# Patient Record
Sex: FEMALE | Race: ASIAN | NOT HISPANIC OR LATINO | Employment: FULL TIME | URBAN - METROPOLITAN AREA
[De-identification: names, ages, dates, MRNs, and addresses within clinical notes are randomized per-mention and may not be internally consistent; named-entity substitution may affect disease eponyms.]

---

## 2017-02-01 ENCOUNTER — HOSPITAL ENCOUNTER (OUTPATIENT)
Dept: MAMMOGRAPHY | Facility: HOSPITAL | Age: 62
Discharge: HOME | End: 2017-02-01
Admitting: FAMILY MEDICINE

## 2017-02-01 DIAGNOSIS — Z12.31 VISIT FOR SCREENING MAMMOGRAM: ICD-10-CM

## 2017-02-01 PROCEDURE — G0202 SCR MAMMO BI INCL CAD: HCPCS

## 2017-02-01 PROCEDURE — 77063 BREAST TOMOSYNTHESIS BI: CPT

## 2017-02-21 ENCOUNTER — OFFICE (OUTPATIENT)
Dept: URBAN - METROPOLITAN AREA OTHER 6 | Facility: OTHER | Age: 62
End: 2017-02-21

## 2017-02-21 VITALS
DIASTOLIC BLOOD PRESSURE: 59 MMHG | HEIGHT: 59 IN | WEIGHT: 117 LBS | HEART RATE: 88 BPM | SYSTOLIC BLOOD PRESSURE: 107 MMHG

## 2017-02-21 DIAGNOSIS — R13.10 DYSPHAGIA, UNSPECIFIED: ICD-10-CM

## 2017-02-21 DIAGNOSIS — K57.90 DIVERTICULOSIS OF INTESTINE, PART UNSPECIFIED, WITHOUT PERFO: ICD-10-CM

## 2017-02-21 PROCEDURE — 99201 OFFICE OUTPATIENT NEW 10 MINUTES: CPT | Performed by: INTERNAL MEDICINE

## 2017-03-10 ENCOUNTER — ANESTHESIA (OUTPATIENT)
Dept: GASTROENTEROLOGY | Facility: HOSPITAL | Age: 62
End: 2017-03-10

## 2017-03-10 ENCOUNTER — HOSPITAL ENCOUNTER (OUTPATIENT)
Facility: HOSPITAL | Age: 62
Setting detail: HOSPITAL OUTPATIENT SURGERY
Discharge: HOME | End: 2017-03-10
Attending: INTERNAL MEDICINE | Admitting: INTERNAL MEDICINE

## 2017-03-10 ENCOUNTER — ON CAMPUS - OUTPATIENT (OUTPATIENT)
Dept: URBAN - METROPOLITAN AREA HOSPITAL 114 | Facility: HOSPITAL | Age: 62
End: 2017-03-10

## 2017-03-10 ENCOUNTER — ANESTHESIA EVENT (OUTPATIENT)
Dept: GASTROENTEROLOGY | Facility: HOSPITAL | Age: 62
End: 2017-03-10

## 2017-03-10 VITALS
WEIGHT: 117 LBS | SYSTOLIC BLOOD PRESSURE: 121 MMHG | HEART RATE: 64 BPM | BODY MASS INDEX: 23.59 KG/M2 | RESPIRATION RATE: 18 BRPM | OXYGEN SATURATION: 97 % | DIASTOLIC BLOOD PRESSURE: 74 MMHG | HEIGHT: 59 IN

## 2017-03-10 DIAGNOSIS — R13.14 DYSPHAGIA, PHARYNGOESOPHAGEAL PHASE: ICD-10-CM

## 2017-03-10 DIAGNOSIS — K22.2 ESOPHAGEAL OBSTRUCTION: ICD-10-CM

## 2017-03-10 DIAGNOSIS — R10.13 DYSPEPSIA: ICD-10-CM

## 2017-03-10 DIAGNOSIS — K29.50 UNSPECIFIED CHRONIC GASTRITIS WITHOUT BLEEDING: ICD-10-CM

## 2017-03-10 DIAGNOSIS — R13.10 DYSPHAGIA: ICD-10-CM

## 2017-03-10 LAB — GLUCOSE BLDC GLUCOMTR-MCNC: 99 MG/DL (ref 70–130)

## 2017-03-10 PROCEDURE — 25010000002 PROPOFOL 10 MG/ML EMULSION: Performed by: ANESTHESIOLOGY

## 2017-03-10 PROCEDURE — 43239 EGD BIOPSY SINGLE/MULTIPLE: CPT | Performed by: INTERNAL MEDICINE

## 2017-03-10 PROCEDURE — 88305 TISSUE EXAM BY PATHOLOGIST: CPT | Performed by: INTERNAL MEDICINE

## 2017-03-10 PROCEDURE — 88312 SPECIAL STAINS GROUP 1: CPT | Performed by: INTERNAL MEDICINE

## 2017-03-10 PROCEDURE — 43450 DILATE ESOPHAGUS 1/MULT PASS: CPT | Performed by: INTERNAL MEDICINE

## 2017-03-10 PROCEDURE — 82962 GLUCOSE BLOOD TEST: CPT

## 2017-03-10 RX ORDER — SODIUM CHLORIDE, SODIUM LACTATE, POTASSIUM CHLORIDE, CALCIUM CHLORIDE 600; 310; 30; 20 MG/100ML; MG/100ML; MG/100ML; MG/100ML
30 INJECTION, SOLUTION INTRAVENOUS CONTINUOUS PRN
Status: DISCONTINUED | OUTPATIENT
Start: 2017-03-10 | End: 2017-03-10 | Stop reason: HOSPADM

## 2017-03-10 RX ORDER — PROPOFOL 10 MG/ML
VIAL (ML) INTRAVENOUS AS NEEDED
Status: DISCONTINUED | OUTPATIENT
Start: 2017-03-10 | End: 2017-03-10 | Stop reason: SURG

## 2017-03-10 RX ORDER — PROPOFOL 10 MG/ML
VIAL (ML) INTRAVENOUS CONTINUOUS PRN
Status: DISCONTINUED | OUTPATIENT
Start: 2017-03-10 | End: 2017-03-10 | Stop reason: SURG

## 2017-03-10 RX ORDER — LIDOCAINE HYDROCHLORIDE 20 MG/ML
INJECTION, SOLUTION INFILTRATION; PERINEURAL AS NEEDED
Status: DISCONTINUED | OUTPATIENT
Start: 2017-03-10 | End: 2017-03-10 | Stop reason: SURG

## 2017-03-10 RX ORDER — SODIUM CHLORIDE 0.9 % (FLUSH) 0.9 %
1-10 SYRINGE (ML) INJECTION AS NEEDED
Status: DISCONTINUED | OUTPATIENT
Start: 2017-03-10 | End: 2017-03-10 | Stop reason: HOSPADM

## 2017-03-10 RX ADMIN — PROPOFOL 140 MCG/KG/MIN: 10 INJECTION, EMULSION INTRAVENOUS at 10:13

## 2017-03-10 RX ADMIN — LIDOCAINE HYDROCHLORIDE 50 MG: 20 INJECTION, SOLUTION INFILTRATION; PERINEURAL at 10:13

## 2017-03-10 RX ADMIN — SODIUM CHLORIDE, POTASSIUM CHLORIDE, SODIUM LACTATE AND CALCIUM CHLORIDE 30 ML/HR: 600; 310; 30; 20 INJECTION, SOLUTION INTRAVENOUS at 10:03

## 2017-03-10 RX ADMIN — PROPOFOL 150 MG: 10 INJECTION, EMULSION INTRAVENOUS at 10:13

## 2017-03-10 NOTE — ANESTHESIA POSTPROCEDURE EVALUATION
"Patient: Felicia Boyle    Procedure Summary     Date Anesthesia Start Anesthesia Stop Room / Location    03/10/17 1008 1024  [facility] /  [facility]       Procedure Diagnosis Surgeon Provider    ESOPHAGOGASTRODUODENOSCOPY WITH BIOPSY AND PETER DILATATION 54\" (N/A Esophagus) No diagnosis on file. MD Hal Burgos MD          Anesthesia Type: MAC  Last vitals  BP (!) 72/35 (03/10/17 1028)    Temp      Pulse 66 (03/10/17 1028)   Resp 16 (03/10/17 1028)    SpO2 98 % (03/10/17 1028)      Post Anesthesia Care and Evaluation    Patient location during evaluation: PACU  Patient participation: complete - patient participated  Level of consciousness: awake and alert  Pain score: 0  Pain management: adequate  Airway patency: patent  Anesthetic complications: No anesthetic complications    Cardiovascular status: acceptable  Respiratory status: acceptable  Hydration status: acceptable      "

## 2017-03-10 NOTE — DISCHARGE INSTRUCTIONS
For the next 24 hours patient needs to be with a responsible adult.    For 24 hours DO NOT drive, operate machinery, appliances, drink alcohol, make important decisions or sign legal documents.    Start with a soft or bland diet and advance to regular diet as tolerated.    Follow recommendations on procedure report provided by your doctor.    Call Dr Gonsalez for problems [phone]. Call for pathology results in one week.    Problems may include but not limited to: large amounts of bleeding, trouble breathing, repeated vomiting, severe unrelieved pain, fever or chills.

## 2017-03-10 NOTE — ANESTHESIA PREPROCEDURE EVALUATION
Anesthesia Evaluation     Patient summary reviewed      Airway   Mallampati: II  TM distance: >3 FB  Neck ROM: full  no difficulty expected  Dental    (+) upper dentures and lower dentures    Pulmonary     breath sounds clear to auscultation  Cardiovascular   Exercise tolerance: good (4-7 METS)    Rhythm: regular  Rate: normal        Neuro/Psych  GI/Hepatic/Renal/Endo      Musculoskeletal     Abdominal    Substance History      OB/GYN          Other                                    Anesthesia Plan    ASA 2     MAC     intravenous induction   Anesthetic plan and risks discussed with patient.

## 2017-03-10 NOTE — H&P
Patient Care Team:  Tristan Malone DO as PCP - General    Chief complaint  dysphagia    Subjective     History of Present Illness  Has some issues with swallowing solids uses liquids to push solids down   Review of Systems   All other systems reviewed and are negative.       Past Medical History   Diagnosis Date   • Diabetes mellitus      borderline   • Diverticulitis    • Osteoporosis 3/3/2016     Past Surgical History   Procedure Laterality Date   • Cholecystectomy  2012   • Endoscopy       2013   • Colon surgery  2002     Family History   Problem Relation Age of Onset   • Hypertension Father    • Hypertension Mother    • Diabetes Mother    • Breast cancer Sister 48   • Breast cancer Sister 38     Social History   Substance Use Topics   • Smoking status: Current Some Day Smoker   • Smokeless tobacco: Never Used      Comment: socially   • Alcohol use No     No prescriptions prior to admission.     Allergies:  Metronidazole    Objective      Vital Signs       Physical Exam   Constitutional: She is oriented to person, place, and time. She appears well-developed and well-nourished.   HENT:   Mouth/Throat: Oropharynx is clear and moist.   Eyes: Conjunctivae are normal.   Neck: Neck supple.   Cardiovascular: Normal rate and regular rhythm.    Pulmonary/Chest: Effort normal and breath sounds normal.   Abdominal: Soft. Bowel sounds are normal.   Neurological: She is alert and oriented to person, place, and time.   Skin: Skin is warm and dry.   Psychiatric: She has a normal mood and affect.       Results Review:   I reviewed the patient's new clinical results.      Assessment/Plan     Active Problems:    * No active hospital problems. *      Assessment:  (Dysphagia  History of h pylori).     Plan:   (Upper tract endoscopy with dilation Upper tract endoscopy, risks, alternatives and benefits discussed with patient and patient is agreeable to proceed, risks, alternatives and benefits discussed with patient and patient  is agreeable to proceed).       I discussed the patients findings and my recommendations with patient and nursing staff    Angelo Gonsalez MD  03/10/17  10:13 AM

## 2017-03-10 NOTE — PLAN OF CARE
Problem: Patient Care Overview (Adult)  Goal: Plan of Care Review  Outcome: Ongoing (interventions implemented as appropriate)    03/10/17 0936   Coping/Psychosocial Response Interventions   Plan Of Care Reviewed With patient   Patient Care Overview   Progress no change       Goal: Adult Individualization and Mutuality  Outcome: Ongoing (interventions implemented as appropriate)  Goal: Discharge Needs Assessment  Outcome: Ongoing (interventions implemented as appropriate)    03/10/17 0936   Discharge Needs Assessment   Concerns To Be Addressed no discharge needs identified   Discharge Disposition home or self-care   Living Environment   Transportation Available car;family or friend will provide         Problem: GI Endoscopy (Adult)  Intervention: Monitor/Manage Procedure Recovery    03/10/17 0936   Respiratory Interventions   Airway/Ventilation Management airway patency maintained         Goal: Signs and Symptoms of Listed Potential Problems Will be Absent or Manageable (GI Endoscopy)  Outcome: Ongoing (interventions implemented as appropriate)    03/10/17 0936   GI Endoscopy   Problems Assessed (GI Endoscopy) pain   Problems Present (GI Endoscopy) none

## 2017-03-13 LAB
CYTO UR: NORMAL
LAB AP CASE REPORT: NORMAL
Lab: NORMAL
PATH REPORT.FINAL DX SPEC: NORMAL
PATH REPORT.GROSS SPEC: NORMAL

## 2017-09-01 ENCOUNTER — OFFICE VISIT (OUTPATIENT)
Dept: OBSTETRICS AND GYNECOLOGY | Facility: CLINIC | Age: 62
End: 2017-09-01

## 2017-09-01 VITALS
WEIGHT: 119 LBS | HEIGHT: 59 IN | DIASTOLIC BLOOD PRESSURE: 78 MMHG | SYSTOLIC BLOOD PRESSURE: 118 MMHG | BODY MASS INDEX: 23.99 KG/M2

## 2017-09-01 DIAGNOSIS — Z12.4 SCREENING FOR MALIGNANT NEOPLASM OF CERVIX: ICD-10-CM

## 2017-09-01 DIAGNOSIS — Z80.3 FH: BREAST CANCER IN FIRST DEGREE RELATIVE: ICD-10-CM

## 2017-09-01 DIAGNOSIS — Z13.9 SCREENING: Primary | ICD-10-CM

## 2017-09-01 DIAGNOSIS — M81.0 OSTEOPOROSIS: ICD-10-CM

## 2017-09-01 PROCEDURE — 99396 PREV VISIT EST AGE 40-64: CPT | Performed by: OBSTETRICS & GYNECOLOGY

## 2017-09-01 RX ORDER — ALENDRONATE SODIUM 70 MG/1
70 TABLET ORAL
Qty: 4 TABLET | Refills: 12 | Status: SHIPPED | OUTPATIENT
Start: 2017-09-01 | End: 2018-01-29 | Stop reason: ALTCHOICE

## 2017-09-01 RX ORDER — AZITHROMYCIN 250 MG/1
TABLET, FILM COATED ORAL
Refills: 0 | COMMUNITY
Start: 2017-08-30 | End: 2017-11-02

## 2017-09-01 NOTE — PROGRESS NOTES
"Subjective   Felicia Boyle is a 61 y.o. female Annual exam, last pap smear 1/15/16 negative, last mammo 2/1/17 negative patient states she has not started fosamax because she forgot about it - will start now- no other complaints -    History of Present Illness    The following portions of the patient's history were reviewed and updated as appropriate: allergies, current medications, past family history, past medical history, past social history, past surgical history and problem list.    Review of Systems   Constitutional: Negative for chills and fever.   Gastrointestinal: Negative for abdominal distention and abdominal pain.   Genitourinary: Negative for dyspareunia, dysuria, pelvic pain, vaginal bleeding, vaginal discharge and vaginal pain.   All other systems reviewed and are negative.  /78  Ht 59\" (149.9 cm)  Wt 119 lb (54 kg)  Breastfeeding? No  BMI 24.04 kg/m2      Objective   Physical Exam   Constitutional: She is oriented to person, place, and time. She appears well-developed and well-nourished.   Neck: Normal range of motion. Neck supple. No thyromegaly present.   Cardiovascular: Normal rate and regular rhythm.    Pulmonary/Chest: Effort normal and breath sounds normal. Right breast exhibits no mass, no nipple discharge, no skin change and no tenderness. Left breast exhibits no mass, no nipple discharge, no skin change and no tenderness.   Abdominal: Soft. Bowel sounds are normal. She exhibits no distension. There is no tenderness.   Genitourinary: Vagina normal and uterus normal. Pelvic exam was performed with patient supine. Uterus is not tender. Cervix exhibits no friability. Right adnexum displays no mass and no tenderness. Left adnexum displays no mass and no tenderness. No vaginal discharge found.   Musculoskeletal: Normal range of motion. She exhibits no edema.   Neurological: She is alert and oriented to person, place, and time.   Skin: Skin is warm and dry. No rash noted.   Psychiatric: " She has a normal mood and affect. Her behavior is normal.   Nursing note and vitals reviewed.        Assessment/Plan   Felicia was seen today for gynecologic exam.    Diagnoses and all orders for this visit:    Screening  -     Cancel: POC Urinalysis Dipstick    Screening for malignant neoplasm of cervix  -     IgP, Aptima HPV    FH: breast cancer in first degree relative    Osteoporosis  -     alendronate (FOSAMAX) 70 MG tablet; Take 1 tablet by mouth Every 7 (Seven) Days. Starting on Sunday      [facility] today   Return 1 year

## 2017-09-06 ENCOUNTER — TELEPHONE (OUTPATIENT)
Dept: OBSTETRICS AND GYNECOLOGY | Facility: CLINIC | Age: 62
End: 2017-09-06

## 2017-09-06 LAB
CYTOLOGIST CVX/VAG CYTO: NORMAL
CYTOLOGY CVX/VAG DOC THIN PREP: NORMAL
DX ICD CODE: NORMAL
HIV 1 & 2 AB SER-IMP: NORMAL
HPV I/H RISK 4 DNA CVX QL PROBE+SIG AMP: NEGATIVE
OTHER STN SPEC: NORMAL
PATH REPORT.FINAL DX SPEC: NORMAL
STAT OF ADQ CVX/VAG CYTO-IMP: NORMAL

## 2017-09-06 NOTE — TELEPHONE ENCOUNTER
----- Message from Cristin Perry MD sent at 9/6/2017  1:22 PM EDT -----  Please call patient and notify of normal results of pap

## 2017-10-04 ENCOUNTER — TELEPHONE (OUTPATIENT)
Dept: OBSTETRICS AND GYNECOLOGY | Facility: CLINIC | Age: 62
End: 2017-10-04

## 2017-10-11 RX ORDER — BLOOD-GLUCOSE METER
KIT MISCELLANEOUS
Qty: 1 EACH | Refills: 0 | Status: SHIPPED | OUTPATIENT
Start: 2017-10-11 | End: 2019-10-09

## 2017-10-11 NOTE — TELEPHONE ENCOUNTER
Patient has made an appointment on 11/2/2017 @ 3:00.    She would like a refill on     Metformin 500mg    #60  1 bid    Please send to [facility] on [address]

## 2017-11-02 ENCOUNTER — OFFICE VISIT (OUTPATIENT)
Dept: FAMILY MEDICINE CLINIC | Facility: CLINIC | Age: 62
End: 2017-11-02

## 2017-11-02 VITALS
OXYGEN SATURATION: 95 % | HEIGHT: 59 IN | HEART RATE: 95 BPM | WEIGHT: 119 LBS | BODY MASS INDEX: 23.99 KG/M2 | SYSTOLIC BLOOD PRESSURE: 120 MMHG | TEMPERATURE: 98.9 F | DIASTOLIC BLOOD PRESSURE: 70 MMHG

## 2017-11-02 DIAGNOSIS — E11.9 CONTROLLED TYPE 2 DIABETES MELLITUS WITHOUT COMPLICATION, WITHOUT LONG-TERM CURRENT USE OF INSULIN (HCC): Primary | ICD-10-CM

## 2017-11-02 DIAGNOSIS — Z00.00 ANNUAL PHYSICAL EXAM: ICD-10-CM

## 2017-11-02 DIAGNOSIS — M81.0 AGE-RELATED OSTEOPOROSIS WITHOUT CURRENT PATHOLOGICAL FRACTURE: ICD-10-CM

## 2017-11-02 DIAGNOSIS — Z78.0 POST-MENOPAUSAL: ICD-10-CM

## 2017-11-02 DIAGNOSIS — Z00.00 HEALTH CARE MAINTENANCE: ICD-10-CM

## 2017-11-02 PROCEDURE — 99213 OFFICE O/P EST LOW 20 MIN: CPT | Performed by: FAMILY MEDICINE

## 2017-11-02 NOTE — PROGRESS NOTES
Subjective   Felicia Boyle is a 62 y.o. female with   Chief Complaint   Patient presents with   • Diabetes     History of Present Illness     Patient presents today for stable Type II Diabetes.  She reports that she has recently checked her A1C and it was 6.13. 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  She continues to tolerate Metformin twice a day. She checks her sugar daily.    Patient states that she was prescribed Alendronate.  There is a known history of osteoporosis.    The following portions of the patient's history were reviewed and updated as appropriate: allergies, current medications, past family history, past medical history, past social history, past surgical history and problem list.    Review of Systems   Endocrine: Negative for cold intolerance and heat intolerance.        Type II Diabetes   Musculoskeletal: Negative for arthralgias, back pain, gait problem and joint swelling.        Osteoporosis       Objective     Vitals:    11/02/17 1504   BP: 120/70   Pulse: 95   Temp: 98.9 °F (37.2 °C)   SpO2: 95%     BP Readings from Last 3 Encounters:   11/02/17 120/70   09/01/17 118/78   03/10/17 121/74      Wt Readings from Last 3 Encounters:   11/02/17 119 lb (54 kg)   09/01/17 119 lb (54 kg)   03/09/17 117 lb (53.1 kg)        Physical Exam   Constitutional: She is oriented to person, place, and time. She appears well-developed and well-nourished.   HENT:   Head: Normocephalic and atraumatic.   Neck: Trachea normal and phonation normal. Neck supple. Normal carotid pulses present. Carotid bruit is not present. No thyroid mass and no thyromegaly present.   Cardiovascular: Normal rate, regular rhythm and normal heart sounds.  Exam reveals no gallop and no friction rub.    No murmur heard.  Pulmonary/Chest: Effort normal and breath sounds normal. No respiratory distress. She has no decreased breath sounds. She has no wheezes. She has no  rhonchi. She has no rales.   Lymphadenopathy:     She has no cervical adenopathy.   Neurological: She is alert and oriented to person, place, and time.   Skin: Skin is warm and dry. No rash noted.   Psychiatric: She has a normal mood and affect. Her speech is normal and behavior is normal. Judgment and thought content normal. Cognition and memory are normal.   Nursing note and vitals reviewed.      Assessment/Plan   Felicia was seen today for diabetes.    Diagnoses and all orders for this visit:    Controlled type 2 diabetes mellitus without complication, without long-term current use of insulin    Post-menopausal  -     DEXA Bone Density Axial; Future    Health care maintenance  -     CBC & Differential  -     Comprehensive Metabolic Panel  -     Hemoglobin A1c  -     Lipid Panel  -     TSH  -     Vitamin D 25 Hydroxy    Annual physical exam  -     CBC & Differential  -     Comprehensive Metabolic Panel  -     Hemoglobin A1c  -     Lipid Panel  -     TSH  -     Vitamin D 25 Hydroxy    Age-related osteoporosis without current pathological fracture    Other orders  -     metFORMIN (GLUCOPHAGE) 500 MG tablet; Take 1 tablet by mouth 2 (Two) Times a Day With Meals.  -     HYDROcodone-homatropine (HYCODAN) 5-1.5 MG/5ML syrup; Take 5 mL by mouth Every 6 (Six) Hours As Needed for Cough.        Return in about 6 months (around 5/2/2018).        Scribed for Tristan Malone MD by Erick Cordova. 11/02/2017    ITristan MD personally performed the services described in this documentation, as scribed by Erick Cordova in my presence, and it is both accurate and complete

## 2017-11-04 PROBLEM — E11.9 CONTROLLED TYPE 2 DIABETES MELLITUS WITHOUT COMPLICATION, WITHOUT LONG-TERM CURRENT USE OF INSULIN: Status: ACTIVE | Noted: 2017-11-04

## 2017-11-04 PROBLEM — Z78.0 POST-MENOPAUSAL: Status: ACTIVE | Noted: 2017-11-04

## 2017-11-08 ENCOUNTER — APPOINTMENT (OUTPATIENT)
Dept: LAB | Facility: HOSPITAL | Age: 62
End: 2017-11-08

## 2017-11-08 LAB
25(OH)D3 SERPL-MCNC: 17.5 NG/ML (ref 30–100)
ALBUMIN SERPL-MCNC: 4.7 G/DL (ref 3.5–5.2)
ALBUMIN/GLOB SERPL: 1.6 G/DL
ALP SERPL-CCNC: 75 U/L (ref 39–117)
ALT SERPL W P-5'-P-CCNC: 23 U/L (ref 1–33)
ANION GAP SERPL CALCULATED.3IONS-SCNC: 10.3 MMOL/L
AST SERPL-CCNC: 16 U/L (ref 1–32)
BASOPHILS # BLD AUTO: 0.03 10*3/MM3 (ref 0–0.2)
BASOPHILS NFR BLD AUTO: 0.4 % (ref 0–1.5)
BILIRUB SERPL-MCNC: 0.3 MG/DL (ref 0.1–1.2)
BUN BLD-MCNC: 12 MG/DL (ref 8–23)
BUN/CREAT SERPL: 16.9 (ref 7–25)
CALCIUM SPEC-SCNC: 9.4 MG/DL (ref 8.6–10.5)
CHLORIDE SERPL-SCNC: 104 MMOL/L (ref 98–107)
CHOLEST SERPL-MCNC: 151 MG/DL (ref 0–200)
CO2 SERPL-SCNC: 27.7 MMOL/L (ref 22–29)
CREAT BLD-MCNC: 0.71 MG/DL (ref 0.57–1)
DEPRECATED RDW RBC AUTO: 45.1 FL (ref 37–54)
EOSINOPHIL # BLD AUTO: 0.08 10*3/MM3 (ref 0–0.7)
EOSINOPHIL NFR BLD AUTO: 1 % (ref 0.3–6.2)
ERYTHROCYTE [DISTWIDTH] IN BLOOD BY AUTOMATED COUNT: 13.1 % (ref 11.7–13)
GFR SERPL CREATININE-BSD FRML MDRD: 101 ML/MIN/1.73
GFR SERPL CREATININE-BSD FRML MDRD: 83 ML/MIN/1.73
GLOBULIN UR ELPH-MCNC: 2.9 GM/DL
GLUCOSE BLD-MCNC: 116 MG/DL (ref 65–99)
HBA1C MFR BLD: 6.23 % (ref 4.8–5.6)
HCT VFR BLD AUTO: 42.5 % (ref 35.6–45.5)
HDLC SERPL-MCNC: 67 MG/DL (ref 40–60)
HGB BLD-MCNC: 13.5 G/DL (ref 11.9–15.5)
IMM GRANULOCYTES # BLD: 0 10*3/MM3 (ref 0–0.03)
IMM GRANULOCYTES NFR BLD: 0 % (ref 0–0.5)
LDLC SERPL CALC-MCNC: 53 MG/DL (ref 0–100)
LDLC/HDLC SERPL: 0.8 {RATIO}
LYMPHOCYTES # BLD AUTO: 2.95 10*3/MM3 (ref 0.9–4.8)
LYMPHOCYTES NFR BLD AUTO: 35.1 % (ref 19.6–45.3)
MCH RBC QN AUTO: 30.1 PG (ref 26.9–32)
MCHC RBC AUTO-ENTMCNC: 31.8 G/DL (ref 32.4–36.3)
MCV RBC AUTO: 94.7 FL (ref 80.5–98.2)
MONOCYTES # BLD AUTO: 0.91 10*3/MM3 (ref 0.2–1.2)
MONOCYTES NFR BLD AUTO: 10.8 % (ref 5–12)
NEUTROPHILS # BLD AUTO: 4.43 10*3/MM3 (ref 1.9–8.1)
NEUTROPHILS NFR BLD AUTO: 52.7 % (ref 42.7–76)
PLATELET # BLD AUTO: 286 10*3/MM3 (ref 140–500)
PMV BLD AUTO: 10.5 FL (ref 6–12)
POTASSIUM BLD-SCNC: 4.1 MMOL/L (ref 3.5–5.2)
PROT SERPL-MCNC: 7.6 G/DL (ref 6–8.5)
RBC # BLD AUTO: 4.49 10*6/MM3 (ref 3.9–5.2)
SODIUM BLD-SCNC: 142 MMOL/L (ref 136–145)
TRIGL SERPL-MCNC: 153 MG/DL (ref 0–150)
TSH SERPL DL<=0.05 MIU/L-ACNC: 2.7 MIU/ML (ref 0.27–4.2)
VLDLC SERPL-MCNC: 30.6 MG/DL (ref 5–40)
WBC NRBC COR # BLD: 8.4 10*3/MM3 (ref 4.5–10.7)

## 2017-11-08 PROCEDURE — 82306 VITAMIN D 25 HYDROXY: CPT | Performed by: FAMILY MEDICINE

## 2017-11-08 PROCEDURE — 85025 COMPLETE CBC W/AUTO DIFF WBC: CPT | Performed by: FAMILY MEDICINE

## 2017-11-08 PROCEDURE — 83036 HEMOGLOBIN GLYCOSYLATED A1C: CPT | Performed by: FAMILY MEDICINE

## 2017-11-08 PROCEDURE — 84443 ASSAY THYROID STIM HORMONE: CPT | Performed by: FAMILY MEDICINE

## 2017-11-08 PROCEDURE — 80061 LIPID PANEL: CPT | Performed by: FAMILY MEDICINE

## 2017-11-08 PROCEDURE — 80053 COMPREHEN METABOLIC PANEL: CPT | Performed by: FAMILY MEDICINE

## 2018-01-03 ENCOUNTER — TRANSCRIBE ORDERS (OUTPATIENT)
Dept: ADMINISTRATIVE | Facility: HOSPITAL | Age: 63
End: 2018-01-03

## 2018-01-03 DIAGNOSIS — Z12.31 VISIT FOR SCREENING MAMMOGRAM: Primary | ICD-10-CM

## 2018-01-22 ENCOUNTER — HOSPITAL ENCOUNTER (OUTPATIENT)
Dept: BONE DENSITY | Facility: HOSPITAL | Age: 63
Discharge: HOME | End: 2018-01-22
Admitting: FAMILY MEDICINE

## 2018-01-22 DIAGNOSIS — Z78.0 POST-MENOPAUSAL: ICD-10-CM

## 2018-01-22 PROCEDURE — 77080 DXA BONE DENSITY AXIAL: CPT

## 2018-01-29 ENCOUNTER — OFFICE VISIT (OUTPATIENT)
Dept: FAMILY MEDICINE CLINIC | Facility: CLINIC | Age: 63
End: 2018-01-29

## 2018-01-29 VITALS
DIASTOLIC BLOOD PRESSURE: 68 MMHG | SYSTOLIC BLOOD PRESSURE: 120 MMHG | TEMPERATURE: 98.5 F | WEIGHT: 115 LBS | HEIGHT: 59 IN | OXYGEN SATURATION: 97 % | HEART RATE: 97 BPM | BODY MASS INDEX: 23.18 KG/M2

## 2018-01-29 DIAGNOSIS — M81.0 AGE-RELATED OSTEOPOROSIS WITHOUT CURRENT PATHOLOGICAL FRACTURE: Primary | ICD-10-CM

## 2018-01-29 DIAGNOSIS — E11.9 CONTROLLED TYPE 2 DIABETES MELLITUS WITHOUT COMPLICATION, WITHOUT LONG-TERM CURRENT USE OF INSULIN (HCC): ICD-10-CM

## 2018-01-29 DIAGNOSIS — E55.9 VITAMIN D DEFICIENCY: ICD-10-CM

## 2018-01-29 PROCEDURE — 99213 OFFICE O/P EST LOW 20 MIN: CPT | Performed by: FAMILY MEDICINE

## 2018-01-29 RX ORDER — ERGOCALCIFEROL 1.25 MG/1
50000 CAPSULE ORAL
Qty: 12 CAPSULE | Refills: 2 | Status: SHIPPED | OUTPATIENT
Start: 2018-01-29 | End: 2018-11-15 | Stop reason: SDUPTHER

## 2018-01-29 NOTE — PATIENT INSTRUCTIONS
Start 50,000 IU per day of Vitamin D3  Repeat Dexa in November of 2018  Discontinue Fosamax due to Reflux and start Prolia Injections.

## 2018-02-02 ENCOUNTER — HOSPITAL ENCOUNTER (OUTPATIENT)
Dept: MAMMOGRAPHY | Facility: HOSPITAL | Age: 63
Discharge: HOME | End: 2018-02-02
Admitting: FAMILY MEDICINE

## 2018-02-02 DIAGNOSIS — Z12.31 VISIT FOR SCREENING MAMMOGRAM: ICD-10-CM

## 2018-02-02 PROCEDURE — 77063 BREAST TOMOSYNTHESIS BI: CPT

## 2018-02-02 PROCEDURE — 77067 SCR MAMMO BI INCL CAD: CPT

## 2018-02-13 ENCOUNTER — TELEPHONE (OUTPATIENT)
Dept: FAMILY MEDICINE CLINIC | Facility: CLINIC | Age: 63
End: 2018-02-13

## 2018-02-13 NOTE — TELEPHONE ENCOUNTER
Called pt to schedule her for Prolia shot, it is here.  She is now afraid to take the injection due to side effects.  She isnt sure now what to do.  Would you like her to schedule an appt to see you to discuss this?  Or can you offer advice over the phone?

## 2018-02-14 ENCOUNTER — OFFICE VISIT (OUTPATIENT)
Dept: FAMILY MEDICINE CLINIC | Facility: CLINIC | Age: 63
End: 2018-02-14

## 2018-02-14 VITALS
TEMPERATURE: 98.3 F | DIASTOLIC BLOOD PRESSURE: 78 MMHG | BODY MASS INDEX: 23.18 KG/M2 | SYSTOLIC BLOOD PRESSURE: 122 MMHG | HEART RATE: 87 BPM | HEIGHT: 59 IN | OXYGEN SATURATION: 97 % | WEIGHT: 115 LBS

## 2018-02-14 DIAGNOSIS — M81.0 AGE-RELATED OSTEOPOROSIS WITHOUT CURRENT PATHOLOGICAL FRACTURE: Primary | ICD-10-CM

## 2018-02-14 PROCEDURE — 99213 OFFICE O/P EST LOW 20 MIN: CPT | Performed by: FAMILY MEDICINE

## 2018-02-14 RX ORDER — ALENDRONATE SODIUM 70 MG/1
70 TABLET ORAL
Qty: 4 TABLET | Refills: 11 | Status: SHIPPED | OUTPATIENT
Start: 2018-02-14 | End: 2018-11-15 | Stop reason: SDUPTHER

## 2018-02-14 NOTE — PROGRESS NOTES
"Subjective   Felicia Boyle is a 62 y.o. female.   Chief Complaint   Patient presents with   • Osteoporosis     Here to discuss prolia, she is concerned about side effects     Vitals:    02/14/18 0732   BP: 122/78   Pulse: 87   Temp: 98.3 °F (36.8 °C)   SpO2: 97%   Weight: 52.2 kg (115 lb)   Height: 149.9 cm (59.02\")     Allergies   Allergen Reactions   • Metronidazole Nausea Only     History of Present Illness   62-year-old [place] female with significant osteoporosis here in follow-up.  Patient initially agreed to the use of Prolia and actually had one injection approved and paid for and has been delivered.  She is now refusing to use this product secondary to cost and would rather try Fosamax first.  Full discussion in regards to benefits, potential side effects and alternatives took place with patient choosing to use this method currently.  The following portions of the patient's history were reviewed and updated as appropriate: allergies, current medications, past family history, past medical history, past social history, past surgical history and problem list.    Review of Systems   Musculoskeletal:        Osteoporosis       Objective   Physical Exam   Constitutional: She is oriented to person, place, and time. She appears well-developed and well-nourished.   HENT:   Head: Normocephalic and atraumatic.   Neck: Trachea normal and phonation normal. Neck supple. Normal carotid pulses present. Carotid bruit is not present. No thyroid mass and no thyromegaly present.   Cardiovascular: Normal rate, regular rhythm and normal heart sounds.  Exam reveals no gallop and no friction rub.    No murmur heard.  Pulmonary/Chest: Effort normal and breath sounds normal. No respiratory distress. She has no decreased breath sounds. She has no wheezes. She has no rhonchi. She has no rales.   Lymphadenopathy:     She has no cervical adenopathy.   Neurological: She is alert and oriented to person, place, and time.   Skin: Skin is " warm and dry. No rash noted.   Psychiatric: She has a normal mood and affect. Her speech is normal and behavior is normal. Judgment and thought content normal. Cognition and memory are normal.   Nursing note and vitals reviewed.      Assessment/Plan   Felicia was seen today for osteoporosis.    Diagnoses and all orders for this visit:    Age-related osteoporosis without current pathological fracture  -     alendronate (FOSAMAX) 70 MG tablet; Take 1 tablet by mouth Every 7 (Seven) Days.      Repeat DEXA scan in 1 year.

## 2018-03-16 ENCOUNTER — LAB (OUTPATIENT)
Dept: LAB | Facility: HOSPITAL | Age: 63
End: 2018-03-16

## 2018-03-16 DIAGNOSIS — E11.9 CONTROLLED TYPE 2 DIABETES MELLITUS WITHOUT COMPLICATION, WITHOUT LONG-TERM CURRENT USE OF INSULIN (HCC): ICD-10-CM

## 2018-03-16 DIAGNOSIS — E55.9 VITAMIN D DEFICIENCY: ICD-10-CM

## 2018-03-16 LAB
25(OH)D3 SERPL-MCNC: 54.1 NG/ML (ref 30–100)
ALBUMIN SERPL-MCNC: 5.1 G/DL (ref 3.5–5.2)
ALBUMIN/GLOB SERPL: 1.8 G/DL
ALP SERPL-CCNC: 62 U/L (ref 39–117)
ALT SERPL W P-5'-P-CCNC: 21 U/L (ref 1–33)
ANION GAP SERPL CALCULATED.3IONS-SCNC: 15.5 MMOL/L
AST SERPL-CCNC: 18 U/L (ref 1–32)
BASOPHILS # BLD AUTO: 0.03 10*3/MM3 (ref 0–0.2)
BASOPHILS NFR BLD AUTO: 0.3 % (ref 0–1.5)
BILIRUB SERPL-MCNC: 0.3 MG/DL (ref 0.1–1.2)
BUN BLD-MCNC: 15 MG/DL (ref 8–23)
BUN/CREAT SERPL: 26.8 (ref 7–25)
CALCIUM SPEC-SCNC: 9.6 MG/DL (ref 8.6–10.5)
CHLORIDE SERPL-SCNC: 99 MMOL/L (ref 98–107)
CHOLEST SERPL-MCNC: 152 MG/DL (ref 0–200)
CO2 SERPL-SCNC: 25.5 MMOL/L (ref 22–29)
CREAT BLD-MCNC: 0.56 MG/DL (ref 0.57–1)
DEPRECATED RDW RBC AUTO: 45.4 FL (ref 37–54)
EOSINOPHIL # BLD AUTO: 0.03 10*3/MM3 (ref 0–0.7)
EOSINOPHIL NFR BLD AUTO: 0.3 % (ref 0.3–6.2)
ERYTHROCYTE [DISTWIDTH] IN BLOOD BY AUTOMATED COUNT: 13.3 % (ref 11.7–13)
GFR SERPL CREATININE-BSD FRML MDRD: 109 ML/MIN/1.73
GFR SERPL CREATININE-BSD FRML MDRD: 133 ML/MIN/1.73
GLOBULIN UR ELPH-MCNC: 2.9 GM/DL
GLUCOSE BLD-MCNC: 123 MG/DL (ref 65–99)
HBA1C MFR BLD: 5.8 % (ref 4.8–5.6)
HCT VFR BLD AUTO: 43 % (ref 35.6–45.5)
HDLC SERPL-MCNC: 68 MG/DL (ref 40–60)
HGB BLD-MCNC: 14.2 G/DL (ref 11.9–15.5)
IMM GRANULOCYTES # BLD: 0.02 10*3/MM3 (ref 0–0.03)
IMM GRANULOCYTES NFR BLD: 0.2 % (ref 0–0.5)
LDLC SERPL CALC-MCNC: 57 MG/DL (ref 0–100)
LDLC/HDLC SERPL: 0.84 {RATIO}
LYMPHOCYTES # BLD AUTO: 3.08 10*3/MM3 (ref 0.9–4.8)
LYMPHOCYTES NFR BLD AUTO: 34.9 % (ref 19.6–45.3)
MCH RBC QN AUTO: 30.8 PG (ref 26.9–32)
MCHC RBC AUTO-ENTMCNC: 33 G/DL (ref 32.4–36.3)
MCV RBC AUTO: 93.3 FL (ref 80.5–98.2)
MONOCYTES # BLD AUTO: 0.49 10*3/MM3 (ref 0.2–1.2)
MONOCYTES NFR BLD AUTO: 5.5 % (ref 5–12)
NEUTROPHILS # BLD AUTO: 5.18 10*3/MM3 (ref 1.9–8.1)
NEUTROPHILS NFR BLD AUTO: 58.8 % (ref 42.7–76)
PLATELET # BLD AUTO: 316 10*3/MM3 (ref 140–500)
PMV BLD AUTO: 10.7 FL (ref 6–12)
POTASSIUM BLD-SCNC: 4.2 MMOL/L (ref 3.5–5.2)
PROT SERPL-MCNC: 8 G/DL (ref 6–8.5)
RBC # BLD AUTO: 4.61 10*6/MM3 (ref 3.9–5.2)
SODIUM BLD-SCNC: 140 MMOL/L (ref 136–145)
TRIGL SERPL-MCNC: 135 MG/DL (ref 0–150)
TSH SERPL DL<=0.05 MIU/L-ACNC: 1.72 MIU/ML (ref 0.27–4.2)
VLDLC SERPL-MCNC: 27 MG/DL (ref 5–40)
WBC NRBC COR # BLD: 8.83 10*3/MM3 (ref 4.5–10.7)

## 2018-03-16 PROCEDURE — 84443 ASSAY THYROID STIM HORMONE: CPT

## 2018-03-16 PROCEDURE — 80053 COMPREHEN METABOLIC PANEL: CPT

## 2018-03-16 PROCEDURE — 85025 COMPLETE CBC W/AUTO DIFF WBC: CPT

## 2018-03-16 PROCEDURE — 80061 LIPID PANEL: CPT

## 2018-03-16 PROCEDURE — 83036 HEMOGLOBIN GLYCOSYLATED A1C: CPT

## 2018-03-16 PROCEDURE — 82306 VITAMIN D 25 HYDROXY: CPT

## 2018-03-16 PROCEDURE — 36415 COLL VENOUS BLD VENIPUNCTURE: CPT

## 2018-05-16 ENCOUNTER — OFFICE VISIT (OUTPATIENT)
Dept: FAMILY MEDICINE CLINIC | Facility: CLINIC | Age: 63
End: 2018-05-16

## 2018-05-16 VITALS
DIASTOLIC BLOOD PRESSURE: 64 MMHG | BODY MASS INDEX: 23.69 KG/M2 | HEIGHT: 59 IN | HEART RATE: 86 BPM | OXYGEN SATURATION: 98 % | RESPIRATION RATE: 16 BRPM | SYSTOLIC BLOOD PRESSURE: 98 MMHG | WEIGHT: 117.5 LBS | TEMPERATURE: 98 F

## 2018-05-16 DIAGNOSIS — R73.02 GLUCOSE INTOLERANCE (IMPAIRED GLUCOSE TOLERANCE): Primary | ICD-10-CM

## 2018-05-16 DIAGNOSIS — Z00.00 HEALTHCARE MAINTENANCE: ICD-10-CM

## 2018-05-16 DIAGNOSIS — E11.9 CONTROLLED TYPE 2 DIABETES MELLITUS WITHOUT COMPLICATION, WITHOUT LONG-TERM CURRENT USE OF INSULIN (HCC): ICD-10-CM

## 2018-05-16 PROCEDURE — 99213 OFFICE O/P EST LOW 20 MIN: CPT | Performed by: FAMILY MEDICINE

## 2018-05-16 NOTE — PATIENT INSTRUCTIONS
Lab on 03/16/2018   Component Date Value Ref Range Status   • 25 Hydroxy, Vitamin D 03/16/2018 54.1  30.0 - 100.0 ng/ml Final   Lab on 03/16/2018   Component Date Value Ref Range Status   • TSH 03/16/2018 1.720  0.270 - 4.200 mIU/mL Final   Lab on 03/16/2018   Component Date Value Ref Range Status   • Total Cholesterol 03/16/2018 152  0 - 200 mg/dL Final   • Triglycerides 03/16/2018 135  0 - 150 mg/dL Final   • HDL Cholesterol 03/16/2018 68* 40 - 60 mg/dL Final   • LDL Cholesterol  03/16/2018 57  0 - 100 mg/dL Final   • VLDL Cholesterol 03/16/2018 27  5 - 40 mg/dL Final   • LDL/HDL Ratio 03/16/2018 0.84   Final   Lab on 03/16/2018   Component Date Value Ref Range Status   • Hemoglobin A1C 03/16/2018 5.80* 4.80 - 5.60 % Final   Lab on 03/16/2018   Component Date Value Ref Range Status   • Glucose 03/16/2018 123* 65 - 99 mg/dL Final   • BUN 03/16/2018 15  8 - 23 mg/dL Final   • Creatinine 03/16/2018 0.56* 0.57 - 1.00 mg/dL Final   • Sodium 03/16/2018 140  136 - 145 mmol/L Final   • Potassium 03/16/2018 4.2  3.5 - 5.2 mmol/L Final   • Chloride 03/16/2018 99  98 - 107 mmol/L Final   • CO2 03/16/2018 25.5  22.0 - 29.0 mmol/L Final   • Calcium 03/16/2018 9.6  8.6 - 10.5 mg/dL Final   • Total Protein 03/16/2018 8.0  6.0 - 8.5 g/dL Final   • Albumin 03/16/2018 5.10  3.50 - 5.20 g/dL Final   • ALT (SGPT) 03/16/2018 21  1 - 33 U/L Final   • AST (SGOT) 03/16/2018 18  1 - 32 U/L Final   • Alkaline Phosphatase 03/16/2018 62  39 - 117 U/L Final   • Total Bilirubin 03/16/2018 0.3  0.1 - 1.2 mg/dL Final   • eGFR Non African Amer 03/16/2018 109  >60 mL/min/1.73 Final   • eGFR   Amer 03/16/2018 133  >60 mL/min/1.73 Final   • Globulin 03/16/2018 2.9  gm/dL Final   • A/G Ratio 03/16/2018 1.8  g/dL Final   • BUN/Creatinine Ratio 03/16/2018 26.8* 7.0 - 25.0 Final   • Anion Gap 03/16/2018 15.5  mmol/L Final   Lab on 03/16/2018   Component Date Value Ref Range Status   • WBC 03/16/2018 8.83  4.50 - 10.70 10*3/mm3 Final   • RBC  03/16/2018 4.61  3.90 - 5.20 10*6/mm3 Final   • Hemoglobin 03/16/2018 14.2  11.9 - 15.5 g/dL Final   • Hematocrit 03/16/2018 43.0  35.6 - 45.5 % Final   • MCV 03/16/2018 93.3  80.5 - 98.2 fL Final   • MCH 03/16/2018 30.8  26.9 - 32.0 pg Final   • MCHC 03/16/2018 33.0  32.4 - 36.3 g/dL Final   • RDW 03/16/2018 13.3* 11.7 - 13.0 % Final   • RDW-SD 03/16/2018 45.4  37.0 - 54.0 fl Final   • MPV 03/16/2018 10.7  6.0 - 12.0 fL Final   • Platelets 03/16/2018 316  140 - 500 10*3/mm3 Final   • Neutrophil % 03/16/2018 58.8  42.7 - 76.0 % Final   • Lymphocyte % 03/16/2018 34.9  19.6 - 45.3 % Final   • Monocyte % 03/16/2018 5.5  5.0 - 12.0 % Final   • Eosinophil % 03/16/2018 0.3  0.3 - 6.2 % Final   • Basophil % 03/16/2018 0.3  0.0 - 1.5 % Final   • Immature Grans % 03/16/2018 0.2  0.0 - 0.5 % Final   • Neutrophils, Absolute 03/16/2018 5.18  1.90 - 8.10 10*3/mm3 Final   • Lymphocytes, Absolute 03/16/2018 3.08  0.90 - 4.80 10*3/mm3 Final   • Monocytes, Absolute 03/16/2018 0.49  0.20 - 1.20 10*3/mm3 Final   • Eosinophils, Absolute 03/16/2018 0.03  0.00 - 0.70 10*3/mm3 Final   • Basophils, Absolute 03/16/2018 0.03  0.00 - 0.20 10*3/mm3 Final   • Immature Grans, Absolute 03/16/2018 0.02  0.00 - 0.03 10*3/mm3 Final     You may stop the Metformin at this time.

## 2018-05-16 NOTE — PROGRESS NOTES
Subjective   Felicia Boyle is a 62 y.o. female with   Chief Complaint   Patient presents with   • Follow-up     6 month   • Diabetes   .    History of Present Illness     Pt presents today for follow up on unstable glucose intolerance, HLD and Vit D.  Pt had labs prior to this appointment which will be discussed today.  Pt is currently taking Metformin 500mg for her diabetes and she does take Vit D 17471PK for her Vit D Def.  Pt also takes Alendronate 70mg for known history of  osteoporosis.  She is currently tolerating those medications and has no other problems a this time.      The following portions of the patient's history were reviewed and updated as appropriate: allergies, current medications, past family history, past medical history, past social history, past surgical history and problem list.    Review of Systems   Endocrine: Negative for cold intolerance and heat intolerance.        Type 2 diabetes     Musculoskeletal: Negative for arthralgias and back pain.        Osteoporosis   All other systems reviewed and are negative.      Objective     Vitals:    05/16/18 0845   BP: 98/64   Pulse: 86   Resp: 16   Temp: 98 °F (36.7 °C)   SpO2: 98%     BP Readings from Last 3 Encounters:   05/16/18 98/64   02/14/18 122/78   01/29/18 120/68      Wt Readings from Last 3 Encounters:   05/16/18 53.3 kg (117 lb 8 oz)   02/14/18 52.2 kg (115 lb)   01/29/18 52.2 kg (115 lb)      Lab on 03/16/2018   Component Date Value Ref Range Status   • 25 Hydroxy, Vitamin D 03/16/2018 54.1  30.0 - 100.0 ng/ml Final   Lab on 03/16/2018   Component Date Value Ref Range Status   • TSH 03/16/2018 1.720  0.270 - 4.200 mIU/mL Final   Lab on 03/16/2018   Component Date Value Ref Range Status   • Total Cholesterol 03/16/2018 152  0 - 200 mg/dL Final   • Triglycerides 03/16/2018 135  0 - 150 mg/dL Final   • HDL Cholesterol 03/16/2018 68* 40 - 60 mg/dL Final   • LDL Cholesterol  03/16/2018 57  0 - 100 mg/dL Final   • VLDL Cholesterol 03/16/2018  27  5 - 40 mg/dL Final   • LDL/HDL Ratio 03/16/2018 0.84   Final   Lab on 03/16/2018   Component Date Value Ref Range Status   • Hemoglobin A1C 03/16/2018 5.80* 4.80 - 5.60 % Final   Lab on 03/16/2018   Component Date Value Ref Range Status   • Glucose 03/16/2018 123* 65 - 99 mg/dL Final   • BUN 03/16/2018 15  8 - 23 mg/dL Final   • Creatinine 03/16/2018 0.56* 0.57 - 1.00 mg/dL Final   • Sodium 03/16/2018 140  136 - 145 mmol/L Final   • Potassium 03/16/2018 4.2  3.5 - 5.2 mmol/L Final   • Chloride 03/16/2018 99  98 - 107 mmol/L Final   • CO2 03/16/2018 25.5  22.0 - 29.0 mmol/L Final   • Calcium 03/16/2018 9.6  8.6 - 10.5 mg/dL Final   • Total Protein 03/16/2018 8.0  6.0 - 8.5 g/dL Final   • Albumin 03/16/2018 5.10  3.50 - 5.20 g/dL Final   • ALT (SGPT) 03/16/2018 21  1 - 33 U/L Final   • AST (SGOT) 03/16/2018 18  1 - 32 U/L Final   • Alkaline Phosphatase 03/16/2018 62  39 - 117 U/L Final   • Total Bilirubin 03/16/2018 0.3  0.1 - 1.2 mg/dL Final   • eGFR Non African Amer 03/16/2018 109  >60 mL/min/1.73 Final   • eGFR   Amer 03/16/2018 133  >60 mL/min/1.73 Final   • Globulin 03/16/2018 2.9  gm/dL Final   • A/G Ratio 03/16/2018 1.8  g/dL Final   • BUN/Creatinine Ratio 03/16/2018 26.8* 7.0 - 25.0 Final   • Anion Gap 03/16/2018 15.5  mmol/L Final   Lab on 03/16/2018   Component Date Value Ref Range Status   • WBC 03/16/2018 8.83  4.50 - 10.70 10*3/mm3 Final   • RBC 03/16/2018 4.61  3.90 - 5.20 10*6/mm3 Final   • Hemoglobin 03/16/2018 14.2  11.9 - 15.5 g/dL Final   • Hematocrit 03/16/2018 43.0  35.6 - 45.5 % Final   • MCV 03/16/2018 93.3  80.5 - 98.2 fL Final   • MCH 03/16/2018 30.8  26.9 - 32.0 pg Final   • MCHC 03/16/2018 33.0  32.4 - 36.3 g/dL Final   • RDW 03/16/2018 13.3* 11.7 - 13.0 % Final   • RDW-SD 03/16/2018 45.4  37.0 - 54.0 fl Final   • MPV 03/16/2018 10.7  6.0 - 12.0 fL Final   • Platelets 03/16/2018 316  140 - 500 10*3/mm3 Final   • Neutrophil % 03/16/2018 58.8  42.7 - 76.0 % Final   • Lymphocyte %  03/16/2018 34.9  19.6 - 45.3 % Final   • Monocyte % 03/16/2018 5.5  5.0 - 12.0 % Final   • Eosinophil % 03/16/2018 0.3  0.3 - 6.2 % Final   • Basophil % 03/16/2018 0.3  0.0 - 1.5 % Final   • Immature Grans % 03/16/2018 0.2  0.0 - 0.5 % Final   • Neutrophils, Absolute 03/16/2018 5.18  1.90 - 8.10 10*3/mm3 Final   • Lymphocytes, Absolute 03/16/2018 3.08  0.90 - 4.80 10*3/mm3 Final   • Monocytes, Absolute 03/16/2018 0.49  0.20 - 1.20 10*3/mm3 Final   • Eosinophils, Absolute 03/16/2018 0.03  0.00 - 0.70 10*3/mm3 Final   • Basophils, Absolute 03/16/2018 0.03  0.00 - 0.20 10*3/mm3 Final   • Immature Grans, Absolute 03/16/2018 0.02  0.00 - 0.03 10*3/mm3 Final         Physical Exam   Constitutional: She is oriented to person, place, and time. She appears well-developed and well-nourished.   HENT:   Head: Normocephalic and atraumatic.   Neck: Trachea normal and phonation normal. Neck supple. Normal carotid pulses present. Carotid bruit is not present. No thyroid mass and no thyromegaly present.   Cardiovascular: Normal rate, regular rhythm and normal heart sounds.  Exam reveals no gallop and no friction rub.    No murmur heard.  Pulmonary/Chest: Effort normal and breath sounds normal. No respiratory distress. She has no decreased breath sounds. She has no wheezes. She has no rhonchi. She has no rales.   Lymphadenopathy:     She has no cervical adenopathy.   Neurological: She is alert and oriented to person, place, and time.   Skin: Skin is warm and dry. No rash noted.   Psychiatric: She has a normal mood and affect. Her speech is normal and behavior is normal. Judgment and thought content normal. Cognition and memory are normal.   Nursing note and vitals reviewed.      Assessment/Plan   Felicia was seen today for follow-up and diabetes.    Diagnoses and all orders for this visit:    Glucose intolerance (impaired glucose tolerance)  -     Comprehensive Metabolic Panel; Future  -     Hemoglobin A1c; Future    Healthcare  maintenance  -     CBC & Differential; Future  -     TSH; Future  -     Vitamin D 25 Hydroxy; Future  -     Lipid Panel; Future    Controlled type 2 diabetes mellitus without complication, without long-term current use of insulin        Patient Instructions     Lab on 03/16/2018   Component Date Value Ref Range Status   • 25 Hydroxy, Vitamin D 03/16/2018 54.1  30.0 - 100.0 ng/ml Final   Lab on 03/16/2018   Component Date Value Ref Range Status   • TSH 03/16/2018 1.720  0.270 - 4.200 mIU/mL Final   Lab on 03/16/2018   Component Date Value Ref Range Status   • Total Cholesterol 03/16/2018 152  0 - 200 mg/dL Final   • Triglycerides 03/16/2018 135  0 - 150 mg/dL Final   • HDL Cholesterol 03/16/2018 68* 40 - 60 mg/dL Final   • LDL Cholesterol  03/16/2018 57  0 - 100 mg/dL Final   • VLDL Cholesterol 03/16/2018 27  5 - 40 mg/dL Final   • LDL/HDL Ratio 03/16/2018 0.84   Final   Lab on 03/16/2018   Component Date Value Ref Range Status   • Hemoglobin A1C 03/16/2018 5.80* 4.80 - 5.60 % Final   Lab on 03/16/2018   Component Date Value Ref Range Status   • Glucose 03/16/2018 123* 65 - 99 mg/dL Final   • BUN 03/16/2018 15  8 - 23 mg/dL Final   • Creatinine 03/16/2018 0.56* 0.57 - 1.00 mg/dL Final   • Sodium 03/16/2018 140  136 - 145 mmol/L Final   • Potassium 03/16/2018 4.2  3.5 - 5.2 mmol/L Final   • Chloride 03/16/2018 99  98 - 107 mmol/L Final   • CO2 03/16/2018 25.5  22.0 - 29.0 mmol/L Final   • Calcium 03/16/2018 9.6  8.6 - 10.5 mg/dL Final   • Total Protein 03/16/2018 8.0  6.0 - 8.5 g/dL Final   • Albumin 03/16/2018 5.10  3.50 - 5.20 g/dL Final   • ALT (SGPT) 03/16/2018 21  1 - 33 U/L Final   • AST (SGOT) 03/16/2018 18  1 - 32 U/L Final   • Alkaline Phosphatase 03/16/2018 62  39 - 117 U/L Final   • Total Bilirubin 03/16/2018 0.3  0.1 - 1.2 mg/dL Final   • eGFR Non African Amer 03/16/2018 109  >60 mL/min/1.73 Final   • eGFR   Amer 03/16/2018 133  >60 mL/min/1.73 Final   • Globulin 03/16/2018 2.9  gm/dL Final   • A/G  Ratio 03/16/2018 1.8  g/dL Final   • BUN/Creatinine Ratio 03/16/2018 26.8* 7.0 - 25.0 Final   • Anion Gap 03/16/2018 15.5  mmol/L Final   Lab on 03/16/2018   Component Date Value Ref Range Status   • WBC 03/16/2018 8.83  4.50 - 10.70 10*3/mm3 Final   • RBC 03/16/2018 4.61  3.90 - 5.20 10*6/mm3 Final   • Hemoglobin 03/16/2018 14.2  11.9 - 15.5 g/dL Final   • Hematocrit 03/16/2018 43.0  35.6 - 45.5 % Final   • MCV 03/16/2018 93.3  80.5 - 98.2 fL Final   • MCH 03/16/2018 30.8  26.9 - 32.0 pg Final   • MCHC 03/16/2018 33.0  32.4 - 36.3 g/dL Final   • RDW 03/16/2018 13.3* 11.7 - 13.0 % Final   • RDW-SD 03/16/2018 45.4  37.0 - 54.0 fl Final   • MPV 03/16/2018 10.7  6.0 - 12.0 fL Final   • Platelets 03/16/2018 316  140 - 500 10*3/mm3 Final   • Neutrophil % 03/16/2018 58.8  42.7 - 76.0 % Final   • Lymphocyte % 03/16/2018 34.9  19.6 - 45.3 % Final   • Monocyte % 03/16/2018 5.5  5.0 - 12.0 % Final   • Eosinophil % 03/16/2018 0.3  0.3 - 6.2 % Final   • Basophil % 03/16/2018 0.3  0.0 - 1.5 % Final   • Immature Grans % 03/16/2018 0.2  0.0 - 0.5 % Final   • Neutrophils, Absolute 03/16/2018 5.18  1.90 - 8.10 10*3/mm3 Final   • Lymphocytes, Absolute 03/16/2018 3.08  0.90 - 4.80 10*3/mm3 Final   • Monocytes, Absolute 03/16/2018 0.49  0.20 - 1.20 10*3/mm3 Final   • Eosinophils, Absolute 03/16/2018 0.03  0.00 - 0.70 10*3/mm3 Final   • Basophils, Absolute 03/16/2018 0.03  0.00 - 0.20 10*3/mm3 Final   • Immature Grans, Absolute 03/16/2018 0.02  0.00 - 0.03 10*3/mm3 Final     You may stop the Metformin at this time.      Return in about 3 months (around 8/16/2018).    Scribed for Tristan Malone MD by Diane Rojas CMA. 05/16/2018    I, Tristan Malone MD personally performed the services described in this documentation, as scribed by Diane Rojas CMA in my presence, and it is both accurate and complete

## 2018-11-06 ENCOUNTER — LAB (OUTPATIENT)
Dept: LAB | Facility: HOSPITAL | Age: 63
End: 2018-11-06

## 2018-11-06 DIAGNOSIS — R73.02 GLUCOSE INTOLERANCE (IMPAIRED GLUCOSE TOLERANCE): ICD-10-CM

## 2018-11-06 DIAGNOSIS — Z00.00 HEALTHCARE MAINTENANCE: ICD-10-CM

## 2018-11-06 LAB
25(OH)D3 SERPL-MCNC: 24 NG/ML (ref 30–100)
ALBUMIN SERPL-MCNC: 4.6 G/DL (ref 3.5–5.2)
ALBUMIN/GLOB SERPL: 1.4 G/DL
ALP SERPL-CCNC: 68 U/L (ref 39–117)
ALT SERPL W P-5'-P-CCNC: 18 U/L (ref 1–33)
ANION GAP SERPL CALCULATED.3IONS-SCNC: 13.3 MMOL/L
AST SERPL-CCNC: 14 U/L (ref 1–32)
BASOPHILS # BLD AUTO: 0.04 10*3/MM3 (ref 0–0.2)
BASOPHILS NFR BLD AUTO: 0.4 % (ref 0–1.5)
BILIRUB SERPL-MCNC: 0.3 MG/DL (ref 0.1–1.2)
BUN BLD-MCNC: 14 MG/DL (ref 8–23)
BUN/CREAT SERPL: 21.2 (ref 7–25)
CALCIUM SPEC-SCNC: 9.7 MG/DL (ref 8.6–10.5)
CHLORIDE SERPL-SCNC: 102 MMOL/L (ref 98–107)
CHOLEST SERPL-MCNC: 176 MG/DL (ref 0–200)
CO2 SERPL-SCNC: 25.7 MMOL/L (ref 22–29)
CREAT BLD-MCNC: 0.66 MG/DL (ref 0.57–1)
DEPRECATED RDW RBC AUTO: 45.8 FL (ref 37–54)
EOSINOPHIL # BLD AUTO: 0.03 10*3/MM3 (ref 0–0.7)
EOSINOPHIL NFR BLD AUTO: 0.3 % (ref 0.3–6.2)
ERYTHROCYTE [DISTWIDTH] IN BLOOD BY AUTOMATED COUNT: 13.1 % (ref 11.7–13)
GFR SERPL CREATININE-BSD FRML MDRD: 109 ML/MIN/1.73
GFR SERPL CREATININE-BSD FRML MDRD: 90 ML/MIN/1.73
GLOBULIN UR ELPH-MCNC: 3.2 GM/DL
GLUCOSE BLD-MCNC: 111 MG/DL (ref 65–99)
HBA1C MFR BLD: 6.1 % (ref 4.8–5.6)
HCT VFR BLD AUTO: 43.4 % (ref 35.6–45.5)
HDLC SERPL-MCNC: 65 MG/DL (ref 40–60)
HGB BLD-MCNC: 13.8 G/DL (ref 11.9–15.5)
IMM GRANULOCYTES # BLD: 0.02 10*3/MM3 (ref 0–0.03)
IMM GRANULOCYTES NFR BLD: 0.2 % (ref 0–0.5)
LDLC SERPL CALC-MCNC: 68 MG/DL (ref 0–100)
LDLC/HDLC SERPL: 1.04 {RATIO}
LYMPHOCYTES # BLD AUTO: 3.26 10*3/MM3 (ref 0.9–4.8)
LYMPHOCYTES NFR BLD AUTO: 36.6 % (ref 19.6–45.3)
MCH RBC QN AUTO: 30.5 PG (ref 26.9–32)
MCHC RBC AUTO-ENTMCNC: 31.8 G/DL (ref 32.4–36.3)
MCV RBC AUTO: 96 FL (ref 80.5–98.2)
MONOCYTES # BLD AUTO: 0.6 10*3/MM3 (ref 0.2–1.2)
MONOCYTES NFR BLD AUTO: 6.7 % (ref 5–12)
NEUTROPHILS # BLD AUTO: 4.96 10*3/MM3 (ref 1.9–8.1)
NEUTROPHILS NFR BLD AUTO: 55.8 % (ref 42.7–76)
PLATELET # BLD AUTO: 319 10*3/MM3 (ref 140–500)
PMV BLD AUTO: 9.9 FL (ref 6–12)
POTASSIUM BLD-SCNC: 4.5 MMOL/L (ref 3.5–5.2)
PROT SERPL-MCNC: 7.8 G/DL (ref 6–8.5)
RBC # BLD AUTO: 4.52 10*6/MM3 (ref 3.9–5.2)
SODIUM BLD-SCNC: 141 MMOL/L (ref 136–145)
TRIGL SERPL-MCNC: 217 MG/DL (ref 0–150)
TSH SERPL DL<=0.05 MIU/L-ACNC: 1.26 MIU/ML (ref 0.27–4.2)
VLDLC SERPL-MCNC: 43.4 MG/DL (ref 5–40)
WBC NRBC COR # BLD: 8.91 10*3/MM3 (ref 4.5–10.7)

## 2018-11-06 PROCEDURE — 80061 LIPID PANEL: CPT

## 2018-11-06 PROCEDURE — 84443 ASSAY THYROID STIM HORMONE: CPT

## 2018-11-06 PROCEDURE — 82306 VITAMIN D 25 HYDROXY: CPT

## 2018-11-06 PROCEDURE — 85025 COMPLETE CBC W/AUTO DIFF WBC: CPT

## 2018-11-06 PROCEDURE — 36415 COLL VENOUS BLD VENIPUNCTURE: CPT

## 2018-11-06 PROCEDURE — 80053 COMPREHEN METABOLIC PANEL: CPT

## 2018-11-06 PROCEDURE — 83036 HEMOGLOBIN GLYCOSYLATED A1C: CPT

## 2018-11-15 ENCOUNTER — OFFICE VISIT (OUTPATIENT)
Dept: FAMILY MEDICINE CLINIC | Facility: CLINIC | Age: 63
End: 2018-11-15

## 2018-11-15 VITALS
SYSTOLIC BLOOD PRESSURE: 120 MMHG | WEIGHT: 118 LBS | HEART RATE: 78 BPM | RESPIRATION RATE: 16 BRPM | DIASTOLIC BLOOD PRESSURE: 76 MMHG | OXYGEN SATURATION: 95 % | HEIGHT: 59 IN | BODY MASS INDEX: 23.79 KG/M2 | TEMPERATURE: 98 F

## 2018-11-15 DIAGNOSIS — E11.9 CONTROLLED TYPE 2 DIABETES MELLITUS WITHOUT COMPLICATION, WITHOUT LONG-TERM CURRENT USE OF INSULIN (HCC): Primary | ICD-10-CM

## 2018-11-15 DIAGNOSIS — Z00.00 ROUTINE HEALTH MAINTENANCE: ICD-10-CM

## 2018-11-15 DIAGNOSIS — M81.0 AGE-RELATED OSTEOPOROSIS WITHOUT CURRENT PATHOLOGICAL FRACTURE: ICD-10-CM

## 2018-11-15 DIAGNOSIS — E55.9 VITAMIN D DEFICIENCY: ICD-10-CM

## 2018-11-15 PROCEDURE — 99213 OFFICE O/P EST LOW 20 MIN: CPT | Performed by: FAMILY MEDICINE

## 2018-11-15 RX ORDER — ERGOCALCIFEROL 1.25 MG/1
50000 CAPSULE ORAL
Qty: 12 CAPSULE | Refills: 3 | Status: SHIPPED | OUTPATIENT
Start: 2018-11-15 | End: 2019-10-31

## 2018-11-15 RX ORDER — ALENDRONATE SODIUM 70 MG/1
70 TABLET ORAL
Qty: 4 TABLET | Refills: 11 | Status: SHIPPED | OUTPATIENT
Start: 2018-11-15 | End: 2020-06-22 | Stop reason: SDUPTHER

## 2018-11-15 NOTE — PROGRESS NOTES
Subjective   Felicia Boyle is a 63 y.o. female with   Chief Complaint   Patient presents with   • Follow-up     on labs   .    History of Present Illness     64 yo white female who presents for follow up on recent fasting labs done for Controlled Type 2 Diabetes Mellitus and unstable Vitamin D Def.  Felicia is presently only prescribed Fosamax 70 mg once weekly for a history of Osteoporosis and Vitamin D 50,000 IU daily for a history of Vitamin D Deficiency.  Pt had her last Dexa Scan was January 2018.    The following portions of the patient's history were reviewed and updated as appropriate: allergies, current medications, past family history, past medical history, past social history, past surgical history and problem list.    Review of Systems   Endocrine: Negative for cold intolerance and heat intolerance.        Controlled Type 2 Diabetes Mellitus  Vitamin D Def   All other systems reviewed and are negative.      Objective     Vitals:    11/15/18 0742   BP: 120/76   Pulse: 78   Resp: 16   Temp: 98 °F (36.7 °C)   SpO2: 95%     BP Readings from Last 3 Encounters:   11/15/18 120/76   05/16/18 98/64   02/14/18 122/78      Wt Readings from Last 3 Encounters:   11/15/18 53.5 kg (118 lb)   05/16/18 53.3 kg (117 lb 8 oz)   02/14/18 52.2 kg (115 lb)      Lab on 11/06/2018   Component Date Value Ref Range Status   • Glucose 11/06/2018 111* 65 - 99 mg/dL Final   • BUN 11/06/2018 14  8 - 23 mg/dL Final   • Creatinine 11/06/2018 0.66  0.57 - 1.00 mg/dL Final   • Sodium 11/06/2018 141  136 - 145 mmol/L Final   • Potassium 11/06/2018 4.5  3.5 - 5.2 mmol/L Final   • Chloride 11/06/2018 102  98 - 107 mmol/L Final   • CO2 11/06/2018 25.7  22.0 - 29.0 mmol/L Final   • Calcium 11/06/2018 9.7  8.6 - 10.5 mg/dL Final   • Total Protein 11/06/2018 7.8  6.0 - 8.5 g/dL Final   • Albumin 11/06/2018 4.60  3.50 - 5.20 g/dL Final   • ALT (SGPT) 11/06/2018 18  1 - 33 U/L Final   • AST (SGOT) 11/06/2018 14  1 - 32 U/L Final   • Alkaline  Phosphatase 11/06/2018 68  39 - 117 U/L Final   • Total Bilirubin 11/06/2018 0.3  0.1 - 1.2 mg/dL Final   • eGFR Non African Amer 11/06/2018 90  >60 mL/min/1.73 Final   • eGFR  African Amer 11/06/2018 109  >60 mL/min/1.73 Final   • Globulin 11/06/2018 3.2  gm/dL Final   • A/G Ratio 11/06/2018 1.4  g/dL Final   • BUN/Creatinine Ratio 11/06/2018 21.2  7.0 - 25.0 Final   • Anion Gap 11/06/2018 13.3  mmol/L Final   • Hemoglobin A1C 11/06/2018 6.10* 4.80 - 5.60 % Final   • TSH 11/06/2018 1.260  0.270 - 4.200 mIU/mL Final   • 25 Hydroxy, Vitamin D 11/06/2018 24.0* 30.0 - 100.0 ng/ml Final   • Total Cholesterol 11/06/2018 176  0 - 200 mg/dL Final   • Triglycerides 11/06/2018 217* 0 - 150 mg/dL Final   • HDL Cholesterol 11/06/2018 65* 40 - 60 mg/dL Final   • LDL Cholesterol  11/06/2018 68  0 - 100 mg/dL Final   • VLDL Cholesterol 11/06/2018 43.4* 5 - 40 mg/dL Final   • LDL/HDL Ratio 11/06/2018 1.04   Final   • WBC 11/06/2018 8.91  4.50 - 10.70 10*3/mm3 Final   • RBC 11/06/2018 4.52  3.90 - 5.20 10*6/mm3 Final   • Hemoglobin 11/06/2018 13.8  11.9 - 15.5 g/dL Final   • Hematocrit 11/06/2018 43.4  35.6 - 45.5 % Final   • MCV 11/06/2018 96.0  80.5 - 98.2 fL Final   • MCH 11/06/2018 30.5  26.9 - 32.0 pg Final   • MCHC 11/06/2018 31.8* 32.4 - 36.3 g/dL Final   • RDW 11/06/2018 13.1* 11.7 - 13.0 % Final   • RDW-SD 11/06/2018 45.8  37.0 - 54.0 fl Final   • MPV 11/06/2018 9.9  6.0 - 12.0 fL Final   • Platelets 11/06/2018 319  140 - 500 10*3/mm3 Final   • Neutrophil % 11/06/2018 55.8  42.7 - 76.0 % Final   • Lymphocyte % 11/06/2018 36.6  19.6 - 45.3 % Final   • Monocyte % 11/06/2018 6.7  5.0 - 12.0 % Final   • Eosinophil % 11/06/2018 0.3  0.3 - 6.2 % Final   • Basophil % 11/06/2018 0.4  0.0 - 1.5 % Final   • Immature Grans % 11/06/2018 0.2  0.0 - 0.5 % Final   • Neutrophils, Absolute 11/06/2018 4.96  1.90 - 8.10 10*3/mm3 Final   • Lymphocytes, Absolute 11/06/2018 3.26  0.90 - 4.80 10*3/mm3 Final   • Monocytes, Absolute 11/06/2018  0.60  0.20 - 1.20 10*3/mm3 Final   • Eosinophils, Absolute 11/06/2018 0.03  0.00 - 0.70 10*3/mm3 Final   • Basophils, Absolute 11/06/2018 0.04  0.00 - 0.20 10*3/mm3 Final   • Immature Grans, Absolute 11/06/2018 0.02  0.00 - 0.03 10*3/mm3 Final       Physical Exam   Constitutional: She is oriented to person, place, and time. She appears well-developed and well-nourished.   HENT:   Head: Normocephalic and atraumatic.   Neck: Trachea normal and phonation normal. Neck supple. Normal carotid pulses present. Carotid bruit is not present. No thyroid mass and no thyromegaly present.   Cardiovascular: Normal rate, regular rhythm and normal heart sounds. Exam reveals no gallop and no friction rub.   No murmur heard.  Pulmonary/Chest: Effort normal and breath sounds normal. No respiratory distress. She has no decreased breath sounds. She has no wheezes. She has no rhonchi. She has no rales.   Lymphadenopathy:     She has no cervical adenopathy.   Neurological: She is alert and oriented to person, place, and time.   Skin: Skin is warm and dry. No rash noted.   Psychiatric: She has a normal mood and affect. Her speech is normal and behavior is normal. Judgment and thought content normal. Cognition and memory are normal.   Nursing note and vitals reviewed.      Assessment/Plan   Felicia was seen today for follow-up.    Diagnoses and all orders for this visit:    Controlled type 2 diabetes mellitus without complication, without long-term current use of insulin (CMS/Former[facility])  -     CBC & Differential; Future  -     Comprehensive Metabolic Panel; Future  -     Hemoglobin A1c; Future  -     Lipid Panel; Future    Vitamin D deficiency  -     Vitamin D 25 Hydroxy; Future    Age-related osteoporosis without current pathological fracture  -     alendronate (FOSAMAX) 70 MG tablet; Take 1 tablet by mouth Every 7 (Seven) Days.  -     DEXA Bone Density Axial; Future    Routine health maintenance  -     Lipid Panel; Future  -     TSH;  Future    Other orders  -     vitamin D (ERGOCALCIFEROL) 28497 units capsule capsule; Take 1 capsule by mouth Every 7 (Seven) Days.      Patient Instructions     Lab on 11/06/2018   Component Date Value Ref Range Status   • Glucose 11/06/2018 111* 65 - 99 mg/dL Final   • BUN 11/06/2018 14  8 - 23 mg/dL Final   • Creatinine 11/06/2018 0.66  0.57 - 1.00 mg/dL Final   • Sodium 11/06/2018 141  136 - 145 mmol/L Final   • Potassium 11/06/2018 4.5  3.5 - 5.2 mmol/L Final   • Chloride 11/06/2018 102  98 - 107 mmol/L Final   • CO2 11/06/2018 25.7  22.0 - 29.0 mmol/L Final   • Calcium 11/06/2018 9.7  8.6 - 10.5 mg/dL Final   • Total Protein 11/06/2018 7.8  6.0 - 8.5 g/dL Final   • Albumin 11/06/2018 4.60  3.50 - 5.20 g/dL Final   • ALT (SGPT) 11/06/2018 18  1 - 33 U/L Final   • AST (SGOT) 11/06/2018 14  1 - 32 U/L Final   • Alkaline Phosphatase 11/06/2018 68  39 - 117 U/L Final   • Total Bilirubin 11/06/2018 0.3  0.1 - 1.2 mg/dL Final   • eGFR Non African Amer 11/06/2018 90  >60 mL/min/1.73 Final   • eGFR  African Amer 11/06/2018 109  >60 mL/min/1.73 Final   • Globulin 11/06/2018 3.2  gm/dL Final   • A/G Ratio 11/06/2018 1.4  g/dL Final   • BUN/Creatinine Ratio 11/06/2018 21.2  7.0 - 25.0 Final   • Anion Gap 11/06/2018 13.3  mmol/L Final   • Hemoglobin A1C 11/06/2018 6.10* 4.80 - 5.60 % Final   • TSH 11/06/2018 1.260  0.270 - 4.200 mIU/mL Final   • 25 Hydroxy, Vitamin D 11/06/2018 24.0* 30.0 - 100.0 ng/ml Final   • Total Cholesterol 11/06/2018 176  0 - 200 mg/dL Final   • Triglycerides 11/06/2018 217* 0 - 150 mg/dL Final   • HDL Cholesterol 11/06/2018 65* 40 - 60 mg/dL Final   • LDL Cholesterol  11/06/2018 68  0 - 100 mg/dL Final   • VLDL Cholesterol 11/06/2018 43.4* 5 - 40 mg/dL Final   • LDL/HDL Ratio 11/06/2018 1.04   Final   • WBC 11/06/2018 8.91  4.50 - 10.70 10*3/mm3 Final   • RBC 11/06/2018 4.52  3.90 - 5.20 10*6/mm3 Final   • Hemoglobin 11/06/2018 13.8  11.9 - 15.5 g/dL Final   • Hematocrit 11/06/2018 43.4  35.6 -  45.5 % Final   • MCV 11/06/2018 96.0  80.5 - 98.2 fL Final   • MCH 11/06/2018 30.5  26.9 - 32.0 pg Final   • MCHC 11/06/2018 31.8* 32.4 - 36.3 g/dL Final   • RDW 11/06/2018 13.1* 11.7 - 13.0 % Final   • RDW-SD 11/06/2018 45.8  37.0 - 54.0 fl Final   • MPV 11/06/2018 9.9  6.0 - 12.0 fL Final   • Platelets 11/06/2018 319  140 - 500 10*3/mm3 Final   • Neutrophil % 11/06/2018 55.8  42.7 - 76.0 % Final   • Lymphocyte % 11/06/2018 36.6  19.6 - 45.3 % Final   • Monocyte % 11/06/2018 6.7  5.0 - 12.0 % Final   • Eosinophil % 11/06/2018 0.3  0.3 - 6.2 % Final   • Basophil % 11/06/2018 0.4  0.0 - 1.5 % Final   • Immature Grans % 11/06/2018 0.2  0.0 - 0.5 % Final   • Neutrophils, Absolute 11/06/2018 4.96  1.90 - 8.10 10*3/mm3 Final   • Lymphocytes, Absolute 11/06/2018 3.26  0.90 - 4.80 10*3/mm3 Final   • Monocytes, Absolute 11/06/2018 0.60  0.20 - 1.20 10*3/mm3 Final   • Eosinophils, Absolute 11/06/2018 0.03  0.00 - 0.70 10*3/mm3 Final   • Basophils, Absolute 11/06/2018 0.04  0.00 - 0.20 10*3/mm3 Final   • Immature Grans, Absolute 11/06/2018 0.02  0.00 - 0.03 10*3/mm3 Final     Please continue your medications as prescribed.  If you run out of refills don't hesitate to call the office for another refill.        Return in about 6 months (around 5/15/2019).    Scribed for Tristan Malone MD by Diane Rojas CMA. 11/15/2018    Tristan CROSS MD personally performed the services described in this documentation, as scribed by Diane Rojas CMA in my presence, and it is both accurate and complete

## 2018-11-15 NOTE — PATIENT INSTRUCTIONS
Lab on 11/06/2018   Component Date Value Ref Range Status   • Glucose 11/06/2018 111* 65 - 99 mg/dL Final   • BUN 11/06/2018 14  8 - 23 mg/dL Final   • Creatinine 11/06/2018 0.66  0.57 - 1.00 mg/dL Final   • Sodium 11/06/2018 141  136 - 145 mmol/L Final   • Potassium 11/06/2018 4.5  3.5 - 5.2 mmol/L Final   • Chloride 11/06/2018 102  98 - 107 mmol/L Final   • CO2 11/06/2018 25.7  22.0 - 29.0 mmol/L Final   • Calcium 11/06/2018 9.7  8.6 - 10.5 mg/dL Final   • Total Protein 11/06/2018 7.8  6.0 - 8.5 g/dL Final   • Albumin 11/06/2018 4.60  3.50 - 5.20 g/dL Final   • ALT (SGPT) 11/06/2018 18  1 - 33 U/L Final   • AST (SGOT) 11/06/2018 14  1 - 32 U/L Final   • Alkaline Phosphatase 11/06/2018 68  39 - 117 U/L Final   • Total Bilirubin 11/06/2018 0.3  0.1 - 1.2 mg/dL Final   • eGFR Non African Amer 11/06/2018 90  >60 mL/min/1.73 Final   • eGFR  African Amer 11/06/2018 109  >60 mL/min/1.73 Final   • Globulin 11/06/2018 3.2  gm/dL Final   • A/G Ratio 11/06/2018 1.4  g/dL Final   • BUN/Creatinine Ratio 11/06/2018 21.2  7.0 - 25.0 Final   • Anion Gap 11/06/2018 13.3  mmol/L Final   • Hemoglobin A1C 11/06/2018 6.10* 4.80 - 5.60 % Final   • TSH 11/06/2018 1.260  0.270 - 4.200 mIU/mL Final   • 25 Hydroxy, Vitamin D 11/06/2018 24.0* 30.0 - 100.0 ng/ml Final   • Total Cholesterol 11/06/2018 176  0 - 200 mg/dL Final   • Triglycerides 11/06/2018 217* 0 - 150 mg/dL Final   • HDL Cholesterol 11/06/2018 65* 40 - 60 mg/dL Final   • LDL Cholesterol  11/06/2018 68  0 - 100 mg/dL Final   • VLDL Cholesterol 11/06/2018 43.4* 5 - 40 mg/dL Final   • LDL/HDL Ratio 11/06/2018 1.04   Final   • WBC 11/06/2018 8.91  4.50 - 10.70 10*3/mm3 Final   • RBC 11/06/2018 4.52  3.90 - 5.20 10*6/mm3 Final   • Hemoglobin 11/06/2018 13.8  11.9 - 15.5 g/dL Final   • Hematocrit 11/06/2018 43.4  35.6 - 45.5 % Final   • MCV 11/06/2018 96.0  80.5 - 98.2 fL Final   • MCH 11/06/2018 30.5  26.9 - 32.0 pg Final   • MCHC 11/06/2018 31.8* 32.4 - 36.3 g/dL Final   • RDW  11/06/2018 13.1* 11.7 - 13.0 % Final   • RDW-SD 11/06/2018 45.8  37.0 - 54.0 fl Final   • MPV 11/06/2018 9.9  6.0 - 12.0 fL Final   • Platelets 11/06/2018 319  140 - 500 10*3/mm3 Final   • Neutrophil % 11/06/2018 55.8  42.7 - 76.0 % Final   • Lymphocyte % 11/06/2018 36.6  19.6 - 45.3 % Final   • Monocyte % 11/06/2018 6.7  5.0 - 12.0 % Final   • Eosinophil % 11/06/2018 0.3  0.3 - 6.2 % Final   • Basophil % 11/06/2018 0.4  0.0 - 1.5 % Final   • Immature Grans % 11/06/2018 0.2  0.0 - 0.5 % Final   • Neutrophils, Absolute 11/06/2018 4.96  1.90 - 8.10 10*3/mm3 Final   • Lymphocytes, Absolute 11/06/2018 3.26  0.90 - 4.80 10*3/mm3 Final   • Monocytes, Absolute 11/06/2018 0.60  0.20 - 1.20 10*3/mm3 Final   • Eosinophils, Absolute 11/06/2018 0.03  0.00 - 0.70 10*3/mm3 Final   • Basophils, Absolute 11/06/2018 0.04  0.00 - 0.20 10*3/mm3 Final   • Immature Grans, Absolute 11/06/2018 0.02  0.00 - 0.03 10*3/mm3 Final     Please continue your medications as prescribed.  If you run out of refills don't hesitate to call the office for another refill.

## 2019-01-07 ENCOUNTER — TRANSCRIBE ORDERS (OUTPATIENT)
Dept: ADMINISTRATIVE | Facility: HOSPITAL | Age: 64
End: 2019-01-07

## 2019-01-07 DIAGNOSIS — Z12.39 SCREENING BREAST EXAMINATION: Primary | ICD-10-CM

## 2019-02-07 ENCOUNTER — HOSPITAL ENCOUNTER (OUTPATIENT)
Dept: MAMMOGRAPHY | Facility: HOSPITAL | Age: 64
Discharge: HOME | End: 2019-02-07
Admitting: FAMILY MEDICINE

## 2019-02-07 DIAGNOSIS — Z12.39 SCREENING BREAST EXAMINATION: ICD-10-CM

## 2019-02-07 PROCEDURE — 77063 BREAST TOMOSYNTHESIS BI: CPT

## 2019-02-07 PROCEDURE — 77067 SCR MAMMO BI INCL CAD: CPT

## 2019-02-08 DIAGNOSIS — N63.10 BREAST MASS, RIGHT: Primary | ICD-10-CM

## 2019-02-08 DIAGNOSIS — R92.8 ABNORMAL MAMMOGRAM: ICD-10-CM

## 2019-02-21 ENCOUNTER — APPOINTMENT (OUTPATIENT)
Dept: ULTRASOUND IMAGING | Facility: HOSPITAL | Age: 64
End: 2019-02-21

## 2019-02-21 ENCOUNTER — APPOINTMENT (OUTPATIENT)
Dept: MAMMOGRAPHY | Facility: HOSPITAL | Age: 64
End: 2019-02-21

## 2019-03-11 ENCOUNTER — TRANSCRIBE ORDERS (OUTPATIENT)
Dept: ADMINISTRATIVE | Facility: HOSPITAL | Age: 64
End: 2019-03-11

## 2019-03-11 DIAGNOSIS — Z12.39 SCREENING BREAST EXAMINATION: Primary | ICD-10-CM

## 2019-03-15 ENCOUNTER — HOSPITAL ENCOUNTER (OUTPATIENT)
Dept: MAMMOGRAPHY | Facility: HOSPITAL | Age: 64
Discharge: HOME | End: 2019-03-15
Admitting: FAMILY MEDICINE

## 2019-03-15 ENCOUNTER — APPOINTMENT (OUTPATIENT)
Dept: ULTRASOUND IMAGING | Facility: HOSPITAL | Age: 64
End: 2019-03-15

## 2019-03-15 DIAGNOSIS — N63.10 BREAST MASS, RIGHT: ICD-10-CM

## 2019-03-15 DIAGNOSIS — R92.8 ABNORMAL MAMMOGRAM: ICD-10-CM

## 2019-03-15 PROCEDURE — 77065 DX MAMMO INCL CAD UNI: CPT

## 2019-05-08 ENCOUNTER — LAB (OUTPATIENT)
Dept: LAB | Facility: HOSPITAL | Age: 64
End: 2019-05-08

## 2019-05-08 DIAGNOSIS — Z00.00 ROUTINE HEALTH MAINTENANCE: ICD-10-CM

## 2019-05-08 DIAGNOSIS — E11.9 CONTROLLED TYPE 2 DIABETES MELLITUS WITHOUT COMPLICATION, WITHOUT LONG-TERM CURRENT USE OF INSULIN (HCC): ICD-10-CM

## 2019-05-08 DIAGNOSIS — E55.9 VITAMIN D DEFICIENCY: ICD-10-CM

## 2019-05-08 LAB
25(OH)D3 SERPL-MCNC: 19.1 NG/ML (ref 30–100)
ALBUMIN SERPL-MCNC: 4.5 G/DL (ref 3.5–5.2)
ALBUMIN/GLOB SERPL: 1.5 G/DL
ALP SERPL-CCNC: 59 U/L (ref 39–117)
ALT SERPL W P-5'-P-CCNC: 20 U/L (ref 1–33)
ANION GAP SERPL CALCULATED.3IONS-SCNC: 11.2 MMOL/L
AST SERPL-CCNC: 17 U/L (ref 1–32)
BASOPHILS # BLD AUTO: 0.06 10*3/MM3 (ref 0–0.2)
BASOPHILS NFR BLD AUTO: 0.7 % (ref 0–1.5)
BILIRUB SERPL-MCNC: 0.2 MG/DL (ref 0.2–1.2)
BUN BLD-MCNC: 20 MG/DL (ref 8–23)
BUN/CREAT SERPL: 27.8 (ref 7–25)
CALCIUM SPEC-SCNC: 9.3 MG/DL (ref 8.6–10.5)
CHLORIDE SERPL-SCNC: 103 MMOL/L (ref 98–107)
CHOLEST SERPL-MCNC: 179 MG/DL (ref 0–200)
CO2 SERPL-SCNC: 24.8 MMOL/L (ref 22–29)
CREAT BLD-MCNC: 0.72 MG/DL (ref 0.57–1)
DEPRECATED RDW RBC AUTO: 43.6 FL (ref 37–54)
EOSINOPHIL # BLD AUTO: 0.07 10*3/MM3 (ref 0–0.4)
EOSINOPHIL NFR BLD AUTO: 0.9 % (ref 0.3–6.2)
ERYTHROCYTE [DISTWIDTH] IN BLOOD BY AUTOMATED COUNT: 12.7 % (ref 12.3–15.4)
GFR SERPL CREATININE-BSD FRML MDRD: 82 ML/MIN/1.73
GFR SERPL CREATININE-BSD FRML MDRD: 99 ML/MIN/1.73
GLOBULIN UR ELPH-MCNC: 3 GM/DL
GLUCOSE BLD-MCNC: 130 MG/DL (ref 65–99)
HBA1C MFR BLD: 6.3 % (ref 4.8–5.6)
HCT VFR BLD AUTO: 43.3 % (ref 34–46.6)
HDLC SERPL-MCNC: 68 MG/DL (ref 40–60)
HGB BLD-MCNC: 13.6 G/DL (ref 12–15.9)
IMM GRANULOCYTES # BLD AUTO: 0.05 10*3/MM3 (ref 0–0.05)
IMM GRANULOCYTES NFR BLD AUTO: 0.6 % (ref 0–0.5)
LDLC SERPL CALC-MCNC: 85 MG/DL (ref 0–100)
LDLC/HDLC SERPL: 1.24 {RATIO}
LYMPHOCYTES # BLD AUTO: 3.32 10*3/MM3 (ref 0.7–3.1)
LYMPHOCYTES NFR BLD AUTO: 41.1 % (ref 19.6–45.3)
MCH RBC QN AUTO: 29.5 PG (ref 26.6–33)
MCHC RBC AUTO-ENTMCNC: 31.4 G/DL (ref 31.5–35.7)
MCV RBC AUTO: 93.9 FL (ref 79–97)
MONOCYTES # BLD AUTO: 0.55 10*3/MM3 (ref 0.1–0.9)
MONOCYTES NFR BLD AUTO: 6.8 % (ref 5–12)
NEUTROPHILS # BLD AUTO: 4.02 10*3/MM3 (ref 1.7–7)
NEUTROPHILS NFR BLD AUTO: 49.9 % (ref 42.7–76)
NRBC BLD AUTO-RTO: 0 /100 WBC (ref 0–0.2)
PLATELET # BLD AUTO: 312 10*3/MM3 (ref 140–450)
PMV BLD AUTO: 10.6 FL (ref 6–12)
POTASSIUM BLD-SCNC: 4.3 MMOL/L (ref 3.5–5.2)
PROT SERPL-MCNC: 7.5 G/DL (ref 6–8.5)
RBC # BLD AUTO: 4.61 10*6/MM3 (ref 3.77–5.28)
SODIUM BLD-SCNC: 139 MMOL/L (ref 136–145)
TRIGL SERPL-MCNC: 132 MG/DL (ref 0–150)
TSH SERPL DL<=0.05 MIU/L-ACNC: 2.07 MIU/ML (ref 0.27–4.2)
VLDLC SERPL-MCNC: 26.4 MG/DL (ref 5–40)
WBC NRBC COR # BLD: 8.07 10*3/MM3 (ref 3.4–10.8)

## 2019-05-08 PROCEDURE — 80053 COMPREHEN METABOLIC PANEL: CPT

## 2019-05-08 PROCEDURE — 82306 VITAMIN D 25 HYDROXY: CPT

## 2019-05-08 PROCEDURE — 83036 HEMOGLOBIN GLYCOSYLATED A1C: CPT

## 2019-05-08 PROCEDURE — 85025 COMPLETE CBC W/AUTO DIFF WBC: CPT

## 2019-05-08 PROCEDURE — 80061 LIPID PANEL: CPT

## 2019-05-08 PROCEDURE — 84443 ASSAY THYROID STIM HORMONE: CPT

## 2019-05-08 PROCEDURE — 36415 COLL VENOUS BLD VENIPUNCTURE: CPT

## 2019-05-16 ENCOUNTER — OFFICE VISIT (OUTPATIENT)
Dept: FAMILY MEDICINE CLINIC | Facility: CLINIC | Age: 64
End: 2019-05-16

## 2019-05-16 VITALS
BODY MASS INDEX: 23.59 KG/M2 | DIASTOLIC BLOOD PRESSURE: 76 MMHG | HEIGHT: 59 IN | OXYGEN SATURATION: 96 % | SYSTOLIC BLOOD PRESSURE: 136 MMHG | HEART RATE: 87 BPM | WEIGHT: 117 LBS

## 2019-05-16 DIAGNOSIS — E11.9 CONTROLLED TYPE 2 DIABETES MELLITUS WITHOUT COMPLICATION, WITHOUT LONG-TERM CURRENT USE OF INSULIN (HCC): Primary | ICD-10-CM

## 2019-05-16 DIAGNOSIS — E78.2 MIXED HYPERLIPIDEMIA: ICD-10-CM

## 2019-05-16 DIAGNOSIS — E55.9 VITAMIN D DEFICIENCY: ICD-10-CM

## 2019-05-16 PROCEDURE — 99213 OFFICE O/P EST LOW 20 MIN: CPT | Performed by: FAMILY MEDICINE

## 2019-05-19 PROBLEM — E78.2 MIXED HYPERLIPIDEMIA: Status: ACTIVE | Noted: 2019-05-19

## 2019-05-19 NOTE — PROGRESS NOTES
Subjective   Felicia Boyle is a 63 y.o. female with   Chief Complaint   Patient presents with   • Diabetes     lab follow up   .    History of Present Illness   63-year-old female with type II non-insulin-dependent diabetes mellitus.  Patient has been controlling her sugar status by diet alone.  She has not been exercising but trying to watch what she eats.  In keeping fasting glucometer numbers and is here with a list of same.  Patient with fasting labs acquired prior to this appointment-here for review.  The following portions of the patient's history were reviewed and updated as appropriate: allergies, current medications, past family history, past medical history, past social history, past surgical history and problem list.    Review of Systems   Endocrine:        Type 2 diabetes mellitus       Objective     Vitals:    05/16/19 0839   BP: 136/76   Pulse: 87   SpO2: 96%       No results found for this or any previous visit (from the past 168 hour(s)).    Physical Exam   Constitutional: She is oriented to person, place, and time. She appears well-developed and well-nourished.   HENT:   Head: Normocephalic and atraumatic.   Neck: Trachea normal and phonation normal. Neck supple. Normal carotid pulses present. Carotid bruit is not present. No thyroid mass and no thyromegaly present.   Cardiovascular: Normal rate, regular rhythm and normal heart sounds. Exam reveals no gallop and no friction rub.   No murmur heard.  Pulmonary/Chest: Effort normal and breath sounds normal. No respiratory distress. She has no decreased breath sounds. She has no wheezes. She has no rhonchi. She has no rales.   Lymphadenopathy:     She has no cervical adenopathy.   Neurological: She is alert and oriented to person, place, and time.   Skin: Skin is warm and dry. No rash noted.   Psychiatric: She has a normal mood and affect. Her speech is normal and behavior is normal. Judgment and thought content normal. Cognition and memory are normal.    Nursing note and vitals reviewed.    Lab on 05/08/2019   Component Date Value Ref Range Status   • Glucose 05/08/2019 130* 65 - 99 mg/dL Final   • BUN 05/08/2019 20  8 - 23 mg/dL Final   • Creatinine 05/08/2019 0.72  0.57 - 1.00 mg/dL Final   • Sodium 05/08/2019 139  136 - 145 mmol/L Final   • Potassium 05/08/2019 4.3  3.5 - 5.2 mmol/L Final   • Chloride 05/08/2019 103  98 - 107 mmol/L Final   • CO2 05/08/2019 24.8  22.0 - 29.0 mmol/L Final   • Calcium 05/08/2019 9.3  8.6 - 10.5 mg/dL Final   • Total Protein 05/08/2019 7.5  6.0 - 8.5 g/dL Final   • Albumin 05/08/2019 4.50  3.50 - 5.20 g/dL Final   • ALT (SGPT) 05/08/2019 20  1 - 33 U/L Final   • AST (SGOT) 05/08/2019 17  1 - 32 U/L Final   • Alkaline Phosphatase 05/08/2019 59  39 - 117 U/L Final   • Total Bilirubin 05/08/2019 0.2  0.2 - 1.2 mg/dL Final   • eGFR Non  Amer 05/08/2019 82  >60 mL/min/1.73 Final   • eGFR   Amer 05/08/2019 99  >60 mL/min/1.73 Final   • Globulin 05/08/2019 3.0  gm/dL Final   • A/G Ratio 05/08/2019 1.5  g/dL Final   • BUN/Creatinine Ratio 05/08/2019 27.8* 7.0 - 25.0 Final   • Anion Gap 05/08/2019 11.2  mmol/L Final   • Hemoglobin A1C 05/08/2019 6.30* 4.80 - 5.60 % Final   • Total Cholesterol 05/08/2019 179  0 - 200 mg/dL Final   • Triglycerides 05/08/2019 132  0 - 150 mg/dL Final   • HDL Cholesterol 05/08/2019 68* 40 - 60 mg/dL Final   • LDL Cholesterol  05/08/2019 85  0 - 100 mg/dL Final   • VLDL Cholesterol 05/08/2019 26.4  5 - 40 mg/dL Final   • LDL/HDL Ratio 05/08/2019 1.24   Final   • TSH 05/08/2019 2.070  0.270 - 4.200 mIU/mL Final   • 25 Hydroxy, Vitamin D 05/08/2019 19.1* 30.0 - 100.0 ng/ml Final   • WBC 05/08/2019 8.07  3.40 - 10.80 10*3/mm3 Final   • RBC 05/08/2019 4.61  3.77 - 5.28 10*6/mm3 Final   • Hemoglobin 05/08/2019 13.6  12.0 - 15.9 g/dL Final   • Hematocrit 05/08/2019 43.3  34.0 - 46.6 % Final   • MCV 05/08/2019 93.9  79.0 - 97.0 fL Final   • MCH 05/08/2019 29.5  26.6 - 33.0 pg Final   • MCHC 05/08/2019  31.4* 31.5 - 35.7 g/dL Final   • RDW 05/08/2019 12.7  12.3 - 15.4 % Final   • RDW-SD 05/08/2019 43.6  37.0 - 54.0 fl Final   • MPV 05/08/2019 10.6  6.0 - 12.0 fL Final   • Platelets 05/08/2019 312  140 - 450 10*3/mm3 Final   • Neutrophil % 05/08/2019 49.9  42.7 - 76.0 % Final   • Lymphocyte % 05/08/2019 41.1  19.6 - 45.3 % Final   • Monocyte % 05/08/2019 6.8  5.0 - 12.0 % Final   • Eosinophil % 05/08/2019 0.9  0.3 - 6.2 % Final   • Basophil % 05/08/2019 0.7  0.0 - 1.5 % Final   • Immature Grans % 05/08/2019 0.6* 0.0 - 0.5 % Final   • Neutrophils, Absolute 05/08/2019 4.02  1.70 - 7.00 10*3/mm3 Final   • Lymphocytes, Absolute 05/08/2019 3.32* 0.70 - 3.10 10*3/mm3 Final   • Monocytes, Absolute 05/08/2019 0.55  0.10 - 0.90 10*3/mm3 Final   • Eosinophils, Absolute 05/08/2019 0.07  0.00 - 0.40 10*3/mm3 Final   • Basophils, Absolute 05/08/2019 0.06  0.00 - 0.20 10*3/mm3 Final   • Immature Grans, Absolute 05/08/2019 0.05  0.00 - 0.05 10*3/mm3 Final   • nRBC 05/08/2019 0.0  0.0 - 0.2 /100 WBC Final         Assessment/Plan   Felicia was seen today for diabetes.    Diagnoses and all orders for this visit:    Controlled type 2 diabetes mellitus without complication, without long-term current use of insulin (CMS/[facility])    Vitamin D deficiency    Mixed hyperlipidemia        Return in about 6 months (around 11/16/2019) for Recheck.

## 2019-05-19 NOTE — PATIENT INSTRUCTIONS
Sugar status currently in a glucose intolerant fashion.  Her good control has been achieved by her adequate diet.  Have asked patient to decrease cholesterol in her diet and attempt to get LDL closer to 70.  Have also asked patient to begin over-the-counter vitamin D3 at 5000 IU/day.  Next fasting labs in 6 months with follow-up with me thereafter.

## 2019-09-05 DIAGNOSIS — E55.9 VITAMIN D DEFICIENCY: ICD-10-CM

## 2019-09-05 DIAGNOSIS — E78.2 MIXED HYPERLIPIDEMIA: ICD-10-CM

## 2019-09-05 DIAGNOSIS — E11.9 CONTROLLED TYPE 2 DIABETES MELLITUS WITHOUT COMPLICATION, WITHOUT LONG-TERM CURRENT USE OF INSULIN (HCC): Primary | ICD-10-CM

## 2019-09-23 NOTE — TELEPHONE ENCOUNTER
Plan:   1. Continue Keppra (100 mg/ml) at 700 mg (7 ml) twice daily. 2. Continue Vimpat (10mg/ml) at 60 mg (6ml) twice a day. 3. Continue Zanaflex at 4 mg at night. 4.  I would again recommend blood work including CBC, CMP, Vitamin D levels. 5. I would recommend Diastat at 5 mg PRN rectally for seizures lasting greater than 3 minutes. 6. Continue involvement in Physical and Occupational therapies. 7. Continue to follow with Hematology.   8. I would like to see him back in 4 months of earlier if needed Pt notified of results. Pt voiced understanding.

## 2019-10-08 ENCOUNTER — TELEPHONE (OUTPATIENT)
Dept: OBSTETRICS AND GYNECOLOGY | Age: 64
End: 2019-10-08

## 2019-10-08 NOTE — TELEPHONE ENCOUNTER
Dr Perry pt has been called into work Oct 15th the day of her sched annual. Pt asked if she can come in tomorrow for her annual instead?     You currently have six annuals scheduled tomorrow. Please Advise

## 2019-10-09 ENCOUNTER — OFFICE VISIT (OUTPATIENT)
Dept: OBSTETRICS AND GYNECOLOGY | Age: 64
End: 2019-10-09

## 2019-10-09 VITALS
SYSTOLIC BLOOD PRESSURE: 126 MMHG | BODY MASS INDEX: 23.59 KG/M2 | WEIGHT: 117 LBS | DIASTOLIC BLOOD PRESSURE: 72 MMHG | HEIGHT: 59 IN

## 2019-10-09 DIAGNOSIS — M81.0 AGE-RELATED OSTEOPOROSIS WITHOUT CURRENT PATHOLOGICAL FRACTURE: ICD-10-CM

## 2019-10-09 DIAGNOSIS — Z80.3 FH: BREAST CANCER IN FIRST DEGREE RELATIVE: ICD-10-CM

## 2019-10-09 DIAGNOSIS — Z01.419 ENCOUNTER FOR GYNECOLOGICAL EXAMINATION WITHOUT ABNORMAL FINDING: Primary | ICD-10-CM

## 2019-10-09 DIAGNOSIS — Z78.0 POST-MENOPAUSAL: ICD-10-CM

## 2019-10-09 DIAGNOSIS — Z12.4 SCREENING FOR MALIGNANT NEOPLASM OF THE CERVIX: ICD-10-CM

## 2019-10-09 PROCEDURE — 99397 PER PM REEVAL EST PAT 65+ YR: CPT | Performed by: OBSTETRICS & GYNECOLOGY

## 2019-10-09 NOTE — PROGRESS NOTES
"Subjective   Felicia Boyle is a 64 y.o. female annual , last pap 09/01/2017 neg , dexa 01/22/2018 , mmg 03/15/19 at [facility], endoscopy 03/2017 , patient doesnt take fosamax daily doesnt like to take pills.  Plans to retire next year.  Has 6 grandchildren that keep her busy. Daughter has opened up Japanese restaurant, [employer], on [address].  Denies complaints.          History of Present Illness    The following portions of the patient's history were reviewed and updated as appropriate: allergies, current medications, past family history, past medical history, past social history, past surgical history and problem list.    Review of Systems   Constitutional: Negative for chills, fatigue and fever.   Gastrointestinal: Negative for abdominal distention and abdominal pain.   Genitourinary: Negative for dyspareunia, dysuria, menstrual problem, pelvic pain, vaginal bleeding, vaginal discharge and vaginal pain.   /72   Ht 149.9 cm (59\")   Wt 53.1 kg (117 lb)   LMP  (LMP Unknown)   BMI 23.63 kg/m²       Objective   Physical Exam   Constitutional: She is oriented to person, place, and time. She appears well-developed and well-nourished.   Neck: Normal range of motion. Neck supple. No thyromegaly present.   Cardiovascular: Normal rate and regular rhythm.   Pulmonary/Chest: Effort normal and breath sounds normal. Right breast exhibits no mass, no nipple discharge, no skin change and no tenderness. Left breast exhibits no mass, no nipple discharge, no skin change and no tenderness.   Abdominal: Soft. Bowel sounds are normal. She exhibits no distension. There is no tenderness.   Genitourinary: Vagina normal and uterus normal. Pelvic exam was performed with patient supine. Uterus is not tender. Cervix exhibits no friability. Right adnexum displays no mass and no tenderness. Left adnexum displays no mass and no tenderness. No vaginal discharge found.   Musculoskeletal: Normal range of motion. She exhibits no edema. "   Neurological: She is alert and oriented to person, place, and time.   Skin: Skin is warm and dry. No rash noted.   Psychiatric: She has a normal mood and affect. Her behavior is normal.   Nursing note and vitals reviewed.        Assessment/Plan   Felicia was seen today for gynecologic exam.    Diagnoses and all orders for this visit:    Encounter for gynecological examination without abnormal finding  -     IgP, Aptima HPV    FH: breast cancer in first degree relative    Post-menopausal  -     IgP, Aptima HPV    Age-related osteoporosis without current pathological fracture    Screening for malignant neoplasm of the cervix  -     IgP, Aptima HPV      Counseling was given to patient for the following topics: instructions for management, risks and benefits of treatment options, importance of treatment compliance and self-breast exams .

## 2019-10-14 LAB
CYTOLOGIST CVX/VAG CYTO: NORMAL
CYTOLOGY CVX/VAG DOC CYTO: NORMAL
CYTOLOGY CVX/VAG DOC THIN PREP: NORMAL
DX ICD CODE: NORMAL
HIV 1 & 2 AB SER-IMP: NORMAL
HPV I/H RISK 4 DNA CVX QL PROBE+SIG AMP: NEGATIVE
OTHER STN SPEC: NORMAL
STAT OF ADQ CVX/VAG CYTO-IMP: NORMAL

## 2019-10-16 ENCOUNTER — TELEPHONE (OUTPATIENT)
Dept: OBSTETRICS AND GYNECOLOGY | Age: 64
End: 2019-10-16

## 2019-10-16 NOTE — TELEPHONE ENCOUNTER
----- Message from Cristin Perry MD sent at 10/15/2019  8:33 AM EDT -----  Please call patient and notify of normal results of pap smear

## 2019-10-29 ENCOUNTER — LAB (OUTPATIENT)
Dept: LAB | Facility: HOSPITAL | Age: 64
End: 2019-10-29

## 2019-10-29 DIAGNOSIS — E78.2 MIXED HYPERLIPIDEMIA: ICD-10-CM

## 2019-10-29 DIAGNOSIS — E11.9 CONTROLLED TYPE 2 DIABETES MELLITUS WITHOUT COMPLICATION, WITHOUT LONG-TERM CURRENT USE OF INSULIN (HCC): ICD-10-CM

## 2019-10-29 DIAGNOSIS — E55.9 VITAMIN D DEFICIENCY: ICD-10-CM

## 2019-10-29 LAB
25(OH)D3 SERPL-MCNC: 22.3 NG/ML (ref 30–100)
ALBUMIN SERPL-MCNC: 4.7 G/DL (ref 3.5–5.2)
ALBUMIN UR-MCNC: 8.1 MG/DL
ALBUMIN/GLOB SERPL: 1.3 G/DL
ALP SERPL-CCNC: 78 U/L (ref 39–117)
ALT SERPL W P-5'-P-CCNC: 22 U/L (ref 1–33)
ANION GAP SERPL CALCULATED.3IONS-SCNC: 13.3 MMOL/L (ref 5–15)
AST SERPL-CCNC: 18 U/L (ref 1–32)
BASOPHILS # BLD AUTO: 0.04 10*3/MM3 (ref 0–0.2)
BASOPHILS NFR BLD AUTO: 0.4 % (ref 0–1.5)
BILIRUB SERPL-MCNC: 0.4 MG/DL (ref 0.2–1.2)
BUN BLD-MCNC: 11 MG/DL (ref 8–23)
BUN/CREAT SERPL: 16.7 (ref 7–25)
CALCIUM SPEC-SCNC: 9.5 MG/DL (ref 8.6–10.5)
CHLORIDE SERPL-SCNC: 99 MMOL/L (ref 98–107)
CHOLEST SERPL-MCNC: 173 MG/DL (ref 0–200)
CO2 SERPL-SCNC: 25.7 MMOL/L (ref 22–29)
CREAT BLD-MCNC: 0.66 MG/DL (ref 0.57–1)
CREAT UR-MCNC: 174.2 MG/DL
DEPRECATED RDW RBC AUTO: 43.9 FL (ref 37–54)
EOSINOPHIL # BLD AUTO: 0.06 10*3/MM3 (ref 0–0.4)
EOSINOPHIL NFR BLD AUTO: 0.7 % (ref 0.3–6.2)
ERYTHROCYTE [DISTWIDTH] IN BLOOD BY AUTOMATED COUNT: 13.1 % (ref 12.3–15.4)
GFR SERPL CREATININE-BSD FRML MDRD: 109 ML/MIN/1.73
GFR SERPL CREATININE-BSD FRML MDRD: 90 ML/MIN/1.73
GLOBULIN UR ELPH-MCNC: 3.6 GM/DL
GLUCOSE BLD-MCNC: 134 MG/DL (ref 65–99)
HBA1C MFR BLD: 6.8 % (ref 4.8–5.6)
HCT VFR BLD AUTO: 44.4 % (ref 34–46.6)
HDLC SERPL-MCNC: 62 MG/DL (ref 40–60)
HGB BLD-MCNC: 14.4 G/DL (ref 12–15.9)
IMM GRANULOCYTES # BLD AUTO: 0.05 10*3/MM3 (ref 0–0.05)
IMM GRANULOCYTES NFR BLD AUTO: 0.6 % (ref 0–0.5)
LDLC SERPL CALC-MCNC: 59 MG/DL (ref 0–100)
LDLC/HDLC SERPL: 0.95 {RATIO}
LYMPHOCYTES # BLD AUTO: 3.8 10*3/MM3 (ref 0.7–3.1)
LYMPHOCYTES NFR BLD AUTO: 42.3 % (ref 19.6–45.3)
MCH RBC QN AUTO: 29.6 PG (ref 26.6–33)
MCHC RBC AUTO-ENTMCNC: 32.4 G/DL (ref 31.5–35.7)
MCV RBC AUTO: 91.2 FL (ref 79–97)
MICROALBUMIN/CREAT UR: 46.5 MG/G
MONOCYTES # BLD AUTO: 0.56 10*3/MM3 (ref 0.1–0.9)
MONOCYTES NFR BLD AUTO: 6.2 % (ref 5–12)
NEUTROPHILS # BLD AUTO: 4.47 10*3/MM3 (ref 1.7–7)
NEUTROPHILS NFR BLD AUTO: 49.8 % (ref 42.7–76)
NRBC BLD AUTO-RTO: 0 /100 WBC (ref 0–0.2)
PLATELET # BLD AUTO: 329 10*3/MM3 (ref 140–450)
PMV BLD AUTO: 10 FL (ref 6–12)
POTASSIUM BLD-SCNC: 4.3 MMOL/L (ref 3.5–5.2)
PROT SERPL-MCNC: 8.3 G/DL (ref 6–8.5)
RBC # BLD AUTO: 4.87 10*6/MM3 (ref 3.77–5.28)
SODIUM BLD-SCNC: 138 MMOL/L (ref 136–145)
TRIGL SERPL-MCNC: 260 MG/DL (ref 0–150)
TSH SERPL DL<=0.05 MIU/L-ACNC: 2.3 UIU/ML (ref 0.27–4.2)
VLDLC SERPL-MCNC: 52 MG/DL (ref 5–40)
WBC NRBC COR # BLD: 8.98 10*3/MM3 (ref 3.4–10.8)

## 2019-10-29 PROCEDURE — 85025 COMPLETE CBC W/AUTO DIFF WBC: CPT

## 2019-10-29 PROCEDURE — 84443 ASSAY THYROID STIM HORMONE: CPT

## 2019-10-29 PROCEDURE — 83036 HEMOGLOBIN GLYCOSYLATED A1C: CPT

## 2019-10-29 PROCEDURE — 36415 COLL VENOUS BLD VENIPUNCTURE: CPT

## 2019-10-29 PROCEDURE — 82306 VITAMIN D 25 HYDROXY: CPT

## 2019-10-29 PROCEDURE — 82570 ASSAY OF URINE CREATININE: CPT

## 2019-10-29 PROCEDURE — 82043 UR ALBUMIN QUANTITATIVE: CPT

## 2019-10-29 PROCEDURE — 80061 LIPID PANEL: CPT

## 2019-10-29 PROCEDURE — 80053 COMPREHEN METABOLIC PANEL: CPT

## 2019-10-31 ENCOUNTER — OFFICE VISIT (OUTPATIENT)
Dept: FAMILY MEDICINE CLINIC | Facility: CLINIC | Age: 64
End: 2019-10-31

## 2019-10-31 VITALS
OXYGEN SATURATION: 98 % | HEIGHT: 59 IN | HEART RATE: 97 BPM | DIASTOLIC BLOOD PRESSURE: 80 MMHG | BODY MASS INDEX: 23.79 KG/M2 | WEIGHT: 118 LBS | SYSTOLIC BLOOD PRESSURE: 134 MMHG

## 2019-10-31 DIAGNOSIS — E55.9 VITAMIN D DEFICIENCY: ICD-10-CM

## 2019-10-31 DIAGNOSIS — J01.00 ACUTE NON-RECURRENT MAXILLARY SINUSITIS: ICD-10-CM

## 2019-10-31 DIAGNOSIS — M81.0 AGE-RELATED OSTEOPOROSIS WITHOUT CURRENT PATHOLOGICAL FRACTURE: ICD-10-CM

## 2019-10-31 DIAGNOSIS — E78.2 MIXED HYPERLIPIDEMIA: ICD-10-CM

## 2019-10-31 DIAGNOSIS — E11.9 CONTROLLED TYPE 2 DIABETES MELLITUS WITHOUT COMPLICATION, WITHOUT LONG-TERM CURRENT USE OF INSULIN (HCC): Primary | ICD-10-CM

## 2019-10-31 PROCEDURE — 99214 OFFICE O/P EST MOD 30 MIN: CPT | Performed by: FAMILY MEDICINE

## 2019-10-31 RX ORDER — METFORMIN HYDROCHLORIDE 750 MG/1
750 TABLET, EXTENDED RELEASE ORAL
Qty: 30 TABLET | Refills: 3 | Status: SHIPPED | OUTPATIENT
Start: 2019-10-31 | End: 2020-02-18

## 2019-10-31 RX ORDER — ERGOCALCIFEROL 1.25 MG/1
CAPSULE ORAL
Qty: 24 CAPSULE | Refills: 3 | Status: SHIPPED | OUTPATIENT
Start: 2019-10-31 | End: 2020-06-22

## 2019-10-31 RX ORDER — AZITHROMYCIN 250 MG/1
TABLET, FILM COATED ORAL
Qty: 6 TABLET | Refills: 0 | Status: SHIPPED | OUTPATIENT
Start: 2019-10-31 | End: 2020-06-22

## 2019-11-03 NOTE — PROGRESS NOTES
Subjective   Felicia Boyle is a 64 y.o. female with   Chief Complaint   Patient presents with   • Vitamin D Deficiency     go over labs    • Cough     discuss xray needed    .    History of Present Illness   64-year-old [place] female here for review of acquired prior to this appointment.  Patient with known history of osteoporosis and vitamin D deficiency here for further medical management.  She also complains of increased nasal congestion with postnasal drip and increased cough.  Onset of symptoms occurred within the last 7 to 10 days and were preceded by her  becoming ill.  He has improved using a azithromycin patient is requesting same.  She denies chest pain, shortness of air and there has been no audible wheezing.  Patient is also a type II non-insulin-dependent diabetic using metformin ER at 750 mg daily.  She is on Fosamax at 70 mg daily as well as 50,000 units of vitamin D2 weekly.  Overall she is tolerating the above medications well and has no further acute complaints.  Patient states that she has been using her Metformin ER only 3-4 times a week.  There has been no particular reason other than she does not like to take pills.  She denies side effects with the above.  The following portions of the patient's history were reviewed and updated as appropriate: allergies, current medications, past family history, past medical history, past social history, past surgical history and problem list.    Review of Systems   Constitutional:        Vitamin D deficiency   HENT: Positive for congestion, postnasal drip and sinus pressure.    Respiratory: Positive for cough. Negative for shortness of breath and wheezing.    Cardiovascular: Negative for chest pain.   Endocrine:        Type II non-insulin-dependent diabetes mellitus   Musculoskeletal:        Osteoporosis       Objective     Vitals:    10/31/19 0809   BP: 134/80   Pulse: 97   SpO2: 98%       Recent Results (from the past 672 hour(s))   IgP, Aptima  HPV    Collection Time: 10/10/19  9:30 AM   Result Value Ref Range    Diagnosis Comment     Specimen adequacy: Comment     Clinician Provided ICD-10: Comment     Performed by: Comment     . .     Note: Comment     Method: Comment     HPV Aptima Negative Negative   Vitamin D 25 Hydroxy    Collection Time: 10/29/19  7:19 AM   Result Value Ref Range    25 Hydroxy, Vitamin D 22.3 (L) 30.0 - 100.0 ng/ml   TSH    Collection Time: 10/29/19  7:19 AM   Result Value Ref Range    TSH 2.300 0.270 - 4.200 uIU/mL   Lipid Panel    Collection Time: 10/29/19  7:19 AM   Result Value Ref Range    Total Cholesterol 173 0 - 200 mg/dL    Triglycerides 260 (H) 0 - 150 mg/dL    HDL Cholesterol 62 (H) 40 - 60 mg/dL    LDL Cholesterol  59 0 - 100 mg/dL    VLDL Cholesterol 52 (H) 5 - 40 mg/dL    LDL/HDL Ratio 0.95    Comprehensive Metabolic Panel    Collection Time: 10/29/19  7:19 AM   Result Value Ref Range    Glucose 134 (H) 65 - 99 mg/dL    BUN 11 8 - 23 mg/dL    Creatinine 0.66 0.57 - 1.00 mg/dL    Sodium 138 136 - 145 mmol/L    Potassium 4.3 3.5 - 5.2 mmol/L    Chloride 99 98 - 107 mmol/L    CO2 25.7 22.0 - 29.0 mmol/L    Calcium 9.5 8.6 - 10.5 mg/dL    Total Protein 8.3 6.0 - 8.5 g/dL    Albumin 4.70 3.50 - 5.20 g/dL    ALT (SGPT) 22 1 - 33 U/L    AST (SGOT) 18 1 - 32 U/L    Alkaline Phosphatase 78 39 - 117 U/L    Total Bilirubin 0.4 0.2 - 1.2 mg/dL    eGFR Non African Amer 90 >60 mL/min/1.73    eGFR  African Amer 109 >60 mL/min/1.73    Globulin 3.6 gm/dL    A/G Ratio 1.3 g/dL    BUN/Creatinine Ratio 16.7 7.0 - 25.0    Anion Gap 13.3 5.0 - 15.0 mmol/L   Microalbumin / Creatinine Urine Ratio - Urine, Clean Catch    Collection Time: 10/29/19  7:20 AM   Result Value Ref Range    Microalbumin/Creatinine Ratio 46.5 mg/g    Creatinine, Urine 174.2 mg/dL    Microalbumin, Urine 8.1 mg/dL   Hemoglobin A1c    Collection Time: 10/29/19  7:20 AM   Result Value Ref Range    Hemoglobin A1C 6.80 (H) 4.80 - 5.60 %   CBC Auto Differential    Collection  Time: 10/29/19  7:20 AM   Result Value Ref Range    WBC 8.98 3.40 - 10.80 10*3/mm3    RBC 4.87 3.77 - 5.28 10*6/mm3    Hemoglobin 14.4 12.0 - 15.9 g/dL    Hematocrit 44.4 34.0 - 46.6 %    MCV 91.2 79.0 - 97.0 fL    MCH 29.6 26.6 - 33.0 pg    MCHC 32.4 31.5 - 35.7 g/dL    RDW 13.1 12.3 - 15.4 %    RDW-SD 43.9 37.0 - 54.0 fl    MPV 10.0 6.0 - 12.0 fL    Platelets 329 140 - 450 10*3/mm3    Neutrophil % 49.8 42.7 - 76.0 %    Lymphocyte % 42.3 19.6 - 45.3 %    Monocyte % 6.2 5.0 - 12.0 %    Eosinophil % 0.7 0.3 - 6.2 %    Basophil % 0.4 0.0 - 1.5 %    Immature Grans % 0.6 (H) 0.0 - 0.5 %    Neutrophils, Absolute 4.47 1.70 - 7.00 10*3/mm3    Lymphocytes, Absolute 3.80 (H) 0.70 - 3.10 10*3/mm3    Monocytes, Absolute 0.56 0.10 - 0.90 10*3/mm3    Eosinophils, Absolute 0.06 0.00 - 0.40 10*3/mm3    Basophils, Absolute 0.04 0.00 - 0.20 10*3/mm3    Immature Grans, Absolute 0.05 0.00 - 0.05 10*3/mm3    nRBC 0.0 0.0 - 0.2 /100 WBC       Physical Exam   Constitutional: She is oriented to person, place, and time. She appears well-developed and well-nourished.   HENT:   Head: Normocephalic and atraumatic.   Right Ear: Hearing, tympanic membrane, external ear and ear canal normal.   Left Ear: Hearing, tympanic membrane, external ear and ear canal normal.   Nose: Mucosal edema present.   Mouth/Throat: Uvula is midline, oropharynx is clear and moist and mucous membranes are normal.   Neck: Trachea normal and phonation normal. Neck supple. Normal carotid pulses present. Carotid bruit is not present. No thyroid mass and no thyromegaly present.   Cardiovascular: Normal rate, regular rhythm and normal heart sounds. Exam reveals no gallop and no friction rub.   No murmur heard.  Pulmonary/Chest: Effort normal and breath sounds normal. No respiratory distress. She has no decreased breath sounds. She has no wheezes. She has no rhonchi. She has no rales.   Lymphadenopathy:     She has no cervical adenopathy.   Neurological: She is alert and  oriented to person, place, and time.   Skin: Skin is warm and dry. No rash noted.   Psychiatric: She has a normal mood and affect. Her speech is normal and behavior is normal. Judgment and thought content normal. Cognition and memory are normal.   Nursing note and vitals reviewed.      Assessment/Plan   Felicia was seen today for vitamin d deficiency and cough.    Diagnoses and all orders for this visit:    Controlled type 2 diabetes mellitus without complication, without long-term current use of insulin (CMS/[facility])  -     metFORMIN ER (GLUCOPHAGE-XR) 750 MG 24 hr tablet; Take 1 tablet by mouth Daily With Breakfast.  -     Lipid panel; Future  -     Hemoglobin A1c; Future  -     Comprehensive metabolic panel; Future  -     Vitamin D 25 hydroxy; Future  -     TSH; Future  -     CBC w AUTO Differential; Future    Mixed hyperlipidemia  -     Lipid panel; Future  -     Hemoglobin A1c; Future  -     Comprehensive metabolic panel; Future  -     Vitamin D 25 hydroxy; Future  -     TSH; Future  -     CBC w AUTO Differential; Future    Vitamin D deficiency  -     vitamin D (ERGOCALCIFEROL) 1.25 MG (94606 UT) capsule capsule; 1 po 2xs per week  -     Lipid panel; Future  -     Hemoglobin A1c; Future  -     Comprehensive metabolic panel; Future  -     Vitamin D 25 hydroxy; Future  -     TSH; Future  -     CBC w AUTO Differential; Future    Age-related osteoporosis without current pathological fracture  -     DEXA Bone Density Axial  -     Lipid panel; Future  -     Hemoglobin A1c; Future  -     Comprehensive metabolic panel; Future  -     Vitamin D 25 hydroxy; Future  -     TSH; Future  -     CBC w AUTO Differential; Future    Acute non-recurrent maxillary sinusitis  -     azithromycin (ZITHROMAX Z-WILDER) 250 MG tablet; Take 2 tablets the first day, then 1 tablet daily for 4 days.  -     Lipid panel; Future  -     Hemoglobin A1c; Future  -     Comprehensive metabolic panel; Future  -     Vitamin D 25 hydroxy; Future  -     TSH;  Future  -     CBC w AUTO Differential; Future        Return in about 4 months (around 2/29/2020).

## 2019-11-21 ENCOUNTER — TELEPHONE (OUTPATIENT)
Dept: FAMILY MEDICINE CLINIC | Facility: CLINIC | Age: 64
End: 2019-11-21

## 2019-11-21 NOTE — TELEPHONE ENCOUNTER
PT CALLED to request a refill on Hycodan cough syrup.  Originally given to her  but you had told them to share it.  He no longer needs it, only she does.

## 2019-12-30 ENCOUNTER — HOSPITAL ENCOUNTER (OUTPATIENT)
Dept: BONE DENSITY | Facility: HOSPITAL | Age: 64
Discharge: HOME | End: 2019-12-30
Admitting: FAMILY MEDICINE

## 2019-12-30 PROCEDURE — 77080 DXA BONE DENSITY AXIAL: CPT

## 2020-02-18 DIAGNOSIS — E11.9 CONTROLLED TYPE 2 DIABETES MELLITUS WITHOUT COMPLICATION, WITHOUT LONG-TERM CURRENT USE OF INSULIN (HCC): ICD-10-CM

## 2020-02-18 RX ORDER — METFORMIN HYDROCHLORIDE 750 MG/1
750 TABLET, EXTENDED RELEASE ORAL
Qty: 30 TABLET | Refills: 3 | Status: SHIPPED | OUTPATIENT
Start: 2020-02-18 | End: 2020-06-22 | Stop reason: SDUPTHER

## 2020-02-24 DIAGNOSIS — E78.2 MIXED HYPERLIPIDEMIA: Primary | ICD-10-CM

## 2020-02-24 DIAGNOSIS — E11.9 CONTROLLED TYPE 2 DIABETES MELLITUS WITHOUT COMPLICATION, WITHOUT LONG-TERM CURRENT USE OF INSULIN (HCC): ICD-10-CM

## 2020-02-24 DIAGNOSIS — E55.9 VITAMIN D DEFICIENCY: ICD-10-CM

## 2020-03-18 ENCOUNTER — OFFICE VISIT (OUTPATIENT)
Dept: OBSTETRICS AND GYNECOLOGY | Age: 65
End: 2020-03-18

## 2020-03-18 VITALS
SYSTOLIC BLOOD PRESSURE: 130 MMHG | DIASTOLIC BLOOD PRESSURE: 80 MMHG | BODY MASS INDEX: 23.39 KG/M2 | HEIGHT: 59 IN | WEIGHT: 116 LBS

## 2020-03-18 DIAGNOSIS — A59.01 TRICHOMONAL VAGINITIS: ICD-10-CM

## 2020-03-18 DIAGNOSIS — N89.8 VAGINAL DISCHARGE: Primary | ICD-10-CM

## 2020-03-18 PROCEDURE — 99213 OFFICE O/P EST LOW 20 MIN: CPT | Performed by: OBSTETRICS & GYNECOLOGY

## 2020-03-18 NOTE — PROGRESS NOTES
"Subjective   Felicia Boyle is a 64 y.o. female . cc:vaginal yellow discharge past 2 weeks denies odor or abdominal pain , patient tried monistat OTC did not work , last pap 10/10/19  neg , mmg 03/15/19 at [facility], [place] , dexa 10/30/19.      History of Present Illness    The following portions of the patient's history were reviewed and updated as appropriate: allergies, current medications, past family history, past medical history, past social history, past surgical history and problem list.    Review of Systems   Constitutional: Negative for chills and fever.   Gastrointestinal: Negative for abdominal distention and abdominal pain.   Genitourinary: Positive for vaginal discharge. Negative for dysuria, pelvic pain, vaginal bleeding and vaginal pain.   All other systems reviewed and are negative.    /80   Ht 149.9 cm (59\")   Wt 52.6 kg (116 lb)   LMP  (LMP Unknown)   BMI 23.43 kg/m²     Objective   Physical Exam   Constitutional: She appears well-developed and well-nourished.   Pulmonary/Chest: Effort normal.   Genitourinary: Pelvic exam was performed with patient supine. There is no rash or tenderness on the right labia. There is no rash or tenderness on the left labia. Cervix exhibits discharge. Cervix exhibits no friability. Vaginal discharge found.   Genitourinary Comments: + thin white d/c noted    Skin: Skin is warm and dry.   Psychiatric: She has a normal mood and affect. Her behavior is normal.   Nursing note and vitals reviewed.      Assessment/Plan   Felicia was seen today for gynecologic exam.    Diagnoses and all orders for this visit:    Vaginal discharge  -     NuSwab VG+ - Swab, Vagina       Counseling was given to patient for the following topics: instructions for management, importance of treatment compliance and call me if swab neg and discharge persists  . Total time of the encounter was 20 minutes and 15 minutes was spend counseling.           "

## 2020-03-21 LAB
A VAGINAE DNA VAG QL NAA+PROBE: ABNORMAL SCORE
BVAB2 DNA VAG QL NAA+PROBE: ABNORMAL SCORE
C ALBICANS DNA VAG QL NAA+PROBE: NEGATIVE
C GLABRATA DNA VAG QL NAA+PROBE: NEGATIVE
C TRACH DNA VAG QL NAA+PROBE: NEGATIVE
MEGA1 DNA VAG QL NAA+PROBE: ABNORMAL SCORE
N GONORRHOEA DNA VAG QL NAA+PROBE: NEGATIVE
T VAGINALIS DNA VAG QL NAA+PROBE: POSITIVE

## 2020-03-23 ENCOUNTER — TELEPHONE (OUTPATIENT)
Dept: OBSTETRICS AND GYNECOLOGY | Age: 65
End: 2020-03-23

## 2020-03-23 DIAGNOSIS — A59.01 TRICHOMONAL VAGINITIS: Primary | ICD-10-CM

## 2020-03-23 RX ORDER — TINIDAZOLE 500 MG/1
2 TABLET ORAL ONCE
Qty: 4 TABLET | Refills: 0 | Status: SHIPPED | OUTPATIENT
Start: 2020-03-23 | End: 2020-03-23

## 2020-03-23 RX ORDER — METRONIDAZOLE 500 MG/1
2000 TABLET ORAL ONCE
Qty: 4 TABLET | Refills: 0 | Status: SHIPPED | OUTPATIENT
Start: 2020-03-23 | End: 2020-03-23

## 2020-03-23 NOTE — TELEPHONE ENCOUNTER
----- Message from Cristin Perry MD sent at 3/23/2020 10:32 AM EDT -----  Please call the patient regarding her abnormal vaginal swab result of trichomonas - this is a sexually transmitted disease and partner must be treated before resuming intercourse - Rx sent to pharmacy - happy to talk with her if she has questions

## 2020-03-23 NOTE — PROGRESS NOTES
Please call the patient regarding her abnormal vaginal swab result of trichomonas - this is a sexually transmitted disease and partner must be treated before resuming intercourse - Rx sent to pharmacy - happy to talk with her if she has questions

## 2020-03-23 NOTE — TELEPHONE ENCOUNTER
Patient notified and explained the results of trich , will  flagyl today . Patient wants to know if we can call the rx for her  to be treated Ryan Boyle dod: 09/26/1961 NOAH

## 2020-03-23 NOTE — TELEPHONE ENCOUNTER
Patient dwaine rx for tindamax and  jamila gutierrezyl is ready at the pharmacy , patient notified and instructed how to take the medicine. Any questions call the office back./

## 2020-04-07 ENCOUNTER — LAB (OUTPATIENT)
Dept: LAB | Facility: HOSPITAL | Age: 65
End: 2020-04-07

## 2020-04-07 DIAGNOSIS — E78.2 MIXED HYPERLIPIDEMIA: ICD-10-CM

## 2020-04-07 DIAGNOSIS — E11.9 CONTROLLED TYPE 2 DIABETES MELLITUS WITHOUT COMPLICATION, WITHOUT LONG-TERM CURRENT USE OF INSULIN (HCC): ICD-10-CM

## 2020-04-07 DIAGNOSIS — E55.9 VITAMIN D DEFICIENCY: ICD-10-CM

## 2020-04-07 LAB
25(OH)D3 SERPL-MCNC: 28 NG/ML (ref 30–100)
ALBUMIN SERPL-MCNC: 4.9 G/DL (ref 3.5–5.2)
ALBUMIN/GLOB SERPL: 1.8 G/DL
ALP SERPL-CCNC: 69 U/L (ref 39–117)
ALT SERPL W P-5'-P-CCNC: 26 U/L (ref 1–33)
ANION GAP SERPL CALCULATED.3IONS-SCNC: 15.4 MMOL/L (ref 5–15)
AST SERPL-CCNC: 19 U/L (ref 1–32)
BASOPHILS # BLD AUTO: 0.05 10*3/MM3 (ref 0–0.2)
BASOPHILS NFR BLD AUTO: 0.6 % (ref 0–1.5)
BILIRUB SERPL-MCNC: 0.3 MG/DL (ref 0.2–1.2)
BUN BLD-MCNC: 16 MG/DL (ref 8–23)
BUN/CREAT SERPL: 22.5 (ref 7–25)
CALCIUM SPEC-SCNC: 9.7 MG/DL (ref 8.6–10.5)
CHLORIDE SERPL-SCNC: 98 MMOL/L (ref 98–107)
CHOLEST SERPL-MCNC: 183 MG/DL (ref 0–200)
CO2 SERPL-SCNC: 23.6 MMOL/L (ref 22–29)
CREAT BLD-MCNC: 0.71 MG/DL (ref 0.57–1)
DEPRECATED RDW RBC AUTO: 42 FL (ref 37–54)
EOSINOPHIL # BLD AUTO: 0.05 10*3/MM3 (ref 0–0.4)
EOSINOPHIL NFR BLD AUTO: 0.6 % (ref 0.3–6.2)
ERYTHROCYTE [DISTWIDTH] IN BLOOD BY AUTOMATED COUNT: 12.7 % (ref 12.3–15.4)
GFR SERPL CREATININE-BSD FRML MDRD: 100 ML/MIN/1.73
GFR SERPL CREATININE-BSD FRML MDRD: 83 ML/MIN/1.73
GLOBULIN UR ELPH-MCNC: 2.8 GM/DL
GLUCOSE BLD-MCNC: 127 MG/DL (ref 65–99)
HBA1C MFR BLD: 6.77 % (ref 4.8–5.6)
HCT VFR BLD AUTO: 41.7 % (ref 34–46.6)
HDLC SERPL-MCNC: 78 MG/DL (ref 40–60)
HGB BLD-MCNC: 13.9 G/DL (ref 12–15.9)
IMM GRANULOCYTES # BLD AUTO: 0.03 10*3/MM3 (ref 0–0.05)
IMM GRANULOCYTES NFR BLD AUTO: 0.3 % (ref 0–0.5)
LDLC SERPL CALC-MCNC: 79 MG/DL (ref 0–100)
LDLC/HDLC SERPL: 1.01 {RATIO}
LYMPHOCYTES # BLD AUTO: 3.53 10*3/MM3 (ref 0.7–3.1)
LYMPHOCYTES NFR BLD AUTO: 40.8 % (ref 19.6–45.3)
MCH RBC QN AUTO: 30.3 PG (ref 26.6–33)
MCHC RBC AUTO-ENTMCNC: 33.3 G/DL (ref 31.5–35.7)
MCV RBC AUTO: 90.8 FL (ref 79–97)
MONOCYTES # BLD AUTO: 0.6 10*3/MM3 (ref 0.1–0.9)
MONOCYTES NFR BLD AUTO: 6.9 % (ref 5–12)
NEUTROPHILS # BLD AUTO: 4.4 10*3/MM3 (ref 1.7–7)
NEUTROPHILS NFR BLD AUTO: 50.8 % (ref 42.7–76)
NRBC BLD AUTO-RTO: 0 /100 WBC (ref 0–0.2)
PLATELET # BLD AUTO: 299 10*3/MM3 (ref 140–450)
PMV BLD AUTO: 10.6 FL (ref 6–12)
POTASSIUM BLD-SCNC: 4.6 MMOL/L (ref 3.5–5.2)
PROT SERPL-MCNC: 7.7 G/DL (ref 6–8.5)
RBC # BLD AUTO: 4.59 10*6/MM3 (ref 3.77–5.28)
SODIUM BLD-SCNC: 137 MMOL/L (ref 136–145)
TRIGL SERPL-MCNC: 132 MG/DL (ref 0–150)
TSH SERPL DL<=0.05 MIU/L-ACNC: 2.33 UIU/ML (ref 0.27–4.2)
VLDLC SERPL-MCNC: 26.4 MG/DL (ref 5–40)
WBC NRBC COR # BLD: 8.66 10*3/MM3 (ref 3.4–10.8)

## 2020-04-07 PROCEDURE — 83036 HEMOGLOBIN GLYCOSYLATED A1C: CPT

## 2020-04-07 PROCEDURE — 85025 COMPLETE CBC W/AUTO DIFF WBC: CPT

## 2020-04-07 PROCEDURE — 84443 ASSAY THYROID STIM HORMONE: CPT

## 2020-04-07 PROCEDURE — 80053 COMPREHEN METABOLIC PANEL: CPT

## 2020-04-07 PROCEDURE — 36415 COLL VENOUS BLD VENIPUNCTURE: CPT

## 2020-04-07 PROCEDURE — 80061 LIPID PANEL: CPT

## 2020-04-07 PROCEDURE — 82306 VITAMIN D 25 HYDROXY: CPT

## 2020-06-22 ENCOUNTER — TELEPHONE (OUTPATIENT)
Dept: FAMILY MEDICINE CLINIC | Facility: CLINIC | Age: 65
End: 2020-06-22

## 2020-06-22 ENCOUNTER — OFFICE VISIT (OUTPATIENT)
Dept: FAMILY MEDICINE CLINIC | Facility: CLINIC | Age: 65
End: 2020-06-22

## 2020-06-22 VITALS
HEART RATE: 94 BPM | TEMPERATURE: 97.6 F | DIASTOLIC BLOOD PRESSURE: 74 MMHG | SYSTOLIC BLOOD PRESSURE: 130 MMHG | HEIGHT: 59 IN | OXYGEN SATURATION: 95 % | WEIGHT: 118 LBS | BODY MASS INDEX: 23.79 KG/M2

## 2020-06-22 DIAGNOSIS — E11.9 CONTROLLED TYPE 2 DIABETES MELLITUS WITHOUT COMPLICATION, WITHOUT LONG-TERM CURRENT USE OF INSULIN (HCC): Primary | ICD-10-CM

## 2020-06-22 DIAGNOSIS — M81.0 AGE-RELATED OSTEOPOROSIS WITHOUT CURRENT PATHOLOGICAL FRACTURE: ICD-10-CM

## 2020-06-22 DIAGNOSIS — E55.9 VITAMIN D DEFICIENCY: ICD-10-CM

## 2020-06-22 PROBLEM — A59.01 TRICHOMONAL VAGINITIS: Status: RESOLVED | Noted: 2020-03-23 | Resolved: 2020-06-22

## 2020-06-22 PROCEDURE — 99213 OFFICE O/P EST LOW 20 MIN: CPT | Performed by: FAMILY MEDICINE

## 2020-06-22 RX ORDER — ERGOCALCIFEROL 1.25 MG/1
CAPSULE ORAL
Qty: 24 CAPSULE | Refills: 3 | Status: SHIPPED | OUTPATIENT
Start: 2020-06-22 | End: 2020-11-25 | Stop reason: SDUPTHER

## 2020-06-22 RX ORDER — METFORMIN HYDROCHLORIDE 750 MG/1
750 TABLET, EXTENDED RELEASE ORAL
Qty: 90 TABLET | Refills: 1 | Status: SHIPPED | OUTPATIENT
Start: 2020-06-22 | End: 2020-11-25 | Stop reason: SDUPTHER

## 2020-06-22 RX ORDER — ALENDRONATE SODIUM 70 MG/1
70 TABLET ORAL
Qty: 4 TABLET | Refills: 11 | Status: SHIPPED | OUTPATIENT
Start: 2020-06-22 | End: 2020-11-25 | Stop reason: SDDI

## 2020-06-22 NOTE — TELEPHONE ENCOUNTER
Pt would like labs done at [facility] before her appointment here on 11/19/2020.

## 2020-06-22 NOTE — PROGRESS NOTES
Subjective   Felicia Boyle is a 64 y.o. female with   Chief Complaint   Patient presents with   • Diabetes   .    History of Present Illness   64-year-old [place] female with type II non-insulin-dependent diabetes mellitus, vitamin D deficiency, osteoporosis here for further medical management.  Patient has been using metformin ER at 750 mg daily.  She has also been using Fosamax on a weekly basis and states that she has been using vitamin D at 50,000 units once a week.  Fasting labs have been acquired prior to this visit.  She is using the above medication regimen and has had no side effects that she has complained of.  Last fasting labs were required in April of this year.  She was prevented from following up as usual secondary to coronavirus.  The following portions of the patient's history were reviewed and updated as appropriate: allergies, current medications, past family history, past medical history, past social history, past surgical history and problem list.    Review of Systems   Constitutional:        Vitamin D deficiency   Endocrine:        Type II non-insulin-dependent diabetes mellitus   Musculoskeletal:        Osteoporosis       Objective     Vitals:    06/22/20 1016   BP: 130/74   Pulse: 94   Temp: 97.6 °F (36.4 °C)   SpO2: 95%       No results found for this or any previous visit (from the past 672 hour(s)).    Physical Exam   Constitutional: She is oriented to person, place, and time. She appears well-developed and well-nourished.   HENT:   Head: Normocephalic and atraumatic.   Neck: Trachea normal and phonation normal. Neck supple. Normal carotid pulses present. Carotid bruit is not present. No thyroid mass and no thyromegaly present.   Cardiovascular: Normal rate, regular rhythm and normal heart sounds. Exam reveals no gallop and no friction rub.   No murmur heard.  Pulmonary/Chest: Effort normal and breath sounds normal. No respiratory distress. She has no decreased breath sounds. She has no  wheezes. She has no rhonchi. She has no rales.   Lymphadenopathy:     She has no cervical adenopathy.   Neurological: She is alert and oriented to person, place, and time.   Skin: Skin is warm and dry. No rash noted.   Psychiatric: She has a normal mood and affect. Her speech is normal and behavior is normal. Judgment and thought content normal. Cognition and memory are normal.   Nursing note and vitals reviewed.    No visits with results within 8 Week(s) from this visit.   Latest known visit with results is:   Lab on 04/07/2020   Component Date Value Ref Range Status   • Glucose 04/07/2020 127* 65 - 99 mg/dL Final   • BUN 04/07/2020 16  8 - 23 mg/dL Final   • Creatinine 04/07/2020 0.71  0.57 - 1.00 mg/dL Final   • Sodium 04/07/2020 137  136 - 145 mmol/L Final   • Potassium 04/07/2020 4.6  3.5 - 5.2 mmol/L Final   • Chloride 04/07/2020 98  98 - 107 mmol/L Final   • CO2 04/07/2020 23.6  22.0 - 29.0 mmol/L Final   • Calcium 04/07/2020 9.7  8.6 - 10.5 mg/dL Final   • Total Protein 04/07/2020 7.7  6.0 - 8.5 g/dL Final   • Albumin 04/07/2020 4.90  3.50 - 5.20 g/dL Final   • ALT (SGPT) 04/07/2020 26  1 - 33 U/L Final   • AST (SGOT) 04/07/2020 19  1 - 32 U/L Final   • Alkaline Phosphatase 04/07/2020 69  39 - 117 U/L Final   • Total Bilirubin 04/07/2020 0.3  0.2 - 1.2 mg/dL Final   • eGFR Non African Amer 04/07/2020 83  >60 mL/min/1.73 Final   • eGFR  African Amer 04/07/2020 100  >60 mL/min/1.73 Final   • Globulin 04/07/2020 2.8  gm/dL Final   • A/G Ratio 04/07/2020 1.8  g/dL Final   • BUN/Creatinine Ratio 04/07/2020 22.5  7.0 - 25.0 Final   • Anion Gap 04/07/2020 15.4* 5.0 - 15.0 mmol/L Final   • Hemoglobin A1C 04/07/2020 6.77* 4.80 - 5.60 % Final   • Total Cholesterol 04/07/2020 183  0 - 200 mg/dL Final   • Triglycerides 04/07/2020 132  0 - 150 mg/dL Final   • HDL Cholesterol 04/07/2020 78* 40 - 60 mg/dL Final   • LDL Cholesterol  04/07/2020 79  0 - 100 mg/dL Final   • VLDL Cholesterol 04/07/2020 26.4  5 - 40 mg/dL Final    • LDL/HDL Ratio 04/07/2020 1.01   Final   • TSH 04/07/2020 2.330  0.270 - 4.200 uIU/mL Final   • WBC 04/07/2020 8.66  3.40 - 10.80 10*3/mm3 Final   • RBC 04/07/2020 4.59  3.77 - 5.28 10*6/mm3 Final   • Hemoglobin 04/07/2020 13.9  12.0 - 15.9 g/dL Final   • Hematocrit 04/07/2020 41.7  34.0 - 46.6 % Final   • MCV 04/07/2020 90.8  79.0 - 97.0 fL Final   • MCH 04/07/2020 30.3  26.6 - 33.0 pg Final   • MCHC 04/07/2020 33.3  31.5 - 35.7 g/dL Final   • RDW 04/07/2020 12.7  12.3 - 15.4 % Final   • RDW-SD 04/07/2020 42.0  37.0 - 54.0 fl Final   • MPV 04/07/2020 10.6  6.0 - 12.0 fL Final   • Platelets 04/07/2020 299  140 - 450 10*3/mm3 Final   • Neutrophil % 04/07/2020 50.8  42.7 - 76.0 % Final   • Lymphocyte % 04/07/2020 40.8  19.6 - 45.3 % Final   • Monocyte % 04/07/2020 6.9  5.0 - 12.0 % Final   • Eosinophil % 04/07/2020 0.6  0.3 - 6.2 % Final   • Basophil % 04/07/2020 0.6  0.0 - 1.5 % Final   • Immature Grans % 04/07/2020 0.3  0.0 - 0.5 % Final   • Neutrophils, Absolute 04/07/2020 4.40  1.70 - 7.00 10*3/mm3 Final   • Lymphocytes, Absolute 04/07/2020 3.53* 0.70 - 3.10 10*3/mm3 Final   • Monocytes, Absolute 04/07/2020 0.60  0.10 - 0.90 10*3/mm3 Final   • Eosinophils, Absolute 04/07/2020 0.05  0.00 - 0.40 10*3/mm3 Final   • Basophils, Absolute 04/07/2020 0.05  0.00 - 0.20 10*3/mm3 Final   • Immature Grans, Absolute 04/07/2020 0.03  0.00 - 0.05 10*3/mm3 Final   • nRBC 04/07/2020 0.0  0.0 - 0.2 /100 WBC Final   • 25 Hydroxy, Vitamin D 04/07/2020 28.0* 30.0 - 100.0 ng/ml Final         Assessment/Plan   Felicia was seen today for diabetes.    Diagnoses and all orders for this visit:    Controlled type 2 diabetes mellitus without complication, without long-term current use of insulin (CMS/[facility])  -     metFORMIN ER (GLUCOPHAGE-XR) 750 MG 24 hr tablet; Take 1 tablet by mouth Daily With Breakfast.  -     Lipid panel; Future  -     Hemoglobin A1c; Future  -     Comprehensive metabolic panel; Future  -     Vitamin D 25 hydroxy;  Future  -     TSH; Future  -     CBC w AUTO Differential; Future    Vitamin D deficiency  -     vitamin D (ERGOCALCIFEROL) 1.25 MG (18645 UT) capsule capsule; 1 po 2xs per week  -     Lipid panel; Future  -     Hemoglobin A1c; Future  -     Comprehensive metabolic panel; Future  -     Vitamin D 25 hydroxy; Future  -     TSH; Future  -     CBC w AUTO Differential; Future    Age-related osteoporosis without current pathological fracture  -     alendronate (Fosamax) 70 MG tablet; Take 1 tablet by mouth Every 7 (Seven) Days.  -     Lipid panel; Future  -     Hemoglobin A1c; Future  -     Comprehensive metabolic panel; Future  -     Vitamin D 25 hydroxy; Future  -     TSH; Future  -     CBC w AUTO Differential; Future        Return in about 6 months (around 12/22/2020) for Recheck.

## 2020-08-25 ENCOUNTER — OFFICE VISIT (OUTPATIENT)
Dept: FAMILY MEDICINE CLINIC | Facility: CLINIC | Age: 65
End: 2020-08-25

## 2020-08-25 VITALS
WEIGHT: 118 LBS | SYSTOLIC BLOOD PRESSURE: 120 MMHG | BODY MASS INDEX: 23.79 KG/M2 | HEART RATE: 75 BPM | TEMPERATURE: 98 F | OXYGEN SATURATION: 98 % | HEIGHT: 59 IN | DIASTOLIC BLOOD PRESSURE: 80 MMHG

## 2020-08-25 DIAGNOSIS — H81.11 BENIGN PAROXYSMAL POSITIONAL VERTIGO OF RIGHT EAR: Primary | ICD-10-CM

## 2020-08-25 PROCEDURE — 99213 OFFICE O/P EST LOW 20 MIN: CPT | Performed by: FAMILY MEDICINE

## 2020-08-25 RX ORDER — MECLIZINE HCL 12.5 MG/1
TABLET ORAL
Qty: 20 TABLET | Refills: 0 | Status: SHIPPED | OUTPATIENT
Start: 2020-08-25 | End: 2022-05-16

## 2020-08-25 NOTE — PROGRESS NOTES
Subjective   Felicia Boyle is a 64 y.o. female with   Chief Complaint   Patient presents with   • Headache   • Dizziness     since Saturday   .    History of Present Illness   64-year-old Filipina female with complaint of true vertigo that is been present for several days.  This is much more prominent when lying on her right side or leaning towards her right.  Patient denies head trauma and there has been no visual changes or focal neurologic changes.  Onset of symptoms seem to spontaneously start and each episode last several minutes.  The vertigo is significant enough to cause nausea and near emesis.  The following portions of the patient's history were reviewed and updated as appropriate: allergies, current medications, past family history, past medical history, past social history, past surgical history and problem list.    Review of Systems   Neurological: Positive for dizziness.       Objective     Vitals:    08/25/20 0853   BP: 120/80   Pulse: 75   Temp: 98 °F (36.7 °C)   SpO2: 98%       No results found for this or any previous visit (from the past 672 hour(s)).    Physical Exam   Constitutional: She is oriented to person, place, and time. She appears well-developed and well-nourished.   HENT:   Head: Normocephalic and atraumatic.   Right Ear: Hearing, tympanic membrane, external ear and ear canal normal.   Left Ear: Hearing, tympanic membrane, external ear and ear canal normal.   Nose: Nose normal.   Mouth/Throat: Uvula is midline, oropharynx is clear and moist and mucous membranes are normal.   Eyes: Pupils are equal, round, and reactive to light. Conjunctivae, EOM and lids are normal.   Fundoscopic exam:       The right eye shows no AV nicking, no exudate, no hemorrhage and no papilledema.        The left eye shows no AV nicking, no exudate, no hemorrhage and no papilledema.   Neck: Trachea normal and phonation normal. Neck supple. Normal carotid pulses present. Carotid bruit is not present. No thyroid  mass and no thyromegaly present.   Cardiovascular: Normal rate, regular rhythm and normal heart sounds. Exam reveals no gallop and no friction rub.   No murmur heard.  Pulmonary/Chest: Effort normal and breath sounds normal. No respiratory distress. She has no decreased breath sounds. She has no wheezes. She has no rhonchi. She has no rales.   Lymphadenopathy:     She has no cervical adenopathy.   Neurological: She is alert and oriented to person, place, and time. She has normal strength and normal reflexes. No cranial nerve deficit or sensory deficit.   [place]-Hallpike maneuver is positive to the right.   Skin: Skin is warm and dry. No rash noted.   Psychiatric: She has a normal mood and affect. Her speech is normal and behavior is normal. Judgment and thought content normal. Cognition and memory are normal.   Nursing note and vitals reviewed.      Assessment/Plan   Felicia was seen today for headache and dizziness.    Diagnoses and all orders for this visit:    Benign paroxysmal positional vertigo of right ear  -     Ambulatory Referral to Physical Therapy Vestibular  -     meclizine (ANTIVERT) 12.5 MG tablet; 1-2 po q 6 hours prn dizzy        Return if symptoms worsen or fail to improve.

## 2020-09-16 ENCOUNTER — APPOINTMENT (OUTPATIENT)
Dept: PHYSICAL THERAPY | Facility: HOSPITAL | Age: 65
End: 2020-09-16

## 2020-09-25 ENCOUNTER — APPOINTMENT (OUTPATIENT)
Dept: PHYSICAL THERAPY | Facility: HOSPITAL | Age: 65
End: 2020-09-25

## 2020-10-12 ENCOUNTER — HOSPITAL ENCOUNTER (OUTPATIENT)
Dept: PHYSICAL THERAPY | Facility: HOSPITAL | Age: 65
Setting detail: THERAPIES SERIES
Discharge: HOME | End: 2020-10-12

## 2020-10-12 DIAGNOSIS — R42 DIZZINESS: ICD-10-CM

## 2020-10-12 DIAGNOSIS — H81.11 BPPV (BENIGN PAROXYSMAL POSITIONAL VERTIGO), RIGHT: Primary | ICD-10-CM

## 2020-10-12 PROCEDURE — 97161 PT EVAL LOW COMPLEX 20 MIN: CPT | Performed by: PHYSICAL THERAPIST

## 2020-10-12 PROCEDURE — 95992 CANALITH REPOSITIONING PROC: CPT | Performed by: PHYSICAL THERAPIST

## 2020-10-12 NOTE — THERAPY EVALUATION
"    Outpatient Physical Therapy Vestibular Initial Evaluation  [facility]     Patient Name: Felicia Boyle  : 1955  MRN: [id number]  Today's Date: 10/12/2020      Visit Date: 10/12/2020    Patient Active Problem List   Diagnosis   • Age-related osteoporosis without current pathological fracture   • FH: breast cancer in first degree relative   • Post-menopausal   • Controlled type 2 diabetes mellitus without complication, without long-term current use of insulin (CMS/[facility])   • Vitamin D deficiency   • Mixed hyperlipidemia        Past Medical History:   Diagnosis Date   • Diabetes mellitus (CMS/HCC)     borderline   • Diverticulitis    • Mixed hyperlipidemia 2019   • Osteoporosis 3/3/2016   • Trichomonal vaginitis 3/23/2020        Past Surgical History:   Procedure Laterality Date   • BREAST BIOPSY     • CHOLECYSTECTOMY     • COLON SURGERY     • ENDOSCOPY         • ENDOSCOPY N/A 3/10/2017    Procedure: ESOPHAGOGASTRODUODENOSCOPY WITH BIOPSY AND PETER DILATATION 54\";  Surgeon: Angelo Gonsalez MD;  Location: [facility] ENDOSCOPY;  Service:    • LAPAROSCOPIC CHOLECYSTECTOMY           Visit Dx:     ICD-10-CM ICD-9-CM   1. BPPV (benign paroxysmal positional vertigo), right  H81.11 386.11   2. Dizziness  R42 780.4       Patient History     Row Name 10/12/20 1000             History    Chief Complaint  Dizziness  -GR      Date Current Problem(s) Began  20  -GR      Brief Description of Current Complaint  6 week history of vertigo started while watching TV. She c/o spinning with laying on her R side.  Her PCP diagnosed her with BPPV and issued her meclizine. She now takes this every 6 hours.  She has had episodes of emesis at least 3 times.  She denies light sensitivity/sound, tinnitus, head trauma or falls.  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  She has been trying to do the home epley every day on both sides- her sister told her to try this.  She is avoiding R sidelying. Worried about working " because she has to call in regularly now.    -GR      Patient/Caregiver Goals  Relief from dizziness;Know what to do to help the symptoms  -GR      Are you or can you be pregnant  No  -GR         Pain     Pain Location  -- no associated pain  -GR         Fall Risk Assessment    Any falls in the past year:  No  -GR         Services    Do you plan to receive Home Health services in the near future  No  -GR         Daily Activities    Primary Language  Other (comment) [place] Irish/Tagalog  -GR      Action taken if English not primary language  speaks English  -GR      Are you able to read  Yes  -GR      Are you able to write  Yes  -GR      How does patient learn best?  Listening;Reading;Demonstration  -GR      Pt Participated in POC and Goals  Yes  -GR         Safety    Are you being hurt, hit, or frightened by anyone at home or in your life?  No  -GR      Are you being neglected by a caregiver  No  -GR        User Key  (r) = Recorded By, (t) = Taken By, (c) = Cosigned By    Initials Name Provider Type    GR Quintin Chung, PT Physical Therapist          Vestibular Eval     Row Name 10/12/20 1000             Occulomotor Exam Fixation Present    Occular ROM  Normal  -GR      Spontaneous Nystagmus  Absent  -GR      Gaze-induced Nystagmus  Absent symptomatic to the R  -GR      Smooth Pursuit  Right:;Symptom Provoking  -GR      Saccades  Intact  -GR      Convergence  Normal  -GR         Positional Testing    Positional Testing  Without infrared goggles  -GR      Vertebrobasilar Artery Screen - Right  Negative  -GR      Vertebrobasilar Artery Screen - Left  Negative  -GR      [place]-Hallpike Right  Upbeat, right rotatory nystagmus  -GR      [place]-Hallpike Right Onset Time   4  -GR      [place]-Hallpike Right Duration Time   20  -GR      Horizontal Roll Test Right  No nystagmus  -GR      Horizontal Roll Test Left  No nystagmus  -GR        User Key  (r) = Recorded By, (t) = Taken By, (c) = Cosigned By    Initials Name Provider Type     Quintin Dyson, PT Physical Therapist                      Therapy Education  Education Details: Role of outpatient PT, vestibular anatomy, differential diagnosis, tri-part balance system, expectations, post-maneuver recommendations, follow up plans.  Given: Symptoms/condition management  Program: New  How Provided: Verbal  Provided to: Patient  Level of Understanding: Teach back education performed, Verbalized      OP Exercises     Row Name 10/12/20 1000             Exercise 1    Exercise Name 1  CRM_ EPLEY R  -GR      Cueing 1  Demo  -GR      Sets 1  1  -GR      Reps 1  1  -GR      Additional Comments  retest negative  -GR        User Key  (r) = Recorded By, (t) = Taken By, (c) = Cosigned By    Initials Name Provider Type    Quintin Dyson, PT Physical Therapist                      PT OP Goals     Row Name 10/12/20 1200          PT Short Term Goals    STG Date to Achieve  10/27/20  -GR     STG 1  Patient will present negative for right BPPV of the posterior canal.  -GR     STG 1 Progress  New  -GR     STG 2  Patient will be independent with static habituation PRN for in home safety.  -GR     STG 2 Progress  New  -GR        Long Term Goals    LTG Date to Achieve  11/26/20  -GR     LTG 1  Patient will present negative for BPPV of all canals.  -GR     LTG 1 Progress  New  -GR     LTG 2  Patient will be independent with dynamic adaptation techniques for community reintegration.  -GR     LTG 2 Progress  New  -GR     LTG 3  Patient will score </= 20/100 on the dizziness handicap inventory to indicate improved perceived positional tolerance.  -GR     LTG 3 Progress  New  -GR        Time Calculation    PT Goal Re-Cert Due Date  01/10/21  -GR       User Key  (r) = Recorded By, (t) = Taken By, (c) = Cosigned By    Initials Name Provider Type    Quintin Dyson, PT Physical Therapist          PT Assessment/Plan     Row Name 10/12/20 1000          PT Assessment    Functional Limitations  Impaired  gait;Limitation in home management;Limitations in community activities;Limitations in functional capacity and performance;Performance in leisure activities;Performance in self-care ADL  -GR     Impairments  Balance  -GR     Assessment Comments  65 y.o. female referred to outpatient physical therapy for vestibular evaluation.  Signs and symptoms are consistent with R BPPV posterior canalithiasis.  Oculomotor exam reveals symptomatic smooth pursuit R. Pertinent comorbidities and personal factors that may affect progress include, but are not limited to, DM, osteoporosis.  This condition is evolving. Canalith repositioning maneuver performed successfully with a negative retest; advised to hold on home treatments until next visit. Recommend skilled PT to address functional deficits. Thank you for this referral.  -GR     Please refer to paper survey for additional self-reported information  Yes  -GR     Rehab Potential  Excellent  -GR     Patient/caregiver participated in establishment of treatment plan and goals  Yes  -GR     Patient would benefit from skilled therapy intervention  Yes  -GR        PT Plan    PT Frequency  1x/week  -GR     Predicted Duration of Therapy Intervention (OT)  6 visits  -GR     Planned CPT's?  PT EVAL LOW COMPLEXITY: 06353;PT RE-EVAL: 24319;PT THER PROC EA 15 MIN: 98004;PT THER ACT EA 15 MIN: 14557;PT MANUAL THERAPY EA 15 MIN: 02076;PT NEUROMUSC RE-EDUCATION EA 15 MIN: 91278;PT GAIT TRAINING EA 15 MIN: 14016;PT TRACTION CERVICAL: 35057;PT CANALITH REPOSITIONIN  -GR     Physical Therapy Interventions (Optional Details)  balance training;home exercise program;manual therapy techniques;modalities;neuromuscular re-education;patient/family education;postural re-education;vestibular training  -GR     PT Plan Comments  Recheck BPPV and tx as indicated.  -GR       User Key  (r) = Recorded By, (t) = Taken By, (c) = Cosigned By    Initials Name Provider Type    GR Quintin Chung, PT Physical  Therapist           Outcome Measure Options: Dizziness Handicap Inventory(40/100)         Time Calculation:   Start Time: 1000  Stop Time: 1045  Time Calculation (min): 45 min  Total Timed Code Minutes- PT: 0 minute(s)   Therapy Charges for Today     Code Description Service Date Service Provider Modifiers Qty    86714527390 HC PT CANALITH REPOSITIONING PER DAY 10/12/2020 Quintin Chung, PT GP 1    16260938221 HC PT EVAL LOW COMPLEXITY 2 10/12/2020 Quintin Chung, PT GP 1          PT G-Codes  Outcome Measure Options: Dizziness Handicap Inventory(40/100)         Quintin Chung, PT  10/12/2020

## 2020-10-15 ENCOUNTER — APPOINTMENT (OUTPATIENT)
Dept: PHYSICAL THERAPY | Facility: HOSPITAL | Age: 65
End: 2020-10-15

## 2020-11-13 ENCOUNTER — LAB (OUTPATIENT)
Dept: LAB | Facility: HOSPITAL | Age: 65
End: 2020-11-13

## 2020-11-13 DIAGNOSIS — E55.9 VITAMIN D DEFICIENCY: ICD-10-CM

## 2020-11-13 DIAGNOSIS — E11.9 CONTROLLED TYPE 2 DIABETES MELLITUS WITHOUT COMPLICATION, WITHOUT LONG-TERM CURRENT USE OF INSULIN (HCC): ICD-10-CM

## 2020-11-13 DIAGNOSIS — M81.0 AGE-RELATED OSTEOPOROSIS WITHOUT CURRENT PATHOLOGICAL FRACTURE: ICD-10-CM

## 2020-11-13 LAB
25(OH)D3 SERPL-MCNC: 26.8 NG/ML (ref 30–100)
ALBUMIN SERPL-MCNC: 4.8 G/DL (ref 3.5–5.2)
ALBUMIN/GLOB SERPL: 1.8 G/DL
ALP SERPL-CCNC: 73 U/L (ref 39–117)
ALT SERPL W P-5'-P-CCNC: 28 U/L (ref 1–33)
ANION GAP SERPL CALCULATED.3IONS-SCNC: 8.2 MMOL/L (ref 5–15)
AST SERPL-CCNC: 22 U/L (ref 1–32)
BASOPHILS # BLD AUTO: 0.04 10*3/MM3 (ref 0–0.2)
BASOPHILS NFR BLD AUTO: 0.5 % (ref 0–1.5)
BILIRUB SERPL-MCNC: 0.3 MG/DL (ref 0–1.2)
BUN SERPL-MCNC: 11 MG/DL (ref 8–23)
BUN/CREAT SERPL: 20.4 (ref 7–25)
CALCIUM SPEC-SCNC: 8.9 MG/DL (ref 8.6–10.5)
CHLORIDE SERPL-SCNC: 104 MMOL/L (ref 98–107)
CHOLEST SERPL-MCNC: 173 MG/DL (ref 0–200)
CO2 SERPL-SCNC: 25.8 MMOL/L (ref 22–29)
CREAT SERPL-MCNC: 0.54 MG/DL (ref 0.57–1)
DEPRECATED RDW RBC AUTO: 42.5 FL (ref 37–54)
EOSINOPHIL # BLD AUTO: 0.04 10*3/MM3 (ref 0–0.4)
EOSINOPHIL NFR BLD AUTO: 0.5 % (ref 0.3–6.2)
ERYTHROCYTE [DISTWIDTH] IN BLOOD BY AUTOMATED COUNT: 12.8 % (ref 12.3–15.4)
GFR SERPL CREATININE-BSD FRML MDRD: 113 ML/MIN/1.73
GFR SERPL CREATININE-BSD FRML MDRD: 137 ML/MIN/1.73
GLOBULIN UR ELPH-MCNC: 2.6 GM/DL
GLUCOSE SERPL-MCNC: 117 MG/DL (ref 65–99)
HBA1C MFR BLD: 6.7 % (ref 4.8–5.6)
HCT VFR BLD AUTO: 41.9 % (ref 34–46.6)
HDLC SERPL-MCNC: 70 MG/DL (ref 40–60)
HGB BLD-MCNC: 13.8 G/DL (ref 12–15.9)
IMM GRANULOCYTES # BLD AUTO: 0.02 10*3/MM3 (ref 0–0.05)
IMM GRANULOCYTES NFR BLD AUTO: 0.3 % (ref 0–0.5)
LDLC SERPL CALC-MCNC: 76 MG/DL (ref 0–100)
LDLC/HDLC SERPL: 1.02 {RATIO}
LYMPHOCYTES # BLD AUTO: 2.75 10*3/MM3 (ref 0.7–3.1)
LYMPHOCYTES NFR BLD AUTO: 36.9 % (ref 19.6–45.3)
MCH RBC QN AUTO: 29.9 PG (ref 26.6–33)
MCHC RBC AUTO-ENTMCNC: 32.9 G/DL (ref 31.5–35.7)
MCV RBC AUTO: 90.9 FL (ref 79–97)
MONOCYTES # BLD AUTO: 0.41 10*3/MM3 (ref 0.1–0.9)
MONOCYTES NFR BLD AUTO: 5.5 % (ref 5–12)
NEUTROPHILS NFR BLD AUTO: 4.19 10*3/MM3 (ref 1.7–7)
NEUTROPHILS NFR BLD AUTO: 56.3 % (ref 42.7–76)
NRBC BLD AUTO-RTO: 0 /100 WBC (ref 0–0.2)
PLATELET # BLD AUTO: 303 10*3/MM3 (ref 140–450)
PMV BLD AUTO: 10.5 FL (ref 6–12)
POTASSIUM SERPL-SCNC: 3.7 MMOL/L (ref 3.5–5.2)
PROT SERPL-MCNC: 7.4 G/DL (ref 6–8.5)
RBC # BLD AUTO: 4.61 10*6/MM3 (ref 3.77–5.28)
SODIUM SERPL-SCNC: 138 MMOL/L (ref 136–145)
TRIGL SERPL-MCNC: 159 MG/DL (ref 0–150)
TSH SERPL DL<=0.05 MIU/L-ACNC: 1.54 UIU/ML (ref 0.27–4.2)
VLDLC SERPL-MCNC: 27 MG/DL (ref 5–40)
WBC # BLD AUTO: 7.45 10*3/MM3 (ref 3.4–10.8)

## 2020-11-13 PROCEDURE — 82306 VITAMIN D 25 HYDROXY: CPT

## 2020-11-13 PROCEDURE — 80061 LIPID PANEL: CPT

## 2020-11-13 PROCEDURE — 80053 COMPREHEN METABOLIC PANEL: CPT

## 2020-11-13 PROCEDURE — 84443 ASSAY THYROID STIM HORMONE: CPT

## 2020-11-13 PROCEDURE — 85025 COMPLETE CBC W/AUTO DIFF WBC: CPT

## 2020-11-13 PROCEDURE — 83036 HEMOGLOBIN GLYCOSYLATED A1C: CPT

## 2020-11-13 PROCEDURE — 36415 COLL VENOUS BLD VENIPUNCTURE: CPT

## 2020-11-25 ENCOUNTER — OFFICE VISIT (OUTPATIENT)
Dept: FAMILY MEDICINE CLINIC | Facility: CLINIC | Age: 65
End: 2020-11-25

## 2020-11-25 VITALS
SYSTOLIC BLOOD PRESSURE: 132 MMHG | WEIGHT: 120 LBS | OXYGEN SATURATION: 98 % | DIASTOLIC BLOOD PRESSURE: 82 MMHG | HEART RATE: 79 BPM | TEMPERATURE: 97.3 F | BODY MASS INDEX: 24.19 KG/M2 | HEIGHT: 59 IN

## 2020-11-25 DIAGNOSIS — E55.9 VITAMIN D DEFICIENCY: ICD-10-CM

## 2020-11-25 DIAGNOSIS — M81.0 AGE-RELATED OSTEOPOROSIS WITHOUT CURRENT PATHOLOGICAL FRACTURE: ICD-10-CM

## 2020-11-25 DIAGNOSIS — E11.9 CONTROLLED TYPE 2 DIABETES MELLITUS WITHOUT COMPLICATION, WITHOUT LONG-TERM CURRENT USE OF INSULIN (HCC): Primary | ICD-10-CM

## 2020-11-25 PROCEDURE — 99213 OFFICE O/P EST LOW 20 MIN: CPT | Performed by: FAMILY MEDICINE

## 2020-11-25 RX ORDER — METFORMIN HYDROCHLORIDE 750 MG/1
750 TABLET, EXTENDED RELEASE ORAL
Qty: 90 TABLET | Refills: 1 | Status: SHIPPED | OUTPATIENT
Start: 2020-11-25 | End: 2021-12-06

## 2020-11-25 RX ORDER — ALENDRONATE SODIUM 70 MG/1
70 TABLET ORAL
Qty: 12 TABLET | Refills: 3 | Status: SHIPPED | OUTPATIENT
Start: 2020-11-25 | End: 2022-05-16

## 2020-11-25 RX ORDER — ERGOCALCIFEROL 1.25 MG/1
CAPSULE ORAL
Qty: 24 CAPSULE | Refills: 3 | Status: SHIPPED | OUTPATIENT
Start: 2020-11-25 | End: 2021-09-07

## 2020-11-25 NOTE — PROGRESS NOTES
Subjective   Felicia Boyle is a 65 y.o. female with   Chief Complaint   Patient presents with   • Diabetes   .    History of Present Illness   65-year-old Malaysian female with type II non-insulin-dependent diabetes mellitus here for further medical management.  Fasting labs have been acquired prior to this visit.  Patient is currently using Metformin ER at 750 mg using 1 tablet daily.  She is also vitamin D deficient and uses vitamin D at 50,000 units twice per week.  She states that she is faithful using the above medication and that they are well-tolerated without side effects.  She does have history of osteoporosis and was initiated with Fosamax.  She self discontinued Fosamax due to the fact that she does not like to take medicines.  Her last DEXA scan was 1 year ago with lumbar spine T score of -3.2.  Her left femoral neck T score was -2.8.  She had also been offered Prolia and balked at this secondary to reading side effects.  She understands the importance of treating this and not allowing her to have a pathologic fracture.  The following portions of the patient's history were reviewed and updated as appropriate: allergies, current medications, past family history, past medical history, past social history, past surgical history and problem list.    Review of Systems   Endocrine:        Vitamin D deficiency, type II non-insulin-dependent diabetes mellitus   Musculoskeletal:        Osteoporosis       Objective     Vitals:    11/25/20 1614   BP: 132/82   Pulse: 79   Temp: 97.3 °F (36.3 °C)   SpO2: 98%       Recent Results (from the past 672 hour(s))   Comprehensive Metabolic Panel    Collection Time: 11/13/20  8:09 AM    Specimen: Blood   Result Value Ref Range    Glucose 117 (H) 65 - 99 mg/dL    BUN 11 8 - 23 mg/dL    Creatinine 0.54 (L) 0.57 - 1.00 mg/dL    Sodium 138 136 - 145 mmol/L    Potassium 3.7 3.5 - 5.2 mmol/L    Chloride 104 98 - 107 mmol/L    CO2 25.8 22.0 - 29.0 mmol/L    Calcium 8.9 8.6 - 10.5  mg/dL    Total Protein 7.4 6.0 - 8.5 g/dL    Albumin 4.80 3.50 - 5.20 g/dL    ALT (SGPT) 28 1 - 33 U/L    AST (SGOT) 22 1 - 32 U/L    Alkaline Phosphatase 73 39 - 117 U/L    Total Bilirubin 0.3 0.0 - 1.2 mg/dL    eGFR Non African Amer 113 >60 mL/min/1.73    eGFR  African Amer 137 >60 mL/min/1.73    Globulin 2.6 gm/dL    A/G Ratio 1.8 g/dL    BUN/Creatinine Ratio 20.4 7.0 - 25.0    Anion Gap 8.2 5.0 - 15.0 mmol/L   Hemoglobin A1c    Collection Time: 11/13/20  8:09 AM    Specimen: Blood   Result Value Ref Range    Hemoglobin A1C 6.70 (H) 4.80 - 5.60 %   Lipid Panel    Collection Time: 11/13/20  8:09 AM    Specimen: Blood   Result Value Ref Range    Total Cholesterol 173 0 - 200 mg/dL    Triglycerides 159 (H) 0 - 150 mg/dL    HDL Cholesterol 70 (H) 40 - 60 mg/dL    LDL Cholesterol  76 0 - 100 mg/dL    VLDL Cholesterol 27 5 - 40 mg/dL    LDL/HDL Ratio 1.02    TSH    Collection Time: 11/13/20  8:09 AM    Specimen: Blood   Result Value Ref Range    TSH 1.540 0.270 - 4.200 uIU/mL   Vitamin D 25 Hydroxy    Collection Time: 11/13/20  8:09 AM    Specimen: Blood   Result Value Ref Range    25 Hydroxy, Vitamin D 26.8 (L) 30.0 - 100.0 ng/ml   CBC Auto Differential    Collection Time: 11/13/20  8:09 AM    Specimen: Blood   Result Value Ref Range    WBC 7.45 3.40 - 10.80 10*3/mm3    RBC 4.61 3.77 - 5.28 10*6/mm3    Hemoglobin 13.8 12.0 - 15.9 g/dL    Hematocrit 41.9 34.0 - 46.6 %    MCV 90.9 79.0 - 97.0 fL    MCH 29.9 26.6 - 33.0 pg    MCHC 32.9 31.5 - 35.7 g/dL    RDW 12.8 12.3 - 15.4 %    RDW-SD 42.5 37.0 - 54.0 fl    MPV 10.5 6.0 - 12.0 fL    Platelets 303 140 - 450 10*3/mm3    Neutrophil % 56.3 42.7 - 76.0 %    Lymphocyte % 36.9 19.6 - 45.3 %    Monocyte % 5.5 5.0 - 12.0 %    Eosinophil % 0.5 0.3 - 6.2 %    Basophil % 0.5 0.0 - 1.5 %    Immature Grans % 0.3 0.0 - 0.5 %    Neutrophils, Absolute 4.19 1.70 - 7.00 10*3/mm3    Lymphocytes, Absolute 2.75 0.70 - 3.10 10*3/mm3    Monocytes, Absolute 0.41 0.10 - 0.90 10*3/mm3     Eosinophils, Absolute 0.04 0.00 - 0.40 10*3/mm3    Basophils, Absolute 0.04 0.00 - 0.20 10*3/mm3    Immature Grans, Absolute 0.02 0.00 - 0.05 10*3/mm3    nRBC 0.0 0.0 - 0.2 /100 WBC       Physical Exam  Vitals signs and nursing note reviewed.   Constitutional:       Appearance: Normal appearance. She is well-developed, well-groomed and normal weight.   HENT:      Head: Normocephalic and atraumatic.   Neck:      Musculoskeletal: Neck supple.      Thyroid: No thyroid mass or thyromegaly.      Vascular: Normal carotid pulses. No carotid bruit.      Trachea: Trachea and phonation normal.   Cardiovascular:      Rate and Rhythm: Normal rate and regular rhythm.      Heart sounds: Normal heart sounds. No murmur. No friction rub. No gallop.    Pulmonary:      Effort: Pulmonary effort is normal. No respiratory distress.      Breath sounds: Normal breath sounds. No decreased breath sounds, wheezing, rhonchi or rales.   Lymphadenopathy:      Cervical: No cervical adenopathy.   Skin:     General: Skin is warm and dry.      Findings: No rash.   Neurological:      Mental Status: She is alert and oriented to person, place, and time.   Psychiatric:         Attention and Perception: Attention and perception normal.         Mood and Affect: Mood and affect normal.         Speech: Speech normal.         Behavior: Behavior normal. Behavior is cooperative.         Thought Content: Thought content normal.         Cognition and Memory: Cognition and memory normal.         Judgment: Judgment normal.         Assessment/Plan   Diagnoses and all orders for this visit:    1. Controlled type 2 diabetes mellitus without complication, without long-term current use of insulin (CMS/[facility]) (Primary)  -     CBC w AUTO Differential; Future  -     Lipid panel; Future  -     Hemoglobin A1c; Future  -     Comprehensive metabolic panel; Future  -     Vitamin D 25 hydroxy; Future  -     TSH; Future  -     metFORMIN ER (GLUCOPHAGE-XR) 750 MG 24 hr tablet; Take  1 tablet by mouth Daily With Breakfast.  Dispense: 90 tablet; Refill: 1    2. Age-related osteoporosis without current pathological fracture  -     alendronate (Fosamax) 70 MG tablet; Take 1 tablet by mouth Every 7 (Seven) Days.  Dispense: 12 tablet; Refill: 3  -     CBC w AUTO Differential; Future  -     Lipid panel; Future  -     Hemoglobin A1c; Future  -     Comprehensive metabolic panel; Future  -     Vitamin D 25 hydroxy; Future  -     TSH; Future    3. Vitamin D deficiency  -     CBC w AUTO Differential; Future  -     Lipid panel; Future  -     Hemoglobin A1c; Future  -     Comprehensive metabolic panel; Future  -     Vitamin D 25 hydroxy; Future  -     TSH; Future  -     vitamin D (ERGOCALCIFEROL) 1.25 MG (87860 UT) capsule capsule; 1 po 2xs per week  Dispense: 24 capsule; Refill: 3        Return in about 6 months (around 5/25/2021) for Recheck.

## 2021-01-07 ENCOUNTER — APPOINTMENT (OUTPATIENT)
Dept: GENERAL RADIOLOGY | Facility: HOSPITAL | Age: 66
End: 2021-01-07

## 2021-01-07 PROCEDURE — 71046 X-RAY EXAM CHEST 2 VIEWS: CPT | Performed by: GENERAL PRACTICE

## 2021-01-13 ENCOUNTER — TELEPHONE (OUTPATIENT)
Dept: GASTROENTEROLOGY | Facility: CLINIC | Age: 66
End: 2021-01-13

## 2021-01-13 NOTE — TELEPHONE ENCOUNTER
RETURNED PT CALL REGARDING APPT QUESTION FOR 1/25 AND COVID QUESTION.... NO APPOINTMENT FOUND ON PT APPT DESK PT MAY HAVE CALLED THE WRONG OFFICE

## 2021-01-17 ENCOUNTER — IMMUNIZATION (OUTPATIENT)
Dept: VACCINE CLINIC | Facility: HOSPITAL | Age: 66
End: 2021-01-17

## 2021-01-17 PROCEDURE — 91300 HC SARSCOV02 VAC 30MCG/0.3ML IM: CPT | Performed by: INTERNAL MEDICINE

## 2021-01-17 PROCEDURE — 0001A: CPT | Performed by: INTERNAL MEDICINE

## 2021-02-07 ENCOUNTER — IMMUNIZATION (OUTPATIENT)
Dept: VACCINE CLINIC | Facility: HOSPITAL | Age: 66
End: 2021-02-07

## 2021-02-07 PROCEDURE — 0002A: CPT | Performed by: INTERNAL MEDICINE

## 2021-02-07 PROCEDURE — 91300 HC SARSCOV02 VAC 30MCG/0.3ML IM: CPT | Performed by: INTERNAL MEDICINE

## 2021-04-20 ENCOUNTER — TELEPHONE (OUTPATIENT)
Dept: FAMILY MEDICINE CLINIC | Facility: CLINIC | Age: 66
End: 2021-04-20

## 2021-04-20 NOTE — TELEPHONE ENCOUNTER
SW patient and told her to call her gastro doctor due to  her symptoms. Pt verbalized understanding.

## 2021-04-20 NOTE — TELEPHONE ENCOUNTER
Caller: Felicia Boyle    Relationship: Self    Best call back number: [phone]    What is the best time to reach you: ANY    Who are you requesting to speak with (clinical staff, provider,  specific staff member): DR LOPEZ OR STAFF    Do you require a callback: PATIENT CALLED STATING SHE FEELS LIKE HER FOOD IS NOT COMING DOWN ALL THE WAY WHEN SHE EATS. SHE GETS PAIN IN HER BACK AFTER SHE EATS. PATIENT WASN'T SURE IF SHE SHOULD SEE DR OLPEZ FIRST OR GO STRAIGHT TO GASTRO . PLEASE CALL TO ADVISE.          Referring Physician:  Sera Navarrete CNM    Reason for Referral:  1. Umbilical vein abnormality affecting pregnancy, single or unspecified fetus        HISTORY OF PRESENT ILLNESS:   Johny Barreto is a 30 year old female seen at 22w4d gestation for the issues noted above.    CURRENT MEDICATIONS:  Current Outpatient Medications   Medication Sig Dispense Refill   • Prenatal Vit-Fe Fumarate-FA (MULTIVITAMIN & MINERAL W/FOLIC ACID- PRENATAL) 27-1 MG Tab Take 1 tablet by mouth daily.     • acetaminophen (TYLENOL) 325 MG tablet Take 2 tablets by mouth every 4 hours as needed for Pain. 30 tablet 0   • ibuprofen (MOTRIN) 200 MG tablet Take 3 tablets by mouth every 6 hours as needed for Pain.       No current facility-administered medications for this visit.        PAST OB HISTORY:   OB History    Para Term  AB Living   4 3 3 0 0 3   SAB TAB Ectopic Molar Multiple Live Births   0 0 0 0 0 3       Allergies as of 2020   • (No Known Allergies)       PAST MEDICAL HISTORY:     Pre-eclampsia                                             PAST SURGICAL HISTORY:   There is no previous surgical history on file.    FAMILY HISTORY:  Family History   Problem Relation Age of Onset   • Thyroid Sister    • Diabetes Maternal Grandmother    • Stroke Maternal Grandfather    • Hyperlipidemia Father        SOCIAL HISTORY:   Social History     Tobacco Use   • Smoking status: Former Smoker     Years: 13.00     Types: Cigarettes     Start date: 2005     Last attempt to quit: 2019     Years since quittin.1   • Smokeless tobacco: Never Used   Substance Use Topics   • Alcohol use: No     Frequency: Never     Drinks per session: 1 or 2     Binge frequency: Never   • Drug use: No       PHYSICAL EXAMINATION:  VITAL SIGNS:    Visit Vitals  /66   Ht 5' 1\" (1.549 m)   Wt 78.7 kg   LMP 10/14/2019 (Exact Date)   BMI 32.78 kg/m²     HEAD:  No gross obvious deformity.    EYES:  Sclerae clear and white.  :  Uterine:   Size/fundal height:  It is umbilicus.    MUSCULOSKELETAL:  Gait - ambulates without difficulty.    SKIN:  No rashes or lesions visible on extremities or facial region.    PSYCHIATRIC:  Judgment and mood appear appropriate.      LABS:        ULTRASOUND:  Cervical length 3.3 cm   persistent right umbilical vein    ASSESSMENT AND PLAN:   The patient was seen in referral today at 22w4d for;    PROBLEM 1.    1. Umbilical vein abnormality affecting pregnancy, single or unspecified fetus    Persistent right umbilical vein protocol *  PLAN:  1. Fetal echo in 3-4 weeks  2. Serial growth scans  3. Fetal testing starting at 32 weeks gestational unless clinically indicated prior  4. Genetics    Total time of today's office visit was 30 greater than 50% was spent patient counseling and care planning.  Which consisted of   A. Reviewing the patient's chart  B. Interpreting any of the following performed prior which have been highlighted      Laboratory results      Ultrasound  C. A physician patient conversation in the counseling room discussing  Persistent right umbilical  vein  Formulating with the patient's agreement the care plan which is documented above

## 2021-04-26 ENCOUNTER — LAB (OUTPATIENT)
Dept: LAB | Facility: HOSPITAL | Age: 66
End: 2021-04-26

## 2021-04-26 ENCOUNTER — TRANSCRIBE ORDERS (OUTPATIENT)
Dept: ADMINISTRATIVE | Facility: HOSPITAL | Age: 66
End: 2021-04-26

## 2021-04-26 DIAGNOSIS — Z01.818 OTHER SPECIFIED PRE-OPERATIVE EXAMINATION: ICD-10-CM

## 2021-04-26 DIAGNOSIS — Z01.818 OTHER SPECIFIED PRE-OPERATIVE EXAMINATION: Primary | ICD-10-CM

## 2021-04-26 LAB — SARS-COV-2 ORF1AB RESP QL NAA+PROBE: NOT DETECTED

## 2021-04-26 PROCEDURE — U0004 COV-19 TEST NON-CDC HGH THRU: HCPCS

## 2021-04-26 PROCEDURE — C9803 HOPD COVID-19 SPEC COLLECT: HCPCS

## 2021-04-28 ENCOUNTER — HOSPITAL ENCOUNTER (OUTPATIENT)
Facility: HOSPITAL | Age: 66
Setting detail: HOSPITAL OUTPATIENT SURGERY
Discharge: HOME | End: 2021-04-28
Attending: INTERNAL MEDICINE | Admitting: INTERNAL MEDICINE

## 2021-04-28 ENCOUNTER — ON CAMPUS - OUTPATIENT (OUTPATIENT)
Dept: URBAN - METROPOLITAN AREA HOSPITAL 114 | Facility: HOSPITAL | Age: 66
End: 2021-04-28
Payer: COMMERCIAL

## 2021-04-28 ENCOUNTER — ANESTHESIA (OUTPATIENT)
Dept: GASTROENTEROLOGY | Facility: HOSPITAL | Age: 66
End: 2021-04-28

## 2021-04-28 ENCOUNTER — ANESTHESIA EVENT (OUTPATIENT)
Dept: GASTROENTEROLOGY | Facility: HOSPITAL | Age: 66
End: 2021-04-28

## 2021-04-28 VITALS
SYSTOLIC BLOOD PRESSURE: 118 MMHG | RESPIRATION RATE: 16 BRPM | BODY MASS INDEX: 24.16 KG/M2 | HEART RATE: 82 BPM | OXYGEN SATURATION: 99 % | HEIGHT: 58 IN | DIASTOLIC BLOOD PRESSURE: 71 MMHG | WEIGHT: 115.1 LBS

## 2021-04-28 DIAGNOSIS — K22.2 ESOPHAGEAL OBSTRUCTION: ICD-10-CM

## 2021-04-28 DIAGNOSIS — R13.14 DYSPHAGIA, PHARYNGOESOPHAGEAL PHASE: ICD-10-CM

## 2021-04-28 DIAGNOSIS — Z12.11 ENCOUNTER FOR SCREENING FOR MALIGNANT NEOPLASM OF COLON: ICD-10-CM

## 2021-04-28 DIAGNOSIS — R13.10 DYSPHAGIA: ICD-10-CM

## 2021-04-28 DIAGNOSIS — K29.70 GASTRITIS, UNSPECIFIED, WITHOUT BLEEDING: ICD-10-CM

## 2021-04-28 LAB — GLUCOSE BLDC GLUCOMTR-MCNC: 147 MG/DL (ref 70–130)

## 2021-04-28 PROCEDURE — 43239 EGD BIOPSY SINGLE/MULTIPLE: CPT | Performed by: INTERNAL MEDICINE

## 2021-04-28 PROCEDURE — 25010000002 PROPOFOL 10 MG/ML EMULSION: Performed by: ANESTHESIOLOGY

## 2021-04-28 PROCEDURE — 43450 DILATE ESOPHAGUS 1/MULT PASS: CPT | Performed by: INTERNAL MEDICINE

## 2021-04-28 PROCEDURE — 88305 TISSUE EXAM BY PATHOLOGIST: CPT | Performed by: INTERNAL MEDICINE

## 2021-04-28 PROCEDURE — 45378 DIAGNOSTIC COLONOSCOPY: CPT | Mod: 33 | Performed by: INTERNAL MEDICINE

## 2021-04-28 PROCEDURE — 82962 GLUCOSE BLOOD TEST: CPT

## 2021-04-28 RX ORDER — PROPOFOL 10 MG/ML
VIAL (ML) INTRAVENOUS AS NEEDED
Status: DISCONTINUED | OUTPATIENT
Start: 2021-04-28 | End: 2021-04-28 | Stop reason: SURG

## 2021-04-28 RX ORDER — EPHEDRINE SULFATE 50 MG/ML
INJECTION, SOLUTION INTRAVENOUS AS NEEDED
Status: DISCONTINUED | OUTPATIENT
Start: 2021-04-28 | End: 2021-04-28 | Stop reason: SURG

## 2021-04-28 RX ORDER — SODIUM CHLORIDE, SODIUM LACTATE, POTASSIUM CHLORIDE, CALCIUM CHLORIDE 600; 310; 30; 20 MG/100ML; MG/100ML; MG/100ML; MG/100ML
30 INJECTION, SOLUTION INTRAVENOUS CONTINUOUS PRN
Status: DISCONTINUED | OUTPATIENT
Start: 2021-04-28 | End: 2021-04-28 | Stop reason: HOSPADM

## 2021-04-28 RX ORDER — LIDOCAINE HYDROCHLORIDE 20 MG/ML
INJECTION, SOLUTION INFILTRATION; PERINEURAL AS NEEDED
Status: DISCONTINUED | OUTPATIENT
Start: 2021-04-28 | End: 2021-04-28 | Stop reason: SURG

## 2021-04-28 RX ORDER — PROPOFOL 10 MG/ML
VIAL (ML) INTRAVENOUS CONTINUOUS PRN
Status: DISCONTINUED | OUTPATIENT
Start: 2021-04-28 | End: 2021-04-28 | Stop reason: SURG

## 2021-04-28 RX ADMIN — PROPOFOL 100 MCG/KG/MIN: 10 INJECTION, EMULSION INTRAVENOUS at 11:09

## 2021-04-28 RX ADMIN — SODIUM CHLORIDE, POTASSIUM CHLORIDE, SODIUM LACTATE AND CALCIUM CHLORIDE 30 ML/HR: 600; 310; 30; 20 INJECTION, SOLUTION INTRAVENOUS at 10:58

## 2021-04-28 RX ADMIN — EPHEDRINE SULFATE 15 MG: 50 INJECTION INTRAVENOUS at 11:23

## 2021-04-28 RX ADMIN — PROPOFOL 100 MG: 10 INJECTION, EMULSION INTRAVENOUS at 11:15

## 2021-04-28 RX ADMIN — PROPOFOL 100 MG: 10 INJECTION, EMULSION INTRAVENOUS at 11:08

## 2021-04-28 RX ADMIN — LIDOCAINE HYDROCHLORIDE 100 MG: 20 INJECTION, SOLUTION INFILTRATION; PERINEURAL at 11:07

## 2021-04-28 NOTE — ANESTHESIA POSTPROCEDURE EVALUATION
"Patient: Felicia Boyle    Procedure Summary     Date: 04/28/21 Room / Location:  [facility] /  [facility]    Anesthesia Start: 1101 Anesthesia Stop: 1142    Procedures:       COLONOSCOPY TO ANASTAMOSIS/TI (N/A )      ESOPHAGOGASTRODUODENOSCOPY WITH BIOPSIES, 54FR PETER DILATATION (N/A Esophagus) Diagnosis:     Surgeons: Angelo Gonsalez MD Provider: Rashel Rocha MD    Anesthesia Type: MAC ASA Status: 2          Anesthesia Type: MAC    Vitals  Vitals Value Taken Time   /71 04/28/21 1201   Temp     Pulse 82 04/28/21 1201   Resp 16 04/28/21 1201   SpO2 100 % 04/28/21 1204   Vitals shown include unvalidated device data.        Post Anesthesia Care and Evaluation    Patient location during evaluation: bedside  Patient participation: complete - patient participated  Level of consciousness: sleepy but conscious  Pain score: 0  Pain management: adequate  Airway patency: patent  Anesthetic complications: No anesthetic complications    Cardiovascular status: acceptable  Respiratory status: acceptable  Hydration status: acceptable    Comments: /71   Pulse 82   Resp 16   Ht 147.3 cm (58\")   Wt 52.2 kg (115 lb 1.6 oz)   LMP  (LMP Unknown)   SpO2 99%   BMI 24.06 kg/m²         "

## 2021-04-28 NOTE — ANESTHESIA PREPROCEDURE EVALUATION
Anesthesia Evaluation     Patient summary reviewed and Nursing notes reviewed   NPO Solid Status: > 8 hours  NPO Liquid Status: > 4 hours           Airway   Mallampati: II  Neck ROM: full  No difficulty expected  Dental - normal exam     Pulmonary     breath sounds clear to auscultation  (+) a smoker Current,   Cardiovascular     Rhythm: regular    (+) hyperlipidemia,       Neuro/Psych  GI/Hepatic/Renal/Endo    (+)   diabetes mellitus,     Musculoskeletal     Abdominal   (+) obese,    Substance History      OB/GYN          Other                        Anesthesia Plan    ASA 2     MAC     intravenous induction     Anesthetic plan, all risks, benefits, and alternatives have been provided, discussed and informed consent has been obtained with: patient.

## 2021-04-29 LAB
LAB AP CASE REPORT: NORMAL
PATH REPORT.FINAL DX SPEC: NORMAL
PATH REPORT.GROSS SPEC: NORMAL

## 2021-05-18 ENCOUNTER — OFFICE VISIT (OUTPATIENT)
Dept: FAMILY MEDICINE CLINIC | Facility: CLINIC | Age: 66
End: 2021-05-18

## 2021-05-18 VITALS
HEART RATE: 72 BPM | TEMPERATURE: 97.1 F | WEIGHT: 119 LBS | HEIGHT: 58 IN | DIASTOLIC BLOOD PRESSURE: 86 MMHG | OXYGEN SATURATION: 97 % | BODY MASS INDEX: 24.98 KG/M2 | SYSTOLIC BLOOD PRESSURE: 134 MMHG

## 2021-05-18 DIAGNOSIS — L50.9 URTICARIA: Primary | ICD-10-CM

## 2021-05-18 DIAGNOSIS — R13.19 ESOPHAGEAL DYSPHAGIA: ICD-10-CM

## 2021-05-18 PROCEDURE — 99213 OFFICE O/P EST LOW 20 MIN: CPT | Performed by: FAMILY MEDICINE

## 2021-05-18 RX ORDER — OMEPRAZOLE 20 MG/1
20 CAPSULE, DELAYED RELEASE ORAL EVERY MORNING
COMMUNITY
Start: 2021-05-07 | End: 2022-05-16

## 2021-05-19 ENCOUNTER — TELEPHONE (OUTPATIENT)
Dept: FAMILY MEDICINE CLINIC | Facility: CLINIC | Age: 66
End: 2021-05-19

## 2021-05-19 NOTE — PROGRESS NOTES
Subjective   Felicia Boyle is a 65 y.o. female with   Chief Complaint   Patient presents with   • swallowing issues     Everytime she eats it feels as though food is stuck in lower part of esophagus   • Urticaria     in afternoons, taking benadryl   .    History of Present Illness   65-year-old female with episodic complaint of dysphagia.  Patient has been experiencing random episodes of urticaria with pictures of same brought to the office visit on her phone.  She has also had difficulty swallowing with episodes of urticaria and has actually been evaluated by GI.  GI has documented normal EGD as well as colonoscopy.  Patient is unaware of any underlying etiology to either her swallowing difficulties or onset of urticaria.  She is  requesting referral to allergy.  The following portions of the patient's history were reviewed and updated as appropriate: allergies, current medications, past family history, past medical history, past social history, past surgical history and problem list.    Review of Systems   Gastrointestinal:        Dysphagia   Skin: Positive for rash.       Objective     Vitals:    05/18/21 1021   BP: 134/86   Pulse: 72   Temp: 97.1 °F (36.2 °C)   SpO2: 97%       Recent Results (from the past 672 hour(s))   COVID-19,APTIMA PANTHER,[facility], NP/OP SWAB IN UTM/VTM/[facility] MEDIA,24 HR TAT - Swab, Nasopharynx    Collection Time: 04/26/21  2:47 PM    Specimen: Nasopharynx; Swab   Result Value Ref Range    COVID19 Not Detected Not Detected - Ref. Range   POC Glucose Once    Collection Time: 04/28/21 10:54 AM    Specimen: Blood   Result Value Ref Range    Glucose 147 (H) 70 - 130 mg/dL   Tissue Pathology Exam    Collection Time: 04/28/21 11:12 AM    Specimen: Stomach; Tissue   Result Value Ref Range    Case Report       Surgical Pathology Report                         Case: [id number]                                  Authorizing Provider:  Angelo Gonsalez MD        Collected:            04/28/2021 11:12 AM          Ordering Location:     [facility]  Received:            04/28/2021 12:35 PM                                 ENDO SUITES                                                                  Pathologist:           Liliana Avery MD                                                          Specimen:    Stomach, GASTRIC                                                                           Final Diagnosis       1. Stomach, Biopsy:     A. Gastric antral and oxyntic mucosa with chronic inactive gastritis.   B. Negative for intestinal metaplasia.    C. Negative for H. pylori-type organisms on routine stain.     jab/brb        Gross Description       1. The specimen is received in formalin labeled with the patient's name and further designated 'stomach biopsy' are multiple small fragments of gray-tan tissue. The specimen is submitted for embedding as received.           Physical Exam  Vitals and nursing note reviewed.   Constitutional:       Appearance: Normal appearance. She is well-developed and well-groomed.   HENT:      Head: Normocephalic and atraumatic.   Neck:      Thyroid: No thyroid mass or thyromegaly.      Vascular: Normal carotid pulses. No carotid bruit.      Trachea: Trachea and phonation normal.   Cardiovascular:      Rate and Rhythm: Normal rate and regular rhythm.      Heart sounds: Normal heart sounds. No murmur heard.   No friction rub. No gallop.    Pulmonary:      Effort: Pulmonary effort is normal. No respiratory distress.      Breath sounds: Normal breath sounds. No decreased breath sounds, wheezing, rhonchi or rales.   Musculoskeletal:      Cervical back: Neck supple.   Lymphadenopathy:      Cervical: No cervical adenopathy.   Skin:     General: Skin is warm and dry.      Findings: No rash.   Neurological:      Mental Status: She is alert and oriented to person, place, and time.   Psychiatric:         Attention and Perception: Attention and perception  normal.         Mood and Affect: Mood and affect normal.         Speech: Speech normal.         Behavior: Behavior normal. Behavior is cooperative.         Thought Content: Thought content normal.         Cognition and Memory: Cognition and memory normal.         Judgment: Judgment normal.         Assessment/Plan   Diagnoses and all orders for this visit:    1. Urticaria (Primary)  -     Ambulatory Referral to Allergy    2. Esophageal dysphagia  -     Ambulatory Referral to Allergy        Return if symptoms worsen or fail to improve, for Next scheduled follow up.

## 2021-06-17 NOTE — TELEPHONE ENCOUNTER
Caller: Felicia Boyle    Relationship: Self    Best call back number: [phone] (H)    What is the medical concern/diagnosis: UNKNOWN    What specialty or service is being requested: ALLERGY SPECIALIST    What is the provider, practice or medical service name: DR DELEON    What is the office location: [facility]    What is the office phone number: UNKNOWN    Any additional details: PATIENT WOULD LIKE AN UPDATED STATUS ON REFERRAL BECAUSE STATED NEEDS TO SEE DR PANCHO [place]  
He states this referral could be changed to Urgent.  Does a new one need to be put in?  
I think I put it in as routine but can be increased to an urgent  
Quality 130: Documentation Of Current Medications In The Medical Record: Current Medications Documented
Was her allergist referral supposed to be a stat or urgent referral?  
Detail Level: Detailed
Quality 110: Preventive Care And Screening: Influenza Immunization: Influenza Immunization not Administered because Patient Refused.

## 2021-09-05 DIAGNOSIS — E55.9 VITAMIN D DEFICIENCY: ICD-10-CM

## 2021-09-05 RX ORDER — ERGOCALCIFEROL 1.25 MG/1
CAPSULE ORAL
Qty: 24 CAPSULE | Refills: 3 | Status: CANCELLED | OUTPATIENT
Start: 2021-09-05

## 2021-09-07 RX ORDER — ERGOCALCIFEROL 1.25 MG/1
CAPSULE ORAL
Qty: 24 CAPSULE | Refills: 0 | Status: SHIPPED | OUTPATIENT
Start: 2021-09-07 | End: 2022-05-16

## 2021-09-10 ENCOUNTER — TELEPHONE (OUTPATIENT)
Dept: FAMILY MEDICINE CLINIC | Facility: CLINIC | Age: 66
End: 2021-09-10

## 2021-09-10 NOTE — TELEPHONE ENCOUNTER
PT IS REQUESTING FOR A REFERRAL TO DERMATOLOGY [facility]. SHE IS HAVING WHITE DOTS ON FACE NEAR HER EYE.

## 2021-12-04 DIAGNOSIS — E11.9 CONTROLLED TYPE 2 DIABETES MELLITUS WITHOUT COMPLICATION, WITHOUT LONG-TERM CURRENT USE OF INSULIN (HCC): ICD-10-CM

## 2021-12-06 DIAGNOSIS — E11.9 CONTROLLED TYPE 2 DIABETES MELLITUS WITHOUT COMPLICATION, WITHOUT LONG-TERM CURRENT USE OF INSULIN (HCC): ICD-10-CM

## 2021-12-06 RX ORDER — METFORMIN HYDROCHLORIDE 750 MG/1
TABLET, EXTENDED RELEASE ORAL
Qty: 30 TABLET | Refills: 0 | Status: SHIPPED | OUTPATIENT
Start: 2021-12-06 | End: 2021-12-06

## 2021-12-06 RX ORDER — METFORMIN HYDROCHLORIDE 750 MG/1
TABLET, EXTENDED RELEASE ORAL
Qty: 90 TABLET | Refills: 0 | Status: SHIPPED | OUTPATIENT
Start: 2021-12-06 | End: 2022-05-16

## 2022-05-03 ENCOUNTER — TELEPHONE (OUTPATIENT)
Dept: FAMILY MEDICINE CLINIC | Facility: CLINIC | Age: 67
End: 2022-05-03

## 2022-05-03 NOTE — TELEPHONE ENCOUNTER
Caller: Felicia Boyle    Relationship: Self    Best call back number: [phone]    What is the medical concern/diagnosis: REDNESS IN EYES    What specialty or service is being requested: Ophthalmology

## 2022-05-16 ENCOUNTER — OFFICE VISIT (OUTPATIENT)
Dept: FAMILY MEDICINE CLINIC | Facility: CLINIC | Age: 67
End: 2022-05-16

## 2022-05-16 VITALS
BODY MASS INDEX: 24.56 KG/M2 | HEART RATE: 80 BPM | OXYGEN SATURATION: 97 % | TEMPERATURE: 97.7 F | WEIGHT: 117 LBS | DIASTOLIC BLOOD PRESSURE: 70 MMHG | HEIGHT: 58 IN | SYSTOLIC BLOOD PRESSURE: 110 MMHG

## 2022-05-16 DIAGNOSIS — E11.9 CONTROLLED TYPE 2 DIABETES MELLITUS WITHOUT COMPLICATION, WITHOUT LONG-TERM CURRENT USE OF INSULIN: ICD-10-CM

## 2022-05-16 DIAGNOSIS — H10.44 VERNAL CONJUNCTIVITIS OF BOTH EYES: Primary | ICD-10-CM

## 2022-05-16 PROCEDURE — 99213 OFFICE O/P EST LOW 20 MIN: CPT | Performed by: FAMILY MEDICINE

## 2022-05-16 RX ORDER — OLOPATADINE HYDROCHLORIDE 1 MG/ML
1 SOLUTION/ DROPS OPHTHALMIC 2 TIMES DAILY
Qty: 5 ML | Refills: 5 | Status: SHIPPED | OUTPATIENT
Start: 2022-05-16 | End: 2022-10-18

## 2022-05-16 RX ORDER — BLOOD-GLUCOSE METER
KIT MISCELLANEOUS
Qty: 100 EACH | Refills: 2 | Status: SHIPPED | OUTPATIENT
Start: 2022-05-16

## 2022-05-16 RX ORDER — LANCETS 28 GAUGE
EACH MISCELLANEOUS
Qty: 100 EACH | Refills: 2 | Status: SHIPPED | OUTPATIENT
Start: 2022-05-16

## 2022-05-16 RX ORDER — BLOOD-GLUCOSE METER
1 KIT MISCELLANEOUS DAILY
Qty: 1 KIT | Refills: 0 | Status: SHIPPED | OUTPATIENT
Start: 2022-05-16

## 2022-05-16 NOTE — PROGRESS NOTES
Subjective   Felicia Boyle is a 66 y.o. female with   Chief Complaint   Patient presents with   • Eye Problem     Unsure if should have come here first, last week her eyes were blood shot but it was 1/2 of each eye   .    History of Present Illness   66-year-old Turkish female with complaint that over the last 2 weeks her eyes have been somewhat bloodshot and pruritic.  There has been no visual acuity changes and there has been no ocular drainage.  Symptoms seem to wax and wane based on her exposure to the outdoors.  She does have history of allergies.  Patient is also a diet-controlled diabetic and will need a new glucometer as hers has come to its and-no longer works.  The following portions of the patient's history were reviewed and updated as appropriate: allergies, current medications, past family history, past medical history, past social history, past surgical history and problem list.    Review of Systems   Eyes: Positive for redness and itching.   Endocrine:        Type 2 diabetes mellitus       Objective     Vitals:    05/16/22 0910   BP: 110/70   Pulse: 80   Temp: 97.7 °F (36.5 °C)   SpO2: 97%       No results found for this or any previous visit (from the past 672 hour(s)).    Physical Exam  Vitals and nursing note reviewed.   Constitutional:       Appearance: Normal appearance. She is well-developed and well-groomed.   HENT:      Head: Normocephalic and atraumatic.   Eyes:      General: Lids are normal.      Extraocular Movements: Extraocular movements intact.      Conjunctiva/sclera:      Right eye: Right conjunctiva is injected.      Left eye: Left conjunctiva is injected.      Pupils: Pupils are equal, round, and reactive to light.      Comments: Bilateral conjunctiva with slight injection bilaterally with cobblestoning   Musculoskeletal:      Cervical back: Neck supple.   Skin:     General: Skin is warm and dry.      Findings: No rash.   Neurological:      Mental Status: She is alert and oriented  to person, place, and time.   Psychiatric:         Attention and Perception: Attention and perception normal.         Mood and Affect: Mood and affect normal.         Speech: Speech normal.         Behavior: Behavior normal. Behavior is cooperative.         Thought Content: Thought content normal.         Cognition and Memory: Cognition and memory normal.         Judgment: Judgment normal.         Assessment & Plan   Diagnoses and all orders for this visit:    1. Vernal conjunctivitis of both eyes (Primary)  -     olopatadine (Patanol) 0.1 % ophthalmic solution; Administer 1 drop to both eyes 2 (Two) Times a Day.  Dispense: 5 mL; Refill: 5    2. Controlled type 2 diabetes mellitus without complication, without long-term current use of insulin ([facility])  -     Blood Glucose Monitoring Suppl (FreeStyle Lite) w/Device kit; 1 Units Daily.  Dispense: 1 kit; Refill: 0  -     Lancets (freestyle) lancets; Use TO CHECK BLOOD SUGAR ONCE DAILY  Dispense: 100 each; Refill: 2  -     glucose blood (FREESTYLE LITE) test strip; TEST ONCE DAILY  Dispense: 100 each; Refill: 2        Return if symptoms worsen or fail to improve, for Next scheduled follow up.

## 2022-09-13 ENCOUNTER — OFFICE VISIT (OUTPATIENT)
Dept: FAMILY MEDICINE CLINIC | Facility: CLINIC | Age: 67
End: 2022-09-13

## 2022-09-13 VITALS
WEIGHT: 120 LBS | TEMPERATURE: 97.8 F | HEART RATE: 72 BPM | HEIGHT: 58 IN | SYSTOLIC BLOOD PRESSURE: 138 MMHG | BODY MASS INDEX: 25.19 KG/M2 | OXYGEN SATURATION: 98 % | DIASTOLIC BLOOD PRESSURE: 68 MMHG

## 2022-09-13 DIAGNOSIS — H81.393 PERIPHERAL POSITIONAL VERTIGO OF BOTH EARS: Primary | ICD-10-CM

## 2022-09-13 DIAGNOSIS — M81.0 AGE-RELATED OSTEOPOROSIS WITHOUT CURRENT PATHOLOGICAL FRACTURE: ICD-10-CM

## 2022-09-13 DIAGNOSIS — E55.9 VITAMIN D DEFICIENCY: ICD-10-CM

## 2022-09-13 DIAGNOSIS — E11.9 CONTROLLED TYPE 2 DIABETES MELLITUS WITHOUT COMPLICATION, WITHOUT LONG-TERM CURRENT USE OF INSULIN: ICD-10-CM

## 2022-09-13 DIAGNOSIS — E78.2 MIXED HYPERLIPIDEMIA: ICD-10-CM

## 2022-09-13 PROCEDURE — 99213 OFFICE O/P EST LOW 20 MIN: CPT | Performed by: FAMILY MEDICINE

## 2022-09-14 DIAGNOSIS — E78.2 MIXED HYPERLIPIDEMIA: ICD-10-CM

## 2022-09-14 DIAGNOSIS — E11.9 CONTROLLED TYPE 2 DIABETES MELLITUS WITHOUT COMPLICATION, WITHOUT LONG-TERM CURRENT USE OF INSULIN: ICD-10-CM

## 2022-09-14 DIAGNOSIS — M81.0 AGE-RELATED OSTEOPOROSIS WITHOUT CURRENT PATHOLOGICAL FRACTURE: ICD-10-CM

## 2022-09-14 DIAGNOSIS — H81.393 PERIPHERAL POSITIONAL VERTIGO OF BOTH EARS: ICD-10-CM

## 2022-09-14 DIAGNOSIS — E55.9 VITAMIN D DEFICIENCY: ICD-10-CM

## 2022-09-14 NOTE — PROGRESS NOTES
Subjective   Felicia Boyle is a 66 y.o. female with   Chief Complaint   Patient presents with   • Headache   .    History of Present Illness   66-year-old [place] female with episodes of lightheadedness with head position changes.  This can occur somewhat randomly but also occurs when she goes from lying down to sitting up or with other head changes.  Episode only lasts seconds and then resolves.  Patient really not having headaches but discomfort in regards to the above symptoms.  She has had true vertigo in the past with eventual resolution.  Patient denies any visual changes, slurred speech and there have been no focal neurologic changes.  She is a type II non-insulin-dependent diabetic-diet controlled.  She does check fasting glucometer readings on occasion and is stating that her fasting blood sugar is running in the 160s.  She has not had lab work in quite some time.  The following portions of the patient's history were reviewed and updated as appropriate: allergies, current medications, past family history, past medical history, past social history, past surgical history and problem list.    Review of Systems   Endocrine:        Type II non-insulin-dependent diabetes mellitus, vitamin D deficiency   Allergic/Immunologic: Positive for environmental allergies.   Neurological: Positive for dizziness and headaches.       Objective     Vitals:    09/13/22 1408   BP: 138/68   Pulse: 72   Temp: 97.8 °F (36.6 °C)   SpO2: 98%       No results found for this or any previous visit (from the past 672 hour(s)).    Physical Exam  Vitals and nursing note reviewed.   Constitutional:       Appearance: Normal appearance. She is well-developed and well-groomed.   HENT:      Head: Normocephalic and atraumatic.      Right Ear: Hearing, tympanic membrane, ear canal and external ear normal.      Left Ear: Hearing, tympanic membrane, ear canal and external ear normal.      Nose: Nose normal.      Mouth/Throat:      Lips: Pink.       Mouth: Mucous membranes are moist.      Pharynx: Oropharynx is clear. Uvula midline.   Neck:      Thyroid: No thyroid mass or thyromegaly.      Vascular: Normal carotid pulses. No carotid bruit.      Trachea: Trachea and phonation normal.   Cardiovascular:      Rate and Rhythm: Normal rate and regular rhythm.      Heart sounds: Normal heart sounds. No murmur heard.    No friction rub. No gallop.   Pulmonary:      Effort: Pulmonary effort is normal. No respiratory distress.      Breath sounds: Normal breath sounds. No decreased breath sounds, wheezing, rhonchi or rales.   Musculoskeletal:      Cervical back: Neck supple.   Lymphadenopathy:      Cervical: No cervical adenopathy.   Skin:     General: Skin is warm and dry.      Findings: No rash.   Neurological:      Mental Status: She is alert and oriented to person, place, and time.      Cranial Nerves: Cranial nerves are intact.      Sensory: Sensation is intact.      Motor: Motor function is intact.      Gait: Gait is intact.      Deep Tendon Reflexes: Reflexes are normal and symmetric.      Comments:  [place] Galloway's [place] maneuver is negative bilaterally although does get very lightheaded when sitting her up from this maneuver.   Psychiatric:         Attention and Perception: Attention and perception normal.         Mood and Affect: Mood and affect normal.         Speech: Speech normal.         Behavior: Behavior normal. Behavior is cooperative.         Thought Content: Thought content normal.         Cognition and Memory: Cognition and memory normal.         Judgment: Judgment normal.         Assessment & Plan   Diagnoses and all orders for this visit:    1. Peripheral positional vertigo of both ears (Primary)  -     Lipid panel; Future  -     Hemoglobin A1c; Future  -     Comprehensive metabolic panel; Future  -     Vitamin D 25 hydroxy; Future  -     TSH; Future  -     Magnesium; Future  -     Phosphorus; Future  -     CBC w AUTO Differential; Future    2.  Controlled type 2 diabetes mellitus without complication, without long-term current use of insulin (HCC)  -     Lipid panel; Future  -     Hemoglobin A1c; Future  -     Comprehensive metabolic panel; Future  -     Vitamin D 25 hydroxy; Future  -     TSH; Future  -     Magnesium; Future  -     Phosphorus; Future  -     CBC w AUTO Differential; Future    3. Mixed hyperlipidemia  -     Lipid panel; Future  -     Hemoglobin A1c; Future  -     Comprehensive metabolic panel; Future  -     Vitamin D 25 hydroxy; Future  -     TSH; Future  -     Magnesium; Future  -     Phosphorus; Future  -     CBC w AUTO Differential; Future    4. Vitamin D deficiency  -     Lipid panel; Future  -     Hemoglobin A1c; Future  -     Comprehensive metabolic panel; Future  -     Vitamin D 25 hydroxy; Future  -     TSH; Future  -     Magnesium; Future  -     Phosphorus; Future  -     CBC w AUTO Differential; Future    5. Age-related osteoporosis without current pathological fracture  -     Lipid panel; Future  -     Hemoglobin A1c; Future  -     Comprehensive metabolic panel; Future  -     Vitamin D 25 hydroxy; Future  -     TSH; Future  -     Magnesium; Future  -     Phosphorus; Future  -     CBC w AUTO Differential; Future        Return if symptoms worsen or fail to improve.

## 2022-09-15 LAB
25(OH)D3+25(OH)D2 SERPL-MCNC: 31.4 NG/ML (ref 30–100)
ALBUMIN SERPL-MCNC: 4.8 G/DL (ref 3.5–5.2)
ALBUMIN/GLOB SERPL: 2.3 G/DL
ALP SERPL-CCNC: 90 U/L (ref 39–117)
ALT SERPL-CCNC: 25 U/L (ref 1–33)
AST SERPL-CCNC: 15 U/L (ref 1–32)
BASOPHILS # BLD AUTO: 0.05 10*3/MM3 (ref 0–0.2)
BASOPHILS NFR BLD AUTO: 0.7 % (ref 0–1.5)
BILIRUB SERPL-MCNC: 0.3 MG/DL (ref 0–1.2)
BUN SERPL-MCNC: 13 MG/DL (ref 8–23)
BUN/CREAT SERPL: 21.3 (ref 7–25)
CALCIUM SERPL-MCNC: 10 MG/DL (ref 8.6–10.5)
CHLORIDE SERPL-SCNC: 101 MMOL/L (ref 98–107)
CHOLEST SERPL-MCNC: 193 MG/DL (ref 0–200)
CO2 SERPL-SCNC: 26.4 MMOL/L (ref 22–29)
CREAT SERPL-MCNC: 0.61 MG/DL (ref 0.57–1)
EGFRCR-CYS SERPLBLD CKD-EPI 2021: 98.1 ML/MIN/1.73
EOSINOPHIL # BLD AUTO: 0.04 10*3/MM3 (ref 0–0.4)
EOSINOPHIL NFR BLD AUTO: 0.5 % (ref 0.3–6.2)
ERYTHROCYTE [DISTWIDTH] IN BLOOD BY AUTOMATED COUNT: 12.5 % (ref 12.3–15.4)
GLOBULIN SER CALC-MCNC: 2.1 GM/DL
GLUCOSE SERPL-MCNC: 104 MG/DL (ref 65–99)
HBA1C MFR BLD: 7.5 % (ref 4.8–5.6)
HCT VFR BLD AUTO: 41.4 % (ref 34–46.6)
HDLC SERPL-MCNC: 72 MG/DL (ref 40–60)
HGB BLD-MCNC: 14 G/DL (ref 12–15.9)
IMM GRANULOCYTES # BLD AUTO: 0.02 10*3/MM3 (ref 0–0.05)
IMM GRANULOCYTES NFR BLD AUTO: 0.3 % (ref 0–0.5)
LDLC SERPL CALC-MCNC: 90 MG/DL (ref 0–100)
LYMPHOCYTES # BLD AUTO: 3.42 10*3/MM3 (ref 0.7–3.1)
LYMPHOCYTES NFR BLD AUTO: 45.4 % (ref 19.6–45.3)
MAGNESIUM SERPL-MCNC: 1.7 MG/DL (ref 1.6–2.4)
MCH RBC QN AUTO: 30.8 PG (ref 26.6–33)
MCHC RBC AUTO-ENTMCNC: 33.8 G/DL (ref 31.5–35.7)
MCV RBC AUTO: 91.2 FL (ref 79–97)
MONOCYTES # BLD AUTO: 0.5 10*3/MM3 (ref 0.1–0.9)
MONOCYTES NFR BLD AUTO: 6.6 % (ref 5–12)
NEUTROPHILS # BLD AUTO: 3.5 10*3/MM3 (ref 1.7–7)
NEUTROPHILS NFR BLD AUTO: 46.5 % (ref 42.7–76)
NRBC BLD AUTO-RTO: 0 /100 WBC (ref 0–0.2)
PHOSPHATE SERPL-MCNC: 3.6 MG/DL (ref 2.5–4.5)
PLATELET # BLD AUTO: 281 10*3/MM3 (ref 140–450)
POTASSIUM SERPL-SCNC: 4.3 MMOL/L (ref 3.5–5.2)
PROT SERPL-MCNC: 6.9 G/DL (ref 6–8.5)
RBC # BLD AUTO: 4.54 10*6/MM3 (ref 3.77–5.28)
SODIUM SERPL-SCNC: 140 MMOL/L (ref 136–145)
TRIGL SERPL-MCNC: 184 MG/DL (ref 0–150)
TSH SERPL DL<=0.005 MIU/L-ACNC: 1.26 UIU/ML (ref 0.27–4.2)
VLDLC SERPL CALC-MCNC: 31 MG/DL (ref 5–40)
WBC # BLD AUTO: 7.53 10*3/MM3 (ref 3.4–10.8)

## 2022-09-19 ENCOUNTER — OFFICE VISIT (OUTPATIENT)
Dept: FAMILY MEDICINE CLINIC | Facility: CLINIC | Age: 67
End: 2022-09-19

## 2022-09-19 VITALS
DIASTOLIC BLOOD PRESSURE: 82 MMHG | BODY MASS INDEX: 25.19 KG/M2 | SYSTOLIC BLOOD PRESSURE: 142 MMHG | OXYGEN SATURATION: 98 % | WEIGHT: 120 LBS | HEIGHT: 58 IN | HEART RATE: 78 BPM

## 2022-09-19 DIAGNOSIS — E11.9 CONTROLLED TYPE 2 DIABETES MELLITUS WITHOUT COMPLICATION, WITHOUT LONG-TERM CURRENT USE OF INSULIN: Primary | ICD-10-CM

## 2022-09-19 DIAGNOSIS — E55.9 VITAMIN D DEFICIENCY: ICD-10-CM

## 2022-09-19 DIAGNOSIS — E78.2 MIXED HYPERLIPIDEMIA: ICD-10-CM

## 2022-09-19 PROCEDURE — 99213 OFFICE O/P EST LOW 20 MIN: CPT | Performed by: FAMILY MEDICINE

## 2022-09-19 NOTE — PROGRESS NOTES
Subjective   Felicia Boyle is a 66 y.o. female with   Chief Complaint   Patient presents with   • Hyperlipidemia   • Diabetes     Follow up labs   .    History of Present Illness   66-year-old Filipina female with type II non-insulin-dependent diabetes mellitus here in follow-up from recent laboratory studies.  Patient is currently using no diabetic medication able to wean from this in the not-too-distant past.  She has been trying to control her status with dietary measures alone.  She does note that her weight has not changed although her fasting blood sugars have changed.  By glucometer her fasting blood sugars have been running in the 140-160 range.  She does state that she has used metformin in the past but had very difficult time with swallowing this large pill.  She is wondering if another medication can be used and lieu of this product if indicated.  The following portions of the patient's history were reviewed and updated as appropriate: allergies, current medications, past family history, past medical history, past social history, past surgical history and problem list.    Review of Systems   Endocrine:        Type II non-insulin-dependent diabetes mellitus       Objective     Vitals:    09/19/22 1345   BP: 142/82   Pulse: 78   SpO2: 98%       Recent Results (from the past 672 hour(s))   CBC w AUTO Differential    Collection Time: 09/14/22  1:36 PM    Specimen: Blood   Result Value Ref Range    WBC 7.53 3.40 - 10.80 10*3/mm3    RBC 4.54 3.77 - 5.28 10*6/mm3    Hemoglobin 14.0 12.0 - 15.9 g/dL    Hematocrit 41.4 34.0 - 46.6 %    MCV 91.2 79.0 - 97.0 fL    MCH 30.8 26.6 - 33.0 pg    MCHC 33.8 31.5 - 35.7 g/dL    RDW 12.5 12.3 - 15.4 %    Platelets 281 140 - 450 10*3/mm3    Neutrophil Rel % 46.5 42.7 - 76.0 %    Lymphocyte Rel % 45.4 (H) 19.6 - 45.3 %    Monocyte Rel % 6.6 5.0 - 12.0 %    Eosinophil Rel % 0.5 0.3 - 6.2 %    Basophil Rel % 0.7 0.0 - 1.5 %    Neutrophils Absolute 3.50 1.70 - 7.00 10*3/mm3     Lymphocytes Absolute 3.42 (H) 0.70 - 3.10 10*3/mm3    Monocytes Absolute 0.50 0.10 - 0.90 10*3/mm3    Eosinophils Absolute 0.04 0.00 - 0.40 10*3/mm3    Basophils Absolute 0.05 0.00 - 0.20 10*3/mm3    Immature Granulocyte Rel % 0.3 0.0 - 0.5 %    Immature Grans Absolute 0.02 0.00 - 0.05 10*3/mm3    nRBC 0.0 0.0 - 0.2 /100 WBC   Phosphorus    Collection Time: 09/14/22  1:36 PM    Specimen: Blood   Result Value Ref Range    Phosphorus 3.6 2.5 - 4.5 mg/dL   Magnesium    Collection Time: 09/14/22  1:36 PM    Specimen: Blood   Result Value Ref Range    Magnesium 1.7 1.6 - 2.4 mg/dL   TSH    Collection Time: 09/14/22  1:36 PM    Specimen: Blood   Result Value Ref Range    TSH 1.260 0.270 - 4.200 uIU/mL   Vitamin D 25 hydroxy    Collection Time: 09/14/22  1:36 PM    Specimen: Blood   Result Value Ref Range    25 Hydroxy, Vitamin D 31.4 30.0 - 100.0 ng/ml   Comprehensive metabolic panel    Collection Time: 09/14/22  1:36 PM    Specimen: Blood   Result Value Ref Range    Glucose 104 (H) 65 - 99 mg/dL    BUN 13 8 - 23 mg/dL    Creatinine 0.61 0.57 - 1.00 mg/dL    EGFR Result 98.1 >60.0 mL/min/1.73    BUN/Creatinine Ratio 21.3 7.0 - 25.0    Sodium 140 136 - 145 mmol/L    Potassium 4.3 3.5 - 5.2 mmol/L    Chloride 101 98 - 107 mmol/L    Total CO2 26.4 22.0 - 29.0 mmol/L    Calcium 10.0 8.6 - 10.5 mg/dL    Total Protein 6.9 6.0 - 8.5 g/dL    Albumin 4.80 3.50 - 5.20 g/dL    Globulin 2.1 gm/dL    A/G Ratio 2.3 g/dL    Total Bilirubin 0.3 0.0 - 1.2 mg/dL    Alkaline Phosphatase 90 39 - 117 U/L    AST (SGOT) 15 1 - 32 U/L    ALT (SGPT) 25 1 - 33 U/L   Hemoglobin A1c    Collection Time: 09/14/22  1:36 PM    Specimen: Blood   Result Value Ref Range    Hemoglobin A1C 7.50 (H) 4.80 - 5.60 %   Lipid panel    Collection Time: 09/14/22  1:36 PM    Specimen: Blood   Result Value Ref Range    Total Cholesterol 193 0 - 200 mg/dL    Triglycerides 184 (H) 0 - 150 mg/dL    HDL Cholesterol 72 (H) 40 - 60 mg/dL    VLDL Cholesterol Rashi 31 5 - 40  mg/dL    LDL Chol Calc (NIH) 90 0 - 100 mg/dL       Physical Exam  Vitals and nursing note reviewed.   Constitutional:       Appearance: Normal appearance. She is well-developed and well-groomed.   HENT:      Head: Normocephalic and atraumatic.   Neck:      Thyroid: No thyroid mass or thyromegaly.      Vascular: Normal carotid pulses. No carotid bruit.      Trachea: Trachea and phonation normal.   Cardiovascular:      Rate and Rhythm: Normal rate and regular rhythm.      Heart sounds: Normal heart sounds. No murmur heard.    No friction rub. No gallop.   Pulmonary:      Effort: Pulmonary effort is normal. No respiratory distress.      Breath sounds: Normal breath sounds. No decreased breath sounds, wheezing, rhonchi or rales.   Musculoskeletal:      Cervical back: Neck supple.   Lymphadenopathy:      Cervical: No cervical adenopathy.   Skin:     General: Skin is warm and dry.      Findings: No rash.   Neurological:      Mental Status: She is alert and oriented to person, place, and time.   Psychiatric:         Attention and Perception: Attention and perception normal.         Mood and Affect: Mood and affect normal.         Speech: Speech normal.         Behavior: Behavior normal. Behavior is cooperative.         Thought Content: Thought content normal.         Cognition and Memory: Cognition and memory normal.         Judgment: Judgment normal.         Assessment & Plan   Diagnoses and all orders for this visit:    1. Controlled type 2 diabetes mellitus without complication, without long-term current use of insulin (HCC) (Primary)  -     SITagliptin (Januvia) 100 MG tablet; Take 1 tablet by mouth Daily.  Dispense: 30 tablet; Refill: 1    2. Mixed hyperlipidemia    3. Vitamin D deficiency    Januvia will be initiated at 100 mg daily.  She will continue to follow fasting blood sugars by glucometer with anticipated follow-up with me in 4 weeks.  A low-cholesterol diet has been strongly recommended with an LDL goal of  70 or less.  Repeat fasting labs will not be obtained until sugars have been shown to be in control.    Return in about 4 weeks (around 10/17/2022) for Recheck.

## 2022-10-18 ENCOUNTER — OFFICE VISIT (OUTPATIENT)
Dept: FAMILY MEDICINE CLINIC | Facility: CLINIC | Age: 67
End: 2022-10-18

## 2022-10-18 VITALS
BODY MASS INDEX: 25.19 KG/M2 | OXYGEN SATURATION: 97 % | SYSTOLIC BLOOD PRESSURE: 144 MMHG | WEIGHT: 120 LBS | HEART RATE: 84 BPM | TEMPERATURE: 97.5 F | HEIGHT: 58 IN | DIASTOLIC BLOOD PRESSURE: 82 MMHG

## 2022-10-18 DIAGNOSIS — E11.9 CONTROLLED TYPE 2 DIABETES MELLITUS WITHOUT COMPLICATION, WITHOUT LONG-TERM CURRENT USE OF INSULIN: Primary | ICD-10-CM

## 2022-10-18 DIAGNOSIS — M81.0 AGE-RELATED OSTEOPOROSIS WITHOUT CURRENT PATHOLOGICAL FRACTURE: ICD-10-CM

## 2022-10-18 DIAGNOSIS — E78.2 MIXED HYPERLIPIDEMIA: ICD-10-CM

## 2022-10-18 DIAGNOSIS — E55.9 VITAMIN D DEFICIENCY: ICD-10-CM

## 2022-10-18 PROCEDURE — 99213 OFFICE O/P EST LOW 20 MIN: CPT | Performed by: FAMILY MEDICINE

## 2022-10-18 NOTE — PROGRESS NOTES
Subjective   Felicia Boyle is a 67 y.o. female with   Chief Complaint   Patient presents with   • Diabetes   .    History of Present Illness   67-year-old female with new onset type II non-insulin-dependent diabetes mellitus here for further medical management.  Patient has been monitoring glucometer numbers on a fasting basis.  She initially was using no medication with last hemoglobin A1c on 9/14/2022 at 7.5.  She is now on Januvia 100 mg daily and tolerating this product without side effects.  Her fasting blood sugars that have been running in the 150-160 range has now dropped into the 120-125 range.  There have been no lows and patient has been using this product on a regular basis.  The following portions of the patient's history were reviewed and updated as appropriate: allergies, current medications, past family history, past medical history, past social history, past surgical history and problem list.    Review of Systems   Endocrine:        Type II non-insulin-dependent diabetes mellitus       Objective     Vitals:    10/18/22 0919   BP: 144/82   Pulse: 84   Temp: 97.5 °F (36.4 °C)   SpO2: 97%       No results found for this or any previous visit (from the past 672 hour(s)).    Physical Exam  Vitals and nursing note reviewed.   Constitutional:       Appearance: Normal appearance. She is well-developed and well-groomed.   HENT:      Head: Normocephalic and atraumatic.   Neck:      Thyroid: No thyroid mass or thyromegaly.      Vascular: Normal carotid pulses. No carotid bruit.      Trachea: Trachea and phonation normal.   Cardiovascular:      Rate and Rhythm: Normal rate and regular rhythm.      Heart sounds: Normal heart sounds. No murmur heard.    No friction rub. No gallop.   Pulmonary:      Effort: Pulmonary effort is normal. No respiratory distress.      Breath sounds: Normal breath sounds. No decreased breath sounds, wheezing, rhonchi or rales.   Musculoskeletal:      Cervical back: Neck supple.    Lymphadenopathy:      Cervical: No cervical adenopathy.   Skin:     General: Skin is warm and dry.      Findings: No rash.   Neurological:      Mental Status: She is alert and oriented to person, place, and time.   Psychiatric:         Attention and Perception: Attention and perception normal.         Mood and Affect: Mood and affect normal.         Speech: Speech normal.         Behavior: Behavior normal. Behavior is cooperative.         Thought Content: Thought content normal.         Cognition and Memory: Cognition and memory normal.         Judgment: Judgment normal.         Assessment & Plan   Diagnoses and all orders for this visit:    1. Controlled type 2 diabetes mellitus without complication, without long-term current use of insulin (HCC) (Primary)  -     SITagliptin (Januvia) 100 MG tablet; Take 1 tablet by mouth Daily.  Dispense: 90 tablet; Refill: 1  -     Comprehensive metabolic panel; Future  -     TSH; Future  -     Hemoglobin A1c; Future  -     Vitamin D 25 hydroxy; Future  -     Lipid panel; Future  -     CBC w AUTO Differential; Future    2. Mixed hyperlipidemia  -     Comprehensive metabolic panel; Future  -     TSH; Future  -     Hemoglobin A1c; Future  -     Vitamin D 25 hydroxy; Future  -     Lipid panel; Future  -     CBC w AUTO Differential; Future    3. Vitamin D deficiency  -     Comprehensive metabolic panel; Future  -     TSH; Future  -     Hemoglobin A1c; Future  -     Vitamin D 25 hydroxy; Future  -     Lipid panel; Future  -     CBC w AUTO Differential; Future    4. Age-related osteoporosis without current pathological fracture  -     Comprehensive metabolic panel; Future  -     TSH; Future  -     Hemoglobin A1c; Future  -     Vitamin D 25 hydroxy; Future  -     Lipid panel; Future  -     CBC w AUTO Differential; Future        Return in about 5 months (around 3/18/2023) for Recheck.

## 2023-08-01 ENCOUNTER — OFFICE VISIT (OUTPATIENT)
Dept: FAMILY MEDICINE CLINIC | Facility: CLINIC | Age: 68
End: 2023-08-01
Payer: COMMERCIAL

## 2023-08-01 ENCOUNTER — HOSPITAL ENCOUNTER (OUTPATIENT)
Dept: GENERAL RADIOLOGY | Facility: HOSPITAL | Age: 68
Discharge: HOME | End: 2023-08-01
Admitting: FAMILY MEDICINE
Payer: COMMERCIAL

## 2023-08-01 VITALS
WEIGHT: 122 LBS | DIASTOLIC BLOOD PRESSURE: 72 MMHG | SYSTOLIC BLOOD PRESSURE: 124 MMHG | OXYGEN SATURATION: 95 % | BODY MASS INDEX: 25.61 KG/M2 | HEIGHT: 58 IN | HEART RATE: 85 BPM | TEMPERATURE: 97.3 F

## 2023-08-01 DIAGNOSIS — E55.9 VITAMIN D DEFICIENCY: ICD-10-CM

## 2023-08-01 DIAGNOSIS — Z00.01 ENCOUNTER FOR WELL ADULT EXAM WITH ABNORMAL FINDINGS: Primary | ICD-10-CM

## 2023-08-01 DIAGNOSIS — R06.09 DOE (DYSPNEA ON EXERTION): ICD-10-CM

## 2023-08-01 DIAGNOSIS — E78.2 MIXED HYPERLIPIDEMIA: ICD-10-CM

## 2023-08-01 DIAGNOSIS — R07.9 CHEST PAIN, UNSPECIFIED TYPE: ICD-10-CM

## 2023-08-01 DIAGNOSIS — E11.9 CONTROLLED TYPE 2 DIABETES MELLITUS WITHOUT COMPLICATION, WITHOUT LONG-TERM CURRENT USE OF INSULIN: ICD-10-CM

## 2023-08-01 PROCEDURE — 99397 PER PM REEVAL EST PAT 65+ YR: CPT | Performed by: FAMILY MEDICINE

## 2023-08-01 PROCEDURE — 71046 X-RAY EXAM CHEST 2 VIEWS: CPT

## 2023-08-01 PROCEDURE — 93000 ELECTROCARDIOGRAM COMPLETE: CPT | Performed by: FAMILY MEDICINE

## 2023-08-02 ENCOUNTER — LAB (OUTPATIENT)
Dept: LAB | Facility: HOSPITAL | Age: 68
End: 2023-08-02
Payer: COMMERCIAL

## 2023-08-02 DIAGNOSIS — E78.2 MIXED HYPERLIPIDEMIA: ICD-10-CM

## 2023-08-02 DIAGNOSIS — Z00.01 ENCOUNTER FOR WELL ADULT EXAM WITH ABNORMAL FINDINGS: ICD-10-CM

## 2023-08-02 DIAGNOSIS — R06.09 DOE (DYSPNEA ON EXERTION): ICD-10-CM

## 2023-08-02 DIAGNOSIS — R07.9 CHEST PAIN, UNSPECIFIED TYPE: ICD-10-CM

## 2023-08-02 DIAGNOSIS — E11.9 CONTROLLED TYPE 2 DIABETES MELLITUS WITHOUT COMPLICATION, WITHOUT LONG-TERM CURRENT USE OF INSULIN: ICD-10-CM

## 2023-08-02 DIAGNOSIS — E55.9 VITAMIN D DEFICIENCY: ICD-10-CM

## 2023-08-02 LAB
25(OH)D3 SERPL-MCNC: 34.5 NG/ML (ref 30–100)
ALBUMIN SERPL-MCNC: 4.7 G/DL (ref 3.5–5.2)
ALBUMIN/GLOB SERPL: 1.7 G/DL
ALP SERPL-CCNC: 94 U/L (ref 39–117)
ALT SERPL W P-5'-P-CCNC: 38 U/L (ref 1–33)
ANION GAP SERPL CALCULATED.3IONS-SCNC: 13.2 MMOL/L (ref 5–15)
AST SERPL-CCNC: 26 U/L (ref 1–32)
BASOPHILS # BLD AUTO: 0.04 10*3/MM3 (ref 0–0.2)
BASOPHILS NFR BLD AUTO: 0.5 % (ref 0–1.5)
BILIRUB SERPL-MCNC: 0.4 MG/DL (ref 0–1.2)
BUN SERPL-MCNC: 15 MG/DL (ref 8–23)
BUN/CREAT SERPL: 21.4 (ref 7–25)
CALCIUM SPEC-SCNC: 9.7 MG/DL (ref 8.6–10.5)
CHLORIDE SERPL-SCNC: 102 MMOL/L (ref 98–107)
CHOLEST SERPL-MCNC: 203 MG/DL (ref 0–200)
CO2 SERPL-SCNC: 23.8 MMOL/L (ref 22–29)
CREAT SERPL-MCNC: 0.7 MG/DL (ref 0.57–1)
DEPRECATED RDW RBC AUTO: 40.1 FL (ref 37–54)
EGFRCR SERPLBLD CKD-EPI 2021: 94.3 ML/MIN/1.73
EOSINOPHIL # BLD AUTO: 0.06 10*3/MM3 (ref 0–0.4)
EOSINOPHIL NFR BLD AUTO: 0.7 % (ref 0.3–6.2)
ERYTHROCYTE [DISTWIDTH] IN BLOOD BY AUTOMATED COUNT: 12.4 % (ref 12.3–15.4)
GLOBULIN UR ELPH-MCNC: 2.8 GM/DL
GLUCOSE SERPL-MCNC: 180 MG/DL (ref 65–99)
HBA1C MFR BLD: 7.3 % (ref 4.8–5.6)
HCT VFR BLD AUTO: 42.2 % (ref 34–46.6)
HDLC SERPL-MCNC: 69 MG/DL (ref 40–60)
HGB BLD-MCNC: 14.3 G/DL (ref 12–15.9)
IMM GRANULOCYTES # BLD AUTO: 0.03 10*3/MM3 (ref 0–0.05)
IMM GRANULOCYTES NFR BLD AUTO: 0.4 % (ref 0–0.5)
LDLC SERPL CALC-MCNC: 97 MG/DL (ref 0–100)
LDLC/HDLC SERPL: 1.3 {RATIO}
LYMPHOCYTES # BLD AUTO: 2.62 10*3/MM3 (ref 0.7–3.1)
LYMPHOCYTES NFR BLD AUTO: 32 % (ref 19.6–45.3)
MCH RBC QN AUTO: 30.5 PG (ref 26.6–33)
MCHC RBC AUTO-ENTMCNC: 33.9 G/DL (ref 31.5–35.7)
MCV RBC AUTO: 90 FL (ref 79–97)
MONOCYTES # BLD AUTO: 0.56 10*3/MM3 (ref 0.1–0.9)
MONOCYTES NFR BLD AUTO: 6.8 % (ref 5–12)
NEUTROPHILS NFR BLD AUTO: 4.89 10*3/MM3 (ref 1.7–7)
NEUTROPHILS NFR BLD AUTO: 59.6 % (ref 42.7–76)
NRBC BLD AUTO-RTO: 0 /100 WBC (ref 0–0.2)
PLATELET # BLD AUTO: 314 10*3/MM3 (ref 140–450)
PMV BLD AUTO: 10.6 FL (ref 6–12)
POTASSIUM SERPL-SCNC: 4.5 MMOL/L (ref 3.5–5.2)
PROT SERPL-MCNC: 7.5 G/DL (ref 6–8.5)
RBC # BLD AUTO: 4.69 10*6/MM3 (ref 3.77–5.28)
SODIUM SERPL-SCNC: 139 MMOL/L (ref 136–145)
TRIGL SERPL-MCNC: 222 MG/DL (ref 0–150)
TSH SERPL DL<=0.05 MIU/L-ACNC: 1.76 UIU/ML (ref 0.27–4.2)
VLDLC SERPL-MCNC: 37 MG/DL (ref 5–40)
WBC NRBC COR # BLD: 8.2 10*3/MM3 (ref 3.4–10.8)

## 2023-08-02 PROCEDURE — 36415 COLL VENOUS BLD VENIPUNCTURE: CPT

## 2023-08-02 PROCEDURE — 80050 GENERAL HEALTH PANEL: CPT

## 2023-08-02 PROCEDURE — 83036 HEMOGLOBIN GLYCOSYLATED A1C: CPT

## 2023-08-02 PROCEDURE — 80061 LIPID PANEL: CPT

## 2023-08-02 PROCEDURE — 82306 VITAMIN D 25 HYDROXY: CPT

## 2023-08-17 ENCOUNTER — HOSPITAL ENCOUNTER (OUTPATIENT)
Dept: CARDIOLOGY | Facility: HOSPITAL | Age: 68
Discharge: HOME | End: 2023-08-17
Payer: COMMERCIAL

## 2023-08-17 ENCOUNTER — HOSPITAL ENCOUNTER (OUTPATIENT)
Dept: RESPIRATORY THERAPY | Facility: HOSPITAL | Age: 68
Discharge: HOME | End: 2023-08-17
Payer: COMMERCIAL

## 2023-08-17 VITALS
DIASTOLIC BLOOD PRESSURE: 74 MMHG | HEIGHT: 57 IN | BODY MASS INDEX: 26.32 KG/M2 | SYSTOLIC BLOOD PRESSURE: 124 MMHG | WEIGHT: 122 LBS

## 2023-08-17 DIAGNOSIS — R07.9 CHEST PAIN, UNSPECIFIED TYPE: ICD-10-CM

## 2023-08-17 DIAGNOSIS — R06.09 DOE (DYSPNEA ON EXERTION): ICD-10-CM

## 2023-08-17 LAB
ASCENDING AORTA: 3.1 CM
BH CV ECHO MEAS - ACS: 1.61 CM
BH CV ECHO MEAS - AO MAX PG: 6.2 MMHG
BH CV ECHO MEAS - AO MEAN PG: 3.2 MMHG
BH CV ECHO MEAS - AO ROOT DIAM: 2.9 CM
BH CV ECHO MEAS - AO V2 MAX: 124.9 CM/SEC
BH CV ECHO MEAS - AO V2 VTI: 29.1 CM
BH CV ECHO MEAS - AVA(I,D): 2.22 CM2
BH CV ECHO MEAS - EDV(CUBED): 41.1 ML
BH CV ECHO MEAS - EDV(MOD-SP2): 47 ML
BH CV ECHO MEAS - EDV(MOD-SP4): 43 ML
BH CV ECHO MEAS - EF(MOD-BP): 62 %
BH CV ECHO MEAS - EF(MOD-SP2): 85.1 %
BH CV ECHO MEAS - EF(MOD-SP4): 81.4 %
BH CV ECHO MEAS - ESV(CUBED): 14.4 ML
BH CV ECHO MEAS - ESV(MOD-SP2): 7 ML
BH CV ECHO MEAS - ESV(MOD-SP4): 8 ML
BH CV ECHO MEAS - FS: 29.5 %
BH CV ECHO MEAS - IVS/LVPW: 0.98 CM
BH CV ECHO MEAS - IVSD: 0.81 CM
BH CV ECHO MEAS - LAT PEAK E' VEL: 5.7 CM/SEC
BH CV ECHO MEAS - LV DIASTOLIC VOL/BSA (35-75): 29.5 CM2
BH CV ECHO MEAS - LV MASS(C)D: 76.1 GRAMS
BH CV ECHO MEAS - LV MAX PG: 5.4 MMHG
BH CV ECHO MEAS - LV MEAN PG: 2.5 MMHG
BH CV ECHO MEAS - LV SYSTOLIC VOL/BSA (12-30): 5.5 CM2
BH CV ECHO MEAS - LV V1 MAX: 115.7 CM/SEC
BH CV ECHO MEAS - LV V1 VTI: 27.9 CM
BH CV ECHO MEAS - LVIDD: 3.5 CM
BH CV ECHO MEAS - LVIDS: 2.44 CM
BH CV ECHO MEAS - LVOT AREA: 2.31 CM2
BH CV ECHO MEAS - LVOT DIAM: 1.71 CM
BH CV ECHO MEAS - LVPWD: 0.83 CM
BH CV ECHO MEAS - MED PEAK E' VEL: 6 CM/SEC
BH CV ECHO MEAS - MV A DUR: 0.15 SEC
BH CV ECHO MEAS - MV A MAX VEL: 104.7 CM/SEC
BH CV ECHO MEAS - MV DEC SLOPE: 290.7 CM/SEC2
BH CV ECHO MEAS - MV DEC TIME: 0.31 MSEC
BH CV ECHO MEAS - MV E MAX VEL: 68.6 CM/SEC
BH CV ECHO MEAS - MV E/A: 0.66
BH CV ECHO MEAS - MV MAX PG: 5.3 MMHG
BH CV ECHO MEAS - MV MEAN PG: 1.95 MMHG
BH CV ECHO MEAS - MV P1/2T: 86.4 MSEC
BH CV ECHO MEAS - MV V2 VTI: 28.1 CM
BH CV ECHO MEAS - MVA(P1/2T): 2.5 CM2
BH CV ECHO MEAS - MVA(VTI): 2.29 CM2
BH CV ECHO MEAS - PA ACC TIME: 0.13 SEC
BH CV ECHO MEAS - PA V2 MAX: 89.6 CM/SEC
BH CV ECHO MEAS - PULM A REVS DUR: 0.14 SEC
BH CV ECHO MEAS - PULM A REVS VEL: 33.2 CM/SEC
BH CV ECHO MEAS - PULM DIAS VEL: 30.5 CM/SEC
BH CV ECHO MEAS - PULM S/D: 1.93
BH CV ECHO MEAS - PULM SYS VEL: 58.9 CM/SEC
BH CV ECHO MEAS - RAP SYSTOLE: 3 MMHG
BH CV ECHO MEAS - RV MAX PG: 2.6 MMHG
BH CV ECHO MEAS - RV V1 MAX: 81 CM/SEC
BH CV ECHO MEAS - RV V1 VTI: 18.3 CM
BH CV ECHO MEAS - RVSP: 19 MMHG
BH CV ECHO MEAS - SI(MOD-SP2): 27.4 ML/M2
BH CV ECHO MEAS - SI(MOD-SP4): 24 ML/M2
BH CV ECHO MEAS - SV(LVOT): 64.4 ML
BH CV ECHO MEAS - SV(MOD-SP2): 40 ML
BH CV ECHO MEAS - SV(MOD-SP4): 35 ML
BH CV ECHO MEAS - TAPSE (>1.6): 1.91 CM
BH CV ECHO MEAS - TR MAX PG: 16.8 MMHG
BH CV ECHO MEAS - TR MAX VEL: 205 CM/SEC
BH CV ECHO MEASUREMENTS AVERAGE E/E' RATIO: 11.73
BH CV STRESS BP STAGE 1: NORMAL
BH CV STRESS BP STAGE 2: NORMAL
BH CV STRESS BP STAGE 3: NORMAL
BH CV STRESS DURATION MIN STAGE 1: 3
BH CV STRESS DURATION MIN STAGE 2: 3
BH CV STRESS DURATION MIN STAGE 3: 1
BH CV STRESS DURATION SEC STAGE 1: 0
BH CV STRESS DURATION SEC STAGE 2: 0
BH CV STRESS DURATION SEC STAGE 3: 0
BH CV STRESS ECHO POST STRESS EJECTION FRACTION EF: 84 %
BH CV STRESS GRADE STAGE 1: 10
BH CV STRESS GRADE STAGE 2: 12
BH CV STRESS GRADE STAGE 3: 14
BH CV STRESS HR STAGE 1: 113
BH CV STRESS HR STAGE 2: 136
BH CV STRESS HR STAGE 3: 145
BH CV STRESS METS STAGE 1: 5
BH CV STRESS METS STAGE 2: 7.5
BH CV STRESS METS STAGE 3: 10
BH CV STRESS PROTOCOL 1: NORMAL
BH CV STRESS RECOVERY BP: NORMAL MMHG
BH CV STRESS RECOVERY HR: 103 BPM
BH CV STRESS SPEED STAGE 1: 1.7
BH CV STRESS SPEED STAGE 2: 2.5
BH CV STRESS SPEED STAGE 3: 3.4
BH CV STRESS STAGE 1: 1
BH CV STRESS STAGE 2: 2
BH CV STRESS STAGE 3: 3
BH CV XLRA - TDI S': 9.9 CM/SEC
LEFT ATRIUM VOLUME INDEX: 16.6 ML/M2
MAXIMAL PREDICTED HEART RATE: 152 BPM
PERCENT MAX PREDICTED HR: 95.39 %
SINUS: 2.42 CM
STJ: 2.2 CM
STRESS BASELINE BP: NORMAL MMHG
STRESS BASELINE HR: 69 BPM
STRESS PERCENT HR: 112 %
STRESS POST ESTIMATED WORKLOAD: 8.2 METS
STRESS POST EXERCISE DUR MIN: 7 MIN
STRESS POST EXERCISE DUR SEC: 0 SEC
STRESS POST PEAK BP: NORMAL MMHG
STRESS POST PEAK HR: 145 BPM
STRESS TARGET HR: 129 BPM

## 2023-08-17 PROCEDURE — 93325 DOPPLER ECHO COLOR FLOW MAPG: CPT

## 2023-08-17 PROCEDURE — 93350 STRESS TTE ONLY: CPT

## 2023-08-17 PROCEDURE — 94010 BREATHING CAPACITY TEST: CPT

## 2023-08-17 PROCEDURE — 94729 DIFFUSING CAPACITY: CPT

## 2023-08-17 PROCEDURE — 94726 PLETHYSMOGRAPHY LUNG VOLUMES: CPT

## 2023-08-17 PROCEDURE — 93320 DOPPLER ECHO COMPLETE: CPT

## 2023-08-17 PROCEDURE — 93017 CV STRESS TEST TRACING ONLY: CPT

## 2023-08-17 PROCEDURE — 25510000001 PERFLUTREN (DEFINITY) 8.476 MG IN SODIUM CHLORIDE (PF) 0.9 % 10 ML INJECTION: Performed by: FAMILY MEDICINE

## 2023-08-17 RX ADMIN — SODIUM CHLORIDE 2 ML: 9 INJECTION INTRAMUSCULAR; INTRAVENOUS; SUBCUTANEOUS at 13:52

## 2023-08-29 ENCOUNTER — OFFICE VISIT (OUTPATIENT)
Dept: FAMILY MEDICINE CLINIC | Facility: CLINIC | Age: 68
End: 2023-08-29
Payer: COMMERCIAL

## 2023-08-29 VITALS
BODY MASS INDEX: 25.67 KG/M2 | OXYGEN SATURATION: 95 % | TEMPERATURE: 97.3 F | HEIGHT: 57 IN | HEART RATE: 56 BPM | SYSTOLIC BLOOD PRESSURE: 136 MMHG | WEIGHT: 119 LBS | DIASTOLIC BLOOD PRESSURE: 82 MMHG

## 2023-08-29 DIAGNOSIS — J45.20 MILD INTERMITTENT ASTHMA WITHOUT COMPLICATION: Primary | ICD-10-CM

## 2023-08-29 PROCEDURE — 99213 OFFICE O/P EST LOW 20 MIN: CPT | Performed by: FAMILY MEDICINE

## 2023-08-29 RX ORDER — DEXTROMETHORPHAN HYDROBROMIDE AND PROMETHAZINE HYDROCHLORIDE 15; 6.25 MG/5ML; MG/5ML
5 SYRUP ORAL 4 TIMES DAILY PRN
Qty: 120 ML | Refills: 0 | Status: SHIPPED | OUTPATIENT
Start: 2023-08-29

## 2023-08-29 RX ORDER — ALBUTEROL SULFATE 90 UG/1
2 AEROSOL, METERED RESPIRATORY (INHALATION) EVERY 4 HOURS PRN
Qty: 18 G | Refills: 0 | Status: SHIPPED | OUTPATIENT
Start: 2023-08-29

## 2023-08-30 NOTE — PROGRESS NOTES
Subjective   Felicia Boyle is a 67 y.o. female with   Chief Complaint   Patient presents with    Discuss PFT   .    History of Present Illness   67-year-old female who has been complaining of shortness of air when she exerts herself chasing her grandson.  There is been some atypical chest pain and initially patient was evaluated with stress echo.  She did very well with no signs of ischemia on this testing.  Chest x-ray was also unremarkable.  She has recently had pulmonary function test and is here for review.  She admits that she is not very well conditioned.  She is now retired from the medical industry.  Patient has smoked since she was a teenager but states that this is never more than 1 or 2 cigarettes/day.  The following portions of the patient's history were reviewed and updated as appropriate: allergies, current medications, past family history, past medical history, past social history, past surgical history and problem list.    Review of Systems   Respiratory:  Positive for shortness of breath.      Objective     Vitals:    08/29/23 0936   BP: 136/82   Pulse: 56   Temp: 97.3 øF (36.3 øC)   SpO2: 95%       Recent Results (from the past 672 hour(s))   Comprehensive metabolic panel    Collection Time: 08/02/23  9:28 AM    Specimen: Blood   Result Value Ref Range    Glucose 180 (H) 65 - 99 mg/dL    BUN 15 8 - 23 mg/dL    Creatinine 0.70 0.57 - 1.00 mg/dL    Sodium 139 136 - 145 mmol/L    Potassium 4.5 3.5 - 5.2 mmol/L    Chloride 102 98 - 107 mmol/L    CO2 23.8 22.0 - 29.0 mmol/L    Calcium 9.7 8.6 - 10.5 mg/dL    Total Protein 7.5 6.0 - 8.5 g/dL    Albumin 4.7 3.5 - 5.2 g/dL    ALT (SGPT) 38 (H) 1 - 33 U/L    AST (SGOT) 26 1 - 32 U/L    Alkaline Phosphatase 94 39 - 117 U/L    Total Bilirubin 0.4 0.0 - 1.2 mg/dL    Globulin 2.8 gm/dL    A/G Ratio 1.7 g/dL    BUN/Creatinine Ratio 21.4 7.0 - 25.0    Anion Gap 13.2 5.0 - 15.0 mmol/L    eGFR 94.3 >60.0 mL/min/1.73   Hemoglobin A1c    Collection Time: 08/02/23   9:28 AM    Specimen: Blood   Result Value Ref Range    Hemoglobin A1C 7.30 (H) 4.80 - 5.60 %   TSH    Collection Time: 08/02/23  9:28 AM    Specimen: Blood   Result Value Ref Range    TSH 1.760 0.270 - 4.200 uIU/mL   Vitamin D 25 hydroxy    Collection Time: 08/02/23  9:28 AM    Specimen: Blood   Result Value Ref Range    25 Hydroxy, Vitamin D 34.5 30.0 - 100.0 ng/ml   Lipid panel    Collection Time: 08/02/23  9:28 AM    Specimen: Blood   Result Value Ref Range    Total Cholesterol 203 (H) 0 - 200 mg/dL    Triglycerides 222 (H) 0 - 150 mg/dL    HDL Cholesterol 69 (H) 40 - 60 mg/dL    LDL Cholesterol  97 0 - 100 mg/dL    VLDL Cholesterol 37 5 - 40 mg/dL    LDL/HDL Ratio 1.30    CBC Auto Differential    Collection Time: 08/02/23  9:28 AM    Specimen: Blood   Result Value Ref Range    WBC 8.20 3.40 - 10.80 10*3/mm3    RBC 4.69 3.77 - 5.28 10*6/mm3    Hemoglobin 14.3 12.0 - 15.9 g/dL    Hematocrit 42.2 34.0 - 46.6 %    MCV 90.0 79.0 - 97.0 fL    MCH 30.5 26.6 - 33.0 pg    MCHC 33.9 31.5 - 35.7 g/dL    RDW 12.4 12.3 - 15.4 %    RDW-SD 40.1 37.0 - 54.0 fl    MPV 10.6 6.0 - 12.0 fL    Platelets 314 140 - 450 10*3/mm3    Neutrophil % 59.6 42.7 - 76.0 %    Lymphocyte % 32.0 19.6 - 45.3 %    Monocyte % 6.8 5.0 - 12.0 %    Eosinophil % 0.7 0.3 - 6.2 %    Basophil % 0.5 0.0 - 1.5 %    Immature Grans % 0.4 0.0 - 0.5 %    Neutrophils, Absolute 4.89 1.70 - 7.00 10*3/mm3    Lymphocytes, Absolute 2.62 0.70 - 3.10 10*3/mm3    Monocytes, Absolute 0.56 0.10 - 0.90 10*3/mm3    Eosinophils, Absolute 0.06 0.00 - 0.40 10*3/mm3    Basophils, Absolute 0.04 0.00 - 0.20 10*3/mm3    Immature Grans, Absolute 0.03 0.00 - 0.05 10*3/mm3    nRBC 0.0 0.0 - 0.2 /100 WBC   Adult Stress Echo W/ Cont or Stress Agent if Necessary Per Protocol    Collection Time: 08/17/23  1:51 PM   Result Value Ref Range    EF(MOD-bp) 62.0 %    LVIDd 3.5 cm    LVIDs 2.44 cm    IVSd 0.81 cm    LVPWd 0.83 cm    FS 29.5 %    IVS/LVPW 0.98 cm    ESV(cubed) 14.4 ml    LV Sys  Vol (BSA corrected) 5.5 cm2    EDV(cubed) 41.1 ml    LV Batista Vol (BSA corrected) 29.5 cm2    LVOT area 2.31 cm2    LV mass(C)d 76.1 grams    LVOT diam 1.71 cm    EDV(MOD-sp2) 47.0 ml    EDV(MOD-sp4) 43.0 ml    ESV(MOD-sp2) 7.0 ml    ESV(MOD-sp4) 8.0 ml    SV(MOD-sp2) 40.0 ml    SV(MOD-sp4) 35.0 ml    SI(MOD-sp2) 27.4 ml/m2    SI(MOD-sp4) 24.0 ml/m2    EF(MOD-sp2) 85.1 %    EF(MOD-sp4) 81.4 %    MV E max tahir 68.6 cm/sec    MV A max tahir 104.7 cm/sec    MV dec time 0.31 msec    MV E/A 0.66     Pulm A Revs Dur 0.14 sec    MV A dur 0.15 sec    LA ESV Index (BP) 16.6 ml/m2    Med Peak E' Tahir 6.0 cm/sec    Lat Peak E' Tahir 5.7 cm/sec    Avg E/e' ratio 11.73     SV(LVOT) 64.4 ml    TAPSE (>1.6) 1.91 cm    RV S' 9.9 cm/sec    Pulm Sys Tahir 58.9 cm/sec    Pulm Batista Tahir 30.5 cm/sec    Pulm S/D 1.93     Pulm A Revs Tahir 33.2 cm/sec    LV V1 max 115.7 cm/sec    LV V1 max PG 5.4 mmHg    LV V1 mean PG 2.5 mmHg    LV V1 VTI 27.9 cm    Ao pk tahir 124.9 cm/sec    Ao max PG 6.2 mmHg    Ao mean PG 3.2 mmHg    Ao V2 VTI 29.1 cm    BENJAMIN(I,D) 2.22 cm2    MV max PG 5.3 mmHg    MV mean PG 1.95 mmHg    MV V2 VTI 28.1 cm    MV P1/2t 86.4 msec    MVA(P1/2t) 2.5 cm2    MVA(VTI) 2.29 cm2    MV dec slope 290.7 cm/sec2    TR max tahir 205.0 cm/sec    TR max PG 16.8 mmHg    RV V1 max PG 2.6 mmHg    RV V1 max 81.0 cm/sec    RV V1 VTI 18.3 cm    PA V2 max 89.6 cm/sec    PA acc time 0.13 sec    Ao root diam 2.9 cm    ACS 1.61 cm    Sinus 2.42 cm    STJ 2.20 cm    RVSP(TR) 19 mmHg    RAP systole 3 mmHg    Ascending aorta 3.1 cm    PostStressEF 84 %    BH CV STRESS PROTOCOL 1 Yovani     Stage 1 1.0     HR Stage 1 113     BP Stage 1 158/89     Duration Min Stage 1 3     Duration Sec Stage 1 0     Grade Stage 1 10     Speed Stage 1 1.7     BH CV STRESS METS STAGE 1 5.0     Stage 2 2.0     HR Stage 2 136     BP Stage 2 188/86     Duration Min Stage 2 3     Duration Sec Stage 2 0     Grade Stage 2 12     Speed Stage 2 2.5     BH CV STRESS METS STAGE 2 7.5     Stage  3 3.0     HR Stage 3 145     BP Stage 3 188/86     Duration Min Stage 3 1     Duration Sec Stage 3 0     Grade Stage 3 14     Speed Stage 3 3.4     BH CV STRESS METS STAGE 3 10.0     Baseline HR 69 bpm    Baseline /78 mmHg    Peak  bpm    Peak /89 mmHg    Recovery  bpm    Recovery /70 mmHg    Target HR (85%) 129 bpm    Max. Pred. HR (100%) 152 bpm    Percent Max Pred HR 95.39 %    Exercise duration (min) 7 min    Exercise duration (sec) 0 sec    Estimated workload 8.2 METS    Percent Target  %       Physical Exam  Vitals and nursing note reviewed.   Constitutional:       Appearance: Normal appearance. She is well-developed and well-groomed.   HENT:      Head: Normocephalic and atraumatic.   Neck:      Thyroid: No thyroid mass or thyromegaly.      Vascular: Normal carotid pulses. No carotid bruit.      Trachea: Trachea and phonation normal.   Cardiovascular:      Rate and Rhythm: Normal rate and regular rhythm.      Heart sounds: Normal heart sounds. No murmur heard.    No friction rub. No gallop.   Pulmonary:      Effort: Pulmonary effort is normal. No respiratory distress.      Breath sounds: Normal breath sounds. No decreased breath sounds, wheezing, rhonchi or rales.   Musculoskeletal:      Cervical back: Neck supple.   Lymphadenopathy:      Cervical: No cervical adenopathy.   Skin:     General: Skin is warm and dry.      Findings: No rash.   Neurological:      Mental Status: She is alert and oriented to person, place, and time.   Psychiatric:         Attention and Perception: Attention and perception normal.         Mood and Affect: Mood and affect normal.         Speech: Speech normal.         Behavior: Behavior normal. Behavior is cooperative.         Thought Content: Thought content normal.         Cognition and Memory: Cognition and memory normal.         Judgment: Judgment normal.   Formal PFTs with only 80% of that predicted for FEV1.    Assessment & Plan   Diagnoses and  all orders for this visit:    1. Mild intermittent asthma without complication (Primary)  -     albuterol sulfate  (90 Base) MCG/ACT inhaler; Inhale 2 puffs Every 4 (Four) Hours As Needed for Wheezing.  Dispense: 18 g; Refill: 0    Other orders  -     promethazine-dextromethorphan (PROMETHAZINE-DM) 6.25-15 MG/5ML syrup; Take 5 mL by mouth 4 (Four) Times a Day As Needed for Cough.  Dispense: 120 mL; Refill: 0        Return if symptoms worsen or fail to improve, for Next scheduled follow up.

## 2023-12-21 ENCOUNTER — TELEPHONE (OUTPATIENT)
Dept: FAMILY MEDICINE CLINIC | Facility: CLINIC | Age: 68
End: 2023-12-21
Payer: COMMERCIAL

## 2023-12-21 NOTE — TELEPHONE ENCOUNTER
Pt called and stated that she found a lump in her right breast yesterday and is asking for a referral for a mammogram. Will the patient need to be seen in office before a referral can be sent? Please advise, thank you.           Note

## 2023-12-21 NOTE — TELEPHONE ENCOUNTER
Pt called and stated that she found a lump in her right breast yesterday and is asking for a referral for a mammogram. Will the patient need to be seen in office before a referral can be sent? Please advise, thank you.

## 2023-12-22 ENCOUNTER — TELEPHONE (OUTPATIENT)
Dept: FAMILY MEDICINE CLINIC | Facility: CLINIC | Age: 68
End: 2023-12-22
Payer: COMMERCIAL

## 2023-12-22 NOTE — TELEPHONE ENCOUNTER
Pt called the office again this morning regarding the lump that she found in her right breast.She is very concerned because of her family history of breast cancer and is asking for you to please place a referral for imaging.

## 2023-12-23 DIAGNOSIS — N64.9 DISORDER OF BREAST: ICD-10-CM

## 2023-12-23 DIAGNOSIS — N63.10 MASS OF RIGHT BREAST, UNSPECIFIED QUADRANT: Primary | ICD-10-CM

## 2023-12-23 NOTE — TELEPHONE ENCOUNTER
Let patient know that I have ordered a diagnostic mammogram with right breast ultrasound.  I would like to see her after I return for a breast exam.

## 2024-01-22 ENCOUNTER — HOSPITAL ENCOUNTER (OUTPATIENT)
Dept: ULTRASOUND IMAGING | Facility: HOSPITAL | Age: 69
Discharge: HOME | End: 2024-01-22
Payer: COMMERCIAL

## 2024-01-22 ENCOUNTER — HOSPITAL ENCOUNTER (OUTPATIENT)
Dept: MAMMOGRAPHY | Facility: HOSPITAL | Age: 69
Discharge: HOME | End: 2024-01-22
Payer: COMMERCIAL

## 2024-01-22 DIAGNOSIS — N64.9 DISORDER OF BREAST: ICD-10-CM

## 2024-01-22 DIAGNOSIS — N63.10 MASS OF RIGHT BREAST, UNSPECIFIED QUADRANT: ICD-10-CM

## 2024-01-22 PROCEDURE — G0279 TOMOSYNTHESIS, MAMMO: HCPCS

## 2024-01-22 PROCEDURE — 77066 DX MAMMO INCL CAD BI: CPT

## 2024-01-22 PROCEDURE — 76642 ULTRASOUND BREAST LIMITED: CPT

## 2024-01-23 DIAGNOSIS — N63.10 MASS OF RIGHT BREAST, UNSPECIFIED QUADRANT: Primary | ICD-10-CM

## 2024-01-29 ENCOUNTER — OFFICE VISIT (OUTPATIENT)
Dept: FAMILY MEDICINE CLINIC | Facility: CLINIC | Age: 69
End: 2024-01-29
Payer: COMMERCIAL

## 2024-01-29 VITALS
TEMPERATURE: 97.5 F | HEIGHT: 57 IN | SYSTOLIC BLOOD PRESSURE: 130 MMHG | HEART RATE: 80 BPM | WEIGHT: 119 LBS | BODY MASS INDEX: 25.67 KG/M2 | DIASTOLIC BLOOD PRESSURE: 80 MMHG | OXYGEN SATURATION: 97 %

## 2024-01-29 DIAGNOSIS — N63.13 MASS OF LOWER OUTER QUADRANT OF RIGHT BREAST: Primary | ICD-10-CM

## 2024-01-29 PROCEDURE — 99213 OFFICE O/P EST LOW 20 MIN: CPT | Performed by: FAMILY MEDICINE

## 2024-01-29 NOTE — PROGRESS NOTES
Subjective   Felicia Boyle is a 68 y.o. female with   Chief Complaint   Patient presents with    Abnormal Mammogram   .    History of Present Illness   68-year-old [place] female with palpable right breast lump discovered during the holidays of 2023.  She states that she was just scratching and felt something in the lower lateral quadrant of the right breast.  There has been no pain and lesion remains freely movable.  Patient denies galactorrhea.  Previous mammogram was in February 2019 and that at that time completely normal.  She has since had diagnostic mammogram with ultrasound showing suspicious lesion in the right breast-lateral lower quadrant approximately 8 cm from her nipple.  She is already scheduled to have an ultrasound guided needle biopsy of this lesion.  The following portions of the patient's history were reviewed and updated as appropriate: allergies, current medications, past family history, past medical history, past social history, past surgical history and problem list.    Review of Systems   Respiratory:          Right breast mass       Objective     Vitals:    01/29/24 0949   BP: 130/80   Pulse: 80   Temp: 97.5 °F (36.4 °C)   SpO2: 97%       No results found for this or any previous visit (from the past 672 hour(s)).    Physical Exam  Vitals and nursing note reviewed.   Constitutional:       Appearance: Normal appearance. She is well-developed and well-groomed.   HENT:      Head: Normocephalic and atraumatic.   Chest:   Breasts:     Right: Mass present. No swelling, bleeding, inverted nipple, nipple discharge, skin change or tenderness.      Left: No swelling, bleeding, inverted nipple, mass, nipple discharge, skin change or tenderness.          Comments: Right breast with a somewhat oval approximately 2 cm well-circumscribed freely movable solid lesion in the lower quadrant approximately 6 cm from her nipple.  Lesion is nontender and easily palpable.  Musculoskeletal:      Cervical back:  Neck supple.   Skin:     General: Skin is warm and dry.      Findings: No rash.   Neurological:      Mental Status: She is alert and oriented to person, place, and time.   Psychiatric:         Attention and Perception: Attention and perception normal.         Mood and Affect: Mood and affect normal.         Speech: Speech normal.         Behavior: Behavior normal. Behavior is cooperative.         Thought Content: Thought content normal.         Cognition and Memory: Cognition and memory normal.         Judgment: Judgment normal.         Assessment & Plan   Diagnoses and all orders for this visit:    1. Mass of lower outer quadrant of right breast (Primary)    Patient has already been scheduled for an ultrasound-guided needle biopsy of this lesion.  Await results.    Return if symptoms worsen or fail to improve.

## 2024-01-31 ENCOUNTER — HOSPITAL ENCOUNTER (OUTPATIENT)
Dept: ULTRASOUND IMAGING | Facility: HOSPITAL | Age: 69
Discharge: HOME | End: 2024-01-31
Payer: COMMERCIAL

## 2024-01-31 ENCOUNTER — HOSPITAL ENCOUNTER (OUTPATIENT)
Dept: MAMMOGRAPHY | Facility: HOSPITAL | Age: 69
Discharge: HOME | End: 2024-01-31
Payer: COMMERCIAL

## 2024-01-31 VITALS
DIASTOLIC BLOOD PRESSURE: 90 MMHG | BODY MASS INDEX: 25.24 KG/M2 | HEART RATE: 80 BPM | WEIGHT: 117 LBS | OXYGEN SATURATION: 97 % | RESPIRATION RATE: 16 BRPM | TEMPERATURE: 97.7 F | SYSTOLIC BLOOD PRESSURE: 159 MMHG | HEIGHT: 57 IN

## 2024-01-31 DIAGNOSIS — N63.10 MASS OF RIGHT BREAST, UNSPECIFIED QUADRANT: ICD-10-CM

## 2024-01-31 PROCEDURE — 88360 TUMOR IMMUNOHISTOCHEM/MANUAL: CPT | Performed by: FAMILY MEDICINE

## 2024-01-31 PROCEDURE — 25010000002 LIDOCAINE 1 % SOLUTION: Performed by: RADIOLOGY

## 2024-01-31 PROCEDURE — 88342 IMHCHEM/IMCYTCHM 1ST ANTB: CPT | Performed by: FAMILY MEDICINE

## 2024-01-31 PROCEDURE — 88305 TISSUE EXAM BY PATHOLOGIST: CPT | Performed by: FAMILY MEDICINE

## 2024-01-31 PROCEDURE — 77065 DX MAMMO INCL CAD UNI: CPT

## 2024-01-31 PROCEDURE — 88341 IMHCHEM/IMCYTCHM EA ADD ANTB: CPT | Performed by: FAMILY MEDICINE

## 2024-01-31 PROCEDURE — A4648 IMPLANTABLE TISSUE MARKER: HCPCS

## 2024-01-31 RX ORDER — DIAZEPAM 5 MG/1
5 TABLET ORAL ONCE AS NEEDED
Status: DISCONTINUED | OUTPATIENT
Start: 2024-01-31 | End: 2024-02-01 | Stop reason: HOSPADM

## 2024-01-31 RX ORDER — LIDOCAINE HYDROCHLORIDE 10 MG/ML
10 INJECTION, SOLUTION INFILTRATION; PERINEURAL ONCE
Status: COMPLETED | OUTPATIENT
Start: 2024-01-31 | End: 2024-01-31

## 2024-01-31 RX ADMIN — LIDOCAINE HYDROCHLORIDE 7 ML: 10; .005 INJECTION, SOLUTION EPIDURAL; INFILTRATION; INTRACAUDAL; PERINEURAL at 10:37

## 2024-01-31 RX ADMIN — LIDOCAINE HYDROCHLORIDE 7 ML: 10 INJECTION, SOLUTION INFILTRATION; PERINEURAL at 10:36

## 2024-01-31 NOTE — H&P
"        Name: Felicia Boyle ADMIT: 2024   : 1955  PCP: Tristan Malone DO    MRN: [id number] LOS: 0 days   AGE/SEX: 68 y.o. female  ROOM: Room/bed info not found       Chief complaint right breast mass    Present Illness or Internal History:  Patient is a 68 y.o. female presents with a right breast mass.     Past Surgical History:  Past Surgical History:   Procedure Laterality Date    BREAST BIOPSY Left     Excisional biopsy in the [facility]    CHOLECYSTECTOMY  2012    COLON SURGERY      COLONOSCOPY N/A 2021    Procedure: COLONOSCOPY TO ANASTAMOSIS/TI;  Surgeon: Angelo Gonsalez MD;  Location: [facility] ENDOSCOPY;  Service: Gastroenterology;  Laterality: N/A;  PRE: SCREENING  POST: NORMAL POST-OP ANATOMY    ENDOSCOPY          ENDOSCOPY N/A 03/10/2017    Procedure: ESOPHAGOGASTRODUODENOSCOPY WITH BIOPSY AND PETER DILATATION 54\";  Surgeon: Angelo Gonsalez MD;  Location: [facility] ENDOSCOPY;  Service:     ENDOSCOPY N/A 2021    Procedure: ESOPHAGOGASTRODUODENOSCOPY WITH BIOPSIES, 54FR PETER DILATATION;  Surgeon: Angelo Gonsalez MD;  Location: [facility] ENDOSCOPY;  Service: Gastroenterology;  Laterality: N/A;  PRE: DYSPHAGIA  POST: GASTRITIS, ESOPHAGEAL RING    LAPAROSCOPIC CHOLECYSTECTOMY         Past Medical History:  Past Medical History:   Diagnosis Date    Diabetes mellitus     borderline    Diverticulitis     Mixed hyperlipidemia 2019    Osteoporosis 2016    Trichomonal vaginitis 2020       Home Medications:  (Not in a hospital admission)      Allergies:  Metronidazole    Family History:  Family History   Problem Relation Age of Onset    Hypertension Mother     Diabetes Mother     Hypertension Father     Breast cancer Sister 48    Breast cancer Sister 38    No Known Problems Brother     No Known Problems Daughter     No Known Problems Son     No Known Problems Maternal Grandmother     No Known Problems Paternal Grandmother     No Known Problems Maternal Grandfather "     No Known Problems Paternal Grandfather     Autism Grandson     Colon cancer Neg Hx     Rectal cancer Neg Hx     Endometrial cancer Neg Hx     Vaginal cancer Neg Hx     Cervical cancer Neg Hx     Ovarian cancer Neg Hx     Prostate cancer Neg Hx     Uterine cancer Neg Hx     Malig Hyperthermia Neg Hx        Social History:  Social History     Tobacco Use    Smoking status: Some Days     Packs/day: 0.25     Years: 15.00     Additional pack years: 0.00     Total pack years: 3.75     Types: Cigarettes    Smokeless tobacco: Never    Tobacco comments:     socially   Substance Use Topics    Alcohol use: No    Drug use: No        Objective     Physical Exam:    No exam performed today,    Vital Signs  Temp:  [97.7 °F (36.5 °C)] 97.7 °F (36.5 °C)  Heart Rate:  [85] 85  Resp:  [16] 16  BP: (151)/(81) 151/81    Anticipated Surgical Procedure:  Ultrasound guided right breast biopsy with clip placement    The risks, benefits and alternatives of this procedure have been discussed with the patient or responsible party: Yes        Luis Patel Jr., MD  01/31/24  10:15 EST

## 2024-01-31 NOTE — NURSING NOTE
Biopsy site to right outer slightly lower breast just above the intramammary fold clear with Exofin dry and intact. No firmness or swelling noted at or around biopsy site. Denies pain. Ice pack with protective covering applied to biopsy site. Discharge instructions discussed with understanding voiced by patient. Copies provided to patient. No distress noted. To home via private vehicle.

## 2024-02-01 ENCOUNTER — TELEPHONE (OUTPATIENT)
Dept: MAMMOGRAPHY | Facility: HOSPITAL | Age: 69
End: 2024-02-01
Payer: COMMERCIAL

## 2024-02-01 LAB
LAB AP CASE REPORT: NORMAL
LAB AP CLINICAL INFORMATION: NORMAL
LAB AP SPECIAL STAINS: NORMAL
LAB AP SYNOPTIC CHECKLIST: NORMAL
PATH REPORT.FINAL DX SPEC: NORMAL
PATH REPORT.GROSS SPEC: NORMAL

## 2024-02-01 NOTE — TELEPHONE ENCOUNTER
Left message checking on patient after biopsy yesterday. Requested call back with any questions or concerns.

## 2024-02-05 ENCOUNTER — TELEPHONE (OUTPATIENT)
Dept: SURGERY | Facility: CLINIC | Age: 69
End: 2024-02-05
Payer: COMMERCIAL

## 2024-02-05 ENCOUNTER — TELEPHONE (OUTPATIENT)
Dept: FAMILY MEDICINE CLINIC | Facility: CLINIC | Age: 69
End: 2024-02-05

## 2024-02-05 DIAGNOSIS — C50.911 INFILTRATING DUCTAL CARCINOMA OF RIGHT BREAST: Primary | ICD-10-CM

## 2024-02-05 NOTE — TELEPHONE ENCOUNTER
Caller: Felicia Boyle    Relationship: Self    Best call back number: [phone]    Caller requesting test results: SELF    What test was performed: BREAST BIOPSY    When was the test performed: 1/31/24    Where was the test performed: [facility]     Additional notes: PLEASE CALL WITH RESULTS

## 2024-02-05 NOTE — TELEPHONE ENCOUNTER
I left patient a detailed message to call me back.   Received referral to see patient. Chart prepared for Dr Abeba Manrique. Message to scheduling for apt.

## 2024-02-07 ENCOUNTER — TELEPHONE (OUTPATIENT)
Dept: SURGERY | Facility: CLINIC | Age: 69
End: 2024-02-07
Payer: COMMERCIAL

## 2024-02-07 NOTE — TELEPHONE ENCOUNTER
Returned patient phone in regards to MRI that is scheduled for 02/09.  Patient verbalized an understanding on the location of the appt and time.        [facility]

## 2024-02-07 NOTE — TELEPHONE ENCOUNTER
Message for patient to call me back, would like to know if patient could come into office for genetic testing. She is scheduled her breast MRI here at [facility] 2/9/24.

## 2024-02-09 ENCOUNTER — HOSPITAL ENCOUNTER (OUTPATIENT)
Dept: MRI IMAGING | Facility: HOSPITAL | Age: 69
Discharge: HOME | End: 2024-02-09
Admitting: SURGERY
Payer: COMMERCIAL

## 2024-02-09 LAB — CREAT BLDA-MCNC: 0.7 MG/DL (ref 0.6–1.3)

## 2024-02-09 PROCEDURE — A9577 INJ MULTIHANCE: HCPCS | Performed by: SURGERY

## 2024-02-09 PROCEDURE — 0 GADOBENATE DIMEGLUMINE 529 MG/ML SOLUTION: Performed by: SURGERY

## 2024-02-09 PROCEDURE — 82565 ASSAY OF CREATININE: CPT

## 2024-02-09 PROCEDURE — 77049 MRI BREAST C-+ W/CAD BI: CPT

## 2024-02-09 RX ADMIN — GADOBENATE DIMEGLUMINE 10 ML: 529 INJECTION, SOLUTION INTRAVENOUS at 16:46

## 2024-02-14 ENCOUNTER — OFFICE VISIT (OUTPATIENT)
Dept: SURGERY | Facility: CLINIC | Age: 69
End: 2024-02-14
Payer: COMMERCIAL

## 2024-02-14 ENCOUNTER — PREP FOR SURGERY (OUTPATIENT)
Dept: OTHER | Facility: HOSPITAL | Age: 69
End: 2024-02-14
Payer: COMMERCIAL

## 2024-02-14 ENCOUNTER — HOSPITAL ENCOUNTER (OUTPATIENT)
Dept: SURGERY | Facility: HOSPITAL | Age: 69
Discharge: HOME | End: 2024-02-14
Payer: COMMERCIAL

## 2024-02-14 VITALS
HEIGHT: 57 IN | BODY MASS INDEX: 25.24 KG/M2 | SYSTOLIC BLOOD PRESSURE: 136 MMHG | OXYGEN SATURATION: 95 % | HEART RATE: 82 BPM | DIASTOLIC BLOOD PRESSURE: 78 MMHG | WEIGHT: 117 LBS

## 2024-02-14 DIAGNOSIS — Z17.1 MALIGNANT NEOPLASM OF LOWER-OUTER QUADRANT OF RIGHT BREAST OF FEMALE, ESTROGEN RECEPTOR NEGATIVE: Primary | ICD-10-CM

## 2024-02-14 DIAGNOSIS — C50.411 MALIGNANT NEOPLASM OF UPPER-OUTER QUADRANT OF RIGHT BREAST IN FEMALE, ESTROGEN RECEPTOR NEGATIVE: Primary | ICD-10-CM

## 2024-02-14 DIAGNOSIS — Z17.1 MALIGNANT NEOPLASM OF UPPER-OUTER QUADRANT OF RIGHT BREAST IN FEMALE, ESTROGEN RECEPTOR NEGATIVE: Primary | ICD-10-CM

## 2024-02-14 DIAGNOSIS — Z87.891 HISTORY OF PRIOR CIGARETTE SMOKING: ICD-10-CM

## 2024-02-14 DIAGNOSIS — C50.511 MALIGNANT NEOPLASM OF LOWER-OUTER QUADRANT OF RIGHT BREAST OF FEMALE, ESTROGEN RECEPTOR NEGATIVE: Primary | ICD-10-CM

## 2024-02-14 DIAGNOSIS — C50.511 BREAST CANCER OF LOWER-OUTER QUADRANT OF RIGHT FEMALE BREAST: Primary | ICD-10-CM

## 2024-02-14 DIAGNOSIS — R92.8 ABNORMAL MRI, BREAST: ICD-10-CM

## 2024-02-14 DIAGNOSIS — Z80.3 FH: BREAST CANCER: ICD-10-CM

## 2024-02-14 RX ORDER — ONDANSETRON 4 MG/1
4 TABLET, FILM COATED ORAL EVERY 8 HOURS PRN
Qty: 10 TABLET | Refills: 1 | Status: SHIPPED | OUTPATIENT
Start: 2024-02-14

## 2024-02-14 RX ORDER — SODIUM CHLORIDE, SODIUM LACTATE, POTASSIUM CHLORIDE, CALCIUM CHLORIDE 600; 310; 30; 20 MG/100ML; MG/100ML; MG/100ML; MG/100ML
100 INJECTION, SOLUTION INTRAVENOUS CONTINUOUS
Status: CANCELLED | OUTPATIENT
Start: 2024-03-14

## 2024-02-14 RX ORDER — HYDROCODONE BITARTRATE AND ACETAMINOPHEN 5; 325 MG/1; MG/1
TABLET ORAL
Qty: 15 TABLET | Refills: 0 | Status: SHIPPED | OUTPATIENT
Start: 2024-02-14

## 2024-02-14 RX ORDER — CEFAZOLIN SODIUM 2 G/100ML
2000 INJECTION, SOLUTION INTRAVENOUS ONCE
Status: CANCELLED | OUTPATIENT
Start: 2024-03-14 | End: 2024-02-14

## 2024-02-14 NOTE — H&P
There are no changes in the history or physical exam, aside from any that are listed as an addendum at the bottom of this document.   Today, patient will go for the surgery listed in the plan below.   Chief Complaint: Felicia Boyle is a 68 y.o. female who was seen in consultation at the request of Tristan Malone DO  for newly diagnosed breast cancer and a followup visit    History of Present Illness:  Patient presents with newly diagnosed breast cancer.  She noted a new RIGHT breast mass lower outer in 2023. She noted no other new masses, skin changes, nipple discharge, nipple changes prior to her most recent imaging.    Her most recent imaging includes the followin2019, MMG Screening Santo Bilateral ([facility])  Scattered fibroglandular density.  BI-RADS 0    2019, MMG Diagnostic Right ([facility])  Resolution of the area of focal asymmetry.  BI-RADS 1: Negative    2024, MMG Diagnostic Digital Santo Bilateral, US Breast Right Limited ([facility])  INDICATION: Right breast palpable lump the patient first identified at  the end of December 3 to 4 weeks ago. The patient reports 2 sisters  with a history of breast cancer, diagnosed age 38 and 48.     The breasts are heterogeneously dense   Right breast: There is a triangular skin marker area of palpable concern, 8 cm from the nipple. The marker overlies an oval high density mass margins measuring 1.7  x 1.4 x 1.6 cm. Lymph nodes included in the right axilla have a normal mammographic appearance.    Ultrasound  8 o'clock 8 cm from the nipple solid mass 1.7 x 1.0 x 1.3 cm.   Ultrasound of the right axilla was performed with no abnormally enlarged lymph nodes.    BI-RADS 4c: Suspicious        She had a biopsy on the following day that showed:   2024, US Guided Breast Biopsy ([facility])  Right breast 8 o'clock 8 cm from the nipple. 10-gauge mammotome multiple tissue specimens bowtie shaped metallic clip was placed.    Post-biopsy mammography demonstrates bowtie  shaped metallic clip centrally within a mass in the posterior one third lower outer quadrant of the right breast. No evidence for clip migration.    The pathology is concordant.      01/31/2024, Surgical pathology report ([facility])    1.  Breast, right, 8:00, 8 cm from nipple, biopsy: Invasive ductal adenocarcinoma                -A.  The tumor is Lesage grade III (tubular grade 3, nuclear grade 3, mitotic grade 3).               -B.  The tumor measures up to 9.3 mm on the slide               -C.  No definitive lymphovascular invasion or perineural invasion is identified               -D.  Ductal carcinoma in situ is not identified    ER Negative (less than 1%)  AZ Negative (less than 1%)  HER2 positive (Score 3+) 95%  KI-67 70%     I then arranged for a MRi:  2/9/24  [facility] BREAST MRI  SHERRILL BUTLER   Posterior one third lower outer quadrant of the right  breast 8 o'clock, 8 cm from the nipple there is an irregular enhancing mass with an internal focal signal void. The mass measures 2.0 cm in anterior to posterior dimension, 1.8 cm in the superior-inferior dimension and 1.7 cm in the mediolateral dimension. This corresponds to the biopsy-proven malignancy with the centrally located bowtie shaped metallic clip. Extending anteriorly away from the anterior margin of the mass there is irregular linear  enhancement that measures on the order of 3.5 cm in anterior posterior.    There are linear areas of increased density seen mammographically in this region. This is suspicious for additional DCIS. The biopsy-proven malignancy and contiguous anterior linear enhancement  measure on the order of 4.7 cm in anterior to posterior dimension, 1.7 cm in the mediolateral dimension and 2.4 cm in the superior to inferior dimension. anterior one third of the right breast at the 12 o'clock position centered on the order of 3 cm from the nipple there is an irregular area  of non-mass enhancement that measures 1.0 cm in superior-inferior  dimension, 1.1 cm in anterior to posterior dimension and 1.2 cm in the medial to lateral dimension. No mammographic correlate is appreciated.  No other areas of suspicious enhancement or morphology are seen in the  right breast. I see no evidence for abnormal skin, nipple or chest wall  enhancement of the right breast and there is no evidence for right  axillary or internal mammary chain adenopathy.  In the middle third of the left breast centered at the 11:30 position on the order of 3.7 cm from the nipple there is an irregular area of non-mass enhancement that measures on the order of 1.3 cm in anterior posterior dimension, 1.1 cm in the superior to inferior dimension and 1.0 cm in the mediolateral dimension. There is suspected architectural distortion. No mammographic correlate is appreciated.  No other areas of abnormal enhancement or morphology are seen within the left breast. No evidence for left axillary or internal mammary chain adenopathy.   IMPRESSION: Biopsy proven malignancy right. The entire mass and linear enhancement measure on the order of 4.7 cm. If breast conservation therapy is desired and biopsy of this region is desired, it is my opinion that this would be best performed under MRI guidance. 2. 1.2 cm irregular area o non mass enhancement in the right breast 12 o'clock position. No mammographic correlate is appreciated. Further evaluation with an MRI guided right breast biopsy should be considered. 3. 1.1 cm irregular area of non mass enhancement seen n middle third of the left breast at th 11:30 position. MRI guided left breast biopsy is recommended. BI-RADS 4 Suspicious abnormality. Biopsy should be considered.       She has not had a breast biopsy in the past.  She has her uterus and ovaries, is postmenopausal, and takes no hormones.    Her family history includes the following:   She has 2 of 7 sisters with breast cancer in their 30s-40s.  Dr Perry sent testing years ago, as  follows    09/01/2017, Genetics ([facility])  Genetic result: Negative, no clinically significant mutation identified.    Based on her imaging, we arranged for a bilateral (2X RIGHT and 1 X LEFT) MRI guided biopsies.      MRI guided biopsy x 3 with 2 being on the right and 1 being on the left dated February 26 2024 [facility]  Right breast 12:00, buckle shaped metallic clip placed.  Sclerosing adenosis, calcifications.  Right breast 8:00, infinity shaped clip placed.  High-grade duct carcinoma in situ, solid, cribriform, clinging, lobular extension, central comedonecrosis.  No invasive carcinoma.       Left breast biopsy was also done:   Left breast MRI guided biopsy 12:00, stoplight shaped metallic clip. 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  No clip migration.  Pathology is all concordant    She reports significant bruising LEFT breast.        Review of Systems:  Review of Systems   Eyes:  Eye problems: cataract surgery 3 weeks ago..   All other systems reviewed and are negative.       Past Medical and Surgical History:  Breast Biopsy History:  Patient had not had a breast biopsy prior to her cancer diagnosis.  Breast Cancer HIstory:  Patient does not have a past medical history of breast cancer.  Breast Operations, and year:  None   Obstetric/Gynecologic History:  Age menstrual periods began: 16  Patient is postmenopausal, entered menopause naturally at age: 49   Number of pregnancies:4  Number of live births: 3  Number of abortions or miscarriages: 1  Age of delivery of first child: 18  Patient breast fed, for the following lenth of time: 3 mons total  Length of time taking birth control pills: none   Patient has never taken hormone replacement    Patient has both ovaries and uterus.   Past Surgical History:   Procedure Laterality Date    BREAST BIOPSY Left     Excisional biopsy in the [facility]    CHOLECYSTECTOMY  2012     "COLON SURGERY  2002    COLONOSCOPY N/A 04/28/2021    Procedure: COLONOSCOPY TO ANASTAMOSIS/TI;  Surgeon: Angelo Gonsalez MD;  Location:  [facility];  Service: Gastroenterology;  Laterality: N/A;  PRE: SCREENING  POST: NORMAL POST-OP ANATOMY    ENDOSCOPY      2013    ENDOSCOPY N/A 03/10/2017    Procedure: ESOPHAGOGASTRODUODENOSCOPY WITH BIOPSY AND PETER DILATATION 54\";  Surgeon: Angelo Gonsalez MD;  Location:  [facility];  Service:     ENDOSCOPY N/A 04/28/2021    Procedure: ESOPHAGOGASTRODUODENOSCOPY WITH BIOPSIES, 54FR PETER DILATATION;  Surgeon: Angelo Gonsalez MD;  Location:  [facility];  Service: Gastroenterology;  Laterality: N/A;  PRE: DYSPHAGIA  POST: GASTRITIS, ESOPHAGEAL RING    LAPAROSCOPIC CHOLECYSTECTOMY       Past Medical History:   Diagnosis Date    Diabetes mellitus     borderline    Diverticulitis     Mixed hyperlipidemia 05/19/2019    Osteoporosis 03/03/2016    Trichomonal vaginitis 03/23/2020       Prior Hospitalizations, other than for surgery or childbirth, and year:  None     Social History     Socioeconomic History    Marital status:    Tobacco Use    Smoking status: Former     Current packs/day: 0.25     Average packs/day: 0.3 packs/day for 15.0 years (3.8 ttl pk-yrs)     Types: Cigarettes     Passive exposure: Never    Smokeless tobacco: Never    Tobacco comments:     socially   Vaping Use    Vaping status: Never Used   Substance and Sexual Activity    Alcohol use: No    Drug use: No    Sexual activity: Defer     Birth control/protection: None     Patient is .  Patient is retired.  Patient drinks 0-1 servings of caffeine per day.    Family History:  Family History   Problem Relation Age of Onset    Hypertension Mother     Diabetes Mother     Hypertension Father     Breast cancer Sister 48    Breast cancer Sister 38    No Known Problems Brother     No Known Problems Daughter     No Known Problems Son     No Known Problems Maternal Grandmother     No Known " "Problems Paternal Grandmother     No Known Problems Maternal Grandfather     No Known Problems Paternal Grandfather     Autism Grandson     Colon cancer Neg Hx     Rectal cancer Neg Hx     Endometrial cancer Neg Hx     Vaginal cancer Neg Hx     Cervical cancer Neg Hx     Ovarian cancer Neg Hx     Prostate cancer Neg Hx     Uterine cancer Neg Hx     Malig Hyperthermia Neg Hx        Vital Signs:  /78 (BP Location: Left arm, Patient Position: Sitting, Cuff Size: Adult)   Pulse 81   Ht 149.9 cm (59.02\")   Wt 53.5 kg (118 lb)   LMP  (LMP Unknown)   SpO2 96%   BMI 23.82 kg/m²      Medications:    Current Outpatient Medications:     Blood Glucose Monitoring Suppl (FreeStyle Lite) w/Device kit, 1 Units Daily., Disp: 1 kit, Rfl: 0    glucose blood (FREESTYLE LITE) test strip, TEST ONCE DAILY, Disp: 100 each, Rfl: 2    Lancets (freestyle) lancets, Use TO CHECK BLOOD SUGAR ONCE DAILY, Disp: 100 each, Rfl: 2    albuterol sulfate  (90 Base) MCG/ACT inhaler, Inhale 2 puffs Every 4 (Four) Hours As Needed for Wheezing. (Patient not taking: Reported on 2/14/2024), Disp: 18 g, Rfl: 0    HYDROcodone-acetaminophen (NORCO) 5-325 MG per tablet, 1-2 tabs po q 4-6hr prn pain, wean to tylenol (Patient not taking: Reported on 3/4/2024), Disp: 15 tablet, Rfl: 0    ondansetron (ZOFRAN) 4 MG tablet, Take 1 tablet by mouth Every 8 (Eight) Hours As Needed for Nausea or Vomiting. May take 8 mg po q8hr prn is 4mg is not effective (Patient not taking: Reported on 3/4/2024), Disp: 10 tablet, Rfl: 1     Allergies:  Allergies   Allergen Reactions    Metronidazole Nausea And Vomiting       Physical Examination:  /78 (BP Location: Left arm, Patient Position: Sitting, Cuff Size: Adult)   Pulse 81   Ht 149.9 cm (59.02\")   Wt 53.5 kg (118 lb)   LMP  (LMP Unknown)   SpO2 96%   BMI 23.82 kg/m²   General Appearance:  Patient is in no distress.  She is well kept and has an average build.   Psychiatric:  Patient with appropriate " mood and affect. Alert and oriented to self, time, and place.    Breast, RIGHT:  medium sized, symmetric with the contralateral side.  Breast skin is without erythema, edema, rashes.  There are no visible abnormalities upon inspection during the arm-raising maneuver or with hands on hips in the sitting position. There is no nipple retraction, discharge or nipple/areolar skin changes. In the RIGHT breast LOQ there is a 2.5x1.75 cm palpable mass at the area of known malignancy. Fairly superficial, fairly close to the IMF, freely mobile, and no overlying skin changes.There are no  other masses palpable in the sitting or supine positions.    Breast, LEFT:  medium sized, symmetric with the contralateral side.  Breast skin is without erythema, edema, rashes.  There are significant ecchymoses LEFT  central breast from  her MRI guided biopsy that showed DCIS. There are no visible abnormalities upon inspection during the arm-raising maneuver or with hands on hips in the sitting position. There is no nipple retraction, discharge or nipple/areolar skin changes.There are no masses palpable in the sitting or supine positions.    Lymphatic:  There is no axillary, cervical, infraclavicular, or supraclavicular adenopathy bilaterally.  Eyes:  Pupils are round and reactive to light.  Cardiovascular:  Heart rate and rhythm are regular.  Respiratory:  Lungs are clear bilaterally with no crackles or wheezes in any lung field.  Gastrointestinal:  Abdomen is soft, nondistended, and nontender.     Musculoskeletal:  Good strength in all 4 extremities.   There is good range of motion in both shoulders.    Skin:  No new skin lesions or rashes on the skin excluding the breast (see breast exam above).        Imagin2019, MMG Screening Santo Bilateral ([facility])  Scattered fibroglandular density.  BI-RADS 0    2019, MMG Diagnostic Right ([facility])  Resolution of the area of focal asymmetry.  BI-RADS 1: Negative    2024, MMG Diagnostic  Digital Santo Bilateral, US Breast Right Limited ([facility])  INDICATION: Right breast palpable lump the patient first identified at  the end of December 3 to 4 weeks ago. The patient reports 2 sisters  with a history of breast cancer, diagnosed age 38 and 48.     The breasts are heterogeneously dense   Right breast: There is a triangular skin marker area of palpable concern, 8 cm from the nipple. The marker overlies an oval high density mass margins measuring 1.7  x 1.4 x 1.6 cm. Lymph nodes included in the right axilla have a normal mammographic appearance.    Ultrasound  8 o'clock 8 cm from the nipple solid mass 1.7 x 1.0 x 1.3 cm.   Ultrasound of the right axilla was performed with no abnormally enlarged lymph nodes.    BI-RADS 4c: Suspicious    2/9/24  [facility] BREAST MRI  SHERRILL BUTLER   Posterior one third lower outer quadrant of the right  breast 8 o'clock, 8 cm from the nipple there is an irregular enhancing mass with an internal focal signal void. The mass measures 2.0 cm in anterior to posterior dimension, 1.8 cm in the superior-inferior dimension and 1.7 cm in the mediolateral dimension. This corresponds to the biopsy-proven malignancy with the centrally located bowtie shaped metallic clip. Extending anteriorly away from the anterior margin of the mass there is irregular linear  enhancement that measures on the order of 3.5 cm in anterior posterior.    There are linear areas of increased density seen mammographically in this region. This is suspicious for additional DCIS. The biopsy-proven malignancy and contiguous anterior linear enhancement  measure on the order of 4.7 cm in anterior to posterior dimension, 1.7 cm in the mediolateral dimension and 2.4 cm in the superior to inferior dimension. anterior one third of the right breast at the 12 o'clock position centered on the order of 3 cm from the nipple there is an irregular area  of non-mass enhancement that measures 1.0 cm in superior-inferior dimension, 1.1  cm in anterior to posterior dimension and 1.2 cm in the medial to lateral dimension. No mammographic correlate is appreciated.  No other areas of suspicious enhancement or morphology are seen in the  right breast. I see no evidence for abnormal skin, nipple or chest wall  enhancement of the right breast and there is no evidence for right  axillary or internal mammary chain adenopathy.  In the middle third of the left breast centered at the 11:30 position on the order of 3.7 cm from the nipple there is an irregular area of non-mass enhancement that measures on the order of 1.3 cm in anterior posterior dimension, 1.1 cm in the superior to inferior dimension and 1.0 cm in the mediolateral dimension. There is suspected architectural distortion. No mammographic correlate is appreciated.  No other areas of abnormal enhancement or morphology are seen within the left breast. No evidence for left axillary or internal mammary chain adenopathy.   IMPRESSION: Biopsy proven malignancy right. The entire mass and linear enhancement measure on the order of 4.7 cm. If breast conservation therapy is desired and biopsy of this region is desired, it is my opinion that this would be best performed under MRI guidance. 2. 1.2 cm irregular area o non mass enhancement in the right breast 12 o'clock position. No mammographic correlate is appreciated. Further evaluation with an MRI guided right breast biopsy should be considered. 3. 1.1 cm irregular area of non mass enhancement seen n middle third of the left breast at th 11:30 position. MRI guided left breast biopsy is recommended. BI-RADS 4 Suspicious abnormality. Biopsy should be considered.     Pathology:  01/31/2024, US Guided Breast Biopsy ([facility])  Right breast 8 o'clock 8 cm from the nipple. 10-gauge mammotome multiple tissue specimens bowtie shaped metallic clip was placed.    Post-biopsy mammography demonstrates bowtie shaped metallic clip centrally within a mass in the posterior one  third lower outer quadrant of the right breast. No evidence for clip migration.    The pathology is concordant.      01/31/2024, Surgical pathology report ([facility])    1.  Breast, right, 8:00, 8 cm from nipple, biopsy: Invasive ductal adenocarcinoma                -A.  The tumor is [place] grade III (tubular grade 3, nuclear grade 3, mitotic grade 3).               -B.  The tumor measures up to 9.3 mm on the slide               -C.  No definitive lymphovascular invasion or perineural invasion is identified               -D.  Ductal carcinoma in situ is not identified    ER Negative (less than 1%)  DC Negative (less than 1%)  HER2 positive (Score 3+) 95%  KI-67 70%      MRI guided biopsy x 3 with 2 being on the right and 1 being on the left dated February 26 2024 [facility]  Right breast 12:00, buckle shaped metallic clip placed.  Sclerosing adenosis, calcifications.  Right breast 8:00, infinity shaped clip placed.  High-grade duct carcinoma in situ, solid, cribriform, clinging, lobular extension, central comedonecrosis.  No invasive carcinoma.       Left breast biopsy was also done:   Left breast MRI guided biopsy 12:00, stoplight shaped metallic clip. 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  No clip migration.  Pathology is all concordant    Final Diagnosis   1.  Left breast, 12:00, MRI-guided biopsies for non-mass enhancement: INTERMEDIATE GRADE DUCTAL CARCINOMA IN SITU (DCIS) WITH APOCRINE FEATURES.               -Architectural patterns: Solid and cribriform with central necrosis and associated microcalcifications.               -DCIS involves sclerosing adenosis in multiple cores and measures up to 7 mm maximally.               -No evidence of invasion identified.     2.  Right breast, 12:00, MRI-guided biopsies for non-mass enhancement:               -Sclerosing adenosis with associated microcalcifications.               -No  atypical hyperplasia, in situ nor invasive carcinoma identified.     3.  Right breast, 8:00, MRI-guided biopsies for linear enhancement: HIGH GRADE DUCTAL CARCINOMA IN SITU (DCIS).  -Architectural patterns: Solid, comedo and clinging with lobular extension, central comedonecrosis and associated microcalcifications.               -DCIS involves multiple tissue cores measuring up to 3 mm.               -Sclerosing adenosis and usual ductal hyperplasia with associated microcalcifications.               -No evidence of invasive carcinoma identified.     SWM   Electronically signed by Shyann Hilario MD on 2/29/2024 at 0940   Synoptic Checklist   Breast Biomarker Reporting Template   Protocol posted: 12/13/2023BREAST BIOMARKER REPORTING TEMPLATE - 1  Test(s) Performed     Estrogen Receptor (ER) Status  Positive (greater than 10% of cells demonstrate nuclear positivity)   Percentage of Cells with Nuclear Positivity  %   Average Intensity of Staining  Strong   Test Type  Food and Drug Administration (FDA) cleared (test / vendor): ventana   Primary Antibody  SP1   Scoring System  Domi   Proportion Score  5   Intensity Score  3   Total Domi Score  8   Test(s) Performed     Progesterone Receptor (PgR) Status  Positive   Percentage of Cells with Nuclear Positivity  81-90%   Average Intensity of Staining  Strong   Test Type  Food and Drug Administration (FDA) cleared (test / vendor): ventana   Primary Antibody  1E2   Scoring System  Domi   Proportion Score  5   Intensity Score  3   Total Domi Score  8   Test(s) Performed  Ki-67   Ki-67 Percentage of Positive Nuclei  15 %   Primary Antibody  30-9   Cold Ischemia and Fixation Times  Meet requirements specified in latest version of the ASCO / CAP Guidelines   Cold Ischemia Time (minutes)  41 min   Fixation Time (hours)  9 hours   Testing Performed on Block Number(s)  1A   METHODS   Fixative  Formalin   Image Analysis  Not performed   .      Comment    Multiple  representative slides from parts 1-3 of this case are reviewed internally with Dr. Avery, who concurs.  The patient has a previously diagnosed invasive ductal carcinoma of the right breast ([id number]).  Please refer to that biopsy for receptor studies in the right breast.       Labs:  09/01/2017, Genetics ([facility])  Genetic result: Negative, no clinically significant mutation identified.    Procedures:    Assessment:   Diagnosis Plan   1. Malignant neoplasm of lower-outer quadrant of right breast of female, estrogen receptor negative        2. Ductal carcinoma in situ (DCIS) of right breast        3. Abnormal MRI, breast        4. Ductal carcinoma in situ (DCIS) of left breast        5. FH: breast cancer        6. History of prior cigarette smoking          1-4  RIGHT LOQ 8:00, 7 CFN- 2.5 cm on exam, 2 cm on MRI index lesion (see also below re: enhancement anteriorly over 3.5 cm concerning for DCIS, plus second lesion in the RIGHT breast and lesion seen in the LEFT breast). MRI total enhancement at known biopsy site 4.7cm. Dr Patel cannot rule out additional invasive disease (tumor board)., Bowtie marker  IMC, NST, high grade, 3,3,3, 9.3mm in core,   ER neg IL neg her 2 hemalatha 3+    Clinical stage possibly mT2N0- IIA      MRI abnormalities at time of cancer diagnosis:  1- RIGHT  8:00- anterior to index lesion 3.5cm enhancement- high grade DCIS, solid, comedo, clinging with lobular extension, central comedonecrosis, no invasive- infinity marker good position anterior to the bowtie invasive cancer  2- RIGHT 12:00 3 CFN- 1.2 cm enhancement- sclerosing adenosis with calcifications- buckle marker (the most anterior marker)  3- LEFT middle third, 11:30 4 CFN- 1.3 cm enhancement- intermediate grade DCIS, solid and cribiform, central necrosis, 7mm- stoplight marker, only marker in the breast    3-4  LEFT breast DCIS, 11:30, 4 CFN, see above    5-  2 of 7 sisters with breast cancer, ages 35,45 approximate  My Risk genetic  testing 9-1-17 negative for mutation    6-  Smoked 1/3 ppd or less from age teens until cancer diagnosis      Plan:  The patient goes by Felicia  She was here today with her  Ryan. I met her Daughter in Law Jocelyn at her initial consultation.      We reviewed her interval history, exam, imaging, pathology, and treatment options together today.       We discussed that with bilateral cancers that hse is considering bilateral mastectomy.    In that light, we will arrange for her to talk with plastic surgery.    I showed her pictures of no reconstruction versus reconstruction.   We discussed that we are hopeful not to need radiation.    We reviewed that we will proceed with LEFT port placement, previously discussed risks of bleeding, infection, clot, pneumothorax, malfunction.    She will see Dr Renteria on the 6th March.    I have arranged for her FU ultrasound in 10 weeks.    We will repeat her MRI at the conclusion of her treatment.  She will see plastics in the interim.      We previously reviewed the differences in systemic therapy versus locoregional therapy. We discussed the options of neoadjuvant versus adjuvant therapy. We discussed that for larger tumors, node positive tumors, or tumors that are felt to communly be chemosensitive, that neoadjuvant therapy can offer the following benefits: potential tumor reduction that may allow for breast conservation, ability to see in vivo response to chemotherapy, potential in 30% of women to neutralize small volume anibal disease that may allow avoidance of complete axillary dissection.      She is interested in neoadjuvant therapy.     Presented her case at tumor board.    Asked Mayito to schedule with plastics.    Abeba Manrique MD      Today I spent 40 minutes doing the following: Reviewing records, labs, outside imaging and reports in preparation for the patient visit; obtaining medical history; performing the physical exam; counseling and educating the  patient and any available family or caregivers; ordering medications, tests or procedures; coordinating care with any other physicians on her care team as needed, and documenting all of the above in the medical record as well as sending communications with her other healthcare professionals.      Next Appointment:  Return for surgery for port placement.      EMR Dragon/transcription disclaimer:    Please note that portions of this note were completed with a voice recognition program.

## 2024-02-16 ENCOUNTER — TELEPHONE (OUTPATIENT)
Dept: SURGERY | Facility: CLINIC | Age: 69
End: 2024-02-16
Payer: COMMERCIAL

## 2024-02-16 ENCOUNTER — PATIENT OUTREACH (OUTPATIENT)
Dept: OTHER | Facility: HOSPITAL | Age: 69
End: 2024-02-16
Payer: COMMERCIAL

## 2024-02-16 DIAGNOSIS — C50.411 MALIGNANT NEOPLASM OF UPPER-OUTER QUADRANT OF RIGHT BREAST IN FEMALE, ESTROGEN RECEPTOR NEGATIVE: Primary | ICD-10-CM

## 2024-02-16 DIAGNOSIS — Z17.1 MALIGNANT NEOPLASM OF UPPER-OUTER QUADRANT OF RIGHT BREAST IN FEMALE, ESTROGEN RECEPTOR NEGATIVE: Primary | ICD-10-CM

## 2024-02-16 PROBLEM — C50.511 BREAST CANCER OF LOWER-OUTER QUADRANT OF RIGHT FEMALE BREAST: Status: ACTIVE | Noted: 2024-02-14

## 2024-02-26 ENCOUNTER — HOSPITAL ENCOUNTER (OUTPATIENT)
Dept: MRI IMAGING | Facility: HOSPITAL | Age: 69
Discharge: HOME | End: 2024-02-26
Payer: COMMERCIAL

## 2024-02-26 ENCOUNTER — HOSPITAL ENCOUNTER (OUTPATIENT)
Dept: MAMMOGRAPHY | Facility: HOSPITAL | Age: 69
Discharge: HOME | End: 2024-02-26
Payer: COMMERCIAL

## 2024-02-26 ENCOUNTER — PATIENT OUTREACH (OUTPATIENT)
Dept: OTHER | Facility: HOSPITAL | Age: 69
End: 2024-02-26
Payer: COMMERCIAL

## 2024-02-26 VITALS
HEIGHT: 59 IN | TEMPERATURE: 97.7 F | HEART RATE: 80 BPM | BODY MASS INDEX: 23.59 KG/M2 | OXYGEN SATURATION: 98 % | RESPIRATION RATE: 16 BRPM | DIASTOLIC BLOOD PRESSURE: 88 MMHG | WEIGHT: 117 LBS | SYSTOLIC BLOOD PRESSURE: 155 MMHG

## 2024-02-26 DIAGNOSIS — Z17.1 MALIGNANT NEOPLASM OF UPPER-OUTER QUADRANT OF RIGHT BREAST IN FEMALE, ESTROGEN RECEPTOR NEGATIVE: ICD-10-CM

## 2024-02-26 DIAGNOSIS — C50.411 MALIGNANT NEOPLASM OF UPPER-OUTER QUADRANT OF RIGHT BREAST IN FEMALE, ESTROGEN RECEPTOR NEGATIVE: ICD-10-CM

## 2024-02-26 LAB — CREAT BLDA-MCNC: 0.8 MG/DL (ref 0.6–1.3)

## 2024-02-26 PROCEDURE — 88305 TISSUE EXAM BY PATHOLOGIST: CPT | Performed by: SURGERY

## 2024-02-26 PROCEDURE — 88360 TUMOR IMMUNOHISTOCHEM/MANUAL: CPT | Performed by: SURGERY

## 2024-02-26 PROCEDURE — 0 GADOBENATE DIMEGLUMINE 529 MG/ML SOLUTION: Performed by: SURGERY

## 2024-02-26 PROCEDURE — 25010000002 LIDOCAINE 1 % SOLUTION: Performed by: SURGERY

## 2024-02-26 PROCEDURE — A4648 IMPLANTABLE TISSUE MARKER: HCPCS

## 2024-02-26 PROCEDURE — 88341 IMHCHEM/IMCYTCHM EA ADD ANTB: CPT | Performed by: SURGERY

## 2024-02-26 PROCEDURE — 77066 DX MAMMO INCL CAD BI: CPT

## 2024-02-26 PROCEDURE — 82565 ASSAY OF CREATININE: CPT

## 2024-02-26 PROCEDURE — C1894 INTRO/SHEATH, NON-LASER: HCPCS

## 2024-02-26 PROCEDURE — A9577 INJ MULTIHANCE: HCPCS | Performed by: SURGERY

## 2024-02-26 PROCEDURE — 88342 IMHCHEM/IMCYTCHM 1ST ANTB: CPT | Performed by: SURGERY

## 2024-02-26 RX ORDER — LIDOCAINE HYDROCHLORIDE 10 MG/ML
1 INJECTION, SOLUTION INFILTRATION; PERINEURAL ONCE
Status: COMPLETED | OUTPATIENT
Start: 2024-02-26 | End: 2024-02-26

## 2024-02-26 RX ORDER — DIAZEPAM 5 MG/1
5 TABLET ORAL ONCE AS NEEDED
Status: COMPLETED | OUTPATIENT
Start: 2024-02-26 | End: 2024-02-26

## 2024-02-26 RX ADMIN — LIDOCAINE HYDROCHLORIDE 15 ML: 10; .005 INJECTION, SOLUTION EPIDURAL; INFILTRATION; INTRACAUDAL; PERINEURAL at 08:51

## 2024-02-26 RX ADMIN — GADOBENATE DIMEGLUMINE 10 ML: 529 INJECTION, SOLUTION INTRAVENOUS at 08:32

## 2024-02-26 RX ADMIN — LIDOCAINE HYDROCHLORIDE 1 ML: 10 INJECTION, SOLUTION INFILTRATION; PERINEURAL at 08:54

## 2024-02-26 RX ADMIN — LIDOCAINE HYDROCHLORIDE 1 ML: 10 INJECTION, SOLUTION INFILTRATION; PERINEURAL at 08:53

## 2024-02-26 RX ADMIN — LIDOCAINE HYDROCHLORIDE 1 ML: 10 INJECTION, SOLUTION INFILTRATION; PERINEURAL at 08:50

## 2024-02-26 RX ADMIN — DIAZEPAM 5 MG: 5 TABLET ORAL at 08:00

## 2024-02-26 RX ADMIN — LIDOCAINE HYDROCHLORIDE 15 ML: 10; .005 INJECTION, SOLUTION EPIDURAL; INFILTRATION; INTRACAUDAL; PERINEURAL at 08:55

## 2024-02-26 RX ADMIN — LIDOCAINE HYDROCHLORIDE 17 ML: 10; .005 INJECTION, SOLUTION EPIDURAL; INFILTRATION; INTRACAUDAL; PERINEURAL at 08:55

## 2024-02-26 NOTE — PROGRESS NOTES
Ms. Boyle called and LVM. Returned called and LVM          Ms. Boyle called back and voiced concern with finances and made referral to Michael. Also gave patient Michael's phone number to contact. She was thankful for the help.

## 2024-02-26 NOTE — NURSING NOTE
Biopsy site to left outer breast and two sites to right outer breast all three clear with Dermabond dry and intact. No firmness or swelling noted at or around right breast biopsy sites. 5 x 5 cm hematoma formed to left breast after mammogram images taken. Ace bandage wrapped around patient. Instructions provided. Sports bra placed over top of ace bandage for further support. 10/10 pain right breast (this breast had previously been biopsied 1/31/24 and was still sore from this). 7/10 pain left breast. Ice pack with protective covering applied to biopsy site. Relief noted with ice applied directly to sites. Discharge instructions discussed with understanding voiced by patient. Copies provided to patient. No distress noted. This RN walked with patient to Patient Discharge to meet  with car.

## 2024-02-29 LAB
LAB AP CASE REPORT: NORMAL
LAB AP DIAGNOSIS COMMENT: NORMAL
LAB AP SPECIAL STAINS: NORMAL
LAB AP SYNOPTIC CHECKLIST: NORMAL
PATH REPORT.FINAL DX SPEC: NORMAL
PATH REPORT.GROSS SPEC: NORMAL

## 2024-03-04 ENCOUNTER — TELEPHONE (OUTPATIENT)
Dept: SURGERY | Facility: CLINIC | Age: 69
End: 2024-03-04

## 2024-03-04 ENCOUNTER — PATIENT OUTREACH (OUTPATIENT)
Dept: OTHER | Facility: HOSPITAL | Age: 69
End: 2024-03-04
Payer: COMMERCIAL

## 2024-03-04 ENCOUNTER — OFFICE VISIT (OUTPATIENT)
Dept: SURGERY | Facility: CLINIC | Age: 69
End: 2024-03-04
Payer: COMMERCIAL

## 2024-03-04 VITALS
WEIGHT: 118 LBS | HEART RATE: 81 BPM | HEIGHT: 59 IN | DIASTOLIC BLOOD PRESSURE: 78 MMHG | BODY MASS INDEX: 23.79 KG/M2 | SYSTOLIC BLOOD PRESSURE: 138 MMHG | OXYGEN SATURATION: 96 %

## 2024-03-04 DIAGNOSIS — D05.12 DUCTAL CARCINOMA IN SITU (DCIS) OF LEFT BREAST: ICD-10-CM

## 2024-03-04 DIAGNOSIS — R92.8 ABNORMAL MRI, BREAST: ICD-10-CM

## 2024-03-04 DIAGNOSIS — D05.11 DUCTAL CARCINOMA IN SITU (DCIS) OF RIGHT BREAST: ICD-10-CM

## 2024-03-04 DIAGNOSIS — C50.511 MALIGNANT NEOPLASM OF LOWER-OUTER QUADRANT OF RIGHT BREAST OF FEMALE, ESTROGEN RECEPTOR NEGATIVE: Primary | ICD-10-CM

## 2024-03-04 DIAGNOSIS — Z80.3 FH: BREAST CANCER: ICD-10-CM

## 2024-03-04 DIAGNOSIS — Z17.1 MALIGNANT NEOPLASM OF LOWER-OUTER QUADRANT OF RIGHT BREAST OF FEMALE, ESTROGEN RECEPTOR NEGATIVE: Primary | ICD-10-CM

## 2024-03-04 DIAGNOSIS — Z87.891 HISTORY OF PRIOR CIGARETTE SMOKING: ICD-10-CM

## 2024-03-04 PROCEDURE — 99215 OFFICE O/P EST HI 40 MIN: CPT | Performed by: SURGERY

## 2024-03-04 NOTE — PROGRESS NOTES
Chief Complaint: Felicia Boyle is a 68 y.o. female who was seen in consultation at the request of Tristan Maolne DO  for newly diagnosed breast cancer and a followup visit    History of Present Illness:  Patient presents with newly diagnosed breast cancer.  She noted a new RIGHT breast mass lower outer in 2023. She noted no other new masses, skin changes, nipple discharge, nipple changes prior to her most recent imaging.    Her most recent imaging includes the followin2019, MMG Screening [place] Bilateral ([facility])  Scattered fibroglandular density.  BI-RADS 0    2019, [facility] Diagnostic Right ([facility])  Resolution of the area of focal asymmetry.  BI-RADS 1: Negative    2024, MM[facility] Diagnostic Digital [place] Bilateral, US Breast Right Limited ([facility])  INDICATION: Right breast palpable lump the patient first identified at  the end of December 3 to 4 weeks ago. The patient reports 2 sisters  with a history of breast cancer, diagnosed age 38 and 48.     The breasts are heterogeneously dense   Right breast: There is a triangular skin marker area of palpable concern, 8 cm from the nipple. The marker overlies an oval high density mass margins measuring 1.7  x 1.4 x 1.6 cm. Lymph nodes included in the right axilla have a normal mammographic appearance.    Ultrasound  8 o'clock 8 cm from the nipple solid mass 1.7 x 1.0 x 1.3 cm.   Ultrasound of the right axilla was performed with no abnormally enlarged lymph nodes.    BI-RADS 4c: Suspicious        She had a biopsy on the following day that showed:   2024, US Guided Breast Biopsy ([facility])  Right breast 8 o'clock 8 cm from the nipple. 10-gauge mammotome multiple tissue specimens bowtie shaped metallic clip was placed.    Post-biopsy mammography demonstrates bowtie shaped metallic clip centrally within a mass in the posterior one third lower outer quadrant of the right breast. No evidence for clip migration.    The pathology is concordant.      2024,  Surgical pathology report ([facility])    1.  Breast, right, 8:00, 8 cm from nipple, biopsy: Invasive ductal adenocarcinoma                -A.  The tumor is [place] grade III (tubular grade 3, nuclear grade 3, mitotic grade 3).               -B.  The tumor measures up to 9.3 mm on the slide               -C.  No definitive lymphovascular invasion or perineural invasion is identified               -D.  Ductal carcinoma in situ is not identified    ER Negative (less than 1%)  [place] Negative (less than 1%)  HER2 positive (Score 3+) 95%  KI-67 70%     I then arranged for a MRi:  2/9/24  [facility] BREAST MRI  SHERRILL BUTLER   Posterior one third lower outer quadrant of the right  breast 8 o'clock, 8 cm from the nipple there is an irregular enhancing mass with an internal focal signal void. The mass measures 2.0 cm in anterior to posterior dimension, 1.8 cm in the superior-inferior dimension and 1.7 cm in the mediolateral dimension. This corresponds to the biopsy-proven malignancy with the centrally located bowtie shaped metallic clip. Extending anteriorly away from the anterior margin of the mass there is irregular linear  enhancement that measures on the order of 3.5 cm in anterior posterior.    There are linear areas of increased density seen mammographically in this region. This is suspicious for additional DCIS. The biopsy-proven malignancy and contiguous anterior linear enhancement  measure on the order of 4.7 cm in anterior to posterior dimension, 1.7 cm in the mediolateral dimension and 2.4 cm in the superior to inferior dimension. anterior one third of the right breast at the 12 o'clock position centered on the order of 3 cm from the nipple there is an irregular area  of non-mass enhancement that measures 1.0 cm in superior-inferior dimension, 1.1 cm in anterior to posterior dimension and 1.2 cm in the medial to lateral dimension. No mammographic correlate is appreciated.  No other areas of suspicious enhancement or  morphology are seen in the  right breast. I see no evidence for abnormal skin, nipple or chest wall  enhancement of the right breast and there is no evidence for right  axillary or internal mammary chain adenopathy.  In the middle third of the left breast centered at the 11:30 position on the order of 3.7 cm from the nipple there is an irregular area of non-mass enhancement that measures on the order of 1.3 cm in anterior posterior dimension, 1.1 cm in the superior to inferior dimension and 1.0 cm in the mediolateral dimension. There is suspected architectural distortion. No mammographic correlate is appreciated.  No other areas of abnormal enhancement or morphology are seen within the left breast. No evidence for left axillary or internal mammary chain adenopathy.   IMPRESSION: Biopsy proven malignancy right. The entire mass and linear enhancement measure on the order of 4.7 cm. If breast conservation therapy is desired and biopsy of this region is desired, it is my opinion that this would be best performed under MRI guidance. 2. 1.2 cm irregular area o non mass enhancement in the right breast 12 o'clock position. No mammographic correlate is appreciated. Further evaluation with an MRI guided right breast biopsy should be considered. 3. 1.1 cm irregular area of non mass enhancement seen n middle third of the left breast at th 11:30 position. MRI guided left breast biopsy is recommended. BI-RADS 4 Suspicious abnormality. Biopsy should be considered.       She has not had a breast biopsy in the past.  She has her uterus and ovaries, is postmenopausal, and takes no hormones.    Her family history includes the following:   She has 2 of 7 sisters with breast cancer in their 30s-40s.  Dr Perry sent testing years ago, as follows    09/01/2017, Genetics ([facility])  Genetic result: Negative, no clinically significant mutation identified.    Based on her imaging, we arranged for a bilateral (2X RIGHT and 1 X LEFT) MRI guided  biopsies.      MRI guided biopsy x 3 with 2 being on the right and 1 being on the left dated February 26 2024 [facility]  Right breast 12:00, buckle shaped metallic clip placed.  Sclerosing adenosis, calcifications.  Right breast 8:00, infinity shaped clip placed.  High-grade duct carcinoma in situ, solid, cribriform, clinging, lobular extension, central comedonecrosis.  No invasive carcinoma.       Left breast biopsy was also done:   Left breast MRI guided biopsy 12:00, stoplight shaped metallic clip. 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  No clip migration.  Pathology is all concordant    She reports significant bruising LEFT breast.        Review of Systems:  Review of Systems   Eyes:  Eye problems: cataract surgery 3 weeks ago..   All other systems reviewed and are negative.       Past Medical and Surgical History:  Breast Biopsy History:  Patient had not had a breast biopsy prior to her cancer diagnosis.  Breast Cancer HIstory:  Patient does not have a past medical history of breast cancer.  Breast Operations, and year:  None   Obstetric/Gynecologic History:  Age menstrual periods began: 16  Patient is postmenopausal, entered menopause naturally at age: 49   Number of pregnancies:4  Number of live births: 3  Number of abortions or miscarriages: 1  Age of delivery of first child: 18  Patient breast fed, for the following lenth of time: 3 mons total  Length of time taking birth control pills: none   Patient has never taken hormone replacement    Patient has both ovaries and uterus.   Past Surgical History:   Procedure Laterality Date    BREAST BIOPSY Left     Excisional biopsy in the [facility]    CHOLECYSTECTOMY  2012    COLON SURGERY  2002    COLONOSCOPY N/A 04/28/2021    Procedure: COLONOSCOPY TO ANASTAMOSIS/TI;  Surgeon: Angelo Gonsalez MD;  Location: [facility] ENDOSCOPY;  Service: Gastroenterology;  Laterality: N/A;   "PRE: SCREENING  POST: NORMAL POST-OP ANATOMY    ENDOSCOPY      2013    ENDOSCOPY N/A 03/10/2017    Procedure: ESOPHAGOGASTRODUODENOSCOPY WITH BIOPSY AND PETER DILATATION 54\";  Surgeon: Angelo Gonsalez MD;  Location: [facility] ENDOSCOPY;  Service:     ENDOSCOPY N/A 04/28/2021    Procedure: ESOPHAGOGASTRODUODENOSCOPY WITH BIOPSIES, 54FR PETER DILATATION;  Surgeon: Angelo Gonsalez MD;  Location:  [facility] ENDOSCOPY;  Service: Gastroenterology;  Laterality: N/A;  PRE: DYSPHAGIA  POST: GASTRITIS, ESOPHAGEAL RING    LAPAROSCOPIC CHOLECYSTECTOMY       Past Medical History:   Diagnosis Date    Diabetes mellitus     borderline    Diverticulitis     Mixed hyperlipidemia 05/19/2019    Osteoporosis 03/03/2016    Trichomonal vaginitis 03/23/2020       Prior Hospitalizations, other than for surgery or childbirth, and year:  None     Social History     Socioeconomic History    Marital status:    Tobacco Use    Smoking status: Former     Current packs/day: 0.25     Average packs/day: 0.3 packs/day for 15.0 years (3.8 ttl pk-yrs)     Types: Cigarettes     Passive exposure: Never    Smokeless tobacco: Never    Tobacco comments:     socially   Vaping Use    Vaping status: Never Used   Substance and Sexual Activity    Alcohol use: No    Drug use: No    Sexual activity: Defer     Birth control/protection: None     Patient is .  Patient is retired.  Patient drinks 0-1 servings of caffeine per day.    Family History:  Family History   Problem Relation Age of Onset    Hypertension Mother     Diabetes Mother     Hypertension Father     Breast cancer Sister 48    Breast cancer Sister 38    No Known Problems Brother     No Known Problems Daughter     No Known Problems Son     No Known Problems Maternal Grandmother     No Known Problems Paternal Grandmother     No Known Problems Maternal Grandfather     No Known Problems Paternal Grandfather     Autism Grandson     Colon cancer Neg Hx     Rectal cancer Neg Hx     Endometrial " "cancer Neg Hx     Vaginal cancer Neg Hx     Cervical cancer Neg Hx     Ovarian cancer Neg Hx     Prostate cancer Neg Hx     Uterine cancer Neg Hx     Malig Hyperthermia Neg Hx        Vital Signs:  /78 (BP Location: Left arm, Patient Position: Sitting, Cuff Size: Adult)   Pulse 81   Ht 149.9 cm (59.02\")   Wt 53.5 kg (118 lb)   LMP  (LMP Unknown)   SpO2 96%   BMI 23.82 kg/m²      Medications:    Current Outpatient Medications:     Blood Glucose Monitoring Suppl (FreeStyle Lite) w/Device kit, 1 Units Daily., Disp: 1 kit, Rfl: 0    glucose blood (FREESTYLE LITE) test strip, TEST ONCE DAILY, Disp: 100 each, Rfl: 2    Lancets (freestyle) lancets, Use TO CHECK BLOOD SUGAR ONCE DAILY, Disp: 100 each, Rfl: 2    albuterol sulfate  (90 Base) MCG/ACT inhaler, Inhale 2 puffs Every 4 (Four) Hours As Needed for Wheezing. (Patient not taking: Reported on 2/14/2024), Disp: 18 g, Rfl: 0    HYDROcodone-acetaminophen (NORCO) 5-325 MG per tablet, 1-2 tabs po q 4-6hr prn pain, wean to tylenol (Patient not taking: Reported on 3/4/2024), Disp: 15 tablet, Rfl: 0    ondansetron (ZOFRAN) 4 MG tablet, Take 1 tablet by mouth Every 8 (Eight) Hours As Needed for Nausea or Vomiting. May take 8 mg po q8hr prn is 4mg is not effective (Patient not taking: Reported on 3/4/2024), Disp: 10 tablet, Rfl: 1     Allergies:  Allergies   Allergen Reactions    Metronidazole Nausea And Vomiting       Physical Examination:  /78 (BP Location: Left arm, Patient Position: Sitting, Cuff Size: Adult)   Pulse 81   Ht 149.9 cm (59.02\")   Wt 53.5 kg (118 lb)   LMP  (LMP Unknown)   SpO2 96%   BMI 23.82 kg/m²   General Appearance:  Patient is in no distress.  She is well kept and has an average build.   Psychiatric:  Patient with appropriate mood and affect. Alert and oriented to self, time, and place.    Breast, RIGHT:  medium sized, symmetric with the contralateral side.  Breast skin is without erythema, edema, rashes.  There are no " visible abnormalities upon inspection during the arm-raising maneuver or with hands on hips in the sitting position. There is no nipple retraction, discharge or nipple/areolar skin changes. In the RIGHT breast LOQ there is a 2.5x1.75 cm palpable mass at the area of known malignancy. Fairly superficial, fairly close to the IMF, freely mobile, and no overlying skin changes.There are no  other masses palpable in the sitting or supine positions.    Breast, LEFT:  medium sized, symmetric with the contralateral side.  Breast skin is without erythema, edema, rashes.  There are significant ecchymoses LEFT  central breast from  her MRI guided biopsy that showed DCIS. There are no visible abnormalities upon inspection during the arm-raising maneuver or with hands on hips in the sitting position. There is no nipple retraction, discharge or nipple/areolar skin changes.There are no masses palpable in the sitting or supine positions.    Lymphatic:  There is no axillary, cervical, infraclavicular, or supraclavicular adenopathy bilaterally.  Eyes:  Pupils are round and reactive to light.  Cardiovascular:  Heart rate and rhythm are regular.  Respiratory:  Lungs are clear bilaterally with no crackles or wheezes in any lung field.  Gastrointestinal:  Abdomen is soft, nondistended, and nontender.     Musculoskeletal:  Good strength in all 4 extremities.   There is good range of motion in both shoulders.    Skin:  No new skin lesions or rashes on the skin excluding the breast (see breast exam above).        Imagin2019, MMG Screening [place] Bilateral ([facility])  Scattered fibroglandular density.  BI-RADS 0    2019, MMG Diagnostic Right ([facility])  Resolution of the area of focal asymmetry.  BI-RADS 1: Negative    2024, MMG Diagnostic Digital [place] Bilateral, US Breast Right Limited ([facility])  INDICATION: Right breast palpable lump the patient first identified at  the end of December 3 to 4 weeks ago. The patient reports 2  sisters  with a history of breast cancer, diagnosed age 38 and 48.     The breasts are heterogeneously dense   Right breast: There is a triangular skin marker area of palpable concern, 8 cm from the nipple. The marker overlies an oval high density mass margins measuring 1.7  x 1.4 x 1.6 cm. Lymph nodes included in the right axilla have a normal mammographic appearance.    Ultrasound  8 o'clock 8 cm from the nipple solid mass 1.7 x 1.0 x 1.3 cm.   Ultrasound of the right axilla was performed with no abnormally enlarged lymph nodes.    BI-RADS 4c: Suspicious    2/9/24  [facility] BREAST MRI  SHERRILL BUTLER   Posterior one third lower outer quadrant of the right  breast 8 o'clock, 8 cm from the nipple there is an irregular enhancing mass with an internal focal signal void. The mass measures 2.0 cm in anterior to posterior dimension, 1.8 cm in the superior-inferior dimension and 1.7 cm in the mediolateral dimension. This corresponds to the biopsy-proven malignancy with the centrally located bowtie shaped metallic clip. Extending anteriorly away from the anterior margin of the mass there is irregular linear  enhancement that measures on the order of 3.5 cm in anterior posterior.    There are linear areas of increased density seen mammographically in this region. This is suspicious for additional DCIS. The biopsy-proven malignancy and contiguous anterior linear enhancement  measure on the order of 4.7 cm in anterior to posterior dimension, 1.7 cm in the mediolateral dimension and 2.4 cm in the superior to inferior dimension. anterior one third of the right breast at the 12 o'clock position centered on the order of 3 cm from the nipple there is an irregular area  of non-mass enhancement that measures 1.0 cm in superior-inferior dimension, 1.1 cm in anterior to posterior dimension and 1.2 cm in the medial to lateral dimension. No mammographic correlate is appreciated.  No other areas of suspicious enhancement or morphology are  seen in the  right breast. I see no evidence for abnormal skin, nipple or chest wall  enhancement of the right breast and there is no evidence for right  axillary or internal mammary chain adenopathy.  In the middle third of the left breast centered at the 11:30 position on the order of 3.7 cm from the nipple there is an irregular area of non-mass enhancement that measures on the order of 1.3 cm in anterior posterior dimension, 1.1 cm in the superior to inferior dimension and 1.0 cm in the mediolateral dimension. There is suspected architectural distortion. No mammographic correlate is appreciated.  No other areas of abnormal enhancement or morphology are seen within the left breast. No evidence for left axillary or internal mammary chain adenopathy.   IMPRESSION: Biopsy proven malignancy right. The entire mass and linear enhancement measure on the order of 4.7 cm. If breast conservation therapy is desired and biopsy of this region is desired, it is my opinion that this would be best performed under MRI guidance. 2. 1.2 cm irregular area o non mass enhancement in the right breast 12 o'clock position. No mammographic correlate is appreciated. Further evaluation with an MRI guided right breast biopsy should be considered. 3. 1.1 cm irregular area of non mass enhancement seen n middle third of the left breast at th 11:30 position. MRI guided left breast biopsy is recommended. BI-RADS 4 Suspicious abnormality. Biopsy should be considered.     Pathology:  01/31/2024, US Guided Breast Biopsy ([facility])  Right breast 8 o'clock 8 cm from the nipple. 10-gauge mammotome multiple tissue specimens bowtie shaped metallic clip was placed.    Post-biopsy mammography demonstrates bowtie shaped metallic clip centrally within a mass in the posterior one third lower outer quadrant of the right breast. No evidence for clip migration.    The pathology is concordant.      01/31/2024, Surgical pathology report ([facility])    1.  Breast, right, 8:00,  8 cm from nipple, biopsy: Invasive ductal adenocarcinoma                -A.  The tumor is [place] grade III (tubular grade 3, nuclear grade 3, mitotic grade 3).               -B.  The tumor measures up to 9.3 mm on the slide               -C.  No definitive lymphovascular invasion or perineural invasion is identified               -D.  Ductal carcinoma in situ is not identified    ER Negative (less than 1%)  [place] Negative (less than 1%)  HER2 positive (Score 3+) 95%  KI-67 70%      MRI guided biopsy x 3 with 2 being on the right and 1 being on the left dated February 26 2024 [facility]  Right breast 12:00, buckle shaped metallic clip placed.  Sclerosing adenosis, calcifications.  Right breast 8:00, infinity shaped clip placed.  High-grade duct carcinoma in situ, solid, cribriform, clinging, lobular extension, central comedonecrosis.  No invasive carcinoma.       Left breast biopsy was also done:   Left breast MRI guided biopsy 12:00, stoplight shaped metallic clip. 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  No clip migration.  Pathology is all concordant    Final Diagnosis   1.  Left breast, 12:00, MRI-guided biopsies for non-mass enhancement: INTERMEDIATE GRADE DUCTAL CARCINOMA IN SITU (DCIS) WITH APOCRINE FEATURES.               -Architectural patterns: Solid and cribriform with central necrosis and associated microcalcifications.               -DCIS involves sclerosing adenosis in multiple cores and measures up to 7 mm maximally.               -No evidence of invasion identified.     2.  Right breast, 12:00, MRI-guided biopsies for non-mass enhancement:               -Sclerosing adenosis with associated microcalcifications.               -No atypical hyperplasia, in situ nor invasive carcinoma identified.     3.  Right breast, 8:00, MRI-guided biopsies for linear enhancement: HIGH GRADE DUCTAL CARCINOMA IN SITU  (DCIS).  -Architectural patterns: Solid, comedo and clinging with lobular extension, central comedonecrosis and associated microcalcifications.               -DCIS involves multiple tissue cores measuring up to 3 mm.               -Sclerosing adenosis and usual ductal hyperplasia with associated microcalcifications.               -No evidence of invasive carcinoma identified.     SWM   Electronically signed by Shyann Hilario MD on 2/29/2024 at 0940   Synoptic Checklist   Breast Biomarker Reporting Template   Protocol posted: 12/13/2023BREAST BIOMARKER REPORTING TEMPLATE - 1  Test(s) Performed     Estrogen Receptor (ER) Status  Positive (greater than 10% of cells demonstrate nuclear positivity)   Percentage of Cells with Nuclear Positivity  %   Average Intensity of Staining  Strong   Test Type  Food and Drug Administration (FDA) cleared (test / vendor): ventana   Primary Antibody  SP1   Scoring System  Domi   Proportion Score  5   Intensity Score  3   Total Domi Score  8   Test(s) Performed     Progesterone Receptor (PgR) Status  Positive   Percentage of Cells with Nuclear Positivity  81-90%   Average Intensity of Staining  Strong   Test Type  Food and Drug Administration (FDA) cleared (test / vendor): ventana   Primary Antibody  1E2   Scoring System  Domi   Proportion Score  5   Intensity Score  3   Total Domi Score  8   Test(s) Performed  Ki-67   Ki-67 Percentage of Positive Nuclei  15 %   Primary Antibody  30-9   Cold Ischemia and Fixation Times  Meet requirements specified in latest version of the ASCO / CAP Guidelines   Cold Ischemia Time (minutes)  41 min   Fixation Time (hours)  9 hours   Testing Performed on Block Number(s)  1A   METHODS   Fixative  Formalin   Image Analysis  Not performed   .      Comment    Multiple representative slides from parts 1-3 of this case are reviewed internally with Dr. Avery, who concurs.  The patient has a previously diagnosed invasive ductal carcinoma of  the right breast ([id number]).  Please refer to that biopsy for receptor studies in the right breast.       Labs:  09/01/2017, Genetics ([facility])  Genetic result: Negative, no clinically significant mutation identified.    Procedures:    Assessment:   Diagnosis Plan   1. Malignant neoplasm of lower-outer quadrant of right breast of female, estrogen receptor negative        2. Ductal carcinoma in situ (DCIS) of right breast        3. Abnormal MRI, breast        4. Ductal carcinoma in situ (DCIS) of left breast        5. FH: breast cancer        6. History of prior cigarette smoking          1-4  RIGHT LOQ 8:00, 7 CFN- 2.5 cm on exam, 2 cm on MRI index lesion (see also below re: enhancement anteriorly over 3.5 cm concerning for DCIS, plus second lesion in the RIGHT breast and lesion seen in the LEFT breast). MRI total enhancement at known biopsy site 4.7cm. Dr Patel cannot rule out additional invasive disease (tumor board)., Bowtie marker  IMC, NST, high grade, 3,3,3, 9.3mm in core,   ER neg [place] neg her 2 hemalatha 3+    Clinical stage possibly mT2N0- IIA      MRI abnormalities at time of cancer diagnosis:  1- RIGHT  8:00- anterior to index lesion 3.5cm enhancement- high grade DCIS, solid, comedo, clinging with lobular extension, central comedonecrosis, no invasive- infinity marker good position anterior to the bowtie invasive cancer  2- RIGHT 12:00 3 CFN- 1.2 cm enhancement- sclerosing adenosis with calcifications- buckle marker (the most anterior marker)  3- LEFT middle third, 11:30 4 CFN- 1.3 cm enhancement- intermediate grade DCIS, solid and cribiform, central necrosis, 7mm- stoplight marker, only marker in the breast    3-4  LEFT breast DCIS, 11:30, 4 CFN, see above    5-  2 of 7 sisters with breast cancer, ages 35,45 approximate  My Risk genetic testing 9-1-17 negative for mutation    6-  Smoked 1/3 ppd or less from age teens until cancer diagnosis      Plan:  The patient goes by Felicia  She was here today with her   Ryan. I met her Daughter in Law Jocelyn at her initial consultation.      We reviewed her interval history, exam, imaging, pathology, and treatment options together today.       We discussed that with bilateral cancers that neva is considering bilateral mastectomy.    In that light, we will arrange for her to talk with plastic surgery.    I showed her pictures of no reconstruction versus reconstruction.   We discussed that we are hopeful not to need radiation.    We reviewed that we will proceed with LEFT port placement, previously discussed risks of bleeding, infection, clot, pneumothorax, malfunction.    She will see Dr Renteria on the 6th March.    I have arranged for her FU ultrasound in 10 weeks.    We will repeat her MRI at the conclusion of her treatment.  She will see plastics in the interim.      We previously reviewed the differences in systemic therapy versus locoregional therapy. We discussed the options of neoadjuvant versus adjuvant therapy. We discussed that for larger tumors, node positive tumors, or tumors that are felt to communly be chemosensitive, that neoadjuvant therapy can offer the following benefits: potential tumor reduction that may allow for breast conservation, ability to see in vivo response to chemotherapy, potential in 30% of women to neutralize small volume anibal disease that may allow avoidance of complete axillary dissection.      She is interested in neoadjuvant therapy.     Presented her case at tumor board.    Asked Mayito to schedule with plastics.    Abeba Manrique MD      Today I spent 40 minutes doing the following: Reviewing records, labs, outside imaging and reports in preparation for the patient visit; obtaining medical history; performing the physical exam; counseling and educating the patient and any available family or caregivers; ordering medications, tests or procedures; coordinating care with any other physicians on her care team as needed, and documenting  all of the above in the medical record as well as sending communications with her other healthcare professionals.      Next Appointment:  Return for surgery for port placement.      EMR Dragon/transcription disclaimer:    Please note that portions of this note were completed with a voice recognition program.

## 2024-03-04 NOTE — TELEPHONE ENCOUNTER
MRI guided biopsy x 3 with 2 being on the right and 1 being on the left dated fibber 26 2024 [facility]  Right breast 12:00, buckle shaped metallic clip placed.  Sclerosing adenosis, calcifications.  Right breast 8:00, infinity shaped clip placed.  High-grade duct carcinoma in situ, solid, cribriform, clinging, lobular extension, central comedonecrosis.  No invasive carcinoma.      Left breast biopsy was also done:   Left breast MRI guided biopsy 12:00, stoplight shaped metallic clip. 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      No clip migration.  Pathology is all concordant

## 2024-03-04 NOTE — PROGRESS NOTES
Ms. Boyle called with questions about directions to cancer center. Discussed this and she was thankful for the help.

## 2024-03-06 ENCOUNTER — HOSPITAL ENCOUNTER (OUTPATIENT)
Dept: GENERAL RADIOLOGY | Facility: HOSPITAL | Age: 69
Discharge: HOME | End: 2024-03-06
Payer: COMMERCIAL

## 2024-03-06 ENCOUNTER — CONSULT (OUTPATIENT)
Dept: ONCOLOGY | Facility: CLINIC | Age: 69
End: 2024-03-06
Payer: COMMERCIAL

## 2024-03-06 ENCOUNTER — PRE-ADMISSION TESTING (OUTPATIENT)
Dept: PREADMISSION TESTING | Facility: HOSPITAL | Age: 69
End: 2024-03-06
Payer: COMMERCIAL

## 2024-03-06 ENCOUNTER — LAB (OUTPATIENT)
Dept: LAB | Facility: HOSPITAL | Age: 69
End: 2024-03-06
Payer: COMMERCIAL

## 2024-03-06 VITALS
HEART RATE: 86 BPM | BODY MASS INDEX: 25.48 KG/M2 | HEIGHT: 58 IN | RESPIRATION RATE: 16 BRPM | SYSTOLIC BLOOD PRESSURE: 131 MMHG | DIASTOLIC BLOOD PRESSURE: 77 MMHG | OXYGEN SATURATION: 96 % | TEMPERATURE: 98.4 F | WEIGHT: 121.4 LBS

## 2024-03-06 VITALS
HEART RATE: 74 BPM | TEMPERATURE: 97.5 F | SYSTOLIC BLOOD PRESSURE: 143 MMHG | BODY MASS INDEX: 24.17 KG/M2 | RESPIRATION RATE: 16 BRPM | OXYGEN SATURATION: 95 % | WEIGHT: 119.9 LBS | HEIGHT: 59 IN | DIASTOLIC BLOOD PRESSURE: 88 MMHG

## 2024-03-06 DIAGNOSIS — C50.511 BREAST CANCER OF LOWER-OUTER QUADRANT OF RIGHT FEMALE BREAST: ICD-10-CM

## 2024-03-06 DIAGNOSIS — C50.511 MALIGNANT NEOPLASM OF LOWER-OUTER QUADRANT OF RIGHT BREAST OF FEMALE, ESTROGEN RECEPTOR POSITIVE: Primary | ICD-10-CM

## 2024-03-06 DIAGNOSIS — C50.919 MALIGNANT NEOPLASM OF FEMALE BREAST, UNSPECIFIED ESTROGEN RECEPTOR STATUS, UNSPECIFIED LATERALITY, UNSPECIFIED SITE OF BREAST: Primary | ICD-10-CM

## 2024-03-06 DIAGNOSIS — Z17.0 MALIGNANT NEOPLASM OF LOWER-OUTER QUADRANT OF RIGHT BREAST OF FEMALE, ESTROGEN RECEPTOR POSITIVE: Primary | ICD-10-CM

## 2024-03-06 PROBLEM — Z45.2 ENCOUNTER FOR FITTING AND ADJUSTMENT OF VASCULAR CATHETER: Status: ACTIVE | Noted: 2024-03-06

## 2024-03-06 LAB
ANION GAP SERPL CALCULATED.3IONS-SCNC: 10.3 MMOL/L (ref 5–15)
BASOPHILS # BLD AUTO: 0.05 10*3/MM3 (ref 0–0.2)
BASOPHILS NFR BLD AUTO: 0.8 % (ref 0–1.5)
BUN SERPL-MCNC: 17 MG/DL (ref 8–23)
BUN/CREAT SERPL: 22.4 (ref 7–25)
CALCIUM SPEC-SCNC: 9.7 MG/DL (ref 8.6–10.5)
CHLORIDE SERPL-SCNC: 102 MMOL/L (ref 98–107)
CO2 SERPL-SCNC: 23.7 MMOL/L (ref 22–29)
CREAT SERPL-MCNC: 0.76 MG/DL (ref 0.57–1)
DEPRECATED RDW RBC AUTO: 41.6 FL (ref 37–54)
EGFRCR SERPLBLD CKD-EPI 2021: 84.9 ML/MIN/1.73
EOSINOPHIL # BLD AUTO: 0.07 10*3/MM3 (ref 0–0.4)
EOSINOPHIL NFR BLD AUTO: 1.1 % (ref 0.3–6.2)
ERYTHROCYTE [DISTWIDTH] IN BLOOD BY AUTOMATED COUNT: 12.6 % (ref 12.3–15.4)
GLUCOSE SERPL-MCNC: 183 MG/DL (ref 65–99)
HCT VFR BLD AUTO: 41.7 % (ref 34–46.6)
HGB BLD-MCNC: 13.7 G/DL (ref 12–15.9)
IMM GRANULOCYTES # BLD AUTO: 0.03 10*3/MM3 (ref 0–0.05)
IMM GRANULOCYTES NFR BLD AUTO: 0.5 % (ref 0–0.5)
LYMPHOCYTES # BLD AUTO: 2.41 10*3/MM3 (ref 0.7–3.1)
LYMPHOCYTES NFR BLD AUTO: 36.3 % (ref 19.6–45.3)
MCH RBC QN AUTO: 30 PG (ref 26.6–33)
MCHC RBC AUTO-ENTMCNC: 32.9 G/DL (ref 31.5–35.7)
MCV RBC AUTO: 91.2 FL (ref 79–97)
MONOCYTES # BLD AUTO: 0.57 10*3/MM3 (ref 0.1–0.9)
MONOCYTES NFR BLD AUTO: 8.6 % (ref 5–12)
NEUTROPHILS NFR BLD AUTO: 3.51 10*3/MM3 (ref 1.7–7)
NEUTROPHILS NFR BLD AUTO: 52.7 % (ref 42.7–76)
NRBC BLD AUTO-RTO: 0 /100 WBC (ref 0–0.2)
PLATELET # BLD AUTO: 309 10*3/MM3 (ref 140–450)
PMV BLD AUTO: 10.3 FL (ref 6–12)
POTASSIUM SERPL-SCNC: 4 MMOL/L (ref 3.5–5.2)
QT INTERVAL: 382 MS
QTC INTERVAL: 449 MS
RBC # BLD AUTO: 4.57 10*6/MM3 (ref 3.77–5.28)
SODIUM SERPL-SCNC: 136 MMOL/L (ref 136–145)
WBC NRBC COR # BLD AUTO: 6.64 10*3/MM3 (ref 3.4–10.8)

## 2024-03-06 PROCEDURE — 71046 X-RAY EXAM CHEST 2 VIEWS: CPT

## 2024-03-06 PROCEDURE — 36415 COLL VENOUS BLD VENIPUNCTURE: CPT

## 2024-03-06 PROCEDURE — 80048 BASIC METABOLIC PNL TOTAL CA: CPT

## 2024-03-06 PROCEDURE — 85025 COMPLETE CBC W/AUTO DIFF WBC: CPT

## 2024-03-06 PROCEDURE — 93005 ELECTROCARDIOGRAM TRACING: CPT

## 2024-03-06 RX ORDER — HEPARIN SODIUM (PORCINE) LOCK FLUSH IV SOLN 100 UNIT/ML 100 UNIT/ML
500 SOLUTION INTRAVENOUS AS NEEDED
OUTPATIENT
Start: 2024-03-06

## 2024-03-06 RX ORDER — SODIUM CHLORIDE 0.9 % (FLUSH) 0.9 %
10 SYRINGE (ML) INJECTION AS NEEDED
OUTPATIENT
Start: 2024-03-06

## 2024-03-06 NOTE — DISCHARGE INSTRUCTIONS
Take the following medications the morning of surgery:  NONE    Arrive to hospital on your day of surgery at 9:00 AM.      If you are on prescription narcotic pain medication to control your pain you may also take that medication the morning of surgery.    General Instructions:  Do not eat solid food after midnight the night before surgery.  You may drink clear liquids day of surgery but must stop at least one hour before your hospital arrival time.  It is beneficial for you to have a clear drink that contains carbohydrates the day of surgery.  We suggest a 12 to 20 ounce bottle of Gatorade or Powerade for non-diabetic patients or a 12 to 20 ounce bottle of G2 or Powerade Zero for diabetic patients. (Pediatric patients, are not advised to drink a 12 to 20 ounce carbohydrate drink)    Clear liquids are liquids you can see through.  Nothing red in color.     Plain water                               Sports drinks  Sodas                                   Gelatin (Jell-O)  Fruit juices without pulp such as white grape juice and apple juice  Popsicles that contain no fruit or yogurt  Tea or coffee (no cream or milk added)  Gatorade / Powerade  G2 / Powerade Zero    Infants may have breast milk up to four hours before surgery.  Infants drinking formula may drink formula up to six hours before surgery.   Patients who avoid smoking, chewing tobacco and alcohol for 4 weeks prior to surgery have a reduced risk of post-operative complications.  Quit smoking as many days before surgery as you can.  Do not smoke, use chewing tobacco or drink alcohol the day of surgery.   If applicable bring your C-PAP/ BI-PAP machine in with you to preop day of surgery.  Bring any papers given to you in the doctor’s office.  Wear clean comfortable clothes.  Do not wear contact lenses, false eyelashes or make-up.  Bring a case for your glasses.   Bring crutches or walker if applicable.  Remove all piercings.  Leave jewelry and any other valuables  at home.  Hair extensions with metal clips must be removed prior to surgery.  The Pre-Admission Testing nurse will instruct you to bring medications if unable to obtain an accurate list in Pre-Admission Testing.        If you were given a blood bank ID arm band remember to bring it with you the day of surgery.    Preventing a Surgical Site Infection:  For 2 to 3 days before surgery, avoid shaving with a razor because the razor can irritate skin and make it easier to develop an infection.    Any areas of open skin can increase the risk of a post-operative wound infection by allowing bacteria to enter and travel throughout the body.  Notify your surgeon if you have any skin wounds / rashes even if it is not near the expected surgical site.  The area will need assessed to determine if surgery should be delayed until it is healed.  The night prior to surgery shower using a fresh bar of anti-bacterial soap (such as Dial) and clean washcloth.  Sleep in a clean bed with clean clothing.  Do not allow pets to sleep with you.  Shower on the morning of surgery using a fresh bar of anti-bacterial soap (such as Dial) and clean washcloth.  Dry with a clean towel and dress in clean clothing.  Ask your surgeon if you will be receiving antibiotics prior to surgery.  Make sure you, your family, and all healthcare providers clean their hands with soap and water or an alcohol based hand  before caring for you or your wound.    Day of surgery:  Your arrival time is approximately two hours before your scheduled surgery time.  Upon arrival, a Pre-op nurse and Anesthesiologist will review your health history, obtain vital signs, and answer questions you may have.  The only belongings needed at this time will be a list of your home medications and if applicable your C-PAP/BI-PAP machine.  A Pre-op nurse will start an IV and you may receive medication in preparation for surgery, including something to help you relax.     Please be  aware that surgery does come with discomfort.  We want to make every effort to control your discomfort so please discuss any uncontrolled symptoms with your nurse.   Your doctor will most likely have prescribed pain medications.      If you are going home after surgery you will receive individualized written care instructions before being discharged.  A responsible adult must drive you to and from the hospital on the day of your surgery and ideally stay with you through the night.   .  Discharge prescriptions can be filled by the hospital pharmacy during regular pharmacy hours.  If you are having surgery late in the day/evening your prescription may be e-prescribed to your pharmacy.  Please verify your pharmacy hours or chose a 24 hour pharmacy to avoid not having access to your prescription because your pharmacy has closed for the day.    If you are staying overnight following surgery, you will be transported to your hospital room following the recovery period.  [facility] has all private rooms.    If you have any questions please call Pre-Admission Testing at [phone].  Deductibles and co-payments are collected on the day of service. Please be prepared to pay the required co-pay, deductible or deposit on the day of service as defined by your plan.    Call your surgeon immediately if you experience any of the following symptoms:  Sore Throat  Shortness of Breath or difficulty breathing  Cough  Chills  Body soreness or muscle pain  Headache  Fever  New loss of taste or smell  Do not arrive for your surgery ill.  Your procedure will need to be rescheduled to another time.  You will need to call your physician before the day of surgery to avoid any unnecessary exposure to hospital staff as well as other patients.

## 2024-03-06 NOTE — PROGRESS NOTES
Subjective   Felicia Boyle is a 68 y.o. female.  Referred by Dr. Manrique for right breast invasive ductal carcinoma, HER2 positive, left breast ductal carcinoma in situ    History of Present Illness     Ms. Boyle is a 68-year-old postmenopausal [place] lady who is fairly healthy without any significant comorbidities palpated of right breast mass in the latter part of  which was further worked up with diagnostic mammogram and ultrasound and subsequently a biopsy which confirmed invasive ductal carcinoma which was ER/LA negative and HER2 positive.    Family history significant for her 2 sisters with breast cancer at the age of 38 and 48 respectively.  Her older sister  of metastatic breast cancer but her younger sister is doing well.  Patient underwent genetic testing in 2017 which was negative due to her family history.    She had regular screening mammograms up until 2019 but subsequently stopped having annual gynecological visits and therefore did not have any further screening mammograms up until the most recent diagnostic mammogram for the new palpable abnormality.    2024-bilateral diagnostic mammogram and right breast ultrasound  The breast heterogeneously dense.  Left breast with a scar marker in the lower left breast dating back to .  No new findings in the left breast.    Right breast at 8:00, 8 cm from the nipple there is a 1.7 x 1.4 x 1.6 cm oval high density mass with partially circumscribed and partially indistinct margins.    On the ultrasound the mass measures 1.7 x 1 x 1.3 cm.    No abnormal right axilla lymphadenopathy.    2024-right breast ultrasound-guided biopsy  Pathology consistent with invasive ductal carcinoma  Grade 3  9.3 mm of invasive disease on the core biopsy  ER negative  LA negative  HER2 3+ on immunohistochemistry, 95%  Ki-67 70%    2024-bilateral breast MRI  In the right breast at 8:00, 8 cm from the nipple there is a irregular enhancing mass which  measures 1.8 x 2 x 1.7 cm.  This corresponds to the biopsy-proven malignancy.  Extending anteriorly away from the MT margin of the mass there is an irregular linear enhancement which measures 3.5 x 1 x 0.7 cm.  The whole area including the mass measures 4.7 cm in AP dimension, 1.7 in mediolateral and 2.4 cm in the superior-inferior dimension.    At 12:00, 3 cm from the nipple there is an irregular non-mass enhancement which measures 1 x 1.1 x 1.2 cm.  No mammographic correlate is appreciated.    No other suspicious areas of enhancement in the right breast or right axilla or internal mammary chain.    In the left breast, middle third at 11:30 position, 3.7 cm from the nipple there is an irregular area of non-mass enhancement which measures 1.3 x 1.1 x 1 cm.  There is suspected architectural distortion.  No other areas of abnormal enhancement seen in the left breast of the left axilla or the internal mammary chain.    2/26/2024-additional MRI guided biopsies    1.left breast 12:00-intermediate grade ductal carcinoma in situ with apocrine features  ER +91 to 100% strong  ND +81 to 90% strong  Ki-67 15%    2. Right breast 12 o'clock position MRI guided biopsy of the non-mass enhancement-sclerosing adenosis with associated calcifications  No atypical hyperplasia in situ or invasive carcinoma    3.right breast 8:00 MRI guided biopsies of the linear enhancement is consistent with high-grade ductal carcinoma in situ  DCIS involves multiple tissue cores measuring up to 3 mm.  Sclerosing adenosis and usual ductal hyperplasia with associated microcalcifications.    The case was discussed in tumor board and Dr. Patel thought that they could be more invasive disease in the 4.7 cm of linear enhancement noted in the right breast at the site of the mass.  Therefore it was decided to proceed with neoadjuvant chemotherapy.    Patient was a former smoker and smoked for about 40 years, half a pack per week, quit about 1 month ago.   "Denies any alcohol use.    She denies any new bone pains, cough, abdominal pain nausea vomiting.  She has extensive bruising from the additional MRI guided breast biopsies which she underwent in February 2024.  She is complaining of some breast discomfort from this.      The following portions of the patient's history were reviewed and updated as appropriate: allergies, current medications, past family history, past medical history, past social history, past surgical history, and problem list.    Past Medical History:   Diagnosis Date    Diabetes mellitus     borderline    Diverticulitis     Encounter for fitting and adjustment of vascular catheter 3/6/2024    Mixed hyperlipidemia 05/19/2019    Osteoporosis 03/03/2016    Trichomonal vaginitis 03/23/2020        Past Surgical History:   Procedure Laterality Date    BREAST BIOPSY Left     Excisional biopsy in the [facility]    CHOLECYSTECTOMY  2012    COLON SURGERY  2002    COLONOSCOPY N/A 04/28/2021    Procedure: COLONOSCOPY TO ANASTAMOSIS/TI;  Surgeon: Angelo Gonsalez MD;  Location: [facility] ENDOSCOPY;  Service: Gastroenterology;  Laterality: N/A;  PRE: SCREENING  POST: NORMAL POST-OP ANATOMY    ENDOSCOPY      2013    ENDOSCOPY N/A 03/10/2017    Procedure: ESOPHAGOGASTRODUODENOSCOPY WITH BIOPSY AND PETER DILATATION 54\";  Surgeon: Angelo Gonsalez MD;  Location: [facility] ENDOSCOPY;  Service:     ENDOSCOPY N/A 04/28/2021    Procedure: ESOPHAGOGASTRODUODENOSCOPY WITH BIOPSIES, 54FR PETER DILATATION;  Surgeon: Angelo Gonsalez MD;  Location: [facility] ENDOSCOPY;  Service: Gastroenterology;  Laterality: N/A;  PRE: DYSPHAGIA  POST: GASTRITIS, ESOPHAGEAL RING    LAPAROSCOPIC CHOLECYSTECTOMY          Family History   Problem Relation Age of Onset    Hypertension Mother     Diabetes Mother     Hypertension Father     Breast cancer Sister 48    Breast cancer Sister 38    No Known Problems Brother     No Known Problems Daughter     No Known Problems Son     No Known Problems " Maternal Grandmother     No Known Problems Paternal Grandmother     No Known Problems Maternal Grandfather     No Known Problems Paternal Grandfather     Autism Grandson     Colon cancer Neg Hx     Rectal cancer Neg Hx     Endometrial cancer Neg Hx     Vaginal cancer Neg Hx     Cervical cancer Neg Hx     Ovarian cancer Neg Hx     Prostate cancer Neg Hx     Uterine cancer Neg Hx     Malig Hyperthermia Neg Hx         Social History     Socioeconomic History    Marital status:    Tobacco Use    Smoking status: Former     Current packs/day: 0.25     Average packs/day: 0.3 packs/day for 15.0 years (3.8 ttl pk-yrs)     Types: Cigarettes     Passive exposure: Never    Smokeless tobacco: Never    Tobacco comments:     socially   Vaping Use    Vaping status: Never Used   Substance and Sexual Activity    Alcohol use: No    Drug use: No    Sexual activity: Defer     Birth control/protection: None        OB History          4    Para   3    Term   3       0    AB   1    Living   3         SAB   1    IAB   0    Ectopic   0    Molar   0    Multiple   0    Live Births   3               Age at menarche-16  Age at first live childbirth-18   4 para 3  1  Age at menopause-49  No use of hormone replacement therapy  No use of oral conceptive pills  Breast-feeding for 3 months    Allergies   Allergen Reactions    Metronidazole Nausea And Vomiting            Review of Systems   Constitutional: Negative.    HENT: Negative.     Eyes: Negative.    Respiratory: Negative.     Cardiovascular: Negative.    Gastrointestinal: Negative.    Endocrine: Negative.    Genitourinary: Negative.  Positive for breast pain.   Musculoskeletal: Negative.    Skin: Negative.    Allergic/Immunologic: Negative.    Neurological: Negative.    Hematological: Negative.    Psychiatric/Behavioral: Negative.           Objective   Blood pressure 143/88, pulse 74, temperature 97.5 °F (36.4 °C), temperature source Temporal, resp. rate 16,  "height 149.9 cm (59.02\"), weight 54.4 kg (119 lb 14.4 oz), SpO2 95%, not currently breastfeeding.   Physical Exam  Vitals reviewed.   Constitutional:       Appearance: Normal appearance. She is normal weight.   HENT:      Nose: Nose normal.      Mouth/Throat:      Pharynx: Oropharynx is clear.   Eyes:      Conjunctiva/sclera: Conjunctivae normal.   Cardiovascular:      Rate and Rhythm: Normal rate.   Pulmonary:      Effort: Pulmonary effort is normal.   Abdominal:      General: Abdomen is flat.   Musculoskeletal:         General: Normal range of motion.      Cervical back: Normal range of motion.   Skin:     General: Skin is warm.   Neurological:      General: No focal deficit present.      Mental Status: She is alert and oriented to person, place, and time.   Psychiatric:         Mood and Affect: Mood normal.         Behavior: Behavior normal.         Thought Content: Thought content normal.         Judgment: Judgment normal.       Breast Exam: Right breast on inspection there is bruising at around 9 o'clock position with biopsy site changes.  On palpation there is a palpable mass which measures 2.5 x 2 cm which is fairly superficial at 8:00.   No palpable right axillary lymphadenopathy.    Left breast-extensive bruising from recent MRI guided biopsies.  There is a palpable underlying hematoma which is tender to palpation.  No left axillary lymphadenopathy.    Lab on 03/06/2024   Component Date Value Ref Range Status    WBC 03/06/2024 6.64  3.40 - 10.80 10*3/mm3 Final    RBC 03/06/2024 4.57  3.77 - 5.28 10*6/mm3 Final    Hemoglobin 03/06/2024 13.7  12.0 - 15.9 g/dL Final    Hematocrit 03/06/2024 41.7  34.0 - 46.6 % Final    MCV 03/06/2024 91.2  79.0 - 97.0 fL Final    MCH 03/06/2024 30.0  26.6 - 33.0 pg Final    MCHC 03/06/2024 32.9  31.5 - 35.7 g/dL Final    RDW 03/06/2024 12.6  12.3 - 15.4 % Final    RDW-SD 03/06/2024 41.6  37.0 - 54.0 fl Final    MPV 03/06/2024 10.3  6.0 - 12.0 fL Final    Platelets 03/06/2024 " 309  140 - 450 10*3/mm3 Final    Neutrophil % 03/06/2024 52.7  42.7 - 76.0 % Final    Lymphocyte % 03/06/2024 36.3  19.6 - 45.3 % Final    Monocyte % 03/06/2024 8.6  5.0 - 12.0 % Final    Eosinophil % 03/06/2024 1.1  0.3 - 6.2 % Final    Basophil % 03/06/2024 0.8  0.0 - 1.5 % Final    Immature Grans % 03/06/2024 0.5  0.0 - 0.5 % Final    Neutrophils, Absolute 03/06/2024 3.51  1.70 - 7.00 10*3/mm3 Final    Lymphocytes, Absolute 03/06/2024 2.41  0.70 - 3.10 10*3/mm3 Final    Monocytes, Absolute 03/06/2024 0.57  0.10 - 0.90 10*3/mm3 Final    Eosinophils, Absolute 03/06/2024 0.07  0.00 - 0.40 10*3/mm3 Final    Basophils, Absolute 03/06/2024 0.05  0.00 - 0.20 10*3/mm3 Final    Immature Grans, Absolute 03/06/2024 0.03  0.00 - 0.05 10*3/mm3 Final    nRBC 03/06/2024 0.0  0.0 - 0.2 /100 WBC Final   Hospital Outpatient Visit on 02/26/2024   Component Date Value Ref Range Status    Case Report 02/26/2024    Final                    Value:Surgical Pathology Report                         Case: [id number]                                  Authorizing Provider:  Abeba Manrique MD    Collected:           02/26/2024 09:06 AM          Ordering Location:     [facility]  Received:            02/26/2024 10:55 AM                                 MRI                                                                          Pathologist:           Shyann Hilario MD                                                    Specimens:   1) - Breast, Left, Left breast MRI Bx @ 12:00 by Dr Patel, 10 cores, formalin 09:47,                clockface 12:00                                                                                     2) - Breast, Right, Site B, Rt breast BX MRI by Dr Patel @ 0914, 7 cores, formalin                  09:42, clockface 12:00                                                                              3) - Breast, Right, Site A Right Breast Bx MRI by Dr Patel @ 0910, 11 cores, [facility]                                          09:45, clockface 08:00                                                                     Final Diagnosis 02/26/2024    Final                    Value:This result contains rich text formatting which cannot be displayed here.    Synoptic Checklist 02/26/2024    Final                    Value:Breast Biomarker Reporting Template                            BREAST BIOMARKER REPORTING TEMPLATE - 1                            Protocol posted: 12/13/2023                                                           Test(s) Performed:                                     Estrogen Receptor (ER) Status:    Positive (greater than 10% of cells demonstrate nuclear positivity)                                    Percentage of Cells with Nuclear Positivity:    %                                    Average Intensity of Staining:    Strong                                  Test Type:    Food and Drug Administration (FDA) cleared (test / vendor): [employer]ana                                  Primary Antibody:    SP1                                  Scoring System:    Domi                                    Proportion Score:    5                                    Intensity Score:    3                                    Total Domi Score:    8                                Test(s) Performed:                                     Progesterone Receptor (PgR) Status:    Positive                                    Percentage of Cells with Nuclear Positivity:    81-90%                                    Average Intensity of Staining:    Strong                                  Test Type:    Food and Drug Administration (FDA) cleared (test / vendor): [employer]                                  Primary Antibody:    1E2                                  Scoring System:    Domi                                    Proportion Score:    5                                    Intensity Score:    3                                     Total Domi Score:    8                                Test(s) Performed:    Ki-67                                  Ki-67 Percentage of Positive Nuclei:    15 %                                 Primary Antibody:    30-9                                Cold Ischemia and Fixation Times:    Meet requirements specified in latest version of the ASCO / CAP Guidelines                                Cold Ischemia Time (minutes):    41 min                               Fixation Time (hours):    9 hours                               Testing Performed on Block Number(s):    1A                                                         METHODS                               Fixative:    Formalin                                Image Analysis:    Not performed       Comment 02/26/2024    Final                    Value:This result contains rich text formatting which cannot be displayed here.    Gross Description 02/26/2024    Final                    Value:This result contains rich text formatting which cannot be displayed here.    Special Stains 02/26/2024    Final                    Value:This result contains rich text formatting which cannot be displayed here.   Hospital Outpatient Visit on 02/26/2024   Component Date Value Ref Range Status    Case Report 02/26/2024    Final                    Value:Surgical Pathology Report                         Case: [id number]                                  Authorizing Provider:  Abeba Manrique MD    Collected:           02/26/2024 09:06 AM          Ordering Location:     [facility]  Received:            02/26/2024 10:55 AM                                 MRI                                                                          Pathologist:           Shyann Hilario MD                                                    Specimens:   1) - Breast, Left, Left breast MRI Bx @ 12:00 by Dr Patel, 10 cores, formalin 09:47,                clockface 12:00                                                                                      2) - Breast, Right, Site B, Rt breast BX MRI by Dr Patel @ 0914, 7 cores, formalin                  09:42, clockface 12:00                                                                              3) - Breast, Right, Site A Right Breast Bx MRI by Dr Patel @ 0910, 11 cores, foramlin                                         09:45, clockface 08:00                                                                     Final Diagnosis 02/26/2024    Final                    Value:This result contains rich text formatting which cannot be displayed here.    Synoptic Checklist 02/26/2024    Final                    Value:Breast Biomarker Reporting Template                            BREAST BIOMARKER REPORTING TEMPLATE - 1                            Protocol posted: 12/13/2023                                                           Test(s) Performed:                                     Estrogen Receptor (ER) Status:    Positive (greater than 10% of cells demonstrate nuclear positivity)                                    Percentage of Cells with Nuclear Positivity:    %                                    Average Intensity of Staining:    Strong                                  Test Type:    Food and Drug Administration (FDA) cleared (test / vendor): [employer]ana                                  Primary Antibody:    SP1                                  Scoring System:    Domi                                    Proportion Score:    5                                    Intensity Score:    3                                    Total Domi Score:    8                                Test(s) Performed:                                     Progesterone Receptor (PgR) Status:    Positive                                    Percentage of Cells with Nuclear Positivity:    81-90%                                    Average Intensity of Staining:    Strong                                   Test Type:    Food and Drug Administration (FDA) cleared (test / vendor): [employer]ana                                  Primary Antibody:    1E2                                  Scoring System:    Domi                                    Proportion Score:    5                                    Intensity Score:    3                                    Total Domi Score:    8                                Test(s) Performed:    Ki-67                                  Ki-67 Percentage of Positive Nuclei:    15 %                                 Primary Antibody:    30-9                                Cold Ischemia and Fixation Times:    Meet requirements specified in latest version of the ASCO / CAP Guidelines                                Cold Ischemia Time (minutes):    41 min                               Fixation Time (hours):    9 hours                               Testing Performed on Block Number(s):    1A                                                         METHODS                               Fixative:    Formalin                                Image Analysis:    Not performed       Comment 02/26/2024    Final                    Value:This result contains rich text formatting which cannot be displayed here.    Gross Description 02/26/2024    Final                    Value:This result contains rich text formatting which cannot be displayed here.    Special Stains 02/26/2024    Final                    Value:This result contains rich text formatting which cannot be displayed here.   Hospital Outpatient Visit on 02/26/2024   Component Date Value Ref Range Status    Creatinine 02/26/2024 0.80  0.60 - 1.30 mg/dL Final    Serial Number: [id number]:  [id number]    Case Report 02/26/2024    Final                    Value:Surgical Pathology Report                         Case: [id number]                                  Authorizing Provider:  Abeba Manrique MD    Collected:           02/26/2024  09:06 AM          Ordering Location:     [facility]  Received:            02/26/2024 10:55 AM                                 MRI                                                                          Pathologist:           Shyann Hilario MD                                                    Specimens:   1) - Breast, Left, Left breast MRI Bx @ 12:00 by Dr Patel, 10 cores, formalin 09:47,                clockface 12:00                                                                                     2) - Breast, Right, Site B, Rt breast BX MRI by Dr Patel @ 0914, 7 cores, formalin                  09:42, clockface 12:00                                                                              3) - Breast, Right, Site A Right Breast Bx MRI by Dr Patel @ 0910, 11 cores, foramlin                                         09:45, clockface 08:00                                                                     Final Diagnosis 02/26/2024    Final                    Value:This result contains rich text formatting which cannot be displayed here.    Synoptic Checklist 02/26/2024    Final                    Value:Breast Biomarker Reporting Template                            BREAST BIOMARKER REPORTING TEMPLATE - 1                            Protocol posted: 12/13/2023                                                           Test(s) Performed:                                     Estrogen Receptor (ER) Status:    Positive (greater than 10% of cells demonstrate nuclear positivity)                                    Percentage of Cells with Nuclear Positivity:    %                                    Average Intensity of Staining:    Strong                                  Test Type:    Food and Drug Administration (FDA) cleared (test / vendor): [employer]                                  Primary Antibody:    SP1                                  Scoring System:    Domi                                     Proportion Score:    5                                    Intensity Score:    3                                    Total Domi Score:    8                                Test(s) Performed:                                     Progesterone Receptor (PgR) Status:    Positive                                    Percentage of Cells with Nuclear Positivity:    81-90%                                    Average Intensity of Staining:    Strong                                  Test Type:    Food and Drug Administration (FDA) cleared (test / vendor): [employer]                                  Primary Antibody:    1E2                                  Scoring System:    Domi                                    Proportion Score:    5                                    Intensity Score:    3                                    Total Domi Score:    8                                Test(s) Performed:    Ki-67                                  Ki-67 Percentage of Positive Nuclei:    15 %                                 Primary Antibody:    30-9                                Cold Ischemia and Fixation Times:    Meet requirements specified in latest version of the ASCO / CAP Guidelines                                Cold Ischemia Time (minutes):    41 min                               Fixation Time (hours):    9 hours                               Testing Performed on Block Number(s):    1A                                                         METHODS                               Fixative:    Formalin                                Image Analysis:    Not performed       Comment 02/26/2024    Final                    Value:This result contains rich text formatting which cannot be displayed here.    Gross Description 02/26/2024    Final                    Value:This result contains rich text formatting which cannot be displayed here.    Special Stains 02/26/2024    Final                    Value:This result contains rich text  formatting which cannot be displayed here.   Hospital Outpatient Visit on 02/09/2024   Component Date Value Ref Range Status    Creatinine 02/09/2024 0.70  0.60 - 1.30 mg/dL Final    Serial Number: [id number]:  [id number]        MRI Breast Biopsy Initial With & Without Device    Result Date: 3/4/2024  Technically successful MRI guided vacuum assisted bilateral breast biopsy at 3 sites with placement of the metallic clips. The pathology result in the right breast at the 8 o'clock position marked by the Infinity shaped metallic clip has returned as high-grade DCIS and the pathology result in the left breast marked by the stoplight shaped metallic clip at the 12-lead position has returned as intermediate grade DCIS. This is in conjunction with the known malignancy in the posterior one third lower outer quadrant of the right breast marked by the bowtie shaped metallic clip. Surgical follow-up for the bilateral breast malignancies is recommended.  BI-RADS Category 6: Known malignancy.  This report was finalized on 3/4/2024 7:04 AM by Dr. Luis Patel M.D on Workstation: [id number]      MRI Breast Biopsy Each Add With & Without Device    Result Date: 3/4/2024  Technically successful MRI guided vacuum assisted bilateral breast biopsy at 3 sites with placement of the metallic clips. The pathology result in the right breast at the 8 o'clock position marked by the Infinity shaped metallic clip has returned as high-grade DCIS and the pathology result in the left breast marked by the stoplight shaped metallic clip at the 12-lead position has returned as intermediate grade DCIS. This is in conjunction with the known malignancy in the posterior one third lower outer quadrant of the right breast marked by the bowtie shaped metallic clip. Surgical follow-up for the bilateral breast malignancies is recommended.  BI-RADS Category 6: Known malignancy.  This report was finalized on 3/4/2024 7:04 AM by Dr. Luis Patel M.D on  Workstation: [id number]      MRI Breast Biopsy Initial With & Without Device    Result Date: 3/4/2024  Technically successful MRI guided vacuum assisted bilateral breast biopsy at 3 sites with placement of the metallic clips. The pathology result in the right breast at the 8 o'clock position marked by the Infinity shaped metallic clip has returned as high-grade DCIS and the pathology result in the left breast marked by the stoplight shaped metallic clip at the 12-lead position has returned as intermediate grade DCIS. This is in conjunction with the known malignancy in the posterior one third lower outer quadrant of the right breast marked by the bowtie shaped metallic clip. Surgical follow-up for the bilateral breast malignancies is recommended.  BI-RADS Category 6: Known malignancy.  This report was finalized on 3/4/2024 7:04 AM by Dr. Luis Patel M.D on Workstation: [id number]      Mammo Diagnostic Bilateral With CAD    Result Date: 3/4/2024  Technically successful MRI guided vacuum assisted bilateral breast biopsy at 3 sites with placement of the metallic clips. The pathology result in the right breast at the 8 o'clock position marked by the Infinity shaped metallic clip has returned as high-grade DCIS and the pathology result in the left breast marked by the stoplight shaped metallic clip at the 12-lead position has returned as intermediate grade DCIS. This is in conjunction with the known malignancy in the posterior one third lower outer quadrant of the right breast marked by the bowtie shaped metallic clip. Surgical follow-up for the bilateral breast malignancies is recommended.  BI-RADS Category 6: Known malignancy.  This report was finalized on 3/4/2024 7:04 AM by Dr. Luis Patel M.D on Workstation: [id number]      MRI Breast Bilateral With & Without Contrast    Result Date: 2/12/2024  1. Biopsy-proven malignancy in the posterior one third of the right breast that measures on the order of 2 cm in  greatest dimension with the centrally located bowtie shaped metallic clip. There is linear enhancement extends anteriorly away from the mass that is 3.5 cm in length. The entire mass and linear enhancement measure on the order of 4.7 cm in greatest dimension. There are linear areas of increased density seen in the region of the linear enhancement anterior to the biopsy-proven malignancy mammographically on the mammograms dated 01/31/2024 and 01/22/2024. However, if breast conservation therapy is desired and biopsy of this region is desired, it is my opinion that this would be best performed under MRI guidance. No evidence for right axillary adenopathy is appreciated. 2. There is a 1.2 cm irregular area of non-mass enhancement in the right breast at the 12 o'clock position. No mammographic correlate is appreciated. Further evaluation with an MRI guided right breast biopsy should be considered. 3. There is a 1.1 cm irregular area of non-mass enhancement seen in the middle third of the left breast at the 11:30 position. Further evaluation with MRI guided left breast biopsy is recommended.  BI-RADS category 4: Suspicious abnormality. Biopsy should be considered.  This report was finalized on 2/12/2024 4:34 PM by Dr. Luis Patel M.D on Workstation: [id number]          Assessment & Plan       *Right breast invasive ductal carcinoma  Clinical T2 N0 M0, stage IIa, clinical and prognostic ER/KY negative, HER2 3+ immunohistochemistry, Ki-67 70%  Discussed at length the details of imaging and pathology report.Discussed the origin of breast cancer from the ducts and the lobules and the histological type of breast cancer based on site of origin. Discussed the tumor size, lymph node status and stage of the cancer. Explained the presence of DCIS. Discussed the receptor status including ER, KY and her-2 hemalatha and their significance in determining the biology and treatment. Also discussed the importance of grade and ki-67.   On the  MRI the mass measures 2 cm however there is linear enhancement which in the AP dimension measures 4.7 cm.  Additional biopsies of this linear enhancement was performed and consistent with DCIS  It is hard to delineate the amount of invasive disease as opposed to DCIS.  More likely than not invasive component is likely limited to the mass which is about 2-1/2 cm on exam.  Since the tumor is greater than 2 cm I think it is reasonable to proceed with neoadjuvant chemotherapy.  We discussed proceeding with weekly Taxol along with Herceptin and Perjeta every 3 weeks.  After 12 weeks she will require an MRI to assess the response.  If there is inadequate response then we may have to proceed with Adriamycin and Cytoxan for 4 cycles however if there is a good response then she can proceed with surgery with no additional chemotherapy.  Adverse effects of chemotherapy including but not limited to myelosuppression, increased risk of infections, nausea, vomiting, arthralgias, risk of hypersensitivity reactions, neurotoxicity, diarrhea, cardiotoxicity discussed with the patient and her .  They are willing to proceed with chemotherapy.  The likely plan is that she will undergo bilateral mastectomy.  If that is the case as long as she does not have any positive margins or lymph node positive disease she may not need radiation.  We discussed continuing adjuvant HER2 directed therapy for whole year    *Left breast ductal carcinoma in situ  Intermediate grade with apocrine features  ER/OH strongly positive  Plan is for her to undergo bilateral mastectomy.  Patient had questions regarding reconstruction  If she does undergo bilateral mastectomy then that would be curative and she would not require any endocrine therapy subsequently unless there is any upstaging of the malignancy    *Cardiac health  Referral to cardio oncology placed  2D echocardiogram will be obtained    *Follow-up-start first dose of Taxol Herceptin Perjeta on  3/18/2024  MD follow-up on 3/25/2024  Schedule chemotherapy education    A total of 95 minutes spent on the encounter including reviewing the medical records, reviewing the MRI images independently and interpreting them independently, face-to-face time, documentation on the same day.

## 2024-03-12 ENCOUNTER — OFFICE VISIT (OUTPATIENT)
Dept: ONCOLOGY | Facility: CLINIC | Age: 69
End: 2024-03-12
Payer: COMMERCIAL

## 2024-03-12 VITALS
WEIGHT: 121.4 LBS | OXYGEN SATURATION: 95 % | HEIGHT: 58 IN | BODY MASS INDEX: 25.48 KG/M2 | RESPIRATION RATE: 18 BRPM | DIASTOLIC BLOOD PRESSURE: 84 MMHG | HEART RATE: 83 BPM | SYSTOLIC BLOOD PRESSURE: 159 MMHG | TEMPERATURE: 98.2 F

## 2024-03-12 DIAGNOSIS — Z17.0 MALIGNANT NEOPLASM OF LOWER-OUTER QUADRANT OF RIGHT BREAST OF FEMALE, ESTROGEN RECEPTOR POSITIVE: Primary | ICD-10-CM

## 2024-03-12 DIAGNOSIS — C50.511 MALIGNANT NEOPLASM OF LOWER-OUTER QUADRANT OF RIGHT BREAST OF FEMALE, ESTROGEN RECEPTOR POSITIVE: Primary | ICD-10-CM

## 2024-03-12 RX ORDER — DEXAMETHASONE 4 MG/1
TABLET ORAL
Qty: 4 TABLET | Refills: 0 | Status: SHIPPED | OUTPATIENT
Start: 2024-03-12

## 2024-03-12 RX ORDER — ONDANSETRON HYDROCHLORIDE 8 MG/1
8 TABLET, FILM COATED ORAL 3 TIMES DAILY PRN
Qty: 30 TABLET | Refills: 5 | Status: SHIPPED | OUTPATIENT
Start: 2024-03-12

## 2024-03-12 NOTE — PROGRESS NOTES
TREATMENT  PREPARATION    Felicia Boyle  [id number]  1955    Chief Complaint: Treatment preparation and needs assessment    History of present illness:  Felicia Boyle is a 68 y.o. year old female who is here today for treatment preparation and needs assessment.  The patient has been diagnosed with   Encounter Diagnosis   Name Primary?    Malignant neoplasm of lower-outer quadrant of right breast of female, estrogen receptor positive Yes    and is scheduled to begin treatment with:     Oncology History:    Oncology/Hematology History   Breast cancer of lower-outer quadrant of right female breast   2/14/2024 Initial Diagnosis    Breast cancer of lower-outer quadrant of right female breast     3/18/2024 -  Chemotherapy    OP BREAST Pertuzumab / Trastuzumab + (Weekly PACLitaxel x 4 Cycles) Q21D         The current medication list and allergy list were reviewed and reconciled.     Past Medical History, Past Surgical History, Social History, Family History have been reviewed and are without significant changes except as mentioned.    Physical Exam:    Vitals:    03/12/24 1402   BP: 159/84   Pulse: 83   Resp: 18   Temp: 98.2 °F (36.8 °C)   SpO2: 95%     Vitals:    03/12/24 1402   PainSc: 0-No pain        ECOG score: 0             Physical Exam  HENT:      Head: Normocephalic and atraumatic.   Eyes:      Extraocular Movements: Extraocular movements intact.      Conjunctiva/sclera: Conjunctivae normal.   Pulmonary:      Effort: Pulmonary effort is normal. No respiratory distress.   Neurological:      General: No focal deficit present.      Mental Status: She is alert and oriented to person, place, and time.   Psychiatric:         Mood and Affect: Mood normal.         Behavior: Behavior normal.           NEEDS ASSESSMENTS    Genetics  The patient's new diagnosis and family history have been reviewed for genetic counseling needs. The patient will not be referred..     Psychosocial and Barriers to care  The patient has  completed a PHQ-9 Depression Screening and the Distress Thermometer (DT) today.  PHQ-9 results show PHQ-2 Total Score: 0 PHQ-9 Total Score: PHQ-9 Total Score: 0     The patient scored their distress today as Distress Level: 0-No distress on a scale of 0-10 with 0 being no distress and 10 being extreme distress. Problems marked by the patient as being an issue for them within the last week include   .      Results were reviewed along with psychosocial resources offered by our cancer center.  Our Supportive Oncology team will be flagged for a score of 4 or above, and a same day call will be made for a score of 9 or 10.  A mental health referral is offered at that time. Patients who score less than 4 have been educated on our support services and can be referred to our  upon request.  The patient will not be referred to our .       Nutrition  The patient has completed the malnutrition screening today. They scored Malnutrition Screening Tool  Have you recently lost weight without trying?  If yes, how much weight have you lost?: 0--> No  Have you been eating poorly because of a decreased appetite?: 0--> No  MST score: 0   with a score of 0-1 meaning not at risk in a score of 2 or greater meaning at risk.  Patients with a score of 3 or higher will be referred to our oncology dietitian for support. Patients beginning at risk treatment regimens or who have dietary concerns will also be referred to our oncology dietitian. The patient will not be referred.    Functional Assessment  Persons who are age 70 or greater will be screened for qualification of a comprehensive geriatric assessment by our survivorship nurse practitioner.  Older adults with cancer face unique challenges. These may include an increased risk of drug reactions, financial burdens, and caregiver stress. The patient scored   . Patients scoring 14 or lower will referred for an older adult functional assessment with the survivorship  "advanced practice registered nurse to ensure all needed support is provided as patients plan for their treatments. NOT APPLICABLE    Intravenous Access Assessment  The patient and I discussed planned intravenous chemo/biotherapy as well as other IV treatments that are often needed throughout the course of treatment. These may include, but are not limited to blood transfusions, antibiotics, and IV hydration. Discussed that depending on selected treatment and vein assessment, patient may require venous access device (VAD) which could include but not limited to a Mediport or PICC line. Risks and benefits of VADs reviewed. The patient will be treated via Port.    Reproductive/Sexual Activity   People should avoid becoming pregnant and should not get a partner pregnant while undergoing chemo/biotherapy.  People of childbearing age should use effective contraception during active therapy. The best recommendation for all people is to use a barrier method for a minimum of 1 week after the last infusion of chemo/biotherapy to prevent your partner being exposed to byproducts from treatment medications in bodily fluids. Effective contraception should be discussed with your oncology team to make sure it is safe to take based on your diagnosis. Possible options include oral contraceptives, barrier methods. Chemo/biotherapy can change your ability to reproduce children in the future.  There are options for fertility preservation. NOT APPLICABLE    Advanced Care Planning  Advance Care Planning   The patient and I discussed advanced care planning, \"Conversations that Matter\".   This service is offered for development of advance directives with a certified ACP facilitator.  The patient does not have an up-to-date advanced directive. This document is not on file with our office. The patient is not interested in an appointment with one of our facilitators to create or update their advanced directives.    Have you reviewed your Advance " Directive and is it valid for this stay?: Not applicable  Patient Requests Assistance on Advance Directives: Patient Declined          Smoking cessation  Tobacco Use: Medium Risk (3/12/2024)    Patient History     Smoking Tobacco Use: Former     Smokeless Tobacco Use: Never     Passive Exposure: Never       Patient and I discussed their tobacco use history. Referral will not be made for smoking cessation.      Palliative Care  When appropriate, the patient and I discussed the availability palliative care services and when appropriate Hospice care. Palliative care is not the same as Hospice care which was explained to the patient.NOT APPLICABLE.    Survivorship   When appropriate, we discussed that we will refer the patient to survivorship clinic to discuss next steps following completion of planned treatment.  Reviewed this visit will include assessment of your physical, psychological, functional, and spiritual needs as a survivor and the need at attend this visit when scheduled.    TREATMENT EDUCATION    Today I met with the patient to discuss the chemo/biotherapy regimen recommended for treatment of Malignant neoplasm of lower-outer quadrant of right breast of female, estrogen receptor positive  - Provider communication  - ondansetron (ZOFRAN) 8 MG tablet  - dexAMETHasone (DECADRON) 4 MG tablet  .  The patient was given explanation of treatment premed side effects including office policy that prohibits patients to drive if sedating medications are administered, MD explanation given regarding benefits, side effects, toxicities and goals of treatment.  The patient received a Chemotherapy/Biotherapy Plan Summary including diagnosis and explanation of specific treatment plan.    SIDE EFFECTS:  Common side effects were discussed with the patient and/or significant other.  Discussion included where applicable hair loss/discoloration, anemia/fatigue, infection/chills/fever, appetite, bleeding risk/precautions,  constipation, diarrhea, mouth sores, taste alteration, loss of appetite, nausea/vomiting, peripheral neuropathy, skin/nail changes, rash, muscle aches/weakness, photosensitivity, weight gain/loss, hearing loss, dizziness, menopausal symptoms, menstrual irregularity, sterility, high blood pressure, heart damage, liver damage, lung damage, kidney damage, DVT/PE risk, fluid retention, pleural/pericardial effusion, somnolence, electrolyte/LFT imbalance, vein exercises and/or the possible need for vascular access/port placement.  The patient was advised that although uncommon, leakage of an infused medication from the vein or venous access device may lead to skin breakdown and/or other tissue damage.  The patient was advised that he/she may have pain, bleeding, and/or bruising from the insertion of a needle in their vein or venous access device (port).  The patient was further advised that, in spite of proper technique, infection with redness and irritation may rarely occur at the site where the needle was inserted.  The patient was advised that if complications occur, additional medical treatment is available.  Finally, where applicable we have reviewed rare but potential immune mediated side effects including shortness of breath, cough, chest pain (pneumonitis), abdominal pain, diarrhea (colitis), thyroiditis (hypothyroid or hyperthyroid), hepatitis and liver dysfunction, nephritis and renal dysfunction.    Discussion also included side effects specific to drugs in the treatment plan, specifically:    Treatment Plans       Name Type Plan Dates Plan Provider         Active    OP BREAST Pertuzumab / Trastuzumab + (Weekly PACLitaxel x 4 Cycles) Q21D ONCOLOGY TREATMENT  3/17/2024 - Present Agata Renteria MD                      Questions answered and additional information discussed on topics including:  Anemia, Thrombocytopenia, Neutropenia, Nutrition and appetite changes, Constipation, Diarrhea, Nausea & vomiting,  "Mouth sores, Alopecia, Nervous system changes, Pain, Skin & nail changes, [place], and Hand/Foot Cooling       Assessment and Plan:    Diagnoses and all orders for this visit:    1. Malignant neoplasm of lower-outer quadrant of right breast of female, estrogen receptor positive (Primary)  -     Provider communication  -     ondansetron (ZOFRAN) 8 MG tablet; Take 1 tablet by mouth 3 (Three) Times a Day As Needed for Nausea or Vomiting.  Dispense: 30 tablet; Refill: 5  -     dexAMETHasone (DECADRON) 4 MG tablet; Take 2 tablets in the morning and the afternoon the day before first treatment  Dispense: 4 tablet; Refill: 0      Orders Placed This Encounter   Procedures    Provider communication     Go to \"Add Orders\" to place order for test to assess Left Ventricular Ejection Fraction every 3 months while on trastuzumab or pertuzumab.    Associate one of the following ICD-10 codes to LVEF study:  Z01.818 - Examination prior to chemotherapy  or  Z51.11 - Patient on antineoplastic chemotherapy regimen / encounter for chemotherapy management         The patient and I have reviewed their diagnosis and scheduled treatment plan. Needs assessment was completed where applicable including genetics, psychosocial needs, barriers to care, VAD evaluation, advanced care planning, survivorship, and palliative care services where indicated. Referrals have been ordered as appropriate based upon evaluation today and patient desires.   Chemo/biotherapy teaching was completed today and consent obtained. See separate documentation for further details.  Adequate time was given to answer questions.  Patient made aware of their care team members and contact information if they have questions or problems throughout the treatment course.  Discussion held and written information provided describing frequency of office visits and ongoing monitoring throughout the treatment plan.     Reviewed with patient any prescribed medication sent to pharmacy.  " Education provided regarding proper storage, safe handling, and proper disposal of unused medication.  Proper handling of body fluids and waste discussed and written information provided.  If appropriate, patient had pretreatment labs drawn today.    Learning assessment completed at initial patient encounter. See separate flowsheet. Chemo/biotherapy education comprehension assessed at today's visit.    I spent 70 minutes caring for Felicia on this date of service. This time includes time spent by me in the following activities: preparing for the visit, reviewing tests, obtaining and/or reviewing a separately obtained history, performing a medically appropriate examination and/or evaluation, counseling and educating the patient/family/caregiver, ordering medications, tests, or procedures, documenting information in the medical record, independently interpreting results and communicating that information with the patient/family/caregiver, and care coordination.     Erin Mir, APRN   03/12/24

## 2024-03-12 NOTE — PROGRESS NOTES
[facility] Hematology/Oncology Treatment Plan Summary    Name: Felicia Boyle  [facility]# [id number]  MD: Dr. Renteria    Diagnosis: No diagnosis found.  Stage: IIa      Goal of treatment: neoadjuvant    Treatment Medication(s):   Taxol weekly  Herceptin every 3 weeks  Perjeta every 3 weeks    Frequency: as above    Number of cycles: 12 weeks then surgery vs additional chemotherapy; Herceptin and Perjeta continue for 1 year    Starting on: 3/18/24    Repeat after 12 weeks:  MRI breast    Items for home use: Thermometer    Rx written for: [x] Nausea    [] Pre-Treatment   dexamethasone 4mg tabs. 2 tabs twice daily on the day prior to starting paclitaxel  and ondansetron 8 mg by mouth every 8 hours as needed for nausea    Notes:         Completing Provider: DAY Sarmiento           Date/time: 03/12/2024      Please note: You will be seen by a provider frequently with your treatment plan. This plan may change depending on many factors, if so, this will be discussed with you by your physician.  Last update 03/2022.

## 2024-03-13 ENCOUNTER — TELEPHONE (OUTPATIENT)
Dept: SURGERY | Facility: CLINIC | Age: 69
End: 2024-03-13
Payer: COMMERCIAL

## 2024-03-13 NOTE — TELEPHONE ENCOUNTER
Called patient and informed them that per pre op testing they would like the patient to arrive at 8:30 instead of 9 am.   Patient verbalized an understanding of the new arrival time.      [facility]

## 2024-03-14 ENCOUNTER — APPOINTMENT (OUTPATIENT)
Dept: GENERAL RADIOLOGY | Facility: HOSPITAL | Age: 69
End: 2024-03-14
Payer: COMMERCIAL

## 2024-03-14 ENCOUNTER — ANESTHESIA EVENT (OUTPATIENT)
Dept: PERIOP | Facility: HOSPITAL | Age: 69
End: 2024-03-14
Payer: COMMERCIAL

## 2024-03-14 ENCOUNTER — ANESTHESIA (OUTPATIENT)
Dept: PERIOP | Facility: HOSPITAL | Age: 69
End: 2024-03-14
Payer: COMMERCIAL

## 2024-03-14 ENCOUNTER — TELEPHONE (OUTPATIENT)
Dept: ONCOLOGY | Facility: CLINIC | Age: 69
End: 2024-03-14
Payer: COMMERCIAL

## 2024-03-14 ENCOUNTER — HOSPITAL ENCOUNTER (OUTPATIENT)
Facility: HOSPITAL | Age: 69
Setting detail: HOSPITAL OUTPATIENT SURGERY
Discharge: HOME | End: 2024-03-14
Attending: SURGERY | Admitting: SURGERY
Payer: COMMERCIAL

## 2024-03-14 VITALS
HEART RATE: 77 BPM | SYSTOLIC BLOOD PRESSURE: 127 MMHG | OXYGEN SATURATION: 94 % | TEMPERATURE: 97.7 F | RESPIRATION RATE: 16 BRPM | DIASTOLIC BLOOD PRESSURE: 79 MMHG

## 2024-03-14 DIAGNOSIS — C50.511 BREAST CANCER OF LOWER-OUTER QUADRANT OF RIGHT FEMALE BREAST: ICD-10-CM

## 2024-03-14 LAB
GLUCOSE BLDC GLUCOMTR-MCNC: 102 MG/DL (ref 70–130)
GLUCOSE BLDC GLUCOMTR-MCNC: 124 MG/DL (ref 70–130)

## 2024-03-14 PROCEDURE — 25010000002 BUPIVACAINE (PF) 0.25 % SOLUTION 10 ML VIAL: Performed by: SURGERY

## 2024-03-14 PROCEDURE — 25810000003 LACTATED RINGERS PER 1000 ML: Performed by: SURGERY

## 2024-03-14 PROCEDURE — 25010000002 ONDANSETRON PER 1 MG: Performed by: NURSE ANESTHETIST, CERTIFIED REGISTERED

## 2024-03-14 PROCEDURE — 36561 INSERT TUNNELED CV CATH: CPT | Performed by: SURGERY

## 2024-03-14 PROCEDURE — C1788 PORT, INDWELLING, IMP: HCPCS | Performed by: SURGERY

## 2024-03-14 PROCEDURE — 25010000002 GENTAMICIN PER 80 MG: Performed by: SURGERY

## 2024-03-14 PROCEDURE — 25010000002 LIDOCAINE 1 % SOLUTION 20 ML VIAL: Performed by: SURGERY

## 2024-03-14 PROCEDURE — 76000 FLUOROSCOPY <1 HR PHYS/QHP: CPT

## 2024-03-14 PROCEDURE — 25010000002 HYDROMORPHONE PER 4 MG: Performed by: NURSE ANESTHETIST, CERTIFIED REGISTERED

## 2024-03-14 PROCEDURE — 77001 FLUOROGUIDE FOR VEIN DEVICE: CPT | Performed by: SURGERY

## 2024-03-14 PROCEDURE — 25010000002 MIDAZOLAM PER 1 MG: Performed by: STUDENT IN AN ORGANIZED HEALTH CARE EDUCATION/TRAINING PROGRAM

## 2024-03-14 PROCEDURE — 25010000002 DEXAMETHASONE SODIUM PHOSPHATE 20 MG/5ML SOLUTION: Performed by: NURSE ANESTHETIST, CERTIFIED REGISTERED

## 2024-03-14 PROCEDURE — 25010000002 PROPOFOL 200 MG/20ML EMULSION: Performed by: NURSE ANESTHETIST, CERTIFIED REGISTERED

## 2024-03-14 PROCEDURE — 82948 REAGENT STRIP/BLOOD GLUCOSE: CPT

## 2024-03-14 PROCEDURE — 25010000002 FENTANYL CITRATE (PF) 50 MCG/ML SOLUTION: Performed by: NURSE ANESTHETIST, CERTIFIED REGISTERED

## 2024-03-14 PROCEDURE — 25010000002 CEFAZOLIN PER 500 MG: Performed by: SURGERY

## 2024-03-14 PROCEDURE — S0260 H&P FOR SURGERY: HCPCS | Performed by: SURGERY

## 2024-03-14 PROCEDURE — 25010000002 HEPARIN (PORCINE) PER 1000 UNITS: Performed by: SURGERY

## 2024-03-14 DEVICE — POWERPORT CLEARVUE IMPLANTABLE PORT WITH ATTACHABLE 8F POLYURETHANE OPEN-ENDED SINGLE-LUMEN VENOUS CATHETER INTERMEDIATE KIT
Type: IMPLANTABLE DEVICE | Site: CHEST | Status: FUNCTIONAL
Brand: POWERPORT CLEARVUE

## 2024-03-14 RX ORDER — SODIUM CHLORIDE, SODIUM LACTATE, POTASSIUM CHLORIDE, CALCIUM CHLORIDE 600; 310; 30; 20 MG/100ML; MG/100ML; MG/100ML; MG/100ML
9 INJECTION, SOLUTION INTRAVENOUS CONTINUOUS
Status: DISCONTINUED | OUTPATIENT
Start: 2024-03-14 | End: 2024-03-14 | Stop reason: HOSPADM

## 2024-03-14 RX ORDER — FAMOTIDINE 10 MG/ML
20 INJECTION, SOLUTION INTRAVENOUS ONCE
Status: COMPLETED | OUTPATIENT
Start: 2024-03-14 | End: 2024-03-14

## 2024-03-14 RX ORDER — FLUMAZENIL 0.1 MG/ML
0.2 INJECTION INTRAVENOUS AS NEEDED
Status: DISCONTINUED | OUTPATIENT
Start: 2024-03-14 | End: 2024-03-14 | Stop reason: HOSPADM

## 2024-03-14 RX ORDER — DROPERIDOL 2.5 MG/ML
0.62 INJECTION, SOLUTION INTRAMUSCULAR; INTRAVENOUS
Status: DISCONTINUED | OUTPATIENT
Start: 2024-03-14 | End: 2024-03-14 | Stop reason: HOSPADM

## 2024-03-14 RX ORDER — EPHEDRINE SULFATE 50 MG/ML
5 INJECTION, SOLUTION INTRAVENOUS ONCE AS NEEDED
Status: DISCONTINUED | OUTPATIENT
Start: 2024-03-14 | End: 2024-03-14 | Stop reason: HOSPADM

## 2024-03-14 RX ORDER — PROPOFOL 10 MG/ML
INJECTION, EMULSION INTRAVENOUS AS NEEDED
Status: DISCONTINUED | OUTPATIENT
Start: 2024-03-14 | End: 2024-03-14 | Stop reason: SURG

## 2024-03-14 RX ORDER — DEXAMETHASONE SODIUM PHOSPHATE 4 MG/ML
INJECTION, SOLUTION INTRA-ARTICULAR; INTRALESIONAL; INTRAMUSCULAR; INTRAVENOUS; SOFT TISSUE AS NEEDED
Status: DISCONTINUED | OUTPATIENT
Start: 2024-03-14 | End: 2024-03-14 | Stop reason: SURG

## 2024-03-14 RX ORDER — MIDAZOLAM HYDROCHLORIDE 1 MG/ML
0.5 INJECTION INTRAMUSCULAR; INTRAVENOUS
Status: DISCONTINUED | OUTPATIENT
Start: 2024-03-14 | End: 2024-03-14 | Stop reason: HOSPADM

## 2024-03-14 RX ORDER — SODIUM CHLORIDE 0.9 % (FLUSH) 0.9 %
3 SYRINGE (ML) INJECTION EVERY 12 HOURS SCHEDULED
Status: DISCONTINUED | OUTPATIENT
Start: 2024-03-14 | End: 2024-03-14 | Stop reason: HOSPADM

## 2024-03-14 RX ORDER — LIDOCAINE HYDROCHLORIDE 10 MG/ML
0.5 INJECTION, SOLUTION INFILTRATION; PERINEURAL ONCE AS NEEDED
Status: DISCONTINUED | OUTPATIENT
Start: 2024-03-14 | End: 2024-03-14 | Stop reason: HOSPADM

## 2024-03-14 RX ORDER — OXYCODONE AND ACETAMINOPHEN 7.5; 325 MG/1; MG/1
1 TABLET ORAL EVERY 4 HOURS PRN
Status: DISCONTINUED | OUTPATIENT
Start: 2024-03-14 | End: 2024-03-14 | Stop reason: HOSPADM

## 2024-03-14 RX ORDER — HYDROCODONE BITARTRATE AND ACETAMINOPHEN 5; 325 MG/1; MG/1
1 TABLET ORAL ONCE AS NEEDED
Status: DISCONTINUED | OUTPATIENT
Start: 2024-03-14 | End: 2024-03-14 | Stop reason: HOSPADM

## 2024-03-14 RX ORDER — NALOXONE HCL 0.4 MG/ML
0.2 VIAL (ML) INJECTION AS NEEDED
Status: DISCONTINUED | OUTPATIENT
Start: 2024-03-14 | End: 2024-03-14 | Stop reason: HOSPADM

## 2024-03-14 RX ORDER — FENTANYL CITRATE 50 UG/ML
INJECTION, SOLUTION INTRAMUSCULAR; INTRAVENOUS AS NEEDED
Status: DISCONTINUED | OUTPATIENT
Start: 2024-03-14 | End: 2024-03-14 | Stop reason: SURG

## 2024-03-14 RX ORDER — HYDRALAZINE HYDROCHLORIDE 20 MG/ML
5 INJECTION INTRAMUSCULAR; INTRAVENOUS
Status: DISCONTINUED | OUTPATIENT
Start: 2024-03-14 | End: 2024-03-14 | Stop reason: HOSPADM

## 2024-03-14 RX ORDER — PROMETHAZINE HYDROCHLORIDE 25 MG/1
25 TABLET ORAL ONCE AS NEEDED
Status: DISCONTINUED | OUTPATIENT
Start: 2024-03-14 | End: 2024-03-14 | Stop reason: HOSPADM

## 2024-03-14 RX ORDER — PREDNISOLONE ACETATE 10 MG/ML
1 SUSPENSION/ DROPS OPHTHALMIC 4 TIMES DAILY
COMMUNITY
Start: 2024-03-12

## 2024-03-14 RX ORDER — FENTANYL CITRATE 50 UG/ML
50 INJECTION, SOLUTION INTRAMUSCULAR; INTRAVENOUS ONCE AS NEEDED
Status: DISCONTINUED | OUTPATIENT
Start: 2024-03-14 | End: 2024-03-14 | Stop reason: HOSPADM

## 2024-03-14 RX ORDER — IPRATROPIUM BROMIDE AND ALBUTEROL SULFATE 2.5; .5 MG/3ML; MG/3ML
3 SOLUTION RESPIRATORY (INHALATION) ONCE AS NEEDED
Status: DISCONTINUED | OUTPATIENT
Start: 2024-03-14 | End: 2024-03-14 | Stop reason: HOSPADM

## 2024-03-14 RX ORDER — FENTANYL CITRATE 50 UG/ML
50 INJECTION, SOLUTION INTRAMUSCULAR; INTRAVENOUS
Status: DISCONTINUED | OUTPATIENT
Start: 2024-03-14 | End: 2024-03-14 | Stop reason: HOSPADM

## 2024-03-14 RX ORDER — LIDOCAINE HYDROCHLORIDE 20 MG/ML
INJECTION, SOLUTION INFILTRATION; PERINEURAL AS NEEDED
Status: DISCONTINUED | OUTPATIENT
Start: 2024-03-14 | End: 2024-03-14 | Stop reason: SURG

## 2024-03-14 RX ORDER — ONDANSETRON 2 MG/ML
4 INJECTION INTRAMUSCULAR; INTRAVENOUS ONCE AS NEEDED
Status: COMPLETED | OUTPATIENT
Start: 2024-03-14 | End: 2024-03-14

## 2024-03-14 RX ORDER — EPHEDRINE SULFATE 50 MG/ML
INJECTION INTRAVENOUS AS NEEDED
Status: DISCONTINUED | OUTPATIENT
Start: 2024-03-14 | End: 2024-03-14 | Stop reason: SURG

## 2024-03-14 RX ORDER — PROMETHAZINE HYDROCHLORIDE 25 MG/1
25 SUPPOSITORY RECTAL ONCE AS NEEDED
Status: DISCONTINUED | OUTPATIENT
Start: 2024-03-14 | End: 2024-03-14 | Stop reason: HOSPADM

## 2024-03-14 RX ORDER — SODIUM CHLORIDE 0.9 % (FLUSH) 0.9 %
3-10 SYRINGE (ML) INJECTION AS NEEDED
Status: DISCONTINUED | OUTPATIENT
Start: 2024-03-14 | End: 2024-03-14 | Stop reason: HOSPADM

## 2024-03-14 RX ORDER — DIPHENHYDRAMINE HYDROCHLORIDE 50 MG/ML
12.5 INJECTION INTRAMUSCULAR; INTRAVENOUS
Status: DISCONTINUED | OUTPATIENT
Start: 2024-03-14 | End: 2024-03-14 | Stop reason: HOSPADM

## 2024-03-14 RX ORDER — HYDROMORPHONE HYDROCHLORIDE 1 MG/ML
0.5 INJECTION, SOLUTION INTRAMUSCULAR; INTRAVENOUS; SUBCUTANEOUS
Status: DISCONTINUED | OUTPATIENT
Start: 2024-03-14 | End: 2024-03-14 | Stop reason: HOSPADM

## 2024-03-14 RX ORDER — ONDANSETRON 2 MG/ML
INJECTION INTRAMUSCULAR; INTRAVENOUS AS NEEDED
Status: DISCONTINUED | OUTPATIENT
Start: 2024-03-14 | End: 2024-03-14 | Stop reason: SURG

## 2024-03-14 RX ORDER — SODIUM CHLORIDE, SODIUM LACTATE, POTASSIUM CHLORIDE, CALCIUM CHLORIDE 600; 310; 30; 20 MG/100ML; MG/100ML; MG/100ML; MG/100ML
100 INJECTION, SOLUTION INTRAVENOUS CONTINUOUS
Status: DISCONTINUED | OUTPATIENT
Start: 2024-03-14 | End: 2024-03-14 | Stop reason: HOSPADM

## 2024-03-14 RX ORDER — LABETALOL HYDROCHLORIDE 5 MG/ML
5 INJECTION, SOLUTION INTRAVENOUS
Status: DISCONTINUED | OUTPATIENT
Start: 2024-03-14 | End: 2024-03-14 | Stop reason: HOSPADM

## 2024-03-14 RX ADMIN — ONDANSETRON 4 MG: 2 INJECTION INTRAMUSCULAR; INTRAVENOUS at 11:28

## 2024-03-14 RX ADMIN — FENTANYL CITRATE 50 MCG: 50 INJECTION, SOLUTION INTRAMUSCULAR; INTRAVENOUS at 11:33

## 2024-03-14 RX ADMIN — ONDANSETRON 4 MG: 2 INJECTION INTRAMUSCULAR; INTRAVENOUS at 13:25

## 2024-03-14 RX ADMIN — MIDAZOLAM 0.5 MG: 1 INJECTION INTRAMUSCULAR; INTRAVENOUS at 10:42

## 2024-03-14 RX ADMIN — LIDOCAINE HYDROCHLORIDE 60 MG: 20 INJECTION, SOLUTION INFILTRATION; PERINEURAL at 11:15

## 2024-03-14 RX ADMIN — PROPOFOL 170 MG: 10 INJECTION, EMULSION INTRAVENOUS at 11:15

## 2024-03-14 RX ADMIN — SODIUM CHLORIDE 2000 MG: 9 INJECTION, SOLUTION INTRAVENOUS at 10:59

## 2024-03-14 RX ADMIN — FAMOTIDINE 20 MG: 10 INJECTION INTRAVENOUS at 09:46

## 2024-03-14 RX ADMIN — SODIUM CHLORIDE, POTASSIUM CHLORIDE, SODIUM LACTATE AND CALCIUM CHLORIDE 100 ML/HR: 600; 310; 30; 20 INJECTION, SOLUTION INTRAVENOUS at 09:46

## 2024-03-14 RX ADMIN — EPHEDRINE SULFATE 10 MG: 50 INJECTION INTRAVENOUS at 11:43

## 2024-03-14 RX ADMIN — DEXAMETHASONE SODIUM PHOSPHATE 8 MG: 4 INJECTION, SOLUTION INTRAMUSCULAR; INTRAVENOUS at 11:28

## 2024-03-14 RX ADMIN — HYDROMORPHONE HYDROCHLORIDE 0.5 MG: 1 INJECTION, SOLUTION INTRAMUSCULAR; INTRAVENOUS; SUBCUTANEOUS at 12:37

## 2024-03-14 RX ADMIN — OXYCODONE AND ACETAMINOPHEN 1 TABLET: 7.5; 325 TABLET ORAL at 14:00

## 2024-03-14 NOTE — TELEPHONE ENCOUNTER
Caller: Felicia Boyle    Relationship: Self    Best call back number: [phone]    What was the call regarding: PT CALLED TO CHECK STATUS OF BEING SCHEDULED FOR CARDIOLOGIST, HER TREATMENT IS THIS MONDAY AND PER PATIENT DR FLEMING WANTED HER TO SEE CARDIOLOGY BEFORE STARTING TREATMENT       PLEASE CALL PATIENT TO ADVISE    Principal Discharge DX:	Bacteremia   1

## 2024-03-14 NOTE — TELEPHONE ENCOUNTER
Called the patients  to let him know that per Cardiology office they are going to try to get her in for a baseline echo. He did not answer but a v/m was left explaining this. I then tried to call the patient and she did not answer either. She had a port placed today. V/m was left on her number as well explaining this with the office number.

## 2024-03-14 NOTE — TELEPHONE ENCOUNTER
PT CALLED BACK TO VERIFY IF SHE IS TO COME TO HER INFUSION ON MONDAY & IS NERVOUS SINCE SHE HAS NOT RECEIVED A CALL BACK, ASSURED HER SHE WOULD HEAR BACK FROM OFFICE.

## 2024-03-14 NOTE — ANESTHESIA PROCEDURE NOTES
Airway  Urgency: elective    Date/Time: 3/14/2024 11:17 AM  Airway not difficult    General Information and Staff    Patient location during procedure: OR  CRNA/CAA: Tiffanie Rodriguez CRNA    Indications and Patient Condition  Indications for airway management: airway protection    Preoxygenated: yes  Mask difficulty assessment: 1 - vent by mask    Final Airway Details  Final airway type: supraglottic airway      Successful airway: classic  Size 4     Number of attempts at approach: 1  Assessment: lips, teeth, and gum same as pre-op    Additional Comments  Smooth IV induction. LMA inserted with ease. Cuff up. LMA secured BEBS

## 2024-03-14 NOTE — ANESTHESIA POSTPROCEDURE EVALUATION
Patient: Felicia Boyle    Procedure Summary       Date: 03/14/24 Room / Location: [facility] OR [address] [place] [address] MAIN OR    Anesthesia Start: 1107 Anesthesia Stop: 1226    Procedure: INSERTION OF PORTACATH (Left) Diagnosis:       Breast cancer of lower-outer quadrant of right female breast      (Breast cancer of lower-outer quadrant of right female breast [C50.511])    Surgeons: Abeba Manrique MD Provider: Han García MD    Anesthesia Type: general ASA Status: 3            Anesthesia Type: general    Vitals  Vitals Value Taken Time   /86 03/14/24 1410   Temp     Pulse 76 03/14/24 1411   Resp 15 03/14/24 1245   SpO2 96 % 03/14/24 1411   Vitals shown include unfiled device data.        Post Anesthesia Care and Evaluation    Patient location during evaluation: bedside  Patient participation: complete - patient participated  Level of consciousness: awake  Pain management: adequate    Airway patency: patent  Anesthetic complications: No anesthetic complications    Cardiovascular status: acceptable  Respiratory status: acceptable  Hydration status: acceptable    Comments: /79   Pulse 77   Temp 36.5 °C (97.7 °F) (Oral)   Resp 16   LMP  (LMP Unknown)   SpO2 94%

## 2024-03-14 NOTE — TELEPHONE ENCOUNTER
Caller: Ryan Boyle    Relationship: Emergency Contact    Best call back number: ***/***/****    What is the best time to reach you: ***    Who are you requesting to speak with (clinical staff, provider,  specific staff member): ***    Do you know the name of the person who called: ***    What was the call regarding: ***    Is it okay if the provider responds through MyChart: ***

## 2024-03-14 NOTE — OP NOTE
Operative note    Preoperative Diagnosis:  Breast cancer    Postoperative Diagnosis: Breast cancer    Procedure Performed: left cephalic vein cutdown port placement    Dictating physician and surgeon: Abeba Manrique MD    EBL:<10cc    FINDINGS AND DESCRIPTION OF PROCEDURE:       The patient was brought to the operating room and placed on the table in the supine position. After adequate general anesthesia was obtained, we sterilely prepped and draped bilateral anterior chest wall.   We did a time out to identify correct patient and correct operative site.  She did receive IV antibiotic within an hour of and prior to incision.     I made an oblique incision in the deltopectoral groove using a 15 blade and dissected down using bovie electrocautery to open the clavipectoral fascia.   I identified the cephalic vein and dissected this out circumferentially and ligated the proximal portion closest to the arm using a 3-0 silk suture.  I left the distal end patient. I then flushed the port after assembling it, using 10 unit per cc heparainized saline.    I used the following port: 8Fr clear  power port   I made a pocket on the pectoral fascia using bovie electrocautery I then made a venotomy with the 11 blade, and threaded the catheter proximally. This threaded with ease. I then used intraoperative fluoroscopy to ensure that the catheter tip was in good position near the atriocaval junction.    I then secured the catheter in the proximal cephalic vein using the 3-0 silk.    I  I then sewed the port down to the pectoral fascia using 3 x 2-0 prolene sutures.    I then flushed and kumar from the port using heparinized saline 10 unit per cc to ensure that the port worked properly, which it did.    I then irrigated, assured hemostasis and closed the skin in 2 layers. the first being an interrupted 3-0 vicryl layer, followed by a running 4-0 monocryl.    I then applied lengthwise ½-inch steri-strips, 2x2s and a Tegaderm.    She  will have a CXR in the recovery room.    She tolerated the procedure well, there were no immediate complications, and all counts were correct at the end of the case.

## 2024-03-14 NOTE — TELEPHONE ENCOUNTER
Caller: [place] W    Relationship: Emergency Contact    Best call back number: [phone]    What is the best time to reach you: SCHEDULING     Who are you requesting to speak with (clinical staff, provider,  specific staff member): SCHEDULING     What was the call regarding: PT IS SCHEDULED FOR HER CARDIOLOGY APPT ON 3-29 AND [facility] IS STATING DR FLEMING WANTED HER TO HAVE HER HEART CHECKED PRIOR TO STARTING HER CHEMO TREATMENTS    REQUESTING A CALL BACK TO VERIFY THIS CHANGE, WILL THEY NEED TO CONTACT THE OFFICE FOR CARDIOLOGY

## 2024-03-14 NOTE — ANESTHESIA PREPROCEDURE EVALUATION
Anesthesia Evaluation     Patient summary reviewed and Nursing notes reviewed                Airway   Mallampati: II  TM distance: >3 FB  Neck ROM: full  Dental    (+) edentulous    Pulmonary    (+) a smoker (quit 1/2024) Former,  Cardiovascular     ECG reviewed    (+) hyperlipidemia    ROS comment: ·  Left ventricular systolic function is normal. Calculated left ventricular EF = 62%  ·  Left ventricular diastolic function was normal.  ·  Normal stress echo with no significant echocardiographic evidence for myocardial ischemia.      Neuro/Psych  (+) psychiatric history Anxiety and Depression  GI/Hepatic/Renal/Endo    (+) diabetes mellitus    Musculoskeletal     Abdominal    Substance History      OB/GYN          Other      history of cancer active                      Anesthesia Plan    ASA 3     general     (I have reviewed the patient's history with the patient and the chart, including all pertinent laboratory results and imaging. I have explained the risks of anesthesia including but not limited to dental damage, corneal abrasion, nerve injury, MI, stroke, and death. Questions asked and answered. Anesthetic plan discussed with patient and team as indicated. Patient expressed understanding of the above.  )  intravenous induction     Anesthetic plan, risks, benefits, and alternatives have been provided, discussed and informed consent has been obtained with: patient.        CODE STATUS:

## 2024-03-14 NOTE — ADDENDUM NOTE
Addendum  created 03/14/24 1539 by Han García MD    Attestation recorded in Intraprocedure, Intraprocedure Attestations filed

## 2024-03-14 NOTE — H&P
There are no changes in the history or physical exam, aside from any that are listed as an addendum at the bottom of this document.   Today, patient will go for the surgery listed in the plan below.     No changes    For LEFT port for neoadjuvant therapy for RIGHT invasive cancer      Chief Complaint: Felicia Boyle is a 68 y.o. female who was seen in consultation at the request of Tristan Malone DO  for newly diagnosed breast cancer and a followup visit    History of Present Illness:  Patient presents with newly diagnosed breast cancer.  She noted a new RIGHT breast mass lower outer in 2023. She noted no other new masses, skin changes, nipple discharge, nipple changes prior to her most recent imaging.    Her most recent imaging includes the followin2019, MMG Screening [place] Bilateral ([facility])  Scattered fibroglandular density.  BI-RADS 0    2019, MMG Diagnostic Right ([facility])  Resolution of the area of focal asymmetry.  BI-RADS 1: Negative    2024, MMG Diagnostic Digital [place] Bilateral, US Breast Right Limited ([facility])  INDICATION: Right breast palpable lump the patient first identified at  the end of December 3 to 4 weeks ago. The patient reports 2 sisters  with a history of breast cancer, diagnosed age 38 and 48.     The breasts are heterogeneously dense   Right breast: There is a triangular skin marker area of palpable concern, 8 cm from the nipple. The marker overlies an oval high density mass margins measuring 1.7  x 1.4 x 1.6 cm. Lymph nodes included in the right axilla have a normal mammographic appearance.    Ultrasound  8 o'clock 8 cm from the nipple solid mass 1.7 x 1.0 x 1.3 cm.   Ultrasound of the right axilla was performed with no abnormally enlarged lymph nodes.    BI-RADS 4c: Suspicious        She had a biopsy on the following day that showed:   2024, US Guided Breast Biopsy ([facility])  Right breast 8 o'clock 8 cm from the nipple. 10-gauge mammotome multiple tissue specimens  bowtie shaped metallic clip was placed.    Post-biopsy mammography demonstrates bowtie shaped metallic clip centrally within a mass in the posterior one third lower outer quadrant of the right breast. No evidence for clip migration.    The pathology is concordant.      01/31/2024, Surgical pathology report ([facility])    1.  Breast, right, 8:00, 8 cm from nipple, biopsy: Invasive ductal adenocarcinoma                -A.  The tumor is [place] grade III (tubular grade 3, nuclear grade 3, mitotic grade 3).               -B.  The tumor measures up to 9.3 mm on the slide               -C.  No definitive lymphovascular invasion or perineural invasion is identified               -D.  Ductal carcinoma in situ is not identified    ER Negative (less than 1%)  ID Negative (less than 1%)  HER2 positive (Score 3+) 95%  KI-67 70%     I then arranged for a MRi:  2/9/24  [facility] BEATRIZ BREAST MRI  SHERRILL BUTLER   Posterior one third lower outer quadrant of the right  breast 8 o'clock, 8 cm from the nipple there is an irregular enhancing mass with an internal focal signal void. The mass measures 2.0 cm in anterior to posterior dimension, 1.8 cm in the superior-inferior dimension and 1.7 cm in the mediolateral dimension. This corresponds to the biopsy-proven malignancy with the centrally located bowtie shaped metallic clip. Extending anteriorly away from the anterior margin of the mass there is irregular linear  enhancement that measures on the order of 3.5 cm in anterior posterior.    There are linear areas of increased density seen mammographically in this region. This is suspicious for additional DCIS. The biopsy-proven malignancy and contiguous anterior linear enhancement  measure on the order of 4.7 cm in anterior to posterior dimension, 1.7 cm in the mediolateral dimension and 2.4 cm in the superior to inferior dimension. anterior one third of the right breast at the 12 o'clock position centered on the order of 3 cm from the nipple there  is an irregular area  of non-mass enhancement that measures 1.0 cm in superior-inferior dimension, 1.1 cm in anterior to posterior dimension and 1.2 cm in the medial to lateral dimension. No mammographic correlate is appreciated.  No other areas of suspicious enhancement or morphology are seen in the  right breast. I see no evidence for abnormal skin, nipple or chest wall  enhancement of the right breast and there is no evidence for right  axillary or internal mammary chain adenopathy.  In the middle third of the left breast centered at the 11:30 position on the order of 3.7 cm from the nipple there is an irregular area of non-mass enhancement that measures on the order of 1.3 cm in anterior posterior dimension, 1.1 cm in the superior to inferior dimension and 1.0 cm in the mediolateral dimension. There is suspected architectural distortion. No mammographic correlate is appreciated.  No other areas of abnormal enhancement or morphology are seen within the left breast. No evidence for left axillary or internal mammary chain adenopathy.   IMPRESSION: Biopsy proven malignancy right. The entire mass and linear enhancement measure on the order of 4.7 cm. If breast conservation therapy is desired and biopsy of this region is desired, it is my opinion that this would be best performed under MRI guidance. 2. 1.2 cm irregular area o non mass enhancement in the right breast 12 o'clock position. No mammographic correlate is appreciated. Further evaluation with an MRI guided right breast biopsy should be considered. 3. 1.1 cm irregular area of non mass enhancement seen n middle third of the left breast at th 11:30 position. MRI guided left breast biopsy is recommended. BI-RADS 4 Suspicious abnormality. Biopsy should be considered.       She has not had a breast biopsy in the past.  She has her uterus and ovaries, is postmenopausal, and takes no hormones.    Her family history includes the following:   She has 2 of 7 sisters  with breast cancer in their 30s-40s.  Dr Perry sent testing years ago, as follows    09/01/2017, Genetics ([facility])  Genetic result: Negative, no clinically significant mutation identified.    Based on her imaging, we arranged for a bilateral (2X RIGHT and 1 X LEFT) MRI guided biopsies.      MRI guided biopsy x 3 with 2 being on the right and 1 being on the left dated February 26 2024 [facility]  Right breast 12:00, buckle shaped metallic clip placed.  Sclerosing adenosis, calcifications.  Right breast 8:00, infinity shaped clip placed.  High-grade duct carcinoma in situ, solid, cribriform, clinging, lobular extension, central comedonecrosis.  No invasive carcinoma.       Left breast biopsy was also done:   Left breast MRI guided biopsy 12:00, stoplight shaped metallic clip. 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  No clip migration.  Pathology is all concordant    She reports significant bruising LEFT breast.        Review of Systems:  Review of Systems   Eyes:  Eye problems: cataract surgery 3 weeks ago..   All other systems reviewed and are negative.       Past Medical and Surgical History:  Breast Biopsy History:  Patient had not had a breast biopsy prior to her cancer diagnosis.  Breast Cancer HIstory:  Patient does not have a past medical history of breast cancer.  Breast Operations, and year:  None   Obstetric/Gynecologic History:  Age menstrual periods began: 16  Patient is postmenopausal, entered menopause naturally at age: 49   Number of pregnancies:4  Number of live births: 3  Number of abortions or miscarriages: 1  Age of delivery of first child: 18  Patient breast fed, for the following lenth of time: 3 mons total  Length of time taking birth control pills: none   Patient has never taken hormone replacement    Patient has both ovaries and uterus.   Past Surgical History:   Procedure Laterality Date    BREAST BIOPSY  "Left     Excisional biopsy in the [facility]    CHOLECYSTECTOMY  2012    COLON SURGERY  2002    COLONOSCOPY N/A 04/28/2021    Procedure: COLONOSCOPY TO ANASTAMOSIS/TI;  Surgeon: Angelo Gonsalez MD;  Location: [facility] ENDOSCOPY;  Service: Gastroenterology;  Laterality: N/A;  PRE: SCREENING  POST: NORMAL POST-OP ANATOMY    ENDOSCOPY      2013    ENDOSCOPY N/A 03/10/2017    Procedure: ESOPHAGOGASTRODUODENOSCOPY WITH BIOPSY AND PETER DILATATION 54\";  Surgeon: Angelo Gonsalez MD;  Location: [facility] ENDOSCOPY;  Service:     ENDOSCOPY N/A 04/28/2021    Procedure: ESOPHAGOGASTRODUODENOSCOPY WITH BIOPSIES, 54FR PETER DILATATION;  Surgeon: Angelo Gonsalez MD;  Location: [facility] ENDOSCOPY;  Service: Gastroenterology;  Laterality: N/A;  PRE: DYSPHAGIA  POST: GASTRITIS, ESOPHAGEAL RING    LAPAROSCOPIC CHOLECYSTECTOMY       Past Medical History:   Diagnosis Date    Diabetes mellitus     borderline    Diverticulitis     Mixed hyperlipidemia 05/19/2019    Osteoporosis 03/03/2016    Trichomonal vaginitis 03/23/2020       Prior Hospitalizations, other than for surgery or childbirth, and year:  None     Social History     Socioeconomic History    Marital status:    Tobacco Use    Smoking status: Former     Current packs/day: 0.25     Average packs/day: 0.3 packs/day for 15.0 years (3.8 ttl pk-yrs)     Types: Cigarettes     Passive exposure: Never    Smokeless tobacco: Never    Tobacco comments:     socially   Vaping Use    Vaping status: Never Used   Substance and Sexual Activity    Alcohol use: No    Drug use: No    Sexual activity: Defer     Birth control/protection: None     Patient is .  Patient is retired.  Patient drinks 0-1 servings of caffeine per day.    Family History:  Family History   Problem Relation Age of Onset    Hypertension Mother     Diabetes Mother     Hypertension Father     Breast cancer Sister 48    Breast cancer Sister 38    No Known Problems Brother     No Known Problems Daughter     No Known " "Problems Son     No Known Problems Maternal Grandmother     No Known Problems Paternal Grandmother     No Known Problems Maternal Grandfather     No Known Problems Paternal Grandfather     Autism Grandson     Colon cancer Neg Hx     Rectal cancer Neg Hx     Endometrial cancer Neg Hx     Vaginal cancer Neg Hx     Cervical cancer Neg Hx     Ovarian cancer Neg Hx     Prostate cancer Neg Hx     Uterine cancer Neg Hx     Malig Hyperthermia Neg Hx        Vital Signs:  /78 (BP Location: Left arm, Patient Position: Sitting, Cuff Size: Adult)   Pulse 81   Ht 149.9 cm (59.02\")   Wt 53.5 kg (118 lb)   LMP  (LMP Unknown)   SpO2 96%   BMI 23.82 kg/m²      Medications:    Current Outpatient Medications:     Blood Glucose Monitoring Suppl (FreeStyle Lite) w/Device kit, 1 Units Daily., Disp: 1 kit, Rfl: 0    glucose blood (FREESTYLE LITE) test strip, TEST ONCE DAILY, Disp: 100 each, Rfl: 2    Lancets (freestyle) lancets, Use TO CHECK BLOOD SUGAR ONCE DAILY, Disp: 100 each, Rfl: 2    albuterol sulfate  (90 Base) MCG/ACT inhaler, Inhale 2 puffs Every 4 (Four) Hours As Needed for Wheezing. (Patient not taking: Reported on 2/14/2024), Disp: 18 g, Rfl: 0    HYDROcodone-acetaminophen (NORCO) 5-325 MG per tablet, 1-2 tabs po q 4-6hr prn pain, wean to tylenol (Patient not taking: Reported on 3/4/2024), Disp: 15 tablet, Rfl: 0    ondansetron (ZOFRAN) 4 MG tablet, Take 1 tablet by mouth Every 8 (Eight) Hours As Needed for Nausea or Vomiting. May take 8 mg po q8hr prn is 4mg is not effective (Patient not taking: Reported on 3/4/2024), Disp: 10 tablet, Rfl: 1     Allergies:  Allergies   Allergen Reactions    Metronidazole Nausea And Vomiting       Physical Examination:  /78 (BP Location: Left arm, Patient Position: Sitting, Cuff Size: Adult)   Pulse 81   Ht 149.9 cm (59.02\")   Wt 53.5 kg (118 lb)   LMP  (LMP Unknown)   SpO2 96%   BMI 23.82 kg/m²   General Appearance:  Patient is in no distress.  She is well kept " and has an average build.   Psychiatric:  Patient with appropriate mood and affect. Alert and oriented to self, time, and place.    Breast, RIGHT:  medium sized, symmetric with the contralateral side.  Breast skin is without erythema, edema, rashes.  There are no visible abnormalities upon inspection during the arm-raising maneuver or with hands on hips in the sitting position. There is no nipple retraction, discharge or nipple/areolar skin changes. In the RIGHT breast LOQ there is a 2.5x1.75 cm palpable mass at the area of known malignancy. Fairly superficial, fairly close to the IMF, freely mobile, and no overlying skin changes.There are no  other masses palpable in the sitting or supine positions.    Breast, LEFT:  medium sized, symmetric with the contralateral side.  Breast skin is without erythema, edema, rashes.  There are significant ecchymoses LEFT  central breast from  her MRI guided biopsy that showed DCIS. There are no visible abnormalities upon inspection during the arm-raising maneuver or with hands on hips in the sitting position. There is no nipple retraction, discharge or nipple/areolar skin changes.There are no masses palpable in the sitting or supine positions.    Lymphatic:  There is no axillary, cervical, infraclavicular, or supraclavicular adenopathy bilaterally.  Eyes:  Pupils are round and reactive to light.  Cardiovascular:  Heart rate and rhythm are regular.  Respiratory:  Lungs are clear bilaterally with no crackles or wheezes in any lung field.  Gastrointestinal:  Abdomen is soft, nondistended, and nontender.     Musculoskeletal:  Good strength in all 4 extremities.   There is good range of motion in both shoulders.    Skin:  No new skin lesions or rashes on the skin excluding the breast (see breast exam above).        Imagin2019, MMG Screening [place] Bilateral ([facility])  Scattered fibroglandular density.  BI-RADS 0    2019, MMG Diagnostic Right ([facility])  Resolution of the area of  focal asymmetry.  BI-RADS 1: Negative    01/22/2024, MMG Diagnostic Digital [place] Bilateral, US Breast Right Limited ([facility])  INDICATION: Right breast palpable lump the patient first identified at  the end of December 3 to 4 weeks ago. The patient reports 2 sisters  with a history of breast cancer, diagnosed age 38 and 48.     The breasts are heterogeneously dense   Right breast: There is a triangular skin marker area of palpable concern, 8 cm from the nipple. The marker overlies an oval high density mass margins measuring 1.7  x 1.4 x 1.6 cm. Lymph nodes included in the right axilla have a normal mammographic appearance.    Ultrasound  8 o'clock 8 cm from the nipple solid mass 1.7 x 1.0 x 1.3 cm.   Ultrasound of the right axilla was performed with no abnormally enlarged lymph nodes.    BI-RADS 4c: Suspicious    2/9/24  [facility] BEATRIZ BREAST MRI  SHERRILL BUTLER   Posterior one third lower outer quadrant of the right  breast 8 o'clock, 8 cm from the nipple there is an irregular enhancing mass with an internal focal signal void. The mass measures 2.0 cm in anterior to posterior dimension, 1.8 cm in the superior-inferior dimension and 1.7 cm in the mediolateral dimension. This corresponds to the biopsy-proven malignancy with the centrally located bowtie shaped metallic clip. Extending anteriorly away from the anterior margin of the mass there is irregular linear  enhancement that measures on the order of 3.5 cm in anterior posterior.    There are linear areas of increased density seen mammographically in this region. This is suspicious for additional DCIS. The biopsy-proven malignancy and contiguous anterior linear enhancement  measure on the order of 4.7 cm in anterior to posterior dimension, 1.7 cm in the mediolateral dimension and 2.4 cm in the superior to inferior dimension. anterior one third of the right breast at the 12 o'clock position centered on the order of 3 cm from the nipple there is an irregular area  of non-mass  enhancement that measures 1.0 cm in superior-inferior dimension, 1.1 cm in anterior to posterior dimension and 1.2 cm in the medial to lateral dimension. No mammographic correlate is appreciated.  No other areas of suspicious enhancement or morphology are seen in the  right breast. I see no evidence for abnormal skin, nipple or chest wall  enhancement of the right breast and there is no evidence for right  axillary or internal mammary chain adenopathy.  In the middle third of the left breast centered at the 11:30 position on the order of 3.7 cm from the nipple there is an irregular area of non-mass enhancement that measures on the order of 1.3 cm in anterior posterior dimension, 1.1 cm in the superior to inferior dimension and 1.0 cm in the mediolateral dimension. There is suspected architectural distortion. No mammographic correlate is appreciated.  No other areas of abnormal enhancement or morphology are seen within the left breast. No evidence for left axillary or internal mammary chain adenopathy.   IMPRESSION: Biopsy proven malignancy right. The entire mass and linear enhancement measure on the order of 4.7 cm. If breast conservation therapy is desired and biopsy of this region is desired, it is my opinion that this would be best performed under MRI guidance. 2. 1.2 cm irregular area o non mass enhancement in the right breast 12 o'clock position. No mammographic correlate is appreciated. Further evaluation with an MRI guided right breast biopsy should be considered. 3. 1.1 cm irregular area of non mass enhancement seen n middle third of the left breast at th 11:30 position. MRI guided left breast biopsy is recommended. BI-RADS 4 Suspicious abnormality. Biopsy should be considered.     Pathology:  01/31/2024, US Guided Breast Biopsy ([facility])  Right breast 8 o'clock 8 cm from the nipple. 10-gauge mammotome multiple tissue specimens bowtie shaped metallic clip was placed.    Post-biopsy mammography demonstrates  bowtie shaped metallic clip centrally within a mass in the posterior one third lower outer quadrant of the right breast. No evidence for clip migration.    The pathology is concordant.      01/31/2024, Surgical pathology report ([facility])    1.  Breast, right, 8:00, 8 cm from nipple, biopsy: Invasive ductal adenocarcinoma                -A.  The tumor is [place] grade III (tubular grade 3, nuclear grade 3, mitotic grade 3).               -B.  The tumor measures up to 9.3 mm on the slide               -C.  No definitive lymphovascular invasion or perineural invasion is identified               -D.  Ductal carcinoma in situ is not identified    ER Negative (less than 1%)  MN Negative (less than 1%)  HER2 positive (Score 3+) 95%  KI-67 70%      MRI guided biopsy x 3 with 2 being on the right and 1 being on the left dated February 26 2024 [facility]  Right breast 12:00, buckle shaped metallic clip placed.  Sclerosing adenosis, calcifications.  Right breast 8:00, infinity shaped clip placed.  High-grade duct carcinoma in situ, solid, cribriform, clinging, lobular extension, central comedonecrosis.  No invasive carcinoma.       Left breast biopsy was also done:   Left breast MRI guided biopsy 12:00, stoplight shaped metallic clip. 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  No clip migration.  Pathology is all concordant    Final Diagnosis   1.  Left breast, 12:00, MRI-guided biopsies for non-mass enhancement: INTERMEDIATE GRADE DUCTAL CARCINOMA IN SITU (DCIS) WITH APOCRINE FEATURES.               -Architectural patterns: Solid and cribriform with central necrosis and associated microcalcifications.               -DCIS involves sclerosing adenosis in multiple cores and measures up to 7 mm maximally.               -No evidence of invasion identified.     2.  Right breast, 12:00, MRI-guided biopsies for non-mass enhancement:                -Sclerosing adenosis with associated microcalcifications.               -No atypical hyperplasia, in situ nor invasive carcinoma identified.     3.  Right breast, 8:00, MRI-guided biopsies for linear enhancement: HIGH GRADE DUCTAL CARCINOMA IN SITU (DCIS).  -Architectural patterns: Solid, comedo and clinging with lobular extension, central comedonecrosis and associated microcalcifications.               -DCIS involves multiple tissue cores measuring up to 3 mm.               -Sclerosing adenosis and usual ductal hyperplasia with associated microcalcifications.               -No evidence of invasive carcinoma identified.     SWM   Electronically signed by Shyann Hilario MD on 2/29/2024 at 0940   Synoptic Checklist   Breast Biomarker Reporting Template   Protocol posted: 12/13/2023BREAST BIOMARKER REPORTING TEMPLATE - 1  Test(s) Performed     Estrogen Receptor (ER) Status  Positive (greater than 10% of cells demonstrate nuclear positivity)   Percentage of Cells with Nuclear Positivity  %   Average Intensity of Staining  Strong   Test Type  Food and Drug Administration (FDA) cleared (test / vendor): ventana   Primary Antibody  SP1   Scoring System  Domi   Proportion Score  5   Intensity Score  3   Total Domi Score  8   Test(s) Performed     Progesterone Receptor (PgR) Status  Positive   Percentage of Cells with Nuclear Positivity  81-90%   Average Intensity of Staining  Strong   Test Type  Food and Drug Administration (FDA) cleared (test / vendor): ventana   Primary Antibody  1E2   Scoring System  Domi   Proportion Score  5   Intensity Score  3   Total Domi Score  8   Test(s) Performed  Ki-67   Ki-67 Percentage of Positive Nuclei  15 %   Primary Antibody  30-9   Cold Ischemia and Fixation Times  Meet requirements specified in latest version of the ASCO / CAP Guidelines   Cold Ischemia Time (minutes)  41 min   Fixation Time (hours)  9 hours   Testing Performed on Block Number(s)  1A   METHODS    Fixative  Formalin   Image Analysis  Not performed   .      Comment    Multiple representative slides from parts 1-3 of this case are reviewed internally with Dr. Avery, who concurs.  The patient has a previously diagnosed invasive ductal carcinoma of the right breast ([id number]).  Please refer to that biopsy for receptor studies in the right breast.       Labs:  09/01/2017, Genetics ([facility])  Genetic result: Negative, no clinically significant mutation identified.    Procedures:    Assessment:   Diagnosis Plan   1. Malignant neoplasm of lower-outer quadrant of right breast of female, estrogen receptor negative        2. Ductal carcinoma in situ (DCIS) of right breast        3. Abnormal MRI, breast        4. Ductal carcinoma in situ (DCIS) of left breast        5. FH: breast cancer        6. History of prior cigarette smoking          1-4  RIGHT LOQ 8:00, 7 CFN- 2.5 cm on exam, 2 cm on MRI index lesion (see also below re: enhancement anteriorly over 3.5 cm concerning for DCIS, plus second lesion in the RIGHT breast and lesion seen in the LEFT breast). MRI total enhancement at known biopsy site 4.7cm. Dr Patel cannot rule out additional invasive disease (tumor board)., Bowtie marker  IMC, NST, high grade, 3,3,3, 9.3mm in core,   ER neg NM neg her 2 hemalatha 3+    Clinical stage possibly mT2N0- IIA      MRI abnormalities at time of cancer diagnosis:  1- RIGHT  8:00- anterior to index lesion 3.5cm enhancement- high grade DCIS, solid, comedo, clinging with lobular extension, central comedonecrosis, no invasive- infinity marker good position anterior to the bowtie invasive cancer  2- RIGHT 12:00 3 CFN- 1.2 cm enhancement- sclerosing adenosis with calcifications- buckle marker (the most anterior marker)  3- LEFT middle third, 11:30 4 CFN- 1.3 cm enhancement- intermediate grade DCIS, solid and cribiform, central necrosis, 7mm- stoplight marker, only marker in the breast    3-4  LEFT breast DCIS, 11:30, 4 CFN, see  above    5-  2 of 7 sisters with breast cancer, ages 35,45 approximate  My Risk genetic testing 9-1-17 negative for mutation    6-  Smoked 1/3 ppd or less from age teens until cancer diagnosis      Plan:  The patient goes by Felicia  She was here today with her  Ryan. I met her Daughter in Law Jocelyn at her initial consultation.      We reviewed her interval history, exam, imaging, pathology, and treatment options together today.       We discussed that with bilateral cancers that hse is considering bilateral mastectomy.    In that light, we will arrange for her to talk with plastic surgery.    I showed her pictures of no reconstruction versus reconstruction.   We discussed that we are hopeful not to need radiation.    We reviewed that we will proceed with LEFT port placement, previously discussed risks of bleeding, infection, clot, pneumothorax, malfunction.    She will see Dr Renteria on the 6th March.    I have arranged for her FU ultrasound in 10 weeks.    We will repeat her MRI at the conclusion of her treatment.  She will see plastics in the interim.      We previously reviewed the differences in systemic therapy versus locoregional therapy. We discussed the options of neoadjuvant versus adjuvant therapy. We discussed that for larger tumors, node positive tumors, or tumors that are felt to communly be chemosensitive, that neoadjuvant therapy can offer the following benefits: potential tumor reduction that may allow for breast conservation, ability to see in vivo response to chemotherapy, potential in 30% of women to neutralize small volume anibal disease that may allow avoidance of complete axillary dissection.      She is interested in neoadjuvant therapy.     Presented her case at tumor board.    Asked Mayito to schedule with plastics.    Abeba Manrique MD      Today I spent 40 minutes doing the following: Reviewing records, labs, outside imaging and reports in preparation for the patient visit;  obtaining medical history; performing the physical exam; counseling and educating the patient and any available family or caregivers; ordering medications, tests or procedures; coordinating care with any other physicians on her care team as needed, and documenting all of the above in the medical record as well as sending communications with her other healthcare professionals.      Next Appointment:  Return for surgery for port placement.      EMR Dragon/transcription disclaimer:    Please note that portions of this note were completed with a voice recognition program.

## 2024-03-15 ENCOUNTER — TELEPHONE (OUTPATIENT)
Dept: FAMILY MEDICINE CLINIC | Facility: CLINIC | Age: 69
End: 2024-03-15
Payer: COMMERCIAL

## 2024-03-15 ENCOUNTER — OFFICE VISIT (OUTPATIENT)
Dept: CARDIOLOGY | Facility: CLINIC | Age: 69
End: 2024-03-15
Payer: COMMERCIAL

## 2024-03-15 ENCOUNTER — HOSPITAL ENCOUNTER (OUTPATIENT)
Dept: CARDIOLOGY | Facility: HOSPITAL | Age: 69
Discharge: HOME | End: 2024-03-15
Payer: COMMERCIAL

## 2024-03-15 VITALS
HEIGHT: 58 IN | BODY MASS INDEX: 25.4 KG/M2 | SYSTOLIC BLOOD PRESSURE: 138 MMHG | WEIGHT: 121 LBS | HEART RATE: 88 BPM | DIASTOLIC BLOOD PRESSURE: 76 MMHG

## 2024-03-15 VITALS
BODY MASS INDEX: 25.4 KG/M2 | HEIGHT: 58 IN | HEART RATE: 72 BPM | SYSTOLIC BLOOD PRESSURE: 120 MMHG | WEIGHT: 121 LBS | DIASTOLIC BLOOD PRESSURE: 80 MMHG

## 2024-03-15 DIAGNOSIS — Z79.899 LONG-TERM USE OF HIGH-RISK MEDICATION: ICD-10-CM

## 2024-03-15 DIAGNOSIS — E11.9 TYPE 2 DIABETES MELLITUS WITHOUT COMPLICATION, WITHOUT LONG-TERM CURRENT USE OF INSULIN: ICD-10-CM

## 2024-03-15 DIAGNOSIS — Z79.899 LONG-TERM USE OF HIGH-RISK MEDICATION: Primary | ICD-10-CM

## 2024-03-15 DIAGNOSIS — C50.511 MALIGNANT NEOPLASM OF LOWER-OUTER QUADRANT OF RIGHT BREAST OF FEMALE, ESTROGEN RECEPTOR NEGATIVE: Primary | ICD-10-CM

## 2024-03-15 DIAGNOSIS — Z17.1 MALIGNANT NEOPLASM OF LOWER-OUTER QUADRANT OF RIGHT BREAST OF FEMALE, ESTROGEN RECEPTOR NEGATIVE: Primary | ICD-10-CM

## 2024-03-15 DIAGNOSIS — D05.10 DUCTAL CARCINOMA IN SITU (DCIS) OF BREAST, UNSPECIFIED LATERALITY: ICD-10-CM

## 2024-03-15 DIAGNOSIS — E78.2 MIXED HYPERLIPIDEMIA: ICD-10-CM

## 2024-03-15 PROBLEM — N63.13 MASS OF LOWER OUTER QUADRANT OF RIGHT BREAST: Status: RESOLVED | Noted: 2024-01-29 | Resolved: 2024-03-15

## 2024-03-15 LAB
AORTIC ARCH: 2.2 CM
AORTIC DIMENSIONLESS INDEX: 0.9 (DI)
ASCENDING AORTA: 2.8 CM
BH CV ECHO LEFT VENTRICLE GLOBAL LONGITUDINAL STRAIN: -24.2 %
BH CV ECHO MEAS - ACS: 1.4 CM
BH CV ECHO MEAS - AO MAX PG: 4.8 MMHG
BH CV ECHO MEAS - AO MEAN PG: 3 MMHG
BH CV ECHO MEAS - AO ROOT DIAM: 2.6 CM
BH CV ECHO MEAS - AO V2 MAX: 110 CM/SEC
BH CV ECHO MEAS - AO V2 VTI: 23.6 CM
BH CV ECHO MEAS - AVA(I,D): 2.9 CM2
BH CV ECHO MEAS - EDV(CUBED): 64 ML
BH CV ECHO MEAS - EDV(MOD-SP2): 78 ML
BH CV ECHO MEAS - EDV(MOD-SP4): 76 ML
BH CV ECHO MEAS - EF(MOD-BP): 75.5 %
BH CV ECHO MEAS - EF(MOD-SP2): 76.9 %
BH CV ECHO MEAS - EF(MOD-SP4): 73.7 %
BH CV ECHO MEAS - EF_3D-VOL: 72 %
BH CV ECHO MEAS - ESV(CUBED): 15.6 ML
BH CV ECHO MEAS - ESV(MOD-SP2): 18 ML
BH CV ECHO MEAS - ESV(MOD-SP4): 20 ML
BH CV ECHO MEAS - FS: 37.5 %
BH CV ECHO MEAS - IVS/LVPW: 0.78 CM
BH CV ECHO MEAS - IVSD: 0.7 CM
BH CV ECHO MEAS - LA 3D VOL INDEX: 33
BH CV ECHO MEAS - LAT PEAK E' VEL: 8.9 CM/SEC
BH CV ECHO MEAS - LV DIASTOLIC VOL/BSA (35-75): 51.5 CM2
BH CV ECHO MEAS - LV MASS(C)D: 93.5 GRAMS
BH CV ECHO MEAS - LV MAX PG: 4.6 MMHG
BH CV ECHO MEAS - LV MEAN PG: 2 MMHG
BH CV ECHO MEAS - LV SYSTOLIC VOL/BSA (12-30): 13.5 CM2
BH CV ECHO MEAS - LV V1 MAX: 107 CM/SEC
BH CV ECHO MEAS - LV V1 VTI: 21.8 CM
BH CV ECHO MEAS - LVIDD: 4 CM
BH CV ECHO MEAS - LVIDS: 2.5 CM
BH CV ECHO MEAS - LVOT AREA: 3.1 CM2
BH CV ECHO MEAS - LVOT DIAM: 2 CM
BH CV ECHO MEAS - LVPWD: 0.9 CM
BH CV ECHO MEAS - MED PEAK E' VEL: 9.1 CM/SEC
BH CV ECHO MEAS - MV A DUR: 0.16 SEC
BH CV ECHO MEAS - MV A MAX VEL: 111 CM/SEC
BH CV ECHO MEAS - MV DEC SLOPE: 507 CM/SEC2
BH CV ECHO MEAS - MV DEC TIME: 0.21 SEC
BH CV ECHO MEAS - MV E MAX VEL: 90.3 CM/SEC
BH CV ECHO MEAS - MV E/A: 0.81
BH CV ECHO MEAS - MV MAX PG: 6.3 MMHG
BH CV ECHO MEAS - MV MEAN PG: 3 MMHG
BH CV ECHO MEAS - MV P1/2T: 69.9 MSEC
BH CV ECHO MEAS - MV V2 VTI: 31.8 CM
BH CV ECHO MEAS - MVA(P1/2T): 3.1 CM2
BH CV ECHO MEAS - MVA(VTI): 2.15 CM2
BH CV ECHO MEAS - PA ACC TIME: 0.12 SEC
BH CV ECHO MEAS - PA V2 MAX: 103 CM/SEC
BH CV ECHO MEAS - PULM A REVS DUR: 0.14 SEC
BH CV ECHO MEAS - PULM A REVS VEL: 36.7 CM/SEC
BH CV ECHO MEAS - PULM DIAS VEL: 38.8 CM/SEC
BH CV ECHO MEAS - PULM S/D: 0.87
BH CV ECHO MEAS - PULM SYS VEL: 33.6 CM/SEC
BH CV ECHO MEAS - QP/QS: 0.52
BH CV ECHO MEAS - RAP SYSTOLE: 3 MMHG
BH CV ECHO MEAS - RV MAX PG: 1.6 MMHG
BH CV ECHO MEAS - RV V1 MAX: 63.3 CM/SEC
BH CV ECHO MEAS - RV V1 VTI: 13.9 CM
BH CV ECHO MEAS - RVOT DIAM: 1.8 CM
BH CV ECHO MEAS - RVSP: 19.6 MMHG
BH CV ECHO MEAS - SI(MOD-SP2): 40.6 ML/M2
BH CV ECHO MEAS - SI(MOD-SP4): 37.9 ML/M2
BH CV ECHO MEAS - SUP REN AO DIAM: 1.7 CM
BH CV ECHO MEAS - SV(LVOT): 68.5 ML
BH CV ECHO MEAS - SV(MOD-SP2): 60 ML
BH CV ECHO MEAS - SV(MOD-SP4): 56 ML
BH CV ECHO MEAS - SV(RVOT): 35.4 ML
BH CV ECHO MEAS - TAPSE (>1.6): 2.6 CM
BH CV ECHO MEAS - TR MAX PG: 16.6 MMHG
BH CV ECHO MEAS - TR MAX VEL: 204 CM/SEC
BH CV ECHO MEASUREMENTS AVERAGE E/E' RATIO: 10.03
BH CV XLRA - RV BASE: 3.2 CM
BH CV XLRA - RV LENGTH: 6.2 CM
BH CV XLRA - RV MID: 2.6 CM
BH CV XLRA - TDI S': 11.2 CM/SEC
LEFT ATRIUM VOLUME INDEX: 23.2 ML/M2
SINUS: 2.8 CM
STJ: 2.34 CM

## 2024-03-15 PROCEDURE — 93306 TTE W/DOPPLER COMPLETE: CPT

## 2024-03-15 PROCEDURE — 93356 MYOCRD STRAIN IMG SPCKL TRCK: CPT

## 2024-03-15 PROCEDURE — 25510000001 PERFLUTREN (DEFINITY) 8.476 MG IN SODIUM CHLORIDE (PF) 0.9 % 10 ML INJECTION: Performed by: INTERNAL MEDICINE

## 2024-03-15 RX ORDER — BISOPROLOL FUMARATE 5 MG/1
5 TABLET, FILM COATED ORAL DAILY
Qty: 90 TABLET | Refills: 1 | Status: SHIPPED | OUTPATIENT
Start: 2024-03-15

## 2024-03-15 RX ADMIN — PERFLUTREN 2 ML: 6.52 INJECTION, SUSPENSION INTRAVENOUS at 10:04

## 2024-03-15 NOTE — TELEPHONE ENCOUNTER
Caller: Felicia Boyle    Relationship: Self    Best call back number: [phone]     What was the call regarding: PATIENT STATED THAT WHERE SHE LIVES, PARKING IS FAR. SHE IS TIRED, WEAK, AND HAS TROUBLE WALKING AROUND SOMETIMES AND IS REQUESTING TO KNOW HOW SHE CAN GO ABOUT GETTING A HANDICAP STICKER FOR HER CAR    PLEASE ADVISE

## 2024-03-15 NOTE — PROGRESS NOTES
Date of Office Visit: 03/15/24  Encounter Provider: Hieu Peguero MD  Place of Service: [facility] CARDIOLOGY  Patient Name: Felicia Boyle  :1955    Chief Complaint   Patient presents with    CARDIO ONCOLOGY VISIT    :     HPI:     Ms. Boyle is 68 y.o. and presents today in cardio-oncology consultation at the request of Dr Renteria.     She was diagnosed with right sided breast cancer (ER/AK-, Her2+) and bilateral DCIS (ER/AK+). She had a port placed yesterday and will start treatment with paclitaxel/trastuzumab/pertuzumab in 3 days.     She has a history of type 2 diabetes but does not take insulin or oral medications.  Her A1c is consistently 7 to 7.5%.     She has a reported history of hyperlipidemia but I reviewed her last lipid panel and it is normal.  She does not take lipid-lowering medications.  She does not have a history of hypertension.  Her father had nonpremature heart disease.  She is very active and denies chest pain, shortness of breath, palpitations, lightheadedness, syncope, leg swelling, orthopnea, or PND.      Past Medical History:   Diagnosis Date    Anxiety about health     Breast cancer of lower-outer quadrant of right female breast 2024    DCIS (ductal carcinoma in situ) of breast 03/15/2024    Diverticulosis     Mixed hyperlipidemia 2019    DIET CONTROLLED    Osteoporosis     Type 2 diabetes mellitus        Past Surgical History:   Procedure Laterality Date    BREAST BIOPSY Left     Excisional biopsy in the [facility]    BREAST BIOPSY  2024    CATARACT EXTRACTION W/ INTRAOCULAR LENS IMPLANT Bilateral     CHOLECYSTECTOMY  2012    COLON RESECTION Right     COLONOSCOPY N/A 2021    Procedure: COLONOSCOPY TO ANASTAMOSIS/TI;  Surgeon: Angelo Gonsalez MD;  Location: [facility] ENDOSCOPY;  Service: Gastroenterology;  Laterality: N/A;  PRE: SCREENING  POST: NORMAL POST-OP ANATOMY    ENDOSCOPY      2013    ENDOSCOPY N/A 03/10/2017     "Procedure: ESOPHAGOGASTRODUODENOSCOPY WITH BIOPSY AND PETER DILATATION 54\";  Surgeon: Angelo Gonsalez MD;  Location: [facility] ENDOSCOPY;  Service:     ENDOSCOPY N/A 2021    Procedure: ESOPHAGOGASTRODUODENOSCOPY WITH BIOPSIES, 54FR PETER DILATATION;  Surgeon: Angelo Gonsalez MD;  Location: [facility] ENDOSCOPY;  Service: Gastroenterology;  Laterality: N/A;  PRE: DYSPHAGIA  POST: GASTRITIS, ESOPHAGEAL RING    VENOUS ACCESS DEVICE (PORT) INSERTION Left 3/14/2024    Procedure: INSERTION OF PORTACATH;  Surgeon: Abeba Manrique MD;  Location: [facility] MAIN OR;  Service: General;  Laterality: Left;       Social History     Socioeconomic History    Marital status:      Spouse name: Ryan   Tobacco Use    Smoking status: Former     Current packs/day: 0.00     Average packs/day: 0.3 packs/day for 15.0 years (3.8 ttl pk-yrs)     Types: Cigarettes     Quit date: 1/15/2024     Years since quittin.1     Passive exposure: Never    Smokeless tobacco: Never    Tobacco comments:     N/A   Vaping Use    Vaping status: Never Used   Substance and Sexual Activity    Alcohol use: Yes     Comment: RARE    Drug use: No    Sexual activity: Not Currently     Partners: Male     Birth control/protection: Tubal ligation       Family History   Problem Relation Age of Onset    Hypertension Mother     Diabetes Mother     Hypertension Father     Breast cancer Sister 48    Breast cancer Sister 38    No Known Problems Brother     No Known Problems Daughter     No Known Problems Son     No Known Problems Maternal Grandmother     No Known Problems Paternal Grandmother     No Known Problems Maternal Grandfather     No Known Problems Paternal Grandfather     Autism Grandson     Colon cancer Neg Hx     Rectal cancer Neg Hx     Endometrial cancer Neg Hx     Vaginal cancer Neg Hx     Cervical cancer Neg Hx     Ovarian cancer Neg Hx     Prostate cancer Neg Hx     Uterine cancer Neg Hx     Malig Hyperthermia Neg Hx        ROS    Allergies " "  Allergen Reactions    Metronidazole Nausea And Vomiting         Current Outpatient Medications:     bisoprolol (ZEBeta) 5 MG tablet, Take 1 tablet by mouth Daily., Disp: 90 tablet, Rfl: 1    Blood Glucose Monitoring Suppl (FreeStyle Lite) w/Device kit, 1 Units Daily., Disp: 1 kit, Rfl: 0    dexAMETHasone (DECADRON) 4 MG tablet, Take 2 tablets in the morning and the afternoon the day before first treatment, Disp: 4 tablet, Rfl: 0    glucose blood (FREESTYLE LITE) test strip, TEST ONCE DAILY, Disp: 100 each, Rfl: 2    HYDROcodone-acetaminophen (NORCO) 5-325 MG per tablet, 1-2 tabs po q 4-6hr prn pain, wean to tylenol (Patient taking differently: Take 1 tablet by mouth Every 4 (Four) Hours As Needed. 1-2 tabs po q 4-6hr prn pain, wean to tylenol), Disp: 15 tablet, Rfl: 0    Lancets (freestyle) lancets, Use TO CHECK BLOOD SUGAR ONCE DAILY, Disp: 100 each, Rfl: 2    ondansetron (ZOFRAN) 8 MG tablet, Take 1 tablet by mouth 3 (Three) Times a Day As Needed for Nausea or Vomiting., Disp: 30 tablet, Rfl: 5    prednisoLONE acetate (PRED FORTE) 1 % ophthalmic suspension, Administer 1 drop to the right eye 4 (Four) Times a Day., Disp: , Rfl:   No current facility-administered medications for this visit.      Objective:     Vitals:    03/15/24 1341   BP: 138/76   BP Location: Left arm   Pulse: 88   Weight: 54.9 kg (121 lb)   Height: 147.3 cm (58\")   STOP-Bang Score  Have you been diagnosed with Sleep Apnea?: no  Snoring?: no  Tired?: no  Observed?: no  Pressure?: no  Stop Score: 0  Body Mass Index more than 35 kg/m2?: no  Age older than 50 year old?: yes  Neck large? \">17\"/43cm-M, >16\"/41cm-F: no  Gender=Male?: no  Total Stop-Bang Score: 1    Body mass index is 25.29 kg/m².    Vitals reviewed.   Constitutional:       Appearance: Well-developed and not in distress.   Eyes:      Conjunctiva/sclera: Conjunctivae normal.   HENT:      Head: Normocephalic.      Nose: Nose normal.    Mouth/Throat:      Pharynx: Oropharynx is clear. "   Neck:      Thyroid: Thyroid normal.      Vascular: No JVD. JVD normal.      Lymphadenopathy: No cervical adenopathy.   Pulmonary:      Effort: Pulmonary effort is normal.      Breath sounds: Normal breath sounds.   Chest:      Comments: Port is bandaged, mild edema but no redness or warmth  Cardiovascular:      Normal rate. Regular rhythm.      Murmurs: There is no murmur.   Pulses:     Intact distal pulses.   Edema:     Peripheral edema absent.   Abdominal:      Palpations: Abdomen is soft.      Tenderness: There is no abdominal tenderness.   Musculoskeletal: Normal range of motion.      Cervical back: Normal range of motion. Skin:     General: Skin is warm and dry.   Neurological:      General: No focal deficit present.      Mental Status: Alert and oriented to person, place, and time.      Cranial Nerves: No cranial nerve deficit.   Psychiatric:         Behavior: Behavior normal.         Thought Content: Thought content normal.         Judgment: Judgment normal.         Procedures EKG --   I have personally reviewed EKG on 03/06/2024 and my interpretation of the tracing is as follows: NSR, normal EKG        Assessment:       Diagnosis Plan   1. Malignant neoplasm of lower-outer quadrant of right breast of female, estrogen receptor negative  Adult Transthoracic Echo Limited W/ Cont if Necessary Per Protocol    Adult Transthoracic Echo Limited W/ Cont if Necessary Per Protocol      2. Ductal carcinoma in situ (DCIS) of breast, unspecified laterality  Adult Transthoracic Echo Limited W/ Cont if Necessary Per Protocol    Adult Transthoracic Echo Limited W/ Cont if Necessary Per Protocol      3. Type 2 diabetes mellitus without complication, without long-term current use of insulin        4. Mixed hyperlipidemia               Plan:       Mrs Boyle has right sided breast cancer (ER/WV-, Her2+) and bilateral DCIS (ER/WV+).  She will start treatment with potentially cardiotoxic immunotherapy in a few days  (trastuzumab/pertuzumab).  An echocardiogram was performed today.  It reveals normal left ventricular systolic function, ejection fraction 70%, normal global longitudinal strain, -24.2%, and normal diastolic function.  She has excellent functional status and denies any worrisome cardiac symptoms.  Unfortunately, she does have untreated diabetes and what I suspect is prehypertension.  I have recommended that she start a low-dose of bisoprolol for cardioprotection.  I will repeat limited echocardiograms every 3 months during treatment and 3 months after completion of treatment.  At this point, the plan is to complete 12 weeks of paclitaxel/trastuzumab/pertuzumab followed by ongoing trastuzumab for a year.  She may eventually require anthracycline therapy but that has not yet been decided.  She will undergo bilateral mastectomy and it is unknown if she will require radiation.  Further decisions will be made based on her evolving treatment plan.    Sincerely,       Hieu Peguero MD

## 2024-03-15 NOTE — PROGRESS NOTES
RM:________    Referral Provider: Agata Renteria MD Hefner, Richard, DO    NEW PATIENT/ CONSULT  PREVIOUS CARDIOLOGIST: ______________________________    CARDIAC TESTING: __________________________________________________    : 1955   AGE: 68 y.o.    03/15/2024  REASON FOR VISIT/  CC:      WT: ____________ BP: __________L __________R/ HR_______________    ALLERGIES:  Metronidazole  SMOKING HISTORY  Social History     Tobacco Use    Smoking status: Former     Current packs/day: 0.25     Average packs/day: 0.3 packs/day for 15.0 years (3.8 ttl pk-yrs)     Types: Cigarettes     Passive exposure: Never    Smokeless tobacco: Never    Tobacco comments:     QUIT 2024  - 1 PACK/ WEEK X 30 YEARS (SOCIAL)   Vaping Use    Vaping status: Never Used   Substance Use Topics    Alcohol use: Yes     Comment: RARE    Drug use: No          H/O: MI_____   STROKE________   GOUT_____   ANEMIA______     CAROTID________ HIV____ CAD_______ HYPERCHOL _____    H/O: CHF _____   RF____ DM___ HTN_______PVD____THYROID DISEASE_______    PMH: GI ____   HEPATITIS ___ KIDNEY DISEASE ___ LUNG DISEASE _______     SLEEP APNEA ____ BLOOD CLOTS ____ DVT ____ VEIN STRIPPING ___________      STOP BANG _________ (CARDIO ONCOLOGY ONLY)    CANCER _________________________________ CHEMO/ RADIATION__________

## 2024-03-15 NOTE — TELEPHONE ENCOUNTER
Caller: Elzbieta Boyle    Relationship: Emergency Contact    Best call back number: [phone]    What is the best time to reach you: ANYTIME    Who are you requesting to speak with (clinical staff, provider,  specific staff member): CLINICAL     Do you know the name of the person who called: ELZBIETA     What was the call regarding:PATIENT IS VERY UPSET SEE WENT TO SEE DR. CHOWDARY TODAY BUT HER APPT IS NOT UNTIL 3/19/2024. WAS TOLD BY DR. FLEMING SHE CANNOT HAVE HER TREATMENT UNTIL SHE SEE'S CARDIOLOGY.  ALSO SHE IS SUPPOSE TO TAKE MEDICATION SUNDAY BEFORE HER TREATMENT.    CALL TO ADVISE, TRIED TO W/T TOLD TO SEND MESSAGE.        Is it okay if the provider responds through MyChart:

## 2024-03-15 NOTE — TELEPHONE ENCOUNTER
Called the patient back to make sure she was aware of the 9:15am echo scheduled for her. She verified she did and v/u.

## 2024-03-18 ENCOUNTER — NUTRITION (OUTPATIENT)
Dept: OTHER | Facility: HOSPITAL | Age: 69
End: 2024-03-18
Payer: COMMERCIAL

## 2024-03-18 ENCOUNTER — INFUSION (OUTPATIENT)
Dept: ONCOLOGY | Facility: HOSPITAL | Age: 69
End: 2024-03-18
Payer: COMMERCIAL

## 2024-03-18 VITALS
OXYGEN SATURATION: 95 % | RESPIRATION RATE: 16 BRPM | HEART RATE: 78 BPM | DIASTOLIC BLOOD PRESSURE: 67 MMHG | SYSTOLIC BLOOD PRESSURE: 117 MMHG | WEIGHT: 119.8 LBS | BODY MASS INDEX: 25.04 KG/M2 | TEMPERATURE: 97.7 F

## 2024-03-18 DIAGNOSIS — Z17.0 MALIGNANT NEOPLASM OF LOWER-OUTER QUADRANT OF RIGHT BREAST OF FEMALE, ESTROGEN RECEPTOR POSITIVE: Primary | ICD-10-CM

## 2024-03-18 DIAGNOSIS — C50.511 MALIGNANT NEOPLASM OF LOWER-OUTER QUADRANT OF RIGHT BREAST OF FEMALE, ESTROGEN RECEPTOR POSITIVE: Primary | ICD-10-CM

## 2024-03-18 DIAGNOSIS — Z45.2 ENCOUNTER FOR FITTING AND ADJUSTMENT OF VASCULAR CATHETER: ICD-10-CM

## 2024-03-18 LAB
ALBUMIN SERPL-MCNC: 4.3 G/DL (ref 3.5–5.2)
ALBUMIN/GLOB SERPL: 1.7 G/DL
ALP SERPL-CCNC: 72 U/L (ref 39–117)
ALT SERPL W P-5'-P-CCNC: 19 U/L (ref 1–33)
ANION GAP SERPL CALCULATED.3IONS-SCNC: 15.1 MMOL/L (ref 5–15)
AST SERPL-CCNC: 18 U/L (ref 1–32)
BASOPHILS # BLD AUTO: 0.02 10*3/MM3 (ref 0–0.2)
BASOPHILS NFR BLD AUTO: 0.2 % (ref 0–1.5)
BILIRUB SERPL-MCNC: 0.3 MG/DL (ref 0–1.2)
BUN SERPL-MCNC: 21 MG/DL (ref 8–23)
BUN/CREAT SERPL: 30.4 (ref 7–25)
CALCIUM SPEC-SCNC: 9.5 MG/DL (ref 8.6–10.5)
CHLORIDE SERPL-SCNC: 106 MMOL/L (ref 98–107)
CO2 SERPL-SCNC: 20.9 MMOL/L (ref 22–29)
CREAT SERPL-MCNC: 0.69 MG/DL (ref 0.57–1)
DEPRECATED RDW RBC AUTO: 42.4 FL (ref 37–54)
EGFRCR SERPLBLD CKD-EPI 2021: 94.1 ML/MIN/1.73
EOSINOPHIL # BLD AUTO: 0 10*3/MM3 (ref 0–0.4)
EOSINOPHIL NFR BLD AUTO: 0 % (ref 0.3–6.2)
ERYTHROCYTE [DISTWIDTH] IN BLOOD BY AUTOMATED COUNT: 12.7 % (ref 12.3–15.4)
GLOBULIN UR ELPH-MCNC: 2.6 GM/DL
GLUCOSE SERPL-MCNC: 150 MG/DL (ref 65–99)
HCT VFR BLD AUTO: 38.6 % (ref 34–46.6)
HGB BLD-MCNC: 13.1 G/DL (ref 12–15.9)
IMM GRANULOCYTES # BLD AUTO: 0.06 10*3/MM3 (ref 0–0.05)
IMM GRANULOCYTES NFR BLD AUTO: 0.5 % (ref 0–0.5)
LYMPHOCYTES # BLD AUTO: 2.43 10*3/MM3 (ref 0.7–3.1)
LYMPHOCYTES NFR BLD AUTO: 19.7 % (ref 19.6–45.3)
MCH RBC QN AUTO: 31.2 PG (ref 26.6–33)
MCHC RBC AUTO-ENTMCNC: 33.9 G/DL (ref 31.5–35.7)
MCV RBC AUTO: 91.9 FL (ref 79–97)
MONOCYTES # BLD AUTO: 1.21 10*3/MM3 (ref 0.1–0.9)
MONOCYTES NFR BLD AUTO: 9.8 % (ref 5–12)
NEUTROPHILS NFR BLD AUTO: 69.8 % (ref 42.7–76)
NEUTROPHILS NFR BLD AUTO: 8.61 10*3/MM3 (ref 1.7–7)
NRBC BLD AUTO-RTO: 0 /100 WBC (ref 0–0.2)
PLATELET # BLD AUTO: 271 10*3/MM3 (ref 140–450)
PMV BLD AUTO: 10.6 FL (ref 6–12)
POTASSIUM SERPL-SCNC: 4 MMOL/L (ref 3.5–5.2)
PROT SERPL-MCNC: 6.9 G/DL (ref 6–8.5)
RBC # BLD AUTO: 4.2 10*6/MM3 (ref 3.77–5.28)
SODIUM SERPL-SCNC: 142 MMOL/L (ref 136–145)
WBC NRBC COR # BLD AUTO: 12.33 10*3/MM3 (ref 3.4–10.8)

## 2024-03-18 PROCEDURE — 25010000002 TRASTUZUMAB-DTTB 420 MG RECONSTITUTED SOLUTION 1 EACH VIAL: Performed by: INTERNAL MEDICINE

## 2024-03-18 PROCEDURE — 25010000002 PERTUZUMAB 420 MG/14ML SOLUTION 420 MG VIAL: Performed by: INTERNAL MEDICINE

## 2024-03-18 PROCEDURE — 25010000002 DEXAMETHASONE SODIUM PHOSPHATE 100 MG/10ML SOLUTION: Performed by: INTERNAL MEDICINE

## 2024-03-18 PROCEDURE — 96411 CHEMO IV PUSH ADDL DRUG: CPT

## 2024-03-18 PROCEDURE — 96375 TX/PRO/DX INJ NEW DRUG ADDON: CPT

## 2024-03-18 PROCEDURE — 80053 COMPREHEN METABOLIC PANEL: CPT

## 2024-03-18 PROCEDURE — 85025 COMPLETE CBC W/AUTO DIFF WBC: CPT

## 2024-03-18 PROCEDURE — 25010000002 HEPARIN LOCK FLUSH PER 10 UNITS: Performed by: INTERNAL MEDICINE

## 2024-03-18 PROCEDURE — 96413 CHEMO IV INFUSION 1 HR: CPT

## 2024-03-18 PROCEDURE — 25810000003 SODIUM CHLORIDE 0.9 % SOLUTION 250 ML FLEX CONT: Performed by: INTERNAL MEDICINE

## 2024-03-18 PROCEDURE — 25810000003 SODIUM CHLORIDE 0.9 % SOLUTION: Performed by: INTERNAL MEDICINE

## 2024-03-18 PROCEDURE — 25010000002 PACLITAXEL PER 1 MG: Performed by: INTERNAL MEDICINE

## 2024-03-18 PROCEDURE — 96417 CHEMO IV INFUS EACH ADDL SEQ: CPT

## 2024-03-18 PROCEDURE — 96376 TX/PRO/DX INJ SAME DRUG ADON: CPT

## 2024-03-18 PROCEDURE — 25010000002 DIPHENHYDRAMINE PER 50 MG: Performed by: INTERNAL MEDICINE

## 2024-03-18 PROCEDURE — 96415 CHEMO IV INFUSION ADDL HR: CPT

## 2024-03-18 PROCEDURE — 25010000002 HYDROCORTISONE SOD SUC (PF) 100 MG RECONSTITUTED SOLUTION: Performed by: INTERNAL MEDICINE

## 2024-03-18 RX ORDER — FAMOTIDINE 10 MG/ML
20 INJECTION, SOLUTION INTRAVENOUS AS NEEDED
Status: CANCELLED | OUTPATIENT
Start: 2024-03-25

## 2024-03-18 RX ORDER — HEPARIN SODIUM (PORCINE) LOCK FLUSH IV SOLN 100 UNIT/ML 100 UNIT/ML
500 SOLUTION INTRAVENOUS AS NEEDED
Status: DISCONTINUED | OUTPATIENT
Start: 2024-03-18 | End: 2024-03-18 | Stop reason: HOSPADM

## 2024-03-18 RX ORDER — FAMOTIDINE 10 MG/ML
20 INJECTION, SOLUTION INTRAVENOUS ONCE
Status: COMPLETED | OUTPATIENT
Start: 2024-03-18 | End: 2024-03-18

## 2024-03-18 RX ORDER — SODIUM CHLORIDE 9 MG/ML
20 INJECTION, SOLUTION INTRAVENOUS ONCE
Status: CANCELLED | OUTPATIENT
Start: 2024-03-25

## 2024-03-18 RX ORDER — SODIUM CHLORIDE 9 MG/ML
20 INJECTION, SOLUTION INTRAVENOUS ONCE
Status: COMPLETED | OUTPATIENT
Start: 2024-03-18 | End: 2024-03-18

## 2024-03-18 RX ORDER — FAMOTIDINE 10 MG/ML
20 INJECTION, SOLUTION INTRAVENOUS ONCE
Status: CANCELLED | OUTPATIENT
Start: 2024-03-18 | End: 2024-03-18

## 2024-03-18 RX ORDER — FAMOTIDINE 10 MG/ML
20 INJECTION, SOLUTION INTRAVENOUS AS NEEDED
OUTPATIENT
Start: 2024-04-01

## 2024-03-18 RX ORDER — DIPHENHYDRAMINE HYDROCHLORIDE 50 MG/ML
50 INJECTION INTRAMUSCULAR; INTRAVENOUS AS NEEDED
OUTPATIENT
Start: 2024-04-01

## 2024-03-18 RX ORDER — DIPHENHYDRAMINE HYDROCHLORIDE 50 MG/ML
50 INJECTION INTRAMUSCULAR; INTRAVENOUS AS NEEDED
Status: CANCELLED | OUTPATIENT
Start: 2024-03-25

## 2024-03-18 RX ORDER — FAMOTIDINE 10 MG/ML
20 INJECTION, SOLUTION INTRAVENOUS ONCE
OUTPATIENT
Start: 2024-04-01

## 2024-03-18 RX ORDER — HEPARIN SODIUM (PORCINE) LOCK FLUSH IV SOLN 100 UNIT/ML 100 UNIT/ML
500 SOLUTION INTRAVENOUS AS NEEDED
OUTPATIENT
Start: 2024-03-18

## 2024-03-18 RX ORDER — FAMOTIDINE 10 MG/ML
20 INJECTION, SOLUTION INTRAVENOUS ONCE
Status: CANCELLED | OUTPATIENT
Start: 2024-03-18

## 2024-03-18 RX ORDER — FAMOTIDINE 10 MG/ML
20 INJECTION, SOLUTION INTRAVENOUS ONCE
Status: CANCELLED | OUTPATIENT
Start: 2024-03-25

## 2024-03-18 RX ORDER — FAMOTIDINE 10 MG/ML
20 INJECTION, SOLUTION INTRAVENOUS AS NEEDED
Status: CANCELLED | OUTPATIENT
Start: 2024-03-18

## 2024-03-18 RX ORDER — DIPHENHYDRAMINE HYDROCHLORIDE 50 MG/ML
50 INJECTION INTRAMUSCULAR; INTRAVENOUS AS NEEDED
Status: CANCELLED | OUTPATIENT
Start: 2024-03-18

## 2024-03-18 RX ORDER — SODIUM CHLORIDE 0.9 % (FLUSH) 0.9 %
10 SYRINGE (ML) INJECTION AS NEEDED
OUTPATIENT
Start: 2024-03-18

## 2024-03-18 RX ORDER — SODIUM CHLORIDE 9 MG/ML
20 INJECTION, SOLUTION INTRAVENOUS ONCE
Status: CANCELLED | OUTPATIENT
Start: 2024-03-18

## 2024-03-18 RX ORDER — SODIUM CHLORIDE 0.9 % (FLUSH) 0.9 %
10 SYRINGE (ML) INJECTION AS NEEDED
Status: DISCONTINUED | OUTPATIENT
Start: 2024-03-18 | End: 2024-03-18 | Stop reason: HOSPADM

## 2024-03-18 RX ORDER — SODIUM CHLORIDE 9 MG/ML
20 INJECTION, SOLUTION INTRAVENOUS ONCE
OUTPATIENT
Start: 2024-04-01

## 2024-03-18 RX ADMIN — FAMOTIDINE 20 MG: 10 INJECTION INTRAVENOUS at 08:47

## 2024-03-18 RX ADMIN — Medication 10 ML: at 15:49

## 2024-03-18 RX ADMIN — ONTRUZANT 440 MG: KIT INTRAVENOUS at 11:41

## 2024-03-18 RX ADMIN — HYDROCORTISONE SODIUM SUCCINATE 100 MG: 100 INJECTION, POWDER, FOR SOLUTION INTRAMUSCULAR; INTRAVENOUS at 12:50

## 2024-03-18 RX ADMIN — PACLITAXEL 120 MG: 6 INJECTION, SOLUTION INTRAVENOUS at 14:53

## 2024-03-18 RX ADMIN — Medication 500 UNITS: at 15:49

## 2024-03-18 RX ADMIN — SODIUM CHLORIDE 20 ML/HR: 9 INJECTION, SOLUTION INTRAVENOUS at 08:47

## 2024-03-18 RX ADMIN — PERTUZUMAB 840 MG: 30 INJECTION, SOLUTION, CONCENTRATE INTRAVENOUS at 09:40

## 2024-03-18 RX ADMIN — DEXAMETHASONE SODIUM PHOSPHATE 12 MG: 10 INJECTION, SOLUTION INTRAMUSCULAR; INTRAVENOUS at 14:05

## 2024-03-18 RX ADMIN — DIPHENHYDRAMINE HYDROCHLORIDE 25 MG: 50 INJECTION, SOLUTION INTRAMUSCULAR; INTRAVENOUS at 13:47

## 2024-03-18 RX ADMIN — DIPHENHYDRAMINE HYDROCHLORIDE 25 MG: 50 INJECTION, SOLUTION INTRAMUSCULAR; INTRAVENOUS at 08:50

## 2024-03-18 RX ADMIN — FAMOTIDINE 20 MG: 10 INJECTION INTRAVENOUS at 13:46

## 2024-03-18 NOTE — NURSING NOTE
"1246 pt reports chills 60 min into 90 min herceptin infusion.  Herceptin stopped and normal saline flush started.  Erin NP at bedside immediately.  /79  HR 97  1250 100mg solucortef administered per Erin NP.  1250 pt reports head feels \"stiff\".    1251 Reports chills are subsiding.But still feel \"stiff\" like still having tense muscles.  1255 Chills have resolved.  Pt will continue on NS flush for approx 15 more minutes then will try to re-start herceptin.  "

## 2024-03-18 NOTE — NURSING NOTE
1312: Pt states she feels back to baseline. /77; HR 90. No more chills. Ontruzant restarted.   Pt able to complete rest of Ontruzant infusion without complications/complaints.     **See second RN Note for Taxol reaction documentation.**    Pt feels back to baseline 20 minutes after having stopped Taxol. No chest tightness, or SOA. Pt's /76 HR 80. O2 99% on room air.   Pt deaccessed and discharged home with daughter and .

## 2024-03-18 NOTE — NURSING NOTE
"1459 taxol stopped after 1mL of Taxol given. Pt c/o chest tightness, soa, pain in neck. Normal saline flush started.  JOHN Joshi at bedside immediately and assessing the pt.  1502 solucortef 100mg given per JOHN Joshi.      1502 O2 sat 99%  /65   Pt stating she \"can't breathe\" and c/o neck pain and tightening.  1505 pt reports soa and pain in neck is improving    1506  Pt c/o chest tightness, soa and pain is completely resolved. Dr. Renteria to bedside and explaining to pt that Taxol will not be restarted.    /73      1512   /76 HR 93 and pt continues to report that all complaints are resolved.  NS flush continued.  "

## 2024-03-18 NOTE — PROGRESS NOTES
"OUTPATIENT ONCOLOGY NUTRITION ASSESSMENT    Patient Name: Felicia Boyle  YOB: 1955  MRN: [id number]  Assessment Date: 3/18/2024    COMMENTS: Met with pt and her daughter in infusion area, pt receiving C1 THP today. Pt reports a good appetite, no issues with eating or drinking. Pt denies recent weight loss, says her UBW is ~117#. Reviewed the recommendation for weight maintenance throughout treatment, encouraged smaller, more frequent meals. Discussed the possibility of diarrhea, provided her with some enterade to have in case diarrhea occurs. Pt and daughter had numerous questions about what foods to eat, discussed all foods are appropriate, but may depend on any side effects that occur.     Explained RD role and the importance of adequate nutrition and hydration during treatment.  Encouraged patient to focus on getting adequate calories, protein and nutrients in order to support immune function and nutrition needs. Reviewed examples of calorically dense high protein foods to include at meals and snacks. Discussed use of oral nutrition supplements if needed to supplement intake. Suggested small more frequent meals.     Reviewed nutrition management of possible side effects during treatment including nausea/vomiting, diarrhea, constipation, fatigue, poor appetite and taste changes. Stressed importance of good oral hygiene and provided recipe for baking soda and salt water mouth rinse to use several times a day. Provided the [employer] booklet \"Nutrition during Cancer Treatment\" as a resource as well as RD contact info for any questions. Will follow throughout treatment course and be available as needed.         Reason for Assessment New assessment, Education      Diagnosis/Problem   R breast ca   Treatment Plan Chemotherapy; THP   Frequency Taxol weekly, herceptin and perjeta every 3 weeks    Goal of cancer treatment Neoadjuvant     Encounter Information        Nutrition/Diet History:  Pt " "has a good appetite    Oral Nutrition Supplements:    Factors/Symptoms Affecting Intake: No factors at this time   Comments:      Medical/Surgical History Past Medical History:   Diagnosis Date    Anxiety about health     Breast cancer of lower-outer quadrant of right female breast 02/14/2024    DCIS (ductal carcinoma in situ) of breast 03/15/2024    Diverticulosis     Mixed hyperlipidemia 05/19/2019    DIET CONTROLLED    Osteoporosis     Type 2 diabetes mellitus      Past Surgical History:   Procedure Laterality Date    BREAST BIOPSY Left     Excisional biopsy in the [facility]    BREAST BIOPSY  03/2024    CATARACT EXTRACTION W/ INTRAOCULAR LENS IMPLANT Bilateral     CHOLECYSTECTOMY  2012    COLON RESECTION Right 2002    COLONOSCOPY N/A 04/28/2021    Procedure: COLONOSCOPY TO ANASTAMOSIS/TI;  Surgeon: Angelo Gonsalez MD;  Location: [facility] ENDOSCOPY;  Service: Gastroenterology;  Laterality: N/A;  PRE: SCREENING  POST: NORMAL POST-OP ANATOMY    ENDOSCOPY      2013    ENDOSCOPY N/A 03/10/2017    Procedure: ESOPHAGOGASTRODUODENOSCOPY WITH BIOPSY AND PETER DILATATION 54\";  Surgeon: Angelo Gonsalez MD;  Location: [facility] ENDOSCOPY;  Service:     ENDOSCOPY N/A 04/28/2021    Procedure: ESOPHAGOGASTRODUODENOSCOPY WITH BIOPSIES, 54FR PETER DILATATION;  Surgeon: Angelo Gonsalez MD;  Location: [facility] ENDOSCOPY;  Service: Gastroenterology;  Laterality: N/A;  PRE: DYSPHAGIA  POST: GASTRITIS, ESOPHAGEAL RING    VENOUS ACCESS DEVICE (PORT) INSERTION Left 3/14/2024    Procedure: INSERTION OF PORTACATH;  Surgeon: Abeba Manrique MD;  Location: [facility] OR;  Service: General;  Laterality: Left;        Anthropometrics        Current Height Ht Readings from Last 1 Encounters:   03/15/24 147.3 cm (58\")      Current Weight Wt Readings from Last 1 Encounters:   03/18/24 54.3 kg (119 lb 12.8 oz)      BMI  24.8   Ideal Body Weight (IBW) 100#   Usual Body Weight (UBW) ~117# per pt   Weight Change/Trend Stable   Weight History " Wt Readings from Last 30 Encounters:   03/18/24 0757 54.3 kg (119 lb 12.8 oz)   03/15/24 0918 54.9 kg (121 lb)   03/15/24 1341 54.9 kg (121 lb)   03/12/24 1402 55.1 kg (121 lb 6.4 oz)   03/06/24 1214 55.1 kg (121 lb 6.4 oz)   03/06/24 0932 54.4 kg (119 lb 14.4 oz)   03/04/24 0918 53.5 kg (118 lb)   02/26/24 0728 53.1 kg (117 lb)   02/14/24 1042 53.1 kg (117 lb)   02/09/24 1613 53.1 kg (117 lb)   01/31/24 0940 53.1 kg (117 lb)   01/29/24 0949 54 kg (119 lb)   08/29/23 0936 54 kg (119 lb)   08/17/23 1352 55.3 kg (122 lb)   08/01/23 1435 55.3 kg (122 lb)   10/18/22 0919 54.4 kg (120 lb)   09/19/22 1345 54.4 kg (120 lb)   09/13/22 1408 54.4 kg (120 lb)   05/16/22 0910 53.1 kg (117 lb)   05/18/21 1021 54 kg (119 lb)   04/28/21 1038 52.2 kg (115 lb 1.6 oz)   01/07/21 0926 53.5 kg (118 lb)   11/25/20 1614 54.4 kg (120 lb)   08/25/20 0853 53.5 kg (118 lb)   06/22/20 1016 53.5 kg (118 lb)   03/18/20 0941 52.6 kg (116 lb)   10/31/19 0809 53.5 kg (118 lb)   10/09/19 1522 53.1 kg (117 lb)   05/16/19 0839 53.1 kg (117 lb)   11/15/18 0742 53.5 kg (118 lb)          Medications           Current medications: FreeStyle Lite, HYDROcodone-acetaminophen, bisoprolol, dexAMETHasone, freestyle, glucose blood, ondansetron, and prednisoLONE acetate     Tests/Procedures        Tests/Procedures Chemotherapy     Labs       Pertinent Labs    Results from last 7 days   Lab Units 03/18/24  0757   SODIUM mmol/L 142   POTASSIUM mmol/L 4.0   CHLORIDE mmol/L 106   CO2 mmol/L 20.9*   BUN mg/dL 21   CREATININE mg/dL 0.69   CALCIUM mg/dL 9.5   BILIRUBIN mg/dL 0.3   ALK PHOS U/L 72   ALT (SGPT) U/L 19   AST (SGOT) U/L 18   GLUCOSE mg/dL 150*     Results from last 7 days   Lab Units 03/18/24  0824 03/18/24  0757   HEMOGLOBIN g/dL 13.1  --    HEMATOCRIT % 38.6  --    WBC 10*3/mm3 12.33*  --    ALBUMIN g/dL  --  4.3     Results from last 7 days   Lab Units 03/18/24  0824   PLATELETS 10*3/mm3 271     COVID19   Date Value Ref Range Status   04/26/2021 Not  "Detected Not Detected - Ref. Range Final     Lab Results   Component Value Date    HGBA1C 7.30 (H) 08/02/2023          Physical Findings        Physical Appearance alert, oriented     Edema  no edema   Gastrointestinal None   Tubes/Drains implantable port   Oral/Mouth Cavity WNL     Estimated/Assessed Needs        Energy Requirements    Height for Calculation   58\"    Weight for Calculation 54.3 kg    Method for Estimation  30 kcal/kg   EST Needs (kcal/day) 1600 kcal/day       Protein Requirements    Weight for Calculation 54.3 kg    EST Protein Needs (g/kg) 1.0 - 1.2 gm/kg   EST Daily Needs (g/day) 55-65 g/day       Fluid Requirements     Method for Estimation 1 mL/kcal    Estimated Needs (mL/day) 1600 mL/day           PES STATEMENT / NUTRITION DIAGNOSIS      Nutrition Dx Problem Problem:    NutritionDiagnosisProblem: Nutrition Appropriate for Condition at this Time and Knowledge Deficit    Etiology:  Medical diagnosis: Breast cancer    Signs/Symptoms:  Information Deficit    Comment:      NUTRITION INTERVENTION / PLAN OF CARE      Intervention Goal(s) Maintain nutrition status, Reduce/improve symptoms, Provide information, Meet estimated needs, Tolerate PO , Maintain intake, Maintain weight, and No significant weight loss         RD Intervention/Action Encouraged intake, Follow Tx progress         Recommendations:       PO Diet Continue current, encouraged small, frequent meals       Supplements Enterade for possible diarrhea       Snacks       Other          Monitor/Evaluation PO intake, Supplement intake, Weight, Symptoms   Education Education provided   --    RD to follow     Electronically signed by:  Yesi Henson RD  03/18/24 13:24 EDT    "

## 2024-03-19 ENCOUNTER — TELEPHONE (OUTPATIENT)
Dept: ONCOLOGY | Facility: CLINIC | Age: 69
End: 2024-03-19
Payer: COMMERCIAL

## 2024-03-19 NOTE — TELEPHONE ENCOUNTER
Provider: Myra  Caller: patient  Relationship to Patient: self  Call Back Phone Number: [phone]  Reason for Call: patient has pain to her neck

## 2024-03-19 NOTE — TELEPHONE ENCOUNTER
Called the patient back. She states she is having neck and back pain after her taxol yesterday. Patient stated that she took a hydrocodone and she states that her pain was 10/10 before medication and then she is now at a 6/10. She denies sleeping different or anything that would have caused this. I spoke with Dr. Renteria and she wanted her to continue taking the norco q 6 hours prn and she stated that she had #14 left from Dr. Verdugo February rx. I let her know to take these as needed and stop after the pain subsides and call us back if she needed anything. Patient v/u.

## 2024-03-25 ENCOUNTER — NUTRITION (OUTPATIENT)
Dept: OTHER | Facility: HOSPITAL | Age: 69
End: 2024-03-25
Payer: COMMERCIAL

## 2024-03-25 ENCOUNTER — OFFICE VISIT (OUTPATIENT)
Dept: ONCOLOGY | Facility: CLINIC | Age: 69
End: 2024-03-25
Payer: COMMERCIAL

## 2024-03-25 ENCOUNTER — INFUSION (OUTPATIENT)
Dept: ONCOLOGY | Facility: HOSPITAL | Age: 69
End: 2024-03-25
Payer: COMMERCIAL

## 2024-03-25 ENCOUNTER — CLINICAL SUPPORT (OUTPATIENT)
Dept: SURGERY | Facility: CLINIC | Age: 69
End: 2024-03-25
Payer: COMMERCIAL

## 2024-03-25 ENCOUNTER — TELEPHONE (OUTPATIENT)
Dept: SURGERY | Facility: CLINIC | Age: 69
End: 2024-03-25
Payer: COMMERCIAL

## 2024-03-25 VITALS
BODY MASS INDEX: 24.16 KG/M2 | OXYGEN SATURATION: 98 % | DIASTOLIC BLOOD PRESSURE: 80 MMHG | TEMPERATURE: 97.8 F | HEIGHT: 58 IN | RESPIRATION RATE: 16 BRPM | HEART RATE: 87 BPM | SYSTOLIC BLOOD PRESSURE: 119 MMHG | WEIGHT: 115.1 LBS

## 2024-03-25 DIAGNOSIS — Z17.0 MALIGNANT NEOPLASM OF LOWER-OUTER QUADRANT OF RIGHT BREAST OF FEMALE, ESTROGEN RECEPTOR POSITIVE: Primary | ICD-10-CM

## 2024-03-25 DIAGNOSIS — C50.511 MALIGNANT NEOPLASM OF LOWER-OUTER QUADRANT OF RIGHT BREAST OF FEMALE, ESTROGEN RECEPTOR POSITIVE: Primary | ICD-10-CM

## 2024-03-25 DIAGNOSIS — C50.511 MALIGNANT NEOPLASM OF LOWER-OUTER QUADRANT OF RIGHT BREAST OF FEMALE, ESTROGEN RECEPTOR POSITIVE: ICD-10-CM

## 2024-03-25 DIAGNOSIS — Z17.0 MALIGNANT NEOPLASM OF LOWER-OUTER QUADRANT OF RIGHT BREAST OF FEMALE, ESTROGEN RECEPTOR POSITIVE: ICD-10-CM

## 2024-03-25 LAB
ALBUMIN SERPL-MCNC: 4.5 G/DL (ref 3.5–5.2)
ALBUMIN/GLOB SERPL: 1.3 G/DL
ALP SERPL-CCNC: 100 U/L (ref 39–117)
ALT SERPL W P-5'-P-CCNC: 22 U/L (ref 1–33)
ANION GAP SERPL CALCULATED.3IONS-SCNC: 15.9 MMOL/L (ref 5–15)
AST SERPL-CCNC: 20 U/L (ref 1–32)
BASOPHILS # BLD AUTO: 0.03 10*3/MM3 (ref 0–0.2)
BASOPHILS NFR BLD AUTO: 0.2 % (ref 0–1.5)
BILIRUB SERPL-MCNC: 0.3 MG/DL (ref 0–1.2)
BUN SERPL-MCNC: 17 MG/DL (ref 8–23)
BUN/CREAT SERPL: 17.3 (ref 7–25)
CALCIUM SPEC-SCNC: 9.4 MG/DL (ref 8.6–10.5)
CHLORIDE SERPL-SCNC: 106 MMOL/L (ref 98–107)
CO2 SERPL-SCNC: 15.1 MMOL/L (ref 22–29)
CREAT SERPL-MCNC: 0.98 MG/DL (ref 0.57–1)
DEPRECATED RDW RBC AUTO: 43.8 FL (ref 37–54)
EGFRCR SERPLBLD CKD-EPI 2021: 62.6 ML/MIN/1.73
EOSINOPHIL # BLD AUTO: 0.02 10*3/MM3 (ref 0–0.4)
EOSINOPHIL NFR BLD AUTO: 0.2 % (ref 0.3–6.2)
ERYTHROCYTE [DISTWIDTH] IN BLOOD BY AUTOMATED COUNT: 12.8 % (ref 12.3–15.4)
GLOBULIN UR ELPH-MCNC: 3.6 GM/DL
GLUCOSE SERPL-MCNC: 200 MG/DL (ref 65–99)
HCT VFR BLD AUTO: 47 % (ref 34–46.6)
HGB BLD-MCNC: 15.5 G/DL (ref 12–15.9)
IMM GRANULOCYTES # BLD AUTO: 0.16 10*3/MM3 (ref 0–0.05)
IMM GRANULOCYTES NFR BLD AUTO: 1.3 % (ref 0–0.5)
LYMPHOCYTES # BLD AUTO: 1 10*3/MM3 (ref 0.7–3.1)
LYMPHOCYTES NFR BLD AUTO: 8.3 % (ref 19.6–45.3)
MCH RBC QN AUTO: 30.6 PG (ref 26.6–33)
MCHC RBC AUTO-ENTMCNC: 33 G/DL (ref 31.5–35.7)
MCV RBC AUTO: 92.9 FL (ref 79–97)
MONOCYTES # BLD AUTO: 0.91 10*3/MM3 (ref 0.1–0.9)
MONOCYTES NFR BLD AUTO: 7.6 % (ref 5–12)
NEUTROPHILS NFR BLD AUTO: 82.4 % (ref 42.7–76)
NEUTROPHILS NFR BLD AUTO: 9.92 10*3/MM3 (ref 1.7–7)
NRBC BLD AUTO-RTO: 0 /100 WBC (ref 0–0.2)
PLATELET # BLD AUTO: 269 10*3/MM3 (ref 140–450)
PMV BLD AUTO: 10.2 FL (ref 6–12)
POTASSIUM SERPL-SCNC: 3.6 MMOL/L (ref 3.5–5.2)
PROT SERPL-MCNC: 8.1 G/DL (ref 6–8.5)
RBC # BLD AUTO: 5.06 10*6/MM3 (ref 3.77–5.28)
SODIUM SERPL-SCNC: 137 MMOL/L (ref 136–145)
WBC NRBC COR # BLD AUTO: 12.04 10*3/MM3 (ref 3.4–10.8)

## 2024-03-25 PROCEDURE — 25810000003 SODIUM CHLORIDE 0.9 % SOLUTION: Performed by: INTERNAL MEDICINE

## 2024-03-25 PROCEDURE — 25010000002 DEXAMETHASONE SODIUM PHOSPHATE 100 MG/10ML SOLUTION: Performed by: INTERNAL MEDICINE

## 2024-03-25 PROCEDURE — 25010000002 DIPHENHYDRAMINE PER 50 MG: Performed by: INTERNAL MEDICINE

## 2024-03-25 PROCEDURE — 25010000002 PACLITAXEL PROTEIN-BOUND PART PER 1 MG: Performed by: INTERNAL MEDICINE

## 2024-03-25 PROCEDURE — 85025 COMPLETE CBC W/AUTO DIFF WBC: CPT

## 2024-03-25 PROCEDURE — 96413 CHEMO IV INFUSION 1 HR: CPT

## 2024-03-25 PROCEDURE — 80053 COMPREHEN METABOLIC PANEL: CPT

## 2024-03-25 PROCEDURE — 96375 TX/PRO/DX INJ NEW DRUG ADDON: CPT

## 2024-03-25 PROCEDURE — 96361 HYDRATE IV INFUSION ADD-ON: CPT

## 2024-03-25 PROCEDURE — G2211 COMPLEX E/M VISIT ADD ON: HCPCS | Performed by: INTERNAL MEDICINE

## 2024-03-25 PROCEDURE — 99215 OFFICE O/P EST HI 40 MIN: CPT | Performed by: INTERNAL MEDICINE

## 2024-03-25 RX ORDER — FAMOTIDINE 10 MG/ML
20 INJECTION, SOLUTION INTRAVENOUS ONCE
Status: COMPLETED | OUTPATIENT
Start: 2024-03-25 | End: 2024-03-25

## 2024-03-25 RX ORDER — PANTOPRAZOLE SODIUM 40 MG/1
40 TABLET, DELAYED RELEASE ORAL DAILY
Qty: 30 TABLET | Refills: 5 | Status: SHIPPED | OUTPATIENT
Start: 2024-03-25

## 2024-03-25 RX ORDER — MONTELUKAST SODIUM 10 MG/1
10 TABLET ORAL ONCE
Status: COMPLETED | OUTPATIENT
Start: 2024-03-25 | End: 2024-03-25

## 2024-03-25 RX ORDER — SODIUM CHLORIDE 9 MG/ML
1000 INJECTION, SOLUTION INTRAVENOUS ONCE
Status: COMPLETED | OUTPATIENT
Start: 2024-03-25 | End: 2024-03-25

## 2024-03-25 RX ORDER — MONTELUKAST SODIUM 10 MG/1
10 TABLET ORAL ONCE
Status: CANCELLED
Start: 2024-04-01 | End: 2024-04-01

## 2024-03-25 RX ORDER — LIDOCAINE AND PRILOCAINE 25; 25 MG/G; MG/G
1 CREAM TOPICAL TAKE AS DIRECTED
Qty: 30 G | Refills: 5 | Status: SHIPPED | OUTPATIENT
Start: 2024-03-25

## 2024-03-25 RX ORDER — SODIUM CHLORIDE 9 MG/ML
20 INJECTION, SOLUTION INTRAVENOUS ONCE
Status: COMPLETED | OUTPATIENT
Start: 2024-03-25 | End: 2024-03-25

## 2024-03-25 RX ORDER — PACLITAXEL 100 MG/20ML
100 INJECTION, POWDER, LYOPHILIZED, FOR SUSPENSION INTRAVENOUS ONCE
Status: CANCELLED | OUTPATIENT
Start: 2024-03-25

## 2024-03-25 RX ORDER — PACLITAXEL 100 MG/20ML
100 INJECTION, POWDER, LYOPHILIZED, FOR SUSPENSION INTRAVENOUS ONCE
Status: CANCELLED | OUTPATIENT
Start: 2024-04-01

## 2024-03-25 RX ORDER — LOPERAMIDE HYDROCHLORIDE 2 MG/1
4 CAPSULE ORAL ONCE
Status: COMPLETED | OUTPATIENT
Start: 2024-03-25 | End: 2024-03-25

## 2024-03-25 RX ORDER — PACLITAXEL 100 MG/20ML
100 INJECTION, POWDER, LYOPHILIZED, FOR SUSPENSION INTRAVENOUS ONCE
Status: COMPLETED | OUTPATIENT
Start: 2024-03-25 | End: 2024-03-25

## 2024-03-25 RX ORDER — MONTELUKAST SODIUM 10 MG/1
10 TABLET ORAL ONCE
Status: CANCELLED
Start: 2024-03-25 | End: 2024-03-25

## 2024-03-25 RX ADMIN — DIPHENHYDRAMINE HYDROCHLORIDE 25 MG: 50 INJECTION, SOLUTION INTRAMUSCULAR; INTRAVENOUS at 09:31

## 2024-03-25 RX ADMIN — FAMOTIDINE 20 MG: 10 INJECTION INTRAVENOUS at 09:29

## 2024-03-25 RX ADMIN — SODIUM CHLORIDE 20 ML/HR: 9 INJECTION, SOLUTION INTRAVENOUS at 09:28

## 2024-03-25 RX ADMIN — LOPERAMIDE HYDROCHLORIDE 4 MG: 2 CAPSULE ORAL at 09:28

## 2024-03-25 RX ADMIN — SODIUM CHLORIDE 1000 ML: 9 INJECTION, SOLUTION INTRAVENOUS at 09:31

## 2024-03-25 RX ADMIN — DEXAMETHASONE SODIUM PHOSPHATE 12 MG: 10 INJECTION, SOLUTION INTRAMUSCULAR; INTRAVENOUS at 09:50

## 2024-03-25 RX ADMIN — PACLITAXEL 150 MG: 100 INJECTION, POWDER, LYOPHILIZED, FOR SUSPENSION INTRAVENOUS at 10:40

## 2024-03-25 RX ADMIN — MONTELUKAST 10 MG: 10 TABLET, FILM COATED ORAL at 09:28

## 2024-03-25 NOTE — TELEPHONE ENCOUNTER
Patient has seen Dr Renteria and plan is for paclitaxel, herceptin, pertuzumab, weekly for 12 weeks, then complete herceptin for a year     Has seen Cardiooncology Dr Peguero

## 2024-03-25 NOTE — PROGRESS NOTES
OUTPATIENT ONCOLOGY NUTRITION ASSESSMENT    Patient Name: Felicia Boyle  YOB: 1955  MRN: [id number]  Assessment Date: 3/25/2024    COMMENTS: F/U with pt and spouse in infusion area, pt receiving abraxane today d/t significant reaction to taxol last week. Pt reports her appetite/intake have been fairly good, but did have some significant diarrhea that started last night. She reports it is watery/yellow in color. Pt spouse did report that he was sick with a significant stomach bug the past few weeks, is still recovering, wondering if he gave it to her or delayed side effect from perjeta. Pt did drink and enterade this AM, encouraged her to drink another one this afternoon and use alongside imodium. Encouraged her to continue with good PO intake to help maintain strength.         Reason for Assessment Follow up     Diagnosis/Problem   R breast ca   Treatment Plan Chemotherapy; abraxane, herceptin, perjeta    Frequency Abraxane weekly, herceptin and perjeta every 3 weeks    Goal of cancer treatment Neoadjuvant     Encounter Information        Nutrition/Diet History:  Pt appetite is fair    Oral Nutrition Supplements: Started enterade today d/t diarrhea    Factors/Symptoms Affecting Intake: Diarrhea   Comments:      Medical/Surgical History Past Medical History:   Diagnosis Date    Anxiety about health     Breast cancer of lower-outer quadrant of right female breast 02/14/2024    DCIS (ductal carcinoma in situ) of breast 03/15/2024    Diverticulosis     Mixed hyperlipidemia 05/19/2019    DIET CONTROLLED    Osteoporosis     Type 2 diabetes mellitus      Past Surgical History:   Procedure Laterality Date    BREAST BIOPSY Left     Excisional biopsy in the [facility]    BREAST BIOPSY  03/2024    CATARACT EXTRACTION W/ INTRAOCULAR LENS IMPLANT Bilateral     CHOLECYSTECTOMY  2012    COLON RESECTION Right 2002    COLONOSCOPY N/A 04/28/2021    Procedure: COLONOSCOPY TO ANASTAMOSIS/TI;  Surgeon: Angelo Gonsalez,  "MD;  Location: [facility] ENDOSCOPY;  Service: Gastroenterology;  Laterality: N/A;  PRE: SCREENING  POST: NORMAL POST-OP ANATOMY    ENDOSCOPY      2013    ENDOSCOPY N/A 03/10/2017    Procedure: ESOPHAGOGASTRODUODENOSCOPY WITH BIOPSY AND PETER DILATATION 54\";  Surgeon: Angelo Gonsalez MD;  Location: [facility] ENDOSCOPY;  Service:     ENDOSCOPY N/A 04/28/2021    Procedure: ESOPHAGOGASTRODUODENOSCOPY WITH BIOPSIES, 54FR PETER DILATATION;  Surgeon: Angelo Gonsalez MD;  Location: [facility] ENDOSCOPY;  Service: Gastroenterology;  Laterality: N/A;  PRE: DYSPHAGIA  POST: GASTRITIS, ESOPHAGEAL RING    VENOUS ACCESS DEVICE (PORT) INSERTION Left 3/14/2024    Procedure: INSERTION OF PORTACATH;  Surgeon: Abeba Manrique MD;  Location: [facility];  Service: General;  Laterality: Left;        Anthropometrics        Current Height Ht Readings from Last 1 Encounters:   03/25/24 147.3 cm (57.99\")      Current Weight Wt Readings from Last 1 Encounters:   03/25/24 52.2 kg (115 lb 1.6 oz)      BMI  24   Ideal Body Weight (IBW) 100#   Usual Body Weight (UBW) ~117#   Weight Change/Trend Stable, Loss   Weight History Wt Readings from Last 30 Encounters:   03/25/24 0816 52.2 kg (115 lb 1.6 oz)   03/18/24 0757 54.3 kg (119 lb 12.8 oz)   03/15/24 0918 54.9 kg (121 lb)   03/15/24 1341 54.9 kg (121 lb)   03/12/24 1402 55.1 kg (121 lb 6.4 oz)   03/06/24 1214 55.1 kg (121 lb 6.4 oz)   03/06/24 0932 54.4 kg (119 lb 14.4 oz)   03/04/24 0918 53.5 kg (118 lb)   02/26/24 0728 53.1 kg (117 lb)   02/14/24 1042 53.1 kg (117 lb)   02/09/24 1613 53.1 kg (117 lb)   01/31/24 0940 53.1 kg (117 lb)   01/29/24 0949 54 kg (119 lb)   08/29/23 0936 54 kg (119 lb)   08/17/23 1352 55.3 kg (122 lb)   08/01/23 1435 55.3 kg (122 lb)   10/18/22 0919 54.4 kg (120 lb)   09/19/22 1345 54.4 kg (120 lb)   09/13/22 1408 54.4 kg (120 lb)   05/16/22 0910 53.1 kg (117 lb)   05/18/21 1021 54 kg (119 lb)   04/28/21 1038 52.2 kg (115 lb 1.6 oz)   01/07/21 0926 53.5 kg (118 lb) " "  11/25/20 1614 54.4 kg (120 lb)   08/25/20 0853 53.5 kg (118 lb)   06/22/20 1016 53.5 kg (118 lb)   03/18/20 0941 52.6 kg (116 lb)   10/31/19 0809 53.5 kg (118 lb)   10/09/19 1522 53.1 kg (117 lb)   05/16/19 0839 53.1 kg (117 lb)          Medications           Current medications: FreeStyle Lite, HYDROcodone-acetaminophen, bisoprolol, dexAMETHasone, freestyle, glucose blood, lidocaine-prilocaine, ondansetron, pantoprazole, and prednisoLONE acetate     Tests/Procedures        Tests/Procedures Chemotherapy     Labs       Pertinent Labs    Results from last 7 days   Lab Units 03/25/24  0756   SODIUM mmol/L 137   POTASSIUM mmol/L 3.6   CHLORIDE mmol/L 106   CO2 mmol/L 15.1*   BUN mg/dL 17   CREATININE mg/dL 0.98   CALCIUM mg/dL 9.4   BILIRUBIN mg/dL 0.3   ALK PHOS U/L 100   ALT (SGPT) U/L 22   AST (SGOT) U/L 20   GLUCOSE mg/dL 200*     Results from last 7 days   Lab Units 03/25/24  0756   HEMOGLOBIN g/dL 15.5   HEMATOCRIT % 47.0*   WBC 10*3/mm3 12.04*   ALBUMIN g/dL 4.5     Results from last 7 days   Lab Units 03/25/24  0756   PLATELETS 10*3/mm3 269     COVID19   Date Value Ref Range Status   04/26/2021 Not Detected Not Detected - Ref. Range Final     Lab Results   Component Value Date    HGBA1C 7.30 (H) 08/02/2023          Physical Findings        Physical Appearance alert, oriented     Edema  no edema   Gastrointestinal diarrhea   Tubes/Drains implantable port   Oral/Mouth Cavity WNL     Estimated/Assessed Needs        Energy Requirements    Height for Calculation   58\"   Weight for Calculation 54.3 kg   Method for Estimation  30 kcal/kg   EST Needs (kcal/day) 1600 kcal/day       Protein Requirements    Weight for Calculation 54.3 kg   EST Protein Needs (g/kg) 1.0 - 1.2 gm/kg   EST Daily Needs (g/day) 55-65 g/day       Fluid Requirements     Method for Estimation 1 mL/kcal    Estimated Needs (mL/day) 1600 mL/day           PES STATEMENT / NUTRITION DIAGNOSIS      Nutrition Dx Problem Problem:    " NutritionDiagnosisProblem: Altered GI Function    Etiology:  Medical diagnosis: Breast cancer  Factors affecting nutrition: Reported GI Symptoms    Signs/Symptoms:  Report/Observation    Comment:      NUTRITION INTERVENTION / PLAN OF CARE      Intervention Goal(s) Maintain nutrition status, Reduce/improve symptoms, Provide information, Meet estimated needs, Tolerate PO , Maintain intake, Maintain weight, and No significant weight loss         RD Intervention/Action Encouraged intake, Follow Tx progress         Recommendations:       PO Diet Continue current       Supplements Enterade 1-2 daily, depending on severity of diarrhea       Snacks       Other          Monitor/Evaluation PO intake, Supplement intake, Weight, Symptoms   Education Will provide eduction as needed   --    RD to follow     Electronically signed by:  Yesi Henson RD  03/25/24 13:20 EDT

## 2024-03-25 NOTE — PROGRESS NOTES
Subjective   Felicia Boyle is a 68 y.o. female.  Referred by Dr. Manrique for right breast invasive ductal carcinoma, HER2 positive, left breast ductal carcinoma in situ    History of Present Illness     Ms. Boyle is a 68-year-old postmenopausal [place]ian lady who is fairly healthy without any significant comorbidities palpated of right breast mass in the latter part of  which was further worked up with diagnostic mammogram and ultrasound and subsequently a biopsy which confirmed invasive ductal carcinoma which was ER/HI negative and HER2 positive.    Family history significant for her 2 sisters with breast cancer at the age of 38 and 48 respectively.  Her older sister  of metastatic breast cancer but her younger sister is doing well.  Patient underwent genetic testing in 2017 which was negative due to her family history.    She had regular screening mammograms up until 2019 but subsequently stopped having annual gynecological visits and therefore did not have any further screening mammograms up until the most recent diagnostic mammogram for the new palpable abnormality.    2024-bilateral diagnostic mammogram and right breast ultrasound  The breast heterogeneously dense.  Left breast with a scar marker in the lower left breast dating back to .  No new findings in the left breast.    Right breast at 8:00, 8 cm from the nipple there is a 1.7 x 1.4 x 1.6 cm oval high density mass with partially circumscribed and partially indistinct margins.    On the ultrasound the mass measures 1.7 x 1 x 1.3 cm.    No abnormal right axilla lymphadenopathy.    2024-right breast ultrasound-guided biopsy  Pathology consistent with invasive ductal carcinoma  Grade 3  9.3 mm of invasive disease on the core biopsy  ER negative  HI negative  HER2 3+ on immunohistochemistry, 95%  Ki-67 70%    2024-bilateral breast MRI  In the right breast at 8:00, 8 cm from the nipple there is a irregular enhancing mass which  measures 1.8 x 2 x 1.7 cm.  This corresponds to the biopsy-proven malignancy.  Extending anteriorly away from the MT margin of the mass there is an irregular linear enhancement which measures 3.5 x 1 x 0.7 cm.  The whole area including the mass measures 4.7 cm in AP dimension, 1.7 in mediolateral and 2.4 cm in the superior-inferior dimension.    At 12:00, 3 cm from the nipple there is an irregular non-mass enhancement which measures 1 x 1.1 x 1.2 cm.  No mammographic correlate is appreciated.    No other suspicious areas of enhancement in the right breast or right axilla or internal mammary chain.    In the left breast, middle third at 11:30 position, 3.7 cm from the nipple there is an irregular area of non-mass enhancement which measures 1.3 x 1.1 x 1 cm.  There is suspected architectural distortion.  No other areas of abnormal enhancement seen in the left breast of the left axilla or the internal mammary chain.    2/26/2024-additional MRI guided biopsies    1.left breast 12:00-intermediate grade ductal carcinoma in situ with apocrine features  ER +91 to 100% strong  WA +81 to 90% strong  Ki-67 15%    2. Right breast 12 o'clock position MRI guided biopsy of the non-mass enhancement-sclerosing adenosis with associated calcifications  No atypical hyperplasia in situ or invasive carcinoma    3.right breast 8:00 MRI guided biopsies of the linear enhancement is consistent with high-grade ductal carcinoma in situ  DCIS involves multiple tissue cores measuring up to 3 mm.  Sclerosing adenosis and usual ductal hyperplasia with associated microcalcifications.    The case was discussed in tumor board and Dr. Patel thought that they could be more invasive disease in the 4.7 cm of linear enhancement noted in the right breast at the site of the mass.  Therefore it was decided to proceed with neoadjuvant chemotherapy.    Patient was a former smoker and smoked for about 40 years, half a pack per week, quit about 1 month ago.   Denies any alcohol use.    Week 1 Taxol Herceptin and Perjeta was planned for 3/18/2024.  Patient received Herceptin and Perjeta and had some chills for which she received Solu-Medrol and responded well to that.  However after infusing only 1 cc of Taxol patient experienced a severe anaphylactic reaction due to which the infusion was stopped permanently and she received an additional dose of Solu-Medrol and Benadryl.  Symptoms resolved subsequently and patient was discharged home safely.    Due to this recent treatment has been changed to Abraxane along with Herceptin and Perjeta.  3/25/2024-patient is due for her first treatment with Abraxane.    She is extremely anxious given the severe hypersensitivity reaction that she experienced with Taxol.  She is also complaining of increased belching and heartburn since her last chemotherapy.  She is complaining of severe diarrhea with started last night.  Had multiple loose stools.        The following portions of the patient's history were reviewed and updated as appropriate: allergies, current medications, past family history, past medical history, past social history, past surgical history, and problem list.    Past Medical History:   Diagnosis Date    Anxiety about health     Breast cancer of lower-outer quadrant of right female breast 02/14/2024    DCIS (ductal carcinoma in situ) of breast 03/15/2024    Diverticulosis     Mixed hyperlipidemia 05/19/2019    DIET CONTROLLED    Osteoporosis     Type 2 diabetes mellitus         Past Surgical History:   Procedure Laterality Date    BREAST BIOPSY Left     Excisional biopsy in the [facility]    BREAST BIOPSY  03/2024    CATARACT EXTRACTION W/ INTRAOCULAR LENS IMPLANT Bilateral     CHOLECYSTECTOMY  2012    COLON RESECTION Right 2002    COLONOSCOPY N/A 04/28/2021    Procedure: COLONOSCOPY TO ANASTAMOSIS/TI;  Surgeon: Angelo Gonsalez MD;  Location: [facility] ENDOSCOPY;  Service: Gastroenterology;  Laterality: N/A;  PRE:  "SCREENING  POST: NORMAL POST-OP ANATOMY    ENDOSCOPY          ENDOSCOPY N/A 03/10/2017    Procedure: ESOPHAGOGASTRODUODENOSCOPY WITH BIOPSY AND PETER DILATATION 54\";  Surgeon: Angelo Gonsalez MD;  Location: [facility] ENDOSCOPY;  Service:     ENDOSCOPY N/A 2021    Procedure: ESOPHAGOGASTRODUODENOSCOPY WITH BIOPSIES, 54FR PETER DILATATION;  Surgeon: Angelo Gonsalez MD;  Location: [facility] ENDOSCOPY;  Service: Gastroenterology;  Laterality: N/A;  PRE: DYSPHAGIA  POST: GASTRITIS, ESOPHAGEAL RING    VENOUS ACCESS DEVICE (PORT) INSERTION Left 3/14/2024    Procedure: INSERTION OF PORTACATH;  Surgeon: Abeba Manrique MD;  Location: [facility];  Service: General;  Laterality: Left;        Family History   Problem Relation Age of Onset    Hypertension Mother     Diabetes Mother     Hypertension Father     Breast cancer Sister 48    Breast cancer Sister 38    No Known Problems Brother     No Known Problems Daughter     No Known Problems Son     No Known Problems Maternal Grandmother     No Known Problems Paternal Grandmother     No Known Problems Maternal Grandfather     No Known Problems Paternal Grandfather     Autism Grandson     Colon cancer Neg Hx     Rectal cancer Neg Hx     Endometrial cancer Neg Hx     Vaginal cancer Neg Hx     Cervical cancer Neg Hx     Ovarian cancer Neg Hx     Prostate cancer Neg Hx     Uterine cancer Neg Hx     Malig Hyperthermia Neg Hx         Social History     Socioeconomic History    Marital status:      Spouse name: Ryan   Tobacco Use    Smoking status: Former     Current packs/day: 0.00     Average packs/day: 0.3 packs/day for 15.0 years (3.8 ttl pk-yrs)     Types: Cigarettes     Quit date: 1/15/2024     Years since quittin.1     Passive exposure: Never    Smokeless tobacco: Never    Tobacco comments:     N/A   Vaping Use    Vaping status: Never Used   Substance and Sexual Activity    Alcohol use: Yes     Comment: RARE    Drug use: No    Sexual activity: Not " "Currently     Partners: Male     Birth control/protection: Tubal ligation        OB History          4    Para   3    Term   3       0    AB   1    Living   3         SAB   1    IAB   0    Ectopic   0    Molar   0    Multiple   0    Live Births   3               Age at menarche-16  Age at first live childbirth-18   4 para 3  1  Age at menopause-49  No use of hormone replacement therapy  No use of oral conceptive pills  Breast-feeding for 3 months    Allergies   Allergen Reactions    Metronidazole Nausea And Vomiting          Review of systems mentioned HPI otherwise negative    Objective   Blood pressure 119/80, pulse 87, temperature 97.8 °F (36.6 °C), temperature source Temporal, resp. rate 16, height 147.3 cm (57.99\"), weight 52.2 kg (115 lb 1.6 oz), SpO2 98%, not currently breastfeeding.   Physical Exam  Vitals reviewed.   Constitutional:       Appearance: Normal appearance. She is normal weight.   HENT:      Nose: Nose normal.      Mouth/Throat:      Pharynx: Oropharynx is clear.   Eyes:      Conjunctiva/sclera: Conjunctivae normal.   Cardiovascular:      Rate and Rhythm: Normal rate.   Pulmonary:      Effort: Pulmonary effort is normal.   Abdominal:      General: Abdomen is flat.   Musculoskeletal:         General: Normal range of motion.      Cervical back: Normal range of motion.   Skin:     General: Skin is warm.   Neurological:      General: No focal deficit present.      Mental Status: She is alert and oriented to person, place, and time.   Psychiatric:         Mood and Affect: Mood normal.         Behavior: Behavior normal.         Thought Content: Thought content normal.         Judgment: Judgment normal.     Breast Exam: Right breast on inspection there is bruising at around 9 o'clock position with biopsy site changes.  On palpation there is a palpable mass which measures 2.5 x 2 cm which is fairly superficial at 8:00.   No palpable right axillary lymphadenopathy.    Left " breast-extensive bruising from recent MRI guided biopsies.  There is a palpable underlying hematoma which is tender to palpation.  No left axillary lymphadenopathy.      I have reexamined the patient and the results are consistent with the previously documented exam. Agata Renteria MD      Infusion on 03/25/2024   Component Date Value Ref Range Status    Glucose 03/25/2024 200 (H)  65 - 99 mg/dL Final    BUN 03/25/2024 17  8 - 23 mg/dL Final    Creatinine 03/25/2024 0.98  0.57 - 1.00 mg/dL Final    Sodium 03/25/2024 137  136 - 145 mmol/L Final    Potassium 03/25/2024 3.6  3.5 - 5.2 mmol/L Final    Chloride 03/25/2024 106  98 - 107 mmol/L Final    CO2 03/25/2024 15.1 (L)  22.0 - 29.0 mmol/L Final    Calcium 03/25/2024 9.4  8.6 - 10.5 mg/dL Final    Total Protein 03/25/2024 8.1  6.0 - 8.5 g/dL Final    Albumin 03/25/2024 4.5  3.5 - 5.2 g/dL Final    ALT (SGPT) 03/25/2024 22  1 - 33 U/L Final    AST (SGOT) 03/25/2024 20  1 - 32 U/L Final    Alkaline Phosphatase 03/25/2024 100  39 - 117 U/L Final    Total Bilirubin 03/25/2024 0.3  0.0 - 1.2 mg/dL Final    Globulin 03/25/2024 3.6  gm/dL Final    A/G Ratio 03/25/2024 1.3  g/dL Final    BUN/Creatinine Ratio 03/25/2024 17.3  7.0 - 25.0 Final    Anion Gap 03/25/2024 15.9 (H)  5.0 - 15.0 mmol/L Final    eGFR 03/25/2024 62.6  >60.0 mL/min/1.73 Final    WBC 03/25/2024 12.04 (H)  3.40 - 10.80 10*3/mm3 Final    RBC 03/25/2024 5.06  3.77 - 5.28 10*6/mm3 Final    Hemoglobin 03/25/2024 15.5  12.0 - 15.9 g/dL Final    Hematocrit 03/25/2024 47.0 (H)  34.0 - 46.6 % Final    MCV 03/25/2024 92.9  79.0 - 97.0 fL Final    MCH 03/25/2024 30.6  26.6 - 33.0 pg Final    MCHC 03/25/2024 33.0  31.5 - 35.7 g/dL Final    RDW 03/25/2024 12.8  12.3 - 15.4 % Final    RDW-SD 03/25/2024 43.8  37.0 - 54.0 fl Final    MPV 03/25/2024 10.2  6.0 - 12.0 fL Final    Platelets 03/25/2024 269  140 - 450 10*3/mm3 Final    Neutrophil % 03/25/2024 82.4 (H)  42.7 - 76.0 % Final    Lymphocyte % 03/25/2024 8.3 (L)   19.6 - 45.3 % Final    Monocyte % 03/25/2024 7.6  5.0 - 12.0 % Final    Eosinophil % 03/25/2024 0.2 (L)  0.3 - 6.2 % Final    Basophil % 03/25/2024 0.2  0.0 - 1.5 % Final    Immature Grans % 03/25/2024 1.3 (H)  0.0 - 0.5 % Final    Neutrophils, Absolute 03/25/2024 9.92 (H)  1.70 - 7.00 10*3/mm3 Final    Lymphocytes, Absolute 03/25/2024 1.00  0.70 - 3.10 10*3/mm3 Final    Monocytes, Absolute 03/25/2024 0.91 (H)  0.10 - 0.90 10*3/mm3 Final    Eosinophils, Absolute 03/25/2024 0.02  0.00 - 0.40 10*3/mm3 Final    Basophils, Absolute 03/25/2024 0.03  0.00 - 0.20 10*3/mm3 Final    Immature Grans, Absolute 03/25/2024 0.16 (H)  0.00 - 0.05 10*3/mm3 Final    nRBC 03/25/2024 0.0  0.0 - 0.2 /100 WBC Final   Infusion on 03/18/2024   Component Date Value Ref Range Status    Glucose 03/18/2024 150 (H)  65 - 99 mg/dL Final    BUN 03/18/2024 21  8 - 23 mg/dL Final    Creatinine 03/18/2024 0.69  0.57 - 1.00 mg/dL Final    Sodium 03/18/2024 142  136 - 145 mmol/L Final    Potassium 03/18/2024 4.0  3.5 - 5.2 mmol/L Final    Chloride 03/18/2024 106  98 - 107 mmol/L Final    CO2 03/18/2024 20.9 (L)  22.0 - 29.0 mmol/L Final    Calcium 03/18/2024 9.5  8.6 - 10.5 mg/dL Final    Total Protein 03/18/2024 6.9  6.0 - 8.5 g/dL Final    Albumin 03/18/2024 4.3  3.5 - 5.2 g/dL Final    ALT (SGPT) 03/18/2024 19  1 - 33 U/L Final    AST (SGOT) 03/18/2024 18  1 - 32 U/L Final    Alkaline Phosphatase 03/18/2024 72  39 - 117 U/L Final    Total Bilirubin 03/18/2024 0.3  0.0 - 1.2 mg/dL Final    Globulin 03/18/2024 2.6  gm/dL Final    A/G Ratio 03/18/2024 1.7  g/dL Final    BUN/Creatinine Ratio 03/18/2024 30.4 (H)  7.0 - 25.0 Final    Anion Gap 03/18/2024 15.1 (H)  5.0 - 15.0 mmol/L Final    eGFR 03/18/2024 94.1  >60.0 mL/min/1.73 Final    WBC 03/18/2024 12.33 (H)  3.40 - 10.80 10*3/mm3 Final    RBC 03/18/2024 4.20  3.77 - 5.28 10*6/mm3 Final    Hemoglobin 03/18/2024 13.1  12.0 - 15.9 g/dL Final    Hematocrit 03/18/2024 38.6  34.0 - 46.6 % Final    MCV  03/18/2024 91.9  79.0 - 97.0 fL Final    MCH 03/18/2024 31.2  26.6 - 33.0 pg Final    MCHC 03/18/2024 33.9  31.5 - 35.7 g/dL Final    RDW 03/18/2024 12.7  12.3 - 15.4 % Final    RDW-SD 03/18/2024 42.4  37.0 - 54.0 fl Final    MPV 03/18/2024 10.6  6.0 - 12.0 fL Final    Platelets 03/18/2024 271  140 - 450 10*3/mm3 Final    Neutrophil % 03/18/2024 69.8  42.7 - 76.0 % Final    Lymphocyte % 03/18/2024 19.7  19.6 - 45.3 % Final    Monocyte % 03/18/2024 9.8  5.0 - 12.0 % Final    Eosinophil % 03/18/2024 0.0 (L)  0.3 - 6.2 % Final    Basophil % 03/18/2024 0.2  0.0 - 1.5 % Final    Immature Grans % 03/18/2024 0.5  0.0 - 0.5 % Final    Neutrophils, Absolute 03/18/2024 8.61 (H)  1.70 - 7.00 10*3/mm3 Final    Lymphocytes, Absolute 03/18/2024 2.43  0.70 - 3.10 10*3/mm3 Final    Monocytes, Absolute 03/18/2024 1.21 (H)  0.10 - 0.90 10*3/mm3 Final    Eosinophils, Absolute 03/18/2024 0.00  0.00 - 0.40 10*3/mm3 Final    Basophils, Absolute 03/18/2024 0.02  0.00 - 0.20 10*3/mm3 Final    Immature Grans, Absolute 03/18/2024 0.06 (H)  0.00 - 0.05 10*3/mm3 Final    nRBC 03/18/2024 0.0  0.0 - 0.2 /100 WBC Final   Hospital Outpatient Visit on 03/15/2024   Component Date Value Ref Range Status    EF(MOD-bp) 03/15/2024 75.5  % Final    EF_3D-VOL 03/15/2024 72.0  % Final    LV GLOBAL STRAIN  03/15/2024 -24.2  % Final    LVIDd 03/15/2024 4.0  cm Final    LVIDs 03/15/2024 2.5  cm Final    IVSd 03/15/2024 0.70  cm Final    LVPWd 03/15/2024 0.90  cm Final    FS 03/15/2024 37.5  % Final    IVS/LVPW 03/15/2024 0.78  cm Final    ESV(cubed) 03/15/2024 15.6  ml Final    LV Sys Vol (BSA corrected) 03/15/2024 13.5  cm2 Final    EDV(cubed) 03/15/2024 64.0  ml Final    LV Batista Vol (BSA corrected) 03/15/2024 51.5  cm2 Final    LV mass(C)d 03/15/2024 93.5  grams Final    LVOT area 03/15/2024 3.1  cm2 Final    LVOT diam 03/15/2024 2.00  cm Final    EDV(MOD-sp2) 03/15/2024 78.0  ml Final    EDV(MOD-sp4) 03/15/2024 76.0  ml Final    ESV(MOD-sp2) 03/15/2024  18.0  ml Final    ESV(MOD-sp4) 03/15/2024 20.0  ml Final    SV(MOD-sp2) 03/15/2024 60.0  ml Final    SV(MOD-sp4) 03/15/2024 56.0  ml Final    SI(MOD-sp2) 03/15/2024 40.6  ml/m2 Final    SI(MOD-sp4) 03/15/2024 37.9  ml/m2 Final    EF(MOD-sp2) 03/15/2024 76.9  % Final    EF(MOD-sp4) 03/15/2024 73.7  % Final    MV E max tahir 03/15/2024 90.3  cm/sec Final    MV A max tahir 03/15/2024 111.0  cm/sec Final    MV dec time 03/15/2024 0.21  sec Final    MV E/A 03/15/2024 0.81   Final    Pulm A Revs Dur 03/15/2024 0.14  sec Final    MV A dur 03/15/2024 0.16  sec Final    LA ESV Index (BP) 03/15/2024 23.2  ml/m2 Final    Med Peak E' Tahir 03/15/2024 9.1  cm/sec Final    Lat Peak E' Tahir 03/15/2024 8.9  cm/sec Final    TR max tahir 03/15/2024 204.0  cm/sec Final    Avg E/e' ratio 03/15/2024 10.03   Final    SV(LVOT) 03/15/2024 68.5  ml Final    SV(RVOT) 03/15/2024 35.4  ml Final    Qp/Qs 03/15/2024 0.52   Final    RV Base 03/15/2024 3.2  cm Final    RV Mid 03/15/2024 2.6  cm Final    RV Length 03/15/2024 6.2  cm Final    TAPSE (>1.6) 03/15/2024 2.6  cm Final    RV S' 03/15/2024 11.2  cm/sec Final    Pulm Sys Tahir 03/15/2024 33.6  cm/sec Final    Pulm Batista Tahir 03/15/2024 38.8  cm/sec Final    Pulm S/D 03/15/2024 0.87   Final    Pulm A Revs Tahir 03/15/2024 36.7  cm/sec Final    LV V1 max 03/15/2024 107.0  cm/sec Final    LV V1 max PG 03/15/2024 4.6  mmHg Final    LV V1 mean PG 03/15/2024 2.00  mmHg Final    LV V1 VTI 03/15/2024 21.8  cm Final    Ao pk tahir 03/15/2024 110.0  cm/sec Final    Ao max PG 03/15/2024 4.8  mmHg Final    Ao mean PG 03/15/2024 3.0  mmHg Final    Ao V2 VTI 03/15/2024 23.6  cm Final    BENJAMIN(I,D) 03/15/2024 2.9  cm2 Final    MV max PG 03/15/2024 6.3  mmHg Final    MV mean PG 03/15/2024 3.0  mmHg Final    MV V2 VTI 03/15/2024 31.8  cm Final    MV P1/2t 03/15/2024 69.9  msec Final    MVA(P1/2t) 03/15/2024 3.1  cm2 Final    MVA(VTI) 03/15/2024 2.15  cm2 Final    MV dec slope 03/15/2024 507.0  cm/sec2 Final    TR max PG  03/15/2024 16.6  mmHg Final    RVSP(TR) 03/15/2024 19.6  mmHg Final    RAP systole 03/15/2024 3.0  mmHg Final    RVOT diam 03/15/2024 1.80  cm Final    RV V1 max PG 03/15/2024 1.60  mmHg Final    RV V1 max 03/15/2024 63.3  cm/sec Final    RV V1 VTI 03/15/2024 13.9  cm Final    PA V2 max 03/15/2024 103.0  cm/sec Final    PA acc time 03/15/2024 0.12  sec Final    Ao root diam 03/15/2024 2.6  cm Final    ACS 03/15/2024 1.40  cm Final    Sinus 03/15/2024 2.8  cm Final    STJ 03/15/2024 2.34  cm Final    3D vol index 03/15/2024 33.0   Final    Dimensionless Index 03/15/2024 0.90  (DI) Final    Ascending aorta 03/15/2024 2.8  cm Final    Aortic arch 03/15/2024 2.2  cm Final    Abdo Ao Diam 03/15/2024 1.7  cm Final   Admission on 03/14/2024, Discharged on 03/14/2024   Component Date Value Ref Range Status    Glucose 03/14/2024 124  70 - 130 mg/dL Final    Glucose 03/14/2024 102  70 - 130 mg/dL Final   Pre-Admission Testing on 03/06/2024   Component Date Value Ref Range Status    Glucose 03/06/2024 183 (H)  65 - 99 mg/dL Final    BUN 03/06/2024 17  8 - 23 mg/dL Final    Creatinine 03/06/2024 0.76  0.57 - 1.00 mg/dL Final    Sodium 03/06/2024 136  136 - 145 mmol/L Final    Potassium 03/06/2024 4.0  3.5 - 5.2 mmol/L Final    Chloride 03/06/2024 102  98 - 107 mmol/L Final    CO2 03/06/2024 23.7  22.0 - 29.0 mmol/L Final    Calcium 03/06/2024 9.7  8.6 - 10.5 mg/dL Final    BUN/Creatinine Ratio 03/06/2024 22.4  7.0 - 25.0 Final    Anion Gap 03/06/2024 10.3  5.0 - 15.0 mmol/L Final    eGFR 03/06/2024 84.9  >60.0 mL/min/1.73 Final    QT Interval 03/06/2024 382  ms Final    QTC Interval 03/06/2024 449  ms Final   Lab on 03/06/2024   Component Date Value Ref Range Status    WBC 03/06/2024 6.64  3.40 - 10.80 10*3/mm3 Final    RBC 03/06/2024 4.57  3.77 - 5.28 10*6/mm3 Final    Hemoglobin 03/06/2024 13.7  12.0 - 15.9 g/dL Final    Hematocrit 03/06/2024 41.7  34.0 - 46.6 % Final    MCV 03/06/2024 91.2  79.0 - 97.0 fL Final    MCH  03/06/2024 30.0  26.6 - 33.0 pg Final    MCHC 03/06/2024 32.9  31.5 - 35.7 g/dL Final    RDW 03/06/2024 12.6  12.3 - 15.4 % Final    RDW-SD 03/06/2024 41.6  37.0 - 54.0 fl Final    MPV 03/06/2024 10.3  6.0 - 12.0 fL Final    Platelets 03/06/2024 309  140 - 450 10*3/mm3 Final    Neutrophil % 03/06/2024 52.7  42.7 - 76.0 % Final    Lymphocyte % 03/06/2024 36.3  19.6 - 45.3 % Final    Monocyte % 03/06/2024 8.6  5.0 - 12.0 % Final    Eosinophil % 03/06/2024 1.1  0.3 - 6.2 % Final    Basophil % 03/06/2024 0.8  0.0 - 1.5 % Final    Immature Grans % 03/06/2024 0.5  0.0 - 0.5 % Final    Neutrophils, Absolute 03/06/2024 3.51  1.70 - 7.00 10*3/mm3 Final    Lymphocytes, Absolute 03/06/2024 2.41  0.70 - 3.10 10*3/mm3 Final    Monocytes, Absolute 03/06/2024 0.57  0.10 - 0.90 10*3/mm3 Final    Eosinophils, Absolute 03/06/2024 0.07  0.00 - 0.40 10*3/mm3 Final    Basophils, Absolute 03/06/2024 0.05  0.00 - 0.20 10*3/mm3 Final    Immature Grans, Absolute 03/06/2024 0.03  0.00 - 0.05 10*3/mm3 Final    nRBC 03/06/2024 0.0  0.0 - 0.2 /100 WBC Final   Hospital Outpatient Visit on 02/26/2024   Component Date Value Ref Range Status    Case Report 02/26/2024    Final                    Value:Surgical Pathology Report                         Case: [id number]                                  Authorizing Provider:  Abeba Manrique MD    Collected:           02/26/2024 09:06 AM          Ordering Location:     [facility]  Received:            02/26/2024 10:55 AM                                 MRI                                                                          Pathologist:           Shyann Hilario MD                                                    Specimens:   1) - Breast, Left, Left breast MRI Bx @ 12:00 by Dr Patel, 10 cores, formalin 09:47,                clockface 12:00                                                                                     2) - Breast, Right, Site B, Rt breast BX MRI by  Dr Patel @ 0914, 7 cores, formalin                  09:42, clockface 12:00                                                                              3) - Breast, Right, Site A Right Breast Bx MRI by Dr Patel @ 0910, 11 cores, foramlin                                         09:45, clockface 08:00                                                                     Final Diagnosis 02/26/2024    Final                    Value:This result contains rich text formatting which cannot be displayed here.    Synoptic Checklist 02/26/2024    Final                    Value:Breast Biomarker Reporting Template                            BREAST BIOMARKER REPORTING TEMPLATE - 1                            Protocol posted: 12/13/2023                                                           Test(s) Performed:                                     Estrogen Receptor (ER) Status:    Positive (greater than 10% of cells demonstrate nuclear positivity)                                    Percentage of Cells with Nuclear Positivity:    %                                    Average Intensity of Staining:    Strong                                  Test Type:    Food and Drug Administration (FDA) cleared (test / vendor): [employer]                                  Primary Antibody:    SP1                                  Scoring System:    Domi                                    Proportion Score:    5                                    Intensity Score:    3                                    Total Domi Score:    8                                Test(s) Performed:                                     Progesterone Receptor (PgR) Status:    Positive                                    Percentage of Cells with Nuclear Positivity:    81-90%                                    Average Intensity of Staining:    Strong                                  Test Type:    Food and Drug Administration (FDA) cleared (test / vendor): [employer]                                   Primary Antibody:    1E2                                  Scoring System:    Domi                                    Proportion Score:    5                                    Intensity Score:    3                                    Total Domi Score:    8                                Test(s) Performed:    Ki-67                                  Ki-67 Percentage of Positive Nuclei:    15 %                                 Primary Antibody:    30-9                                Cold Ischemia and Fixation Times:    Meet requirements specified in latest version of the ASCO / [employer] Guidelines                                Cold Ischemia Time (minutes):    41 min                               Fixation Time (hours):    9 hours                               Testing Performed on Block Number(s):    1A                                                         METHODS                               Fixative:    Formalin                                Image Analysis:    Not performed       Comment 02/26/2024    Final                    Value:This result contains rich text formatting which cannot be displayed here.    Gross Description 02/26/2024    Final                    Value:This result contains rich text formatting which cannot be displayed here.    Special Stains 02/26/2024    Final                    Value:This result contains rich text formatting which cannot be displayed here.   Hospital Outpatient Visit on 02/26/2024   Component Date Value Ref Range Status    Case Report 02/26/2024    Final                    Value:Surgical Pathology Report                         Case: [id number]                                  Authorizing Provider:  Abeba Manrique MD    Collected:           02/26/2024 09:06 AM          Ordering Location:     [facility]  Received:            02/26/2024 10:55 AM                                 MRI                                                                           Pathologist:           Shyann Hilario MD                                                    Specimens:   1) - Breast, Left, Left breast MRI Bx @ 12:00 by Dr Patel, 10 cores, formalin 09:47,                clockface 12:00                                                                                     2) - Breast, Right, Site B, Rt breast BX MRI by Dr Patel @ 0914, 7 cores, formalin                  09:42, clockface 12:00                                                                              3) - Breast, Right, Site A Right Breast Bx MRI by Dr Patel @ 0910, 11 cores, foramlin                                         09:45, clockface 08:00                                                                     Final Diagnosis 02/26/2024    Final                    Value:This result contains rich text formatting which cannot be displayed here.    Synoptic Checklist 02/26/2024    Final                    Value:Breast Biomarker Reporting Template                            BREAST BIOMARKER REPORTING TEMPLATE - 1                            Protocol posted: 12/13/2023                                                           Test(s) Performed:                                     Estrogen Receptor (ER) Status:    Positive (greater than 10% of cells demonstrate nuclear positivity)                                    Percentage of Cells with Nuclear Positivity:    %                                    Average Intensity of Staining:    Strong                                  Test Type:    Food and Drug Administration (FDA) cleared (test / vendor): vent[employer]ul[employer]ribe                                  Primary Antibody:    SP1                                  Scoring System:    Domi                                    Proportion Score:    5                                    Intensity Score:    3                                    Total Domi Score:    8                                Test(s) Performed:                                      Progesterone Receptor (PgR) Status:    Positive                                    Percentage of Cells with Nuclear Positivity:    81-90%                                    Average Intensity of Staining:    Strong                                  Test Type:    Food and Drug Administration (FDA) cleared (test / vendor): ventana                                  Primary Antibody:    1E2                                  Scoring System:    Domi                                    Proportion Score:    5                                    Intensity Score:    3                                    Total Domi Score:    8                                Test(s) Performed:    Ki-67                                  Ki-67 Percentage of Positive Nuclei:    15 %                                 Primary Antibody:    30-9                                Cold Ischemia and Fixation Times:    Meet requirements specified in latest version of the ASCO / CAP Guidelines                                Cold Ischemia Time (minutes):    41 min                               Fixation Time (hours):    9 hours                               Testing Performed on Block Number(s):    1A                                                         METHODS                               Fixative:    Formalin                                Image Analysis:    Not performed       Comment 02/26/2024    Final                    Value:This result contains rich text formatting which cannot be displayed here.    Gross Description 02/26/2024    Final                    Value:This result contains rich text formatting which cannot be displayed here.    Special Stains 02/26/2024    Final                    Value:This result contains rich text formatting which cannot be displayed here.   Hospital Outpatient Visit on 02/26/2024   Component Date Value Ref Range Status    Creatinine 02/26/2024 0.80  0.60 - 1.30 mg/dL Final    Serial Number: [id number]:   [id number]    Case Report 02/26/2024    Final                    Value:Surgical Pathology Report                         Case: [id number]                                  Authorizing Provider:  Abeba Manrique MD    Collected:           02/26/2024 09:06 AM          Ordering Location:     [facility]  Received:            02/26/2024 10:55 AM                                 MRI                                                                          Pathologist:           Shyann Hilario MD                                                    Specimens:   1) - Breast, Left, Left breast MRI Bx @ 12:00 by Dr Patel, 10 cores, formalin 09:47,                clockface 12:00                                                                                     2) - Breast, Right, Site B, Rt breast BX MRI by Dr Patel @ 0914, 7 cores, formalin                  09:42, clockface 12:00                                                                              3) - Breast, Right, Site A Right Breast Bx MRI by Dr Patel @ 0910, 11 cores, foramlin                                         09:45, clockface 08:00                                                                     Final Diagnosis 02/26/2024    Final                    Value:This result contains rich text formatting which cannot be displayed here.    Synoptic Checklist 02/26/2024    Final                    Value:Breast Biomarker Reporting Template                            BREAST BIOMARKER REPORTING TEMPLATE - 1                            Protocol posted: 12/13/2023                                                           Test(s) Performed:                                     Estrogen Receptor (ER) Status:    Positive (greater than 10% of cells demonstrate nuclear positivity)                                    Percentage of Cells with Nuclear Positivity:    %                                    Average Intensity of Staining:    Strong                                   Test Type:    Food and Drug Administration (FDA) cleared (test / vendor): ventana                                  Primary Antibody:    SP1                                  Scoring System:    Domi                                    Proportion Score:    5                                    Intensity Score:    3                                    Total Domi Score:    8                                Test(s) Performed:                                     Progesterone Receptor (PgR) Status:    Positive                                    Percentage of Cells with Nuclear Positivity:    81-90%                                    Average Intensity of Staining:    Strong                                  Test Type:    Food and Drug Administration (FDA) cleared (test / vendor): ventana                                  Primary Antibody:    1E2                                  Scoring System:    Domi                                    Proportion Score:    5                                    Intensity Score:    3                                    Total Domi Score:    8                                Test(s) Performed:    Ki-67                                  Ki-67 Percentage of Positive Nuclei:    15 %                                 Primary Antibody:    30-9                                Cold Ischemia and Fixation Times:    Meet requirements specified in latest version of the ASCO / CAP Guidelines                                Cold Ischemia Time (minutes):    41 min                               Fixation Time (hours):    9 hours                               Testing Performed on Block Number(s):    1A                                                         METHODS                               Fixative:    Formalin                                Image Analysis:    Not performed       Comment 02/26/2024    Final                    Value:This result contains rich text formatting which cannot be  displayed here.    Gross Description 02/26/2024    Final                    Value:This result contains rich text formatting which cannot be displayed here.    Special Stains 02/26/2024    Final                    Value:This result contains rich text formatting which cannot be displayed here.        XR Chest Post CVA Port    Result Date: 3/14/2024  1. Mediport catheter tip in the SVC. 2. No pneumothorax is seen.   This report was finalized on 3/14/2024 12:59 PM by Dr. Ean Allen M.D on Workstation: [id number]      MRI Breast Biopsy Initial With & Without Device    Result Date: 3/4/2024  Technically successful MRI guided vacuum assisted bilateral breast biopsy at 3 sites with placement of the metallic clips. The pathology result in the right breast at the 8 o'clock position marked by the Infinity shaped metallic clip has returned as high-grade DCIS and the pathology result in the left breast marked by the stoplight shaped metallic clip at the 12-lead position has returned as intermediate grade DCIS. This is in conjunction with the known malignancy in the posterior one third lower outer quadrant of the right breast marked by the bowtie shaped metallic clip. Surgical follow-up for the bilateral breast malignancies is recommended.  BI-RADS Category 6: Known malignancy.  This report was finalized on 3/4/2024 7:04 AM by Dr. Luis Patel M.D on Workstation: [id number]      MRI Breast Biopsy Each Add With & Without Device    Result Date: 3/4/2024  Technically successful MRI guided vacuum assisted bilateral breast biopsy at 3 sites with placement of the metallic clips. The pathology result in the right breast at the 8 o'clock position marked by the Infinity shaped metallic clip has returned as high-grade DCIS and the pathology result in the left breast marked by the stoplight shaped metallic clip at the 12-lead position has returned as intermediate grade DCIS. This is in conjunction with the known malignancy in the  posterior one third lower outer quadrant of the right breast marked by the bowtie shaped metallic clip. Surgical follow-up for the bilateral breast malignancies is recommended.  BI-RADS Category 6: Known malignancy.  This report was finalized on 3/4/2024 7:04 AM by Dr. Luis Patel M.D on Workstation: [id number]      MRI Breast Biopsy Initial With & Without Device    Result Date: 3/4/2024  Technically successful MRI guided vacuum assisted bilateral breast biopsy at 3 sites with placement of the metallic clips. The pathology result in the right breast at the 8 o'clock position marked by the Infinity shaped metallic clip has returned as high-grade DCIS and the pathology result in the left breast marked by the stoplight shaped metallic clip at the 12-lead position has returned as intermediate grade DCIS. This is in conjunction with the known malignancy in the posterior one third lower outer quadrant of the right breast marked by the bowtie shaped metallic clip. Surgical follow-up for the bilateral breast malignancies is recommended.  BI-RADS Category 6: Known malignancy.  This report was finalized on 3/4/2024 7:04 AM by Dr. Luis Patel M.D on Workstation: [id number]      Mammo Diagnostic Bilateral With CAD    Result Date: 3/4/2024  Technically successful MRI guided vacuum assisted bilateral breast biopsy at 3 sites with placement of the metallic clips. The pathology result in the right breast at the 8 o'clock position marked by the Infinity shaped metallic clip has returned as high-grade DCIS and the pathology result in the left breast marked by the stoplight shaped metallic clip at the 12-lead position has returned as intermediate grade DCIS. This is in conjunction with the known malignancy in the posterior one third lower outer quadrant of the right breast marked by the bowtie shaped metallic clip. Surgical follow-up for the bilateral breast malignancies is recommended.  BI-RADS Category 6: Known malignancy.   This report was finalized on 3/4/2024 7:04 AM by Dr. Luis Patel M.D on Workstation: [id number]        3/25/2024 CBC reviewed and WBC 12.04, hemoglobin 15.5 and platelets 269,000  CMP reviewed and blood sugar 200, creatinine 0.98, LFTs normal      Assessment & Plan       *Right breast invasive ductal carcinoma  Clinical T2 N0 M0, stage IIa, clinical and prognostic ER/DE negative, HER2 3+ immunohistochemistry, Ki-67 70%  On the MRI the mass measures 2 cm however there is linear enhancement which in the AP dimension measures 4.7 cm.  Additional biopsies of this linear enhancement was performed and consistent with DCIS  It is hard to delineate the amount of invasive disease as opposed to DCIS.  More likely than not invasive component is likely limited to the mass which is about 2-1/2 cm on exam.  Since the tumor is greater than 2 cm I think it is reasonable to proceed with neoadjuvant chemotherapy.  We discussed proceeding with weekly Taxol along with Herceptin and Perjeta every 3 weeks.  After 12 weeks she will require an MRI to assess the response.  If there is inadequate response then we may have to proceed with Adriamycin and Cytoxan for 4 cycles however if there is a good response then she can proceed with surgery with no additional chemotherapy.  The likely plan is that she will undergo bilateral mastectomy.  If that is the case as long as she does not have any positive margins or lymph node positive disease she may not need radiation.  We discussed continuing adjuvant HER2 directed therapy for whole year  3/18/2024-received first dose of Herceptin and Perjeta, had a severe hypersensitivity reaction to Taxol and hence Taxol was discontinued  3/25/2024-scheduled for Abraxane only.  Labs reviewed and stable to proceed with chemotherapy  Patient is extremely anxious today after experiencing the severe hypersensitivity reaction last week.  Reassured patient that this is unlikely to happen with Abraxane and we will  premedicate with extra Solu-Medrol.    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  Patient had questions regarding reconstruction  If she does undergo bilateral mastectomy then that would be curative and she would not require any endocrine therapy subsequently unless there is any upstaging of the malignancy    *Severe diarrhea  Started last night  Patient has not used any Imodium  We will administer 1 L of normal saline today  Imodium will be given here today  She will start entegrade  Patient has not been using any Imodium.  Encouraged her to use Imodium up to 8 tablets in a day.    *GERD  Protonix daily to be started    *Cardiac health  Referral to cardio oncology placed  3/15/2024 echocardiogram with an ejection fraction of 75.5%, LV strain is normal at -24.2%  She has met with Dr. Peguero , started on bisoprolol which patient has not started yet.    *Follow-up-1 week with APRN in 3 weeks with him    Patient is experiencing severe diarrhea secondary to Herceptin and Perjeta.  She is also had a severe hypersensitivity reaction to Taxol due to which Taxol has been discontinued and treatment has been changed to Abraxane.  She is on cytotoxic treatment requiring close monitoring for toxicities.

## 2024-03-28 ENCOUNTER — TELEPHONE (OUTPATIENT)
Dept: ONCOLOGY | Facility: CLINIC | Age: 69
End: 2024-03-28
Payer: COMMERCIAL

## 2024-03-28 NOTE — TELEPHONE ENCOUNTER
Called the patient back. Patient states she has had 4 episodes of diarrhea but she has only taken 2 Imodium. I went through the diarrhea protocol with her and let her know she could take 1 Imodium after each loose BM for up to 6 more in 24 hours. Patient was audibly anxious over this but she read back the instructions and Madelyn WEBB was notified of the call as well. Patient v/u.

## 2024-03-28 NOTE — TELEPHONE ENCOUNTER
Provider: Myra  Caller: patient  Relationship to Patient: self  Call Back Phone Number: [phone]  Reason for Call: patient says she has diarrhea

## 2024-04-01 ENCOUNTER — INFUSION (OUTPATIENT)
Dept: ONCOLOGY | Facility: HOSPITAL | Age: 69
End: 2024-04-01
Payer: COMMERCIAL

## 2024-04-01 ENCOUNTER — TELEPHONE (OUTPATIENT)
Dept: SURGERY | Facility: CLINIC | Age: 69
End: 2024-04-01
Payer: COMMERCIAL

## 2024-04-01 ENCOUNTER — OFFICE VISIT (OUTPATIENT)
Dept: ONCOLOGY | Facility: CLINIC | Age: 69
End: 2024-04-01
Payer: COMMERCIAL

## 2024-04-01 ENCOUNTER — TELEPHONE (OUTPATIENT)
Dept: ONCOLOGY | Facility: CLINIC | Age: 69
End: 2024-04-01
Payer: COMMERCIAL

## 2024-04-01 VITALS
HEART RATE: 72 BPM | OXYGEN SATURATION: 97 % | HEIGHT: 58 IN | BODY MASS INDEX: 23.74 KG/M2 | TEMPERATURE: 97.8 F | SYSTOLIC BLOOD PRESSURE: 143 MMHG | DIASTOLIC BLOOD PRESSURE: 81 MMHG | RESPIRATION RATE: 16 BRPM | WEIGHT: 113.1 LBS

## 2024-04-01 DIAGNOSIS — Z79.899 HIGH RISK MEDICATION USE: ICD-10-CM

## 2024-04-01 DIAGNOSIS — R31.9 URINARY TRACT INFECTION WITH HEMATURIA, SITE UNSPECIFIED: Primary | ICD-10-CM

## 2024-04-01 DIAGNOSIS — R79.89 ELEVATED LFTS: ICD-10-CM

## 2024-04-01 DIAGNOSIS — Z17.0 MALIGNANT NEOPLASM OF LOWER-OUTER QUADRANT OF RIGHT BREAST OF FEMALE, ESTROGEN RECEPTOR POSITIVE: ICD-10-CM

## 2024-04-01 DIAGNOSIS — C50.511 MALIGNANT NEOPLASM OF LOWER-OUTER QUADRANT OF RIGHT BREAST OF FEMALE, ESTROGEN RECEPTOR POSITIVE: Primary | ICD-10-CM

## 2024-04-01 DIAGNOSIS — Z45.2 ENCOUNTER FOR FITTING AND ADJUSTMENT OF VASCULAR CATHETER: Primary | ICD-10-CM

## 2024-04-01 DIAGNOSIS — E87.6 HYPOKALEMIA: ICD-10-CM

## 2024-04-01 DIAGNOSIS — N39.0 URINARY TRACT INFECTION WITH HEMATURIA, SITE UNSPECIFIED: Primary | ICD-10-CM

## 2024-04-01 DIAGNOSIS — C50.511 MALIGNANT NEOPLASM OF LOWER-OUTER QUADRANT OF RIGHT BREAST OF FEMALE, ESTROGEN RECEPTOR POSITIVE: ICD-10-CM

## 2024-04-01 DIAGNOSIS — Z17.0 MALIGNANT NEOPLASM OF LOWER-OUTER QUADRANT OF RIGHT BREAST OF FEMALE, ESTROGEN RECEPTOR POSITIVE: Primary | ICD-10-CM

## 2024-04-01 LAB
ALBUMIN SERPL-MCNC: 4.3 G/DL (ref 3.5–5.2)
ALBUMIN/GLOB SERPL: 1.8 G/DL
ALP SERPL-CCNC: 160 U/L (ref 39–117)
ALT SERPL W P-5'-P-CCNC: 141 U/L (ref 1–33)
ANION GAP SERPL CALCULATED.3IONS-SCNC: 13.2 MMOL/L (ref 5–15)
AST SERPL-CCNC: 47 U/L (ref 1–32)
BASOPHILS # BLD AUTO: 0.07 10*3/MM3 (ref 0–0.2)
BASOPHILS NFR BLD AUTO: 0.7 % (ref 0–1.5)
BILIRUB SERPL-MCNC: 0.4 MG/DL (ref 0–1.2)
BILIRUB UR QL STRIP: NEGATIVE
BUN SERPL-MCNC: 15 MG/DL (ref 8–23)
BUN/CREAT SERPL: 20 (ref 7–25)
CALCIUM SPEC-SCNC: 9.6 MG/DL (ref 8.6–10.5)
CHLORIDE SERPL-SCNC: 103 MMOL/L (ref 98–107)
CLARITY UR: ABNORMAL
CO2 SERPL-SCNC: 22.8 MMOL/L (ref 22–29)
COLOR UR: YELLOW
CREAT SERPL-MCNC: 0.75 MG/DL (ref 0.57–1)
DEPRECATED RDW RBC AUTO: 41.1 FL (ref 37–54)
EGFRCR SERPLBLD CKD-EPI 2021: 86.3 ML/MIN/1.73
EOSINOPHIL # BLD AUTO: 0.04 10*3/MM3 (ref 0–0.4)
EOSINOPHIL NFR BLD AUTO: 0.4 % (ref 0.3–6.2)
ERYTHROCYTE [DISTWIDTH] IN BLOOD BY AUTOMATED COUNT: 12.6 % (ref 12.3–15.4)
GLOBULIN UR ELPH-MCNC: 2.4 GM/DL
GLUCOSE SERPL-MCNC: 151 MG/DL (ref 65–99)
GLUCOSE UR STRIP-MCNC: NEGATIVE MG/DL
HAV IGM SERPL QL IA: NORMAL
HBV CORE IGM SERPL QL IA: NORMAL
HBV SURFACE AG SERPL QL IA: NORMAL
HCT VFR BLD AUTO: 38.8 % (ref 34–46.6)
HCV AB SER DONR QL: NORMAL
HGB BLD-MCNC: 13.1 G/DL (ref 12–15.9)
HGB UR QL STRIP.AUTO: ABNORMAL
IMM GRANULOCYTES # BLD AUTO: 0.08 10*3/MM3 (ref 0–0.05)
IMM GRANULOCYTES NFR BLD AUTO: 0.8 % (ref 0–0.5)
KETONES UR QL STRIP: NEGATIVE
LEUKOCYTE ESTERASE UR QL STRIP.AUTO: ABNORMAL
LYMPHOCYTES # BLD AUTO: 3.06 10*3/MM3 (ref 0.7–3.1)
LYMPHOCYTES NFR BLD AUTO: 30 % (ref 19.6–45.3)
MAGNESIUM SERPL-MCNC: 1.8 MG/DL (ref 1.6–2.4)
MCH RBC QN AUTO: 30.2 PG (ref 26.6–33)
MCHC RBC AUTO-ENTMCNC: 33.8 G/DL (ref 31.5–35.7)
MCV RBC AUTO: 89.4 FL (ref 79–97)
MONOCYTES # BLD AUTO: 0.33 10*3/MM3 (ref 0.1–0.9)
MONOCYTES NFR BLD AUTO: 3.2 % (ref 5–12)
NEUTROPHILS NFR BLD AUTO: 6.63 10*3/MM3 (ref 1.7–7)
NEUTROPHILS NFR BLD AUTO: 64.9 % (ref 42.7–76)
NITRITE UR QL STRIP: POSITIVE
NRBC BLD AUTO-RTO: 0 /100 WBC (ref 0–0.2)
PH UR STRIP.AUTO: 6 [PH] (ref 4.5–8)
PLATELET # BLD AUTO: 338 10*3/MM3 (ref 140–450)
PMV BLD AUTO: 9.7 FL (ref 6–12)
POTASSIUM SERPL-SCNC: 3.2 MMOL/L (ref 3.5–5.2)
PROT SERPL-MCNC: 6.7 G/DL (ref 6–8.5)
PROT UR QL STRIP: ABNORMAL
RBC # BLD AUTO: 4.34 10*6/MM3 (ref 3.77–5.28)
SODIUM SERPL-SCNC: 139 MMOL/L (ref 136–145)
SP GR UR STRIP: 1.02 (ref 1–1.03)
UROBILINOGEN UR QL STRIP: ABNORMAL
WBC NRBC COR # BLD AUTO: 10.21 10*3/MM3 (ref 3.4–10.8)

## 2024-04-01 PROCEDURE — 85025 COMPLETE CBC W/AUTO DIFF WBC: CPT

## 2024-04-01 PROCEDURE — 87086 URINE CULTURE/COLONY COUNT: CPT | Performed by: NURSE PRACTITIONER

## 2024-04-01 PROCEDURE — 36591 DRAW BLOOD OFF VENOUS DEVICE: CPT

## 2024-04-01 PROCEDURE — 87186 SC STD MICRODIL/AGAR DIL: CPT | Performed by: NURSE PRACTITIONER

## 2024-04-01 PROCEDURE — 81003 URINALYSIS AUTO W/O SCOPE: CPT | Performed by: NURSE PRACTITIONER

## 2024-04-01 PROCEDURE — 36415 COLL VENOUS BLD VENIPUNCTURE: CPT | Performed by: NURSE PRACTITIONER

## 2024-04-01 PROCEDURE — 80074 ACUTE HEPATITIS PANEL: CPT | Performed by: NURSE PRACTITIONER

## 2024-04-01 PROCEDURE — 80053 COMPREHEN METABOLIC PANEL: CPT

## 2024-04-01 PROCEDURE — 87077 CULTURE AEROBIC IDENTIFY: CPT | Performed by: NURSE PRACTITIONER

## 2024-04-01 PROCEDURE — 25010000002 HEPARIN LOCK FLUSH PER 10 UNITS: Performed by: INTERNAL MEDICINE

## 2024-04-01 PROCEDURE — 83735 ASSAY OF MAGNESIUM: CPT | Performed by: NURSE PRACTITIONER

## 2024-04-01 RX ORDER — SODIUM CHLORIDE 0.9 % (FLUSH) 0.9 %
10 SYRINGE (ML) INJECTION AS NEEDED
OUTPATIENT
Start: 2024-04-01

## 2024-04-01 RX ORDER — POTASSIUM CHLORIDE 750 MG/1
20 TABLET, EXTENDED RELEASE ORAL DAILY
Qty: 5 TABLET | Refills: 0 | Status: SHIPPED | OUTPATIENT
Start: 2024-04-01

## 2024-04-01 RX ORDER — HEPARIN SODIUM (PORCINE) LOCK FLUSH IV SOLN 100 UNIT/ML 100 UNIT/ML
500 SOLUTION INTRAVENOUS AS NEEDED
Status: DISCONTINUED | OUTPATIENT
Start: 2024-04-01 | End: 2024-04-01 | Stop reason: HOSPADM

## 2024-04-01 RX ORDER — SODIUM CHLORIDE 0.9 % (FLUSH) 0.9 %
10 SYRINGE (ML) INJECTION AS NEEDED
Status: DISCONTINUED | OUTPATIENT
Start: 2024-04-01 | End: 2024-04-01 | Stop reason: HOSPADM

## 2024-04-01 RX ORDER — HEPARIN SODIUM (PORCINE) LOCK FLUSH IV SOLN 100 UNIT/ML 100 UNIT/ML
500 SOLUTION INTRAVENOUS AS NEEDED
OUTPATIENT
Start: 2024-04-01

## 2024-04-01 RX ADMIN — Medication 10 ML: at 10:55

## 2024-04-01 RX ADMIN — HEPARIN 500 UNITS: 100 SYRINGE at 10:56

## 2024-04-01 NOTE — PROGRESS NOTES
V/o Erin APRN no treatment today d/t elevated LFT. V/o to collect u/a.    Lab Results   Component Value Date    GLUCOSE 151 (H) 04/01/2024    BUN 15 04/01/2024    CREATININE 0.75 04/01/2024    EGFRRESULT 98.1 09/14/2022    EGFR 86.3 04/01/2024    BCR 20.0 04/01/2024    K 3.2 (L) 04/01/2024    CO2 22.8 04/01/2024    CALCIUM 9.6 04/01/2024    PROTENTOTREF 6.9 09/14/2022    ALBUMIN 4.3 04/01/2024    BILITOT 0.4 04/01/2024    AST 47 (H) 04/01/2024     (C) 04/01/2024

## 2024-04-01 NOTE — TELEPHONE ENCOUNTER
"Called the patient back as I seen the message from Dr. Lucina connell. She states she is not having chest pain and I asked her numerous times to ensure this was the case. She states her port area is very sore and hurts her and she is also having unrelated back pain she stated. After asking her numerous times to ensure she did not have true chest pain or other cardiac related s/s she stated \"I promise if it was chest pain I would go to the ER.\" She stated that she believes there was just some misunderstanding and there is some language barriers over the phone. I elt her know we would see her as planned then and she was thankful and v/u.   "

## 2024-04-01 NOTE — TELEPHONE ENCOUNTER
Provider: Myra  Caller: patient  Relationship to Patient: self  Call Back Phone Number: [phone]  Reason for Call: patient says she has pain to her port area and her back

## 2024-04-01 NOTE — TELEPHONE ENCOUNTER
Pt call c/o heart pain while inhaling, pain in chest. Left should fells numb, patient reports this has been happening since Wed 3/27.   Left port placement 3/14/24.   I let patient know that she really needs to go to the ER for her chest pain,   I notified [facility] group nurse, patient has apt today for port flush

## 2024-04-01 NOTE — PROGRESS NOTES
Subjective   Felicia Boyle is a 68 y.o. female.  Referred by Dr. Manrique for right breast invasive ductal carcinoma, HER2 positive, left breast ductal carcinoma in situ    History of Present Illness     Ms. Boyle is a 68-year-old postmenopausal [place] lady who is fairly healthy without any significant comorbidities palpated of right breast mass in the latter part of  which was further worked up with diagnostic mammogram and ultrasound and subsequently a biopsy which confirmed invasive ductal carcinoma which was ER/NM negative and HER2 positive.    Family history significant for her 2 sisters with breast cancer at the age of 38 and 48 respectively.  Her older sister  of metastatic breast cancer but her younger sister is doing well.  Patient underwent genetic testing in 2017 which was negative due to her family history.    She had regular screening mammograms up until 2019 but subsequently stopped having annual gynecological visits and therefore did not have any further screening mammograms up until the most recent diagnostic mammogram for the new palpable abnormality.    2024-bilateral diagnostic mammogram and right breast ultrasound  The breast heterogeneously dense.  Left breast with a scar marker in the lower left breast dating back to .  No new findings in the left breast.    Right breast at 8:00, 8 cm from the nipple there is a 1.7 x 1.4 x 1.6 cm oval high density mass with partially circumscribed and partially indistinct margins.    On the ultrasound the mass measures 1.7 x 1 x 1.3 cm.    No abnormal right axilla lymphadenopathy.    2024-right breast ultrasound-guided biopsy  Pathology consistent with invasive ductal carcinoma  Grade 3  9.3 mm of invasive disease on the core biopsy  ER negative  NM negative  HER2 3+ on immunohistochemistry, 95%  Ki-67 70%    2024-bilateral breast MRI  In the right breast at 8:00, 8 cm from the nipple there is a irregular enhancing mass which  measures 1.8 x 2 x 1.7 cm.  This corresponds to the biopsy-proven malignancy.  Extending anteriorly away from the MT margin of the mass there is an irregular linear enhancement which measures 3.5 x 1 x 0.7 cm.  The whole area including the mass measures 4.7 cm in AP dimension, 1.7 in mediolateral and 2.4 cm in the superior-inferior dimension.    At 12:00, 3 cm from the nipple there is an irregular non-mass enhancement which measures 1 x 1.1 x 1.2 cm.  No mammographic correlate is appreciated.    No other suspicious areas of enhancement in the right breast or right axilla or internal mammary chain.    In the left breast, middle third at 11:30 position, 3.7 cm from the nipple there is an irregular area of non-mass enhancement which measures 1.3 x 1.1 x 1 cm.  There is suspected architectural distortion.  No other areas of abnormal enhancement seen in the left breast of the left axilla or the internal mammary chain.    2/26/2024-additional MRI guided biopsies    1.left breast 12:00-intermediate grade ductal carcinoma in situ with apocrine features  ER +91 to 100% strong  VT +81 to 90% strong  Ki-67 15%    2. Right breast 12 o'clock position MRI guided biopsy of the non-mass enhancement-sclerosing adenosis with associated calcifications  No atypical hyperplasia in situ or invasive carcinoma    3.right breast 8:00 MRI guided biopsies of the linear enhancement is consistent with high-grade ductal carcinoma in situ  DCIS involves multiple tissue cores measuring up to 3 mm.  Sclerosing adenosis and usual ductal hyperplasia with associated microcalcifications.    The case was discussed in tumor board and Dr. Patel thought that they could be more invasive disease in the 4.7 cm of linear enhancement noted in the right breast at the site of the mass.  Therefore it was decided to proceed with neoadjuvant chemotherapy.    Patient was a former smoker and smoked for about 40 years, half a pack per week, quit about 1 month ago.   Denies any alcohol use.    Week 1 Taxol Herceptin and Perjeta was planned for 3/18/2024.  Patient received Herceptin and Perjeta and had some chills for which she received Solu-Medrol and responded well to that.  However after infusing only 1 cc of Taxol patient experienced a severe anaphylactic reaction due to which the infusion was stopped permanently and she received an additional dose of Solu-Medrol and Benadryl.  Symptoms resolved subsequently and patient was discharged home safely.    Due to this recent treatment has been changed to Abraxane along with Herceptin and Perjeta.  3/25/2024-patient is due for her first treatment with Abraxane.    She is extremely anxious given the severe hypersensitivity reaction that she experienced with Taxol.  She is also complaining of increased belching and heartburn since her last chemotherapy.  She is complaining of severe diarrhea with started last night.  Had multiple loose stools.    INTERVAL HISTORY  Patient comes into the office today due for cycle 1 day 15 Abraxane.  She reported having considerable amount of diarrhea starting about 3 days after treatment that did improve after increasing her Imodium use a few days ago.  She denies fevers or chills.  There has been also had diarrhea earlier in the week and thought he had some sort of virus but also did not have fevers or chills.  She states during this time she was not eating well as this is causing more diarrhea.  She denies abdominal pain.  She has pain in her left shoulder and left breast and shoulder that comes and goes.  She does notice this with deep breath occasionally.  She notices discomfort when she lies flat on her back at night sometimes but not every night.  She describes this is an ache that feels better if she presses on the area.  She denies indigestion has been taking pantoprazole with improvement in symptoms.  Her weight is down additional 2 pounds this week.  She has noted some dysuria the past  "couple of days with burning with urination.  She denies any known bleeding.  Her stools have now normalized.  She was able to eat yesterday without difficulty.    The following portions of the patient's history were reviewed and updated as appropriate: allergies, current medications, past family history, past medical history, past social history, past surgical history, and problem list.    Past Medical History:   Diagnosis Date    Anxiety about health     Breast cancer of lower-outer quadrant of right female breast 02/14/2024    DCIS (ductal carcinoma in situ) of breast 03/15/2024    Diverticulosis     Mixed hyperlipidemia 05/19/2019    DIET CONTROLLED    Osteoporosis     Type 2 diabetes mellitus         Past Surgical History:   Procedure Laterality Date    BREAST BIOPSY Left     Excisional biopsy in the [facility]    BREAST BIOPSY  03/2024    CATARACT EXTRACTION W/ INTRAOCULAR LENS IMPLANT Bilateral     CHOLECYSTECTOMY  2012    COLON RESECTION Right 2002    COLONOSCOPY N/A 04/28/2021    Procedure: COLONOSCOPY TO ANASTAMOSIS/TI;  Surgeon: Angelo Gonsalez MD;  Location: [facility] ENDOSCOPY;  Service: Gastroenterology;  Laterality: N/A;  PRE: SCREENING  POST: NORMAL POST-OP ANATOMY    ENDOSCOPY      2013    ENDOSCOPY N/A 03/10/2017    Procedure: ESOPHAGOGASTRODUODENOSCOPY WITH BIOPSY AND PETER DILATATION 54\";  Surgeon: Angelo Gonsalez MD;  Location: [facility] ENDOSCOPY;  Service:     ENDOSCOPY N/A 04/28/2021    Procedure: ESOPHAGOGASTRODUODENOSCOPY WITH BIOPSIES, 54FR PETER DILATATION;  Surgeon: Angeol Gonsalez MD;  Location: [facility] ENDOSCOPY;  Service: Gastroenterology;  Laterality: N/A;  PRE: DYSPHAGIA  POST: GASTRITIS, ESOPHAGEAL RING    VENOUS ACCESS DEVICE (PORT) INSERTION Left 3/14/2024    Procedure: INSERTION OF PORTACATH;  Surgeon: Abeba Manrique MD;  Location: [facility] MAIN OR;  Service: General;  Laterality: Left;        Family History   Problem Relation Age of Onset    Hypertension Mother     Diabetes " "Mother     Hypertension Father     Breast cancer Sister 48    Breast cancer Sister 38    No Known Problems Brother     No Known Problems Daughter     No Known Problems Son     No Known Problems Maternal Grandmother     No Known Problems Paternal Grandmother     No Known Problems Maternal Grandfather     No Known Problems Paternal Grandfather     Autism Grandson     Colon cancer Neg Hx     Rectal cancer Neg Hx     Endometrial cancer Neg Hx     Vaginal cancer Neg Hx     Cervical cancer Neg Hx     Ovarian cancer Neg Hx     Prostate cancer Neg Hx     Uterine cancer Neg Hx     Malig Hyperthermia Neg Hx         Social History     Socioeconomic History    Marital status:      Spouse name: Ryan   Tobacco Use    Smoking status: Former     Current packs/day: 0.00     Average packs/day: 0.3 packs/day for 15.0 years (3.8 ttl pk-yrs)     Types: Cigarettes     Quit date: 1/15/2024     Years since quittin.2     Passive exposure: Never    Smokeless tobacco: Never    Tobacco comments:     N/A   Vaping Use    Vaping status: Never Used   Substance and Sexual Activity    Alcohol use: Yes     Comment: RARE    Drug use: No    Sexual activity: Not Currently     Partners: Male     Birth control/protection: Tubal ligation        OB History          4    Para   3    Term   3       0    AB   1    Living   3         SAB   1    IAB   0    Ectopic   0    Molar   0    Multiple   0    Live Births   3               Age at menarche-16  Age at first live childbirth-18   4 para 3  1  Age at menopause-49  No use of hormone replacement therapy  No use of oral conceptive pills  Breast-feeding for 3 months    Allergies   Allergen Reactions    Metronidazole Nausea And Vomiting          Review of systems mentioned HPI otherwise negative    Objective   Blood pressure 143/81, pulse 72, temperature 97.8 °F (36.6 °C), temperature source Temporal, resp. rate 16, height 147.3 cm (57.99\"), weight 51.3 kg (113 lb 1.6 oz), " SpO2 97%, not currently breastfeeding.   Physical Exam  Vitals reviewed.   Constitutional:       Appearance: Normal appearance. She is normal weight.   HENT:      Nose: Nose normal.      Mouth/Throat:      Pharynx: Oropharynx is clear.   Eyes:      Conjunctiva/sclera: Conjunctivae normal.   Cardiovascular:      Rate and Rhythm: Normal rate.   Pulmonary:      Effort: Pulmonary effort is normal.   Abdominal:      General: Abdomen is flat.   Musculoskeletal:         General: Normal range of motion.      Cervical back: Normal range of motion.   Skin:     General: Skin is warm.   Neurological:      General: No focal deficit present.      Mental Status: She is alert and oriented to person, place, and time.   Psychiatric:         Mood and Affect: Mood normal.         Behavior: Behavior normal.         Thought Content: Thought content normal.         Judgment: Judgment normal.     Breast Exam: Right breast on inspection 9 o'clock position with biopsy site changes.  On palpation there is a palpable mass which measures 2.5 x 2 cm which is fairly superficial at 8:00.   No palpable right axillary lymphadenopathy.    Left breast :There is a palpable underlying hematoma which is tender to palpation from MRI guided biopsies. No left axillary lymphadenopathy.      I have reexamined the patient and the results are consistent with the previously documented exam. Erin Mir, APRN      Infusion on 04/01/2024   Component Date Value Ref Range Status    WBC 04/01/2024 10.21  3.40 - 10.80 10*3/mm3 Final    RBC 04/01/2024 4.34  3.77 - 5.28 10*6/mm3 Final    Hemoglobin 04/01/2024 13.1  12.0 - 15.9 g/dL Final    Hematocrit 04/01/2024 38.8  34.0 - 46.6 % Final    MCV 04/01/2024 89.4  79.0 - 97.0 fL Final    MCH 04/01/2024 30.2  26.6 - 33.0 pg Final    MCHC 04/01/2024 33.8  31.5 - 35.7 g/dL Final    RDW 04/01/2024 12.6  12.3 - 15.4 % Final    RDW-SD 04/01/2024 41.1  37.0 - 54.0 fl Final    MPV 04/01/2024 9.7  6.0 - 12.0 fL Final    Platelets  04/01/2024 338  140 - 450 10*3/mm3 Final    Neutrophil % 04/01/2024 64.9  42.7 - 76.0 % Final    Lymphocyte % 04/01/2024 30.0  19.6 - 45.3 % Final    Monocyte % 04/01/2024 3.2 (L)  5.0 - 12.0 % Final    Eosinophil % 04/01/2024 0.4  0.3 - 6.2 % Final    Basophil % 04/01/2024 0.7  0.0 - 1.5 % Final    Immature Grans % 04/01/2024 0.8 (H)  0.0 - 0.5 % Final    Neutrophils, Absolute 04/01/2024 6.63  1.70 - 7.00 10*3/mm3 Final    Lymphocytes, Absolute 04/01/2024 3.06  0.70 - 3.10 10*3/mm3 Final    Monocytes, Absolute 04/01/2024 0.33  0.10 - 0.90 10*3/mm3 Final    Eosinophils, Absolute 04/01/2024 0.04  0.00 - 0.40 10*3/mm3 Final    Basophils, Absolute 04/01/2024 0.07  0.00 - 0.20 10*3/mm3 Final    Immature Grans, Absolute 04/01/2024 0.08 (H)  0.00 - 0.05 10*3/mm3 Final    nRBC 04/01/2024 0.0  0.0 - 0.2 /100 WBC Final   Infusion on 03/25/2024   Component Date Value Ref Range Status    Glucose 03/25/2024 200 (H)  65 - 99 mg/dL Final    BUN 03/25/2024 17  8 - 23 mg/dL Final    Creatinine 03/25/2024 0.98  0.57 - 1.00 mg/dL Final    Sodium 03/25/2024 137  136 - 145 mmol/L Final    Potassium 03/25/2024 3.6  3.5 - 5.2 mmol/L Final    Chloride 03/25/2024 106  98 - 107 mmol/L Final    CO2 03/25/2024 15.1 (L)  22.0 - 29.0 mmol/L Final    Calcium 03/25/2024 9.4  8.6 - 10.5 mg/dL Final    Total Protein 03/25/2024 8.1  6.0 - 8.5 g/dL Final    Albumin 03/25/2024 4.5  3.5 - 5.2 g/dL Final    ALT (SGPT) 03/25/2024 22  1 - 33 U/L Final    AST (SGOT) 03/25/2024 20  1 - 32 U/L Final    Alkaline Phosphatase 03/25/2024 100  39 - 117 U/L Final    Total Bilirubin 03/25/2024 0.3  0.0 - 1.2 mg/dL Final    Globulin 03/25/2024 3.6  gm/dL Final    A/G Ratio 03/25/2024 1.3  g/dL Final    BUN/Creatinine Ratio 03/25/2024 17.3  7.0 - 25.0 Final    Anion Gap 03/25/2024 15.9 (H)  5.0 - 15.0 mmol/L Final    eGFR 03/25/2024 62.6  >60.0 mL/min/1.73 Final    WBC 03/25/2024 12.04 (H)  3.40 - 10.80 10*3/mm3 Final    RBC 03/25/2024 5.06  3.77 - 5.28 10*6/mm3  Final    Hemoglobin 03/25/2024 15.5  12.0 - 15.9 g/dL Final    Hematocrit 03/25/2024 47.0 (H)  34.0 - 46.6 % Final    MCV 03/25/2024 92.9  79.0 - 97.0 fL Final    MCH 03/25/2024 30.6  26.6 - 33.0 pg Final    MCHC 03/25/2024 33.0  31.5 - 35.7 g/dL Final    RDW 03/25/2024 12.8  12.3 - 15.4 % Final    RDW-SD 03/25/2024 43.8  37.0 - 54.0 fl Final    MPV 03/25/2024 10.2  6.0 - 12.0 fL Final    Platelets 03/25/2024 269  140 - 450 10*3/mm3 Final    Neutrophil % 03/25/2024 82.4 (H)  42.7 - 76.0 % Final    Lymphocyte % 03/25/2024 8.3 (L)  19.6 - 45.3 % Final    Monocyte % 03/25/2024 7.6  5.0 - 12.0 % Final    Eosinophil % 03/25/2024 0.2 (L)  0.3 - 6.2 % Final    Basophil % 03/25/2024 0.2  0.0 - 1.5 % Final    Immature Grans % 03/25/2024 1.3 (H)  0.0 - 0.5 % Final    Neutrophils, Absolute 03/25/2024 9.92 (H)  1.70 - 7.00 10*3/mm3 Final    Lymphocytes, Absolute 03/25/2024 1.00  0.70 - 3.10 10*3/mm3 Final    Monocytes, Absolute 03/25/2024 0.91 (H)  0.10 - 0.90 10*3/mm3 Final    Eosinophils, Absolute 03/25/2024 0.02  0.00 - 0.40 10*3/mm3 Final    Basophils, Absolute 03/25/2024 0.03  0.00 - 0.20 10*3/mm3 Final    Immature Grans, Absolute 03/25/2024 0.16 (H)  0.00 - 0.05 10*3/mm3 Final    nRBC 03/25/2024 0.0  0.0 - 0.2 /100 WBC Final   Infusion on 03/18/2024   Component Date Value Ref Range Status    Glucose 03/18/2024 150 (H)  65 - 99 mg/dL Final    BUN 03/18/2024 21  8 - 23 mg/dL Final    Creatinine 03/18/2024 0.69  0.57 - 1.00 mg/dL Final    Sodium 03/18/2024 142  136 - 145 mmol/L Final    Potassium 03/18/2024 4.0  3.5 - 5.2 mmol/L Final    Chloride 03/18/2024 106  98 - 107 mmol/L Final    CO2 03/18/2024 20.9 (L)  22.0 - 29.0 mmol/L Final    Calcium 03/18/2024 9.5  8.6 - 10.5 mg/dL Final    Total Protein 03/18/2024 6.9  6.0 - 8.5 g/dL Final    Albumin 03/18/2024 4.3  3.5 - 5.2 g/dL Final    ALT (SGPT) 03/18/2024 19  1 - 33 U/L Final    AST (SGOT) 03/18/2024 18  1 - 32 U/L Final    Alkaline Phosphatase 03/18/2024 72  39 - 117  U/L Final    Total Bilirubin 03/18/2024 0.3  0.0 - 1.2 mg/dL Final    Globulin 03/18/2024 2.6  gm/dL Final    A/G Ratio 03/18/2024 1.7  g/dL Final    BUN/Creatinine Ratio 03/18/2024 30.4 (H)  7.0 - 25.0 Final    Anion Gap 03/18/2024 15.1 (H)  5.0 - 15.0 mmol/L Final    eGFR 03/18/2024 94.1  >60.0 mL/min/1.73 Final    WBC 03/18/2024 12.33 (H)  3.40 - 10.80 10*3/mm3 Final    RBC 03/18/2024 4.20  3.77 - 5.28 10*6/mm3 Final    Hemoglobin 03/18/2024 13.1  12.0 - 15.9 g/dL Final    Hematocrit 03/18/2024 38.6  34.0 - 46.6 % Final    MCV 03/18/2024 91.9  79.0 - 97.0 fL Final    MCH 03/18/2024 31.2  26.6 - 33.0 pg Final    MCHC 03/18/2024 33.9  31.5 - 35.7 g/dL Final    RDW 03/18/2024 12.7  12.3 - 15.4 % Final    RDW-SD 03/18/2024 42.4  37.0 - 54.0 fl Final    MPV 03/18/2024 10.6  6.0 - 12.0 fL Final    Platelets 03/18/2024 271  140 - 450 10*3/mm3 Final    Neutrophil % 03/18/2024 69.8  42.7 - 76.0 % Final    Lymphocyte % 03/18/2024 19.7  19.6 - 45.3 % Final    Monocyte % 03/18/2024 9.8  5.0 - 12.0 % Final    Eosinophil % 03/18/2024 0.0 (L)  0.3 - 6.2 % Final    Basophil % 03/18/2024 0.2  0.0 - 1.5 % Final    Immature Grans % 03/18/2024 0.5  0.0 - 0.5 % Final    Neutrophils, Absolute 03/18/2024 8.61 (H)  1.70 - 7.00 10*3/mm3 Final    Lymphocytes, Absolute 03/18/2024 2.43  0.70 - 3.10 10*3/mm3 Final    Monocytes, Absolute 03/18/2024 1.21 (H)  0.10 - 0.90 10*3/mm3 Final    Eosinophils, Absolute 03/18/2024 0.00  0.00 - 0.40 10*3/mm3 Final    Basophils, Absolute 03/18/2024 0.02  0.00 - 0.20 10*3/mm3 Final    Immature Grans, Absolute 03/18/2024 0.06 (H)  0.00 - 0.05 10*3/mm3 Final    nRBC 03/18/2024 0.0  0.0 - 0.2 /100 WBC Final   Hospital Outpatient Visit on 03/15/2024   Component Date Value Ref Range Status    EF(MOD-bp) 03/15/2024 75.5  % Final    EF_3D-VOL 03/15/2024 72.0  % Final    LV GLOBAL STRAIN  03/15/2024 -24.2  % Final    LVIDd 03/15/2024 4.0  cm Final    LVIDs 03/15/2024 2.5  cm Final    IVSd 03/15/2024 0.70  cm Final     LVPWd 03/15/2024 0.90  cm Final    FS 03/15/2024 37.5  % Final    IVS/LVPW 03/15/2024 0.78  cm Final    ESV(cubed) 03/15/2024 15.6  ml Final    LV Sys Vol (BSA corrected) 03/15/2024 13.5  cm2 Final    EDV(cubed) 03/15/2024 64.0  ml Final    LV Batista Vol (BSA corrected) 03/15/2024 51.5  cm2 Final    LV mass(C)d 03/15/2024 93.5  grams Final    LVOT area 03/15/2024 3.1  cm2 Final    LVOT diam 03/15/2024 2.00  cm Final    EDV(MOD-sp2) 03/15/2024 78.0  ml Final    EDV(MOD-sp4) 03/15/2024 76.0  ml Final    ESV(MOD-sp2) 03/15/2024 18.0  ml Final    ESV(MOD-sp4) 03/15/2024 20.0  ml Final    SV(MOD-sp2) 03/15/2024 60.0  ml Final    SV(MOD-sp4) 03/15/2024 56.0  ml Final    SI(MOD-sp2) 03/15/2024 40.6  ml/m2 Final    SI(MOD-sp4) 03/15/2024 37.9  ml/m2 Final    EF(MOD-sp2) 03/15/2024 76.9  % Final    EF(MOD-sp4) 03/15/2024 73.7  % Final    MV E max tahir 03/15/2024 90.3  cm/sec Final    MV A max tahir 03/15/2024 111.0  cm/sec Final    MV dec time 03/15/2024 0.21  sec Final    MV E/A 03/15/2024 0.81   Final    Pulm A Revs Dur 03/15/2024 0.14  sec Final    MV A dur 03/15/2024 0.16  sec Final    LA ESV Index (BP) 03/15/2024 23.2  ml/m2 Final    Med Peak E' Tahir 03/15/2024 9.1  cm/sec Final    Lat Peak E' Tahir 03/15/2024 8.9  cm/sec Final    TR max tahir 03/15/2024 204.0  cm/sec Final    Avg E/e' ratio 03/15/2024 10.03   Final    SV(LVOT) 03/15/2024 68.5  ml Final    SV(RVOT) 03/15/2024 35.4  ml Final    Qp/Qs 03/15/2024 0.52   Final    RV Base 03/15/2024 3.2  cm Final    RV Mid 03/15/2024 2.6  cm Final    RV Length 03/15/2024 6.2  cm Final    TAPSE (>1.6) 03/15/2024 2.6  cm Final    RV S' 03/15/2024 11.2  cm/sec Final    Pulm Sys Tahir 03/15/2024 33.6  cm/sec Final    Pulm Batista Tahir 03/15/2024 38.8  cm/sec Final    Pulm S/D 03/15/2024 0.87   Final    Pulm A Revs Tahir 03/15/2024 36.7  cm/sec Final    LV V1 max 03/15/2024 107.0  cm/sec Final    LV V1 max PG 03/15/2024 4.6  mmHg Final    LV V1 mean PG 03/15/2024 2.00  mmHg Final    LV V1 VTI  03/15/2024 21.8  cm Final    Ao pk mac 03/15/2024 110.0  cm/sec Final    Ao max PG 03/15/2024 4.8  mmHg Final    Ao mean PG 03/15/2024 3.0  mmHg Final    Ao V2 VTI 03/15/2024 23.6  cm Final    BENJAMIN(I,D) 03/15/2024 2.9  cm2 Final    MV max PG 03/15/2024 6.3  mmHg Final    MV mean PG 03/15/2024 3.0  mmHg Final    MV V2 VTI 03/15/2024 31.8  cm Final    MV P1/2t 03/15/2024 69.9  msec Final    MVA(P1/2t) 03/15/2024 3.1  cm2 Final    MVA(VTI) 03/15/2024 2.15  cm2 Final    MV dec slope 03/15/2024 507.0  cm/sec2 Final    TR max PG 03/15/2024 16.6  mmHg Final    RVSP(TR) 03/15/2024 19.6  mmHg Final    RAP systole 03/15/2024 3.0  mmHg Final    RVOT diam 03/15/2024 1.80  cm Final    RV V1 max PG 03/15/2024 1.60  mmHg Final    RV V1 max 03/15/2024 63.3  cm/sec Final    RV V1 VTI 03/15/2024 13.9  cm Final    PA V2 max 03/15/2024 103.0  cm/sec Final    PA acc time 03/15/2024 0.12  sec Final    Ao root diam 03/15/2024 2.6  cm Final    ACS 03/15/2024 1.40  cm Final    Sinus 03/15/2024 2.8  cm Final    STJ 03/15/2024 2.34  cm Final    3D vol index 03/15/2024 33.0   Final    Dimensionless Index 03/15/2024 0.90  (DI) Final    Ascending aorta 03/15/2024 2.8  cm Final    Aortic arch 03/15/2024 2.2  cm Final    Abdo Ao Diam 03/15/2024 1.7  cm Final   Admission on 03/14/2024, Discharged on 03/14/2024   Component Date Value Ref Range Status    Glucose 03/14/2024 124  70 - 130 mg/dL Final    Glucose 03/14/2024 102  70 - 130 mg/dL Final   Pre-Admission Testing on 03/06/2024   Component Date Value Ref Range Status    Glucose 03/06/2024 183 (H)  65 - 99 mg/dL Final    BUN 03/06/2024 17  8 - 23 mg/dL Final    Creatinine 03/06/2024 0.76  0.57 - 1.00 mg/dL Final    Sodium 03/06/2024 136  136 - 145 mmol/L Final    Potassium 03/06/2024 4.0  3.5 - 5.2 mmol/L Final    Chloride 03/06/2024 102  98 - 107 mmol/L Final    CO2 03/06/2024 23.7  22.0 - 29.0 mmol/L Final    Calcium 03/06/2024 9.7  8.6 - 10.5 mg/dL Final    BUN/Creatinine Ratio 03/06/2024 22.4   7.0 - 25.0 Final    Anion Gap 03/06/2024 10.3  5.0 - 15.0 mmol/L Final    eGFR 03/06/2024 84.9  >60.0 mL/min/1.73 Final    QT Interval 03/06/2024 382  ms Final    QTC Interval 03/06/2024 449  ms Final   Lab on 03/06/2024   Component Date Value Ref Range Status    WBC 03/06/2024 6.64  3.40 - 10.80 10*3/mm3 Final    RBC 03/06/2024 4.57  3.77 - 5.28 10*6/mm3 Final    Hemoglobin 03/06/2024 13.7  12.0 - 15.9 g/dL Final    Hematocrit 03/06/2024 41.7  34.0 - 46.6 % Final    MCV 03/06/2024 91.2  79.0 - 97.0 fL Final    MCH 03/06/2024 30.0  26.6 - 33.0 pg Final    MCHC 03/06/2024 32.9  31.5 - 35.7 g/dL Final    RDW 03/06/2024 12.6  12.3 - 15.4 % Final    RDW-SD 03/06/2024 41.6  37.0 - 54.0 fl Final    MPV 03/06/2024 10.3  6.0 - 12.0 fL Final    Platelets 03/06/2024 309  140 - 450 10*3/mm3 Final    Neutrophil % 03/06/2024 52.7  42.7 - 76.0 % Final    Lymphocyte % 03/06/2024 36.3  19.6 - 45.3 % Final    Monocyte % 03/06/2024 8.6  5.0 - 12.0 % Final    Eosinophil % 03/06/2024 1.1  0.3 - 6.2 % Final    Basophil % 03/06/2024 0.8  0.0 - 1.5 % Final    Immature Grans % 03/06/2024 0.5  0.0 - 0.5 % Final    Neutrophils, Absolute 03/06/2024 3.51  1.70 - 7.00 10*3/mm3 Final    Lymphocytes, Absolute 03/06/2024 2.41  0.70 - 3.10 10*3/mm3 Final    Monocytes, Absolute 03/06/2024 0.57  0.10 - 0.90 10*3/mm3 Final    Eosinophils, Absolute 03/06/2024 0.07  0.00 - 0.40 10*3/mm3 Final    Basophils, Absolute 03/06/2024 0.05  0.00 - 0.20 10*3/mm3 Final    Immature Grans, Absolute 03/06/2024 0.03  0.00 - 0.05 10*3/mm3 Final    nRBC 03/06/2024 0.0  0.0 - 0.2 /100 WBC Final        XR Chest Post CVA Port    Result Date: 3/14/2024  1. Mediport catheter tip in the SVC. 2. No pneumothorax is seen.   This report was finalized on 3/14/2024 12:59 PM by Dr. Ean Allen M.D on Workstation: [id number]              Assessment & Plan       *Right breast invasive ductal carcinoma  Clinical T2 N0 M0, stage IIa, clinical and prognostic ER/AK negative, HER2 3+  immunohistochemistry, Ki-67 70%  On the MRI the mass measures 2 cm however there is linear enhancement which in the AP dimension measures 4.7 cm.  Additional biopsies of this linear enhancement was performed and consistent with DCIS  It is hard to delineate the amount of invasive disease as opposed to DCIS.  More likely than not invasive component is likely limited to the mass which is about 2-1/2 cm on exam.  Since the tumor is greater than 2 cm I think it is reasonable to proceed with neoadjuvant chemotherapy.  We discussed proceeding with weekly Taxol along with Herceptin and Perjeta every 3 weeks.  After 12 weeks she will require an MRI to assess the response.  If there is inadequate response then we may have to proceed with Adriamycin and Cytoxan for 4 cycles however if there is a good response then she can proceed with surgery with no additional chemotherapy.  The likely plan is that she will undergo bilateral mastectomy.  If that is the case as long as she does not have any positive margins or lymph node positive disease she may not need radiation.  We discussed continuing adjuvant HER2 directed therapy for whole year  3/18/2024-received first dose of Herceptin and Perjeta, had a severe hypersensitivity reaction to Taxol and hence Taxol was discontinued  3/25/2024-scheduled for Abraxane only.  Labs reviewed and stable to proceed with chemotherapy  Patient is extremely anxious today after experiencing the severe hypersensitivity reaction last week.  Reassured patient that this is unlikely to happen with Abraxane and we will premedicate with extra Solu-Medrol..  4/1/2024 patient returns for cycle 1 day 15 Abraxane.,  Left chest, left shoulder at times.  This has come and gone over the past week and is not associated with any shortness of breath, numbness or tingling.  She states when her chest is hurting if she presses on the area it gets better.  She notices it at different points during the day.  It feels  more like an ache and is not a sharp pain.  She states she has been sleeping in a different position due to the port which could be causing some muscular discomfort.  She has noticed the discomfort at times when she takes a deep breath however is able to get deep breath here in the office did not have any pain.  She is not having any pain today at all.  She has taken her hydrocodone maybe 3-4 times over the past week and has not needed it multiple times a day.  CMP resulted today with AST elevated at 47 and .  Bilirubin remains normal at 0.4 with alkaline phosphatase elevated to 160.  Reviewed with Dr. Loredo and will hold treatment today.  Patient will return in 1 week for review by Dr. Renteria with possible Abraxane Perjeta and Herceptin.  In the meantime she will try to limit any acetaminophen containing products.  She did have a few days of diarrhea that she is unsure if it was related to the Abraxane versus a GI bug as her  had the same symptoms.  Neither had chills or fevers.  She is also having some dysuria today we have a urinalysis with culture pending.      *Left breast ductal carcinoma in situ  Intermed  She has been having some discomfort in her left shoulder bladeiate grade with apocrine features  ER/GA strongly positive  Plan is for her to undergo bilateral mastectomy.  Patient had questions regarding reconstruction  If she does undergo bilateral mastectomy then that would be curative and she would not require any endocrine therapy subsequently unless there is any upstaging of the malignancy    *Severe diarrhea  Started last night  Patient has not used any Imodium  We will administer 1 L of normal saline today  Imodium will be given here today  She will start entegrade  Patient has not been using any Imodium.  Encouraged her to use Imodium up to 8 tablets in a day.  When seen on 4/1/2023 patient has had a few days of diarrhea last week during which she was not eating.  She was not taking  the Imodium as directed and when she called into the office was educated on this.  What she increase the number of tablets per day the diarrhea did stop.  She reports she has had formed bowel movements over the past couple of days been normal.    *GERD  Protonix with improvement    *Cardiac health  Referral to cardio oncology placed  3/15/2024 echocardiogram with an ejection fraction of 75.5%, LV strain is normal at -24.2%  She has met with Dr. Peguero , started on bisoprolol     *Elevated LFTs  4/1/2024 AST at 47, from 20.  ALT at 141, from 22.  Alkaline phosphatase at 160, from 100.  Total bilirubin remains normal.  Acute hepatitis panel negative.  Patient asked to avoid Tylenol and limit her hydrocodone usage as this does have Tylenol in it unless needed.  Also advised to avoid alcohol which she states she does not drink.    *Hypokalemia secondary to diarrhea likely  4/1/2024 potassium 3.2.  Potassium chloride 20 mEq 1 p.o. daily sent to pharmacy x 5 days.  Will recheck next week.    *Return to the office in 1 week for review by Dr. Renteria with Herceptin, Perjeta, Abraxane pending results of laboratory data.  *Patient is to call the office for any new onset abdominal pain or nausea/vomiting.  She is to present to the emergency department for new onset chest pain.      Patient is on high risk medication requiring frequent monitoring.  Case discussed today with Dr. Loredo and Dr. Renteria's absence.  Case discussed with clinic nursing. I spent 40 minutes caring for Felicia on this date of service. This time includes time spent by me in the following activities: preparing for the visit, reviewing tests, obtaining and/or reviewing a separately obtained history, performing a medically appropriate examination and/or evaluation, counseling and educating the patient/family/caregiver, ordering medications, tests, or procedures, referring and communicating with other health care professionals, documenting information in the  medical record, independently interpreting results and communicating that information with the patient/family/caregiver, and care coordination.

## 2024-04-02 ENCOUNTER — TELEPHONE (OUTPATIENT)
Dept: ONCOLOGY | Facility: CLINIC | Age: 69
End: 2024-04-02
Payer: COMMERCIAL

## 2024-04-02 RX ORDER — CEPHALEXIN 500 MG/1
500 CAPSULE ORAL 2 TIMES DAILY
Qty: 10 CAPSULE | Refills: 0 | Status: SHIPPED | OUTPATIENT
Start: 2024-04-02

## 2024-04-02 NOTE — TELEPHONE ENCOUNTER
Called and spoke with patient she is feeling well today.  She still does have some burning with urination towards the end of her stream and we discussed her urine culture results and will send in Keflex 500 mg twice daily for 5 days.  Patient agreeable to the plan and will call for any other concerns.

## 2024-04-03 ENCOUNTER — PATIENT OUTREACH (OUTPATIENT)
Dept: OTHER | Facility: HOSPITAL | Age: 69
End: 2024-04-03
Payer: COMMERCIAL

## 2024-04-03 DIAGNOSIS — C50.511 MALIGNANT NEOPLASM OF LOWER-OUTER QUADRANT OF RIGHT FEMALE BREAST, UNSPECIFIED ESTROGEN RECEPTOR STATUS: Primary | ICD-10-CM

## 2024-04-03 LAB — BACTERIA SPEC AEROBE CULT: ABNORMAL

## 2024-04-03 NOTE — PROGRESS NOTES
Called Ms. Boyle to see how she was doing. Left a message with my contact information and asked her to call back at her convenience.      Ms. Boyle called back and stated she is doing well but has questions about transportation assistance. She stated she had to cancel an appt due to her  being sick and unable to drive. She was interested in speaking with our social work team about options for transportation if that happens again. Referral made. She was thankful for the call and will reach out if any needs arise.

## 2024-04-04 NOTE — PROGRESS NOTES
Subjective   Felicia Boyle is a 68 y.o. female.  Referred by Dr. Manrique for right breast invasive ductal carcinoma, HER2 positive, left breast ductal carcinoma in situ    History of Present Illness     Ms. Boyle is a 68-year-old postmenopausal [place] lady who is fairly healthy without any significant comorbidities palpated of right breast mass in the latter part of  which was further worked up with diagnostic mammogram and ultrasound and subsequently a biopsy which confirmed invasive ductal carcinoma which was ER/NM negative and HER2 positive.    Family history significant for her 2 sisters with breast cancer at the age of 38 and 48 respectively.  Her older sister  of metastatic breast cancer but her younger sister is doing well.  Patient underwent genetic testing in 2017 which was negative due to her family history.    She had regular screening mammograms up until 2019 but subsequently stopped having annual gynecological visits and therefore did not have any further screening mammograms up until the most recent diagnostic mammogram for the new palpable abnormality.    2024-bilateral diagnostic mammogram and right breast ultrasound  The breast heterogeneously dense.  Left breast with a scar marker in the lower left breast dating back to .  No new findings in the left breast.    Right breast at 8:00, 8 cm from the nipple there is a 1.7 x 1.4 x 1.6 cm oval high density mass with partially circumscribed and partially indistinct margins.    On the ultrasound the mass measures 1.7 x 1 x 1.3 cm.    No abnormal right axilla lymphadenopathy.    2024-right breast ultrasound-guided biopsy  Pathology consistent with invasive ductal carcinoma  Grade 3  9.3 mm of invasive disease on the core biopsy  ER negative  NM negative  HER2 3+ on immunohistochemistry, 95%  Ki-67 70%    2024-bilateral breast MRI  In the right breast at 8:00, 8 cm from the nipple there is a irregular enhancing mass which  measures 1.8 x 2 x 1.7 cm.  This corresponds to the biopsy-proven malignancy.  Extending anteriorly away from the MT margin of the mass there is an irregular linear enhancement which measures 3.5 x 1 x 0.7 cm.  The whole area including the mass measures 4.7 cm in AP dimension, 1.7 in mediolateral and 2.4 cm in the superior-inferior dimension.    At 12:00, 3 cm from the nipple there is an irregular non-mass enhancement which measures 1 x 1.1 x 1.2 cm.  No mammographic correlate is appreciated.    No other suspicious areas of enhancement in the right breast or right axilla or internal mammary chain.    In the left breast, middle third at 11:30 position, 3.7 cm from the nipple there is an irregular area of non-mass enhancement which measures 1.3 x 1.1 x 1 cm.  There is suspected architectural distortion.  No other areas of abnormal enhancement seen in the left breast of the left axilla or the internal mammary chain.    2/26/2024-additional MRI guided biopsies    1.left breast 12:00-intermediate grade ductal carcinoma in situ with apocrine features  ER +91 to 100% strong  CA +81 to 90% strong  Ki-67 15%    2. Right breast 12 o'clock position MRI guided biopsy of the non-mass enhancement-sclerosing adenosis with associated calcifications  No atypical hyperplasia in situ or invasive carcinoma    3.right breast 8:00 MRI guided biopsies of the linear enhancement is consistent with high-grade ductal carcinoma in situ  DCIS involves multiple tissue cores measuring up to 3 mm.  Sclerosing adenosis and usual ductal hyperplasia with associated microcalcifications.    The case was discussed in tumor board and Dr. Patel thought that they could be more invasive disease in the 4.7 cm of linear enhancement noted in the right breast at the site of the mass.  Therefore it was decided to proceed with neoadjuvant chemotherapy.    Patient was a former smoker and smoked for about 40 years, half a pack per week, quit about 1 month ago.   Denies any alcohol use.    Week 1 Taxol Herceptin and Perjeta was planned for 3/18/2024.  Patient received Herceptin and Perjeta and had some chills for which she received Solu-Medrol and responded well to that.  However after infusing only 1 cc of Taxol patient experienced a severe anaphylactic reaction due to which the infusion was stopped permanently and she received an additional dose of Solu-Medrol and Benadryl.  Symptoms resolved subsequently and patient was discharged home safely.    Due to this recent treatment has been changed to Abraxane along with Herceptin and Perjeta.  3/25/2024-patient is due for her first treatment with Abraxane.    She is extremely anxious given the severe hypersensitivity reaction that she experienced with Taxol.  She is also complaining of increased belching and heartburn since her last chemotherapy.  She is complaining of severe diarrhea with started last night.  Had multiple loose stools.    INTERVAL HISTORY  The patient returns today for for follow-up and evaluation prior to cycle 2-day 1 Abraxane, Herceptin, Perjeta.  She did not receive cycle 1 day 15 Abraxane due to elevation in her LFTs.  She is seen today with her  present.  She has started noting hair loss over the last week, she is tearful today because of this.  She also has dryness and cracking on her hands that started a couple days ago. She has intermittent tingling in her fingertips a few times over the last 2 weeks, none present today.  Appetite good.  Bowels moving regularly.  Denies fever, chills, nausea, vomiting.  Says she is no longer able to palpate breast mass.    The following portions of the patient's history were reviewed and updated as appropriate: allergies, current medications, past family history, past medical history, past social history, past surgical history, and problem list.    Past Medical History:   Diagnosis Date    Anxiety about health     Breast cancer of lower-outer quadrant of right  "female breast 02/14/2024    DCIS (ductal carcinoma in situ) of breast 03/15/2024    Diverticulosis     Mixed hyperlipidemia 05/19/2019    DIET CONTROLLED    Osteoporosis     Type 2 diabetes mellitus         Past Surgical History:   Procedure Laterality Date    BREAST BIOPSY Left     Excisional biopsy in the [facility]    BREAST BIOPSY  03/2024    CATARACT EXTRACTION W/ INTRAOCULAR LENS IMPLANT Bilateral     CHOLECYSTECTOMY  2012    COLON RESECTION Right 2002    COLONOSCOPY N/A 04/28/2021    Procedure: COLONOSCOPY TO ANASTAMOSIS/TI;  Surgeon: Angelo Gonsalez MD;  Location: [facility] ENDOSCOPY;  Service: Gastroenterology;  Laterality: N/A;  PRE: SCREENING  POST: NORMAL POST-OP ANATOMY    ENDOSCOPY      2013    ENDOSCOPY N/A 03/10/2017    Procedure: ESOPHAGOGASTRODUODENOSCOPY WITH BIOPSY AND PETER DILATATION 54\";  Surgeon: Angelo Gonsalez MD;  Location: [facility] ENDOSCOPY;  Service:     ENDOSCOPY N/A 04/28/2021    Procedure: ESOPHAGOGASTRODUODENOSCOPY WITH BIOPSIES, 54FR PETER DILATATION;  Surgeon: Angelo Gonsalez MD;  Location: [facility] ENDOSCOPY;  Service: Gastroenterology;  Laterality: N/A;  PRE: DYSPHAGIA  POST: GASTRITIS, ESOPHAGEAL RING    VENOUS ACCESS DEVICE (PORT) INSERTION Left 3/14/2024    Procedure: INSERTION OF PORTACATH;  Surgeon: Abeba Manrique MD;  Location: [facility] OR;  Service: General;  Laterality: Left;        Family History   Problem Relation Age of Onset    Hypertension Mother     Diabetes Mother     Hypertension Father     Breast cancer Sister 48    Breast cancer Sister 38    No Known Problems Brother     No Known Problems Daughter     No Known Problems Son     No Known Problems Maternal Grandmother     No Known Problems Paternal Grandmother     No Known Problems Maternal Grandfather     No Known Problems Paternal Grandfather     Autism Grandson     Colon cancer Neg Hx     Rectal cancer Neg Hx     Endometrial cancer Neg Hx     Vaginal cancer Neg Hx     Cervical cancer Neg Hx     Ovarian " "cancer Neg Hx     Prostate cancer Neg Hx     Uterine cancer Neg Hx     Malig Hyperthermia Neg Hx         Social History     Socioeconomic History    Marital status:      Spouse name: Ryan   Tobacco Use    Smoking status: Former     Current packs/day: 0.00     Average packs/day: 0.3 packs/day for 15.0 years (3.8 ttl pk-yrs)     Types: Cigarettes     Quit date: 1/15/2024     Years since quittin.2     Passive exposure: Never    Smokeless tobacco: Never    Tobacco comments:     N/A   Vaping Use    Vaping status: Never Used   Substance and Sexual Activity    Alcohol use: Yes     Comment: RARE    Drug use: No    Sexual activity: Not Currently     Partners: Male     Birth control/protection: Tubal ligation        OB History          4    Para   3    Term   3       0    AB   1    Living   3         SAB   1    IAB   0    Ectopic   0    Molar   0    Multiple   0    Live Births   3               Age at menarche-16  Age at first live childbirth-18   4 para 3  1  Age at menopause-49  No use of hormone replacement therapy  No use of oral conceptive pills  Breast-feeding for 3 months    Allergies   Allergen Reactions    Metronidazole Nausea And Vomiting          Review of systems mentioned HPI otherwise negative    Objective   Blood pressure 148/84, pulse 72, temperature 98.3 °F (36.8 °C), temperature source Temporal, resp. rate 16, height 147 cm (57.87\"), weight 53.8 kg (118 lb 9.6 oz), not currently breastfeeding.     Physical Exam  Vitals reviewed.   Constitutional:       Appearance: Normal appearance. She is normal weight.   HENT:      Nose: Nose normal.      Mouth/Throat:      Pharynx: Oropharynx is clear.   Eyes:      Conjunctiva/sclera: Conjunctivae normal.   Cardiovascular:      Rate and Rhythm: Normal rate.   Pulmonary:      Effort: Pulmonary effort is normal.   Abdominal:      General: Abdomen is flat.   Musculoskeletal:         General: Normal range of motion.      Cervical back: " Normal range of motion.   Skin:     General: Skin is warm.   Neurological:      General: No focal deficit present.      Mental Status: She is alert and oriented to person, place, and time.   Psychiatric:         Mood and Affect: Mood normal.         Behavior: Behavior normal.         Thought Content: Thought content normal.         Judgment: Judgment normal.       Breast Exam: Right breast on inspection 9 o'clock position with biopsy site changes.  On palpation there is a palpable mass which measures 2.5 x 2 cm which is fairly superficial at 8:00  No palpable right axillary lymphadenopathy.    Left breast :There is a palpable underlying hematoma which is tender to palpation from MRI guided biopsies. No left axillary lymphadenopathy.      I have reexamined the patient and the results are consistent with the previously documented exam. Latha Galloway, APRN      Infusion on 04/08/2024   Component Date Value Ref Range Status    Glucose 04/08/2024 157 (H)  65 - 99 mg/dL Final    BUN 04/08/2024 13  8 - 23 mg/dL Final    Creatinine 04/08/2024 0.65  0.57 - 1.00 mg/dL Final    Sodium 04/08/2024 139  136 - 145 mmol/L Final    Potassium 04/08/2024 4.1  3.5 - 5.2 mmol/L Final    Chloride 04/08/2024 105  98 - 107 mmol/L Final    CO2 04/08/2024 23.9  22.0 - 29.0 mmol/L Final    Calcium 04/08/2024 8.8  8.6 - 10.5 mg/dL Final    Total Protein 04/08/2024 6.4  6.0 - 8.5 g/dL Final    Albumin 04/08/2024 4.1  3.5 - 5.2 g/dL Final    ALT (SGPT) 04/08/2024 58 (H)  1 - 33 U/L Final    AST (SGOT) 04/08/2024 28  1 - 32 U/L Final    Alkaline Phosphatase 04/08/2024 102  39 - 117 U/L Final    Total Bilirubin 04/08/2024 0.3  0.0 - 1.2 mg/dL Final    Globulin 04/08/2024 2.3  gm/dL Final    A/G Ratio 04/08/2024 1.8  g/dL Final    BUN/Creatinine Ratio 04/08/2024 20.0  7.0 - 25.0 Final    Anion Gap 04/08/2024 10.1  5.0 - 15.0 mmol/L Final    eGFR 04/08/2024 95.4  >60.0 mL/min/1.73 Final    WBC 04/08/2024 6.54  3.40 - 10.80 10*3/mm3 Final    RBC  04/08/2024 4.05  3.77 - 5.28 10*6/mm3 Final    Hemoglobin 04/08/2024 11.9 (L)  12.0 - 15.9 g/dL Final    Hematocrit 04/08/2024 38.0  34.0 - 46.6 % Final    MCV 04/08/2024 93.8  79.0 - 97.0 fL Final    MCH 04/08/2024 29.4  26.6 - 33.0 pg Final    MCHC 04/08/2024 31.3 (L)  31.5 - 35.7 g/dL Final    RDW 04/08/2024 13.0  12.3 - 15.4 % Final    RDW-SD 04/08/2024 44.5  37.0 - 54.0 fl Final    MPV 04/08/2024 9.5  6.0 - 12.0 fL Final    Platelets 04/08/2024 370  140 - 450 10*3/mm3 Final    Neutrophil % 04/08/2024 48.6  42.7 - 76.0 % Final    Lymphocyte % 04/08/2024 40.7  19.6 - 45.3 % Final    Monocyte % 04/08/2024 8.7  5.0 - 12.0 % Final    Eosinophil % 04/08/2024 0.9  0.3 - 6.2 % Final    Basophil % 04/08/2024 0.8  0.0 - 1.5 % Final    Immature Grans % 04/08/2024 0.3  0.0 - 0.5 % Final    Neutrophils, Absolute 04/08/2024 3.18  1.70 - 7.00 10*3/mm3 Final    Lymphocytes, Absolute 04/08/2024 2.66  0.70 - 3.10 10*3/mm3 Final    Monocytes, Absolute 04/08/2024 0.57  0.10 - 0.90 10*3/mm3 Final    Eosinophils, Absolute 04/08/2024 0.06  0.00 - 0.40 10*3/mm3 Final    Basophils, Absolute 04/08/2024 0.05  0.00 - 0.20 10*3/mm3 Final    Immature Grans, Absolute 04/08/2024 0.02  0.00 - 0.05 10*3/mm3 Final    nRBC 04/08/2024 0.0  0.0 - 0.2 /100 WBC Final   Infusion on 04/01/2024   Component Date Value Ref Range Status    Color, UA 04/01/2024 Yellow  Yellow, Straw Final    Appearance, UA 04/01/2024 Cloudy (A)  Clear Final    pH, UA 04/01/2024 6.0  4.5 - 8.0 Final    Specific Gravity, UA 04/01/2024 1.025  1.002 - 1.030 Final    Glucose, UA 04/01/2024 Negative  Negative Final    Ketones, UA 04/01/2024 Negative  Negative Final    Bilirubin, UA 04/01/2024 Negative  Negative Final    Blood, UA 04/01/2024 Moderate (2+) (A)  Negative Final    Protein, UA 04/01/2024 >=300 mg/dL (3+) (A)  Negative Final    Leuk Esterase, UA 04/01/2024 Small (1+) (A)  Negative Final    Nitrite, UA 04/01/2024 Positive (A)  Negative Final    Urobilinogen, UA  04/01/2024 0.2 E.U./dL  0.2 - 1.0 E.U./dL Final   Office Visit on 04/01/2024   Component Date Value Ref Range Status    Hepatitis B Surface Ag 04/01/2024 Non-Reactive  Non-Reactive Final    Hep A IgM 04/01/2024 Non-Reactive  Non-Reactive Final    Hep B C IgM 04/01/2024 Non-Reactive  Non-Reactive Final    Hepatitis C Ab 04/01/2024 Non-Reactive  Non-Reactive Final    Magnesium 04/01/2024 1.8  1.6 - 2.4 mg/dL Final   Infusion on 04/01/2024   Component Date Value Ref Range Status    Glucose 04/01/2024 151 (H)  65 - 99 mg/dL Final    BUN 04/01/2024 15  8 - 23 mg/dL Final    Creatinine 04/01/2024 0.75  0.57 - 1.00 mg/dL Final    Sodium 04/01/2024 139  136 - 145 mmol/L Final    Potassium 04/01/2024 3.2 (L)  3.5 - 5.2 mmol/L Final    Chloride 04/01/2024 103  98 - 107 mmol/L Final    CO2 04/01/2024 22.8  22.0 - 29.0 mmol/L Final    Calcium 04/01/2024 9.6  8.6 - 10.5 mg/dL Final    Total Protein 04/01/2024 6.7  6.0 - 8.5 g/dL Final    Albumin 04/01/2024 4.3  3.5 - 5.2 g/dL Final    ALT (SGPT) 04/01/2024 141 (C)  1 - 33 U/L Final    AST (SGOT) 04/01/2024 47 (H)  1 - 32 U/L Final    Alkaline Phosphatase 04/01/2024 160 (H)  39 - 117 U/L Final    Total Bilirubin 04/01/2024 0.4  0.0 - 1.2 mg/dL Final    Globulin 04/01/2024 2.4  gm/dL Final    A/G Ratio 04/01/2024 1.8  g/dL Final    BUN/Creatinine Ratio 04/01/2024 20.0  7.0 - 25.0 Final    Anion Gap 04/01/2024 13.2  5.0 - 15.0 mmol/L Final    eGFR 04/01/2024 86.3  >60.0 mL/min/1.73 Final    WBC 04/01/2024 10.21  3.40 - 10.80 10*3/mm3 Final    RBC 04/01/2024 4.34  3.77 - 5.28 10*6/mm3 Final    Hemoglobin 04/01/2024 13.1  12.0 - 15.9 g/dL Final    Hematocrit 04/01/2024 38.8  34.0 - 46.6 % Final    MCV 04/01/2024 89.4  79.0 - 97.0 fL Final    MCH 04/01/2024 30.2  26.6 - 33.0 pg Final    MCHC 04/01/2024 33.8  31.5 - 35.7 g/dL Final    RDW 04/01/2024 12.6  12.3 - 15.4 % Final    RDW-SD 04/01/2024 41.1  37.0 - 54.0 fl Final    MPV 04/01/2024 9.7  6.0 - 12.0 fL Final    Platelets  04/01/2024 338  140 - 450 10*3/mm3 Final    Neutrophil % 04/01/2024 64.9  42.7 - 76.0 % Final    Lymphocyte % 04/01/2024 30.0  19.6 - 45.3 % Final    Monocyte % 04/01/2024 3.2 (L)  5.0 - 12.0 % Final    Eosinophil % 04/01/2024 0.4  0.3 - 6.2 % Final    Basophil % 04/01/2024 0.7  0.0 - 1.5 % Final    Immature Grans % 04/01/2024 0.8 (H)  0.0 - 0.5 % Final    Neutrophils, Absolute 04/01/2024 6.63  1.70 - 7.00 10*3/mm3 Final    Lymphocytes, Absolute 04/01/2024 3.06  0.70 - 3.10 10*3/mm3 Final    Monocytes, Absolute 04/01/2024 0.33  0.10 - 0.90 10*3/mm3 Final    Eosinophils, Absolute 04/01/2024 0.04  0.00 - 0.40 10*3/mm3 Final    Basophils, Absolute 04/01/2024 0.07  0.00 - 0.20 10*3/mm3 Final    Immature Grans, Absolute 04/01/2024 0.08 (H)  0.00 - 0.05 10*3/mm3 Final    nRBC 04/01/2024 0.0  0.0 - 0.2 /100 WBC Final    Urine Culture 04/01/2024 >100,000 CFU/mL Escherichia coli (A)   Final   Infusion on 03/25/2024   Component Date Value Ref Range Status    Glucose 03/25/2024 200 (H)  65 - 99 mg/dL Final    BUN 03/25/2024 17  8 - 23 mg/dL Final    Creatinine 03/25/2024 0.98  0.57 - 1.00 mg/dL Final    Sodium 03/25/2024 137  136 - 145 mmol/L Final    Potassium 03/25/2024 3.6  3.5 - 5.2 mmol/L Final    Chloride 03/25/2024 106  98 - 107 mmol/L Final    CO2 03/25/2024 15.1 (L)  22.0 - 29.0 mmol/L Final    Calcium 03/25/2024 9.4  8.6 - 10.5 mg/dL Final    Total Protein 03/25/2024 8.1  6.0 - 8.5 g/dL Final    Albumin 03/25/2024 4.5  3.5 - 5.2 g/dL Final    ALT (SGPT) 03/25/2024 22  1 - 33 U/L Final    AST (SGOT) 03/25/2024 20  1 - 32 U/L Final    Alkaline Phosphatase 03/25/2024 100  39 - 117 U/L Final    Total Bilirubin 03/25/2024 0.3  0.0 - 1.2 mg/dL Final    Globulin 03/25/2024 3.6  gm/dL Final    A/G Ratio 03/25/2024 1.3  g/dL Final    BUN/Creatinine Ratio 03/25/2024 17.3  7.0 - 25.0 Final    Anion Gap 03/25/2024 15.9 (H)  5.0 - 15.0 mmol/L Final    eGFR 03/25/2024 62.6  >60.0 mL/min/1.73 Final    WBC 03/25/2024 12.04 (H)  3.40  - 10.80 10*3/mm3 Final    RBC 03/25/2024 5.06  3.77 - 5.28 10*6/mm3 Final    Hemoglobin 03/25/2024 15.5  12.0 - 15.9 g/dL Final    Hematocrit 03/25/2024 47.0 (H)  34.0 - 46.6 % Final    MCV 03/25/2024 92.9  79.0 - 97.0 fL Final    MCH 03/25/2024 30.6  26.6 - 33.0 pg Final    MCHC 03/25/2024 33.0  31.5 - 35.7 g/dL Final    RDW 03/25/2024 12.8  12.3 - 15.4 % Final    RDW-SD 03/25/2024 43.8  37.0 - 54.0 fl Final    MPV 03/25/2024 10.2  6.0 - 12.0 fL Final    Platelets 03/25/2024 269  140 - 450 10*3/mm3 Final    Neutrophil % 03/25/2024 82.4 (H)  42.7 - 76.0 % Final    Lymphocyte % 03/25/2024 8.3 (L)  19.6 - 45.3 % Final    Monocyte % 03/25/2024 7.6  5.0 - 12.0 % Final    Eosinophil % 03/25/2024 0.2 (L)  0.3 - 6.2 % Final    Basophil % 03/25/2024 0.2  0.0 - 1.5 % Final    Immature Grans % 03/25/2024 1.3 (H)  0.0 - 0.5 % Final    Neutrophils, Absolute 03/25/2024 9.92 (H)  1.70 - 7.00 10*3/mm3 Final    Lymphocytes, Absolute 03/25/2024 1.00  0.70 - 3.10 10*3/mm3 Final    Monocytes, Absolute 03/25/2024 0.91 (H)  0.10 - 0.90 10*3/mm3 Final    Eosinophils, Absolute 03/25/2024 0.02  0.00 - 0.40 10*3/mm3 Final    Basophils, Absolute 03/25/2024 0.03  0.00 - 0.20 10*3/mm3 Final    Immature Grans, Absolute 03/25/2024 0.16 (H)  0.00 - 0.05 10*3/mm3 Final    nRBC 03/25/2024 0.0  0.0 - 0.2 /100 WBC Final   Infusion on 03/18/2024   Component Date Value Ref Range Status    Glucose 03/18/2024 150 (H)  65 - 99 mg/dL Final    BUN 03/18/2024 21  8 - 23 mg/dL Final    Creatinine 03/18/2024 0.69  0.57 - 1.00 mg/dL Final    Sodium 03/18/2024 142  136 - 145 mmol/L Final    Potassium 03/18/2024 4.0  3.5 - 5.2 mmol/L Final    Chloride 03/18/2024 106  98 - 107 mmol/L Final    CO2 03/18/2024 20.9 (L)  22.0 - 29.0 mmol/L Final    Calcium 03/18/2024 9.5  8.6 - 10.5 mg/dL Final    Total Protein 03/18/2024 6.9  6.0 - 8.5 g/dL Final    Albumin 03/18/2024 4.3  3.5 - 5.2 g/dL Final    ALT (SGPT) 03/18/2024 19  1 - 33 U/L Final    AST (SGOT) 03/18/2024 18   1 - 32 U/L Final    Alkaline Phosphatase 03/18/2024 72  39 - 117 U/L Final    Total Bilirubin 03/18/2024 0.3  0.0 - 1.2 mg/dL Final    Globulin 03/18/2024 2.6  gm/dL Final    A/G Ratio 03/18/2024 1.7  g/dL Final    BUN/Creatinine Ratio 03/18/2024 30.4 (H)  7.0 - 25.0 Final    Anion Gap 03/18/2024 15.1 (H)  5.0 - 15.0 mmol/L Final    eGFR 03/18/2024 94.1  >60.0 mL/min/1.73 Final    WBC 03/18/2024 12.33 (H)  3.40 - 10.80 10*3/mm3 Final    RBC 03/18/2024 4.20  3.77 - 5.28 10*6/mm3 Final    Hemoglobin 03/18/2024 13.1  12.0 - 15.9 g/dL Final    Hematocrit 03/18/2024 38.6  34.0 - 46.6 % Final    MCV 03/18/2024 91.9  79.0 - 97.0 fL Final    MCH 03/18/2024 31.2  26.6 - 33.0 pg Final    MCHC 03/18/2024 33.9  31.5 - 35.7 g/dL Final    RDW 03/18/2024 12.7  12.3 - 15.4 % Final    RDW-SD 03/18/2024 42.4  37.0 - 54.0 fl Final    MPV 03/18/2024 10.6  6.0 - 12.0 fL Final    Platelets 03/18/2024 271  140 - 450 10*3/mm3 Final    Neutrophil % 03/18/2024 69.8  42.7 - 76.0 % Final    Lymphocyte % 03/18/2024 19.7  19.6 - 45.3 % Final    Monocyte % 03/18/2024 9.8  5.0 - 12.0 % Final    Eosinophil % 03/18/2024 0.0 (L)  0.3 - 6.2 % Final    Basophil % 03/18/2024 0.2  0.0 - 1.5 % Final    Immature Grans % 03/18/2024 0.5  0.0 - 0.5 % Final    Neutrophils, Absolute 03/18/2024 8.61 (H)  1.70 - 7.00 10*3/mm3 Final    Lymphocytes, Absolute 03/18/2024 2.43  0.70 - 3.10 10*3/mm3 Final    Monocytes, Absolute 03/18/2024 1.21 (H)  0.10 - 0.90 10*3/mm3 Final    Eosinophils, Absolute 03/18/2024 0.00  0.00 - 0.40 10*3/mm3 Final    Basophils, Absolute 03/18/2024 0.02  0.00 - 0.20 10*3/mm3 Final    Immature Grans, Absolute 03/18/2024 0.06 (H)  0.00 - 0.05 10*3/mm3 Final    nRBC 03/18/2024 0.0  0.0 - 0.2 /100 WBC Final   Hospital Outpatient Visit on 03/15/2024   Component Date Value Ref Range Status    EF(MOD-bp) 03/15/2024 75.5  % Final    EF_3D-VOL 03/15/2024 72.0  % Final    LV GLOBAL STRAIN  03/15/2024 -24.2  % Final    LVIDd 03/15/2024 4.0  cm Final     LVIDs 03/15/2024 2.5  cm Final    IVSd 03/15/2024 0.70  cm Final    LVPWd 03/15/2024 0.90  cm Final    FS 03/15/2024 37.5  % Final    IVS/LVPW 03/15/2024 0.78  cm Final    ESV(cubed) 03/15/2024 15.6  ml Final    LV Sys Vol (BSA corrected) 03/15/2024 13.5  cm2 Final    EDV(cubed) 03/15/2024 64.0  ml Final    LV Batista Vol (BSA corrected) 03/15/2024 51.5  cm2 Final    LV mass(C)d 03/15/2024 93.5  grams Final    LVOT area 03/15/2024 3.1  cm2 Final    LVOT diam 03/15/2024 2.00  cm Final    EDV(MOD-sp2) 03/15/2024 78.0  ml Final    EDV(MOD-sp4) 03/15/2024 76.0  ml Final    ESV(MOD-sp2) 03/15/2024 18.0  ml Final    ESV(MOD-sp4) 03/15/2024 20.0  ml Final    SV(MOD-sp2) 03/15/2024 60.0  ml Final    SV(MOD-sp4) 03/15/2024 56.0  ml Final    SI(MOD-sp2) 03/15/2024 40.6  ml/m2 Final    SI(MOD-sp4) 03/15/2024 37.9  ml/m2 Final    EF(MOD-sp2) 03/15/2024 76.9  % Final    EF(MOD-sp4) 03/15/2024 73.7  % Final    MV E max tahir 03/15/2024 90.3  cm/sec Final    MV A max tahir 03/15/2024 111.0  cm/sec Final    MV dec time 03/15/2024 0.21  sec Final    MV E/A 03/15/2024 0.81   Final    Pulm A Revs Dur 03/15/2024 0.14  sec Final    MV A dur 03/15/2024 0.16  sec Final    LA ESV Index (BP) 03/15/2024 23.2  ml/m2 Final    Med Peak E' Tahir 03/15/2024 9.1  cm/sec Final    Lat Peak E' Tahir 03/15/2024 8.9  cm/sec Final    TR max tahir 03/15/2024 204.0  cm/sec Final    Avg E/e' ratio 03/15/2024 10.03   Final    SV(LVOT) 03/15/2024 68.5  ml Final    SV(RVOT) 03/15/2024 35.4  ml Final    Qp/Qs 03/15/2024 0.52   Final    RV Base 03/15/2024 3.2  cm Final    RV Mid 03/15/2024 2.6  cm Final    RV Length 03/15/2024 6.2  cm Final    TAPSE (>1.6) 03/15/2024 2.6  cm Final    RV S' 03/15/2024 11.2  cm/sec Final    Pulm Sys Tahir 03/15/2024 33.6  cm/sec Final    Pulm Batista Tahir 03/15/2024 38.8  cm/sec Final    Pulm S/D 03/15/2024 0.87   Final    Pulm A Revs Tahir 03/15/2024 36.7  cm/sec Final    LV V1 max 03/15/2024 107.0  cm/sec Final    LV V1 max PG 03/15/2024 4.6  mmHg  Final    LV V1 mean PG 03/15/2024 2.00  mmHg Final    LV V1 VTI 03/15/2024 21.8  cm Final    Ao pk mac 03/15/2024 110.0  cm/sec Final    Ao max PG 03/15/2024 4.8  mmHg Final    Ao mean PG 03/15/2024 3.0  mmHg Final    Ao V2 VTI 03/15/2024 23.6  cm Final    BENJAMIN(I,D) 03/15/2024 2.9  cm2 Final    MV max PG 03/15/2024 6.3  mmHg Final    MV mean PG 03/15/2024 3.0  mmHg Final    MV V2 VTI 03/15/2024 31.8  cm Final    MV P1/2t 03/15/2024 69.9  msec Final    MVA(P1/2t) 03/15/2024 3.1  cm2 Final    MVA(VTI) 03/15/2024 2.15  cm2 Final    MV dec slope 03/15/2024 507.0  cm/sec2 Final    TR max PG 03/15/2024 16.6  mmHg Final    RVSP(TR) 03/15/2024 19.6  mmHg Final    RAP systole 03/15/2024 3.0  mmHg Final    RVOT diam 03/15/2024 1.80  cm Final    RV V1 max PG 03/15/2024 1.60  mmHg Final    RV V1 max 03/15/2024 63.3  cm/sec Final    RV V1 VTI 03/15/2024 13.9  cm Final    PA V2 max 03/15/2024 103.0  cm/sec Final    PA acc time 03/15/2024 0.12  sec Final    Ao root diam 03/15/2024 2.6  cm Final    ACS 03/15/2024 1.40  cm Final    Sinus 03/15/2024 2.8  cm Final    STJ 03/15/2024 2.34  cm Final    3D vol index 03/15/2024 33.0   Final    Dimensionless Index 03/15/2024 0.90  (DI) Final    Ascending aorta 03/15/2024 2.8  cm Final    Aortic arch 03/15/2024 2.2  cm Final    Abdo Ao Diam 03/15/2024 1.7  cm Final   Admission on 03/14/2024, Discharged on 03/14/2024   Component Date Value Ref Range Status    Glucose 03/14/2024 124  70 - 130 mg/dL Final    Glucose 03/14/2024 102  70 - 130 mg/dL Final        XR Chest Post CVA Port    Result Date: 3/14/2024  1. Mediport catheter tip in the SVC. 2. No pneumothorax is seen.   This report was finalized on 3/14/2024 12:59 PM by Dr. Ean Allen M.D on Workstation: [id number]              Assessment & Plan       *Right breast invasive ductal carcinoma  Clinical T2 N0 M0, stage IIa, clinical and prognostic ER/AK negative, HER2 3+ immunohistochemistry, Ki-67 70%  On the MRI the mass measures 2 cm  however there is linear enhancement which in the AP dimension measures 4.7 cm.  Additional biopsies of this linear enhancement was performed and consistent with DCIS  It is hard to delineate the amount of invasive disease as opposed to DCIS.  More likely than not invasive component is likely limited to the mass which is about 2-1/2 cm on exam.  Since the tumor is greater than 2 cm I think it is reasonable to proceed with neoadjuvant chemotherapy.  We discussed proceeding with weekly Taxol along with Herceptin and Perjeta every 3 weeks.  After 12 weeks she will require an MRI to assess the response.  If there is inadequate response then we may have to proceed with Adriamycin and Cytoxan for 4 cycles however if there is a good response then she can proceed with surgery with no additional chemotherapy.  The likely plan is that she will undergo bilateral mastectomy.  If that is the case as long as she does not have any positive margins or lymph node positive disease she may not need radiation.  We discussed continuing adjuvant HER2 directed therapy for whole year  3/18/2024-received first dose of Herceptin and Perjeta, had a severe hypersensitivity reaction to Taxol and hence Taxol was discontinued  3/25/2024-scheduled for Abraxane only.  Labs reviewed and stable to proceed with chemotherapy  Patient is extremely anxious today after experiencing the severe hypersensitivity reaction last week.  Reassured patient that this is unlikely to happen with Abraxane and we will premedicate with extra Solu-Medrol..  4/1/2024 patient returns for cycle 1 day 15 Abraxane.,  Left chest, left shoulder at times.  This has come and gone over the past week and is not associated with any shortness of breath, numbness or tingling.  She states when her chest is hurting if she presses on the area it gets better.  She notices it at different points during the day.  It feels more like an ache and is not a sharp pain.  She states she has been  sleeping in a different position due to the port which could be causing some muscular discomfort.  She has noticed the discomfort at times when she takes a deep breath however is able to get deep breath here in the office did not have any pain.  She is not having any pain today at all.  She has taken her hydrocodone maybe 3-4 times over the past week and has not needed it multiple times a day.  CMP resulted today with AST elevated at 47 and .  Bilirubin remains normal at 0.4 with alkaline phosphatase elevated to 160.  Reviewed with Dr. Loredo and will hold treatment today.  She will try to limit any acetaminophen containing products.  She did have a few days of diarrhea that she is unsure if it was related to the Abraxane versus a GI bug as her  had the same symptoms.  Neither had chills or fevers.  She is also having some dysuria today we have a urinalysis with culture pending.  4/8/2024: Due for cycle 2-day 1 Abraxane, Herceptin, Perjeta.  Did not receive cycle 1 day 15 Abraxane due to elevated LFTs as detailed above.  LFTs have significantly improved, AST 28, ALT 58.  Reviewed with Dr. Renteria will proceed with Abraxane only today, will reduce Abraxane by 20%.    *Left breast ductal carcinoma in situ  Intermed  She has been having some discomfort in her left shoulder bladeiate grade with apocrine features  ER/ID strongly positive  Plan is for her to undergo bilateral mastectomy.  Patient had questions regarding reconstruction  If she does undergo bilateral mastectomy then that would be curative and she would not require any endocrine therapy subsequently unless there is any upstaging of the malignancy    *Severe diarrhea  Started last night  Patient has not used any Imodium  We will administer 1 L of normal saline today  Imodium will be given here today  She will start entegrade  Patient has not been using any Imodium.  Encouraged her to use Imodium up to 8 tablets in a day.  4/8/2024-has not had any  diarrhea in about 2 weeks.  Did advise if she has diarrhea following treatment today that she can continue Imodium as needed.    *GERD  Protonix with improvement    *Cardiac health  Referral to cardio oncology placed  3/15/2024 echocardiogram with an ejection fraction of 75.5%, LV strain is normal at -24.2%  She has met with Dr. Peguero , started on bisoprolol     *Elevated LFTs  4/1/2024 AST at 47, from 20.  ALT at 141, from 22.  Alkaline phosphatase at 160, from 100.  Total bilirubin remains normal.  Acute hepatitis panel negative.  Patient asked to avoid Tylenol and limit her hydrocodone usage as this does have Tylenol in it unless needed.  Also advised to avoid alcohol which she states she does not drink.  4/8/2024: AST 28, ALT 58, alk phos 102.  Total bilirubin 0.3.  Likely secondary to Abraxane.     *Hypokalemia secondary to diarrhea likely  4/1/2024 potassium 3.2.  Potassium chloride 20 mEq 1 p.o. daily sent to pharmacy x 5 days.  Will recheck next week.  4/8/2024: Potassium today 4.1, continue oral potassium.    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  Patient is tearful today discussing this.  Did discuss referral to supportive oncology patient/ agreeable with this.    PLAN:   Proceed with Abraxane today, 20% dose reduction.  Return in 1 week for MD follow-up and cycle 2-day 8 Abraxane.   Did review with patient that we can write prescription for wig, she would like to hold off at this time.  Start Aquaphor to the hands.  Referral placed to supportive oncology per patient/ request.    The patient is on high risk medication that requires close monitoring for toxicity.  Patient reviewed with Dr. Renteria.     I spent 55 minutes caring for Felicia on this date of service. This time includes time spent by me in the following activities: preparing for the visit, reviewing tests, obtaining and/or reviewing a separately obtained history, performing a medically appropriate examination and/or evaluation,  counseling and educating the patient/family/caregiver, ordering medications, tests, or procedures, documenting information in the medical record, and care coordination.

## 2024-04-08 ENCOUNTER — OFFICE VISIT (OUTPATIENT)
Dept: ONCOLOGY | Facility: CLINIC | Age: 69
End: 2024-04-08
Payer: COMMERCIAL

## 2024-04-08 ENCOUNTER — INFUSION (OUTPATIENT)
Dept: ONCOLOGY | Facility: HOSPITAL | Age: 69
End: 2024-04-08
Payer: COMMERCIAL

## 2024-04-08 ENCOUNTER — NUTRITION (OUTPATIENT)
Dept: OTHER | Facility: HOSPITAL | Age: 69
End: 2024-04-08
Payer: COMMERCIAL

## 2024-04-08 VITALS
TEMPERATURE: 98.3 F | HEIGHT: 58 IN | BODY MASS INDEX: 24.9 KG/M2 | SYSTOLIC BLOOD PRESSURE: 148 MMHG | RESPIRATION RATE: 16 BRPM | WEIGHT: 118.6 LBS | DIASTOLIC BLOOD PRESSURE: 84 MMHG | HEART RATE: 72 BPM

## 2024-04-08 DIAGNOSIS — Z17.0 MALIGNANT NEOPLASM OF LOWER-OUTER QUADRANT OF RIGHT BREAST OF FEMALE, ESTROGEN RECEPTOR POSITIVE: Primary | ICD-10-CM

## 2024-04-08 DIAGNOSIS — C50.511 MALIGNANT NEOPLASM OF LOWER-OUTER QUADRANT OF RIGHT BREAST OF FEMALE, ESTROGEN RECEPTOR POSITIVE: Primary | ICD-10-CM

## 2024-04-08 DIAGNOSIS — Z79.899 HIGH RISK MEDICATION USE: ICD-10-CM

## 2024-04-08 DIAGNOSIS — R79.89 ELEVATED LFTS: ICD-10-CM

## 2024-04-08 DIAGNOSIS — Z17.0 MALIGNANT NEOPLASM OF LOWER-OUTER QUADRANT OF RIGHT BREAST OF FEMALE, ESTROGEN RECEPTOR POSITIVE: ICD-10-CM

## 2024-04-08 DIAGNOSIS — C50.511 MALIGNANT NEOPLASM OF LOWER-OUTER QUADRANT OF RIGHT BREAST OF FEMALE, ESTROGEN RECEPTOR POSITIVE: ICD-10-CM

## 2024-04-08 LAB
ALBUMIN SERPL-MCNC: 4.1 G/DL (ref 3.5–5.2)
ALBUMIN/GLOB SERPL: 1.8 G/DL
ALP SERPL-CCNC: 102 U/L (ref 39–117)
ALT SERPL W P-5'-P-CCNC: 58 U/L (ref 1–33)
ANION GAP SERPL CALCULATED.3IONS-SCNC: 10.1 MMOL/L (ref 5–15)
AST SERPL-CCNC: 28 U/L (ref 1–32)
BASOPHILS # BLD AUTO: 0.05 10*3/MM3 (ref 0–0.2)
BASOPHILS NFR BLD AUTO: 0.8 % (ref 0–1.5)
BILIRUB SERPL-MCNC: 0.3 MG/DL (ref 0–1.2)
BUN SERPL-MCNC: 13 MG/DL (ref 8–23)
BUN/CREAT SERPL: 20 (ref 7–25)
CALCIUM SPEC-SCNC: 8.8 MG/DL (ref 8.6–10.5)
CHLORIDE SERPL-SCNC: 105 MMOL/L (ref 98–107)
CO2 SERPL-SCNC: 23.9 MMOL/L (ref 22–29)
CREAT SERPL-MCNC: 0.65 MG/DL (ref 0.57–1)
DEPRECATED RDW RBC AUTO: 44.5 FL (ref 37–54)
EGFRCR SERPLBLD CKD-EPI 2021: 95.4 ML/MIN/1.73
EOSINOPHIL # BLD AUTO: 0.06 10*3/MM3 (ref 0–0.4)
EOSINOPHIL NFR BLD AUTO: 0.9 % (ref 0.3–6.2)
ERYTHROCYTE [DISTWIDTH] IN BLOOD BY AUTOMATED COUNT: 13 % (ref 12.3–15.4)
GLOBULIN UR ELPH-MCNC: 2.3 GM/DL
GLUCOSE SERPL-MCNC: 157 MG/DL (ref 65–99)
HCT VFR BLD AUTO: 38 % (ref 34–46.6)
HGB BLD-MCNC: 11.9 G/DL (ref 12–15.9)
IMM GRANULOCYTES # BLD AUTO: 0.02 10*3/MM3 (ref 0–0.05)
IMM GRANULOCYTES NFR BLD AUTO: 0.3 % (ref 0–0.5)
LYMPHOCYTES # BLD AUTO: 2.66 10*3/MM3 (ref 0.7–3.1)
LYMPHOCYTES NFR BLD AUTO: 40.7 % (ref 19.6–45.3)
MCH RBC QN AUTO: 29.4 PG (ref 26.6–33)
MCHC RBC AUTO-ENTMCNC: 31.3 G/DL (ref 31.5–35.7)
MCV RBC AUTO: 93.8 FL (ref 79–97)
MONOCYTES # BLD AUTO: 0.57 10*3/MM3 (ref 0.1–0.9)
MONOCYTES NFR BLD AUTO: 8.7 % (ref 5–12)
NEUTROPHILS NFR BLD AUTO: 3.18 10*3/MM3 (ref 1.7–7)
NEUTROPHILS NFR BLD AUTO: 48.6 % (ref 42.7–76)
NRBC BLD AUTO-RTO: 0 /100 WBC (ref 0–0.2)
PLATELET # BLD AUTO: 370 10*3/MM3 (ref 140–450)
PMV BLD AUTO: 9.5 FL (ref 6–12)
POTASSIUM SERPL-SCNC: 4.1 MMOL/L (ref 3.5–5.2)
PROT SERPL-MCNC: 6.4 G/DL (ref 6–8.5)
RBC # BLD AUTO: 4.05 10*6/MM3 (ref 3.77–5.28)
SODIUM SERPL-SCNC: 139 MMOL/L (ref 136–145)
WBC NRBC COR # BLD AUTO: 6.54 10*3/MM3 (ref 3.4–10.8)

## 2024-04-08 PROCEDURE — 99215 OFFICE O/P EST HI 40 MIN: CPT | Performed by: NURSE PRACTITIONER

## 2024-04-08 PROCEDURE — 25010000002 DEXAMETHASONE SODIUM PHOSPHATE 100 MG/10ML SOLUTION: Performed by: NURSE PRACTITIONER

## 2024-04-08 PROCEDURE — 85025 COMPLETE CBC W/AUTO DIFF WBC: CPT

## 2024-04-08 PROCEDURE — 96413 CHEMO IV INFUSION 1 HR: CPT

## 2024-04-08 PROCEDURE — 25810000003 SODIUM CHLORIDE 0.9 % SOLUTION: Performed by: NURSE PRACTITIONER

## 2024-04-08 PROCEDURE — 25010000002 PACLITAXEL PROTEIN-BOUND PART PER 1 MG: Performed by: NURSE PRACTITIONER

## 2024-04-08 PROCEDURE — 80053 COMPREHEN METABOLIC PANEL: CPT

## 2024-04-08 PROCEDURE — 96417 CHEMO IV INFUS EACH ADDL SEQ: CPT

## 2024-04-08 PROCEDURE — 25810000003 SODIUM CHLORIDE 0.9 % SOLUTION 250 ML FLEX CONT: Performed by: NURSE PRACTITIONER

## 2024-04-08 PROCEDURE — 25010000002 TRASTUZUMAB-DTTB 420 MG RECONSTITUTED SOLUTION 1 EACH VIAL: Performed by: NURSE PRACTITIONER

## 2024-04-08 PROCEDURE — 25010000002 PERTUZUMAB 420 MG/14ML SOLUTION 420 MG VIAL: Performed by: NURSE PRACTITIONER

## 2024-04-08 PROCEDURE — 25010000002 HYDROCORTISONE SOD SUC (PF) 100 MG RECONSTITUTED SOLUTION: Performed by: NURSE PRACTITIONER

## 2024-04-08 PROCEDURE — 25010000002 DIPHENHYDRAMINE PER 50 MG: Performed by: NURSE PRACTITIONER

## 2024-04-08 PROCEDURE — 96375 TX/PRO/DX INJ NEW DRUG ADDON: CPT

## 2024-04-08 RX ORDER — MONTELUKAST SODIUM 10 MG/1
10 TABLET ORAL ONCE
Status: COMPLETED | OUTPATIENT
Start: 2024-04-08 | End: 2024-04-08

## 2024-04-08 RX ORDER — PACLITAXEL 100 MG/20ML
80 INJECTION, POWDER, LYOPHILIZED, FOR SUSPENSION INTRAVENOUS ONCE
Status: COMPLETED | OUTPATIENT
Start: 2024-04-08 | End: 2024-04-08

## 2024-04-08 RX ORDER — ACETAMINOPHEN 325 MG/1
650 TABLET ORAL ONCE
Status: COMPLETED | OUTPATIENT
Start: 2024-04-08 | End: 2024-04-08

## 2024-04-08 RX ORDER — PACLITAXEL 100 MG/20ML
80 INJECTION, POWDER, LYOPHILIZED, FOR SUSPENSION INTRAVENOUS ONCE
Status: CANCELLED | OUTPATIENT
Start: 2024-04-08

## 2024-04-08 RX ORDER — FAMOTIDINE 10 MG/ML
20 INJECTION, SOLUTION INTRAVENOUS ONCE
Status: COMPLETED | OUTPATIENT
Start: 2024-04-08 | End: 2024-04-08

## 2024-04-08 RX ORDER — SODIUM CHLORIDE 9 MG/ML
20 INJECTION, SOLUTION INTRAVENOUS ONCE
Status: COMPLETED | OUTPATIENT
Start: 2024-04-08 | End: 2024-04-08

## 2024-04-08 RX ADMIN — PERTUZUMAB 420 MG: 30 INJECTION, SOLUTION, CONCENTRATE INTRAVENOUS at 10:02

## 2024-04-08 RX ADMIN — SODIUM CHLORIDE 20 ML/HR: 9 INJECTION, SOLUTION INTRAVENOUS at 09:10

## 2024-04-08 RX ADMIN — HYDROCORTISONE SODIUM SUCCINATE 100 MG: 100 INJECTION, POWDER, FOR SOLUTION INTRAMUSCULAR; INTRAVENOUS at 11:02

## 2024-04-08 RX ADMIN — MONTELUKAST 10 MG: 10 TABLET, FILM COATED ORAL at 09:16

## 2024-04-08 RX ADMIN — DIPHENHYDRAMINE HYDROCHLORIDE 25 MG: 50 INJECTION, SOLUTION INTRAMUSCULAR; INTRAVENOUS at 09:19

## 2024-04-08 RX ADMIN — PACLITAXEL 120 MG: 100 INJECTION, POWDER, LYOPHILIZED, FOR SUSPENSION INTRAVENOUS at 12:08

## 2024-04-08 RX ADMIN — ACETAMINOPHEN 650 MG: 325 TABLET ORAL at 09:16

## 2024-04-08 RX ADMIN — FAMOTIDINE 20 MG: 10 INJECTION INTRAVENOUS at 09:17

## 2024-04-08 RX ADMIN — ONTRUZANT 330 MG: KIT INTRAVENOUS at 11:04

## 2024-04-08 RX ADMIN — DEXAMETHASONE SODIUM PHOSPHATE 12 MG: 10 INJECTION, SOLUTION INTRAMUSCULAR; INTRAVENOUS at 11:37

## 2024-04-08 NOTE — PROGRESS NOTES
OUTPATIENT ONCOLOGY NUTRITION ASSESSMENT    Patient Name: Felicia Boyle  YOB: 1955  MRN: [id number]  Assessment Date: 4/8/2024    COMMENTS: F/U with pt in infusion area, pt receiving abraxane, herceptin, and perjeta today. Pt reports a better week d/t not receiving treatment last week. Pt appetite and intake went up and she did not struggle with any diarrhea. Pt is receiving perjeta today, provided with some additional enterade today for her to have in case the diarrhea starts again. Pt asking about dairy products, reviewed that sometimes it can be difficult to tolerate dairy while having diarrhea, but everyone is different. Did recommend for pt to consume yogurt for the probiotics.         Reason for Assessment Follow up     Diagnosis/Problem   R breast ca   Treatment Plan Chemotherapy; abraxane, herceptin, perjeta    Frequency Weekly abraxane, HP every 3 weeks    Goal of cancer treatment Neoadjuvant     Encounter Information        Nutrition/Diet History:  Pt appetite has increased since no treatment last week    Oral Nutrition Supplements: Enterade    Factors/Symptoms Affecting Intake: No factors at this time   Comments:      Medical/Surgical History Past Medical History:   Diagnosis Date    Anxiety about health     Breast cancer of lower-outer quadrant of right female breast 02/14/2024    DCIS (ductal carcinoma in situ) of breast 03/15/2024    Diverticulosis     Mixed hyperlipidemia 05/19/2019    DIET CONTROLLED    Osteoporosis     Type 2 diabetes mellitus      Past Surgical History:   Procedure Laterality Date    BREAST BIOPSY Left     Excisional biopsy in the [facility]    BREAST BIOPSY  03/2024    CATARACT EXTRACTION W/ INTRAOCULAR LENS IMPLANT Bilateral     CHOLECYSTECTOMY  2012    COLON RESECTION Right 2002    COLONOSCOPY N/A 04/28/2021    Procedure: COLONOSCOPY TO ANASTAMOSIS/TI;  Surgeon: Angelo Gonsalez MD;  Location: [facility] ENDOSCOPY;  Service: Gastroenterology;  Laterality: N/A;  PRE:  "SCREENING  POST: NORMAL POST-OP ANATOMY    ENDOSCOPY      2013    ENDOSCOPY N/A 03/10/2017    Procedure: ESOPHAGOGASTRODUODENOSCOPY WITH BIOPSY AND PETER DILATATION 54\";  Surgeon: Angelo Gonsalez MD;  Location: [facility] ENDOSCOPY;  Service:     ENDOSCOPY N/A 04/28/2021    Procedure: ESOPHAGOGASTRODUODENOSCOPY WITH BIOPSIES, 54FR PETER DILATATION;  Surgeon: Angelo Gonsalez MD;  Location: [facility] ENDOSCOPY;  Service: Gastroenterology;  Laterality: N/A;  PRE: DYSPHAGIA  POST: GASTRITIS, ESOPHAGEAL RING    VENOUS ACCESS DEVICE (PORT) INSERTION Left 3/14/2024    Procedure: INSERTION OF PORTACATH;  Surgeon: Abeba Manrique MD;  Location: [facility];  Service: General;  Laterality: Left;        Anthropometrics        Current Height Ht Readings from Last 1 Encounters:   04/08/24 147 cm (57.87\")      Current Weight Wt Readings from Last 1 Encounters:   04/08/24 53.8 kg (118 lb 9.6 oz)      BMI  24.7   Ideal Body Weight (IBW) 100#   Usual Body Weight (UBW) ~117#   Weight Change/Trend Stable   Weight History Wt Readings from Last 30 Encounters:   04/08/24 0804 53.8 kg (118 lb 9.6 oz)   04/01/24 0950 51.3 kg (113 lb 1.6 oz)   03/25/24 0816 52.2 kg (115 lb 1.6 oz)   03/18/24 0757 54.3 kg (119 lb 12.8 oz)   03/15/24 0918 54.9 kg (121 lb)   03/15/24 1341 54.9 kg (121 lb)   03/12/24 1402 55.1 kg (121 lb 6.4 oz)   03/06/24 1214 55.1 kg (121 lb 6.4 oz)   03/06/24 0932 54.4 kg (119 lb 14.4 oz)   03/04/24 0918 53.5 kg (118 lb)   02/26/24 0728 53.1 kg (117 lb)   02/14/24 1042 53.1 kg (117 lb)   02/09/24 1613 53.1 kg (117 lb)   01/31/24 0940 53.1 kg (117 lb)   01/29/24 0949 54 kg (119 lb)   08/29/23 0936 54 kg (119 lb)   08/17/23 1352 55.3 kg (122 lb)   08/01/23 1435 55.3 kg (122 lb)   10/18/22 0919 54.4 kg (120 lb)   09/19/22 1345 54.4 kg (120 lb)   09/13/22 1408 54.4 kg (120 lb)   05/16/22 0910 53.1 kg (117 lb)   05/18/21 1021 54 kg (119 lb)   04/28/21 1038 52.2 kg (115 lb 1.6 oz)   01/07/21 0926 53.5 kg (118 lb)   11/25/20 " "1614 54.4 kg (120 lb)   08/25/20 0853 53.5 kg (118 lb)   06/22/20 1016 53.5 kg (118 lb)   03/18/20 0941 52.6 kg (116 lb)   10/31/19 0809 53.5 kg (118 lb)          Medications           Current medications: FreeStyle Lite, HYDROcodone-acetaminophen, cephalexin, freestyle, glucose blood, lidocaine-prilocaine, ondansetron, pantoprazole, potassium chloride, and prednisoLONE acetate     Tests/Procedures        Tests/Procedures Chemotherapy     Labs       Pertinent Labs    Results from last 7 days   Lab Units 04/08/24  0737   SODIUM mmol/L 139   POTASSIUM mmol/L 4.1   CHLORIDE mmol/L 105   CO2 mmol/L 23.9   BUN mg/dL 13   CREATININE mg/dL 0.65   CALCIUM mg/dL 8.8   BILIRUBIN mg/dL 0.3   ALK PHOS U/L 102   ALT (SGPT) U/L 58*   AST (SGOT) U/L 28   GLUCOSE mg/dL 157*     Results from last 7 days   Lab Units 04/08/24  0737   HEMOGLOBIN g/dL 11.9*   HEMATOCRIT % 38.0   WBC 10*3/mm3 6.54   ALBUMIN g/dL 4.1     Results from last 7 days   Lab Units 04/08/24  0737   PLATELETS 10*3/mm3 370     COVID19   Date Value Ref Range Status   04/26/2021 Not Detected Not Detected - Ref. Range Final     Lab Results   Component Value Date    HGBA1C 7.30 (H) 08/02/2023          Physical Findings        Physical Appearance alert, oriented     Edema  no edema   Gastrointestinal None   Tubes/Drains implantable port   Oral/Mouth Cavity WNL     Estimated/Assessed Needs        Energy Requirements    Height for Calculation   58\"   Weight for Calculation 54.3 kg   Method for Estimation  30 kcal/kg   EST Needs (kcal/day) 1600 kcal/day       Protein Requirements    Weight for Calculation 54.3 kg   EST Protein Needs (g/kg) 1.0 - 1.2 gm/kg   EST Daily Needs (g/day) 55-65 g/day       Fluid Requirements     Method for Estimation 1 mL/kcal    Estimated Needs (mL/day) 1600 mL/day           PES STATEMENT / NUTRITION DIAGNOSIS      Nutrition Dx Problem Problem:    NutritionDiagnosisProblem: Altered GI Function and Predicted Suboptimal Intake    Etiology:  Medical " diagnosis: Breast cancer  Factors affecting nutrition: Appetite, Reported GI Symptoms    Signs/Symptoms:  Report/Observation    Comment:      NUTRITION INTERVENTION / PLAN OF CARE      Intervention Goal(s) Maintain nutrition status, Reduce/improve symptoms, Meet estimated needs, Tolerate PO , Maintain intake, Maintain weight, and No significant weight loss         RD Intervention/Action Encouraged intake, Follow Tx progress         Recommendations:       PO Diet Continue current       Supplements Continue enterade for diarrhea       Snacks       Other          Monitor/Evaluation PO intake, Supplement intake, Weight, Symptoms   Education Will provide eduction as needed   --    RD to follow     Electronically signed by:  Yesi Henson RD  04/08/24 12:46 EDT

## 2024-04-10 ENCOUNTER — TELEPHONE (OUTPATIENT)
Dept: SURGERY | Facility: CLINIC | Age: 69
End: 2024-04-10
Payer: COMMERCIAL

## 2024-04-10 NOTE — TELEPHONE ENCOUNTER
Left patient a VM, would like to check on her concern reported 4/1, is this better, did she discuss with Medical Oncology?     This pain is better  She had an infusion Monday,   Numbness improved, no more pain

## 2024-04-15 ENCOUNTER — INFUSION (OUTPATIENT)
Dept: ONCOLOGY | Facility: HOSPITAL | Age: 69
End: 2024-04-15
Payer: COMMERCIAL

## 2024-04-15 ENCOUNTER — TELEPHONE (OUTPATIENT)
Dept: ONCOLOGY | Facility: CLINIC | Age: 69
End: 2024-04-15
Payer: COMMERCIAL

## 2024-04-15 ENCOUNTER — OFFICE VISIT (OUTPATIENT)
Dept: ONCOLOGY | Facility: CLINIC | Age: 69
End: 2024-04-15
Payer: COMMERCIAL

## 2024-04-15 ENCOUNTER — NUTRITION (OUTPATIENT)
Dept: OTHER | Facility: HOSPITAL | Age: 69
End: 2024-04-15
Payer: COMMERCIAL

## 2024-04-15 VITALS
SYSTOLIC BLOOD PRESSURE: 145 MMHG | HEIGHT: 58 IN | HEART RATE: 83 BPM | OXYGEN SATURATION: 96 % | TEMPERATURE: 97.7 F | DIASTOLIC BLOOD PRESSURE: 82 MMHG | WEIGHT: 118.9 LBS | BODY MASS INDEX: 24.96 KG/M2 | RESPIRATION RATE: 16 BRPM

## 2024-04-15 DIAGNOSIS — Z17.0 MALIGNANT NEOPLASM OF LOWER-OUTER QUADRANT OF RIGHT BREAST OF FEMALE, ESTROGEN RECEPTOR POSITIVE: ICD-10-CM

## 2024-04-15 DIAGNOSIS — Z17.0 MALIGNANT NEOPLASM OF LOWER-OUTER QUADRANT OF RIGHT BREAST OF FEMALE, ESTROGEN RECEPTOR POSITIVE: Primary | ICD-10-CM

## 2024-04-15 DIAGNOSIS — C50.511 MALIGNANT NEOPLASM OF LOWER-OUTER QUADRANT OF RIGHT BREAST OF FEMALE, ESTROGEN RECEPTOR POSITIVE: Primary | ICD-10-CM

## 2024-04-15 DIAGNOSIS — R79.89 ELEVATED LFTS: ICD-10-CM

## 2024-04-15 DIAGNOSIS — Z45.2 ENCOUNTER FOR FITTING AND ADJUSTMENT OF VASCULAR CATHETER: ICD-10-CM

## 2024-04-15 DIAGNOSIS — Z79.899 HIGH RISK MEDICATION USE: ICD-10-CM

## 2024-04-15 DIAGNOSIS — C50.511 MALIGNANT NEOPLASM OF LOWER-OUTER QUADRANT OF RIGHT BREAST OF FEMALE, ESTROGEN RECEPTOR POSITIVE: ICD-10-CM

## 2024-04-15 LAB
ALBUMIN SERPL-MCNC: 4.3 G/DL (ref 3.5–5.2)
ALBUMIN/GLOB SERPL: 1.7 G/DL
ALP SERPL-CCNC: 98 U/L (ref 39–117)
ALT SERPL W P-5'-P-CCNC: 45 U/L (ref 1–33)
ANION GAP SERPL CALCULATED.3IONS-SCNC: 12.4 MMOL/L (ref 5–15)
AST SERPL-CCNC: 25 U/L (ref 1–32)
BACTERIA UR QL AUTO: NEGATIVE /HPF
BASOPHILS # BLD AUTO: 0.05 10*3/MM3 (ref 0–0.2)
BASOPHILS NFR BLD AUTO: 0.6 % (ref 0–1.5)
BILIRUB SERPL-MCNC: 0.3 MG/DL (ref 0–1.2)
BILIRUB UR QL STRIP: NEGATIVE
BUN SERPL-MCNC: 8 MG/DL (ref 8–23)
BUN/CREAT SERPL: 12.1 (ref 7–25)
CALCIUM SPEC-SCNC: 9.4 MG/DL (ref 8.6–10.5)
CHLORIDE SERPL-SCNC: 105 MMOL/L (ref 98–107)
CLARITY UR: CLEAR
CO2 SERPL-SCNC: 24.6 MMOL/L (ref 22–29)
COLOR UR: ABNORMAL
CREAT SERPL-MCNC: 0.66 MG/DL (ref 0.57–1)
DEPRECATED RDW RBC AUTO: 43.5 FL (ref 37–54)
EGFRCR SERPLBLD CKD-EPI 2021: 95.1 ML/MIN/1.73
EOSINOPHIL # BLD AUTO: 0.06 10*3/MM3 (ref 0–0.4)
EOSINOPHIL NFR BLD AUTO: 0.7 % (ref 0.3–6.2)
ERYTHROCYTE [DISTWIDTH] IN BLOOD BY AUTOMATED COUNT: 12.8 % (ref 12.3–15.4)
GLOBULIN UR ELPH-MCNC: 2.6 GM/DL
GLUCOSE SERPL-MCNC: 116 MG/DL (ref 65–99)
GLUCOSE UR STRIP-MCNC: ABNORMAL MG/DL
HCT VFR BLD AUTO: 38.3 % (ref 34–46.6)
HGB BLD-MCNC: 12.4 G/DL (ref 12–15.9)
HGB UR QL STRIP.AUTO: NEGATIVE
IMM GRANULOCYTES # BLD AUTO: 0.08 10*3/MM3 (ref 0–0.05)
IMM GRANULOCYTES NFR BLD AUTO: 0.9 % (ref 0–0.5)
KETONES UR QL STRIP: NEGATIVE
LEUKOCYTE ESTERASE UR QL STRIP.AUTO: NEGATIVE
LYMPHOCYTES # BLD AUTO: 2.83 10*3/MM3 (ref 0.7–3.1)
LYMPHOCYTES NFR BLD AUTO: 31.3 % (ref 19.6–45.3)
MCH RBC QN AUTO: 30.2 PG (ref 26.6–33)
MCHC RBC AUTO-ENTMCNC: 32.4 G/DL (ref 31.5–35.7)
MCV RBC AUTO: 93.2 FL (ref 79–97)
MONOCYTES # BLD AUTO: 0.45 10*3/MM3 (ref 0.1–0.9)
MONOCYTES NFR BLD AUTO: 5 % (ref 5–12)
NEUTROPHILS NFR BLD AUTO: 5.58 10*3/MM3 (ref 1.7–7)
NEUTROPHILS NFR BLD AUTO: 61.5 % (ref 42.7–76)
NITRITE UR QL STRIP: NEGATIVE
NRBC BLD AUTO-RTO: 0 /100 WBC (ref 0–0.2)
PH UR STRIP.AUTO: 6 [PH] (ref 4.5–8)
PLATELET # BLD AUTO: 306 10*3/MM3 (ref 140–450)
PMV BLD AUTO: 10.1 FL (ref 6–12)
POTASSIUM SERPL-SCNC: 4 MMOL/L (ref 3.5–5.2)
PROT SERPL-MCNC: 6.9 G/DL (ref 6–8.5)
PROT UR QL STRIP: NEGATIVE
RBC # BLD AUTO: 4.11 10*6/MM3 (ref 3.77–5.28)
RBC # UR STRIP: NORMAL /HPF
REF LAB TEST METHOD: NORMAL
SODIUM SERPL-SCNC: 142 MMOL/L (ref 136–145)
SP GR UR STRIP: 1.02 (ref 1–1.03)
SQUAMOUS #/AREA URNS HPF: NORMAL /HPF
UROBILINOGEN UR QL STRIP: ABNORMAL
WBC # UR STRIP: NORMAL /HPF
WBC NRBC COR # BLD AUTO: 9.05 10*3/MM3 (ref 3.4–10.8)

## 2024-04-15 PROCEDURE — 25010000002 HEPARIN LOCK FLUSH PER 10 UNITS: Performed by: INTERNAL MEDICINE

## 2024-04-15 PROCEDURE — 85025 COMPLETE CBC W/AUTO DIFF WBC: CPT

## 2024-04-15 PROCEDURE — 80053 COMPREHEN METABOLIC PANEL: CPT

## 2024-04-15 PROCEDURE — 25010000002 DIPHENHYDRAMINE PER 50 MG: Performed by: INTERNAL MEDICINE

## 2024-04-15 PROCEDURE — 99215 OFFICE O/P EST HI 40 MIN: CPT | Performed by: INTERNAL MEDICINE

## 2024-04-15 PROCEDURE — 25010000002 DEXAMETHASONE SODIUM PHOSPHATE 100 MG/10ML SOLUTION: Performed by: INTERNAL MEDICINE

## 2024-04-15 PROCEDURE — 25010000002 PACLITAXEL PROTEIN-BOUND PART PER 1 MG: Performed by: INTERNAL MEDICINE

## 2024-04-15 PROCEDURE — 96413 CHEMO IV INFUSION 1 HR: CPT

## 2024-04-15 PROCEDURE — 25810000003 SODIUM CHLORIDE 0.9 % SOLUTION: Performed by: INTERNAL MEDICINE

## 2024-04-15 PROCEDURE — 96375 TX/PRO/DX INJ NEW DRUG ADDON: CPT

## 2024-04-15 PROCEDURE — 81001 URINALYSIS AUTO W/SCOPE: CPT

## 2024-04-15 RX ORDER — SODIUM CHLORIDE 9 MG/ML
20 INJECTION, SOLUTION INTRAVENOUS ONCE
Status: CANCELLED | OUTPATIENT
Start: 2024-04-22

## 2024-04-15 RX ORDER — HEPARIN SODIUM (PORCINE) LOCK FLUSH IV SOLN 100 UNIT/ML 100 UNIT/ML
500 SOLUTION INTRAVENOUS AS NEEDED
Status: DISCONTINUED | OUTPATIENT
Start: 2024-04-15 | End: 2024-04-15 | Stop reason: HOSPADM

## 2024-04-15 RX ORDER — FAMOTIDINE 10 MG/ML
20 INJECTION, SOLUTION INTRAVENOUS ONCE
Status: CANCELLED | OUTPATIENT
Start: 2024-04-22

## 2024-04-15 RX ORDER — FAMOTIDINE 10 MG/ML
20 INJECTION, SOLUTION INTRAVENOUS ONCE
Status: COMPLETED | OUTPATIENT
Start: 2024-04-15 | End: 2024-04-15

## 2024-04-15 RX ORDER — FAMOTIDINE 10 MG/ML
20 INJECTION, SOLUTION INTRAVENOUS ONCE
Status: CANCELLED | OUTPATIENT
Start: 2024-04-15

## 2024-04-15 RX ORDER — MONTELUKAST SODIUM 10 MG/1
10 TABLET ORAL ONCE
Status: CANCELLED
Start: 2024-04-15 | End: 2024-04-15

## 2024-04-15 RX ORDER — HEPARIN SODIUM (PORCINE) LOCK FLUSH IV SOLN 100 UNIT/ML 100 UNIT/ML
500 SOLUTION INTRAVENOUS AS NEEDED
OUTPATIENT
Start: 2024-04-15

## 2024-04-15 RX ORDER — PACLITAXEL 100 MG/20ML
80 INJECTION, POWDER, LYOPHILIZED, FOR SUSPENSION INTRAVENOUS ONCE
Status: CANCELLED | OUTPATIENT
Start: 2024-04-22

## 2024-04-15 RX ORDER — MONTELUKAST SODIUM 10 MG/1
10 TABLET ORAL ONCE
Status: CANCELLED
Start: 2024-04-22 | End: 2024-04-22

## 2024-04-15 RX ORDER — PACLITAXEL 100 MG/20ML
80 INJECTION, POWDER, LYOPHILIZED, FOR SUSPENSION INTRAVENOUS ONCE
Status: COMPLETED | OUTPATIENT
Start: 2024-04-15 | End: 2024-04-15

## 2024-04-15 RX ORDER — DIPHENHYDRAMINE HYDROCHLORIDE 50 MG/ML
50 INJECTION INTRAMUSCULAR; INTRAVENOUS AS NEEDED
Status: CANCELLED | OUTPATIENT
Start: 2024-04-22

## 2024-04-15 RX ORDER — SODIUM CHLORIDE 0.9 % (FLUSH) 0.9 %
10 SYRINGE (ML) INJECTION AS NEEDED
OUTPATIENT
Start: 2024-04-15

## 2024-04-15 RX ORDER — FAMOTIDINE 10 MG/ML
20 INJECTION, SOLUTION INTRAVENOUS AS NEEDED
Status: CANCELLED | OUTPATIENT
Start: 2024-04-15

## 2024-04-15 RX ORDER — SODIUM CHLORIDE 0.9 % (FLUSH) 0.9 %
10 SYRINGE (ML) INJECTION AS NEEDED
Status: DISCONTINUED | OUTPATIENT
Start: 2024-04-15 | End: 2024-04-15 | Stop reason: HOSPADM

## 2024-04-15 RX ORDER — PACLITAXEL 100 MG/20ML
80 INJECTION, POWDER, LYOPHILIZED, FOR SUSPENSION INTRAVENOUS ONCE
Status: CANCELLED | OUTPATIENT
Start: 2024-04-15

## 2024-04-15 RX ORDER — DIPHENHYDRAMINE HYDROCHLORIDE 50 MG/ML
50 INJECTION INTRAMUSCULAR; INTRAVENOUS AS NEEDED
Status: CANCELLED | OUTPATIENT
Start: 2024-04-15

## 2024-04-15 RX ORDER — MONTELUKAST SODIUM 10 MG/1
10 TABLET ORAL ONCE
Status: COMPLETED | OUTPATIENT
Start: 2024-04-15 | End: 2024-04-15

## 2024-04-15 RX ORDER — SODIUM CHLORIDE 9 MG/ML
20 INJECTION, SOLUTION INTRAVENOUS ONCE
Status: COMPLETED | OUTPATIENT
Start: 2024-04-15 | End: 2024-04-15

## 2024-04-15 RX ORDER — FAMOTIDINE 10 MG/ML
20 INJECTION, SOLUTION INTRAVENOUS AS NEEDED
Status: CANCELLED | OUTPATIENT
Start: 2024-04-22

## 2024-04-15 RX ORDER — SODIUM CHLORIDE 9 MG/ML
20 INJECTION, SOLUTION INTRAVENOUS ONCE
Status: CANCELLED | OUTPATIENT
Start: 2024-04-15

## 2024-04-15 RX ADMIN — FAMOTIDINE 20 MG: 10 INJECTION INTRAVENOUS at 14:01

## 2024-04-15 RX ADMIN — MONTELUKAST 10 MG: 10 TABLET, FILM COATED ORAL at 13:59

## 2024-04-15 RX ADMIN — SODIUM CHLORIDE 20 ML/HR: 9 INJECTION, SOLUTION INTRAVENOUS at 13:59

## 2024-04-15 RX ADMIN — Medication 10 ML: at 16:09

## 2024-04-15 RX ADMIN — DIPHENHYDRAMINE HYDROCHLORIDE 25 MG: 50 INJECTION, SOLUTION INTRAMUSCULAR; INTRAVENOUS at 14:20

## 2024-04-15 RX ADMIN — DEXAMETHASONE SODIUM PHOSPHATE 12 MG: 10 INJECTION, SOLUTION INTRAMUSCULAR; INTRAVENOUS at 14:03

## 2024-04-15 RX ADMIN — Medication 500 UNITS: at 16:09

## 2024-04-15 RX ADMIN — PACLITAXEL 120 MG: 100 INJECTION, POWDER, LYOPHILIZED, FOR SUSPENSION INTRAVENOUS at 15:11

## 2024-04-15 NOTE — PROGRESS NOTES
OUTPATIENT ONCOLOGY NUTRITION ASSESSMENT    Patient Name: Felicia Boyle  YOB: 1955  MRN: [id number]  Assessment Date: 4/15/2024    COMMENTS: F/U with pt and pts daughter in infusion area, pt receiving abraxane today. Pt reports she had a good week, was able to eat well and did not experience any diarrhea. Pt daughter asking if eating impacts lab values as pt did not eat from 12 am-12 pm today d/t fear the labs were fasting, does endorse some nausea and lightheadedness d/t this. Assured them that the labs they take are not fasting and the only measurement that may be impacted in glucose, but she should definitely be eating something prior to coming in before treatment, pt and daughter were relived to hear this.         Reason for Assessment Follow up     Diagnosis/Problem   R breast ca   Treatment Plan Chemotherapy; abraxane, herceptin, perjeta    Frequency Weekly abraxane, HP every 3 weeks   Goal of cancer treatment Neoadjuvant     Encounter Information        Nutrition/Diet History:  Pt has a good appetite, eating well    Oral Nutrition Supplements: Has enterade if needed    Factors/Symptoms Affecting Intake: No factors at this time   Comments:      Medical/Surgical History Past Medical History:   Diagnosis Date    Anxiety about health     Breast cancer of lower-outer quadrant of right female breast 02/14/2024    DCIS (ductal carcinoma in situ) of breast 03/15/2024    Diverticulosis     Mixed hyperlipidemia 05/19/2019    DIET CONTROLLED    Osteoporosis     Type 2 diabetes mellitus      Past Surgical History:   Procedure Laterality Date    BREAST BIOPSY Left     Excisional biopsy in the [facility]    BREAST BIOPSY  03/2024    CATARACT EXTRACTION W/ INTRAOCULAR LENS IMPLANT Bilateral     CHOLECYSTECTOMY  2012    COLON RESECTION Right 2002    COLONOSCOPY N/A 04/28/2021    Procedure: COLONOSCOPY TO ANASTAMOSIS/TI;  Surgeon: Angelo Gonsalez MD;  Location: [facility] ENDOSCOPY;  Service: Gastroenterology;   "Laterality: N/A;  PRE: SCREENING  POST: NORMAL POST-OP ANATOMY    ENDOSCOPY      2013    ENDOSCOPY N/A 03/10/2017    Procedure: ESOPHAGOGASTRODUODENOSCOPY WITH BIOPSY AND PETER DILATATION 54\";  Surgeon: Angelo Gonsalez MD;  Location: [facility] ENDOSCOPY;  Service:     ENDOSCOPY N/A 04/28/2021    Procedure: ESOPHAGOGASTRODUODENOSCOPY WITH BIOPSIES, 54FR PETER DILATATION;  Surgeon: Angelo Gonsalez MD;  Location: [facility] ENDOSCOPY;  Service: Gastroenterology;  Laterality: N/A;  PRE: DYSPHAGIA  POST: GASTRITIS, ESOPHAGEAL RING    VENOUS ACCESS DEVICE (PORT) INSERTION Left 3/14/2024    Procedure: INSERTION OF PORTACATH;  Surgeon: Abeba Manrique MD;  Location: [facility];  Service: General;  Laterality: Left;        Anthropometrics        Current Height Ht Readings from Last 1 Encounters:   04/15/24 147 cm (57.87\")      Current Weight Wt Readings from Last 1 Encounters:   04/15/24 53.9 kg (118 lb 14.4 oz)      BMI  24.7   Ideal Body Weight (IBW) 100#   Usual Body Weight (UBW) ~117#   Weight Change/Trend Stable   Weight History Wt Readings from Last 30 Encounters:   04/15/24 1247 53.9 kg (118 lb 14.4 oz)   04/08/24 0804 53.8 kg (118 lb 9.6 oz)   04/01/24 0950 51.3 kg (113 lb 1.6 oz)   03/25/24 0816 52.2 kg (115 lb 1.6 oz)   03/18/24 0757 54.3 kg (119 lb 12.8 oz)   03/15/24 0918 54.9 kg (121 lb)   03/15/24 1341 54.9 kg (121 lb)   03/12/24 1402 55.1 kg (121 lb 6.4 oz)   03/06/24 1214 55.1 kg (121 lb 6.4 oz)   03/06/24 0932 54.4 kg (119 lb 14.4 oz)   03/04/24 0918 53.5 kg (118 lb)   02/26/24 0728 53.1 kg (117 lb)   02/14/24 1042 53.1 kg (117 lb)   02/09/24 1613 53.1 kg (117 lb)   01/31/24 0940 53.1 kg (117 lb)   01/29/24 0949 54 kg (119 lb)   08/29/23 0936 54 kg (119 lb)   08/17/23 1352 55.3 kg (122 lb)   08/01/23 1435 55.3 kg (122 lb)   10/18/22 0919 54.4 kg (120 lb)   09/19/22 1345 54.4 kg (120 lb)   09/13/22 1408 54.4 kg (120 lb)   05/16/22 0910 53.1 kg (117 lb)   05/18/21 1021 54 kg (119 lb)   04/28/21 1038 " "52.2 kg (115 lb 1.6 oz)   01/07/21 0926 53.5 kg (118 lb)   11/25/20 1614 54.4 kg (120 lb)   08/25/20 0853 53.5 kg (118 lb)   06/22/20 1016 53.5 kg (118 lb)   03/18/20 0941 52.6 kg (116 lb)          Medications           Current medications: FreeStyle Lite, HYDROcodone-acetaminophen, cephalexin, freestyle, glucose blood, lidocaine-prilocaine, ondansetron, pantoprazole, potassium chloride, and prednisoLONE acetate     Tests/Procedures        Tests/Procedures Chemotherapy     Labs       Pertinent Labs    Results from last 7 days   Lab Units 04/15/24  1211   SODIUM mmol/L 142   POTASSIUM mmol/L 4.0   CHLORIDE mmol/L 105   CO2 mmol/L 24.6   BUN mg/dL 8   CREATININE mg/dL 0.66   CALCIUM mg/dL 9.4   BILIRUBIN mg/dL 0.3   ALK PHOS U/L 98   ALT (SGPT) U/L 45*   AST (SGOT) U/L 25   GLUCOSE mg/dL 116*     Results from last 7 days   Lab Units 04/15/24  1211   HEMOGLOBIN g/dL 12.4   HEMATOCRIT % 38.3   WBC 10*3/mm3 9.05   ALBUMIN g/dL 4.3     Results from last 7 days   Lab Units 04/15/24  1211   PLATELETS 10*3/mm3 306     COVID19   Date Value Ref Range Status   04/26/2021 Not Detected Not Detected - Ref. Range Final     Lab Results   Component Value Date    HGBA1C 7.30 (H) 08/02/2023          Physical Findings        Physical Appearance alert, oriented     Edema  no edema   Gastrointestinal None   Tubes/Drains implantable port   Oral/Mouth Cavity WNL     Estimated/Assessed Needs        Energy Requirements    Height for Calculation   58\"   Weight for Calculation 54.3 kg   Method for Estimation  30 kcal/kg   EST Needs (kcal/day) 1600 kcal/day       Protein Requirements    Weight for Calculation 54.3 kg   EST Protein Needs (g/kg) 1.0 - 1.2 gm/kg   EST Daily Needs (g/day) 55-65 g/day       Fluid Requirements     Method for Estimation 1 mL/kcal    Estimated Needs (mL/day) 1600 mL/day           PES STATEMENT / NUTRITION DIAGNOSIS      Nutrition Dx Problem Problem:    NutritionDiagnosisProblem: Nutrition Appropriate for Condition at " this Time    Etiology:  Medical diagnosis: Breast cancer     NUTRITION INTERVENTION / PLAN OF CARE      Intervention Goal(s) Maintain nutrition status, Meet estimated needs, Tolerate PO , Maintain intake, Maintain weight, and No significant weight loss         RD Intervention/Action Encouraged intake, Follow Tx progress         Recommendations:       PO Diet Continue current       Supplements Monitor for need--pt has enterade if needed       Snacks       Other          Monitor/Evaluation PO intake, Weight, Symptoms   Education Will provide eduction as needed   --    RD to follow     Electronically signed by:  Yesi Henson RD  04/15/24 14:43 EDT

## 2024-04-15 NOTE — TELEPHONE ENCOUNTER
----- Message from Agata Renteria MD sent at 4/15/2024  3:41 PM EDT -----  Please let patient know that the UA is negative  ----- Message -----  From: Lab, Background User  Sent: 4/15/2024   3:32 PM EDT  To: Agata Renteria MD

## 2024-04-15 NOTE — PROGRESS NOTES
Subjective   Felicia Boyle is a 68 y.o. female.  Referred by Dr. Manrique for right breast invasive ductal carcinoma, HER2 positive, left breast ductal carcinoma in situ    History of Present Illness     Ms. Boyle is a 68-year-old postmenopausal [place] lady who is fairly healthy without any significant comorbidities palpated of right breast mass in the latter part of  which was further worked up with diagnostic mammogram and ultrasound and subsequently a biopsy which confirmed invasive ductal carcinoma which was ER/OH negative and HER2 positive.    Family history significant for her 2 sisters with breast cancer at the age of 38 and 48 respectively.  Her older sister  of metastatic breast cancer but her younger sister is doing well.  Patient underwent genetic testing in 2017 which was negative due to her family history.    She had regular screening mammograms up until 2019 but subsequently stopped having annual gynecological visits and therefore did not have any further screening mammograms up until the most recent diagnostic mammogram for the new palpable abnormality.    2024-bilateral diagnostic mammogram and right breast ultrasound  The breast heterogeneously dense.  Left breast with a scar marker in the lower left breast dating back to .  No new findings in the left breast.    Right breast at 8:00, 8 cm from the nipple there is a 1.7 x 1.4 x 1.6 cm oval high density mass with partially circumscribed and partially indistinct margins.    On the ultrasound the mass measures 1.7 x 1 x 1.3 cm.    No abnormal right axilla lymphadenopathy.    2024-right breast ultrasound-guided biopsy  Pathology consistent with invasive ductal carcinoma  Grade 3  9.3 mm of invasive disease on the core biopsy  ER negative  OH negative  HER2 3+ on immunohistochemistry, 95%  Ki-67 70%    2024-bilateral breast MRI  In the right breast at 8:00, 8 cm from the nipple there is a irregular enhancing mass which  measures 1.8 x 2 x 1.7 cm.  This corresponds to the biopsy-proven malignancy.  Extending anteriorly away from the MT margin of the mass there is an irregular linear enhancement which measures 3.5 x 1 x 0.7 cm.  The whole area including the mass measures 4.7 cm in AP dimension, 1.7 in mediolateral and 2.4 cm in the superior-inferior dimension.    At 12:00, 3 cm from the nipple there is an irregular non-mass enhancement which measures 1 x 1.1 x 1.2 cm.  No mammographic correlate is appreciated.    No other suspicious areas of enhancement in the right breast or right axilla or internal mammary chain.    In the left breast, middle third at 11:30 position, 3.7 cm from the nipple there is an irregular area of non-mass enhancement which measures 1.3 x 1.1 x 1 cm.  There is suspected architectural distortion.  No other areas of abnormal enhancement seen in the left breast of the left axilla or the internal mammary chain.    2/26/2024-additional MRI guided biopsies    1.left breast 12:00-intermediate grade ductal carcinoma in situ with apocrine features  ER +91 to 100% strong  LA +81 to 90% strong  Ki-67 15%    2. Right breast 12 o'clock position MRI guided biopsy of the non-mass enhancement-sclerosing adenosis with associated calcifications  No atypical hyperplasia in situ or invasive carcinoma    3.right breast 8:00 MRI guided biopsies of the linear enhancement is consistent with high-grade ductal carcinoma in situ  DCIS involves multiple tissue cores measuring up to 3 mm.  Sclerosing adenosis and usual ductal hyperplasia with associated microcalcifications.    The case was discussed in tumor board and Dr. Patel thought that they could be more invasive disease in the 4.7 cm of linear enhancement noted in the right breast at the site of the mass.  Therefore it was decided to proceed with neoadjuvant chemotherapy.    Patient was a former smoker and smoked for about 40 years, half a pack per week, quit about 1 month ago.   Denies any alcohol use.    Week 1 Taxol Herceptin and Perjeta was planned for 3/18/2024.  Patient received Herceptin and Perjeta and had some chills for which she received Solu-Medrol and responded well to that.  However after infusing only 1 cc of Taxol patient experienced a severe anaphylactic reaction due to which the infusion was stopped permanently and she received an additional dose of Solu-Medrol and Benadryl.  Symptoms resolved subsequently and patient was discharged home safely.    Due to this recent treatment has been changed to Abraxane along with Herceptin and Perjeta.  3/25/2024-patient is due for her first treatment with Abraxane.    She is extremely anxious given the severe hypersensitivity reaction that she experienced with Taxol.  She is also complaining of increased belching and heartburn since her last chemotherapy.  She is complaining of severe diarrhea with started last night.  Had multiple loose stools.    INTERVAL HISTORY  The patient returns today for for follow-up and scheduled for weekly Abraxane.  She has not had any significant diarrhea or nausea or vomiting.  She feels well today.  Denies any neuropathy.  She is unable to feel the right breast mass any longer.      The following portions of the patient's history were reviewed and updated as appropriate: allergies, current medications, past family history, past medical history, past social history, past surgical history, and problem list.    Past Medical History:   Diagnosis Date    Anxiety about health     Breast cancer of lower-outer quadrant of right female breast 02/14/2024    DCIS (ductal carcinoma in situ) of breast 03/15/2024    Diverticulosis     Mixed hyperlipidemia 05/19/2019    DIET CONTROLLED    Osteoporosis     Type 2 diabetes mellitus         Past Surgical History:   Procedure Laterality Date    BREAST BIOPSY Left     Excisional biopsy in the [facility]    BREAST BIOPSY  03/2024    CATARACT EXTRACTION W/ INTRAOCULAR LENS  "IMPLANT Bilateral     CHOLECYSTECTOMY  2012    COLON RESECTION Right 2002    COLONOSCOPY N/A 04/28/2021    Procedure: COLONOSCOPY TO ANASTAMOSIS/TI;  Surgeon: Angelo Gonsalez MD;  Location: [facility] ENDOSCOPY;  Service: Gastroenterology;  Laterality: N/A;  PRE: SCREENING  POST: NORMAL POST-OP ANATOMY    ENDOSCOPY      2013    ENDOSCOPY N/A 03/10/2017    Procedure: ESOPHAGOGASTRODUODENOSCOPY WITH BIOPSY AND PETER DILATATION 54\";  Surgeon: Angelo Gonsalez MD;  Location: [facility] ENDOSCOPY;  Service:     ENDOSCOPY N/A 04/28/2021    Procedure: ESOPHAGOGASTRODUODENOSCOPY WITH BIOPSIES, 54FR PETER DILATATION;  Surgeon: Angelo Gonsalez MD;  Location: [facility] ENDOSCOPY;  Service: Gastroenterology;  Laterality: N/A;  PRE: DYSPHAGIA  POST: GASTRITIS, ESOPHAGEAL RING    VENOUS ACCESS DEVICE (PORT) INSERTION Left 3/14/2024    Procedure: INSERTION OF PORTACATH;  Surgeon: Abeba Manrique MD;  Location: [facility];  Service: General;  Laterality: Left;        Family History   Problem Relation Age of Onset    Hypertension Mother     Diabetes Mother     Hypertension Father     Breast cancer Sister 48    Breast cancer Sister 38    No Known Problems Brother     No Known Problems Daughter     No Known Problems Son     No Known Problems Maternal Grandmother     No Known Problems Paternal Grandmother     No Known Problems Maternal Grandfather     No Known Problems Paternal Grandfather     Autism Grandson     Colon cancer Neg Hx     Rectal cancer Neg Hx     Endometrial cancer Neg Hx     Vaginal cancer Neg Hx     Cervical cancer Neg Hx     Ovarian cancer Neg Hx     Prostate cancer Neg Hx     Uterine cancer Neg Hx     Malig Hyperthermia Neg Hx         Social History     Socioeconomic History    Marital status:      Spouse name: Ryan   Tobacco Use    Smoking status: Former     Current packs/day: 0.00     Average packs/day: 0.3 packs/day for 15.0 years (3.8 ttl pk-yrs)     Types: Cigarettes     Quit date: 1/15/2024     Years " "since quittin.2     Passive exposure: Never    Smokeless tobacco: Never    Tobacco comments:     N/A   Vaping Use    Vaping status: Never Used   Substance and Sexual Activity    Alcohol use: Yes     Comment: RARE    Drug use: No    Sexual activity: Not Currently     Partners: Male     Birth control/protection: Tubal ligation        OB History          4    Para   3    Term   3       0    AB   1    Living   3         SAB   1    IAB   0    Ectopic   0    Molar   0    Multiple   0    Live Births   3               Age at menarche-16  Age at first live childbirth-18   4 para 3  1  Age at menopause-49  No use of hormone replacement therapy  No use of oral conceptive pills  Breast-feeding for 3 months    Allergies   Allergen Reactions    Metronidazole Nausea And Vomiting      Review of systems as mentioned HPI otherwise negative    Objective   Blood pressure 145/82, pulse 83, temperature 97.7 °F (36.5 °C), temperature source Temporal, resp. rate 16, height 147 cm (57.87\"), weight 53.9 kg (118 lb 14.4 oz), SpO2 96%, not currently breastfeeding.     Physical Exam  Vitals reviewed.   Constitutional:       Appearance: Normal appearance. She is normal weight.   HENT:      Nose: Nose normal.      Mouth/Throat:      Pharynx: Oropharynx is clear.   Eyes:      Conjunctiva/sclera: Conjunctivae normal.   Cardiovascular:      Rate and Rhythm: Normal rate.   Pulmonary:      Effort: Pulmonary effort is normal.   Abdominal:      General: Abdomen is flat.   Musculoskeletal:         General: Normal range of motion.      Cervical back: Normal range of motion.   Skin:     General: Skin is warm.   Neurological:      General: No focal deficit present.      Mental Status: She is alert and oriented to person, place, and time.   Psychiatric:         Mood and Affect: Mood normal.         Behavior: Behavior normal.         Thought Content: Thought content normal.         Judgment: Judgment normal.       Breast Exam: " Right breast on inspection 9 o'clock position with biopsy site changes.  The right breast mass is no longer palpable.  No right answer lymphadenopathy.    Left breast :There is a palpable underlying hematoma which is tender to palpation from MRI guided biopsies. No left axillary lymphadenopathy.(Left breast not examined today)      I have reexamined the patient and the results are consistent with the previously documented exam. Agata Renteria MD      Infusion on 04/15/2024   Component Date Value Ref Range Status    Glucose 04/15/2024 116 (H)  65 - 99 mg/dL Final    BUN 04/15/2024 8  8 - 23 mg/dL Final    Creatinine 04/15/2024 0.66  0.57 - 1.00 mg/dL Final    Sodium 04/15/2024 142  136 - 145 mmol/L Final    Potassium 04/15/2024 4.0  3.5 - 5.2 mmol/L Final    Chloride 04/15/2024 105  98 - 107 mmol/L Final    CO2 04/15/2024 24.6  22.0 - 29.0 mmol/L Final    Calcium 04/15/2024 9.4  8.6 - 10.5 mg/dL Final    Total Protein 04/15/2024 6.9  6.0 - 8.5 g/dL Final    Albumin 04/15/2024 4.3  3.5 - 5.2 g/dL Final    ALT (SGPT) 04/15/2024 45 (H)  1 - 33 U/L Final    AST (SGOT) 04/15/2024 25  1 - 32 U/L Final    Alkaline Phosphatase 04/15/2024 98  39 - 117 U/L Final    Total Bilirubin 04/15/2024 0.3  0.0 - 1.2 mg/dL Final    Globulin 04/15/2024 2.6  gm/dL Final    A/G Ratio 04/15/2024 1.7  g/dL Final    BUN/Creatinine Ratio 04/15/2024 12.1  7.0 - 25.0 Final    Anion Gap 04/15/2024 12.4  5.0 - 15.0 mmol/L Final    eGFR 04/15/2024 95.1  >60.0 mL/min/1.73 Final    WBC 04/15/2024 9.05  3.40 - 10.80 10*3/mm3 Final    RBC 04/15/2024 4.11  3.77 - 5.28 10*6/mm3 Final    Hemoglobin 04/15/2024 12.4  12.0 - 15.9 g/dL Final    Hematocrit 04/15/2024 38.3  34.0 - 46.6 % Final    MCV 04/15/2024 93.2  79.0 - 97.0 fL Final    MCH 04/15/2024 30.2  26.6 - 33.0 pg Final    MCHC 04/15/2024 32.4  31.5 - 35.7 g/dL Final    RDW 04/15/2024 12.8  12.3 - 15.4 % Final    RDW-SD 04/15/2024 43.5  37.0 - 54.0 fl Final    MPV 04/15/2024 10.1  6.0 - 12.0 fL  Final    Platelets 04/15/2024 306  140 - 450 10*3/mm3 Final    Neutrophil % 04/15/2024 61.5  42.7 - 76.0 % Final    Lymphocyte % 04/15/2024 31.3  19.6 - 45.3 % Final    Monocyte % 04/15/2024 5.0  5.0 - 12.0 % Final    Eosinophil % 04/15/2024 0.7  0.3 - 6.2 % Final    Basophil % 04/15/2024 0.6  0.0 - 1.5 % Final    Immature Grans % 04/15/2024 0.9 (H)  0.0 - 0.5 % Final    Neutrophils, Absolute 04/15/2024 5.58  1.70 - 7.00 10*3/mm3 Final    Lymphocytes, Absolute 04/15/2024 2.83  0.70 - 3.10 10*3/mm3 Final    Monocytes, Absolute 04/15/2024 0.45  0.10 - 0.90 10*3/mm3 Final    Eosinophils, Absolute 04/15/2024 0.06  0.00 - 0.40 10*3/mm3 Final    Basophils, Absolute 04/15/2024 0.05  0.00 - 0.20 10*3/mm3 Final    Immature Grans, Absolute 04/15/2024 0.08 (H)  0.00 - 0.05 10*3/mm3 Final    nRBC 04/15/2024 0.0  0.0 - 0.2 /100 WBC Final   Infusion on 04/08/2024   Component Date Value Ref Range Status    Glucose 04/08/2024 157 (H)  65 - 99 mg/dL Final    BUN 04/08/2024 13  8 - 23 mg/dL Final    Creatinine 04/08/2024 0.65  0.57 - 1.00 mg/dL Final    Sodium 04/08/2024 139  136 - 145 mmol/L Final    Potassium 04/08/2024 4.1  3.5 - 5.2 mmol/L Final    Chloride 04/08/2024 105  98 - 107 mmol/L Final    CO2 04/08/2024 23.9  22.0 - 29.0 mmol/L Final    Calcium 04/08/2024 8.8  8.6 - 10.5 mg/dL Final    Total Protein 04/08/2024 6.4  6.0 - 8.5 g/dL Final    Albumin 04/08/2024 4.1  3.5 - 5.2 g/dL Final    ALT (SGPT) 04/08/2024 58 (H)  1 - 33 U/L Final    AST (SGOT) 04/08/2024 28  1 - 32 U/L Final    Alkaline Phosphatase 04/08/2024 102  39 - 117 U/L Final    Total Bilirubin 04/08/2024 0.3  0.0 - 1.2 mg/dL Final    Globulin 04/08/2024 2.3  gm/dL Final    A/G Ratio 04/08/2024 1.8  g/dL Final    BUN/Creatinine Ratio 04/08/2024 20.0  7.0 - 25.0 Final    Anion Gap 04/08/2024 10.1  5.0 - 15.0 mmol/L Final    eGFR 04/08/2024 95.4  >60.0 mL/min/1.73 Final    WBC 04/08/2024 6.54  3.40 - 10.80 10*3/mm3 Final    RBC 04/08/2024 4.05  3.77 - 5.28  10*6/mm3 Final    Hemoglobin 04/08/2024 11.9 (L)  12.0 - 15.9 g/dL Final    Hematocrit 04/08/2024 38.0  34.0 - 46.6 % Final    MCV 04/08/2024 93.8  79.0 - 97.0 fL Final    MCH 04/08/2024 29.4  26.6 - 33.0 pg Final    MCHC 04/08/2024 31.3 (L)  31.5 - 35.7 g/dL Final    RDW 04/08/2024 13.0  12.3 - 15.4 % Final    RDW-SD 04/08/2024 44.5  37.0 - 54.0 fl Final    MPV 04/08/2024 9.5  6.0 - 12.0 fL Final    Platelets 04/08/2024 370  140 - 450 10*3/mm3 Final    Neutrophil % 04/08/2024 48.6  42.7 - 76.0 % Final    Lymphocyte % 04/08/2024 40.7  19.6 - 45.3 % Final    Monocyte % 04/08/2024 8.7  5.0 - 12.0 % Final    Eosinophil % 04/08/2024 0.9  0.3 - 6.2 % Final    Basophil % 04/08/2024 0.8  0.0 - 1.5 % Final    Immature Grans % 04/08/2024 0.3  0.0 - 0.5 % Final    Neutrophils, Absolute 04/08/2024 3.18  1.70 - 7.00 10*3/mm3 Final    Lymphocytes, Absolute 04/08/2024 2.66  0.70 - 3.10 10*3/mm3 Final    Monocytes, Absolute 04/08/2024 0.57  0.10 - 0.90 10*3/mm3 Final    Eosinophils, Absolute 04/08/2024 0.06  0.00 - 0.40 10*3/mm3 Final    Basophils, Absolute 04/08/2024 0.05  0.00 - 0.20 10*3/mm3 Final    Immature Grans, Absolute 04/08/2024 0.02  0.00 - 0.05 10*3/mm3 Final    nRBC 04/08/2024 0.0  0.0 - 0.2 /100 WBC Final   Infusion on 04/01/2024   Component Date Value Ref Range Status    Color, UA 04/01/2024 Yellow  Yellow, Straw Final    Appearance, UA 04/01/2024 Cloudy (A)  Clear Final    pH, UA 04/01/2024 6.0  4.5 - 8.0 Final    Specific Gravity, UA 04/01/2024 1.025  1.002 - 1.030 Final    Glucose, UA 04/01/2024 Negative  Negative Final    Ketones, UA 04/01/2024 Negative  Negative Final    Bilirubin, UA 04/01/2024 Negative  Negative Final    Blood, UA 04/01/2024 Moderate (2+) (A)  Negative Final    Protein, UA 04/01/2024 >=300 mg/dL (3+) (A)  Negative Final    Leuk Esterase, UA 04/01/2024 Small (1+) (A)  Negative Final    Nitrite, UA 04/01/2024 Positive (A)  Negative Final    Urobilinogen, UA 04/01/2024 0.2 E.U./dL  0.2 - 1.0  E.U./dL Final   Office Visit on 04/01/2024   Component Date Value Ref Range Status    Hepatitis B Surface Ag 04/01/2024 Non-Reactive  Non-Reactive Final    Hep A IgM 04/01/2024 Non-Reactive  Non-Reactive Final    Hep B C IgM 04/01/2024 Non-Reactive  Non-Reactive Final    Hepatitis C Ab 04/01/2024 Non-Reactive  Non-Reactive Final    Magnesium 04/01/2024 1.8  1.6 - 2.4 mg/dL Final   Infusion on 04/01/2024   Component Date Value Ref Range Status    Glucose 04/01/2024 151 (H)  65 - 99 mg/dL Final    BUN 04/01/2024 15  8 - 23 mg/dL Final    Creatinine 04/01/2024 0.75  0.57 - 1.00 mg/dL Final    Sodium 04/01/2024 139  136 - 145 mmol/L Final    Potassium 04/01/2024 3.2 (L)  3.5 - 5.2 mmol/L Final    Chloride 04/01/2024 103  98 - 107 mmol/L Final    CO2 04/01/2024 22.8  22.0 - 29.0 mmol/L Final    Calcium 04/01/2024 9.6  8.6 - 10.5 mg/dL Final    Total Protein 04/01/2024 6.7  6.0 - 8.5 g/dL Final    Albumin 04/01/2024 4.3  3.5 - 5.2 g/dL Final    ALT (SGPT) 04/01/2024 141 (C)  1 - 33 U/L Final    AST (SGOT) 04/01/2024 47 (H)  1 - 32 U/L Final    Alkaline Phosphatase 04/01/2024 160 (H)  39 - 117 U/L Final    Total Bilirubin 04/01/2024 0.4  0.0 - 1.2 mg/dL Final    Globulin 04/01/2024 2.4  gm/dL Final    A/G Ratio 04/01/2024 1.8  g/dL Final    BUN/Creatinine Ratio 04/01/2024 20.0  7.0 - 25.0 Final    Anion Gap 04/01/2024 13.2  5.0 - 15.0 mmol/L Final    eGFR 04/01/2024 86.3  >60.0 mL/min/1.73 Final    WBC 04/01/2024 10.21  3.40 - 10.80 10*3/mm3 Final    RBC 04/01/2024 4.34  3.77 - 5.28 10*6/mm3 Final    Hemoglobin 04/01/2024 13.1  12.0 - 15.9 g/dL Final    Hematocrit 04/01/2024 38.8  34.0 - 46.6 % Final    MCV 04/01/2024 89.4  79.0 - 97.0 fL Final    MCH 04/01/2024 30.2  26.6 - 33.0 pg Final    MCHC 04/01/2024 33.8  31.5 - 35.7 g/dL Final    RDW 04/01/2024 12.6  12.3 - 15.4 % Final    RDW-SD 04/01/2024 41.1  37.0 - 54.0 fl Final    MPV 04/01/2024 9.7  6.0 - 12.0 fL Final    Platelets 04/01/2024 338  140 - 450 10*3/mm3 Final     Neutrophil % 04/01/2024 64.9  42.7 - 76.0 % Final    Lymphocyte % 04/01/2024 30.0  19.6 - 45.3 % Final    Monocyte % 04/01/2024 3.2 (L)  5.0 - 12.0 % Final    Eosinophil % 04/01/2024 0.4  0.3 - 6.2 % Final    Basophil % 04/01/2024 0.7  0.0 - 1.5 % Final    Immature Grans % 04/01/2024 0.8 (H)  0.0 - 0.5 % Final    Neutrophils, Absolute 04/01/2024 6.63  1.70 - 7.00 10*3/mm3 Final    Lymphocytes, Absolute 04/01/2024 3.06  0.70 - 3.10 10*3/mm3 Final    Monocytes, Absolute 04/01/2024 0.33  0.10 - 0.90 10*3/mm3 Final    Eosinophils, Absolute 04/01/2024 0.04  0.00 - 0.40 10*3/mm3 Final    Basophils, Absolute 04/01/2024 0.07  0.00 - 0.20 10*3/mm3 Final    Immature Grans, Absolute 04/01/2024 0.08 (H)  0.00 - 0.05 10*3/mm3 Final    nRBC 04/01/2024 0.0  0.0 - 0.2 /100 WBC Final    Urine Culture 04/01/2024 >100,000 CFU/mL Escherichia coli (A)   Final   Infusion on 03/25/2024   Component Date Value Ref Range Status    Glucose 03/25/2024 200 (H)  65 - 99 mg/dL Final    BUN 03/25/2024 17  8 - 23 mg/dL Final    Creatinine 03/25/2024 0.98  0.57 - 1.00 mg/dL Final    Sodium 03/25/2024 137  136 - 145 mmol/L Final    Potassium 03/25/2024 3.6  3.5 - 5.2 mmol/L Final    Chloride 03/25/2024 106  98 - 107 mmol/L Final    CO2 03/25/2024 15.1 (L)  22.0 - 29.0 mmol/L Final    Calcium 03/25/2024 9.4  8.6 - 10.5 mg/dL Final    Total Protein 03/25/2024 8.1  6.0 - 8.5 g/dL Final    Albumin 03/25/2024 4.5  3.5 - 5.2 g/dL Final    ALT (SGPT) 03/25/2024 22  1 - 33 U/L Final    AST (SGOT) 03/25/2024 20  1 - 32 U/L Final    Alkaline Phosphatase 03/25/2024 100  39 - 117 U/L Final    Total Bilirubin 03/25/2024 0.3  0.0 - 1.2 mg/dL Final    Globulin 03/25/2024 3.6  gm/dL Final    A/G Ratio 03/25/2024 1.3  g/dL Final    BUN/Creatinine Ratio 03/25/2024 17.3  7.0 - 25.0 Final    Anion Gap 03/25/2024 15.9 (H)  5.0 - 15.0 mmol/L Final    eGFR 03/25/2024 62.6  >60.0 mL/min/1.73 Final    WBC 03/25/2024 12.04 (H)  3.40 - 10.80 10*3/mm3 Final    RBC 03/25/2024  5.06  3.77 - 5.28 10*6/mm3 Final    Hemoglobin 03/25/2024 15.5  12.0 - 15.9 g/dL Final    Hematocrit 03/25/2024 47.0 (H)  34.0 - 46.6 % Final    MCV 03/25/2024 92.9  79.0 - 97.0 fL Final    MCH 03/25/2024 30.6  26.6 - 33.0 pg Final    MCHC 03/25/2024 33.0  31.5 - 35.7 g/dL Final    RDW 03/25/2024 12.8  12.3 - 15.4 % Final    RDW-SD 03/25/2024 43.8  37.0 - 54.0 fl Final    MPV 03/25/2024 10.2  6.0 - 12.0 fL Final    Platelets 03/25/2024 269  140 - 450 10*3/mm3 Final    Neutrophil % 03/25/2024 82.4 (H)  42.7 - 76.0 % Final    Lymphocyte % 03/25/2024 8.3 (L)  19.6 - 45.3 % Final    Monocyte % 03/25/2024 7.6  5.0 - 12.0 % Final    Eosinophil % 03/25/2024 0.2 (L)  0.3 - 6.2 % Final    Basophil % 03/25/2024 0.2  0.0 - 1.5 % Final    Immature Grans % 03/25/2024 1.3 (H)  0.0 - 0.5 % Final    Neutrophils, Absolute 03/25/2024 9.92 (H)  1.70 - 7.00 10*3/mm3 Final    Lymphocytes, Absolute 03/25/2024 1.00  0.70 - 3.10 10*3/mm3 Final    Monocytes, Absolute 03/25/2024 0.91 (H)  0.10 - 0.90 10*3/mm3 Final    Eosinophils, Absolute 03/25/2024 0.02  0.00 - 0.40 10*3/mm3 Final    Basophils, Absolute 03/25/2024 0.03  0.00 - 0.20 10*3/mm3 Final    Immature Grans, Absolute 03/25/2024 0.16 (H)  0.00 - 0.05 10*3/mm3 Final    nRBC 03/25/2024 0.0  0.0 - 0.2 /100 WBC Final   Infusion on 03/18/2024   Component Date Value Ref Range Status    Glucose 03/18/2024 150 (H)  65 - 99 mg/dL Final    BUN 03/18/2024 21  8 - 23 mg/dL Final    Creatinine 03/18/2024 0.69  0.57 - 1.00 mg/dL Final    Sodium 03/18/2024 142  136 - 145 mmol/L Final    Potassium 03/18/2024 4.0  3.5 - 5.2 mmol/L Final    Chloride 03/18/2024 106  98 - 107 mmol/L Final    CO2 03/18/2024 20.9 (L)  22.0 - 29.0 mmol/L Final    Calcium 03/18/2024 9.5  8.6 - 10.5 mg/dL Final    Total Protein 03/18/2024 6.9  6.0 - 8.5 g/dL Final    Albumin 03/18/2024 4.3  3.5 - 5.2 g/dL Final    ALT (SGPT) 03/18/2024 19  1 - 33 U/L Final    AST (SGOT) 03/18/2024 18  1 - 32 U/L Final    Alkaline Phosphatase  03/18/2024 72  39 - 117 U/L Final    Total Bilirubin 03/18/2024 0.3  0.0 - 1.2 mg/dL Final    Globulin 03/18/2024 2.6  gm/dL Final    A/G Ratio 03/18/2024 1.7  g/dL Final    BUN/Creatinine Ratio 03/18/2024 30.4 (H)  7.0 - 25.0 Final    Anion Gap 03/18/2024 15.1 (H)  5.0 - 15.0 mmol/L Final    eGFR 03/18/2024 94.1  >60.0 mL/min/1.73 Final    WBC 03/18/2024 12.33 (H)  3.40 - 10.80 10*3/mm3 Final    RBC 03/18/2024 4.20  3.77 - 5.28 10*6/mm3 Final    Hemoglobin 03/18/2024 13.1  12.0 - 15.9 g/dL Final    Hematocrit 03/18/2024 38.6  34.0 - 46.6 % Final    MCV 03/18/2024 91.9  79.0 - 97.0 fL Final    MCH 03/18/2024 31.2  26.6 - 33.0 pg Final    MCHC 03/18/2024 33.9  31.5 - 35.7 g/dL Final    RDW 03/18/2024 12.7  12.3 - 15.4 % Final    RDW-SD 03/18/2024 42.4  37.0 - 54.0 fl Final    MPV 03/18/2024 10.6  6.0 - 12.0 fL Final    Platelets 03/18/2024 271  140 - 450 10*3/mm3 Final    Neutrophil % 03/18/2024 69.8  42.7 - 76.0 % Final    Lymphocyte % 03/18/2024 19.7  19.6 - 45.3 % Final    Monocyte % 03/18/2024 9.8  5.0 - 12.0 % Final    Eosinophil % 03/18/2024 0.0 (L)  0.3 - 6.2 % Final    Basophil % 03/18/2024 0.2  0.0 - 1.5 % Final    Immature Grans % 03/18/2024 0.5  0.0 - 0.5 % Final    Neutrophils, Absolute 03/18/2024 8.61 (H)  1.70 - 7.00 10*3/mm3 Final    Lymphocytes, Absolute 03/18/2024 2.43  0.70 - 3.10 10*3/mm3 Final    Monocytes, Absolute 03/18/2024 1.21 (H)  0.10 - 0.90 10*3/mm3 Final    Eosinophils, Absolute 03/18/2024 0.00  0.00 - 0.40 10*3/mm3 Final    Basophils, Absolute 03/18/2024 0.02  0.00 - 0.20 10*3/mm3 Final    Immature Grans, Absolute 03/18/2024 0.06 (H)  0.00 - 0.05 10*3/mm3 Final    nRBC 03/18/2024 0.0  0.0 - 0.2 /100 WBC Final        No radiology results for the last 30 days.     4/15/2024 CBC reviewed and WBC 9.05, hemoglobin 12.4 and platelets 306,000  CMP reviewed and BUN 8, creatinine 0.66, ALT 45, AST 25      Assessment & Plan       *Right breast invasive ductal carcinoma  Clinical T2 N0 M0, stage  IIa, clinical and prognostic ER/CT negative, HER2 3+ immunohistochemistry, Ki-67 70%  On the MRI the mass measures 2 cm however there is linear enhancement which in the AP dimension measures 4.7 cm.  Additional biopsies of this linear enhancement was performed and consistent with DCIS  It is hard to delineate the amount of invasive disease as opposed to DCIS.  More likely than not invasive component is likely limited to the mass which is about 2-1/2 cm on exam.  Since the tumor is greater than 2 cm I think it is reasonable to proceed with neoadjuvant chemotherapy.  We discussed proceeding with weekly Taxol along with Herceptin and Perjeta every 3 weeks.  After 12 weeks she will require an MRI to assess the response.  If there is inadequate response then we may have to proceed with Adriamycin and Cytoxan for 4 cycles however if there is a good response then she can proceed with surgery with no additional chemotherapy.  The likely plan is that she will undergo bilateral mastectomy.  If that is the case as long as she does not have any positive margins or lymph node positive disease she may not need radiation.  We discussed continuing adjuvant HER2 directed therapy for whole year  3/18/2024-received first dose of Herceptin and Perjeta, had a severe hypersensitivity reaction to Taxol and hence Taxol was discontinued  3/25/2024-Labs reviewed and stable to proceed with chemotherapy  Patient is extremely anxious today after experiencing the severe hypersensitivity reaction last week.  Reassured patient that this is unlikely to happen with Abraxane and we will premedicate with extra Solu-Medrol..  4/1/2024 patient returns for cycle 1 day 15 Abraxane.,  Left chest, left shoulder at times.  This has come and gone over the past week and is not associated with any shortness of breath, numbness or tingling.  She states when her chest is hurting if she presses on the area it gets better.  She notices it at different points during  the day.  It feels more like an ache and is not a sharp pain.  She states she has been sleeping in a different position due to the port which could be causing some muscular discomfort.  She has noticed the discomfort at times when she takes a deep breath however is able to get deep breath here in the office did not have any pain.  She is not having any pain today at all.  She has taken her hydrocodone maybe 3-4 times over the past week and has not needed it multiple times a day.  CMP resulted today with AST elevated at 47 and .  Bilirubin remains normal at 0.4 with alkaline phosphatase elevated to 160.  Reviewed with Dr. Loredo and will hold treatment today.  She will try to limit any acetaminophen containing products.  She did have a few days of diarrhea that she is unsure if it was related to the Abraxane versus a GI bug as her  had the same symptoms.  Neither had chills or fevers.  She is also having some dysuria today we have a urinalysis with culture pending.  4/8/2024: Due for cycle 2-day 1 Abraxane, Herceptin, Perjeta.  Did not receive cycle 1 day 15 Abraxane due to elevated LFTs as detailed above.  LFTs have significantly improved, AST 28, ALT 58.  Reviewed with Dr. Renteria will proceed with Abraxane only today, will reduce Abraxane by 20%.  4/15/2024-patient is scheduled for week 4 of chemotherapy today.  Labs reviewed and overall stable except for an ALT of 45.  She will proceed with planned chemotherapy.    *Left breast ductal carcinoma in situ  Intermed  She has been having some discomfort in her left shoulder bladeiate grade with apocrine features  ER/[place] strongly positive  Plan is for her to undergo bilateral mastectomy.  Patient had questions regarding reconstruction  If she does undergo bilateral mastectomy then that would be curative and she would not require any endocrine therapy subsequently unless there is any upstaging of the malignancy    *Severe diarrhea  Started last night  Patient  has not used any Imodium  We will administer 1 L of normal saline today  Imodium will be given here today  She will start entegrade  Patient has not been using any Imodium.  Encouraged her to use Imodium up to 8 tablets in a day.  Denies any significant diarrhea.    *GERD  Protonix with improvement    *Cardiac health  Referral to cardio oncology placed  3/15/2024 echocardiogram with an ejection fraction of 75.5%, LV strain is normal at -24.2%  She has met with Dr. Peguero , started on bisoprolol     *Elevated LFTs  4/1/2024 AST at 47, from 20.  ALT at 141, from 22.  Alkaline phosphatase at 160, from 100.  Total bilirubin remains normal.  Acute hepatitis panel negative.  Patient asked to avoid Tylenol and limit her hydrocodone usage as this does have Tylenol in it unless needed.  Also advised to avoid alcohol which she states she does not drink.  4/8/2024: AST 28, ALT 58, alk phos 102.  Total bilirubin 0.3.  Likely secondary to Abraxane.   4/15/2024-ALT 45, AST 25, alkaline phosphatase 98  Please continue to improve    *Hypokalemia secondary to diarrhea likely  4/1/2024 potassium 3.2.  Potassium chloride 20 mEq 1 p.o. daily sent to pharmacy x 5 days.  Will recheck next week.  4/8/2024: Potassium today 4.1, continue oral potassium.    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  Continue follow-up with supportive oncology    PLAN:   Proceed with Abraxane today, 20% dose reduction.  APRN in 2 weeks and MD in 4 weeks.   Continue Aquaphor  Patient and her daughter had multiple questions regarding the need for ongoing chemotherapy since the right breast tumor is no longer palpable, modality of surgery whether she is can have a lumpectomy or mastectomy and if she really needs a surgery if the MRI shows complete response.  Went over the details of the need for surgery and need for ongoing chemotherapy and explained the reason for the same.  Recommend that she discuss with Dr. Manrique regarding surgical options.    The patient is on  high risk medication that requires close monitoring for toxicity.  Patient reviewed with Dr. Renteria.     I spent 41 minutes caring for Felicia on this date of service. This time includes time spent by me in the following activities: preparing for the visit, reviewing tests, obtaining and/or reviewing a separately obtained history, performing a medically appropriate examination and/or evaluation, counseling and educating the patient/family/caregiver, ordering medications, tests, or procedures, documenting information in the medical record, and care coordination.

## 2024-04-16 ENCOUNTER — TELEPHONE (OUTPATIENT)
Dept: ONCOLOGY | Facility: CLINIC | Age: 69
End: 2024-04-16
Payer: COMMERCIAL

## 2024-04-16 NOTE — TELEPHONE ENCOUNTER
Caller: Felicia Boyle    Relationship to patient: Self    Best call back number: [phone]    Chief complaint: PT CALLED TO RESCHEDULE     Type of visit: INFUSION     Requested date: NEEDS 10 OR 11 AM     If rescheduling, when is the original appointment: 4-22-24

## 2024-04-18 ENCOUNTER — TELEPHONE (OUTPATIENT)
Dept: ONCOLOGY | Facility: CLINIC | Age: 69
End: 2024-04-18
Payer: COMMERCIAL

## 2024-04-18 NOTE — TELEPHONE ENCOUNTER
Provider: Myra  Caller: patient  Relationship to Patient: self  Call Back Phone Number: [phone]  Reason for Call: Pt calling about having pain when she urinates.

## 2024-04-18 NOTE — TELEPHONE ENCOUNTER
Returned call to Ms Boyle. She reports pain with urination that is worse today than Monday.  UA on Monday was negative.  Reviewed with Dr Renteria, encouraged her to push fluids and add cranberry juice and to report back tomorrow if no better then will repeat UA.  She voiced understanding.

## 2024-04-22 ENCOUNTER — OFFICE VISIT (OUTPATIENT)
Dept: ONCOLOGY | Facility: CLINIC | Age: 69
End: 2024-04-22
Payer: COMMERCIAL

## 2024-04-22 ENCOUNTER — INFUSION (OUTPATIENT)
Dept: ONCOLOGY | Facility: HOSPITAL | Age: 69
End: 2024-04-22
Payer: COMMERCIAL

## 2024-04-22 VITALS
DIASTOLIC BLOOD PRESSURE: 82 MMHG | SYSTOLIC BLOOD PRESSURE: 162 MMHG | OXYGEN SATURATION: 95 % | BODY MASS INDEX: 27.54 KG/M2 | TEMPERATURE: 97.5 F | HEIGHT: 55 IN | RESPIRATION RATE: 16 BRPM | WEIGHT: 119 LBS | HEART RATE: 91 BPM

## 2024-04-22 VITALS
OXYGEN SATURATION: 95 % | DIASTOLIC BLOOD PRESSURE: 82 MMHG | SYSTOLIC BLOOD PRESSURE: 162 MMHG | WEIGHT: 119.2 LBS | TEMPERATURE: 97.5 F | BODY MASS INDEX: 25.02 KG/M2 | HEART RATE: 91 BPM | RESPIRATION RATE: 16 BRPM

## 2024-04-22 DIAGNOSIS — N39.0 URINARY TRACT INFECTION WITHOUT HEMATURIA, SITE UNSPECIFIED: ICD-10-CM

## 2024-04-22 DIAGNOSIS — Z17.0 MALIGNANT NEOPLASM OF LOWER-OUTER QUADRANT OF RIGHT BREAST OF FEMALE, ESTROGEN RECEPTOR POSITIVE: Primary | ICD-10-CM

## 2024-04-22 DIAGNOSIS — C50.511 MALIGNANT NEOPLASM OF LOWER-OUTER QUADRANT OF RIGHT BREAST OF FEMALE, ESTROGEN RECEPTOR POSITIVE: Primary | ICD-10-CM

## 2024-04-22 DIAGNOSIS — Z79.899 HIGH RISK MEDICATION USE: ICD-10-CM

## 2024-04-22 LAB
ALBUMIN SERPL-MCNC: 4.2 G/DL (ref 3.5–5.2)
ALBUMIN/GLOB SERPL: 1.8 G/DL
ALP SERPL-CCNC: 93 U/L (ref 39–117)
ALT SERPL W P-5'-P-CCNC: 37 U/L (ref 1–33)
ANION GAP SERPL CALCULATED.3IONS-SCNC: 15.8 MMOL/L (ref 5–15)
AST SERPL-CCNC: 24 U/L (ref 1–32)
BACTERIA UR QL AUTO: ABNORMAL /HPF
BASOPHILS # BLD AUTO: 0.06 10*3/MM3 (ref 0–0.2)
BASOPHILS NFR BLD AUTO: 0.9 % (ref 0–1.5)
BILIRUB SERPL-MCNC: 0.2 MG/DL (ref 0–1.2)
BILIRUB UR QL STRIP: NEGATIVE
BUN SERPL-MCNC: 9 MG/DL (ref 8–23)
BUN/CREAT SERPL: 11.4 (ref 7–25)
CALCIUM SPEC-SCNC: 9.1 MG/DL (ref 8.6–10.5)
CHLORIDE SERPL-SCNC: 105 MMOL/L (ref 98–107)
CLARITY UR: CLEAR
CO2 SERPL-SCNC: 22.2 MMOL/L (ref 22–29)
COLOR UR: YELLOW
CREAT SERPL-MCNC: 0.79 MG/DL (ref 0.57–1)
DEPRECATED RDW RBC AUTO: 44.3 FL (ref 37–54)
EGFRCR SERPLBLD CKD-EPI 2021: 81.1 ML/MIN/1.73
EOSINOPHIL # BLD AUTO: 0.06 10*3/MM3 (ref 0–0.4)
EOSINOPHIL NFR BLD AUTO: 0.9 % (ref 0.3–6.2)
ERYTHROCYTE [DISTWIDTH] IN BLOOD BY AUTOMATED COUNT: 13.1 % (ref 12.3–15.4)
GLOBULIN UR ELPH-MCNC: 2.4 GM/DL
GLUCOSE SERPL-MCNC: 130 MG/DL (ref 65–99)
GLUCOSE UR STRIP-MCNC: NEGATIVE MG/DL
HCT VFR BLD AUTO: 37.1 % (ref 34–46.6)
HGB BLD-MCNC: 12 G/DL (ref 12–15.9)
HGB UR QL STRIP.AUTO: NEGATIVE
HYALINE CASTS UR QL AUTO: ABNORMAL /LPF
IMM GRANULOCYTES # BLD AUTO: 0.09 10*3/MM3 (ref 0–0.05)
IMM GRANULOCYTES NFR BLD AUTO: 1.3 % (ref 0–0.5)
KETONES UR QL STRIP: NEGATIVE
LEUKOCYTE ESTERASE UR QL STRIP.AUTO: ABNORMAL
LYMPHOCYTES # BLD AUTO: 2.46 10*3/MM3 (ref 0.7–3.1)
LYMPHOCYTES NFR BLD AUTO: 36.2 % (ref 19.6–45.3)
MCH RBC QN AUTO: 30.2 PG (ref 26.6–33)
MCHC RBC AUTO-ENTMCNC: 32.3 G/DL (ref 31.5–35.7)
MCV RBC AUTO: 93.5 FL (ref 79–97)
MONOCYTES # BLD AUTO: 0.41 10*3/MM3 (ref 0.1–0.9)
MONOCYTES NFR BLD AUTO: 6 % (ref 5–12)
NEUTROPHILS NFR BLD AUTO: 3.72 10*3/MM3 (ref 1.7–7)
NEUTROPHILS NFR BLD AUTO: 54.7 % (ref 42.7–76)
NITRITE UR QL STRIP: NEGATIVE
NRBC BLD AUTO-RTO: 0 /100 WBC (ref 0–0.2)
PH UR STRIP.AUTO: 6.5 [PH] (ref 4.5–8)
PLATELET # BLD AUTO: 318 10*3/MM3 (ref 140–450)
PMV BLD AUTO: 9.6 FL (ref 6–12)
POTASSIUM SERPL-SCNC: 3.6 MMOL/L (ref 3.5–5.2)
PROT SERPL-MCNC: 6.6 G/DL (ref 6–8.5)
PROT UR QL STRIP: ABNORMAL
RBC # BLD AUTO: 3.97 10*6/MM3 (ref 3.77–5.28)
RBC # UR STRIP: ABNORMAL /HPF
REF LAB TEST METHOD: ABNORMAL
SODIUM SERPL-SCNC: 143 MMOL/L (ref 136–145)
SP GR UR STRIP: 1.02 (ref 1–1.03)
SQUAMOUS #/AREA URNS HPF: ABNORMAL /HPF
UROBILINOGEN UR QL STRIP: ABNORMAL
WBC # UR STRIP: ABNORMAL /HPF
WBC NRBC COR # BLD AUTO: 6.8 10*3/MM3 (ref 3.4–10.8)

## 2024-04-22 PROCEDURE — 96413 CHEMO IV INFUSION 1 HR: CPT

## 2024-04-22 PROCEDURE — 81001 URINALYSIS AUTO W/SCOPE: CPT | Performed by: INTERNAL MEDICINE

## 2024-04-22 PROCEDURE — 25810000003 SODIUM CHLORIDE 0.9 % SOLUTION: Performed by: INTERNAL MEDICINE

## 2024-04-22 PROCEDURE — 25010000002 DEXAMETHASONE SODIUM PHOSPHATE 100 MG/10ML SOLUTION: Performed by: INTERNAL MEDICINE

## 2024-04-22 PROCEDURE — 99214 OFFICE O/P EST MOD 30 MIN: CPT | Performed by: NURSE PRACTITIONER

## 2024-04-22 PROCEDURE — 80053 COMPREHEN METABOLIC PANEL: CPT

## 2024-04-22 PROCEDURE — 25010000002 DIPHENHYDRAMINE PER 50 MG: Performed by: INTERNAL MEDICINE

## 2024-04-22 PROCEDURE — 87186 SC STD MICRODIL/AGAR DIL: CPT | Performed by: INTERNAL MEDICINE

## 2024-04-22 PROCEDURE — 85025 COMPLETE CBC W/AUTO DIFF WBC: CPT

## 2024-04-22 PROCEDURE — 25010000002 PACLITAXEL PROTEIN-BOUND PART PER 1 MG: Performed by: INTERNAL MEDICINE

## 2024-04-22 PROCEDURE — 87077 CULTURE AEROBIC IDENTIFY: CPT | Performed by: INTERNAL MEDICINE

## 2024-04-22 PROCEDURE — 96375 TX/PRO/DX INJ NEW DRUG ADDON: CPT

## 2024-04-22 PROCEDURE — 87086 URINE CULTURE/COLONY COUNT: CPT | Performed by: INTERNAL MEDICINE

## 2024-04-22 RX ORDER — MONTELUKAST SODIUM 10 MG/1
10 TABLET ORAL ONCE
Status: COMPLETED | OUTPATIENT
Start: 2024-04-22 | End: 2024-04-22

## 2024-04-22 RX ORDER — PACLITAXEL 100 MG/20ML
80 INJECTION, POWDER, LYOPHILIZED, FOR SUSPENSION INTRAVENOUS ONCE
Status: COMPLETED | OUTPATIENT
Start: 2024-04-22 | End: 2024-04-22

## 2024-04-22 RX ORDER — FAMOTIDINE 10 MG/ML
20 INJECTION, SOLUTION INTRAVENOUS ONCE
Status: COMPLETED | OUTPATIENT
Start: 2024-04-22 | End: 2024-04-22

## 2024-04-22 RX ORDER — SODIUM CHLORIDE 9 MG/ML
20 INJECTION, SOLUTION INTRAVENOUS ONCE
Status: COMPLETED | OUTPATIENT
Start: 2024-04-22 | End: 2024-04-22

## 2024-04-22 RX ORDER — SULFAMETHOXAZOLE AND TRIMETHOPRIM 800; 160 MG/1; MG/1
1 TABLET ORAL 2 TIMES DAILY
Qty: 6 TABLET | Refills: 0 | Status: SHIPPED | OUTPATIENT
Start: 2024-04-22 | End: 2024-04-25

## 2024-04-22 RX ADMIN — DIPHENHYDRAMINE HYDROCHLORIDE 25 MG: 50 INJECTION, SOLUTION INTRAMUSCULAR; INTRAVENOUS at 13:22

## 2024-04-22 RX ADMIN — PACLITAXEL 120 MG: 100 INJECTION, POWDER, LYOPHILIZED, FOR SUSPENSION INTRAVENOUS at 14:26

## 2024-04-22 RX ADMIN — MONTELUKAST 10 MG: 10 TABLET, FILM COATED ORAL at 13:21

## 2024-04-22 RX ADMIN — SODIUM CHLORIDE 20 ML/HR: 9 INJECTION, SOLUTION INTRAVENOUS at 13:21

## 2024-04-22 RX ADMIN — DEXAMETHASONE SODIUM PHOSPHATE 12 MG: 10 INJECTION, SOLUTION INTRAMUSCULAR; INTRAVENOUS at 13:39

## 2024-04-22 RX ADMIN — FAMOTIDINE 20 MG: 10 INJECTION INTRAVENOUS at 13:21

## 2024-04-22 NOTE — PROGRESS NOTES
Subjective   Felicia Boyle is a 68 y.o. female.  Referred by Dr. Manrique for right breast invasive ductal carcinoma, HER2 positive, left breast ductal carcinoma in situ    History of Present Illness     Ms. Boyle is a 68-year-old postmenopausal [place] lady who is fairly healthy without any significant comorbidities palpated of right breast mass in the latter part of  which was further worked up with diagnostic mammogram and ultrasound and subsequently a biopsy which confirmed invasive ductal carcinoma which was ER/MS negative and HER2 positive.    Family history significant for her 2 sisters with breast cancer at the age of 38 and 48 respectively.  Her older sister  of metastatic breast cancer but her younger sister is doing well.  Patient underwent genetic testing in 2017 which was negative due to her family history.    She had regular screening mammograms up until 2019 but subsequently stopped having annual gynecological visits and therefore did not have any further screening mammograms up until the most recent diagnostic mammogram for the new palpable abnormality.    2024-bilateral diagnostic mammogram and right breast ultrasound  The breast heterogeneously dense.  Left breast with a scar marker in the lower left breast dating back to .  No new findings in the left breast.    Right breast at 8:00, 8 cm from the nipple there is a 1.7 x 1.4 x 1.6 cm oval high density mass with partially circumscribed and partially indistinct margins.    On the ultrasound the mass measures 1.7 x 1 x 1.3 cm.    No abnormal right axilla lymphadenopathy.    2024-right breast ultrasound-guided biopsy  Pathology consistent with invasive ductal carcinoma  Grade 3  9.3 mm of invasive disease on the core biopsy  ER negative  MS negative  HER2 3+ on immunohistochemistry, 95%  Ki-67 70%    2024-bilateral breast MRI  In the right breast at 8:00, 8 cm from the nipple there is a irregular enhancing mass which  measures 1.8 x 2 x 1.7 cm.  This corresponds to the biopsy-proven malignancy.  Extending anteriorly away from the MT margin of the mass there is an irregular linear enhancement which measures 3.5 x 1 x 0.7 cm.  The whole area including the mass measures 4.7 cm in AP dimension, 1.7 in mediolateral and 2.4 cm in the superior-inferior dimension.    At 12:00, 3 cm from the nipple there is an irregular non-mass enhancement which measures 1 x 1.1 x 1.2 cm.  No mammographic correlate is appreciated.    No other suspicious areas of enhancement in the right breast or right axilla or internal mammary chain.    In the left breast, middle third at 11:30 position, 3.7 cm from the nipple there is an irregular area of non-mass enhancement which measures 1.3 x 1.1 x 1 cm.  There is suspected architectural distortion.  No other areas of abnormal enhancement seen in the left breast of the left axilla or the internal mammary chain.    2/26/2024-additional MRI guided biopsies    1.left breast 12:00-intermediate grade ductal carcinoma in situ with apocrine features  ER +91 to 100% strong  WA +81 to 90% strong  Ki-67 15%    2. Right breast 12 o'clock position MRI guided biopsy of the non-mass enhancement-sclerosing adenosis with associated calcifications  No atypical hyperplasia in situ or invasive carcinoma    3.right breast 8:00 MRI guided biopsies of the linear enhancement is consistent with high-grade ductal carcinoma in situ  DCIS involves multiple tissue cores measuring up to 3 mm.  Sclerosing adenosis and usual ductal hyperplasia with associated microcalcifications.    The case was discussed in tumor board and Dr. Patel thought that they could be more invasive disease in the 4.7 cm of linear enhancement noted in the right breast at the site of the mass.  Therefore it was decided to proceed with neoadjuvant chemotherapy.    Patient was a former smoker and smoked for about 40 years, half a pack per week, quit about 1 month ago.   "Denies any alcohol use.    Week 1 Taxol Herceptin and Perjeta was planned for 3/18/2024.  Patient received Herceptin and Perjeta and had some chills for which she received Solu-Medrol and responded well to that.  However after infusing only 1 cc of Taxol patient experienced a severe anaphylactic reaction due to which the infusion was stopped permanently and she received an additional dose of Solu-Medrol and Benadryl.  Symptoms resolved subsequently and patient was discharged home safely.    Due to this recent treatment has been changed to Abraxane along with Herceptin and Perjeta.  3/25/2024-patient is due for her first treatment with Abraxane.    She is extremely anxious given the severe hypersensitivity reaction that she experienced with Taxol.  She is also complaining of increased belching and heartburn since her last chemotherapy.  She is complaining of severe diarrhea with started last night.  Had multiple loose stools.    INTERVAL HISTORY  The patient is seen today as an add on in the infusion area.  She has a few complaints including burning with urination and possible neuropathy.  A couple days ago she started to have dysuria, no fever/chills.  After her last chemotherapy she started to have intermittent \"zapping\" sensation in her fingertips and bottoms of her feet.  She has had this occur on three different instances, each time the zapping lasts for a few seconds then resolves.  She has not had any more zapping sensation for about three days.  Appetite adequate.  Bowels moving regularly.  Denies fever, chills, nausea, or vomiting.    The following portions of the patient's history were reviewed and updated as appropriate: allergies, current medications, past family history, past medical history, past social history, past surgical history, and problem list.    Past Medical History:   Diagnosis Date    Anxiety about health     Breast cancer of lower-outer quadrant of right female breast 02/14/2024    DCIS " "(ductal carcinoma in situ) of breast 03/15/2024    Diverticulosis     Mixed hyperlipidemia 05/19/2019    DIET CONTROLLED    Osteoporosis     Type 2 diabetes mellitus         Past Surgical History:   Procedure Laterality Date    BREAST BIOPSY Left     Excisional biopsy in the [facility]    BREAST BIOPSY  03/2024    CATARACT EXTRACTION W/ INTRAOCULAR LENS IMPLANT Bilateral     CHOLECYSTECTOMY  2012    COLON RESECTION Right 2002    COLONOSCOPY N/A 04/28/2021    Procedure: COLONOSCOPY TO ANASTAMOSIS/TI;  Surgeon: Angelo Gonsalez MD;  Location: [facility] ENDOSCOPY;  Service: Gastroenterology;  Laterality: N/A;  PRE: SCREENING  POST: NORMAL POST-OP ANATOMY    ENDOSCOPY      2013    ENDOSCOPY N/A 03/10/2017    Procedure: ESOPHAGOGASTRODUODENOSCOPY WITH BIOPSY AND PETER DILATATION 54\";  Surgeon: Angelo Gonsalez MD;  Location: [facility] ENDOSCOPY;  Service:     ENDOSCOPY N/A 04/28/2021    Procedure: ESOPHAGOGASTRODUODENOSCOPY WITH BIOPSIES, 54FR PETER DILATATION;  Surgeon: Angelo Gonsalez MD;  Location: [facility] ENDOSCOPY;  Service: Gastroenterology;  Laterality: N/A;  PRE: DYSPHAGIA  POST: GASTRITIS, ESOPHAGEAL RING    VENOUS ACCESS DEVICE (PORT) INSERTION Left 3/14/2024    Procedure: INSERTION OF PORTACATH;  Surgeon: Abeba Manrique MD;  Location: [facility] OR;  Service: General;  Laterality: Left;        Family History   Problem Relation Age of Onset    Hypertension Mother     Diabetes Mother     Hypertension Father     Breast cancer Sister 48    Breast cancer Sister 38    No Known Problems Brother     No Known Problems Daughter     No Known Problems Son     No Known Problems Maternal Grandmother     No Known Problems Paternal Grandmother     No Known Problems Maternal Grandfather     No Known Problems Paternal Grandfather     Autism Grandson     Colon cancer Neg Hx     Rectal cancer Neg Hx     Endometrial cancer Neg Hx     Vaginal cancer Neg Hx     Cervical cancer Neg Hx     Ovarian cancer Neg Hx     Prostate " "cancer Neg Hx     Uterine cancer Neg Hx     Malig Hyperthermia Neg Hx         Social History     Socioeconomic History    Marital status:      Spouse name: Ryan   Tobacco Use    Smoking status: Former     Current packs/day: 0.00     Average packs/day: 0.3 packs/day for 15.0 years (3.8 ttl pk-yrs)     Types: Cigarettes     Quit date: 1/15/2024     Years since quittin.2     Passive exposure: Never    Smokeless tobacco: Never    Tobacco comments:     N/A   Vaping Use    Vaping status: Never Used   Substance and Sexual Activity    Alcohol use: Yes     Comment: RARE    Drug use: No    Sexual activity: Not Currently     Partners: Male     Birth control/protection: Tubal ligation        OB History          4    Para   3    Term   3       0    AB   1    Living   3         SAB   1    IAB   0    Ectopic   0    Molar   0    Multiple   0    Live Births   3               Age at menarche-16  Age at first live childbirth-18   4 para 3  1  Age at menopause-49  No use of hormone replacement therapy  No use of oral conceptive pills  Breast-feeding for 3 months    Allergies   Allergen Reactions    Metronidazole Nausea And Vomiting      Review of systems as mentioned HPI otherwise negative    Objective   Blood pressure 162/82, pulse 91, temperature 97.5 °F (36.4 °C), temperature source Temporal, resp. rate 16, height 57.8 cm (22.76\"), weight 54 kg (119 lb), SpO2 95%, not currently breastfeeding.     Physical Exam  Vitals reviewed.   Constitutional:       Appearance: Normal appearance. She is normal weight.   HENT:      Nose: Nose normal.      Mouth/Throat:      Mouth: Mucous membranes are moist.      Pharynx: Oropharynx is clear.   Eyes:      Conjunctiva/sclera: Conjunctivae normal.      Pupils: Pupils are equal, round, and reactive to light.   Cardiovascular:      Rate and Rhythm: Normal rate.   Pulmonary:      Effort: Pulmonary effort is normal.      Breath sounds: Normal breath sounds. "   Abdominal:      General: Abdomen is flat. Bowel sounds are normal.   Musculoskeletal:         General: Normal range of motion.      Cervical back: Normal range of motion.   Skin:     General: Skin is warm and dry.      Findings: No rash.   Neurological:      General: No focal deficit present.      Mental Status: She is alert and oriented to person, place, and time.   Psychiatric:         Mood and Affect: Mood normal.         Behavior: Behavior normal.         Thought Content: Thought content normal.         Judgment: Judgment normal.       Breast Exam not performed today as patient seen in infusion area: Right breast on inspection 9 o'clock position with biopsy site changes.  The right breast mass is no longer palpable.  No right answer lymphadenopathy.    Left breast :There is a palpable underlying hematoma which is tender to palpation from MRI guided biopsies. No left axillary lymphadenopathy.(Left breast not examined today)      I have reexamined the patient and the results are consistent with the previously documented exam. Latha Galloway, APRN      Infusion on 04/22/2024   Component Date Value Ref Range Status    Glucose 04/22/2024 130 (H)  65 - 99 mg/dL Final    BUN 04/22/2024 9  8 - 23 mg/dL Final    Creatinine 04/22/2024 0.79  0.57 - 1.00 mg/dL Final    Sodium 04/22/2024 143  136 - 145 mmol/L Final    Potassium 04/22/2024 3.6  3.5 - 5.2 mmol/L Final    Chloride 04/22/2024 105  98 - 107 mmol/L Final    CO2 04/22/2024 22.2  22.0 - 29.0 mmol/L Final    Calcium 04/22/2024 9.1  8.6 - 10.5 mg/dL Final    Total Protein 04/22/2024 6.6  6.0 - 8.5 g/dL Final    Albumin 04/22/2024 4.2  3.5 - 5.2 g/dL Final    ALT (SGPT) 04/22/2024 37 (H)  1 - 33 U/L Final    AST (SGOT) 04/22/2024 24  1 - 32 U/L Final    Alkaline Phosphatase 04/22/2024 93  39 - 117 U/L Final    Total Bilirubin 04/22/2024 0.2  0.0 - 1.2 mg/dL Final    Globulin 04/22/2024 2.4  gm/dL Final    A/G Ratio 04/22/2024 1.8  g/dL Final    BUN/Creatinine Ratio  04/22/2024 11.4  7.0 - 25.0 Final    Anion Gap 04/22/2024 15.8 (H)  5.0 - 15.0 mmol/L Final    eGFR 04/22/2024 81.1  >60.0 mL/min/1.73 Final    WBC 04/22/2024 6.80  3.40 - 10.80 10*3/mm3 Final    RBC 04/22/2024 3.97  3.77 - 5.28 10*6/mm3 Final    Hemoglobin 04/22/2024 12.0  12.0 - 15.9 g/dL Final    Hematocrit 04/22/2024 37.1  34.0 - 46.6 % Final    MCV 04/22/2024 93.5  79.0 - 97.0 fL Final    MCH 04/22/2024 30.2  26.6 - 33.0 pg Final    MCHC 04/22/2024 32.3  31.5 - 35.7 g/dL Final    RDW 04/22/2024 13.1  12.3 - 15.4 % Final    RDW-SD 04/22/2024 44.3  37.0 - 54.0 fl Final    MPV 04/22/2024 9.6  6.0 - 12.0 fL Final    Platelets 04/22/2024 318  140 - 450 10*3/mm3 Final    Neutrophil % 04/22/2024 54.7  42.7 - 76.0 % Final    Lymphocyte % 04/22/2024 36.2  19.6 - 45.3 % Final    Monocyte % 04/22/2024 6.0  5.0 - 12.0 % Final    Eosinophil % 04/22/2024 0.9  0.3 - 6.2 % Final    Basophil % 04/22/2024 0.9  0.0 - 1.5 % Final    Immature Grans % 04/22/2024 1.3 (H)  0.0 - 0.5 % Final    Neutrophils, Absolute 04/22/2024 3.72  1.70 - 7.00 10*3/mm3 Final    Lymphocytes, Absolute 04/22/2024 2.46  0.70 - 3.10 10*3/mm3 Final    Monocytes, Absolute 04/22/2024 0.41  0.10 - 0.90 10*3/mm3 Final    Eosinophils, Absolute 04/22/2024 0.06  0.00 - 0.40 10*3/mm3 Final    Basophils, Absolute 04/22/2024 0.06  0.00 - 0.20 10*3/mm3 Final    Immature Grans, Absolute 04/22/2024 0.09 (H)  0.00 - 0.05 10*3/mm3 Final    nRBC 04/22/2024 0.0  0.0 - 0.2 /100 WBC Final    Color, UA 04/22/2024 Yellow  Yellow, Straw Final    Appearance, UA 04/22/2024 Clear  Clear Final    pH, UA 04/22/2024 6.5  4.5 - 8.0 Final    Specific Gravity, UA 04/22/2024 1.020  1.002 - 1.030 Final    Glucose, UA 04/22/2024 Negative  Negative Final    Ketones, UA 04/22/2024 Negative  Negative Final    Bilirubin, UA 04/22/2024 Negative  Negative Final    Blood, UA 04/22/2024 Negative  Negative Final    Protein, UA 04/22/2024 30 mg/dL (1+) (A)  Negative Final    Leuk Esterase, UA  04/22/2024 Moderate (2+) (A)  Negative Final    Nitrite, UA 04/22/2024 Negative  Negative Final    Urobilinogen, UA 04/22/2024 1.0 E.U./dL  0.2 - 1.0 E.U./dL Final    RBC, UA 04/22/2024 0-2  None Seen, 0-2 /HPF Final    WBC, UA 04/22/2024 21-50 (A)  None Seen, 0-2 /HPF Final    Bacteria, UA 04/22/2024 2+ (A)  Negative /HPF Final    Squamous Epithelial Cells, UA 04/22/2024 7-12 (A)  0 - 3 /HPF Final    Hyaline Casts, UA 04/22/2024 None Seen  0 - 2 /LPF Final    Methodology 04/22/2024 Manual Light Microscopy   Final   Infusion on 04/15/2024   Component Date Value Ref Range Status    Color, UA 04/15/2024 Straw  Yellow, Straw Final    Appearance, UA 04/15/2024 Clear  Clear Final    pH, UA 04/15/2024 6.0  4.5 - 8.0 Final    Specific Gravity, UA 04/15/2024 1.020  1.002 - 1.030 Final    Glucose, UA 04/15/2024 250 mg/dL (1+) (A)  Negative Final    Ketones, UA 04/15/2024 Negative  Negative Final    Bilirubin, UA 04/15/2024 Negative  Negative Final    Blood, UA 04/15/2024 Negative  Negative Final    Protein, UA 04/15/2024 Negative  Negative Final    Leuk Esterase, UA 04/15/2024 Negative  Negative Final    Nitrite, UA 04/15/2024 Negative  Negative Final    Urobilinogen, UA 04/15/2024 0.2 E.U./dL  0.2 - 1.0 E.U./dL Final    RBC, UA 04/15/2024 0-2  None Seen, 0-2 /HPF Final    WBC, UA 04/15/2024 0-2  None Seen, 0-2 /HPF Final    Bacteria, UA 04/15/2024 Negative  Negative /HPF Final    Squamous Epithelial Cells, UA 04/15/2024 0-3  0 - 3 /HPF Final    Methodology 04/15/2024 Manual Light Microscopy   Final   Infusion on 04/15/2024   Component Date Value Ref Range Status    Glucose 04/15/2024 116 (H)  65 - 99 mg/dL Final    BUN 04/15/2024 8  8 - 23 mg/dL Final    Creatinine 04/15/2024 0.66  0.57 - 1.00 mg/dL Final    Sodium 04/15/2024 142  136 - 145 mmol/L Final    Potassium 04/15/2024 4.0  3.5 - 5.2 mmol/L Final    Chloride 04/15/2024 105  98 - 107 mmol/L Final    CO2 04/15/2024 24.6  22.0 - 29.0 mmol/L Final    Calcium 04/15/2024  9.4  8.6 - 10.5 mg/dL Final    Total Protein 04/15/2024 6.9  6.0 - 8.5 g/dL Final    Albumin 04/15/2024 4.3  3.5 - 5.2 g/dL Final    ALT (SGPT) 04/15/2024 45 (H)  1 - 33 U/L Final    AST (SGOT) 04/15/2024 25  1 - 32 U/L Final    Alkaline Phosphatase 04/15/2024 98  39 - 117 U/L Final    Total Bilirubin 04/15/2024 0.3  0.0 - 1.2 mg/dL Final    Globulin 04/15/2024 2.6  gm/dL Final    A/G Ratio 04/15/2024 1.7  g/dL Final    BUN/Creatinine Ratio 04/15/2024 12.1  7.0 - 25.0 Final    Anion Gap 04/15/2024 12.4  5.0 - 15.0 mmol/L Final    eGFR 04/15/2024 95.1  >60.0 mL/min/1.73 Final    WBC 04/15/2024 9.05  3.40 - 10.80 10*3/mm3 Final    RBC 04/15/2024 4.11  3.77 - 5.28 10*6/mm3 Final    Hemoglobin 04/15/2024 12.4  12.0 - 15.9 g/dL Final    Hematocrit 04/15/2024 38.3  34.0 - 46.6 % Final    MCV 04/15/2024 93.2  79.0 - 97.0 fL Final    MCH 04/15/2024 30.2  26.6 - 33.0 pg Final    MCHC 04/15/2024 32.4  31.5 - 35.7 g/dL Final    RDW 04/15/2024 12.8  12.3 - 15.4 % Final    RDW-SD 04/15/2024 43.5  37.0 - 54.0 fl Final    MPV 04/15/2024 10.1  6.0 - 12.0 fL Final    Platelets 04/15/2024 306  140 - 450 10*3/mm3 Final    Neutrophil % 04/15/2024 61.5  42.7 - 76.0 % Final    Lymphocyte % 04/15/2024 31.3  19.6 - 45.3 % Final    Monocyte % 04/15/2024 5.0  5.0 - 12.0 % Final    Eosinophil % 04/15/2024 0.7  0.3 - 6.2 % Final    Basophil % 04/15/2024 0.6  0.0 - 1.5 % Final    Immature Grans % 04/15/2024 0.9 (H)  0.0 - 0.5 % Final    Neutrophils, Absolute 04/15/2024 5.58  1.70 - 7.00 10*3/mm3 Final    Lymphocytes, Absolute 04/15/2024 2.83  0.70 - 3.10 10*3/mm3 Final    Monocytes, Absolute 04/15/2024 0.45  0.10 - 0.90 10*3/mm3 Final    Eosinophils, Absolute 04/15/2024 0.06  0.00 - 0.40 10*3/mm3 Final    Basophils, Absolute 04/15/2024 0.05  0.00 - 0.20 10*3/mm3 Final    Immature Grans, Absolute 04/15/2024 0.08 (H)  0.00 - 0.05 10*3/mm3 Final    nRBC 04/15/2024 0.0  0.0 - 0.2 /100 WBC Final   Infusion on 04/08/2024   Component Date Value Ref  Range Status    Glucose 04/08/2024 157 (H)  65 - 99 mg/dL Final    BUN 04/08/2024 13  8 - 23 mg/dL Final    Creatinine 04/08/2024 0.65  0.57 - 1.00 mg/dL Final    Sodium 04/08/2024 139  136 - 145 mmol/L Final    Potassium 04/08/2024 4.1  3.5 - 5.2 mmol/L Final    Chloride 04/08/2024 105  98 - 107 mmol/L Final    CO2 04/08/2024 23.9  22.0 - 29.0 mmol/L Final    Calcium 04/08/2024 8.8  8.6 - 10.5 mg/dL Final    Total Protein 04/08/2024 6.4  6.0 - 8.5 g/dL Final    Albumin 04/08/2024 4.1  3.5 - 5.2 g/dL Final    ALT (SGPT) 04/08/2024 58 (H)  1 - 33 U/L Final    AST (SGOT) 04/08/2024 28  1 - 32 U/L Final    Alkaline Phosphatase 04/08/2024 102  39 - 117 U/L Final    Total Bilirubin 04/08/2024 0.3  0.0 - 1.2 mg/dL Final    Globulin 04/08/2024 2.3  gm/dL Final    A/G Ratio 04/08/2024 1.8  g/dL Final    BUN/Creatinine Ratio 04/08/2024 20.0  7.0 - 25.0 Final    Anion Gap 04/08/2024 10.1  5.0 - 15.0 mmol/L Final    eGFR 04/08/2024 95.4  >60.0 mL/min/1.73 Final    WBC 04/08/2024 6.54  3.40 - 10.80 10*3/mm3 Final    RBC 04/08/2024 4.05  3.77 - 5.28 10*6/mm3 Final    Hemoglobin 04/08/2024 11.9 (L)  12.0 - 15.9 g/dL Final    Hematocrit 04/08/2024 38.0  34.0 - 46.6 % Final    MCV 04/08/2024 93.8  79.0 - 97.0 fL Final    MCH 04/08/2024 29.4  26.6 - 33.0 pg Final    MCHC 04/08/2024 31.3 (L)  31.5 - 35.7 g/dL Final    RDW 04/08/2024 13.0  12.3 - 15.4 % Final    RDW-SD 04/08/2024 44.5  37.0 - 54.0 fl Final    MPV 04/08/2024 9.5  6.0 - 12.0 fL Final    Platelets 04/08/2024 370  140 - 450 10*3/mm3 Final    Neutrophil % 04/08/2024 48.6  42.7 - 76.0 % Final    Lymphocyte % 04/08/2024 40.7  19.6 - 45.3 % Final    Monocyte % 04/08/2024 8.7  5.0 - 12.0 % Final    Eosinophil % 04/08/2024 0.9  0.3 - 6.2 % Final    Basophil % 04/08/2024 0.8  0.0 - 1.5 % Final    Immature Grans % 04/08/2024 0.3  0.0 - 0.5 % Final    Neutrophils, Absolute 04/08/2024 3.18  1.70 - 7.00 10*3/mm3 Final    Lymphocytes, Absolute 04/08/2024 2.66  0.70 - 3.10 10*3/mm3  Final    Monocytes, Absolute 04/08/2024 0.57  0.10 - 0.90 10*3/mm3 Final    Eosinophils, Absolute 04/08/2024 0.06  0.00 - 0.40 10*3/mm3 Final    Basophils, Absolute 04/08/2024 0.05  0.00 - 0.20 10*3/mm3 Final    Immature Grans, Absolute 04/08/2024 0.02  0.00 - 0.05 10*3/mm3 Final    nRBC 04/08/2024 0.0  0.0 - 0.2 /100 WBC Final   Infusion on 04/01/2024   Component Date Value Ref Range Status    Color, UA 04/01/2024 Yellow  Yellow, Straw Final    Appearance, UA 04/01/2024 Cloudy (A)  Clear Final    pH, UA 04/01/2024 6.0  4.5 - 8.0 Final    Specific Gravity, UA 04/01/2024 1.025  1.002 - 1.030 Final    Glucose, UA 04/01/2024 Negative  Negative Final    Ketones, UA 04/01/2024 Negative  Negative Final    Bilirubin, UA 04/01/2024 Negative  Negative Final    Blood, UA 04/01/2024 Moderate (2+) (A)  Negative Final    Protein, UA 04/01/2024 >=300 mg/dL (3+) (A)  Negative Final    Leuk Esterase, UA 04/01/2024 Small (1+) (A)  Negative Final    Nitrite, UA 04/01/2024 Positive (A)  Negative Final    Urobilinogen, UA 04/01/2024 0.2 E.U./dL  0.2 - 1.0 E.U./dL Final   Office Visit on 04/01/2024   Component Date Value Ref Range Status    Hepatitis B Surface Ag 04/01/2024 Non-Reactive  Non-Reactive Final    Hep A IgM 04/01/2024 Non-Reactive  Non-Reactive Final    Hep B C IgM 04/01/2024 Non-Reactive  Non-Reactive Final    Hepatitis C Ab 04/01/2024 Non-Reactive  Non-Reactive Final    Magnesium 04/01/2024 1.8  1.6 - 2.4 mg/dL Final   Infusion on 04/01/2024   Component Date Value Ref Range Status    Glucose 04/01/2024 151 (H)  65 - 99 mg/dL Final    BUN 04/01/2024 15  8 - 23 mg/dL Final    Creatinine 04/01/2024 0.75  0.57 - 1.00 mg/dL Final    Sodium 04/01/2024 139  136 - 145 mmol/L Final    Potassium 04/01/2024 3.2 (L)  3.5 - 5.2 mmol/L Final    Chloride 04/01/2024 103  98 - 107 mmol/L Final    CO2 04/01/2024 22.8  22.0 - 29.0 mmol/L Final    Calcium 04/01/2024 9.6  8.6 - 10.5 mg/dL Final    Total Protein 04/01/2024 6.7  6.0 - 8.5 g/dL  Final    Albumin 04/01/2024 4.3  3.5 - 5.2 g/dL Final    ALT (SGPT) 04/01/2024 141 (C)  1 - 33 U/L Final    AST (SGOT) 04/01/2024 47 (H)  1 - 32 U/L Final    Alkaline Phosphatase 04/01/2024 160 (H)  39 - 117 U/L Final    Total Bilirubin 04/01/2024 0.4  0.0 - 1.2 mg/dL Final    Globulin 04/01/2024 2.4  gm/dL Final    A/G Ratio 04/01/2024 1.8  g/dL Final    BUN/Creatinine Ratio 04/01/2024 20.0  7.0 - 25.0 Final    Anion Gap 04/01/2024 13.2  5.0 - 15.0 mmol/L Final    eGFR 04/01/2024 86.3  >60.0 mL/min/1.73 Final    WBC 04/01/2024 10.21  3.40 - 10.80 10*3/mm3 Final    RBC 04/01/2024 4.34  3.77 - 5.28 10*6/mm3 Final    Hemoglobin 04/01/2024 13.1  12.0 - 15.9 g/dL Final    Hematocrit 04/01/2024 38.8  34.0 - 46.6 % Final    MCV 04/01/2024 89.4  79.0 - 97.0 fL Final    MCH 04/01/2024 30.2  26.6 - 33.0 pg Final    MCHC 04/01/2024 33.8  31.5 - 35.7 g/dL Final    RDW 04/01/2024 12.6  12.3 - 15.4 % Final    RDW-SD 04/01/2024 41.1  37.0 - 54.0 fl Final    MPV 04/01/2024 9.7  6.0 - 12.0 fL Final    Platelets 04/01/2024 338  140 - 450 10*3/mm3 Final    Neutrophil % 04/01/2024 64.9  42.7 - 76.0 % Final    Lymphocyte % 04/01/2024 30.0  19.6 - 45.3 % Final    Monocyte % 04/01/2024 3.2 (L)  5.0 - 12.0 % Final    Eosinophil % 04/01/2024 0.4  0.3 - 6.2 % Final    Basophil % 04/01/2024 0.7  0.0 - 1.5 % Final    Immature Grans % 04/01/2024 0.8 (H)  0.0 - 0.5 % Final    Neutrophils, Absolute 04/01/2024 6.63  1.70 - 7.00 10*3/mm3 Final    Lymphocytes, Absolute 04/01/2024 3.06  0.70 - 3.10 10*3/mm3 Final    Monocytes, Absolute 04/01/2024 0.33  0.10 - 0.90 10*3/mm3 Final    Eosinophils, Absolute 04/01/2024 0.04  0.00 - 0.40 10*3/mm3 Final    Basophils, Absolute 04/01/2024 0.07  0.00 - 0.20 10*3/mm3 Final    Immature Grans, Absolute 04/01/2024 0.08 (H)  0.00 - 0.05 10*3/mm3 Final    nRBC 04/01/2024 0.0  0.0 - 0.2 /100 WBC Final    Urine Culture 04/01/2024 >100,000 CFU/mL Escherichia coli (A)   Final   Infusion on 03/25/2024   Component Date  Value Ref Range Status    Glucose 03/25/2024 200 (H)  65 - 99 mg/dL Final    BUN 03/25/2024 17  8 - 23 mg/dL Final    Creatinine 03/25/2024 0.98  0.57 - 1.00 mg/dL Final    Sodium 03/25/2024 137  136 - 145 mmol/L Final    Potassium 03/25/2024 3.6  3.5 - 5.2 mmol/L Final    Chloride 03/25/2024 106  98 - 107 mmol/L Final    CO2 03/25/2024 15.1 (L)  22.0 - 29.0 mmol/L Final    Calcium 03/25/2024 9.4  8.6 - 10.5 mg/dL Final    Total Protein 03/25/2024 8.1  6.0 - 8.5 g/dL Final    Albumin 03/25/2024 4.5  3.5 - 5.2 g/dL Final    ALT (SGPT) 03/25/2024 22  1 - 33 U/L Final    AST (SGOT) 03/25/2024 20  1 - 32 U/L Final    Alkaline Phosphatase 03/25/2024 100  39 - 117 U/L Final    Total Bilirubin 03/25/2024 0.3  0.0 - 1.2 mg/dL Final    Globulin 03/25/2024 3.6  gm/dL Final    A/G Ratio 03/25/2024 1.3  g/dL Final    BUN/Creatinine Ratio 03/25/2024 17.3  7.0 - 25.0 Final    Anion Gap 03/25/2024 15.9 (H)  5.0 - 15.0 mmol/L Final    eGFR 03/25/2024 62.6  >60.0 mL/min/1.73 Final    WBC 03/25/2024 12.04 (H)  3.40 - 10.80 10*3/mm3 Final    RBC 03/25/2024 5.06  3.77 - 5.28 10*6/mm3 Final    Hemoglobin 03/25/2024 15.5  12.0 - 15.9 g/dL Final    Hematocrit 03/25/2024 47.0 (H)  34.0 - 46.6 % Final    MCV 03/25/2024 92.9  79.0 - 97.0 fL Final    MCH 03/25/2024 30.6  26.6 - 33.0 pg Final    MCHC 03/25/2024 33.0  31.5 - 35.7 g/dL Final    RDW 03/25/2024 12.8  12.3 - 15.4 % Final    RDW-SD 03/25/2024 43.8  37.0 - 54.0 fl Final    MPV 03/25/2024 10.2  6.0 - 12.0 fL Final    Platelets 03/25/2024 269  140 - 450 10*3/mm3 Final    Neutrophil % 03/25/2024 82.4 (H)  42.7 - 76.0 % Final    Lymphocyte % 03/25/2024 8.3 (L)  19.6 - 45.3 % Final    Monocyte % 03/25/2024 7.6  5.0 - 12.0 % Final    Eosinophil % 03/25/2024 0.2 (L)  0.3 - 6.2 % Final    Basophil % 03/25/2024 0.2  0.0 - 1.5 % Final    Immature Grans % 03/25/2024 1.3 (H)  0.0 - 0.5 % Final    Neutrophils, Absolute 03/25/2024 9.92 (H)  1.70 - 7.00 10*3/mm3 Final    Lymphocytes, Absolute  03/25/2024 1.00  0.70 - 3.10 10*3/mm3 Final    Monocytes, Absolute 03/25/2024 0.91 (H)  0.10 - 0.90 10*3/mm3 Final    Eosinophils, Absolute 03/25/2024 0.02  0.00 - 0.40 10*3/mm3 Final    Basophils, Absolute 03/25/2024 0.03  0.00 - 0.20 10*3/mm3 Final    Immature Grans, Absolute 03/25/2024 0.16 (H)  0.00 - 0.05 10*3/mm3 Final    nRBC 03/25/2024 0.0  0.0 - 0.2 /100 WBC Final        No radiology results for the last 30 days.     4/15/2024 CBC reviewed and WBC 9.05, hemoglobin 12.4 and platelets 306,000  CMP reviewed and BUN 8, creatinine 0.66, ALT 45, AST 25      Assessment & Plan     *Right breast invasive ductal carcinoma  Clinical T2 N0 M0, stage IIa, clinical and prognostic ER/PA negative, HER2 3+ immunohistochemistry, Ki-67 70%  On the MRI the mass measures 2 cm however there is linear enhancement which in the AP dimension measures 4.7 cm.  Additional biopsies of this linear enhancement was performed and consistent with DCIS  It is hard to delineate the amount of invasive disease as opposed to DCIS.  More likely than not invasive component is likely limited to the mass which is about 2-1/2 cm on exam.  Since the tumor is greater than 2 cm I think it is reasonable to proceed with neoadjuvant chemotherapy.  We discussed proceeding with weekly Taxol along with Herceptin and Perjeta every 3 weeks.  After 12 weeks she will require an MRI to assess the response.  If there is inadequate response then we may have to proceed with Adriamycin and Cytoxan for 4 cycles however if there is a good response then she can proceed with surgery with no additional chemotherapy.  The likely plan is that she will undergo bilateral mastectomy.  If that is the case as long as she does not have any positive margins or lymph node positive disease she may not need radiation.  We discussed continuing adjuvant HER2 directed therapy for whole year  3/18/2024-received first dose of Herceptin and Perjeta, had a severe hypersensitivity reaction to  Taxol and hence Taxol was discontinued  3/25/2024-Labs reviewed and stable to proceed with chemotherapy  Patient is extremely anxious today after experiencing the severe hypersensitivity reaction last week.  Reassured patient that this is unlikely to happen with Abraxane and we will premedicate with extra Solu-Medrol..  4/1/2024 patient returns for cycle 1 day 15 Abraxane.,  Left chest, left shoulder at times.  This has come and gone over the past week and is not associated with any shortness of breath, numbness or tingling.  She states when her chest is hurting if she presses on the area it gets better.  She notices it at different points during the day.  It feels more like an ache and is not a sharp pain.  She states she has been sleeping in a different position due to the port which could be causing some muscular discomfort.  She has noticed the discomfort at times when she takes a deep breath however is able to get deep breath here in the office did not have any pain.  She is not having any pain today at all.  She has taken her hydrocodone maybe 3-4 times over the past week and has not needed it multiple times a day.  CMP resulted today with AST elevated at 47 and .  Bilirubin remains normal at 0.4 with alkaline phosphatase elevated to 160.  Reviewed with Dr. Loredo and will hold treatment today.  She will try to limit any acetaminophen containing products.  She did have a few days of diarrhea that she is unsure if it was related to the Abraxane versus a GI bug as her  had the same symptoms.  Neither had chills or fevers.  She is also having some dysuria today we have a urinalysis with culture pending.  4/8/2024: Due for cycle 2-day 1 Abraxane, Herceptin, Perjeta.  Did not receive cycle 1 day 15 Abraxane due to elevated LFTs as detailed above.  LFTs have significantly improved, AST 28, ALT 58.  Reviewed with Dr. Renteria will proceed with Abraxane only today, will reduce Abraxane by  "20%.  4/15/2024-patient is scheduled for week 4 of chemotherapy today.  Labs reviewed and overall stable except for an ALT of 45.  She will proceed with planned chemotherapy.  4/22/2024: after last chemotherapy patient experienced 3 episodes of \"zapping\" in her fingertips and soles of feet that last a few seconds then resolved.  Has not experienced this sensation in at least 3 days.  No interference with daily activities.  Will continue Abraxane at previous 20% dose reduction.  She is doing cold therapy for her hands/feet.  Will need to monitor closely for neuropathy.    *Left breast ductal carcinoma in situ  Intermed  She has been having some discomfort in her left shoulder bladeiate grade with apocrine features  ER/PA strongly positive  Plan is for her to undergo bilateral mastectomy.  Patient had questions regarding reconstruction  If she does undergo bilateral mastectomy then that would be curative and she would not require any endocrine therapy subsequently unless there is any upstaging of the malignancy    *Severe diarrhea  Started last night  Patient has not used any Imodium  We will administer 1 L of normal saline today  Imodium will be given here today  She will start entegrade  Patient has not been using any Imodium.  Encouraged her to use Imodium up to 8 tablets in a day.  Denies any significant diarrhea.    *GERD  Protonix with improvement    *Cardiac health  Referral to cardio oncology placed  3/15/2024 echocardiogram with an ejection fraction of 75.5%, LV strain is normal at -24.2%  She has met with Dr. Peguero , started on bisoprolol     *Elevated LFTs  4/1/2024 AST at 47, from 20.  ALT at 141, from 22.  Alkaline phosphatase at 160, from 100.  Total bilirubin remains normal.  Acute hepatitis panel negative.  Patient asked to avoid Tylenol and limit her hydrocodone usage as this does have Tylenol in it unless needed.  Also advised to avoid alcohol which she states she does not drink.  4/8/2024: AST 28, " ALT 58, alk phos 102.  Total bilirubin 0.3.  Likely secondary to Abraxane.   4/15/2024-ALT 45, AST 25, alkaline phosphatase 98  AST 24, ALT 37, alkaline phosphatase 93    *Hypokalemia secondary to diarrhea likely  4/1/2024 potassium 3.2.  Potassium chloride 20 mEq 1 p.o. daily sent to pharmacy x 5 days.  Will recheck next week.  4/8/2024: Potassium today 4.1, continue oral potassium.  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  Continue follow-up with supportive oncology    *Dysuria-UA with 2+ bacteria.  Start bactrim twice daily x 3 days.  Culture pending.    PLAN:   Proceed with Abraxane today, 20% dose reduction.  Continue cold therapy to the hands/feet.  Will need to monitor closely for neuropathy.  Start Bactrim twice daily x 3 days.  Prescription sent to pharmacy today.  Return in 1 week for cycle 3-day 1 Abraxane, Herceptin, Perjeta.  Return in 2 weeks for MD follow-up and cycle 3-day 8.  Continue Aquaphor    The patient is on high risk medication that requires close monitoring for toxicity.

## 2024-04-22 NOTE — PROGRESS NOTES
Pt c/o urinary frequency and dysuria since last Wednesday. Collected another u/a today. Pt has been drinking cranberry juice and has had little relief. D/w Sudha LOPEZ. V/o to have urine cultured and for patient to take Azo OTC until culture has resulted. Ltaha LOPEZ saw pt at chairside and sent in Bactrim. Pt v/u.  Pt also c/o intermittent numbness/tingling in both feet and all fingers. States this is a new concern since last week. Denies balance issues. D/w Latha LOPEZ who saw pt at chairside. No new orders to change dosing or treatment plan at this time. Pt will continue to monitor.

## 2024-04-25 LAB — BACTERIA SPEC AEROBE CULT: ABNORMAL

## 2024-04-29 ENCOUNTER — INFUSION (OUTPATIENT)
Dept: ONCOLOGY | Facility: HOSPITAL | Age: 69
End: 2024-04-29
Payer: COMMERCIAL

## 2024-04-29 VITALS
OXYGEN SATURATION: 98 % | SYSTOLIC BLOOD PRESSURE: 137 MMHG | RESPIRATION RATE: 16 BRPM | TEMPERATURE: 97.8 F | WEIGHT: 119.2 LBS | DIASTOLIC BLOOD PRESSURE: 75 MMHG | HEART RATE: 93 BPM | BODY MASS INDEX: 161.84 KG/M2

## 2024-04-29 DIAGNOSIS — Z17.0 MALIGNANT NEOPLASM OF LOWER-OUTER QUADRANT OF RIGHT BREAST OF FEMALE, ESTROGEN RECEPTOR POSITIVE: Primary | ICD-10-CM

## 2024-04-29 DIAGNOSIS — C50.511 MALIGNANT NEOPLASM OF LOWER-OUTER QUADRANT OF RIGHT BREAST OF FEMALE, ESTROGEN RECEPTOR POSITIVE: Primary | ICD-10-CM

## 2024-04-29 LAB
ALBUMIN SERPL-MCNC: 4 G/DL (ref 3.5–5.2)
ALBUMIN/GLOB SERPL: 1.6 G/DL
ALP SERPL-CCNC: 88 U/L (ref 39–117)
ALT SERPL W P-5'-P-CCNC: 44 U/L (ref 1–33)
ANION GAP SERPL CALCULATED.3IONS-SCNC: 8.5 MMOL/L (ref 5–15)
AST SERPL-CCNC: 28 U/L (ref 1–32)
BASOPHILS # BLD AUTO: 0.04 10*3/MM3 (ref 0–0.2)
BASOPHILS NFR BLD AUTO: 0.6 % (ref 0–1.5)
BILIRUB SERPL-MCNC: 0.2 MG/DL (ref 0–1.2)
BILIRUB UR QL STRIP: NEGATIVE
BUN SERPL-MCNC: 12 MG/DL (ref 8–23)
BUN/CREAT SERPL: 15.4 (ref 7–25)
CALCIUM SPEC-SCNC: 9.4 MG/DL (ref 8.6–10.5)
CHLORIDE SERPL-SCNC: 103 MMOL/L (ref 98–107)
CLARITY UR: CLEAR
CO2 SERPL-SCNC: 25.5 MMOL/L (ref 22–29)
COLOR UR: YELLOW
CREAT SERPL-MCNC: 0.78 MG/DL (ref 0.57–1)
DEPRECATED RDW RBC AUTO: 45.8 FL (ref 37–54)
EGFRCR SERPLBLD CKD-EPI 2021: 82.3 ML/MIN/1.73
EOSINOPHIL # BLD AUTO: 0.05 10*3/MM3 (ref 0–0.4)
EOSINOPHIL NFR BLD AUTO: 0.8 % (ref 0.3–6.2)
ERYTHROCYTE [DISTWIDTH] IN BLOOD BY AUTOMATED COUNT: 13.5 % (ref 12.3–15.4)
GLOBULIN UR ELPH-MCNC: 2.5 GM/DL
GLUCOSE SERPL-MCNC: 185 MG/DL (ref 65–99)
GLUCOSE UR STRIP-MCNC: NEGATIVE MG/DL
HCT VFR BLD AUTO: 37.4 % (ref 34–46.6)
HGB BLD-MCNC: 12 G/DL (ref 12–15.9)
HGB UR QL STRIP.AUTO: NEGATIVE
IMM GRANULOCYTES # BLD AUTO: 0.14 10*3/MM3 (ref 0–0.05)
IMM GRANULOCYTES NFR BLD AUTO: 2.2 % (ref 0–0.5)
KETONES UR QL STRIP: ABNORMAL
LEUKOCYTE ESTERASE UR QL STRIP.AUTO: NEGATIVE
LYMPHOCYTES # BLD AUTO: 1.93 10*3/MM3 (ref 0.7–3.1)
LYMPHOCYTES NFR BLD AUTO: 30.9 % (ref 19.6–45.3)
MCH RBC QN AUTO: 30.1 PG (ref 26.6–33)
MCHC RBC AUTO-ENTMCNC: 32.1 G/DL (ref 31.5–35.7)
MCV RBC AUTO: 93.7 FL (ref 79–97)
MONOCYTES # BLD AUTO: 0.39 10*3/MM3 (ref 0.1–0.9)
MONOCYTES NFR BLD AUTO: 6.2 % (ref 5–12)
NEUTROPHILS NFR BLD AUTO: 3.7 10*3/MM3 (ref 1.7–7)
NEUTROPHILS NFR BLD AUTO: 59.3 % (ref 42.7–76)
NITRITE UR QL STRIP: NEGATIVE
NRBC BLD AUTO-RTO: 0 /100 WBC (ref 0–0.2)
PH UR STRIP.AUTO: 5.5 [PH] (ref 4.5–8)
PLATELET # BLD AUTO: 327 10*3/MM3 (ref 140–450)
PMV BLD AUTO: 10 FL (ref 6–12)
POTASSIUM SERPL-SCNC: 4.1 MMOL/L (ref 3.5–5.2)
PROT SERPL-MCNC: 6.5 G/DL (ref 6–8.5)
PROT UR QL STRIP: ABNORMAL
RBC # BLD AUTO: 3.99 10*6/MM3 (ref 3.77–5.28)
SODIUM SERPL-SCNC: 137 MMOL/L (ref 136–145)
SP GR UR STRIP: >=1.03 (ref 1–1.03)
UROBILINOGEN UR QL STRIP: ABNORMAL
WBC NRBC COR # BLD AUTO: 6.25 10*3/MM3 (ref 3.4–10.8)

## 2024-04-29 PROCEDURE — 25810000003 SODIUM CHLORIDE 0.9 % SOLUTION 250 ML FLEX CONT: Performed by: NURSE PRACTITIONER

## 2024-04-29 PROCEDURE — 25810000003 SODIUM CHLORIDE 0.9 % SOLUTION: Performed by: NURSE PRACTITIONER

## 2024-04-29 PROCEDURE — 96417 CHEMO IV INFUS EACH ADDL SEQ: CPT

## 2024-04-29 PROCEDURE — 80053 COMPREHEN METABOLIC PANEL: CPT

## 2024-04-29 PROCEDURE — 81003 URINALYSIS AUTO W/O SCOPE: CPT | Performed by: NURSE PRACTITIONER

## 2024-04-29 PROCEDURE — 96375 TX/PRO/DX INJ NEW DRUG ADDON: CPT

## 2024-04-29 PROCEDURE — 25010000002 DIPHENHYDRAMINE PER 50 MG: Performed by: NURSE PRACTITIONER

## 2024-04-29 PROCEDURE — 25010000002 PERTUZUMAB 420 MG/14ML SOLUTION 420 MG VIAL: Performed by: NURSE PRACTITIONER

## 2024-04-29 PROCEDURE — 85025 COMPLETE CBC W/AUTO DIFF WBC: CPT

## 2024-04-29 PROCEDURE — 96413 CHEMO IV INFUSION 1 HR: CPT

## 2024-04-29 PROCEDURE — 25010000002 TRASTUZUMAB-DTTB 420 MG RECONSTITUTED SOLUTION 1 EACH VIAL: Performed by: NURSE PRACTITIONER

## 2024-04-29 RX ORDER — SODIUM CHLORIDE 9 MG/ML
20 INJECTION, SOLUTION INTRAVENOUS ONCE
Status: COMPLETED | OUTPATIENT
Start: 2024-04-29 | End: 2024-04-29

## 2024-04-29 RX ORDER — DIPHENHYDRAMINE HYDROCHLORIDE 50 MG/ML
50 INJECTION INTRAMUSCULAR; INTRAVENOUS AS NEEDED
Status: CANCELLED | OUTPATIENT
Start: 2024-04-29

## 2024-04-29 RX ORDER — PACLITAXEL 100 MG/20ML
64 INJECTION, POWDER, LYOPHILIZED, FOR SUSPENSION INTRAVENOUS ONCE
Status: DISCONTINUED | OUTPATIENT
Start: 2024-04-29 | End: 2024-04-29

## 2024-04-29 RX ORDER — FAMOTIDINE 10 MG/ML
20 INJECTION, SOLUTION INTRAVENOUS AS NEEDED
Status: CANCELLED | OUTPATIENT
Start: 2024-04-29

## 2024-04-29 RX ORDER — FAMOTIDINE 10 MG/ML
20 INJECTION, SOLUTION INTRAVENOUS ONCE
Status: COMPLETED | OUTPATIENT
Start: 2024-04-29 | End: 2024-04-29

## 2024-04-29 RX ORDER — MONTELUKAST SODIUM 10 MG/1
10 TABLET ORAL ONCE
Status: DISCONTINUED | OUTPATIENT
Start: 2024-04-29 | End: 2024-04-29 | Stop reason: HOSPADM

## 2024-04-29 RX ADMIN — FAMOTIDINE 20 MG: 10 INJECTION INTRAVENOUS at 14:23

## 2024-04-29 RX ADMIN — ONTRUZANT 330 MG: KIT INTRAVENOUS at 15:58

## 2024-04-29 RX ADMIN — DIPHENHYDRAMINE HYDROCHLORIDE 25 MG: 50 INJECTION, SOLUTION INTRAMUSCULAR; INTRAVENOUS at 14:26

## 2024-04-29 RX ADMIN — PERTUZUMAB 420 MG: 30 INJECTION, SOLUTION, CONCENTRATE INTRAVENOUS at 14:55

## 2024-04-29 RX ADMIN — SODIUM CHLORIDE 20 ML/HR: 9 INJECTION, SOLUTION INTRAVENOUS at 14:22

## 2024-04-29 NOTE — NURSING NOTE
Pt here for perjeta, herceptin, abraxane. Pt continues to have dysuria and urinary frequency despite finishing bactrim. Pt also still c/o neuropathy to finger and states that she has been dropping things she picks up.     R/w Dr. Renteria will collect a UA on pt and hold abraxane today.

## 2024-05-01 ENCOUNTER — PATIENT OUTREACH (OUTPATIENT)
Dept: OTHER | Facility: HOSPITAL | Age: 69
End: 2024-05-01
Payer: COMMERCIAL

## 2024-05-01 NOTE — PROGRESS NOTES
Called Ms. [place] to see how she was doing and to let her know about the appointment with Bri DOE on Monday May 6th.. Left a message with my contact information and asked her to call back at her convenience.

## 2024-05-06 ENCOUNTER — INFUSION (OUTPATIENT)
Dept: ONCOLOGY | Facility: HOSPITAL | Age: 69
End: 2024-05-06
Payer: COMMERCIAL

## 2024-05-06 ENCOUNTER — PATIENT OUTREACH (OUTPATIENT)
Dept: OTHER | Facility: HOSPITAL | Age: 69
End: 2024-05-06
Payer: COMMERCIAL

## 2024-05-06 ENCOUNTER — SOCIAL WORK (OUTPATIENT)
Dept: PSYCHIATRY | Facility: HOSPITAL | Age: 69
End: 2024-05-06
Payer: COMMERCIAL

## 2024-05-06 ENCOUNTER — OFFICE VISIT (OUTPATIENT)
Dept: ONCOLOGY | Facility: CLINIC | Age: 69
End: 2024-05-06
Payer: COMMERCIAL

## 2024-05-06 VITALS
WEIGHT: 118.3 LBS | TEMPERATURE: 97.5 F | HEART RATE: 80 BPM | BODY MASS INDEX: 24.83 KG/M2 | SYSTOLIC BLOOD PRESSURE: 152 MMHG | DIASTOLIC BLOOD PRESSURE: 87 MMHG | OXYGEN SATURATION: 96 % | RESPIRATION RATE: 16 BRPM | HEIGHT: 58 IN

## 2024-05-06 DIAGNOSIS — Z17.0 MALIGNANT NEOPLASM OF LOWER-OUTER QUADRANT OF RIGHT BREAST OF FEMALE, ESTROGEN RECEPTOR POSITIVE: Primary | ICD-10-CM

## 2024-05-06 DIAGNOSIS — Z17.0 MALIGNANT NEOPLASM OF LOWER-OUTER QUADRANT OF RIGHT BREAST OF FEMALE, ESTROGEN RECEPTOR POSITIVE: ICD-10-CM

## 2024-05-06 DIAGNOSIS — Z45.2 ENCOUNTER FOR FITTING AND ADJUSTMENT OF VASCULAR CATHETER: ICD-10-CM

## 2024-05-06 DIAGNOSIS — C50.511 MALIGNANT NEOPLASM OF LOWER-OUTER QUADRANT OF RIGHT BREAST OF FEMALE, ESTROGEN RECEPTOR POSITIVE: Primary | ICD-10-CM

## 2024-05-06 DIAGNOSIS — C50.511 MALIGNANT NEOPLASM OF LOWER-OUTER QUADRANT OF RIGHT BREAST OF FEMALE, ESTROGEN RECEPTOR POSITIVE: ICD-10-CM

## 2024-05-06 DIAGNOSIS — Z17.1 MALIGNANT NEOPLASM OF LOWER-OUTER QUADRANT OF RIGHT BREAST OF FEMALE, ESTROGEN RECEPTOR NEGATIVE: Primary | ICD-10-CM

## 2024-05-06 DIAGNOSIS — Z79.899 HIGH RISK MEDICATION USE: ICD-10-CM

## 2024-05-06 DIAGNOSIS — R79.89 ELEVATED LFTS: ICD-10-CM

## 2024-05-06 DIAGNOSIS — C50.511 MALIGNANT NEOPLASM OF LOWER-OUTER QUADRANT OF RIGHT BREAST OF FEMALE, ESTROGEN RECEPTOR NEGATIVE: Primary | ICD-10-CM

## 2024-05-06 LAB
ALBUMIN SERPL-MCNC: 4.3 G/DL (ref 3.5–5.2)
ALBUMIN/GLOB SERPL: 1.7 G/DL
ALP SERPL-CCNC: 87 U/L (ref 39–117)
ALT SERPL W P-5'-P-CCNC: 51 U/L (ref 1–33)
ANION GAP SERPL CALCULATED.3IONS-SCNC: 14.1 MMOL/L (ref 5–15)
AST SERPL-CCNC: 36 U/L (ref 1–32)
BASOPHILS # BLD AUTO: 0.07 10*3/MM3 (ref 0–0.2)
BASOPHILS NFR BLD AUTO: 1.2 % (ref 0–1.5)
BILIRUB SERPL-MCNC: 0.4 MG/DL (ref 0–1.2)
BUN SERPL-MCNC: 9 MG/DL (ref 8–23)
BUN/CREAT SERPL: 13.4 (ref 7–25)
CALCIUM SPEC-SCNC: 9.1 MG/DL (ref 8.6–10.5)
CHLORIDE SERPL-SCNC: 105 MMOL/L (ref 98–107)
CO2 SERPL-SCNC: 22.9 MMOL/L (ref 22–29)
CREAT SERPL-MCNC: 0.67 MG/DL (ref 0.57–1)
DEPRECATED RDW RBC AUTO: 46.4 FL (ref 37–54)
EGFRCR SERPLBLD CKD-EPI 2021: 94.7 ML/MIN/1.73
EOSINOPHIL # BLD AUTO: 0.04 10*3/MM3 (ref 0–0.4)
EOSINOPHIL NFR BLD AUTO: 0.7 % (ref 0.3–6.2)
ERYTHROCYTE [DISTWIDTH] IN BLOOD BY AUTOMATED COUNT: 13.5 % (ref 12.3–15.4)
GLOBULIN UR ELPH-MCNC: 2.5 GM/DL
GLUCOSE SERPL-MCNC: 154 MG/DL (ref 65–99)
HCT VFR BLD AUTO: 37.7 % (ref 34–46.6)
HGB BLD-MCNC: 12.3 G/DL (ref 12–15.9)
IMM GRANULOCYTES # BLD AUTO: 0.04 10*3/MM3 (ref 0–0.05)
IMM GRANULOCYTES NFR BLD AUTO: 0.7 % (ref 0–0.5)
LYMPHOCYTES # BLD AUTO: 1.92 10*3/MM3 (ref 0.7–3.1)
LYMPHOCYTES NFR BLD AUTO: 32.3 % (ref 19.6–45.3)
MCH RBC QN AUTO: 30.7 PG (ref 26.6–33)
MCHC RBC AUTO-ENTMCNC: 32.6 G/DL (ref 31.5–35.7)
MCV RBC AUTO: 94 FL (ref 79–97)
MONOCYTES # BLD AUTO: 0.7 10*3/MM3 (ref 0.1–0.9)
MONOCYTES NFR BLD AUTO: 11.8 % (ref 5–12)
NEUTROPHILS NFR BLD AUTO: 3.17 10*3/MM3 (ref 1.7–7)
NEUTROPHILS NFR BLD AUTO: 53.3 % (ref 42.7–76)
NRBC BLD AUTO-RTO: 0 /100 WBC (ref 0–0.2)
PLATELET # BLD AUTO: 285 10*3/MM3 (ref 140–450)
PMV BLD AUTO: 9.4 FL (ref 6–12)
POTASSIUM SERPL-SCNC: 4 MMOL/L (ref 3.5–5.2)
PROT SERPL-MCNC: 6.8 G/DL (ref 6–8.5)
RBC # BLD AUTO: 4.01 10*6/MM3 (ref 3.77–5.28)
SODIUM SERPL-SCNC: 142 MMOL/L (ref 136–145)
WBC NRBC COR # BLD AUTO: 5.94 10*3/MM3 (ref 3.4–10.8)

## 2024-05-06 PROCEDURE — 96413 CHEMO IV INFUSION 1 HR: CPT

## 2024-05-06 PROCEDURE — 25010000002 DEXAMETHASONE SODIUM PHOSPHATE 100 MG/10ML SOLUTION: Performed by: INTERNAL MEDICINE

## 2024-05-06 PROCEDURE — 25010000002 DIPHENHYDRAMINE PER 50 MG: Performed by: INTERNAL MEDICINE

## 2024-05-06 PROCEDURE — 25010000002 HEPARIN LOCK FLUSH PER 10 UNITS: Performed by: INTERNAL MEDICINE

## 2024-05-06 PROCEDURE — 96375 TX/PRO/DX INJ NEW DRUG ADDON: CPT

## 2024-05-06 PROCEDURE — 85025 COMPLETE CBC W/AUTO DIFF WBC: CPT

## 2024-05-06 PROCEDURE — 80053 COMPREHEN METABOLIC PANEL: CPT

## 2024-05-06 PROCEDURE — 25810000003 SODIUM CHLORIDE 0.9 % SOLUTION: Performed by: INTERNAL MEDICINE

## 2024-05-06 PROCEDURE — 25010000002 PACLITAXEL PROTEIN-BOUND PART PER 1 MG: Performed by: INTERNAL MEDICINE

## 2024-05-06 RX ORDER — SODIUM CHLORIDE 0.9 % (FLUSH) 0.9 %
10 SYRINGE (ML) INJECTION AS NEEDED
OUTPATIENT
Start: 2024-05-06

## 2024-05-06 RX ORDER — MONTELUKAST SODIUM 10 MG/1
10 TABLET ORAL ONCE
Status: COMPLETED | OUTPATIENT
Start: 2024-05-06 | End: 2024-05-06

## 2024-05-06 RX ORDER — FAMOTIDINE 10 MG/ML
20 INJECTION, SOLUTION INTRAVENOUS ONCE
Status: CANCELLED | OUTPATIENT
Start: 2024-05-06

## 2024-05-06 RX ORDER — FAMOTIDINE 10 MG/ML
20 INJECTION, SOLUTION INTRAVENOUS ONCE
Status: COMPLETED | OUTPATIENT
Start: 2024-05-06 | End: 2024-05-06

## 2024-05-06 RX ORDER — HEPARIN SODIUM (PORCINE) LOCK FLUSH IV SOLN 100 UNIT/ML 100 UNIT/ML
500 SOLUTION INTRAVENOUS AS NEEDED
Status: DISCONTINUED | OUTPATIENT
Start: 2024-05-06 | End: 2024-05-06 | Stop reason: HOSPADM

## 2024-05-06 RX ORDER — SODIUM CHLORIDE 9 MG/ML
20 INJECTION, SOLUTION INTRAVENOUS ONCE
Status: CANCELLED | OUTPATIENT
Start: 2024-05-06

## 2024-05-06 RX ORDER — FAMOTIDINE 10 MG/ML
20 INJECTION, SOLUTION INTRAVENOUS AS NEEDED
Status: CANCELLED | OUTPATIENT
Start: 2024-05-06

## 2024-05-06 RX ORDER — PACLITAXEL 100 MG/20ML
80 INJECTION, POWDER, LYOPHILIZED, FOR SUSPENSION INTRAVENOUS ONCE
Status: COMPLETED | OUTPATIENT
Start: 2024-05-06 | End: 2024-05-06

## 2024-05-06 RX ORDER — SODIUM CHLORIDE 9 MG/ML
20 INJECTION, SOLUTION INTRAVENOUS ONCE
Status: COMPLETED | OUTPATIENT
Start: 2024-05-06 | End: 2024-05-06

## 2024-05-06 RX ORDER — GABAPENTIN 300 MG/1
300 CAPSULE ORAL NIGHTLY
Qty: 30 CAPSULE | Refills: 1 | Status: SHIPPED | OUTPATIENT
Start: 2024-05-06

## 2024-05-06 RX ORDER — DIPHENHYDRAMINE HYDROCHLORIDE 50 MG/ML
50 INJECTION INTRAMUSCULAR; INTRAVENOUS AS NEEDED
Status: CANCELLED | OUTPATIENT
Start: 2024-05-06

## 2024-05-06 RX ORDER — PACLITAXEL 100 MG/20ML
80 INJECTION, POWDER, LYOPHILIZED, FOR SUSPENSION INTRAVENOUS ONCE
Status: CANCELLED | OUTPATIENT
Start: 2024-05-06

## 2024-05-06 RX ORDER — MONTELUKAST SODIUM 10 MG/1
10 TABLET ORAL ONCE
Status: CANCELLED
Start: 2024-05-06 | End: 2024-05-06

## 2024-05-06 RX ORDER — SODIUM CHLORIDE 0.9 % (FLUSH) 0.9 %
10 SYRINGE (ML) INJECTION AS NEEDED
Status: DISCONTINUED | OUTPATIENT
Start: 2024-05-06 | End: 2024-05-06 | Stop reason: HOSPADM

## 2024-05-06 RX ORDER — HEPARIN SODIUM (PORCINE) LOCK FLUSH IV SOLN 100 UNIT/ML 100 UNIT/ML
500 SOLUTION INTRAVENOUS AS NEEDED
OUTPATIENT
Start: 2024-05-06

## 2024-05-06 RX ADMIN — DIPHENHYDRAMINE HYDROCHLORIDE 25 MG: 50 INJECTION, SOLUTION INTRAMUSCULAR; INTRAVENOUS at 09:34

## 2024-05-06 RX ADMIN — DEXAMETHASONE SODIUM PHOSPHATE 12 MG: 10 INJECTION, SOLUTION INTRAMUSCULAR; INTRAVENOUS at 09:14

## 2024-05-06 RX ADMIN — Medication 500 UNITS: at 11:21

## 2024-05-06 RX ADMIN — SODIUM CHLORIDE 20 ML/HR: 9 INJECTION, SOLUTION INTRAVENOUS at 09:12

## 2024-05-06 RX ADMIN — PACLITAXEL 120 MG: 100 INJECTION, POWDER, LYOPHILIZED, FOR SUSPENSION INTRAVENOUS at 10:29

## 2024-05-06 RX ADMIN — FAMOTIDINE 20 MG: 10 INJECTION INTRAVENOUS at 09:12

## 2024-05-06 RX ADMIN — MONTELUKAST 10 MG: 10 TABLET, FILM COATED ORAL at 09:14

## 2024-05-06 RX ADMIN — Medication 10 ML: at 11:21

## 2024-05-11 ENCOUNTER — HOSPITAL ENCOUNTER (OUTPATIENT)
Dept: MRI IMAGING | Facility: HOSPITAL | Age: 69
Discharge: HOME | End: 2024-05-11
Admitting: INTERNAL MEDICINE
Payer: COMMERCIAL

## 2024-05-11 DIAGNOSIS — Z79.899 HIGH RISK MEDICATION USE: ICD-10-CM

## 2024-05-11 DIAGNOSIS — C50.511 MALIGNANT NEOPLASM OF LOWER-OUTER QUADRANT OF RIGHT BREAST OF FEMALE, ESTROGEN RECEPTOR POSITIVE: ICD-10-CM

## 2024-05-11 DIAGNOSIS — Z17.0 MALIGNANT NEOPLASM OF LOWER-OUTER QUADRANT OF RIGHT BREAST OF FEMALE, ESTROGEN RECEPTOR POSITIVE: ICD-10-CM

## 2024-05-11 DIAGNOSIS — R79.89 ELEVATED LFTS: ICD-10-CM

## 2024-05-11 PROCEDURE — A9577 INJ MULTIHANCE: HCPCS | Performed by: INTERNAL MEDICINE

## 2024-05-11 PROCEDURE — 77049 MRI BREAST C-+ W/CAD BI: CPT

## 2024-05-11 PROCEDURE — 0 GADOBENATE DIMEGLUMINE 529 MG/ML SOLUTION: Performed by: INTERNAL MEDICINE

## 2024-05-11 RX ADMIN — GADOBENATE DIMEGLUMINE 10 ML: 529 INJECTION, SOLUTION INTRAVENOUS at 15:38

## 2024-05-13 ENCOUNTER — INFUSION (OUTPATIENT)
Dept: ONCOLOGY | Facility: HOSPITAL | Age: 69
End: 2024-05-13
Payer: COMMERCIAL

## 2024-05-13 ENCOUNTER — OFFICE VISIT (OUTPATIENT)
Dept: ONCOLOGY | Facility: CLINIC | Age: 69
End: 2024-05-13
Payer: COMMERCIAL

## 2024-05-13 VITALS
RESPIRATION RATE: 16 BRPM | DIASTOLIC BLOOD PRESSURE: 87 MMHG | OXYGEN SATURATION: 96 % | BODY MASS INDEX: 25.02 KG/M2 | TEMPERATURE: 98.1 F | HEIGHT: 58 IN | WEIGHT: 119.2 LBS | HEART RATE: 89 BPM | SYSTOLIC BLOOD PRESSURE: 138 MMHG

## 2024-05-13 DIAGNOSIS — C50.511 MALIGNANT NEOPLASM OF LOWER-OUTER QUADRANT OF RIGHT BREAST OF FEMALE, ESTROGEN RECEPTOR POSITIVE: Primary | ICD-10-CM

## 2024-05-13 DIAGNOSIS — Z79.899 HIGH RISK MEDICATION USE: ICD-10-CM

## 2024-05-13 DIAGNOSIS — Z17.0 MALIGNANT NEOPLASM OF LOWER-OUTER QUADRANT OF RIGHT BREAST OF FEMALE, ESTROGEN RECEPTOR POSITIVE: Primary | ICD-10-CM

## 2024-05-13 DIAGNOSIS — C50.511 MALIGNANT NEOPLASM OF LOWER-OUTER QUADRANT OF RIGHT BREAST OF FEMALE, ESTROGEN RECEPTOR POSITIVE: ICD-10-CM

## 2024-05-13 DIAGNOSIS — Z17.0 MALIGNANT NEOPLASM OF LOWER-OUTER QUADRANT OF RIGHT BREAST OF FEMALE, ESTROGEN RECEPTOR POSITIVE: ICD-10-CM

## 2024-05-13 DIAGNOSIS — R79.89 ELEVATED LFTS: ICD-10-CM

## 2024-05-13 DIAGNOSIS — C50.511 MALIGNANT NEOPLASM OF LOWER-OUTER QUADRANT OF RIGHT BREAST OF FEMALE, ESTROGEN RECEPTOR NEGATIVE: Primary | ICD-10-CM

## 2024-05-13 DIAGNOSIS — Z17.1 MALIGNANT NEOPLASM OF LOWER-OUTER QUADRANT OF RIGHT BREAST OF FEMALE, ESTROGEN RECEPTOR NEGATIVE: Primary | ICD-10-CM

## 2024-05-13 LAB
ALBUMIN SERPL-MCNC: 4.6 G/DL (ref 3.5–5.2)
ALBUMIN/GLOB SERPL: 2.3 G/DL
ALP SERPL-CCNC: 90 U/L (ref 39–117)
ALT SERPL W P-5'-P-CCNC: 98 U/L (ref 1–33)
ANION GAP SERPL CALCULATED.3IONS-SCNC: 14.4 MMOL/L (ref 5–15)
AST SERPL-CCNC: 67 U/L (ref 1–32)
BASOPHILS # BLD AUTO: 0.07 10*3/MM3 (ref 0–0.2)
BASOPHILS NFR BLD AUTO: 0.8 % (ref 0–1.5)
BILIRUB SERPL-MCNC: 0.2 MG/DL (ref 0–1.2)
BUN SERPL-MCNC: 18 MG/DL (ref 8–23)
BUN/CREAT SERPL: 25 (ref 7–25)
CALCIUM SPEC-SCNC: 9.5 MG/DL (ref 8.6–10.5)
CHLORIDE SERPL-SCNC: 101 MMOL/L (ref 98–107)
CO2 SERPL-SCNC: 22.6 MMOL/L (ref 22–29)
CREAT SERPL-MCNC: 0.72 MG/DL (ref 0.57–1)
DEPRECATED RDW RBC AUTO: 45.2 FL (ref 37–54)
EGFRCR SERPLBLD CKD-EPI 2021: 90.6 ML/MIN/1.73
EOSINOPHIL # BLD AUTO: 0.04 10*3/MM3 (ref 0–0.4)
EOSINOPHIL NFR BLD AUTO: 0.5 % (ref 0.3–6.2)
ERYTHROCYTE [DISTWIDTH] IN BLOOD BY AUTOMATED COUNT: 13.2 % (ref 12.3–15.4)
GLOBULIN UR ELPH-MCNC: 2 GM/DL
GLUCOSE SERPL-MCNC: 210 MG/DL (ref 65–99)
HCT VFR BLD AUTO: 37.2 % (ref 34–46.6)
HGB BLD-MCNC: 12.2 G/DL (ref 12–15.9)
IMM GRANULOCYTES # BLD AUTO: 0.12 10*3/MM3 (ref 0–0.05)
IMM GRANULOCYTES NFR BLD AUTO: 1.5 % (ref 0–0.5)
LYMPHOCYTES # BLD AUTO: 2.67 10*3/MM3 (ref 0.7–3.1)
LYMPHOCYTES NFR BLD AUTO: 32.3 % (ref 19.6–45.3)
MCH RBC QN AUTO: 30.7 PG (ref 26.6–33)
MCHC RBC AUTO-ENTMCNC: 32.8 G/DL (ref 31.5–35.7)
MCV RBC AUTO: 93.5 FL (ref 79–97)
MONOCYTES # BLD AUTO: 0.5 10*3/MM3 (ref 0.1–0.9)
MONOCYTES NFR BLD AUTO: 6.1 % (ref 5–12)
NEUTROPHILS NFR BLD AUTO: 4.86 10*3/MM3 (ref 1.7–7)
NEUTROPHILS NFR BLD AUTO: 58.8 % (ref 42.7–76)
NRBC BLD AUTO-RTO: 0 /100 WBC (ref 0–0.2)
PLATELET # BLD AUTO: 315 10*3/MM3 (ref 140–450)
PMV BLD AUTO: 10.2 FL (ref 6–12)
POTASSIUM SERPL-SCNC: 4 MMOL/L (ref 3.5–5.2)
PROT SERPL-MCNC: 6.6 G/DL (ref 6–8.5)
RBC # BLD AUTO: 3.98 10*6/MM3 (ref 3.77–5.28)
SODIUM SERPL-SCNC: 138 MMOL/L (ref 136–145)
WBC NRBC COR # BLD AUTO: 8.26 10*3/MM3 (ref 3.4–10.8)

## 2024-05-13 PROCEDURE — 96375 TX/PRO/DX INJ NEW DRUG ADDON: CPT

## 2024-05-13 PROCEDURE — 96413 CHEMO IV INFUSION 1 HR: CPT

## 2024-05-13 PROCEDURE — 25010000002 DEXAMETHASONE SODIUM PHOSPHATE 100 MG/10ML SOLUTION: Performed by: INTERNAL MEDICINE

## 2024-05-13 PROCEDURE — 99215 OFFICE O/P EST HI 40 MIN: CPT | Performed by: INTERNAL MEDICINE

## 2024-05-13 PROCEDURE — 25810000003 SODIUM CHLORIDE 0.9 % SOLUTION: Performed by: INTERNAL MEDICINE

## 2024-05-13 PROCEDURE — 25010000002 DIPHENHYDRAMINE PER 50 MG: Performed by: INTERNAL MEDICINE

## 2024-05-13 PROCEDURE — 25010000002 PACLITAXEL PROTEIN-BOUND PART PER 1 MG: Performed by: INTERNAL MEDICINE

## 2024-05-13 PROCEDURE — 85025 COMPLETE CBC W/AUTO DIFF WBC: CPT

## 2024-05-13 PROCEDURE — 80053 COMPREHEN METABOLIC PANEL: CPT

## 2024-05-13 RX ORDER — SODIUM CHLORIDE 9 MG/ML
20 INJECTION, SOLUTION INTRAVENOUS ONCE
Status: COMPLETED | OUTPATIENT
Start: 2024-05-13 | End: 2024-05-13

## 2024-05-13 RX ORDER — FAMOTIDINE 10 MG/ML
20 INJECTION, SOLUTION INTRAVENOUS ONCE
Status: COMPLETED | OUTPATIENT
Start: 2024-05-13 | End: 2024-05-13

## 2024-05-13 RX ORDER — MONTELUKAST SODIUM 10 MG/1
10 TABLET ORAL ONCE
Status: COMPLETED | OUTPATIENT
Start: 2024-05-13 | End: 2024-05-13

## 2024-05-13 RX ORDER — SODIUM CHLORIDE 9 MG/ML
20 INJECTION, SOLUTION INTRAVENOUS ONCE
Status: CANCELLED | OUTPATIENT
Start: 2024-05-13

## 2024-05-13 RX ORDER — PACLITAXEL 100 MG/20ML
80 INJECTION, POWDER, LYOPHILIZED, FOR SUSPENSION INTRAVENOUS ONCE
Status: COMPLETED | OUTPATIENT
Start: 2024-05-13 | End: 2024-05-13

## 2024-05-13 RX ORDER — FAMOTIDINE 10 MG/ML
20 INJECTION, SOLUTION INTRAVENOUS ONCE
Status: CANCELLED | OUTPATIENT
Start: 2024-05-13

## 2024-05-13 RX ORDER — FAMOTIDINE 10 MG/ML
20 INJECTION, SOLUTION INTRAVENOUS AS NEEDED
Status: CANCELLED | OUTPATIENT
Start: 2024-05-13

## 2024-05-13 RX ORDER — DIPHENHYDRAMINE HYDROCHLORIDE 50 MG/ML
50 INJECTION INTRAMUSCULAR; INTRAVENOUS AS NEEDED
Status: CANCELLED | OUTPATIENT
Start: 2024-05-13

## 2024-05-13 RX ORDER — MONTELUKAST SODIUM 10 MG/1
10 TABLET ORAL ONCE
Status: CANCELLED
Start: 2024-05-13 | End: 2024-05-13

## 2024-05-13 RX ORDER — PACLITAXEL 100 MG/20ML
80 INJECTION, POWDER, LYOPHILIZED, FOR SUSPENSION INTRAVENOUS ONCE
Status: CANCELLED | OUTPATIENT
Start: 2024-05-13

## 2024-05-13 RX ORDER — TRIAMCINOLONE ACETONIDE 1 MG/G
1 OINTMENT TOPICAL 2 TIMES DAILY
Qty: 30 G | Refills: 0 | Status: SHIPPED | OUTPATIENT
Start: 2024-05-13

## 2024-05-13 RX ADMIN — PACLITAXEL 120 MG: 100 INJECTION, POWDER, LYOPHILIZED, FOR SUSPENSION INTRAVENOUS at 12:52

## 2024-05-13 RX ADMIN — FAMOTIDINE 20 MG: 10 INJECTION INTRAVENOUS at 11:48

## 2024-05-13 RX ADMIN — DEXAMETHASONE SODIUM PHOSPHATE 12 MG: 100 INJECTION INTRAMUSCULAR; INTRAVENOUS at 12:08

## 2024-05-13 RX ADMIN — SODIUM CHLORIDE 20 ML/HR: 9 INJECTION, SOLUTION INTRAVENOUS at 11:48

## 2024-05-13 RX ADMIN — MONTELUKAST 10 MG: 10 TABLET, FILM COATED ORAL at 11:48

## 2024-05-13 RX ADMIN — DIPHENHYDRAMINE HYDROCHLORIDE 25 MG: 50 INJECTION, SOLUTION INTRAMUSCULAR; INTRAVENOUS at 11:49

## 2024-05-13 NOTE — PROGRESS NOTES
Subjective   Felicia Boyle is a 68 y.o. female.  Referred by Dr. Manrique for right breast invasive ductal carcinoma, HER2 positive, left breast ductal carcinoma in situ    History of Present Illness     Ms. Boyle is a 68-year-old postmenopausal [place] lady who is fairly healthy without any significant comorbidities palpated of right breast mass in the latter part of  which was further worked up with diagnostic mammogram and ultrasound and subsequently a biopsy which confirmed invasive ductal carcinoma which was ER/WY negative and HER2 positive.    Family history significant for her 2 sisters with breast cancer at the age of 38 and 48 respectively.  Her older sister  of metastatic breast cancer but her younger sister is doing well.  Patient underwent genetic testing in 2017 which was negative due to her family history.    She had regular screening mammograms up until 2019 but subsequently stopped having annual gynecological visits and therefore did not have any further screening mammograms up until the most recent diagnostic mammogram for the new palpable abnormality.    2024-bilateral diagnostic mammogram and right breast ultrasound  The breast heterogeneously dense.  Left breast with a scar marker in the lower left breast dating back to .  No new findings in the left breast.    Right breast at 8:00, 8 cm from the nipple there is a 1.7 x 1.4 x 1.6 cm oval high density mass with partially circumscribed and partially indistinct margins.    On the ultrasound the mass measures 1.7 x 1 x 1.3 cm.    No abnormal right axilla lymphadenopathy.    2024-right breast ultrasound-guided biopsy  Pathology consistent with invasive ductal carcinoma  Grade 3  9.3 mm of invasive disease on the core biopsy  ER negative  WY negative  HER2 3+ on immunohistochemistry, 95%  Ki-67 70%    2024-bilateral breast MRI  In the right breast at 8:00, 8 cm from the nipple there is a irregular enhancing mass which  measures 1.8 x 2 x 1.7 cm.  This corresponds to the biopsy-proven malignancy.  Extending anteriorly away from the MT margin of the mass there is an irregular linear enhancement which measures 3.5 x 1 x 0.7 cm.  The whole area including the mass measures 4.7 cm in AP dimension, 1.7 in mediolateral and 2.4 cm in the superior-inferior dimension.    At 12:00, 3 cm from the nipple there is an irregular non-mass enhancement which measures 1 x 1.1 x 1.2 cm.  No mammographic correlate is appreciated.    No other suspicious areas of enhancement in the right breast or right axilla or internal mammary chain.    In the left breast, middle third at 11:30 position, 3.7 cm from the nipple there is an irregular area of non-mass enhancement which measures 1.3 x 1.1 x 1 cm.  There is suspected architectural distortion.  No other areas of abnormal enhancement seen in the left breast of the left axilla or the internal mammary chain.    2/26/2024-additional MRI guided biopsies    1.left breast 12:00-intermediate grade ductal carcinoma in situ with apocrine features  ER +91 to 100% strong  OK +81 to 90% strong  Ki-67 15%    2. Right breast 12 o'clock position MRI guided biopsy of the non-mass enhancement-sclerosing adenosis with associated calcifications  No atypical hyperplasia in situ or invasive carcinoma    3.right breast 8:00 MRI guided biopsies of the linear enhancement is consistent with high-grade ductal carcinoma in situ  DCIS involves multiple tissue cores measuring up to 3 mm.  Sclerosing adenosis and usual ductal hyperplasia with associated microcalcifications.    The case was discussed in tumor board and Dr. Patel thought that they could be more invasive disease in the 4.7 cm of linear enhancement noted in the right breast at the site of the mass.  Therefore it was decided to proceed with neoadjuvant chemotherapy.    Patient was a former smoker and smoked for about 40 years, half a pack per week, quit about 1 month ago.   Denies any alcohol use.    Week 1 Taxol Herceptin and Perjeta was planned for 3/18/2024.  Patient received Herceptin and Perjeta and had some chills for which she received Solu-Medrol and responded well to that.  However after infusing only 1 cc of Taxol patient experienced a severe anaphylactic reaction due to which the infusion was stopped permanently and she received an additional dose of Solu-Medrol and Benadryl.  Symptoms resolved subsequently and patient was discharged home safely.    Due to this recent treatment has been changed to Abraxane along with Herceptin and Perjeta.  3/25/2024-patient is due for her first treatment with Abraxane.    She is extremely anxious given the severe hypersensitivity reaction that she experienced with Taxol.  She is also complaining of increased belching and heartburn since her last chemotherapy.  She is complaining of severe diarrhea with started last night.  Had multiple loose stools.    5/11/2024-MRI of the breast-images independently reviewed and interpreted by me.  Interval near complete resolution of all suspicious enhancement in the posterior one third lower outer quadrant of the right breast at 8:00 corresponding to the biopsy-proven site of malignancy.  Who has been on masslike area of enhancement measuring 1.5 x 0.9 x 0.9 cm compared to 2 x 1.8 x 1.7 cm.  Mildly abnormal enhancement extending anteriorly away from the mass has resolved.  No other suspicious areas of enhancement noted in the right breast.  Left breast at 12:00 interval resolution of all suspicious enhancement.    INTERVAL HISTORY  Patient returns today for follow-up.  She is scheduled to receive Abraxane.  Last week she was started on gabapentin and she reports that the neuropathy has improved overall.  She is reporting some tenderness of the scalp and some irritation of the scalp.  Also reporting chapped  skin at the corners of the skin.  Denies any diarrhea nausea or vomiting.      The following  "portions of the patient's history were reviewed and updated as appropriate: allergies, current medications, past family history, past medical history, past social history, past surgical history, and problem list.    Past Medical History:   Diagnosis Date    Anxiety about health     Breast cancer of lower-outer quadrant of right female breast 02/14/2024    DCIS (ductal carcinoma in situ) of breast 03/15/2024    Diverticulosis     Mixed hyperlipidemia 05/19/2019    DIET CONTROLLED    Osteoporosis     Type 2 diabetes mellitus         Past Surgical History:   Procedure Laterality Date    BREAST BIOPSY Left     Excisional biopsy in the [facility]    BREAST BIOPSY  03/2024    CATARACT EXTRACTION W/ INTRAOCULAR LENS IMPLANT Bilateral     CHOLECYSTECTOMY  2012    COLON RESECTION Right 2002    COLONOSCOPY N/A 04/28/2021    Procedure: COLONOSCOPY TO ANASTAMOSIS/TI;  Surgeon: Angelo Gonsalez MD;  Location: [facility] ENDOSCOPY;  Service: Gastroenterology;  Laterality: N/A;  PRE: SCREENING  POST: NORMAL POST-OP ANATOMY    ENDOSCOPY      2013    ENDOSCOPY N/A 03/10/2017    Procedure: ESOPHAGOGASTRODUODENOSCOPY WITH BIOPSY AND PETER DILATATION 54\";  Surgeon: Angelo Gonsalez MD;  Location: [facility] ENDOSCOPY;  Service:     ENDOSCOPY N/A 04/28/2021    Procedure: ESOPHAGOGASTRODUODENOSCOPY WITH BIOPSIES, 54FR PETER DILATATION;  Surgeon: Angelo Gonsalez MD;  Location: [facility] ENDOSCOPY;  Service: Gastroenterology;  Laterality: N/A;  PRE: DYSPHAGIA  POST: GASTRITIS, ESOPHAGEAL RING    VENOUS ACCESS DEVICE (PORT) INSERTION Left 3/14/2024    Procedure: INSERTION OF PORTACATH;  Surgeon: Abeba Manrique MD;  Location: [facility] OR;  Service: General;  Laterality: Left;        Family History   Problem Relation Age of Onset    Hypertension Mother     Diabetes Mother     Hypertension Father     Breast cancer Sister 48    Breast cancer Sister 38    No Known Problems Brother     No Known Problems Daughter     No Known Problems Son     " "No Known Problems Maternal Grandmother     No Known Problems Paternal Grandmother     No Known Problems Maternal Grandfather     No Known Problems Paternal Grandfather     Autism Grandson     Colon cancer Neg Hx     Rectal cancer Neg Hx     Endometrial cancer Neg Hx     Vaginal cancer Neg Hx     Cervical cancer Neg Hx     Ovarian cancer Neg Hx     Prostate cancer Neg Hx     Uterine cancer Neg Hx     Malig Hyperthermia Neg Hx         Social History     Socioeconomic History    Marital status:      Spouse name: Ryan   Tobacco Use    Smoking status: Former     Current packs/day: 0.00     Average packs/day: 0.3 packs/day for 15.0 years (3.8 ttl pk-yrs)     Types: Cigarettes     Quit date: 1/15/2024     Years since quittin.3     Passive exposure: Never    Smokeless tobacco: Never    Tobacco comments:     N/A   Vaping Use    Vaping status: Never Used   Substance and Sexual Activity    Alcohol use: Yes     Comment: RARE    Drug use: No    Sexual activity: Not Currently     Partners: Male     Birth control/protection: Tubal ligation        OB History          4    Para   3    Term   3       0    AB   1    Living   3         SAB   1    IAB   0    Ectopic   0    Molar   0    Multiple   0    Live Births   3               Age at menarche-16  Age at first live childbirth-18   4 para 3  1  Age at menopause-49  No use of hormone replacement therapy  No use of oral conceptive pills  Breast-feeding for 3 months    Allergies   Allergen Reactions    Metronidazole Nausea And Vomiting      Review of systems as mentioned HPI otherwise negative    Objective   Blood pressure 138/87, pulse 89, temperature 98.1 °F (36.7 °C), temperature source Oral, resp. rate 16, height 147 cm (57.87\"), weight 54.1 kg (119 lb 3.2 oz), SpO2 96%, not currently breastfeeding.     Physical Exam  Vitals reviewed.   Constitutional:       Appearance: Normal appearance. She is normal weight.   HENT:      Nose: Nose normal. "      Mouth/Throat:      Mouth: Mucous membranes are moist.      Pharynx: Oropharynx is clear.   Eyes:      Conjunctiva/sclera: Conjunctivae normal.      Pupils: Pupils are equal, round, and reactive to light.   Cardiovascular:      Rate and Rhythm: Normal rate.   Pulmonary:      Effort: Pulmonary effort is normal.      Breath sounds: Normal breath sounds.   Abdominal:      General: Abdomen is flat. Bowel sounds are normal.   Musculoskeletal:         General: Normal range of motion.      Cervical back: Normal range of motion.   Skin:     General: Skin is warm and dry.      Findings: No rash.   Neurological:      General: No focal deficit present.      Mental Status: She is alert and oriented to person, place, and time.   Psychiatric:         Mood and Affect: Mood normal.         Behavior: Behavior normal.         Thought Content: Thought content normal.         Judgment: Judgment normal.       Breast Exam not performed today as patient seen in infusion area: Right breast on inspection 9 o'clock position with biopsy site changes.  The right breast mass is no longer palpable.  No right answer lymphadenopathy.    Left breast :There is a palpable underlying hematoma which is tender to palpation from MRI guided biopsies. No left axillary lymphadenopathy.    I have reexamined the patient and the results are consistent with the previously documented exam. Agata Renteria MD      Infusion on 05/13/2024   Component Date Value Ref Range Status    WBC 05/13/2024 8.26  3.40 - 10.80 10*3/mm3 Final    RBC 05/13/2024 3.98  3.77 - 5.28 10*6/mm3 Final    Hemoglobin 05/13/2024 12.2  12.0 - 15.9 g/dL Final    Hematocrit 05/13/2024 37.2  34.0 - 46.6 % Final    MCV 05/13/2024 93.5  79.0 - 97.0 fL Final    MCH 05/13/2024 30.7  26.6 - 33.0 pg Final    MCHC 05/13/2024 32.8  31.5 - 35.7 g/dL Final    RDW 05/13/2024 13.2  12.3 - 15.4 % Final    RDW-SD 05/13/2024 45.2  37.0 - 54.0 fl Final    MPV 05/13/2024 10.2  6.0 - 12.0 fL Final     Platelets 05/13/2024 315  140 - 450 10*3/mm3 Final    Neutrophil % 05/13/2024 58.8  42.7 - 76.0 % Final    Lymphocyte % 05/13/2024 32.3  19.6 - 45.3 % Final    Monocyte % 05/13/2024 6.1  5.0 - 12.0 % Final    Eosinophil % 05/13/2024 0.5  0.3 - 6.2 % Final    Basophil % 05/13/2024 0.8  0.0 - 1.5 % Final    Immature Grans % 05/13/2024 1.5 (H)  0.0 - 0.5 % Final    Neutrophils, Absolute 05/13/2024 4.86  1.70 - 7.00 10*3/mm3 Final    Lymphocytes, Absolute 05/13/2024 2.67  0.70 - 3.10 10*3/mm3 Final    Monocytes, Absolute 05/13/2024 0.50  0.10 - 0.90 10*3/mm3 Final    Eosinophils, Absolute 05/13/2024 0.04  0.00 - 0.40 10*3/mm3 Final    Basophils, Absolute 05/13/2024 0.07  0.00 - 0.20 10*3/mm3 Final    Immature Grans, Absolute 05/13/2024 0.12 (H)  0.00 - 0.05 10*3/mm3 Final    nRBC 05/13/2024 0.0  0.0 - 0.2 /100 WBC Final   Infusion on 05/06/2024   Component Date Value Ref Range Status    Glucose 05/06/2024 154 (H)  65 - 99 mg/dL Final    BUN 05/06/2024 9  8 - 23 mg/dL Final    Creatinine 05/06/2024 0.67  0.57 - 1.00 mg/dL Final    Sodium 05/06/2024 142  136 - 145 mmol/L Final    Potassium 05/06/2024 4.0  3.5 - 5.2 mmol/L Final    Chloride 05/06/2024 105  98 - 107 mmol/L Final    CO2 05/06/2024 22.9  22.0 - 29.0 mmol/L Final    Calcium 05/06/2024 9.1  8.6 - 10.5 mg/dL Final    Total Protein 05/06/2024 6.8  6.0 - 8.5 g/dL Final    Albumin 05/06/2024 4.3  3.5 - 5.2 g/dL Final    ALT (SGPT) 05/06/2024 51 (H)  1 - 33 U/L Final    AST (SGOT) 05/06/2024 36 (H)  1 - 32 U/L Final    Alkaline Phosphatase 05/06/2024 87  39 - 117 U/L Final    Total Bilirubin 05/06/2024 0.4  0.0 - 1.2 mg/dL Final    Globulin 05/06/2024 2.5  gm/dL Final    A/G Ratio 05/06/2024 1.7  g/dL Final    BUN/Creatinine Ratio 05/06/2024 13.4  7.0 - 25.0 Final    Anion Gap 05/06/2024 14.1  5.0 - 15.0 mmol/L Final    eGFR 05/06/2024 94.7  >60.0 mL/min/1.73 Final    WBC 05/06/2024 5.94  3.40 - 10.80 10*3/mm3 Final    RBC 05/06/2024 4.01  3.77 - 5.28 10*6/mm3 Final     Hemoglobin 05/06/2024 12.3  12.0 - 15.9 g/dL Final    Hematocrit 05/06/2024 37.7  34.0 - 46.6 % Final    MCV 05/06/2024 94.0  79.0 - 97.0 fL Final    MCH 05/06/2024 30.7  26.6 - 33.0 pg Final    MCHC 05/06/2024 32.6  31.5 - 35.7 g/dL Final    RDW 05/06/2024 13.5  12.3 - 15.4 % Final    RDW-SD 05/06/2024 46.4  37.0 - 54.0 fl Final    MPV 05/06/2024 9.4  6.0 - 12.0 fL Final    Platelets 05/06/2024 285  140 - 450 10*3/mm3 Final    Neutrophil % 05/06/2024 53.3  42.7 - 76.0 % Final    Lymphocyte % 05/06/2024 32.3  19.6 - 45.3 % Final    Monocyte % 05/06/2024 11.8  5.0 - 12.0 % Final    Eosinophil % 05/06/2024 0.7  0.3 - 6.2 % Final    Basophil % 05/06/2024 1.2  0.0 - 1.5 % Final    Immature Grans % 05/06/2024 0.7 (H)  0.0 - 0.5 % Final    Neutrophils, Absolute 05/06/2024 3.17  1.70 - 7.00 10*3/mm3 Final    Lymphocytes, Absolute 05/06/2024 1.92  0.70 - 3.10 10*3/mm3 Final    Monocytes, Absolute 05/06/2024 0.70  0.10 - 0.90 10*3/mm3 Final    Eosinophils, Absolute 05/06/2024 0.04  0.00 - 0.40 10*3/mm3 Final    Basophils, Absolute 05/06/2024 0.07  0.00 - 0.20 10*3/mm3 Final    Immature Grans, Absolute 05/06/2024 0.04  0.00 - 0.05 10*3/mm3 Final    nRBC 05/06/2024 0.0  0.0 - 0.2 /100 WBC Final   Infusion on 04/29/2024   Component Date Value Ref Range Status    Glucose 04/29/2024 185 (H)  65 - 99 mg/dL Final    BUN 04/29/2024 12  8 - 23 mg/dL Final    Creatinine 04/29/2024 0.78  0.57 - 1.00 mg/dL Final    Sodium 04/29/2024 137  136 - 145 mmol/L Final    Potassium 04/29/2024 4.1  3.5 - 5.2 mmol/L Final    Chloride 04/29/2024 103  98 - 107 mmol/L Final    CO2 04/29/2024 25.5  22.0 - 29.0 mmol/L Final    Calcium 04/29/2024 9.4  8.6 - 10.5 mg/dL Final    Total Protein 04/29/2024 6.5  6.0 - 8.5 g/dL Final    Albumin 04/29/2024 4.0  3.5 - 5.2 g/dL Final    ALT (SGPT) 04/29/2024 44 (H)  1 - 33 U/L Final    AST (SGOT) 04/29/2024 28  1 - 32 U/L Final    Alkaline Phosphatase 04/29/2024 88  39 - 117 U/L Final    Total Bilirubin  04/29/2024 0.2  0.0 - 1.2 mg/dL Final    Globulin 04/29/2024 2.5  gm/dL Final    A/G Ratio 04/29/2024 1.6  g/dL Final    BUN/Creatinine Ratio 04/29/2024 15.4  7.0 - 25.0 Final    Anion Gap 04/29/2024 8.5  5.0 - 15.0 mmol/L Final    eGFR 04/29/2024 82.3  >60.0 mL/min/1.73 Final    WBC 04/29/2024 6.25  3.40 - 10.80 10*3/mm3 Final    RBC 04/29/2024 3.99  3.77 - 5.28 10*6/mm3 Final    Hemoglobin 04/29/2024 12.0  12.0 - 15.9 g/dL Final    Hematocrit 04/29/2024 37.4  34.0 - 46.6 % Final    MCV 04/29/2024 93.7  79.0 - 97.0 fL Final    MCH 04/29/2024 30.1  26.6 - 33.0 pg Final    MCHC 04/29/2024 32.1  31.5 - 35.7 g/dL Final    RDW 04/29/2024 13.5  12.3 - 15.4 % Final    RDW-SD 04/29/2024 45.8  37.0 - 54.0 fl Final    MPV 04/29/2024 10.0  6.0 - 12.0 fL Final    Platelets 04/29/2024 327  140 - 450 10*3/mm3 Final    Neutrophil % 04/29/2024 59.3  42.7 - 76.0 % Final    Lymphocyte % 04/29/2024 30.9  19.6 - 45.3 % Final    Monocyte % 04/29/2024 6.2  5.0 - 12.0 % Final    Eosinophil % 04/29/2024 0.8  0.3 - 6.2 % Final    Basophil % 04/29/2024 0.6  0.0 - 1.5 % Final    Immature Grans % 04/29/2024 2.2 (H)  0.0 - 0.5 % Final    Neutrophils, Absolute 04/29/2024 3.70  1.70 - 7.00 10*3/mm3 Final    Lymphocytes, Absolute 04/29/2024 1.93  0.70 - 3.10 10*3/mm3 Final    Monocytes, Absolute 04/29/2024 0.39  0.10 - 0.90 10*3/mm3 Final    Eosinophils, Absolute 04/29/2024 0.05  0.00 - 0.40 10*3/mm3 Final    Basophils, Absolute 04/29/2024 0.04  0.00 - 0.20 10*3/mm3 Final    Immature Grans, Absolute 04/29/2024 0.14 (H)  0.00 - 0.05 10*3/mm3 Final    nRBC 04/29/2024 0.0  0.0 - 0.2 /100 WBC Final    Color, UA 04/29/2024 Yellow  Yellow, Straw Final    Appearance, UA 04/29/2024 Clear  Clear Final    pH, UA 04/29/2024 5.5  4.5 - 8.0 Final    Specific Gravity, UA 04/29/2024 >=1.030  1.002 - 1.030 Final    Glucose, UA 04/29/2024 Negative  Negative Final    Ketones, UA 04/29/2024 Trace (A)  Negative Final    Bilirubin, UA 04/29/2024 Negative  Negative  Final    Blood, UA 04/29/2024 Negative  Negative Final    Protein, UA 04/29/2024 Trace (A)  Negative Final    Leuk Esterase, UA 04/29/2024 Negative  Negative Final    Nitrite, UA 04/29/2024 Negative  Negative Final    Urobilinogen, UA 04/29/2024 0.2 E.U./dL  0.2 - 1.0 E.U./dL Final   Infusion on 04/22/2024   Component Date Value Ref Range Status    Glucose 04/22/2024 130 (H)  65 - 99 mg/dL Final    BUN 04/22/2024 9  8 - 23 mg/dL Final    Creatinine 04/22/2024 0.79  0.57 - 1.00 mg/dL Final    Sodium 04/22/2024 143  136 - 145 mmol/L Final    Potassium 04/22/2024 3.6  3.5 - 5.2 mmol/L Final    Chloride 04/22/2024 105  98 - 107 mmol/L Final    CO2 04/22/2024 22.2  22.0 - 29.0 mmol/L Final    Calcium 04/22/2024 9.1  8.6 - 10.5 mg/dL Final    Total Protein 04/22/2024 6.6  6.0 - 8.5 g/dL Final    Albumin 04/22/2024 4.2  3.5 - 5.2 g/dL Final    ALT (SGPT) 04/22/2024 37 (H)  1 - 33 U/L Final    AST (SGOT) 04/22/2024 24  1 - 32 U/L Final    Alkaline Phosphatase 04/22/2024 93  39 - 117 U/L Final    Total Bilirubin 04/22/2024 0.2  0.0 - 1.2 mg/dL Final    Globulin 04/22/2024 2.4  gm/dL Final    A/G Ratio 04/22/2024 1.8  g/dL Final    BUN/Creatinine Ratio 04/22/2024 11.4  7.0 - 25.0 Final    Anion Gap 04/22/2024 15.8 (H)  5.0 - 15.0 mmol/L Final    eGFR 04/22/2024 81.1  >60.0 mL/min/1.73 Final    WBC 04/22/2024 6.80  3.40 - 10.80 10*3/mm3 Final    RBC 04/22/2024 3.97  3.77 - 5.28 10*6/mm3 Final    Hemoglobin 04/22/2024 12.0  12.0 - 15.9 g/dL Final    Hematocrit 04/22/2024 37.1  34.0 - 46.6 % Final    MCV 04/22/2024 93.5  79.0 - 97.0 fL Final    MCH 04/22/2024 30.2  26.6 - 33.0 pg Final    MCHC 04/22/2024 32.3  31.5 - 35.7 g/dL Final    RDW 04/22/2024 13.1  12.3 - 15.4 % Final    RDW-SD 04/22/2024 44.3  37.0 - 54.0 fl Final    MPV 04/22/2024 9.6  6.0 - 12.0 fL Final    Platelets 04/22/2024 318  140 - 450 10*3/mm3 Final    Neutrophil % 04/22/2024 54.7  42.7 - 76.0 % Final    Lymphocyte % 04/22/2024 36.2  19.6 - 45.3 % Final     Monocyte % 04/22/2024 6.0  5.0 - 12.0 % Final    Eosinophil % 04/22/2024 0.9  0.3 - 6.2 % Final    Basophil % 04/22/2024 0.9  0.0 - 1.5 % Final    Immature Grans % 04/22/2024 1.3 (H)  0.0 - 0.5 % Final    Neutrophils, Absolute 04/22/2024 3.72  1.70 - 7.00 10*3/mm3 Final    Lymphocytes, Absolute 04/22/2024 2.46  0.70 - 3.10 10*3/mm3 Final    Monocytes, Absolute 04/22/2024 0.41  0.10 - 0.90 10*3/mm3 Final    Eosinophils, Absolute 04/22/2024 0.06  0.00 - 0.40 10*3/mm3 Final    Basophils, Absolute 04/22/2024 0.06  0.00 - 0.20 10*3/mm3 Final    Immature Grans, Absolute 04/22/2024 0.09 (H)  0.00 - 0.05 10*3/mm3 Final    nRBC 04/22/2024 0.0  0.0 - 0.2 /100 WBC Final    Color, UA 04/22/2024 Yellow  Yellow, Straw Final    Appearance, UA 04/22/2024 Clear  Clear Final    pH, UA 04/22/2024 6.5  4.5 - 8.0 Final    Specific Gravity, UA 04/22/2024 1.020  1.002 - 1.030 Final    Glucose, UA 04/22/2024 Negative  Negative Final    Ketones, UA 04/22/2024 Negative  Negative Final    Bilirubin, UA 04/22/2024 Negative  Negative Final    Blood, UA 04/22/2024 Negative  Negative Final    Protein, UA 04/22/2024 30 mg/dL (1+) (A)  Negative Final    Leuk Esterase, UA 04/22/2024 Moderate (2+) (A)  Negative Final    Nitrite, UA 04/22/2024 Negative  Negative Final    Urobilinogen, UA 04/22/2024 1.0 E.U./dL  0.2 - 1.0 E.U./dL Final    RBC, UA 04/22/2024 0-2  None Seen, 0-2 /HPF Final    WBC, UA 04/22/2024 21-50 (A)  None Seen, 0-2 /HPF Final    Bacteria, UA 04/22/2024 2+ (A)  Negative /HPF Final    Squamous Epithelial Cells, UA 04/22/2024 7-12 (A)  0 - 3 /HPF Final    Hyaline Casts, UA 04/22/2024 None Seen  0 - 2 /LPF Final    Methodology 04/22/2024 Manual Light Microscopy   Final    Urine Culture 04/22/2024 >100,000 CFU/mL Escherichia coli (A)   Final   Infusion on 04/15/2024   Component Date Value Ref Range Status    Color, UA 04/15/2024 Straw  Yellow, Straw Final    Appearance, UA 04/15/2024 Clear  Clear Final    pH, UA 04/15/2024 6.0  4.5 - 8.0  Final    Specific Gravity, UA 04/15/2024 1.020  1.002 - 1.030 Final    Glucose, UA 04/15/2024 250 mg/dL (1+) (A)  Negative Final    Ketones, UA 04/15/2024 Negative  Negative Final    Bilirubin, UA 04/15/2024 Negative  Negative Final    Blood, UA 04/15/2024 Negative  Negative Final    Protein, UA 04/15/2024 Negative  Negative Final    Leuk Esterase, UA 04/15/2024 Negative  Negative Final    Nitrite, UA 04/15/2024 Negative  Negative Final    Urobilinogen, UA 04/15/2024 0.2 E.U./dL  0.2 - 1.0 E.U./dL Final    RBC, UA 04/15/2024 0-2  None Seen, 0-2 /HPF Final    WBC, UA 04/15/2024 0-2  None Seen, 0-2 /HPF Final    Bacteria, UA 04/15/2024 Negative  Negative /HPF Final    Squamous Epithelial Cells, UA 04/15/2024 0-3  0 - 3 /HPF Final    Methodology 04/15/2024 Manual Light Microscopy   Final   Infusion on 04/15/2024   Component Date Value Ref Range Status    Glucose 04/15/2024 116 (H)  65 - 99 mg/dL Final    BUN 04/15/2024 8  8 - 23 mg/dL Final    Creatinine 04/15/2024 0.66  0.57 - 1.00 mg/dL Final    Sodium 04/15/2024 142  136 - 145 mmol/L Final    Potassium 04/15/2024 4.0  3.5 - 5.2 mmol/L Final    Chloride 04/15/2024 105  98 - 107 mmol/L Final    CO2 04/15/2024 24.6  22.0 - 29.0 mmol/L Final    Calcium 04/15/2024 9.4  8.6 - 10.5 mg/dL Final    Total Protein 04/15/2024 6.9  6.0 - 8.5 g/dL Final    Albumin 04/15/2024 4.3  3.5 - 5.2 g/dL Final    ALT (SGPT) 04/15/2024 45 (H)  1 - 33 U/L Final    AST (SGOT) 04/15/2024 25  1 - 32 U/L Final    Alkaline Phosphatase 04/15/2024 98  39 - 117 U/L Final    Total Bilirubin 04/15/2024 0.3  0.0 - 1.2 mg/dL Final    Globulin 04/15/2024 2.6  gm/dL Final    A/G Ratio 04/15/2024 1.7  g/dL Final    BUN/Creatinine Ratio 04/15/2024 12.1  7.0 - 25.0 Final    Anion Gap 04/15/2024 12.4  5.0 - 15.0 mmol/L Final    eGFR 04/15/2024 95.1  >60.0 mL/min/1.73 Final    WBC 04/15/2024 9.05  3.40 - 10.80 10*3/mm3 Final    RBC 04/15/2024 4.11  3.77 - 5.28 10*6/mm3 Final    Hemoglobin 04/15/2024 12.4  12.0  - 15.9 g/dL Final    Hematocrit 04/15/2024 38.3  34.0 - 46.6 % Final    MCV 04/15/2024 93.2  79.0 - 97.0 fL Final    MCH 04/15/2024 30.2  26.6 - 33.0 pg Final    MCHC 04/15/2024 32.4  31.5 - 35.7 g/dL Final    RDW 04/15/2024 12.8  12.3 - 15.4 % Final    RDW-SD 04/15/2024 43.5  37.0 - 54.0 fl Final    MPV 04/15/2024 10.1  6.0 - 12.0 fL Final    Platelets 04/15/2024 306  140 - 450 10*3/mm3 Final    Neutrophil % 04/15/2024 61.5  42.7 - 76.0 % Final    Lymphocyte % 04/15/2024 31.3  19.6 - 45.3 % Final    Monocyte % 04/15/2024 5.0  5.0 - 12.0 % Final    Eosinophil % 04/15/2024 0.7  0.3 - 6.2 % Final    Basophil % 04/15/2024 0.6  0.0 - 1.5 % Final    Immature Grans % 04/15/2024 0.9 (H)  0.0 - 0.5 % Final    Neutrophils, Absolute 04/15/2024 5.58  1.70 - 7.00 10*3/mm3 Final    Lymphocytes, Absolute 04/15/2024 2.83  0.70 - 3.10 10*3/mm3 Final    Monocytes, Absolute 04/15/2024 0.45  0.10 - 0.90 10*3/mm3 Final    Eosinophils, Absolute 04/15/2024 0.06  0.00 - 0.40 10*3/mm3 Final    Basophils, Absolute 04/15/2024 0.05  0.00 - 0.20 10*3/mm3 Final    Immature Grans, Absolute 04/15/2024 0.08 (H)  0.00 - 0.05 10*3/mm3 Final    nRBC 04/15/2024 0.0  0.0 - 0.2 /100 WBC Final        No radiology results for the last 30 days.     5/6/2024 CBC reviewed and WBC 5.94, hemoglobin 12.3 and platelets 285,000  CMP reviewed and BUN 9, creatinine 0.67, ALT 51, AST 36      Assessment & Plan     *Right breast invasive ductal carcinoma  Clinical T2 N0 M0, stage IIa, clinical and prognostic ER/WY negative, HER2 3+ immunohistochemistry, Ki-67 70%  On the MRI the mass measures 2 cm however there is linear enhancement which in the AP dimension measures 4.7 cm.  Additional biopsies of this linear enhancement was performed and consistent with DCIS  It is hard to delineate the amount of invasive disease as opposed to DCIS.  More likely than not invasive component is likely limited to the mass which is about 2-1/2 cm on exam.  Since the tumor is greater  than 2 cm I think it is reasonable to proceed with neoadjuvant chemotherapy.  We discussed proceeding with weekly Taxol along with Herceptin and Perjeta every 3 weeks.  After 12 weeks she will require an MRI to assess the response.  If there is inadequate response then we may have to proceed with Adriamycin and Cytoxan for 4 cycles however if there is a good response then she can proceed with surgery with no additional chemotherapy.  The likely plan is that she will undergo bilateral mastectomy.  If that is the case as long as she does not have any positive margins or lymph node positive disease she may not need radiation.  We discussed continuing adjuvant HER2 directed therapy for whole year  3/18/2024-received first dose of Herceptin and Perjeta, had a severe hypersensitivity reaction to Taxol and hence Taxol was discontinued  3/25/2024-Labs reviewed and stable to proceed with chemotherapy  Patient is extremely anxious today after experiencing the severe hypersensitivity reaction last week.  Reassured patient that this is unlikely to happen with Abraxane and we will premedicate with extra Solu-Medrol..  4/1/2024 patient returns for cycle 1 day 15 Abraxane.,  Left chest, left shoulder at times.  This has come and gone over the past week and is not associated with any shortness of breath, numbness or tingling.  She states when her chest is hurting if she presses on the area it gets better.  She notices it at different points during the day.  It feels more like an ache and is not a sharp pain.  She states she has been sleeping in a different position due to the port which could be causing some muscular discomfort.  She has noticed the discomfort at times when she takes a deep breath however is able to get deep breath here in the office did not have any pain.  She is not having any pain today at all.  She has taken her hydrocodone maybe 3-4 times over the past week and has not needed it multiple times a day.  CMP  "resulted today with AST elevated at 47 and .  Bilirubin remains normal at 0.4 with alkaline phosphatase elevated to 160.  Reviewed with Dr. Loredo and will hold treatment today.  She will try to limit any acetaminophen containing products.  She did have a few days of diarrhea that she is unsure if it was related to the Abraxane versus a GI bug as her  had the same symptoms.  Neither had chills or fevers.  She is also having some dysuria today we have a urinalysis with culture pending.  4/8/2024: Due for cycle 2-day 1 Abraxane, Herceptin, Perjeta.  Did not receive cycle 1 day 15 Abraxane due to elevated LFTs as detailed above.  LFTs have significantly improved, AST 28, ALT 58.  Reviewed with Dr. Renteria will proceed with Abraxane only today, will reduce Abraxane by 20%.  4/15/2024-patient is scheduled for week 4 of chemotherapy today.  Labs reviewed and overall stable except for an ALT of 45.  She will proceed with planned chemotherapy.  4/22/2024: after last chemotherapy patient experienced 3 episodes of \"zapping\" in her fingertips and soles of feet that last a few seconds then resolved.  Has not experienced this sensation in at least 3 days.  No interference with daily activities.  Will continue Abraxane at previous 20% dose reduction.  She is doing cold therapy for her hands/feet.  Will need to monitor closely for neuropathy.  4/29/2024-chemotherapy held as patient reported worsening neuropathy and dropping things.  5/6/2024-presents today for evaluation of the neuropathy and to determine if she can resume chemotherapy.  She reports that the neuropathy has improved some.  She is mainly perceiving pain in her fingers and tingling and numbness.   She however tells me that she has not been dropping things anymore.  She is able to do other fine motor activities just fine.  5/6/2024-patient did receive Abraxane with a 20% dose reduction.  5/13/2024-presents today for the neck cycle of Abraxane and since her " last treatment she has not had any worsening of neuropathy.  We also started her on gabapentin 300 mg nightly.  Neuropathy overall stable.  Labs reviewed and she does have elevated LFTs however no further dose reduction needed.  Will proceed with planned chemotherapy today.  5/11/2024-MRI of the breast shows near complete resolution of the non-mass enhancement and areas of malignancy in both right and left breast.  This is reassuring and if patient has any further worsening of neuropathy then we could discontinue Abraxane and she will proceed with surgery and continue HER2 directed therapy.    *Left breast ductal carcinoma in situ  Intermed  She has been having some discomfort in her left shoulder bladeiate grade with apocrine features  ER/OR strongly positive  Plan is for her to undergo bilateral mastectomy.  If she does undergo bilateral mastectomy then that would be curative and she would not require any endocrine therapy subsequently unless there is any upstaging of the malignancy  5/11/2024-MRI of the breast with near complete resolution of the non-mass enhancement.    *Severe diarrhea  Started last night  Patient has not used any Imodium  We will administer 1 L of normal saline today  Imodium will be given here today  She will start entegrade  Patient has not been using any Imodium.  Encouraged her to use Imodium up to 8 tablets in a day.  Denies any diarrhea at this time    *GERD  Protonix with improvement  Continue the same    *Cardiac health  Referral to cardio oncology placed  3/15/2024 echocardiogram with an ejection fraction of 75.5%, LV strain is normal at -24.2%  She has met with Dr. Peguero , started on bisoprolol   Continue follow-up with cardio oncology    *Elevated LFTs  4/1/2024 AST at 47, from 20.  ALT at 141, from 22.  Alkaline phosphatase at 160, from 100.  Total bilirubin remains normal.  Acute hepatitis panel negative.  Patient asked to avoid Tylenol and limit her hydrocodone usage as this does  have Tylenol in it unless needed.  Also advised to avoid alcohol which she states she does not drink.  4/8/2024: AST 28, ALT 58, alk phos 102.  Total bilirubin 0.3.  Likely secondary to Abraxane.   4/15/2024-ALT 45, AST 25, alkaline phosphatase 98  AST 24, ALT 37, alkaline phosphatase 93  5/13/2024 LFTs higher with an ALT of 98 and AST 67, alkaline phosphatase 90  No further reduction of Abraxane needed at this time.  Will proceed with chemotherapy as planned    *Hypokalemia secondary to diarrhea likely  4/1/2024 potassium 3.2.  Potassium chloride 20 mEq 1 p.o. daily sent to pharmacy x 5 days.  Will recheck next week.  4/8/2024: Potassium today 4.1, continue oral potassium.  Potassium normal today    *Alopecia-secondary to chemotherapy.  Continue follow-up with supportive oncology    *Dysuria-UA with 2+ bacteria.  Start bactrim twice daily x 3 days.    Repeat UA negative      *Neuropathy  Held treatment  due to neuropathy  5/6/2024-resumed chemotherapy  She was started on gabapentin 300 mg nightly and also vitamin B12.  Reports stable neuropathy  Proceed with planned chemotherapy today    *Chapped lips   Prescribed triamcinolone to help with healing    PLAN:   Proceed with Abraxane today, 20% dose reduction.  No further dose reduction needed for elevated LFTs.  Continue cold therapy to the hands/feet.  Will need to monitor closely for neuropathy.  Continue gabapentin and B12  MRI shows near complete resolution of the abnormalities and if patient experiences any worsening neuropathy then low threshold to stop chemotherapy and proceed with surgery  APRN in 2 weeks and MD in 4 weeks    The patient is on high risk medication that requires close monitoring for toxicity.  Patient is experiencing severe side effects from chemotherapy in terms of abnormal LFTs, neuropathy,chapped lips.   Abraxane dose has been reduced by 20%.

## 2024-05-13 NOTE — NURSING NOTE
Lab Results   Component Value Date    GLUCOSE 210 (H) 05/13/2024    BUN 18 05/13/2024    CREATININE 0.72 05/13/2024    EGFRRESULT 98.1 09/14/2022    EGFR 90.6 05/13/2024    BCR 25.0 05/13/2024    K 4.0 05/13/2024    CO2 22.6 05/13/2024    CALCIUM 9.5 05/13/2024    PROTENTOTREF 6.9 09/14/2022    ALBUMIN 4.6 05/13/2024    BILITOT 0.2 05/13/2024    AST 67 (H) 05/13/2024    ALT 98 (C) 05/13/2024    Discussed labs with Dr. Renteria. Per MD, okay to treat pt with 80% original dose of Abraxane today.

## 2024-05-20 ENCOUNTER — TELEPHONE (OUTPATIENT)
Dept: ONCOLOGY | Facility: CLINIC | Age: 69
End: 2024-05-20
Payer: COMMERCIAL

## 2024-05-20 ENCOUNTER — INFUSION (OUTPATIENT)
Dept: ONCOLOGY | Facility: HOSPITAL | Age: 69
End: 2024-05-20
Payer: COMMERCIAL

## 2024-05-20 VITALS
HEART RATE: 92 BPM | DIASTOLIC BLOOD PRESSURE: 74 MMHG | SYSTOLIC BLOOD PRESSURE: 152 MMHG | TEMPERATURE: 98 F | RESPIRATION RATE: 16 BRPM | BODY MASS INDEX: 25.02 KG/M2 | OXYGEN SATURATION: 97 % | WEIGHT: 119.2 LBS

## 2024-05-20 DIAGNOSIS — Z45.2 ENCOUNTER FOR FITTING AND ADJUSTMENT OF VASCULAR CATHETER: ICD-10-CM

## 2024-05-20 DIAGNOSIS — Z17.0 MALIGNANT NEOPLASM OF LOWER-OUTER QUADRANT OF RIGHT BREAST OF FEMALE, ESTROGEN RECEPTOR POSITIVE: Primary | ICD-10-CM

## 2024-05-20 DIAGNOSIS — C50.511 MALIGNANT NEOPLASM OF LOWER-OUTER QUADRANT OF RIGHT BREAST OF FEMALE, ESTROGEN RECEPTOR POSITIVE: Primary | ICD-10-CM

## 2024-05-20 LAB
ALBUMIN SERPL-MCNC: 4.2 G/DL (ref 3.5–5.2)
ALBUMIN/GLOB SERPL: 1.8 G/DL
ALP SERPL-CCNC: 86 U/L (ref 39–117)
ALT SERPL W P-5'-P-CCNC: 59 U/L (ref 1–33)
ANION GAP SERPL CALCULATED.3IONS-SCNC: 11 MMOL/L (ref 5–15)
AST SERPL-CCNC: 32 U/L (ref 1–32)
BASOPHILS # BLD AUTO: 0.07 10*3/MM3 (ref 0–0.2)
BASOPHILS NFR BLD AUTO: 1.1 % (ref 0–1.5)
BILIRUB SERPL-MCNC: 0.2 MG/DL (ref 0–1.2)
BUN SERPL-MCNC: 13 MG/DL (ref 8–23)
BUN/CREAT SERPL: 20.6 (ref 7–25)
CALCIUM SPEC-SCNC: 9.5 MG/DL (ref 8.6–10.5)
CHLORIDE SERPL-SCNC: 102 MMOL/L (ref 98–107)
CO2 SERPL-SCNC: 24 MMOL/L (ref 22–29)
CREAT SERPL-MCNC: 0.63 MG/DL (ref 0.57–1)
DEPRECATED RDW RBC AUTO: 46.6 FL (ref 37–54)
EGFRCR SERPLBLD CKD-EPI 2021: 96.2 ML/MIN/1.73
EOSINOPHIL # BLD AUTO: 0.07 10*3/MM3 (ref 0–0.4)
EOSINOPHIL NFR BLD AUTO: 1.1 % (ref 0.3–6.2)
ERYTHROCYTE [DISTWIDTH] IN BLOOD BY AUTOMATED COUNT: 13.7 % (ref 12.3–15.4)
GLOBULIN UR ELPH-MCNC: 2.4 GM/DL
GLUCOSE SERPL-MCNC: 146 MG/DL (ref 65–99)
HCT VFR BLD AUTO: 35.8 % (ref 34–46.6)
HGB BLD-MCNC: 11.3 G/DL (ref 12–15.9)
IMM GRANULOCYTES # BLD AUTO: 0.1 10*3/MM3 (ref 0–0.05)
IMM GRANULOCYTES NFR BLD AUTO: 1.6 % (ref 0–0.5)
LYMPHOCYTES # BLD AUTO: 2.36 10*3/MM3 (ref 0.7–3.1)
LYMPHOCYTES NFR BLD AUTO: 36.9 % (ref 19.6–45.3)
MCH RBC QN AUTO: 29.9 PG (ref 26.6–33)
MCHC RBC AUTO-ENTMCNC: 31.6 G/DL (ref 31.5–35.7)
MCV RBC AUTO: 94.7 FL (ref 79–97)
MONOCYTES # BLD AUTO: 0.39 10*3/MM3 (ref 0.1–0.9)
MONOCYTES NFR BLD AUTO: 6.1 % (ref 5–12)
NEUTROPHILS NFR BLD AUTO: 3.41 10*3/MM3 (ref 1.7–7)
NEUTROPHILS NFR BLD AUTO: 53.2 % (ref 42.7–76)
NRBC BLD AUTO-RTO: 0 /100 WBC (ref 0–0.2)
PLATELET # BLD AUTO: 321 10*3/MM3 (ref 140–450)
PMV BLD AUTO: 10.1 FL (ref 6–12)
POTASSIUM SERPL-SCNC: 3.8 MMOL/L (ref 3.5–5.2)
PROT SERPL-MCNC: 6.6 G/DL (ref 6–8.5)
RBC # BLD AUTO: 3.78 10*6/MM3 (ref 3.77–5.28)
SODIUM SERPL-SCNC: 137 MMOL/L (ref 136–145)
WBC NRBC COR # BLD AUTO: 6.4 10*3/MM3 (ref 3.4–10.8)

## 2024-05-20 PROCEDURE — 25010000002 HEPARIN LOCK FLUSH PER 10 UNITS: Performed by: INTERNAL MEDICINE

## 2024-05-20 PROCEDURE — 96417 CHEMO IV INFUS EACH ADDL SEQ: CPT

## 2024-05-20 PROCEDURE — 25010000002 DIPHENHYDRAMINE PER 50 MG: Performed by: NURSE PRACTITIONER

## 2024-05-20 PROCEDURE — 25010000002 PACLITAXEL PROTEIN-BOUND PART PER 1 MG: Performed by: NURSE PRACTITIONER

## 2024-05-20 PROCEDURE — 25010000002 TRASTUZUMAB-DTTB 420 MG RECONSTITUTED SOLUTION 1 EACH VIAL: Performed by: NURSE PRACTITIONER

## 2024-05-20 PROCEDURE — 25810000003 SODIUM CHLORIDE 0.9 % SOLUTION: Performed by: NURSE PRACTITIONER

## 2024-05-20 PROCEDURE — 96375 TX/PRO/DX INJ NEW DRUG ADDON: CPT

## 2024-05-20 PROCEDURE — 80053 COMPREHEN METABOLIC PANEL: CPT

## 2024-05-20 PROCEDURE — 96413 CHEMO IV INFUSION 1 HR: CPT

## 2024-05-20 PROCEDURE — 25810000003 SODIUM CHLORIDE 0.9 % SOLUTION 250 ML FLEX CONT: Performed by: NURSE PRACTITIONER

## 2024-05-20 PROCEDURE — 25010000002 PERTUZUMAB 420 MG/14ML SOLUTION 420 MG VIAL: Performed by: NURSE PRACTITIONER

## 2024-05-20 PROCEDURE — 85025 COMPLETE CBC W/AUTO DIFF WBC: CPT

## 2024-05-20 PROCEDURE — 25010000002 DEXAMETHASONE SODIUM PHOSPHATE 100 MG/10ML SOLUTION: Performed by: NURSE PRACTITIONER

## 2024-05-20 PROCEDURE — 25010000002 HYDROCORTISONE SOD SUC (PF) 100 MG RECONSTITUTED SOLUTION: Performed by: NURSE PRACTITIONER

## 2024-05-20 RX ORDER — HEPARIN SODIUM (PORCINE) LOCK FLUSH IV SOLN 100 UNIT/ML 100 UNIT/ML
500 SOLUTION INTRAVENOUS AS NEEDED
Status: DISCONTINUED | OUTPATIENT
Start: 2024-05-20 | End: 2024-05-20 | Stop reason: HOSPADM

## 2024-05-20 RX ORDER — HEPARIN SODIUM (PORCINE) LOCK FLUSH IV SOLN 100 UNIT/ML 100 UNIT/ML
500 SOLUTION INTRAVENOUS AS NEEDED
OUTPATIENT
Start: 2024-05-20

## 2024-05-20 RX ORDER — SODIUM CHLORIDE 0.9 % (FLUSH) 0.9 %
10 SYRINGE (ML) INJECTION AS NEEDED
Status: DISCONTINUED | OUTPATIENT
Start: 2024-05-20 | End: 2024-05-20 | Stop reason: HOSPADM

## 2024-05-20 RX ORDER — PACLITAXEL 100 MG/20ML
80 INJECTION, POWDER, LYOPHILIZED, FOR SUSPENSION INTRAVENOUS ONCE
Status: COMPLETED | OUTPATIENT
Start: 2024-05-20 | End: 2024-05-20

## 2024-05-20 RX ORDER — PACLITAXEL 100 MG/20ML
80 INJECTION, POWDER, LYOPHILIZED, FOR SUSPENSION INTRAVENOUS ONCE
Status: CANCELLED | OUTPATIENT
Start: 2024-05-20

## 2024-05-20 RX ORDER — FAMOTIDINE 10 MG/ML
20 INJECTION, SOLUTION INTRAVENOUS AS NEEDED
Status: CANCELLED | OUTPATIENT
Start: 2024-05-20

## 2024-05-20 RX ORDER — SODIUM CHLORIDE 0.9 % (FLUSH) 0.9 %
10 SYRINGE (ML) INJECTION AS NEEDED
OUTPATIENT
Start: 2024-05-20

## 2024-05-20 RX ORDER — SODIUM CHLORIDE 9 MG/ML
20 INJECTION, SOLUTION INTRAVENOUS ONCE
Status: COMPLETED | OUTPATIENT
Start: 2024-05-20 | End: 2024-05-20

## 2024-05-20 RX ORDER — FAMOTIDINE 10 MG/ML
20 INJECTION, SOLUTION INTRAVENOUS ONCE
Status: COMPLETED | OUTPATIENT
Start: 2024-05-20 | End: 2024-05-20

## 2024-05-20 RX ORDER — MONTELUKAST SODIUM 10 MG/1
10 TABLET ORAL ONCE
Status: COMPLETED | OUTPATIENT
Start: 2024-05-20 | End: 2024-05-20

## 2024-05-20 RX ORDER — DIPHENHYDRAMINE HYDROCHLORIDE 50 MG/ML
50 INJECTION INTRAMUSCULAR; INTRAVENOUS AS NEEDED
Status: CANCELLED | OUTPATIENT
Start: 2024-05-20

## 2024-05-20 RX ADMIN — ONTRUZANT 330 MG: KIT INTRAVENOUS at 14:44

## 2024-05-20 RX ADMIN — FAMOTIDINE 20 MG: 10 INJECTION INTRAVENOUS at 13:13

## 2024-05-20 RX ADMIN — DEXAMETHASONE SODIUM PHOSPHATE 12 MG: 100 INJECTION INTRAMUSCULAR; INTRAVENOUS at 14:26

## 2024-05-20 RX ADMIN — Medication 500 UNITS: at 15:54

## 2024-05-20 RX ADMIN — Medication 10 ML: at 15:54

## 2024-05-20 RX ADMIN — HYDROCORTISONE SODIUM SUCCINATE 100 MG: 100 INJECTION, POWDER, FOR SOLUTION INTRAMUSCULAR; INTRAVENOUS at 14:25

## 2024-05-20 RX ADMIN — MONTELUKAST 10 MG: 10 TABLET, FILM COATED ORAL at 13:13

## 2024-05-20 RX ADMIN — PACLITAXEL 120 MG: 100 INJECTION, POWDER, LYOPHILIZED, FOR SUSPENSION INTRAVENOUS at 15:24

## 2024-05-20 RX ADMIN — PERTUZUMAB 420 MG: 30 INJECTION, SOLUTION, CONCENTRATE INTRAVENOUS at 13:45

## 2024-05-20 RX ADMIN — SODIUM CHLORIDE 20 ML/HR: 9 INJECTION, SOLUTION INTRAVENOUS at 13:12

## 2024-05-20 RX ADMIN — DIPHENHYDRAMINE HYDROCHLORIDE 25 MG: 50 INJECTION, SOLUTION INTRAMUSCULAR; INTRAVENOUS at 13:16

## 2024-05-20 NOTE — TELEPHONE ENCOUNTER
Caller: Felicia Boyle    Relationship to patient: Self    Best call back number: [phone]    Chief complaint: PT NEEDS TO RESCHEDULE     Type of visit: PORT FLUSH, FOLLOW UP AND INFUSION     Requested date: [facility]  OFFICE    If rescheduling, when is the original appointment: 5-28-24

## 2024-05-21 ENCOUNTER — SOCIAL WORK (OUTPATIENT)
Dept: PSYCHIATRY | Facility: HOSPITAL | Age: 69
End: 2024-05-21
Payer: COMMERCIAL

## 2024-05-21 NOTE — PROGRESS NOTES
Oncology Social Work  [facility]       Chief Complaint: fear, adjustment to dx and treatment, anxiety      Subjective  Patient ID: Felicia Boyle is a 68 y.o. female who presents to the [facility] ([facility]) clinic for initial consultation at the request of Dr Renteria for supportive therapy.  Chart reviewed.  Patient diagnosed with Right breast invasive ductal carcinoma.  Clinical T2 N0 M0, stage IIa, clinical and prognostic ER/WY negative, HER2 3+ immunohistochemistry, Ki-67 70%. Patient is currently undergoing chemotherapy . Patient reports cold, tingling hands/ fingertips.      Objective   Mental Status Exam  Appearance:  clean and casually dressed, appropriate  Attitude toward clinician:  cooperative and agreeable ; a little evasive   Speech:               Rate:  slow               Volume:  soft   Motor:  no abnormal movements present  Mood:  sad but improved from prior visit  Affect:  dysphoric/ euthymic   Thought Processes:  linear, logical, and goal directed  Thought Content:  normal  Suicidal Thoughts:  absent  Homicidal Thoughts:  absent  Perceptual Disturbance: no perceptual disturbance  Attention and Concentration:  fair  Insight and Judgement:  fair  Memory:  memory appears to be intact     Physical Exam  Station and gait observed to be WNL- patient is active and independent with ADL's. .     Assessment: Adjustment disorder with mixed anxiety and depressed mood     Plan of Care     OSW will see patient to work through feelings and emotions, assist with adjusting and adapting, decrease anxiety, improve mood and coping.  Next Appt:  6/18/2024 at 10AM     MARISOL Vickers, SERGIO

## 2024-05-25 NOTE — PROGRESS NOTES
Subjective   Felicia Boyle is a 68 y.o. female.  Referred by Dr. Manrique for right breast invasive ductal carcinoma, HER2 positive, left breast ductal carcinoma in situ    History of Present Illness     Ms. Boyle is a 68-year-old postmenopausal [place] lady who is fairly healthy without any significant comorbidities palpated of right breast mass in the latter part of  which was further worked up with diagnostic mammogram and ultrasound and subsequently a biopsy which confirmed invasive ductal carcinoma which was ER/MS negative and HER2 positive.    Family history significant for her 2 sisters with breast cancer at the age of 38 and 48 respectively.  Her older sister  of metastatic breast cancer but her younger sister is doing well.  Patient underwent genetic testing in 2017 which was negative due to her family history.    She had regular screening mammograms up until 2019 but subsequently stopped having annual gynecological visits and therefore did not have any further screening mammograms up until the most recent diagnostic mammogram for the new palpable abnormality.    2024-bilateral diagnostic mammogram and right breast ultrasound  The breast heterogeneously dense.  Left breast with a scar marker in the lower left breast dating back to .  No new findings in the left breast.    Right breast at 8:00, 8 cm from the nipple there is a 1.7 x 1.4 x 1.6 cm oval high density mass with partially circumscribed and partially indistinct margins.    On the ultrasound the mass measures 1.7 x 1 x 1.3 cm.    No abnormal right axilla lymphadenopathy.    2024-right breast ultrasound-guided biopsy  Pathology consistent with invasive ductal carcinoma  Grade 3  9.3 mm of invasive disease on the core biopsy  ER negative  MS negative  HER2 3+ on immunohistochemistry, 95%  Ki-67 70%    2024-bilateral breast MRI  In the right breast at 8:00, 8 cm from the nipple there is a irregular enhancing mass which  measures 1.8 x 2 x 1.7 cm.  This corresponds to the biopsy-proven malignancy.  Extending anteriorly away from the MT margin of the mass there is an irregular linear enhancement which measures 3.5 x 1 x 0.7 cm.  The whole area including the mass measures 4.7 cm in AP dimension, 1.7 in mediolateral and 2.4 cm in the superior-inferior dimension.    At 12:00, 3 cm from the nipple there is an irregular non-mass enhancement which measures 1 x 1.1 x 1.2 cm.  No mammographic correlate is appreciated.    No other suspicious areas of enhancement in the right breast or right axilla or internal mammary chain.    In the left breast, middle third at 11:30 position, 3.7 cm from the nipple there is an irregular area of non-mass enhancement which measures 1.3 x 1.1 x 1 cm.  There is suspected architectural distortion.  No other areas of abnormal enhancement seen in the left breast of the left axilla or the internal mammary chain.    2/26/2024-additional MRI guided biopsies    1.left breast 12:00-intermediate grade ductal carcinoma in situ with apocrine features  ER +91 to 100% strong  MT +81 to 90% strong  Ki-67 15%    2. Right breast 12 o'clock position MRI guided biopsy of the non-mass enhancement-sclerosing adenosis with associated calcifications  No atypical hyperplasia in situ or invasive carcinoma    3.right breast 8:00 MRI guided biopsies of the linear enhancement is consistent with high-grade ductal carcinoma in situ  DCIS involves multiple tissue cores measuring up to 3 mm.  Sclerosing adenosis and usual ductal hyperplasia with associated microcalcifications.    The case was discussed in tumor board and Dr. Patel thought that they could be more invasive disease in the 4.7 cm of linear enhancement noted in the right breast at the site of the mass.  Therefore it was decided to proceed with neoadjuvant chemotherapy.    Patient was a former smoker and smoked for about 40 years, half a pack per week, quit about 1 month ago.   Denies any alcohol use.    Week 1 Taxol Herceptin and Perjeta was planned for 3/18/2024.  Patient received Herceptin and Perjeta and had some chills for which she received Solu-Medrol and responded well to that.  However after infusing only 1 cc of Taxol patient experienced a severe anaphylactic reaction due to which the infusion was stopped permanently and she received an additional dose of Solu-Medrol and Benadryl.  Symptoms resolved subsequently and patient was discharged home safely.    Due to this recent treatment has been changed to Abraxane along with Herceptin and Perjeta.  3/25/2024-patient is due for her first treatment with Abraxane.    She is extremely anxious given the severe hypersensitivity reaction that she experienced with Taxol.  She is also complaining of increased belching and heartburn since her last chemotherapy.  She is complaining of severe diarrhea with started last night.  Had multiple loose stools.    5/11/2024-MRI of the breast-images independently reviewed and interpreted by me.  Interval near complete resolution of all suspicious enhancement in the posterior one third lower outer quadrant of the right breast at 8:00 corresponding to the biopsy-proven site of malignancy.  Who has been on masslike area of enhancement measuring 1.5 x 0.9 x 0.9 cm compared to 2 x 1.8 x 1.7 cm.  Mildly abnormal enhancement extending anteriorly away from the mass has resolved.  No other suspicious areas of enhancement noted in the right breast.  Left breast at 12:00 interval resolution of all suspicious enhancement.    INTERVAL HISTORY  Felicia returns today for follow-up and evaluation prior to cycle 4-day 8 Abraxane with continued 20% dose reduction.  She is seen today with her  present.  She continues on gabapentin for her neuropathy, feels overall her neuropathy has remained stable since her last chemotherapy.  She continues with tenderness of the scalp.  Appetite is adequate.  Bowels moving  "regularly.  She denies fever, chills, nausea, vomiting.  Denies new or worsening pain.    The following portions of the patient's history were reviewed and updated as appropriate: allergies, current medications, past family history, past medical history, past social history, past surgical history, and problem list.    Past Medical History:   Diagnosis Date    Anxiety about health     Breast cancer of lower-outer quadrant of right female breast 02/14/2024    DCIS (ductal carcinoma in situ) of breast 03/15/2024    Diverticulosis     Mixed hyperlipidemia 05/19/2019    DIET CONTROLLED    Osteoporosis     Type 2 diabetes mellitus         Past Surgical History:   Procedure Laterality Date    BREAST BIOPSY Left     Excisional biopsy in the [facility]    BREAST BIOPSY  03/2024    CATARACT EXTRACTION W/ INTRAOCULAR LENS IMPLANT Bilateral     CHOLECYSTECTOMY  2012    COLON RESECTION Right 2002    COLONOSCOPY N/A 04/28/2021    Procedure: COLONOSCOPY TO ANASTAMOSIS/TI;  Surgeon: Angelo Gonsalez MD;  Location: [facility] ENDOSCOPY;  Service: Gastroenterology;  Laterality: N/A;  PRE: SCREENING  POST: NORMAL POST-OP ANATOMY    ENDOSCOPY      2013    ENDOSCOPY N/A 03/10/2017    Procedure: ESOPHAGOGASTRODUODENOSCOPY WITH BIOPSY AND PETER DILATATION 54\";  Surgeon: Angelo Gonsalez MD;  Location: [facility] ENDOSCOPY;  Service:     ENDOSCOPY N/A 04/28/2021    Procedure: ESOPHAGOGASTRODUODENOSCOPY WITH BIOPSIES, 54FR PETER DILATATION;  Surgeon: Angelo Gonsalez MD;  Location: [facility] ENDOSCOPY;  Service: Gastroenterology;  Laterality: N/A;  PRE: DYSPHAGIA  POST: GASTRITIS, ESOPHAGEAL RING    VENOUS ACCESS DEVICE (PORT) INSERTION Left 3/14/2024    Procedure: INSERTION OF PORTACATH;  Surgeon: Abeba Manrique MD;  Location: [facility] OR;  Service: General;  Laterality: Left;        Family History   Problem Relation Age of Onset    Hypertension Mother     Diabetes Mother     Hypertension Father     Breast cancer Sister 48    Breast " "cancer Sister 38    No Known Problems Brother     No Known Problems Daughter     No Known Problems Son     No Known Problems Maternal Grandmother     No Known Problems Paternal Grandmother     No Known Problems Maternal Grandfather     No Known Problems Paternal Grandfather     Autism Grandson     Colon cancer Neg Hx     Rectal cancer Neg Hx     Endometrial cancer Neg Hx     Vaginal cancer Neg Hx     Cervical cancer Neg Hx     Ovarian cancer Neg Hx     Prostate cancer Neg Hx     Uterine cancer Neg Hx     Malig Hyperthermia Neg Hx         Social History     Socioeconomic History    Marital status:      Spouse name: Ryan   Tobacco Use    Smoking status: Former     Current packs/day: 0.00     Average packs/day: 0.3 packs/day for 15.0 years (3.8 ttl pk-yrs)     Types: Cigarettes     Quit date: 1/15/2024     Years since quittin.3     Passive exposure: Never    Smokeless tobacco: Never    Tobacco comments:     N/A   Vaping Use    Vaping status: Never Used   Substance and Sexual Activity    Alcohol use: Yes     Comment: RARE    Drug use: No    Sexual activity: Not Currently     Partners: Male     Birth control/protection: Tubal ligation        OB History          4    Para   3    Term   3       0    AB   1    Living   3         SAB   1    IAB   0    Ectopic   0    Molar   0    Multiple   0    Live Births   3               Age at menarche-16  Age at first live childbirth-18   4 para 3  1  Age at menopause-49  No use of hormone replacement therapy  No use of oral conceptive pills  Breast-feeding for 3 months    Allergies   Allergen Reactions    Metronidazole Nausea And Vomiting      Review of systems as mentioned HPI otherwise negative    Objective   Blood pressure 144/82, pulse 82, temperature 98.4 °F (36.9 °C), temperature source Oral, resp. rate 16, height 147 cm (57.87\"), weight 53.5 kg (118 lb), SpO2 93%, not currently breastfeeding.     Physical Exam  Vitals reviewed. "   Constitutional:       Appearance: Normal appearance. She is normal weight.   HENT:      Nose: Nose normal.      Mouth/Throat:      Mouth: Mucous membranes are moist.      Pharynx: Oropharynx is clear.   Eyes:      Conjunctiva/sclera: Conjunctivae normal.      Pupils: Pupils are equal, round, and reactive to light.   Cardiovascular:      Rate and Rhythm: Normal rate.   Pulmonary:      Effort: Pulmonary effort is normal.      Breath sounds: Normal breath sounds.   Abdominal:      General: Abdomen is flat. Bowel sounds are normal.   Musculoskeletal:         General: Normal range of motion.      Cervical back: Normal range of motion.   Skin:     General: Skin is warm and dry.      Findings: No rash.   Neurological:      General: No focal deficit present.      Mental Status: She is alert and oriented to person, place, and time.   Psychiatric:         Mood and Affect: Mood normal.         Behavior: Behavior normal.         Thought Content: Thought content normal.         Judgment: Judgment normal.       Breast Exam: Right breast on inspection 9 o'clock position with biopsy site changes.  The right breast mass is no longer palpable.  No right answer lymphadenopathy.    Left breast :There is a palpable underlying hematoma which is tender to palpation from MRI guided biopsies, improved. No left axillary lymphadenopathy.     I have reexamined the patient and the results are consistent with the previously documented exam. Latha Galloway, DAY      Infusion on 05/28/2024   Component Date Value Ref Range Status    Glucose 05/28/2024 130 (H)  65 - 99 mg/dL Final    BUN 05/28/2024 15  8 - 23 mg/dL Final    Creatinine 05/28/2024 0.69  0.57 - 1.00 mg/dL Final    Sodium 05/28/2024 141  136 - 145 mmol/L Final    Potassium 05/28/2024 4.1  3.5 - 5.2 mmol/L Final    Chloride 05/28/2024 104  98 - 107 mmol/L Final    CO2 05/28/2024 22.0  22.0 - 29.0 mmol/L Final    Calcium 05/28/2024 9.3  8.6 - 10.5 mg/dL Final    Total Protein  05/28/2024 7.0  6.0 - 8.5 g/dL Final    Albumin 05/28/2024 4.3  3.5 - 5.2 g/dL Final    ALT (SGPT) 05/28/2024 43 (H)  1 - 33 U/L Final    AST (SGOT) 05/28/2024 30  1 - 32 U/L Final    Alkaline Phosphatase 05/28/2024 89  39 - 117 U/L Final    Total Bilirubin 05/28/2024 0.2  0.0 - 1.2 mg/dL Final    Globulin 05/28/2024 2.7  gm/dL Final    A/G Ratio 05/28/2024 1.6  g/dL Final    BUN/Creatinine Ratio 05/28/2024 21.7  7.0 - 25.0 Final    Anion Gap 05/28/2024 15.0  5.0 - 15.0 mmol/L Final    eGFR 05/28/2024 94.1  >60.0 mL/min/1.73 Final    WBC 05/28/2024 7.68  3.40 - 10.80 10*3/mm3 Final    RBC 05/28/2024 3.83  3.77 - 5.28 10*6/mm3 Final    Hemoglobin 05/28/2024 11.6 (L)  12.0 - 15.9 g/dL Final    Hematocrit 05/28/2024 36.0  34.0 - 46.6 % Final    MCV 05/28/2024 94.0  79.0 - 97.0 fL Final    MCH 05/28/2024 30.3  26.6 - 33.0 pg Final    MCHC 05/28/2024 32.2  31.5 - 35.7 g/dL Final    RDW 05/28/2024 14.2  12.3 - 15.4 % Final    RDW-SD 05/28/2024 47.8  37.0 - 54.0 fl Final    MPV 05/28/2024 9.9  6.0 - 12.0 fL Final    Platelets 05/28/2024 361  140 - 450 10*3/mm3 Final    Neutrophil % 05/28/2024 49.1  42.7 - 76.0 % Final    Lymphocyte % 05/28/2024 40.5  19.6 - 45.3 % Final    Monocyte % 05/28/2024 6.8  5.0 - 12.0 % Final    Eosinophil % 05/28/2024 0.9  0.3 - 6.2 % Final    Basophil % 05/28/2024 0.7  0.0 - 1.5 % Final    Immature Grans % 05/28/2024 2.0 (H)  0.0 - 0.5 % Final    Neutrophils, Absolute 05/28/2024 3.78  1.70 - 7.00 10*3/mm3 Final    Lymphocytes, Absolute 05/28/2024 3.11 (H)  0.70 - 3.10 10*3/mm3 Final    Monocytes, Absolute 05/28/2024 0.52  0.10 - 0.90 10*3/mm3 Final    Eosinophils, Absolute 05/28/2024 0.07  0.00 - 0.40 10*3/mm3 Final    Basophils, Absolute 05/28/2024 0.05  0.00 - 0.20 10*3/mm3 Final    Immature Grans, Absolute 05/28/2024 0.15 (H)  0.00 - 0.05 10*3/mm3 Final    nRBC 05/28/2024 0.0  0.0 - 0.2 /100 WBC Final   Infusion on 05/20/2024   Component Date Value Ref Range Status    Glucose 05/20/2024 146  (H)  65 - 99 mg/dL Final    BUN 05/20/2024 13  8 - 23 mg/dL Final    Creatinine 05/20/2024 0.63  0.57 - 1.00 mg/dL Final    Sodium 05/20/2024 137  136 - 145 mmol/L Final    Potassium 05/20/2024 3.8  3.5 - 5.2 mmol/L Final    Chloride 05/20/2024 102  98 - 107 mmol/L Final    CO2 05/20/2024 24.0  22.0 - 29.0 mmol/L Final    Calcium 05/20/2024 9.5  8.6 - 10.5 mg/dL Final    Total Protein 05/20/2024 6.6  6.0 - 8.5 g/dL Final    Albumin 05/20/2024 4.2  3.5 - 5.2 g/dL Final    ALT (SGPT) 05/20/2024 59 (H)  1 - 33 U/L Final    AST (SGOT) 05/20/2024 32  1 - 32 U/L Final    Alkaline Phosphatase 05/20/2024 86  39 - 117 U/L Final    Total Bilirubin 05/20/2024 0.2  0.0 - 1.2 mg/dL Final    Globulin 05/20/2024 2.4  gm/dL Final    A/G Ratio 05/20/2024 1.8  g/dL Final    BUN/Creatinine Ratio 05/20/2024 20.6  7.0 - 25.0 Final    Anion Gap 05/20/2024 11.0  5.0 - 15.0 mmol/L Final    eGFR 05/20/2024 96.2  >60.0 mL/min/1.73 Final    WBC 05/20/2024 6.40  3.40 - 10.80 10*3/mm3 Final    RBC 05/20/2024 3.78  3.77 - 5.28 10*6/mm3 Final    Hemoglobin 05/20/2024 11.3 (L)  12.0 - 15.9 g/dL Final    Hematocrit 05/20/2024 35.8  34.0 - 46.6 % Final    MCV 05/20/2024 94.7  79.0 - 97.0 fL Final    MCH 05/20/2024 29.9  26.6 - 33.0 pg Final    MCHC 05/20/2024 31.6  31.5 - 35.7 g/dL Final    RDW 05/20/2024 13.7  12.3 - 15.4 % Final    RDW-SD 05/20/2024 46.6  37.0 - 54.0 fl Final    MPV 05/20/2024 10.1  6.0 - 12.0 fL Final    Platelets 05/20/2024 321  140 - 450 10*3/mm3 Final    Neutrophil % 05/20/2024 53.2  42.7 - 76.0 % Final    Lymphocyte % 05/20/2024 36.9  19.6 - 45.3 % Final    Monocyte % 05/20/2024 6.1  5.0 - 12.0 % Final    Eosinophil % 05/20/2024 1.1  0.3 - 6.2 % Final    Basophil % 05/20/2024 1.1  0.0 - 1.5 % Final    Immature Grans % 05/20/2024 1.6 (H)  0.0 - 0.5 % Final    Neutrophils, Absolute 05/20/2024 3.41  1.70 - 7.00 10*3/mm3 Final    Lymphocytes, Absolute 05/20/2024 2.36  0.70 - 3.10 10*3/mm3 Final    Monocytes, Absolute 05/20/2024  0.39  0.10 - 0.90 10*3/mm3 Final    Eosinophils, Absolute 05/20/2024 0.07  0.00 - 0.40 10*3/mm3 Final    Basophils, Absolute 05/20/2024 0.07  0.00 - 0.20 10*3/mm3 Final    Immature Grans, Absolute 05/20/2024 0.10 (H)  0.00 - 0.05 10*3/mm3 Final    nRBC 05/20/2024 0.0  0.0 - 0.2 /100 WBC Final   Infusion on 05/13/2024   Component Date Value Ref Range Status    Glucose 05/13/2024 210 (H)  65 - 99 mg/dL Final    BUN 05/13/2024 18  8 - 23 mg/dL Final    Creatinine 05/13/2024 0.72  0.57 - 1.00 mg/dL Final    Sodium 05/13/2024 138  136 - 145 mmol/L Final    Potassium 05/13/2024 4.0  3.5 - 5.2 mmol/L Final    Chloride 05/13/2024 101  98 - 107 mmol/L Final    CO2 05/13/2024 22.6  22.0 - 29.0 mmol/L Final    Calcium 05/13/2024 9.5  8.6 - 10.5 mg/dL Final    Total Protein 05/13/2024 6.6  6.0 - 8.5 g/dL Final    Albumin 05/13/2024 4.6  3.5 - 5.2 g/dL Final    ALT (SGPT) 05/13/2024 98 (C)  1 - 33 U/L Final    AST (SGOT) 05/13/2024 67 (H)  1 - 32 U/L Final    Alkaline Phosphatase 05/13/2024 90  39 - 117 U/L Final    Total Bilirubin 05/13/2024 0.2  0.0 - 1.2 mg/dL Final    Globulin 05/13/2024 2.0  gm/dL Final    A/G Ratio 05/13/2024 2.3  g/dL Final    BUN/Creatinine Ratio 05/13/2024 25.0  7.0 - 25.0 Final    Anion Gap 05/13/2024 14.4  5.0 - 15.0 mmol/L Final    eGFR 05/13/2024 90.6  >60.0 mL/min/1.73 Final    WBC 05/13/2024 8.26  3.40 - 10.80 10*3/mm3 Final    RBC 05/13/2024 3.98  3.77 - 5.28 10*6/mm3 Final    Hemoglobin 05/13/2024 12.2  12.0 - 15.9 g/dL Final    Hematocrit 05/13/2024 37.2  34.0 - 46.6 % Final    MCV 05/13/2024 93.5  79.0 - 97.0 fL Final    MCH 05/13/2024 30.7  26.6 - 33.0 pg Final    MCHC 05/13/2024 32.8  31.5 - 35.7 g/dL Final    RDW 05/13/2024 13.2  12.3 - 15.4 % Final    RDW-SD 05/13/2024 45.2  37.0 - 54.0 fl Final    MPV 05/13/2024 10.2  6.0 - 12.0 fL Final    Platelets 05/13/2024 315  140 - 450 10*3/mm3 Final    Neutrophil % 05/13/2024 58.8  42.7 - 76.0 % Final    Lymphocyte % 05/13/2024 32.3  19.6 - 45.3 %  Final    Monocyte % 05/13/2024 6.1  5.0 - 12.0 % Final    Eosinophil % 05/13/2024 0.5  0.3 - 6.2 % Final    Basophil % 05/13/2024 0.8  0.0 - 1.5 % Final    Immature Grans % 05/13/2024 1.5 (H)  0.0 - 0.5 % Final    Neutrophils, Absolute 05/13/2024 4.86  1.70 - 7.00 10*3/mm3 Final    Lymphocytes, Absolute 05/13/2024 2.67  0.70 - 3.10 10*3/mm3 Final    Monocytes, Absolute 05/13/2024 0.50  0.10 - 0.90 10*3/mm3 Final    Eosinophils, Absolute 05/13/2024 0.04  0.00 - 0.40 10*3/mm3 Final    Basophils, Absolute 05/13/2024 0.07  0.00 - 0.20 10*3/mm3 Final    Immature Grans, Absolute 05/13/2024 0.12 (H)  0.00 - 0.05 10*3/mm3 Final    nRBC 05/13/2024 0.0  0.0 - 0.2 /100 WBC Final   Infusion on 05/06/2024   Component Date Value Ref Range Status    Glucose 05/06/2024 154 (H)  65 - 99 mg/dL Final    BUN 05/06/2024 9  8 - 23 mg/dL Final    Creatinine 05/06/2024 0.67  0.57 - 1.00 mg/dL Final    Sodium 05/06/2024 142  136 - 145 mmol/L Final    Potassium 05/06/2024 4.0  3.5 - 5.2 mmol/L Final    Chloride 05/06/2024 105  98 - 107 mmol/L Final    CO2 05/06/2024 22.9  22.0 - 29.0 mmol/L Final    Calcium 05/06/2024 9.1  8.6 - 10.5 mg/dL Final    Total Protein 05/06/2024 6.8  6.0 - 8.5 g/dL Final    Albumin 05/06/2024 4.3  3.5 - 5.2 g/dL Final    ALT (SGPT) 05/06/2024 51 (H)  1 - 33 U/L Final    AST (SGOT) 05/06/2024 36 (H)  1 - 32 U/L Final    Alkaline Phosphatase 05/06/2024 87  39 - 117 U/L Final    Total Bilirubin 05/06/2024 0.4  0.0 - 1.2 mg/dL Final    Globulin 05/06/2024 2.5  gm/dL Final    A/G Ratio 05/06/2024 1.7  g/dL Final    BUN/Creatinine Ratio 05/06/2024 13.4  7.0 - 25.0 Final    Anion Gap 05/06/2024 14.1  5.0 - 15.0 mmol/L Final    eGFR 05/06/2024 94.7  >60.0 mL/min/1.73 Final    WBC 05/06/2024 5.94  3.40 - 10.80 10*3/mm3 Final    RBC 05/06/2024 4.01  3.77 - 5.28 10*6/mm3 Final    Hemoglobin 05/06/2024 12.3  12.0 - 15.9 g/dL Final    Hematocrit 05/06/2024 37.7  34.0 - 46.6 % Final    MCV 05/06/2024 94.0  79.0 - 97.0 fL Final     MCH 05/06/2024 30.7  26.6 - 33.0 pg Final    MCHC 05/06/2024 32.6  31.5 - 35.7 g/dL Final    RDW 05/06/2024 13.5  12.3 - 15.4 % Final    RDW-SD 05/06/2024 46.4  37.0 - 54.0 fl Final    MPV 05/06/2024 9.4  6.0 - 12.0 fL Final    Platelets 05/06/2024 285  140 - 450 10*3/mm3 Final    Neutrophil % 05/06/2024 53.3  42.7 - 76.0 % Final    Lymphocyte % 05/06/2024 32.3  19.6 - 45.3 % Final    Monocyte % 05/06/2024 11.8  5.0 - 12.0 % Final    Eosinophil % 05/06/2024 0.7  0.3 - 6.2 % Final    Basophil % 05/06/2024 1.2  0.0 - 1.5 % Final    Immature Grans % 05/06/2024 0.7 (H)  0.0 - 0.5 % Final    Neutrophils, Absolute 05/06/2024 3.17  1.70 - 7.00 10*3/mm3 Final    Lymphocytes, Absolute 05/06/2024 1.92  0.70 - 3.10 10*3/mm3 Final    Monocytes, Absolute 05/06/2024 0.70  0.10 - 0.90 10*3/mm3 Final    Eosinophils, Absolute 05/06/2024 0.04  0.00 - 0.40 10*3/mm3 Final    Basophils, Absolute 05/06/2024 0.07  0.00 - 0.20 10*3/mm3 Final    Immature Grans, Absolute 05/06/2024 0.04  0.00 - 0.05 10*3/mm3 Final    nRBC 05/06/2024 0.0  0.0 - 0.2 /100 WBC Final   Infusion on 04/29/2024   Component Date Value Ref Range Status    Glucose 04/29/2024 185 (H)  65 - 99 mg/dL Final    BUN 04/29/2024 12  8 - 23 mg/dL Final    Creatinine 04/29/2024 0.78  0.57 - 1.00 mg/dL Final    Sodium 04/29/2024 137  136 - 145 mmol/L Final    Potassium 04/29/2024 4.1  3.5 - 5.2 mmol/L Final    Chloride 04/29/2024 103  98 - 107 mmol/L Final    CO2 04/29/2024 25.5  22.0 - 29.0 mmol/L Final    Calcium 04/29/2024 9.4  8.6 - 10.5 mg/dL Final    Total Protein 04/29/2024 6.5  6.0 - 8.5 g/dL Final    Albumin 04/29/2024 4.0  3.5 - 5.2 g/dL Final    ALT (SGPT) 04/29/2024 44 (H)  1 - 33 U/L Final    AST (SGOT) 04/29/2024 28  1 - 32 U/L Final    Alkaline Phosphatase 04/29/2024 88  39 - 117 U/L Final    Total Bilirubin 04/29/2024 0.2  0.0 - 1.2 mg/dL Final    Globulin 04/29/2024 2.5  gm/dL Final    A/G Ratio 04/29/2024 1.6  g/dL Final    BUN/Creatinine Ratio 04/29/2024  15.4  7.0 - 25.0 Final    Anion Gap 04/29/2024 8.5  5.0 - 15.0 mmol/L Final    eGFR 04/29/2024 82.3  >60.0 mL/min/1.73 Final    WBC 04/29/2024 6.25  3.40 - 10.80 10*3/mm3 Final    RBC 04/29/2024 3.99  3.77 - 5.28 10*6/mm3 Final    Hemoglobin 04/29/2024 12.0  12.0 - 15.9 g/dL Final    Hematocrit 04/29/2024 37.4  34.0 - 46.6 % Final    MCV 04/29/2024 93.7  79.0 - 97.0 fL Final    MCH 04/29/2024 30.1  26.6 - 33.0 pg Final    MCHC 04/29/2024 32.1  31.5 - 35.7 g/dL Final    RDW 04/29/2024 13.5  12.3 - 15.4 % Final    RDW-SD 04/29/2024 45.8  37.0 - 54.0 fl Final    MPV 04/29/2024 10.0  6.0 - 12.0 fL Final    Platelets 04/29/2024 327  140 - 450 10*3/mm3 Final    Neutrophil % 04/29/2024 59.3  42.7 - 76.0 % Final    Lymphocyte % 04/29/2024 30.9  19.6 - 45.3 % Final    Monocyte % 04/29/2024 6.2  5.0 - 12.0 % Final    Eosinophil % 04/29/2024 0.8  0.3 - 6.2 % Final    Basophil % 04/29/2024 0.6  0.0 - 1.5 % Final    Immature Grans % 04/29/2024 2.2 (H)  0.0 - 0.5 % Final    Neutrophils, Absolute 04/29/2024 3.70  1.70 - 7.00 10*3/mm3 Final    Lymphocytes, Absolute 04/29/2024 1.93  0.70 - 3.10 10*3/mm3 Final    Monocytes, Absolute 04/29/2024 0.39  0.10 - 0.90 10*3/mm3 Final    Eosinophils, Absolute 04/29/2024 0.05  0.00 - 0.40 10*3/mm3 Final    Basophils, Absolute 04/29/2024 0.04  0.00 - 0.20 10*3/mm3 Final    Immature Grans, Absolute 04/29/2024 0.14 (H)  0.00 - 0.05 10*3/mm3 Final    nRBC 04/29/2024 0.0  0.0 - 0.2 /100 WBC Final    Color, UA 04/29/2024 Yellow  Yellow, Straw Final    Appearance, UA 04/29/2024 Clear  Clear Final    pH, UA 04/29/2024 5.5  4.5 - 8.0 Final    Specific Gravity, UA 04/29/2024 >=1.030  1.002 - 1.030 Final    Glucose, UA 04/29/2024 Negative  Negative Final    Ketones, UA 04/29/2024 Trace (A)  Negative Final    Bilirubin, UA 04/29/2024 Negative  Negative Final    Blood, UA 04/29/2024 Negative  Negative Final    Protein, UA 04/29/2024 Trace (A)  Negative Final    Leuk Esterase, UA 04/29/2024 Negative   Negative Final    Nitrite, UA 04/29/2024 Negative  Negative Final    Urobilinogen, UA 04/29/2024 0.2 E.U./dL  0.2 - 1.0 E.U./dL Final        MRI Breast Bilateral Diagnostic W WO Contrast    Result Date: 5/13/2024  1. Near complete interval resolution of all suspicious enhancement in the right breast centered at the 8 o'clock position. Only wispy non-mass enhancement measuring up to 1.5 cm is seen surrounding the bowtie shaped metallic clip in the posterior one third at the 8 o'clock position. There has been resolution of the enhancement associated with the Infinity shaped metallic clip seen extending anteriorly away from the mass on the prior examination that represents biopsy-proven DCIS. No new suspicious findings in the right breast are visualized. This is consistent with interval near complete response to neoadjuvant chemotherapy. 2. There has been interval resolution of suspicious enhancement in the left breast at the biopsy-proven site of malignancy corresponding to the 12 o'clock position. No additional suspicious findings in the left breast are visualized.  BI-RADS category 6: Known biopsy-proven malignancy.  This report was finalized on 5/13/2024 12:35 PM by Dr. Luis Patel M.D on Workstation: [id number]          Assessment & Plan     *Right breast invasive ductal carcinoma  Clinical T2 N0 M0, stage IIa, clinical and prognostic ER/NE negative, HER2 3+ immunohistochemistry, Ki-67 70%  On the MRI the mass measures 2 cm however there is linear enhancement which in the AP dimension measures 4.7 cm.  Additional biopsies of this linear enhancement was performed and consistent with DCIS  It is hard to delineate the amount of invasive disease as opposed to DCIS.  More likely than not invasive component is likely limited to the mass which is about 2-1/2 cm on exam.  Since the tumor is greater than 2 cm I think it is reasonable to proceed with neoadjuvant chemotherapy.  We discussed proceeding with weekly Taxol  along with Herceptin and Perjeta every 3 weeks.  After 12 weeks she will require an MRI to assess the response.  If there is inadequate response then we may have to proceed with Adriamycin and Cytoxan for 4 cycles however if there is a good response then she can proceed with surgery with no additional chemotherapy.  The likely plan is that she will undergo bilateral mastectomy.  If that is the case as long as she does not have any positive margins or lymph node positive disease she may not need radiation.  We discussed continuing adjuvant HER2 directed therapy for whole year  3/18/2024-received first dose of Herceptin and Perjeta, had a severe hypersensitivity reaction to Taxol and hence Taxol was discontinued  3/25/2024-Labs reviewed and stable to proceed with chemotherapy  Patient is extremely anxious today after experiencing the severe hypersensitivity reaction last week.  Reassured patient that this is unlikely to happen with Abraxane and we will premedicate with extra Solu-Medrol..  4/1/2024 patient returns for cycle 1 day 15 Abraxane.,  Left chest, left shoulder at times.  This has come and gone over the past week and is not associated with any shortness of breath, numbness or tingling.  She states when her chest is hurting if she presses on the area it gets better.  She notices it at different points during the day.  It feels more like an ache and is not a sharp pain.  She states she has been sleeping in a different position due to the port which could be causing some muscular discomfort.  She has noticed the discomfort at times when she takes a deep breath however is able to get deep breath here in the office did not have any pain.  She is not having any pain today at all.  She has taken her hydrocodone maybe 3-4 times over the past week and has not needed it multiple times a day.  CMP resulted today with AST elevated at 47 and .  Bilirubin remains normal at 0.4 with alkaline phosphatase elevated to  "160.  Reviewed with Dr. Loredo and will hold treatment today.  She will try to limit any acetaminophen containing products.  She did have a few days of diarrhea that she is unsure if it was related to the Abraxane versus a GI bug as her  had the same symptoms.  Neither had chills or fevers.  She is also having some dysuria today we have a urinalysis with culture pending.  4/8/2024: Due for cycle 2-day 1 Abraxane, Herceptin, Perjeta.  Did not receive cycle 1 day 15 Abraxane due to elevated LFTs as detailed above.  LFTs have significantly improved, AST 28, ALT 58.  Reviewed with Dr. Renteria will proceed with Abraxane only today, will reduce Abraxane by 20%.  4/15/2024-patient is scheduled for week 4 of chemotherapy today.  Labs reviewed and overall stable except for an ALT of 45.  She will proceed with planned chemotherapy.  4/22/2024: after last chemotherapy patient experienced 3 episodes of \"zapping\" in her fingertips and soles of feet that last a few seconds then resolved.  Has not experienced this sensation in at least 3 days.  No interference with daily activities.  Will continue Abraxane at previous 20% dose reduction.  She is doing cold therapy for her hands/feet.  Will need to monitor closely for neuropathy.  4/29/2024-chemotherapy held as patient reported worsening neuropathy and dropping things.  5/6/2024-presents today for evaluation of the neuropathy and to determine if she can resume chemotherapy.  She reports that the neuropathy has improved some.  She is mainly perceiving pain in her fingers and tingling and numbness.   She however tells me that she has not been dropping things anymore.  She is able to do other fine motor activities just fine.  5/6/2024-patient did receive Abraxane with a 20% dose reduction.  5/13/2024-presents today for the neck cycle of Abraxane and since her last treatment she has not had any worsening of neuropathy.  We also started her on gabapentin 300 mg nightly.  Neuropathy " overall stable.  Labs reviewed and she does have elevated LFTs however no further dose reduction needed.  Will proceed with planned chemotherapy today.  5/11/2024-MRI of the breast shows near complete resolution of the non-mass enhancement and areas of malignancy in both right and left breast.  This is reassuring and if patient has any further worsening of neuropathy then we could discontinue Abraxane and she will proceed with surgery and continue HER2 directed therapy.  5/28/2024: Cycle 4-day 8 Abraxane only, continue previous 20% dose reduction.  Continues on gabapentin, neuropathy stable.    *Left breast ductal carcinoma in situ  Intermed  She has been having some discomfort in her left shoulder bladeiate grade with apocrine features  ER/RI strongly positive  Plan is for her to undergo bilateral mastectomy.  If she does undergo bilateral mastectomy then that would be curative and she would not require any endocrine therapy subsequently unless there is any upstaging of the malignancy  5/11/2024-MRI of the breast with near complete resolution of the non-mass enhancement.    *Severe diarrhea  Started last night  Patient has not used any Imodium  We will administer 1 L of normal saline today  Imodium will be given here today  She will start entegrade  Patient has not been using any Imodium.  Encouraged her to use Imodium up to 8 tablets in a day.  Denies any diarrhea at this time    *GERD  Protonix with improvement  Continue the same    *Cardiac health  Referral to cardio oncology placed  3/15/2024 echocardiogram with an ejection fraction of 75.5%, LV strain is normal at -24.2%  She has met with Dr. Peguero , started on bisoprolol   Continue follow-up with cardio oncology    *Elevated LFTs  4/1/2024 AST at 47, from 20.  ALT at 141, from 22.  Alkaline phosphatase at 160, from 100.  Total bilirubin remains normal.  Acute hepatitis panel negative.  Patient asked to avoid Tylenol and limit her hydrocodone usage as this does  have Tylenol in it unless needed.  Also advised to avoid alcohol which she states she does not drink.  4/8/2024: AST 28, ALT 58, alk phos 102.  Total bilirubin 0.3.  Likely secondary to Abraxane.   4/15/2024-ALT 45, AST 25, alkaline phosphatase 98  AST 24, ALT 37, alkaline phosphatase 93  5/13/2024 LFTs higher with an ALT of 98 and AST 67, alkaline phosphatase 90  5/28/2024: AST 30, ALT 43, total bilirubin 0.2.  No further reduction of Abraxane needed at this time.  Will proceed with chemotherapy as planned    *Hypokalemia secondary to diarrhea likely  4/1/2024 potassium 3.2.  Potassium chloride 20 mEq 1 p.o. daily sent to pharmacy x 5 days.  Will recheck next week.  4/8/2024: Potassium today 4.1, continue oral potassium.  Potassium normal today 4.1.    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  Continue follow-up with supportive oncology    *Dysuria-UA with 2+ bacteria.  Start bactrim twice daily x 3 days.    Repeat UA negative    *Neuropathy  Held treatment  due to neuropathy  5/6/2024-resumed chemotherapy  She was started on gabapentin 300 mg nightly and also vitamin B12.  Reports stable neuropathy  Proceed with planned chemotherapy today   MRI shows near complete resolution of the abnormalities and if patient experiences any worsening neuropathy then low threshold to stop chemotherapy and proceed with surgery    *Chapped lips   Prescribed triamcinolone to help with healing    PLAN:   Proceed with Abraxane today, with previous 20% dose reduction.  No further dose reduction needed for elevated LFTs.   Continue cold therapy to the hands/feet.  Will need to monitor closely for neuropathy.  Continue gabapentin and B12  MRI shows near complete resolution of the abnormalities and if patient experiences any worsening neuropathy then low threshold to stop chemotherapy and proceed with surgery  Return in 1 week for cycle 4-day 15 Abraxane.  Return in 2 weeks for MD follow-up treatment.  Echo scheduled 6/17/2024    The patient  is on high risk medication that requires close monitoring for toxicity.

## 2024-05-28 ENCOUNTER — OFFICE VISIT (OUTPATIENT)
Dept: ONCOLOGY | Facility: CLINIC | Age: 69
End: 2024-05-28
Payer: COMMERCIAL

## 2024-05-28 ENCOUNTER — INFUSION (OUTPATIENT)
Dept: ONCOLOGY | Facility: HOSPITAL | Age: 69
End: 2024-05-28
Payer: COMMERCIAL

## 2024-05-28 VITALS
TEMPERATURE: 98.4 F | OXYGEN SATURATION: 93 % | HEART RATE: 82 BPM | RESPIRATION RATE: 16 BRPM | BODY MASS INDEX: 24.77 KG/M2 | WEIGHT: 118 LBS | SYSTOLIC BLOOD PRESSURE: 144 MMHG | HEIGHT: 58 IN | DIASTOLIC BLOOD PRESSURE: 82 MMHG

## 2024-05-28 DIAGNOSIS — Z17.0 MALIGNANT NEOPLASM OF LOWER-OUTER QUADRANT OF RIGHT BREAST OF FEMALE, ESTROGEN RECEPTOR POSITIVE: Primary | ICD-10-CM

## 2024-05-28 DIAGNOSIS — R79.89 ELEVATED LFTS: ICD-10-CM

## 2024-05-28 DIAGNOSIS — Z45.2 ENCOUNTER FOR FITTING AND ADJUSTMENT OF VASCULAR CATHETER: ICD-10-CM

## 2024-05-28 DIAGNOSIS — C50.511 MALIGNANT NEOPLASM OF LOWER-OUTER QUADRANT OF RIGHT BREAST OF FEMALE, ESTROGEN RECEPTOR POSITIVE: ICD-10-CM

## 2024-05-28 DIAGNOSIS — Z17.0 MALIGNANT NEOPLASM OF LOWER-OUTER QUADRANT OF RIGHT BREAST OF FEMALE, ESTROGEN RECEPTOR POSITIVE: ICD-10-CM

## 2024-05-28 DIAGNOSIS — C50.511 MALIGNANT NEOPLASM OF LOWER-OUTER QUADRANT OF RIGHT BREAST OF FEMALE, ESTROGEN RECEPTOR POSITIVE: Primary | ICD-10-CM

## 2024-05-28 DIAGNOSIS — Z79.899 HIGH RISK MEDICATION USE: ICD-10-CM

## 2024-05-28 LAB
ALBUMIN SERPL-MCNC: 4.3 G/DL (ref 3.5–5.2)
ALBUMIN/GLOB SERPL: 1.6 G/DL
ALP SERPL-CCNC: 89 U/L (ref 39–117)
ALT SERPL W P-5'-P-CCNC: 43 U/L (ref 1–33)
ANION GAP SERPL CALCULATED.3IONS-SCNC: 15 MMOL/L (ref 5–15)
AST SERPL-CCNC: 30 U/L (ref 1–32)
BASOPHILS # BLD AUTO: 0.05 10*3/MM3 (ref 0–0.2)
BASOPHILS NFR BLD AUTO: 0.7 % (ref 0–1.5)
BILIRUB SERPL-MCNC: 0.2 MG/DL (ref 0–1.2)
BUN SERPL-MCNC: 15 MG/DL (ref 8–23)
BUN/CREAT SERPL: 21.7 (ref 7–25)
CALCIUM SPEC-SCNC: 9.3 MG/DL (ref 8.6–10.5)
CHLORIDE SERPL-SCNC: 104 MMOL/L (ref 98–107)
CO2 SERPL-SCNC: 22 MMOL/L (ref 22–29)
CREAT SERPL-MCNC: 0.69 MG/DL (ref 0.57–1)
DEPRECATED RDW RBC AUTO: 47.8 FL (ref 37–54)
EGFRCR SERPLBLD CKD-EPI 2021: 94.1 ML/MIN/1.73
EOSINOPHIL # BLD AUTO: 0.07 10*3/MM3 (ref 0–0.4)
EOSINOPHIL NFR BLD AUTO: 0.9 % (ref 0.3–6.2)
ERYTHROCYTE [DISTWIDTH] IN BLOOD BY AUTOMATED COUNT: 14.2 % (ref 12.3–15.4)
GLOBULIN UR ELPH-MCNC: 2.7 GM/DL
GLUCOSE SERPL-MCNC: 130 MG/DL (ref 65–99)
HCT VFR BLD AUTO: 36 % (ref 34–46.6)
HGB BLD-MCNC: 11.6 G/DL (ref 12–15.9)
IMM GRANULOCYTES # BLD AUTO: 0.15 10*3/MM3 (ref 0–0.05)
IMM GRANULOCYTES NFR BLD AUTO: 2 % (ref 0–0.5)
LYMPHOCYTES # BLD AUTO: 3.11 10*3/MM3 (ref 0.7–3.1)
LYMPHOCYTES NFR BLD AUTO: 40.5 % (ref 19.6–45.3)
MCH RBC QN AUTO: 30.3 PG (ref 26.6–33)
MCHC RBC AUTO-ENTMCNC: 32.2 G/DL (ref 31.5–35.7)
MCV RBC AUTO: 94 FL (ref 79–97)
MONOCYTES # BLD AUTO: 0.52 10*3/MM3 (ref 0.1–0.9)
MONOCYTES NFR BLD AUTO: 6.8 % (ref 5–12)
NEUTROPHILS NFR BLD AUTO: 3.78 10*3/MM3 (ref 1.7–7)
NEUTROPHILS NFR BLD AUTO: 49.1 % (ref 42.7–76)
NRBC BLD AUTO-RTO: 0 /100 WBC (ref 0–0.2)
PLATELET # BLD AUTO: 361 10*3/MM3 (ref 140–450)
PMV BLD AUTO: 9.9 FL (ref 6–12)
POTASSIUM SERPL-SCNC: 4.1 MMOL/L (ref 3.5–5.2)
PROT SERPL-MCNC: 7 G/DL (ref 6–8.5)
RBC # BLD AUTO: 3.83 10*6/MM3 (ref 3.77–5.28)
SODIUM SERPL-SCNC: 141 MMOL/L (ref 136–145)
WBC NRBC COR # BLD AUTO: 7.68 10*3/MM3 (ref 3.4–10.8)

## 2024-05-28 PROCEDURE — 80053 COMPREHEN METABOLIC PANEL: CPT

## 2024-05-28 PROCEDURE — 25010000002 PACLITAXEL PROTEIN-BOUND PART PER 1 MG: Performed by: NURSE PRACTITIONER

## 2024-05-28 PROCEDURE — 96413 CHEMO IV INFUSION 1 HR: CPT

## 2024-05-28 PROCEDURE — 25010000002 DEXAMETHASONE SODIUM PHOSPHATE 100 MG/10ML SOLUTION: Performed by: NURSE PRACTITIONER

## 2024-05-28 PROCEDURE — 96375 TX/PRO/DX INJ NEW DRUG ADDON: CPT

## 2024-05-28 PROCEDURE — 99214 OFFICE O/P EST MOD 30 MIN: CPT | Performed by: NURSE PRACTITIONER

## 2024-05-28 PROCEDURE — 25010000002 DIPHENHYDRAMINE PER 50 MG: Performed by: NURSE PRACTITIONER

## 2024-05-28 PROCEDURE — 25010000002 HEPARIN LOCK FLUSH PER 10 UNITS: Performed by: INTERNAL MEDICINE

## 2024-05-28 PROCEDURE — 85025 COMPLETE CBC W/AUTO DIFF WBC: CPT

## 2024-05-28 PROCEDURE — 25810000003 SODIUM CHLORIDE 0.9 % SOLUTION: Performed by: NURSE PRACTITIONER

## 2024-05-28 RX ORDER — FAMOTIDINE 10 MG/ML
20 INJECTION, SOLUTION INTRAVENOUS ONCE
Status: COMPLETED | OUTPATIENT
Start: 2024-05-28 | End: 2024-05-28

## 2024-05-28 RX ORDER — FAMOTIDINE 10 MG/ML
20 INJECTION, SOLUTION INTRAVENOUS AS NEEDED
Status: CANCELLED | OUTPATIENT
Start: 2024-05-28

## 2024-05-28 RX ORDER — DIPHENHYDRAMINE HYDROCHLORIDE 50 MG/ML
50 INJECTION INTRAMUSCULAR; INTRAVENOUS AS NEEDED
Status: CANCELLED | OUTPATIENT
Start: 2024-05-28

## 2024-05-28 RX ORDER — MONTELUKAST SODIUM 10 MG/1
10 TABLET ORAL ONCE
Status: COMPLETED | OUTPATIENT
Start: 2024-05-28 | End: 2024-05-28

## 2024-05-28 RX ORDER — SODIUM CHLORIDE 9 MG/ML
20 INJECTION, SOLUTION INTRAVENOUS ONCE
Status: COMPLETED | OUTPATIENT
Start: 2024-05-28 | End: 2024-05-28

## 2024-05-28 RX ORDER — HEPARIN SODIUM (PORCINE) LOCK FLUSH IV SOLN 100 UNIT/ML 100 UNIT/ML
500 SOLUTION INTRAVENOUS AS NEEDED
Status: DISCONTINUED | OUTPATIENT
Start: 2024-05-28 | End: 2024-05-28 | Stop reason: HOSPADM

## 2024-05-28 RX ORDER — SODIUM CHLORIDE 0.9 % (FLUSH) 0.9 %
10 SYRINGE (ML) INJECTION AS NEEDED
Status: DISCONTINUED | OUTPATIENT
Start: 2024-05-28 | End: 2024-05-28 | Stop reason: HOSPADM

## 2024-05-28 RX ORDER — MONTELUKAST SODIUM 10 MG/1
10 TABLET ORAL ONCE
Status: CANCELLED
Start: 2024-05-28 | End: 2024-05-28

## 2024-05-28 RX ORDER — PACLITAXEL 100 MG/20ML
80 INJECTION, POWDER, LYOPHILIZED, FOR SUSPENSION INTRAVENOUS ONCE
Status: CANCELLED | OUTPATIENT
Start: 2024-05-28

## 2024-05-28 RX ORDER — HEPARIN SODIUM (PORCINE) LOCK FLUSH IV SOLN 100 UNIT/ML 100 UNIT/ML
500 SOLUTION INTRAVENOUS AS NEEDED
OUTPATIENT
Start: 2024-05-28

## 2024-05-28 RX ORDER — FAMOTIDINE 10 MG/ML
20 INJECTION, SOLUTION INTRAVENOUS ONCE
Status: CANCELLED | OUTPATIENT
Start: 2024-05-28

## 2024-05-28 RX ORDER — SODIUM CHLORIDE 0.9 % (FLUSH) 0.9 %
10 SYRINGE (ML) INJECTION AS NEEDED
OUTPATIENT
Start: 2024-05-28

## 2024-05-28 RX ORDER — SODIUM CHLORIDE 9 MG/ML
20 INJECTION, SOLUTION INTRAVENOUS ONCE
Status: CANCELLED | OUTPATIENT
Start: 2024-05-28

## 2024-05-28 RX ORDER — PACLITAXEL 100 MG/20ML
80 INJECTION, POWDER, LYOPHILIZED, FOR SUSPENSION INTRAVENOUS ONCE
Status: COMPLETED | OUTPATIENT
Start: 2024-05-28 | End: 2024-05-28

## 2024-05-28 RX ADMIN — MONTELUKAST 10 MG: 10 TABLET, FILM COATED ORAL at 16:13

## 2024-05-28 RX ADMIN — DEXAMETHASONE SODIUM PHOSPHATE 12 MG: 100 INJECTION INTRAMUSCULAR; INTRAVENOUS at 16:15

## 2024-05-28 RX ADMIN — PACLITAXEL 120 MG: 100 INJECTION, POWDER, LYOPHILIZED, FOR SUSPENSION INTRAVENOUS at 17:18

## 2024-05-28 RX ADMIN — FAMOTIDINE 20 MG: 10 INJECTION INTRAVENOUS at 16:14

## 2024-05-28 RX ADMIN — SODIUM CHLORIDE 20 ML/HR: 9 INJECTION, SOLUTION INTRAVENOUS at 16:13

## 2024-05-28 RX ADMIN — Medication 10 ML: at 17:56

## 2024-05-28 RX ADMIN — DIPHENHYDRAMINE HYDROCHLORIDE 25 MG: 50 INJECTION, SOLUTION INTRAMUSCULAR; INTRAVENOUS at 16:34

## 2024-05-28 RX ADMIN — Medication 500 UNITS: at 17:56

## 2024-06-03 ENCOUNTER — INFUSION (OUTPATIENT)
Dept: ONCOLOGY | Facility: HOSPITAL | Age: 69
End: 2024-06-03
Payer: COMMERCIAL

## 2024-06-03 VITALS
OXYGEN SATURATION: 95 % | BODY MASS INDEX: 24.81 KG/M2 | SYSTOLIC BLOOD PRESSURE: 145 MMHG | WEIGHT: 118.2 LBS | HEART RATE: 88 BPM | DIASTOLIC BLOOD PRESSURE: 75 MMHG | TEMPERATURE: 96.3 F | RESPIRATION RATE: 18 BRPM

## 2024-06-03 DIAGNOSIS — Z17.0 MALIGNANT NEOPLASM OF LOWER-OUTER QUADRANT OF RIGHT BREAST OF FEMALE, ESTROGEN RECEPTOR POSITIVE: Primary | ICD-10-CM

## 2024-06-03 DIAGNOSIS — C50.511 MALIGNANT NEOPLASM OF LOWER-OUTER QUADRANT OF RIGHT BREAST OF FEMALE, ESTROGEN RECEPTOR POSITIVE: Primary | ICD-10-CM

## 2024-06-03 LAB
ALBUMIN SERPL-MCNC: 4.1 G/DL (ref 3.5–5.2)
ALBUMIN/GLOB SERPL: 2.2 G/DL
ALP SERPL-CCNC: 77 U/L (ref 39–117)
ALT SERPL W P-5'-P-CCNC: 44 U/L (ref 1–33)
ANION GAP SERPL CALCULATED.3IONS-SCNC: 11.1 MMOL/L (ref 5–15)
AST SERPL-CCNC: 28 U/L (ref 1–32)
BASOPHILS # BLD AUTO: 0.05 10*3/MM3 (ref 0–0.2)
BASOPHILS NFR BLD AUTO: 0.8 % (ref 0–1.5)
BILIRUB SERPL-MCNC: 0.2 MG/DL (ref 0–1.2)
BUN SERPL-MCNC: 14 MG/DL (ref 8–23)
BUN/CREAT SERPL: 20 (ref 7–25)
CALCIUM SPEC-SCNC: 9.2 MG/DL (ref 8.6–10.5)
CHLORIDE SERPL-SCNC: 107 MMOL/L (ref 98–107)
CO2 SERPL-SCNC: 22.9 MMOL/L (ref 22–29)
CREAT SERPL-MCNC: 0.7 MG/DL (ref 0.57–1)
DEPRECATED RDW RBC AUTO: 48.9 FL (ref 37–54)
EGFRCR SERPLBLD CKD-EPI 2021: 93.8 ML/MIN/1.73
EOSINOPHIL # BLD AUTO: 0.04 10*3/MM3 (ref 0–0.4)
EOSINOPHIL NFR BLD AUTO: 0.7 % (ref 0.3–6.2)
ERYTHROCYTE [DISTWIDTH] IN BLOOD BY AUTOMATED COUNT: 14.3 % (ref 12.3–15.4)
GLOBULIN UR ELPH-MCNC: 1.9 GM/DL
GLUCOSE SERPL-MCNC: 194 MG/DL (ref 65–99)
HCT VFR BLD AUTO: 33.4 % (ref 34–46.6)
HGB BLD-MCNC: 11 G/DL (ref 12–15.9)
IMM GRANULOCYTES # BLD AUTO: 0.03 10*3/MM3 (ref 0–0.05)
IMM GRANULOCYTES NFR BLD AUTO: 0.5 % (ref 0–0.5)
LYMPHOCYTES # BLD AUTO: 2.47 10*3/MM3 (ref 0.7–3.1)
LYMPHOCYTES NFR BLD AUTO: 40.4 % (ref 19.6–45.3)
MCH RBC QN AUTO: 31.3 PG (ref 26.6–33)
MCHC RBC AUTO-ENTMCNC: 32.9 G/DL (ref 31.5–35.7)
MCV RBC AUTO: 94.9 FL (ref 79–97)
MONOCYTES # BLD AUTO: 0.29 10*3/MM3 (ref 0.1–0.9)
MONOCYTES NFR BLD AUTO: 4.7 % (ref 5–12)
NEUTROPHILS NFR BLD AUTO: 3.23 10*3/MM3 (ref 1.7–7)
NEUTROPHILS NFR BLD AUTO: 52.9 % (ref 42.7–76)
NRBC BLD AUTO-RTO: 0 /100 WBC (ref 0–0.2)
PLATELET # BLD AUTO: 316 10*3/MM3 (ref 140–450)
PMV BLD AUTO: 10.1 FL (ref 6–12)
POTASSIUM SERPL-SCNC: 3.7 MMOL/L (ref 3.5–5.2)
PROT SERPL-MCNC: 6 G/DL (ref 6–8.5)
RBC # BLD AUTO: 3.52 10*6/MM3 (ref 3.77–5.28)
SODIUM SERPL-SCNC: 141 MMOL/L (ref 136–145)
WBC NRBC COR # BLD AUTO: 6.11 10*3/MM3 (ref 3.4–10.8)

## 2024-06-03 PROCEDURE — 25010000002 PACLITAXEL PROTEIN-BOUND PART PER 1 MG: Performed by: NURSE PRACTITIONER

## 2024-06-03 PROCEDURE — 80053 COMPREHEN METABOLIC PANEL: CPT

## 2024-06-03 PROCEDURE — 25010000002 DEXAMETHASONE SODIUM PHOSPHATE 100 MG/10ML SOLUTION: Performed by: NURSE PRACTITIONER

## 2024-06-03 PROCEDURE — 85025 COMPLETE CBC W/AUTO DIFF WBC: CPT

## 2024-06-03 PROCEDURE — 96375 TX/PRO/DX INJ NEW DRUG ADDON: CPT

## 2024-06-03 PROCEDURE — 25810000003 SODIUM CHLORIDE 0.9 % SOLUTION: Performed by: NURSE PRACTITIONER

## 2024-06-03 PROCEDURE — 96413 CHEMO IV INFUSION 1 HR: CPT

## 2024-06-03 PROCEDURE — 25010000002 DIPHENHYDRAMINE PER 50 MG: Performed by: NURSE PRACTITIONER

## 2024-06-03 RX ORDER — SODIUM CHLORIDE 9 MG/ML
20 INJECTION, SOLUTION INTRAVENOUS ONCE
Status: COMPLETED | OUTPATIENT
Start: 2024-06-03 | End: 2024-06-03

## 2024-06-03 RX ORDER — MONTELUKAST SODIUM 10 MG/1
10 TABLET ORAL ONCE
Status: COMPLETED | OUTPATIENT
Start: 2024-06-03 | End: 2024-06-03

## 2024-06-03 RX ORDER — DIPHENHYDRAMINE HYDROCHLORIDE 50 MG/ML
50 INJECTION INTRAMUSCULAR; INTRAVENOUS AS NEEDED
Status: CANCELLED | OUTPATIENT
Start: 2024-06-03

## 2024-06-03 RX ORDER — FAMOTIDINE 10 MG/ML
20 INJECTION, SOLUTION INTRAVENOUS ONCE
Status: COMPLETED | OUTPATIENT
Start: 2024-06-03 | End: 2024-06-03

## 2024-06-03 RX ORDER — PACLITAXEL 100 MG/20ML
80 INJECTION, POWDER, LYOPHILIZED, FOR SUSPENSION INTRAVENOUS ONCE
Status: COMPLETED | OUTPATIENT
Start: 2024-06-03 | End: 2024-06-03

## 2024-06-03 RX ORDER — FAMOTIDINE 10 MG/ML
20 INJECTION, SOLUTION INTRAVENOUS AS NEEDED
Status: CANCELLED | OUTPATIENT
Start: 2024-06-03

## 2024-06-03 RX ADMIN — PACLITAXEL 120 MG: 100 INJECTION, POWDER, LYOPHILIZED, FOR SUSPENSION INTRAVENOUS at 14:30

## 2024-06-03 RX ADMIN — SODIUM CHLORIDE 20 ML/HR: 9 INJECTION, SOLUTION INTRAVENOUS at 13:22

## 2024-06-03 RX ADMIN — DEXAMETHASONE SODIUM PHOSPHATE 12 MG: 10 INJECTION, SOLUTION INTRAMUSCULAR; INTRAVENOUS at 13:42

## 2024-06-03 RX ADMIN — FAMOTIDINE 20 MG: 10 INJECTION INTRAVENOUS at 13:23

## 2024-06-03 RX ADMIN — DIPHENHYDRAMINE HYDROCHLORIDE 25 MG: 50 INJECTION, SOLUTION INTRAMUSCULAR; INTRAVENOUS at 13:24

## 2024-06-03 RX ADMIN — MONTELUKAST 10 MG: 10 TABLET, FILM COATED ORAL at 13:22

## 2024-06-10 ENCOUNTER — PATIENT OUTREACH (OUTPATIENT)
Dept: OTHER | Facility: HOSPITAL | Age: 69
End: 2024-06-10
Payer: COMMERCIAL

## 2024-06-10 ENCOUNTER — INFUSION (OUTPATIENT)
Dept: ONCOLOGY | Facility: HOSPITAL | Age: 69
End: 2024-06-10
Payer: COMMERCIAL

## 2024-06-10 ENCOUNTER — OFFICE VISIT (OUTPATIENT)
Dept: ONCOLOGY | Facility: CLINIC | Age: 69
End: 2024-06-10
Payer: COMMERCIAL

## 2024-06-10 VITALS
OXYGEN SATURATION: 96 % | DIASTOLIC BLOOD PRESSURE: 70 MMHG | SYSTOLIC BLOOD PRESSURE: 128 MMHG | BODY MASS INDEX: 25.23 KG/M2 | WEIGHT: 120.2 LBS | HEIGHT: 58 IN | HEART RATE: 83 BPM | RESPIRATION RATE: 16 BRPM | TEMPERATURE: 97.8 F

## 2024-06-10 DIAGNOSIS — C50.511 MALIGNANT NEOPLASM OF LOWER-OUTER QUADRANT OF RIGHT BREAST OF FEMALE, ESTROGEN RECEPTOR POSITIVE: ICD-10-CM

## 2024-06-10 DIAGNOSIS — Z17.0 MALIGNANT NEOPLASM OF LOWER-OUTER QUADRANT OF RIGHT BREAST OF FEMALE, ESTROGEN RECEPTOR POSITIVE: Primary | ICD-10-CM

## 2024-06-10 DIAGNOSIS — C50.511 MALIGNANT NEOPLASM OF LOWER-OUTER QUADRANT OF RIGHT BREAST OF FEMALE, ESTROGEN RECEPTOR POSITIVE: Primary | ICD-10-CM

## 2024-06-10 DIAGNOSIS — Z17.0 MALIGNANT NEOPLASM OF LOWER-OUTER QUADRANT OF RIGHT BREAST OF FEMALE, ESTROGEN RECEPTOR POSITIVE: ICD-10-CM

## 2024-06-10 DIAGNOSIS — R79.89 ELEVATED LFTS: ICD-10-CM

## 2024-06-10 DIAGNOSIS — Z79.899 HIGH RISK MEDICATION USE: ICD-10-CM

## 2024-06-10 LAB
ALBUMIN SERPL-MCNC: 4.2 G/DL (ref 3.5–5.2)
ALBUMIN/GLOB SERPL: 2 G/DL
ALP SERPL-CCNC: 77 U/L (ref 39–117)
ALT SERPL W P-5'-P-CCNC: 43 U/L (ref 1–33)
ANION GAP SERPL CALCULATED.3IONS-SCNC: 14.3 MMOL/L (ref 5–15)
AST SERPL-CCNC: 26 U/L (ref 1–32)
BASOPHILS # BLD AUTO: 0.05 10*3/MM3 (ref 0–0.2)
BASOPHILS NFR BLD AUTO: 0.7 % (ref 0–1.5)
BILIRUB SERPL-MCNC: 0.2 MG/DL (ref 0–1.2)
BUN SERPL-MCNC: 12 MG/DL (ref 8–23)
BUN/CREAT SERPL: 20.3 (ref 7–25)
CALCIUM SPEC-SCNC: 9.3 MG/DL (ref 8.6–10.5)
CHLORIDE SERPL-SCNC: 105 MMOL/L (ref 98–107)
CO2 SERPL-SCNC: 21.7 MMOL/L (ref 22–29)
CREAT SERPL-MCNC: 0.59 MG/DL (ref 0.57–1)
DEPRECATED RDW RBC AUTO: 50.7 FL (ref 37–54)
EGFRCR SERPLBLD CKD-EPI 2021: 97.7 ML/MIN/1.73
EOSINOPHIL # BLD AUTO: 0.07 10*3/MM3 (ref 0–0.4)
EOSINOPHIL NFR BLD AUTO: 1 % (ref 0.3–6.2)
ERYTHROCYTE [DISTWIDTH] IN BLOOD BY AUTOMATED COUNT: 14.6 % (ref 12.3–15.4)
GLOBULIN UR ELPH-MCNC: 2.1 GM/DL
GLUCOSE SERPL-MCNC: 164 MG/DL (ref 65–99)
HCT VFR BLD AUTO: 34.6 % (ref 34–46.6)
HGB BLD-MCNC: 11.1 G/DL (ref 12–15.9)
IMM GRANULOCYTES # BLD AUTO: 0.18 10*3/MM3 (ref 0–0.05)
IMM GRANULOCYTES NFR BLD AUTO: 2.6 % (ref 0–0.5)
LYMPHOCYTES # BLD AUTO: 2.3 10*3/MM3 (ref 0.7–3.1)
LYMPHOCYTES NFR BLD AUTO: 33.3 % (ref 19.6–45.3)
MCH RBC QN AUTO: 30.7 PG (ref 26.6–33)
MCHC RBC AUTO-ENTMCNC: 32.1 G/DL (ref 31.5–35.7)
MCV RBC AUTO: 95.6 FL (ref 79–97)
MONOCYTES # BLD AUTO: 0.4 10*3/MM3 (ref 0.1–0.9)
MONOCYTES NFR BLD AUTO: 5.8 % (ref 5–12)
NEUTROPHILS NFR BLD AUTO: 3.9 10*3/MM3 (ref 1.7–7)
NEUTROPHILS NFR BLD AUTO: 56.6 % (ref 42.7–76)
NRBC BLD AUTO-RTO: 0 /100 WBC (ref 0–0.2)
PLATELET # BLD AUTO: 357 10*3/MM3 (ref 140–450)
PMV BLD AUTO: 9.9 FL (ref 6–12)
POTASSIUM SERPL-SCNC: 3.9 MMOL/L (ref 3.5–5.2)
PROT SERPL-MCNC: 6.3 G/DL (ref 6–8.5)
RBC # BLD AUTO: 3.62 10*6/MM3 (ref 3.77–5.28)
SODIUM SERPL-SCNC: 141 MMOL/L (ref 136–145)
WBC NRBC COR # BLD AUTO: 6.9 10*3/MM3 (ref 3.4–10.8)

## 2024-06-10 PROCEDURE — 96413 CHEMO IV INFUSION 1 HR: CPT

## 2024-06-10 PROCEDURE — 85025 COMPLETE CBC W/AUTO DIFF WBC: CPT

## 2024-06-10 PROCEDURE — 96417 CHEMO IV INFUS EACH ADDL SEQ: CPT

## 2024-06-10 PROCEDURE — 25810000003 SODIUM CHLORIDE 0.9 % SOLUTION: Performed by: INTERNAL MEDICINE

## 2024-06-10 PROCEDURE — 25010000002 DEXAMETHASONE SODIUM PHOSPHATE 100 MG/10ML SOLUTION: Performed by: INTERNAL MEDICINE

## 2024-06-10 PROCEDURE — 25810000003 SODIUM CHLORIDE 0.9 % SOLUTION 250 ML FLEX CONT: Performed by: INTERNAL MEDICINE

## 2024-06-10 PROCEDURE — 25010000002 DIPHENHYDRAMINE PER 50 MG: Performed by: INTERNAL MEDICINE

## 2024-06-10 PROCEDURE — 96375 TX/PRO/DX INJ NEW DRUG ADDON: CPT

## 2024-06-10 PROCEDURE — 99215 OFFICE O/P EST HI 40 MIN: CPT | Performed by: INTERNAL MEDICINE

## 2024-06-10 PROCEDURE — 25010000002 PERTUZUMAB 420 MG/14ML SOLUTION 420 MG VIAL: Performed by: INTERNAL MEDICINE

## 2024-06-10 PROCEDURE — 25010000002 HYDROCORTISONE SOD SUC (PF) 100 MG RECONSTITUTED SOLUTION: Performed by: INTERNAL MEDICINE

## 2024-06-10 PROCEDURE — 25010000002 TRASTUZUMAB-DTTB 420 MG RECONSTITUTED SOLUTION 1 EACH VIAL: Performed by: INTERNAL MEDICINE

## 2024-06-10 PROCEDURE — 80053 COMPREHEN METABOLIC PANEL: CPT

## 2024-06-10 PROCEDURE — 25010000002 PACLITAXEL PROTEIN-BOUND PART PER 1 MG: Performed by: INTERNAL MEDICINE

## 2024-06-10 RX ORDER — MONTELUKAST SODIUM 10 MG/1
10 TABLET ORAL ONCE
Status: COMPLETED | OUTPATIENT
Start: 2024-06-10 | End: 2024-06-10

## 2024-06-10 RX ORDER — MONTELUKAST SODIUM 10 MG/1
10 TABLET ORAL ONCE
Status: CANCELLED
Start: 2024-06-10 | End: 2024-06-10

## 2024-06-10 RX ORDER — FAMOTIDINE 10 MG/ML
20 INJECTION, SOLUTION INTRAVENOUS ONCE
Status: COMPLETED | OUTPATIENT
Start: 2024-06-10 | End: 2024-06-10

## 2024-06-10 RX ORDER — FAMOTIDINE 10 MG/ML
20 INJECTION, SOLUTION INTRAVENOUS ONCE
Status: CANCELLED | OUTPATIENT
Start: 2024-06-10

## 2024-06-10 RX ORDER — PACLITAXEL 100 MG/20ML
80 INJECTION, POWDER, LYOPHILIZED, FOR SUSPENSION INTRAVENOUS ONCE
Status: COMPLETED | OUTPATIENT
Start: 2024-06-10 | End: 2024-06-10

## 2024-06-10 RX ORDER — SODIUM CHLORIDE 9 MG/ML
20 INJECTION, SOLUTION INTRAVENOUS ONCE
Status: COMPLETED | OUTPATIENT
Start: 2024-06-10 | End: 2024-06-10

## 2024-06-10 RX ORDER — FAMOTIDINE 10 MG/ML
20 INJECTION, SOLUTION INTRAVENOUS AS NEEDED
Status: CANCELLED | OUTPATIENT
Start: 2024-06-10

## 2024-06-10 RX ORDER — PACLITAXEL 100 MG/20ML
80 INJECTION, POWDER, LYOPHILIZED, FOR SUSPENSION INTRAVENOUS ONCE
Status: CANCELLED | OUTPATIENT
Start: 2024-06-10

## 2024-06-10 RX ORDER — SODIUM CHLORIDE 9 MG/ML
20 INJECTION, SOLUTION INTRAVENOUS ONCE
Status: CANCELLED | OUTPATIENT
Start: 2024-06-10

## 2024-06-10 RX ORDER — DIPHENHYDRAMINE HYDROCHLORIDE 50 MG/ML
50 INJECTION INTRAMUSCULAR; INTRAVENOUS AS NEEDED
Status: CANCELLED | OUTPATIENT
Start: 2024-06-10

## 2024-06-10 RX ADMIN — PACLITAXEL 120 MG: 100 INJECTION, POWDER, LYOPHILIZED, FOR SUSPENSION INTRAVENOUS at 12:35

## 2024-06-10 RX ADMIN — MONTELUKAST 10 MG: 10 TABLET, FILM COATED ORAL at 09:57

## 2024-06-10 RX ADMIN — HYDROCORTISONE SODIUM SUCCINATE 100 MG: 100 INJECTION, POWDER, FOR SOLUTION INTRAMUSCULAR; INTRAVENOUS at 12:32

## 2024-06-10 RX ADMIN — FAMOTIDINE 20 MG: 10 INJECTION INTRAVENOUS at 11:48

## 2024-06-10 RX ADMIN — ONTRUZANT 330 MG: KIT INTRAVENOUS at 11:14

## 2024-06-10 RX ADMIN — SODIUM CHLORIDE 20 ML/HR: 9 INJECTION, SOLUTION INTRAVENOUS at 10:09

## 2024-06-10 RX ADMIN — PERTUZUMAB 420 MG: 30 INJECTION, SOLUTION, CONCENTRATE INTRAVENOUS at 10:10

## 2024-06-10 RX ADMIN — DIPHENHYDRAMINE HYDROCHLORIDE 25 MG: 50 INJECTION, SOLUTION INTRAMUSCULAR; INTRAVENOUS at 12:07

## 2024-06-10 RX ADMIN — DEXAMETHASONE SODIUM PHOSPHATE 12 MG: 100 INJECTION INTRAMUSCULAR; INTRAVENOUS at 11:50

## 2024-06-10 NOTE — PROGRESS NOTES
Called Ms. Boyle to see how she was doing. She stated she had treatment today and is doing well. She will have one more treatment and will then have surgery. She stated she does not have a follow up with Dr. Manrique yet and message sent to Kayla SALAZAR in her office requesting assistance setting up that appointment. We talked through her other questions and she was thankful for the help. Otherwise, she has no needs at this time. She was thankful for the call and will reach out if any questions or needs arise.

## 2024-06-10 NOTE — PROGRESS NOTES
Subjective   Felicia Boyle is a 68 y.o. female.  Referred by Dr. Manrique for right breast invasive ductal carcinoma, HER2 positive, left breast ductal carcinoma in situ    History of Present Illness     Ms. Boyle is a 68-year-old postmenopausal Malagasy lady who is fairly healthy without any significant comorbidities palpated of right breast mass in the latter part of  which was further worked up with diagnostic mammogram and ultrasound and subsequently a biopsy which confirmed invasive ductal carcinoma which was ER/ME negative and HER2 positive.    Family history significant for her 2 sisters with breast cancer at the age of 38 and 48 respectively.  Her older sister  of metastatic breast cancer but her younger sister is doing well.  Patient underwent genetic testing in 2017 which was negative due to her family history.    She had regular screening mammograms up until 2019 but subsequently stopped having annual gynecological visits and therefore did not have any further screening mammograms up until the most recent diagnostic mammogram for the new palpable abnormality.    2024-bilateral diagnostic mammogram and right breast ultrasound  The breast heterogeneously dense.  Left breast with a scar marker in the lower left breast dating back to .  No new findings in the left breast.    Right breast at 8:00, 8 cm from the nipple there is a 1.7 x 1.4 x 1.6 cm oval high density mass with partially circumscribed and partially indistinct margins.    On the ultrasound the mass measures 1.7 x 1 x 1.3 cm.    No abnormal right axilla lymphadenopathy.    2024-right breast ultrasound-guided biopsy  Pathology consistent with invasive ductal carcinoma  Grade 3  9.3 mm of invasive disease on the core biopsy  ER negative  ME negative  HER2 3+ on immunohistochemistry, 95%  Ki-67 70%    2024-bilateral breast MRI  In the right breast at 8:00, 8 cm from the nipple there is a irregular enhancing mass which  measures 1.8 x 2 x 1.7 cm.  This corresponds to the biopsy-proven malignancy.  Extending anteriorly away from the MT margin of the mass there is an irregular linear enhancement which measures 3.5 x 1 x 0.7 cm.  The whole area including the mass measures 4.7 cm in AP dimension, 1.7 in mediolateral and 2.4 cm in the superior-inferior dimension.    At 12:00, 3 cm from the nipple there is an irregular non-mass enhancement which measures 1 x 1.1 x 1.2 cm.  No mammographic correlate is appreciated.    No other suspicious areas of enhancement in the right breast or right axilla or internal mammary chain.    In the left breast, middle third at 11:30 position, 3.7 cm from the nipple there is an irregular area of non-mass enhancement which measures 1.3 x 1.1 x 1 cm.  There is suspected architectural distortion.  No other areas of abnormal enhancement seen in the left breast of the left axilla or the internal mammary chain.    2/26/2024-additional MRI guided biopsies    1.left breast 12:00-intermediate grade ductal carcinoma in situ with apocrine features  ER +91 to 100% strong  ID +81 to 90% strong  Ki-67 15%    2. Right breast 12 o'clock position MRI guided biopsy of the non-mass enhancement-sclerosing adenosis with associated calcifications  No atypical hyperplasia in situ or invasive carcinoma    3.right breast 8:00 MRI guided biopsies of the linear enhancement is consistent with high-grade ductal carcinoma in situ  DCIS involves multiple tissue cores measuring up to 3 mm.  Sclerosing adenosis and usual ductal hyperplasia with associated microcalcifications.    The case was discussed in tumor board and Dr. Patel thought that they could be more invasive disease in the 4.7 cm of linear enhancement noted in the right breast at the site of the mass.  Therefore it was decided to proceed with neoadjuvant chemotherapy.    Patient was a former smoker and smoked for about 40 years, half a pack per week, quit about 1 month ago.   Denies any alcohol use.    Week 1 Taxol Herceptin and Perjeta was planned for 3/18/2024.  Patient received Herceptin and Perjeta and had some chills for which she received Solu-Medrol and responded well to that.  However after infusing only 1 cc of Taxol patient experienced a severe anaphylactic reaction due to which the infusion was stopped permanently and she received an additional dose of Solu-Medrol and Benadryl.  Symptoms resolved subsequently and patient was discharged home safely.    Due to this recent treatment has been changed to Abraxane along with Herceptin and Perjeta.  3/25/2024-patient is due for her first treatment with Abraxane.    She is extremely anxious given the severe hypersensitivity reaction that she experienced with Taxol.  She is also complaining of increased belching and heartburn since her last chemotherapy.  She is complaining of severe diarrhea with started last night.  Had multiple loose stools.    5/11/2024-MRI of the breast-images independently reviewed and interpreted by me.  Interval near complete resolution of all suspicious enhancement in the posterior one third lower outer quadrant of the right breast at 8:00 corresponding to the biopsy-proven site of malignancy.  Who has been on masslike area of enhancement measuring 1.5 x 0.9 x 0.9 cm compared to 2 x 1.8 x 1.7 cm.  Mildly abnormal enhancement extending anteriorly away from the mass has resolved.  No other suspicious areas of enhancement noted in the right breast.  Left breast at 12:00 interval resolution of all suspicious enhancement.    INTERVAL HISTORY  Felicia presents today for the 11th week of Abraxane.  She is also scheduled for Herceptin Perjeta today.  She reports that the neuropathy is overall stable with no worsening of the tingling or numbness of the fingers or toes.  She has no restrictions in daily activity.  She remains anxious about ongoing chemotherapy.  Today we discussed about the need for surgery after  "completion of chemotherapy and patient is extremely reluctant to undergo surgery.  She feels like there is no one to take care of her after the surgical procedure and hence does not want to undergo surgery.  She also tells me that her sister passed away a month after she underwent breast surgery and that makes her worried about going through surgery.  We discussed the fact that her sister was in [place] and the treatment at that time might have been very different than forte she is undergoing now.  Her  who accompanied her to the visit today also tried to convince her about the importance of surgery and offered support.      The following portions of the patient's history were reviewed and updated as appropriate: allergies, current medications, past family history, past medical history, past social history, past surgical history, and problem list.    Past Medical History:   Diagnosis Date    Anxiety about health     Breast cancer of lower-outer quadrant of right female breast 02/14/2024    DCIS (ductal carcinoma in situ) of breast 03/15/2024    Diverticulosis     Mixed hyperlipidemia 05/19/2019    DIET CONTROLLED    Osteoporosis     Type 2 diabetes mellitus         Past Surgical History:   Procedure Laterality Date    BREAST BIOPSY Left     Excisional biopsy in the [facility]    BREAST BIOPSY  03/2024    CATARACT EXTRACTION W/ INTRAOCULAR LENS IMPLANT Bilateral     CHOLECYSTECTOMY  2012    COLON RESECTION Right 2002    COLONOSCOPY N/A 04/28/2021    Procedure: COLONOSCOPY TO ANASTAMOSIS/TI;  Surgeon: Angelo Gonsalez MD;  Location: [facility] ENDOSCOPY;  Service: Gastroenterology;  Laterality: N/A;  PRE: SCREENING  POST: NORMAL POST-OP ANATOMY    ENDOSCOPY      2013    ENDOSCOPY N/A 03/10/2017    Procedure: ESOPHAGOGASTRODUODENOSCOPY WITH BIOPSY AND PETER DILATATION 54\";  Surgeon: Angelo Gonsalez MD;  Location: [facility] ENDOSCOPY;  Service:     ENDOSCOPY N/A 04/28/2021    Procedure: " ESOPHAGOGASTRODUODENOSCOPY WITH BIOPSIES, 54FR PETER DILATATION;  Surgeon: Angelo Gonsalez MD;  Location: [facility] ENDOSCOPY;  Service: Gastroenterology;  Laterality: N/A;  PRE: DYSPHAGIA  POST: GASTRITIS, ESOPHAGEAL RING    VENOUS ACCESS DEVICE (PORT) INSERTION Left 3/14/2024    Procedure: INSERTION OF PORTACATH;  Surgeon: Abeba Manrique MD;  Location: [facility] MAIN OR;  Service: General;  Laterality: Left;        Family History   Problem Relation Age of Onset    Hypertension Mother     Diabetes Mother     Hypertension Father     Breast cancer Sister 48    Breast cancer Sister 38    No Known Problems Brother     No Known Problems Daughter     No Known Problems Son     No Known Problems Maternal Grandmother     No Known Problems Paternal Grandmother     No Known Problems Maternal Grandfather     No Known Problems Paternal Grandfather     Autism Grandson     Colon cancer Neg Hx     Rectal cancer Neg Hx     Endometrial cancer Neg Hx     Vaginal cancer Neg Hx     Cervical cancer Neg Hx     Ovarian cancer Neg Hx     Prostate cancer Neg Hx     Uterine cancer Neg Hx     Malig Hyperthermia Neg Hx         Social History     Socioeconomic History    Marital status:      Spouse name: Ryan   Tobacco Use    Smoking status: Former     Current packs/day: 0.00     Average packs/day: 0.3 packs/day for 15.0 years (3.8 ttl pk-yrs)     Types: Cigarettes     Quit date: 1/15/2024     Years since quittin.4     Passive exposure: Past    Smokeless tobacco: Never    Tobacco comments:     N/A   Vaping Use    Vaping status: Never Used   Substance and Sexual Activity    Alcohol use: Yes     Comment: RARE    Drug use: No    Sexual activity: Not Currently     Partners: Male     Birth control/protection: Tubal ligation        OB History          4    Para   3    Term   3       0    AB   1    Living   3         SAB   1    IAB   0    Ectopic   0    Molar   0    Multiple   0    Live Births   3               Age at  "menarche-16  Age at first live childbirth-18   4 para 3  1  Age at menopause-49  No use of hormone replacement therapy  No use of oral conceptive pills  Breast-feeding for 3 months    Allergies   Allergen Reactions    Metronidazole Nausea And Vomiting      Review of systems as mentioned HPI otherwise negative    Objective   Blood pressure 128/70, pulse 83, temperature 97.8 °F (36.6 °C), temperature source Infrared, resp. rate 16, height 147 cm (57.87\"), weight 54.5 kg (120 lb 3.2 oz), SpO2 96%, not currently breastfeeding.     Physical Exam  Vitals reviewed.   Constitutional:       Appearance: Normal appearance. She is normal weight.   HENT:      Nose: Nose normal.      Mouth/Throat:      Mouth: Mucous membranes are moist.      Pharynx: Oropharynx is clear.   Eyes:      Conjunctiva/sclera: Conjunctivae normal.      Pupils: Pupils are equal, round, and reactive to light.   Cardiovascular:      Rate and Rhythm: Normal rate.   Pulmonary:      Effort: Pulmonary effort is normal.      Breath sounds: Normal breath sounds.   Abdominal:      General: Abdomen is flat. Bowel sounds are normal.   Musculoskeletal:         General: Normal range of motion.      Cervical back: Normal range of motion.   Skin:     General: Skin is warm and dry.      Findings: No rash.   Neurological:      General: No focal deficit present.      Mental Status: She is alert and oriented to person, place, and time.   Psychiatric:         Mood and Affect: Mood normal.         Behavior: Behavior normal.         Thought Content: Thought content normal.         Judgment: Judgment normal.     Breast Exam: Right breast on inspection 9 o'clock position with biopsy site changes.  The right breast mass is no longer palpable.  No right axillary lymphadenopathy.    Left breast :There is a palpable underlying hematoma which is tender to palpation from MRI guided biopsies, improved. No left axillary lymphadenopathy.     I have reexamined the patient " and the results are consistent with the previously documented exam. Agata Renteria MD      Infusion on 06/10/2024   Component Date Value Ref Range Status    WBC 06/10/2024 6.90  3.40 - 10.80 10*3/mm3 Final    RBC 06/10/2024 3.62 (L)  3.77 - 5.28 10*6/mm3 Final    Hemoglobin 06/10/2024 11.1 (L)  12.0 - 15.9 g/dL Final    Hematocrit 06/10/2024 34.6  34.0 - 46.6 % Final    MCV 06/10/2024 95.6  79.0 - 97.0 fL Final    MCH 06/10/2024 30.7  26.6 - 33.0 pg Final    MCHC 06/10/2024 32.1  31.5 - 35.7 g/dL Final    RDW 06/10/2024 14.6  12.3 - 15.4 % Final    RDW-SD 06/10/2024 50.7  37.0 - 54.0 fl Final    MPV 06/10/2024 9.9  6.0 - 12.0 fL Final    Platelets 06/10/2024 357  140 - 450 10*3/mm3 Final    Neutrophil % 06/10/2024 56.6  42.7 - 76.0 % Final    Lymphocyte % 06/10/2024 33.3  19.6 - 45.3 % Final    Monocyte % 06/10/2024 5.8  5.0 - 12.0 % Final    Eosinophil % 06/10/2024 1.0  0.3 - 6.2 % Final    Basophil % 06/10/2024 0.7  0.0 - 1.5 % Final    Immature Grans % 06/10/2024 2.6 (H)  0.0 - 0.5 % Final    Neutrophils, Absolute 06/10/2024 3.90  1.70 - 7.00 10*3/mm3 Final    Lymphocytes, Absolute 06/10/2024 2.30  0.70 - 3.10 10*3/mm3 Final    Monocytes, Absolute 06/10/2024 0.40  0.10 - 0.90 10*3/mm3 Final    Eosinophils, Absolute 06/10/2024 0.07  0.00 - 0.40 10*3/mm3 Final    Basophils, Absolute 06/10/2024 0.05  0.00 - 0.20 10*3/mm3 Final    Immature Grans, Absolute 06/10/2024 0.18 (H)  0.00 - 0.05 10*3/mm3 Final    nRBC 06/10/2024 0.0  0.0 - 0.2 /100 WBC Final   Infusion on 06/03/2024   Component Date Value Ref Range Status    Glucose 06/03/2024 194 (H)  65 - 99 mg/dL Final    BUN 06/03/2024 14  8 - 23 mg/dL Final    Creatinine 06/03/2024 0.70  0.57 - 1.00 mg/dL Final    Sodium 06/03/2024 141  136 - 145 mmol/L Final    Potassium 06/03/2024 3.7  3.5 - 5.2 mmol/L Final    Chloride 06/03/2024 107  98 - 107 mmol/L Final    CO2 06/03/2024 22.9  22.0 - 29.0 mmol/L Final    Calcium 06/03/2024 9.2  8.6 - 10.5 mg/dL Final    Total  Protein 06/03/2024 6.0  6.0 - 8.5 g/dL Final    Albumin 06/03/2024 4.1  3.5 - 5.2 g/dL Final    ALT (SGPT) 06/03/2024 44 (H)  1 - 33 U/L Final    AST (SGOT) 06/03/2024 28  1 - 32 U/L Final    Alkaline Phosphatase 06/03/2024 77  39 - 117 U/L Final    Total Bilirubin 06/03/2024 0.2  0.0 - 1.2 mg/dL Final    Globulin 06/03/2024 1.9  gm/dL Final    A/G Ratio 06/03/2024 2.2  g/dL Final    BUN/Creatinine Ratio 06/03/2024 20.0  7.0 - 25.0 Final    Anion Gap 06/03/2024 11.1  5.0 - 15.0 mmol/L Final    eGFR 06/03/2024 93.8  >60.0 mL/min/1.73 Final    WBC 06/03/2024 6.11  3.40 - 10.80 10*3/mm3 Final    RBC 06/03/2024 3.52 (L)  3.77 - 5.28 10*6/mm3 Final    Hemoglobin 06/03/2024 11.0 (L)  12.0 - 15.9 g/dL Final    Hematocrit 06/03/2024 33.4 (L)  34.0 - 46.6 % Final    MCV 06/03/2024 94.9  79.0 - 97.0 fL Final    MCH 06/03/2024 31.3  26.6 - 33.0 pg Final    MCHC 06/03/2024 32.9  31.5 - 35.7 g/dL Final    RDW 06/03/2024 14.3  12.3 - 15.4 % Final    RDW-SD 06/03/2024 48.9  37.0 - 54.0 fl Final    MPV 06/03/2024 10.1  6.0 - 12.0 fL Final    Platelets 06/03/2024 316  140 - 450 10*3/mm3 Final    Neutrophil % 06/03/2024 52.9  42.7 - 76.0 % Final    Lymphocyte % 06/03/2024 40.4  19.6 - 45.3 % Final    Monocyte % 06/03/2024 4.7 (L)  5.0 - 12.0 % Final    Eosinophil % 06/03/2024 0.7  0.3 - 6.2 % Final    Basophil % 06/03/2024 0.8  0.0 - 1.5 % Final    Immature Grans % 06/03/2024 0.5  0.0 - 0.5 % Final    Neutrophils, Absolute 06/03/2024 3.23  1.70 - 7.00 10*3/mm3 Final    Lymphocytes, Absolute 06/03/2024 2.47  0.70 - 3.10 10*3/mm3 Final    Monocytes, Absolute 06/03/2024 0.29  0.10 - 0.90 10*3/mm3 Final    Eosinophils, Absolute 06/03/2024 0.04  0.00 - 0.40 10*3/mm3 Final    Basophils, Absolute 06/03/2024 0.05  0.00 - 0.20 10*3/mm3 Final    Immature Grans, Absolute 06/03/2024 0.03  0.00 - 0.05 10*3/mm3 Final    nRBC 06/03/2024 0.0  0.0 - 0.2 /100 WBC Final   Infusion on 05/28/2024   Component Date Value Ref Range Status    Glucose  05/28/2024 130 (H)  65 - 99 mg/dL Final    BUN 05/28/2024 15  8 - 23 mg/dL Final    Creatinine 05/28/2024 0.69  0.57 - 1.00 mg/dL Final    Sodium 05/28/2024 141  136 - 145 mmol/L Final    Potassium 05/28/2024 4.1  3.5 - 5.2 mmol/L Final    Chloride 05/28/2024 104  98 - 107 mmol/L Final    CO2 05/28/2024 22.0  22.0 - 29.0 mmol/L Final    Calcium 05/28/2024 9.3  8.6 - 10.5 mg/dL Final    Total Protein 05/28/2024 7.0  6.0 - 8.5 g/dL Final    Albumin 05/28/2024 4.3  3.5 - 5.2 g/dL Final    ALT (SGPT) 05/28/2024 43 (H)  1 - 33 U/L Final    AST (SGOT) 05/28/2024 30  1 - 32 U/L Final    Alkaline Phosphatase 05/28/2024 89  39 - 117 U/L Final    Total Bilirubin 05/28/2024 0.2  0.0 - 1.2 mg/dL Final    Globulin 05/28/2024 2.7  gm/dL Final    A/G Ratio 05/28/2024 1.6  g/dL Final    BUN/Creatinine Ratio 05/28/2024 21.7  7.0 - 25.0 Final    Anion Gap 05/28/2024 15.0  5.0 - 15.0 mmol/L Final    eGFR 05/28/2024 94.1  >60.0 mL/min/1.73 Final    WBC 05/28/2024 7.68  3.40 - 10.80 10*3/mm3 Final    RBC 05/28/2024 3.83  3.77 - 5.28 10*6/mm3 Final    Hemoglobin 05/28/2024 11.6 (L)  12.0 - 15.9 g/dL Final    Hematocrit 05/28/2024 36.0  34.0 - 46.6 % Final    MCV 05/28/2024 94.0  79.0 - 97.0 fL Final    MCH 05/28/2024 30.3  26.6 - 33.0 pg Final    MCHC 05/28/2024 32.2  31.5 - 35.7 g/dL Final    RDW 05/28/2024 14.2  12.3 - 15.4 % Final    RDW-SD 05/28/2024 47.8  37.0 - 54.0 fl Final    MPV 05/28/2024 9.9  6.0 - 12.0 fL Final    Platelets 05/28/2024 361  140 - 450 10*3/mm3 Final    Neutrophil % 05/28/2024 49.1  42.7 - 76.0 % Final    Lymphocyte % 05/28/2024 40.5  19.6 - 45.3 % Final    Monocyte % 05/28/2024 6.8  5.0 - 12.0 % Final    Eosinophil % 05/28/2024 0.9  0.3 - 6.2 % Final    Basophil % 05/28/2024 0.7  0.0 - 1.5 % Final    Immature Grans % 05/28/2024 2.0 (H)  0.0 - 0.5 % Final    Neutrophils, Absolute 05/28/2024 3.78  1.70 - 7.00 10*3/mm3 Final    Lymphocytes, Absolute 05/28/2024 3.11 (H)  0.70 - 3.10 10*3/mm3 Final    Monocytes,  Absolute 05/28/2024 0.52  0.10 - 0.90 10*3/mm3 Final    Eosinophils, Absolute 05/28/2024 0.07  0.00 - 0.40 10*3/mm3 Final    Basophils, Absolute 05/28/2024 0.05  0.00 - 0.20 10*3/mm3 Final    Immature Grans, Absolute 05/28/2024 0.15 (H)  0.00 - 0.05 10*3/mm3 Final    nRBC 05/28/2024 0.0  0.0 - 0.2 /100 WBC Final   Infusion on 05/20/2024   Component Date Value Ref Range Status    Glucose 05/20/2024 146 (H)  65 - 99 mg/dL Final    BUN 05/20/2024 13  8 - 23 mg/dL Final    Creatinine 05/20/2024 0.63  0.57 - 1.00 mg/dL Final    Sodium 05/20/2024 137  136 - 145 mmol/L Final    Potassium 05/20/2024 3.8  3.5 - 5.2 mmol/L Final    Chloride 05/20/2024 102  98 - 107 mmol/L Final    CO2 05/20/2024 24.0  22.0 - 29.0 mmol/L Final    Calcium 05/20/2024 9.5  8.6 - 10.5 mg/dL Final    Total Protein 05/20/2024 6.6  6.0 - 8.5 g/dL Final    Albumin 05/20/2024 4.2  3.5 - 5.2 g/dL Final    ALT (SGPT) 05/20/2024 59 (H)  1 - 33 U/L Final    AST (SGOT) 05/20/2024 32  1 - 32 U/L Final    Alkaline Phosphatase 05/20/2024 86  39 - 117 U/L Final    Total Bilirubin 05/20/2024 0.2  0.0 - 1.2 mg/dL Final    Globulin 05/20/2024 2.4  gm/dL Final    A/G Ratio 05/20/2024 1.8  g/dL Final    BUN/Creatinine Ratio 05/20/2024 20.6  7.0 - 25.0 Final    Anion Gap 05/20/2024 11.0  5.0 - 15.0 mmol/L Final    eGFR 05/20/2024 96.2  >60.0 mL/min/1.73 Final    WBC 05/20/2024 6.40  3.40 - 10.80 10*3/mm3 Final    RBC 05/20/2024 3.78  3.77 - 5.28 10*6/mm3 Final    Hemoglobin 05/20/2024 11.3 (L)  12.0 - 15.9 g/dL Final    Hematocrit 05/20/2024 35.8  34.0 - 46.6 % Final    MCV 05/20/2024 94.7  79.0 - 97.0 fL Final    MCH 05/20/2024 29.9  26.6 - 33.0 pg Final    MCHC 05/20/2024 31.6  31.5 - 35.7 g/dL Final    RDW 05/20/2024 13.7  12.3 - 15.4 % Final    RDW-SD 05/20/2024 46.6  37.0 - 54.0 fl Final    MPV 05/20/2024 10.1  6.0 - 12.0 fL Final    Platelets 05/20/2024 321  140 - 450 10*3/mm3 Final    Neutrophil % 05/20/2024 53.2  42.7 - 76.0 % Final    Lymphocyte % 05/20/2024  36.9  19.6 - 45.3 % Final    Monocyte % 05/20/2024 6.1  5.0 - 12.0 % Final    Eosinophil % 05/20/2024 1.1  0.3 - 6.2 % Final    Basophil % 05/20/2024 1.1  0.0 - 1.5 % Final    Immature Grans % 05/20/2024 1.6 (H)  0.0 - 0.5 % Final    Neutrophils, Absolute 05/20/2024 3.41  1.70 - 7.00 10*3/mm3 Final    Lymphocytes, Absolute 05/20/2024 2.36  0.70 - 3.10 10*3/mm3 Final    Monocytes, Absolute 05/20/2024 0.39  0.10 - 0.90 10*3/mm3 Final    Eosinophils, Absolute 05/20/2024 0.07  0.00 - 0.40 10*3/mm3 Final    Basophils, Absolute 05/20/2024 0.07  0.00 - 0.20 10*3/mm3 Final    Immature Grans, Absolute 05/20/2024 0.10 (H)  0.00 - 0.05 10*3/mm3 Final    nRBC 05/20/2024 0.0  0.0 - 0.2 /100 WBC Final   Infusion on 05/13/2024   Component Date Value Ref Range Status    Glucose 05/13/2024 210 (H)  65 - 99 mg/dL Final    BUN 05/13/2024 18  8 - 23 mg/dL Final    Creatinine 05/13/2024 0.72  0.57 - 1.00 mg/dL Final    Sodium 05/13/2024 138  136 - 145 mmol/L Final    Potassium 05/13/2024 4.0  3.5 - 5.2 mmol/L Final    Chloride 05/13/2024 101  98 - 107 mmol/L Final    CO2 05/13/2024 22.6  22.0 - 29.0 mmol/L Final    Calcium 05/13/2024 9.5  8.6 - 10.5 mg/dL Final    Total Protein 05/13/2024 6.6  6.0 - 8.5 g/dL Final    Albumin 05/13/2024 4.6  3.5 - 5.2 g/dL Final    ALT (SGPT) 05/13/2024 98 (C)  1 - 33 U/L Final    AST (SGOT) 05/13/2024 67 (H)  1 - 32 U/L Final    Alkaline Phosphatase 05/13/2024 90  39 - 117 U/L Final    Total Bilirubin 05/13/2024 0.2  0.0 - 1.2 mg/dL Final    Globulin 05/13/2024 2.0  gm/dL Final    A/G Ratio 05/13/2024 2.3  g/dL Final    BUN/Creatinine Ratio 05/13/2024 25.0  7.0 - 25.0 Final    Anion Gap 05/13/2024 14.4  5.0 - 15.0 mmol/L Final    eGFR 05/13/2024 90.6  >60.0 mL/min/1.73 Final    WBC 05/13/2024 8.26  3.40 - 10.80 10*3/mm3 Final    RBC 05/13/2024 3.98  3.77 - 5.28 10*6/mm3 Final    Hemoglobin 05/13/2024 12.2  12.0 - 15.9 g/dL Final    Hematocrit 05/13/2024 37.2  34.0 - 46.6 % Final    MCV 05/13/2024 93.5   79.0 - 97.0 fL Final    MCH 05/13/2024 30.7  26.6 - 33.0 pg Final    MCHC 05/13/2024 32.8  31.5 - 35.7 g/dL Final    RDW 05/13/2024 13.2  12.3 - 15.4 % Final    RDW-SD 05/13/2024 45.2  37.0 - 54.0 fl Final    MPV 05/13/2024 10.2  6.0 - 12.0 fL Final    Platelets 05/13/2024 315  140 - 450 10*3/mm3 Final    Neutrophil % 05/13/2024 58.8  42.7 - 76.0 % Final    Lymphocyte % 05/13/2024 32.3  19.6 - 45.3 % Final    Monocyte % 05/13/2024 6.1  5.0 - 12.0 % Final    Eosinophil % 05/13/2024 0.5  0.3 - 6.2 % Final    Basophil % 05/13/2024 0.8  0.0 - 1.5 % Final    Immature Grans % 05/13/2024 1.5 (H)  0.0 - 0.5 % Final    Neutrophils, Absolute 05/13/2024 4.86  1.70 - 7.00 10*3/mm3 Final    Lymphocytes, Absolute 05/13/2024 2.67  0.70 - 3.10 10*3/mm3 Final    Monocytes, Absolute 05/13/2024 0.50  0.10 - 0.90 10*3/mm3 Final    Eosinophils, Absolute 05/13/2024 0.04  0.00 - 0.40 10*3/mm3 Final    Basophils, Absolute 05/13/2024 0.07  0.00 - 0.20 10*3/mm3 Final    Immature Grans, Absolute 05/13/2024 0.12 (H)  0.00 - 0.05 10*3/mm3 Final    nRBC 05/13/2024 0.0  0.0 - 0.2 /100 WBC Final        MRI Breast Bilateral Diagnostic W WO Contrast    Result Date: 5/13/2024  1. Near complete interval resolution of all suspicious enhancement in the right breast centered at the 8 o'clock position. Only wispy non-mass enhancement measuring up to 1.5 cm is seen surrounding the bowtie shaped metallic clip in the posterior one third at the 8 o'clock position. There has been resolution of the enhancement associated with the Infinity shaped metallic clip seen extending anteriorly away from the mass on the prior examination that represents biopsy-proven DCIS. No new suspicious findings in the right breast are visualized. This is consistent with interval near complete response to neoadjuvant chemotherapy. 2. There has been interval resolution of suspicious enhancement in the left breast at the biopsy-proven site of malignancy corresponding to the 12 o'clock  position. No additional suspicious findings in the left breast are visualized.  BI-RADS category 6: Known biopsy-proven malignancy.  This report was finalized on 5/13/2024 12:35 PM by Dr. Luis Patel M.D on Workstation: [id number]          Assessment & Plan     *Right breast invasive ductal carcinoma  Clinical T2 N0 M0, stage IIa, clinical and prognostic ER/DC negative, HER2 3+ immunohistochemistry, Ki-67 70%  On the MRI the mass measures 2 cm however there is linear enhancement which in the AP dimension measures 4.7 cm.  Additional biopsies of this linear enhancement was performed and consistent with DCIS  It is hard to delineate the amount of invasive disease as opposed to DCIS.  More likely than not invasive component is likely limited to the mass which is about 2-1/2 cm on exam.  Since the tumor is greater than 2 cm I think it is reasonable to proceed with neoadjuvant chemotherapy.  We discussed proceeding with weekly Taxol along with Herceptin and Perjeta every 3 weeks.  After 12 weeks she will require an MRI to assess the response.  If there is inadequate response then we may have to proceed with Adriamycin and Cytoxan for 4 cycles however if there is a good response then she can proceed with surgery with no additional chemotherapy.  The likely plan is that she will undergo bilateral mastectomy.  If that is the case as long as she does not have any positive margins or lymph node positive disease she may not need radiation.  We discussed continuing adjuvant HER2 directed therapy for whole year  3/18/2024-received first dose of Herceptin and Perjeta, had a severe hypersensitivity reaction to Taxol and hence Taxol was discontinued  3/25/2024-Labs reviewed and stable to proceed with chemotherapy  Patient is extremely anxious today after experiencing the severe hypersensitivity reaction last week.  Reassured patient that this is unlikely to happen with Abraxane and we will premedicate with extra  "Solu-Medrol..  4/1/2024 patient returns for cycle 1 day 15 Abraxane.,  Left chest, left shoulder at times.  This has come and gone over the past week and is not associated with any shortness of breath, numbness or tingling.  She states when her chest is hurting if she presses on the area it gets better.  She notices it at different points during the day.  It feels more like an ache and is not a sharp pain.  She states she has been sleeping in a different position due to the port which could be causing some muscular discomfort.  She has noticed the discomfort at times when she takes a deep breath however is able to get deep breath here in the office did not have any pain.  She is not having any pain today at all.  She has taken her hydrocodone maybe 3-4 times over the past week and has not needed it multiple times a day.  CMP resulted today with AST elevated at 47 and .  Bilirubin remains normal at 0.4 with alkaline phosphatase elevated to 160.  Reviewed with Dr. Loredo and will hold treatment today.  She will try to limit any acetaminophen containing products.  She did have a few days of diarrhea that she is unsure if it was related to the Abraxane versus a GI bug as her  had the same symptoms.  Neither had chills or fevers.  She is also having some dysuria today we have a urinalysis with culture pending.  4/8/2024: Due for cycle 2-day 1 Abraxane, Herceptin, Perjeta.  Did not receive cycle 1 day 15 Abraxane due to elevated LFTs as detailed above.  LFTs have significantly improved, AST 28, ALT 58.  Reviewed with Dr. Renteria will proceed with Abraxane only today, will reduce Abraxane by 20%.  4/15/2024-patient is scheduled for week 4 of chemotherapy today.  Labs reviewed and overall stable except for an ALT of 45.  She will proceed with planned chemotherapy.  4/22/2024: after last chemotherapy patient experienced 3 episodes of \"zapping\" in her fingertips and soles of feet that last a few seconds then " resolved.  Has not experienced this sensation in at least 3 days.  No interference with daily activities.  Will continue Abraxane at previous 20% dose reduction.  She is doing cold therapy for her hands/feet.  Will need to monitor closely for neuropathy.  4/29/2024-chemotherapy held as patient reported worsening neuropathy and dropping things.  5/6/2024-presents today for evaluation of the neuropathy and to determine if she can resume chemotherapy.  She reports that the neuropathy has improved some.  She is mainly perceiving pain in her fingers and tingling and numbness.   She however tells me that she has not been dropping things anymore.  She is able to do other fine motor activities just fine.  5/6/2024-patient did receive Abraxane with a 20% dose reduction.  5/13/2024-presents today for the neck cycle of Abraxane and since her last treatment she has not had any worsening of neuropathy.  We also started her on gabapentin 300 mg nightly.  Neuropathy overall stable.  Labs reviewed and she does have elevated LFTs however no further dose reduction needed.  Will proceed with planned chemotherapy today.  5/11/2024-MRI of the breast shows near complete resolution of the non-mass enhancement and areas of malignancy in both right and left breast.  This is reassuring and if patient has any further worsening of neuropathy then we could discontinue Abraxane and she will proceed with surgery and continue HER2 directed therapy.  5/28/2024: Cycle 4-day 8 Abraxane only, continue previous 20% dose reduction.  Continues on gabapentin, neuropathy stable.  6/10/2024-scheduled for Abraxane Herceptin and Perjeta today.  Neuropathy overall stable.  She will continue with 20% dose reduction.  Labs reviewed and stable to proceed with chemotherapy  She has 1 more week of Abraxane left following which she will have to proceed with surgery followed by adjuvant HER2 directed therapy.  Patient is very reluctant to undergo surgery.   Communicated with Dr. Manrique and she will be scheduled to see her after completion of the last treatment of Abraxane.  Since she had a recent MRI she may not require another MRI    *Left breast ductal carcinoma in situ  Intermed  She has been having some discomfort in her left shoulder bladeiate grade with apocrine features  ER/TN strongly positive  Plan is for her to undergo bilateral mastectomy.  If she does undergo bilateral mastectomy then that would be curative and she would not require any endocrine therapy subsequently unless there is any upstaging of the malignancy  5/11/2024-MRI of the breast with near complete resolution of the non-mass enhancement.    *Severe diarrhea  Started last night  Patient has not used any Imodium  We will administer 1 L of normal saline today  Imodium will be given here today  She will start entegrade  Patient has not been using any Imodium.  Encouraged her to use Imodium up to 8 tablets in a day.  No diarrhea at this time.    *GERD  Protonix with improvement  Continue the same    *Cardiac health  Referral to cardio oncology placed  3/15/2024 echocardiogram with an ejection fraction of 75.5%, LV strain is normal at -24.2%  She has met with Dr. Peguero , started on bisoprolol   Continue follow-up with cardio oncology    *Elevated LFTs  4/1/2024 AST at 47, from 20.  ALT at 141, from 22.  Alkaline phosphatase at 160, from 100.  Total bilirubin remains normal.  Acute hepatitis panel negative.  Patient asked to avoid Tylenol and limit her hydrocodone usage as this does have Tylenol in it unless needed.  Also advised to avoid alcohol which she states she does not drink.  4/8/2024: AST 28, ALT 58, alk phos 102.  Total bilirubin 0.3.  Likely secondary to Abraxane.   4/15/2024-ALT 45, AST 25, alkaline phosphatase 98  AST 24, ALT 37, alkaline phosphatase 93  5/13/2024 LFTs higher with an ALT of 98 and AST 67, alkaline phosphatase 90  5/28/2024: AST 30, ALT 43, total bilirubin  0.2.  6/10/2024-ALT mildly elevated at 43, AST 26, alkaline phosphatase 77, total bilirubin 0.2.  Lab stable, proceed with Abraxane    *Hypokalemia secondary to diarrhea likely  Potassium normal today    *Alopecia-secondary to chemotherapy.  Continue follow-up with supportive oncology    *Dysuria-UA with 2+ bacteria.  Start bactrim twice daily x 3 days.    Repeat UA negative    *Neuropathy  Held treatment  due to neuropathy  5/6/2024-resumed chemotherapy  She was started on gabapentin 300 mg nightly and also vitamin B12.  Reports stable neuropathy  Proceed with planned chemotherapy today   MRI shows near complete resolution of the abnormalities and if patient experiences any worsening neuropathy then low threshold to stop chemotherapy and proceed with surgery  Neuropathy overall stable.  Continue with the last dose of Abraxane    *Chapped lips   Prescribed triamcinolone to help with healing    PLAN:   Proceed with Abraxane today, with previous 20% dose reduction.  No further dose reduction needed for elevated LFTs.   Continue cold therapy to the hands/feet.  Will need to monitor closely for neuropathy.  Continue gabapentin and B12  MRI shows near complete resolution of the abnormalities and if patient experiences any worsening neuropathy then low threshold to stop chemotherapy and proceed with surgery  Last dose of Abraxane next week  Continue every 3 weekly Herceptin Perjeta.  Echo scheduled 6/17/2024    The patient is on high risk medication that requires close monitoring for toxicity.  40 minutes total spent on the encounter including reviewing the medical records, face-to-face time with the patient as well as documentation on the same day.

## 2024-06-12 DIAGNOSIS — C50.911 MALIGNANT NEOPLASM OF RIGHT FEMALE BREAST, UNSPECIFIED ESTROGEN RECEPTOR STATUS, UNSPECIFIED SITE OF BREAST: Primary | ICD-10-CM

## 2024-06-14 ENCOUNTER — HOSPITAL ENCOUNTER (OUTPATIENT)
Dept: ULTRASOUND IMAGING | Facility: HOSPITAL | Age: 69
Discharge: HOME | End: 2024-06-14
Admitting: SURGERY
Payer: COMMERCIAL

## 2024-06-14 PROCEDURE — 76642 ULTRASOUND BREAST LIMITED: CPT

## 2024-06-17 ENCOUNTER — OFFICE VISIT (OUTPATIENT)
Dept: ONCOLOGY | Facility: CLINIC | Age: 69
End: 2024-06-17
Payer: COMMERCIAL

## 2024-06-17 ENCOUNTER — TELEPHONE (OUTPATIENT)
Dept: SURGERY | Facility: CLINIC | Age: 69
End: 2024-06-17
Payer: COMMERCIAL

## 2024-06-17 ENCOUNTER — HOSPITAL ENCOUNTER (OUTPATIENT)
Dept: CARDIOLOGY | Facility: HOSPITAL | Age: 69
Discharge: HOME | End: 2024-06-17
Admitting: INTERNAL MEDICINE
Payer: COMMERCIAL

## 2024-06-17 ENCOUNTER — INFUSION (OUTPATIENT)
Dept: ONCOLOGY | Facility: HOSPITAL | Age: 69
End: 2024-06-17
Payer: COMMERCIAL

## 2024-06-17 VITALS
HEIGHT: 58 IN | OXYGEN SATURATION: 96 % | HEART RATE: 85 BPM | DIASTOLIC BLOOD PRESSURE: 75 MMHG | SYSTOLIC BLOOD PRESSURE: 137 MMHG | WEIGHT: 117.9 LBS | TEMPERATURE: 98.4 F | BODY MASS INDEX: 24.75 KG/M2 | RESPIRATION RATE: 16 BRPM

## 2024-06-17 VITALS
HEART RATE: 76 BPM | WEIGHT: 120 LBS | BODY MASS INDEX: 25.19 KG/M2 | HEIGHT: 58 IN | DIASTOLIC BLOOD PRESSURE: 64 MMHG | SYSTOLIC BLOOD PRESSURE: 130 MMHG

## 2024-06-17 DIAGNOSIS — Z45.2 ENCOUNTER FOR FITTING AND ADJUSTMENT OF VASCULAR CATHETER: ICD-10-CM

## 2024-06-17 DIAGNOSIS — Z17.1 MALIGNANT NEOPLASM OF LOWER-OUTER QUADRANT OF RIGHT BREAST OF FEMALE, ESTROGEN RECEPTOR NEGATIVE: ICD-10-CM

## 2024-06-17 DIAGNOSIS — C50.511 MALIGNANT NEOPLASM OF LOWER-OUTER QUADRANT OF RIGHT BREAST OF FEMALE, ESTROGEN RECEPTOR POSITIVE: Primary | ICD-10-CM

## 2024-06-17 DIAGNOSIS — D05.10 DUCTAL CARCINOMA IN SITU (DCIS) OF BREAST, UNSPECIFIED LATERALITY: ICD-10-CM

## 2024-06-17 DIAGNOSIS — Z17.0 MALIGNANT NEOPLASM OF LOWER-OUTER QUADRANT OF RIGHT BREAST OF FEMALE, ESTROGEN RECEPTOR POSITIVE: Primary | ICD-10-CM

## 2024-06-17 DIAGNOSIS — C50.511 MALIGNANT NEOPLASM OF LOWER-OUTER QUADRANT OF RIGHT BREAST OF FEMALE, ESTROGEN RECEPTOR NEGATIVE: ICD-10-CM

## 2024-06-17 DIAGNOSIS — G62.0 CHEMOTHERAPY-INDUCED PERIPHERAL NEUROPATHY: ICD-10-CM

## 2024-06-17 DIAGNOSIS — Z79.899 HIGH RISK MEDICATION USE: ICD-10-CM

## 2024-06-17 DIAGNOSIS — T45.1X5A CHEMOTHERAPY-INDUCED PERIPHERAL NEUROPATHY: ICD-10-CM

## 2024-06-17 LAB
ALBUMIN SERPL-MCNC: 4.3 G/DL (ref 3.5–5.2)
ALBUMIN/GLOB SERPL: 1.8 G/DL
ALP SERPL-CCNC: 87 U/L (ref 39–117)
ALT SERPL W P-5'-P-CCNC: 15 U/L (ref 1–33)
ANION GAP SERPL CALCULATED.3IONS-SCNC: 11.8 MMOL/L (ref 5–15)
AST SERPL-CCNC: 25 U/L (ref 1–32)
AV LVOT PEAK GRADIENT: 7 MMHG
BASOPHILS # BLD AUTO: 0.05 10*3/MM3 (ref 0–0.2)
BASOPHILS NFR BLD AUTO: 0.7 % (ref 0–1.5)
BH CV ECHO HCM PROVOKED PEAK GRADIENT: 11 MMHG
BH CV ECHO LEFT VENTRICLE GLOBAL LONGITUDINAL STRAIN: -22.4 %
BH CV ECHO MEAS - EDV(CUBED): 43.6 ML
BH CV ECHO MEAS - EDV(MOD-SP2): 63 ML
BH CV ECHO MEAS - EDV(MOD-SP4): 73 ML
BH CV ECHO MEAS - EF(MOD-BP): 73.4 %
BH CV ECHO MEAS - EF(MOD-SP2): 74.6 %
BH CV ECHO MEAS - EF(MOD-SP4): 74 %
BH CV ECHO MEAS - ESV(CUBED): 7.2 ML
BH CV ECHO MEAS - ESV(MOD-SP2): 16 ML
BH CV ECHO MEAS - ESV(MOD-SP4): 19 ML
BH CV ECHO MEAS - FS: 45.1 %
BH CV ECHO MEAS - IVS/LVPW: 0.95 CM
BH CV ECHO MEAS - IVSD: 0.86 CM
BH CV ECHO MEAS - LAT PEAK E' VEL: 7.5 CM/SEC
BH CV ECHO MEAS - LV DIASTOLIC VOL/BSA (35-75): 49.8 CM2
BH CV ECHO MEAS - LV MASS(C)D: 87.6 GRAMS
BH CV ECHO MEAS - LV SYSTOLIC VOL/BSA (12-30): 13 CM2
BH CV ECHO MEAS - LVIDD: 3.5 CM
BH CV ECHO MEAS - LVIDS: 1.93 CM
BH CV ECHO MEAS - LVPWD: 0.91 CM
BH CV ECHO MEAS - MED PEAK E' VEL: 6.3 CM/SEC
BH CV ECHO MEAS - MV A DUR: 0.13 SEC
BH CV ECHO MEAS - MV A MAX VEL: 118 CM/SEC
BH CV ECHO MEAS - MV DEC SLOPE: 532 CM/SEC2
BH CV ECHO MEAS - MV DEC TIME: 0.25 SEC
BH CV ECHO MEAS - MV E MAX VEL: 76.7 CM/SEC
BH CV ECHO MEAS - MV E/A: 0.65
BH CV ECHO MEAS - MV MAX PG: 5.8 MMHG
BH CV ECHO MEAS - MV MEAN PG: 2.24 MMHG
BH CV ECHO MEAS - MV P1/2T: 57.5 MSEC
BH CV ECHO MEAS - MV V2 VTI: 22.1 CM
BH CV ECHO MEAS - MVA(P1/2T): 3.8 CM2
BH CV ECHO MEAS - PULM A REVS DUR: 0.14 SEC
BH CV ECHO MEAS - PULM A REVS VEL: 33 CM/SEC
BH CV ECHO MEAS - PULM DIAS VEL: 40.3 CM/SEC
BH CV ECHO MEAS - PULM S/D: 1.04
BH CV ECHO MEAS - PULM SYS VEL: 42 CM/SEC
BH CV ECHO MEAS - SV(MOD-SP2): 47 ML
BH CV ECHO MEAS - SV(MOD-SP4): 54 ML
BH CV ECHO MEAS - SVI(MOD-SP2): 32.1 ML/M2
BH CV ECHO MEAS - SVI(MOD-SP4): 36.8 ML/M2
BH CV ECHO MEASUREMENTS AVERAGE E/E' RATIO: 11.12
BILIRUB SERPL-MCNC: 0.3 MG/DL (ref 0–1.2)
BUN SERPL-MCNC: 14 MG/DL (ref 8–23)
BUN/CREAT SERPL: 21.5 (ref 7–25)
CALCIUM SPEC-SCNC: 9.3 MG/DL (ref 8.6–10.5)
CHLORIDE SERPL-SCNC: 101 MMOL/L (ref 98–107)
CO2 SERPL-SCNC: 25.2 MMOL/L (ref 22–29)
CREAT SERPL-MCNC: 0.65 MG/DL (ref 0.57–1)
DEPRECATED RDW RBC AUTO: 52.2 FL (ref 37–54)
EGFRCR SERPLBLD CKD-EPI 2021: 95.4 ML/MIN/1.73
EOSINOPHIL # BLD AUTO: 0.03 10*3/MM3 (ref 0–0.4)
EOSINOPHIL NFR BLD AUTO: 0.4 % (ref 0.3–6.2)
ERYTHROCYTE [DISTWIDTH] IN BLOOD BY AUTOMATED COUNT: 14.9 % (ref 12.3–15.4)
GLOBULIN UR ELPH-MCNC: 2.4 GM/DL
GLUCOSE SERPL-MCNC: 147 MG/DL (ref 65–99)
HCT VFR BLD AUTO: 33.6 % (ref 34–46.6)
HGB BLD-MCNC: 11 G/DL (ref 12–15.9)
IMM GRANULOCYTES # BLD AUTO: 0.15 10*3/MM3 (ref 0–0.05)
IMM GRANULOCYTES NFR BLD AUTO: 2.1 % (ref 0–0.5)
LEFT ATRIUM VOLUME INDEX: 24.1 ML/M2
LV EF 2D ECHO EST: 70 %
LYMPHOCYTES # BLD AUTO: 2.6 10*3/MM3 (ref 0.7–3.1)
LYMPHOCYTES NFR BLD AUTO: 37 % (ref 19.6–45.3)
MCH RBC QN AUTO: 31.3 PG (ref 26.6–33)
MCHC RBC AUTO-ENTMCNC: 32.7 G/DL (ref 31.5–35.7)
MCV RBC AUTO: 95.7 FL (ref 79–97)
MONOCYTES # BLD AUTO: 0.58 10*3/MM3 (ref 0.1–0.9)
MONOCYTES NFR BLD AUTO: 8.3 % (ref 5–12)
NEUTROPHILS NFR BLD AUTO: 3.61 10*3/MM3 (ref 1.7–7)
NEUTROPHILS NFR BLD AUTO: 51.5 % (ref 42.7–76)
NRBC BLD AUTO-RTO: 0 /100 WBC (ref 0–0.2)
PLATELET # BLD AUTO: 334 10*3/MM3 (ref 140–450)
PMV BLD AUTO: 10.3 FL (ref 6–12)
POTASSIUM SERPL-SCNC: 3.8 MMOL/L (ref 3.5–5.2)
PROT SERPL-MCNC: 6.7 G/DL (ref 6–8.5)
RBC # BLD AUTO: 3.51 10*6/MM3 (ref 3.77–5.28)
SODIUM SERPL-SCNC: 138 MMOL/L (ref 136–145)
WBC NRBC COR # BLD AUTO: 7.02 10*3/MM3 (ref 3.4–10.8)

## 2024-06-17 PROCEDURE — 93321 DOPPLER ECHO F-UP/LMTD STD: CPT

## 2024-06-17 PROCEDURE — 93356 MYOCRD STRAIN IMG SPCKL TRCK: CPT | Performed by: INTERNAL MEDICINE

## 2024-06-17 PROCEDURE — 93308 TTE F-UP OR LMTD: CPT

## 2024-06-17 PROCEDURE — 96375 TX/PRO/DX INJ NEW DRUG ADDON: CPT

## 2024-06-17 PROCEDURE — 25010000002 PACLITAXEL PROTEIN-BOUND PART PER 1 MG: Performed by: NURSE PRACTITIONER

## 2024-06-17 PROCEDURE — 93308 TTE F-UP OR LMTD: CPT | Performed by: INTERNAL MEDICINE

## 2024-06-17 PROCEDURE — 96413 CHEMO IV INFUSION 1 HR: CPT

## 2024-06-17 PROCEDURE — 25010000002 HEPARIN LOCK FLUSH PER 10 UNITS: Performed by: INTERNAL MEDICINE

## 2024-06-17 PROCEDURE — 99214 OFFICE O/P EST MOD 30 MIN: CPT | Performed by: NURSE PRACTITIONER

## 2024-06-17 PROCEDURE — 93325 DOPPLER ECHO COLOR FLOW MAPG: CPT

## 2024-06-17 PROCEDURE — 93356 MYOCRD STRAIN IMG SPCKL TRCK: CPT

## 2024-06-17 PROCEDURE — 85025 COMPLETE CBC W/AUTO DIFF WBC: CPT

## 2024-06-17 PROCEDURE — 25010000002 DEXAMETHASONE SODIUM PHOSPHATE 100 MG/10ML SOLUTION: Performed by: NURSE PRACTITIONER

## 2024-06-17 PROCEDURE — 80053 COMPREHEN METABOLIC PANEL: CPT

## 2024-06-17 PROCEDURE — 25510000001 PERFLUTREN (DEFINITY) 8.476 MG IN SODIUM CHLORIDE (PF) 0.9 % 10 ML INJECTION: Performed by: INTERNAL MEDICINE

## 2024-06-17 PROCEDURE — 25810000003 SODIUM CHLORIDE 0.9 % SOLUTION: Performed by: NURSE PRACTITIONER

## 2024-06-17 PROCEDURE — 25010000002 DIPHENHYDRAMINE PER 50 MG: Performed by: NURSE PRACTITIONER

## 2024-06-17 PROCEDURE — 93325 DOPPLER ECHO COLOR FLOW MAPG: CPT | Performed by: INTERNAL MEDICINE

## 2024-06-17 PROCEDURE — 93321 DOPPLER ECHO F-UP/LMTD STD: CPT | Performed by: INTERNAL MEDICINE

## 2024-06-17 RX ORDER — DIPHENHYDRAMINE HYDROCHLORIDE 50 MG/ML
50 INJECTION INTRAMUSCULAR; INTRAVENOUS AS NEEDED
Status: CANCELLED | OUTPATIENT
Start: 2024-06-17

## 2024-06-17 RX ORDER — MONTELUKAST SODIUM 10 MG/1
10 TABLET ORAL ONCE
Status: CANCELLED | OUTPATIENT
Start: 2024-06-17 | End: 2024-06-17

## 2024-06-17 RX ORDER — SODIUM CHLORIDE 0.9 % (FLUSH) 0.9 %
10 SYRINGE (ML) INJECTION AS NEEDED
Status: DISCONTINUED | OUTPATIENT
Start: 2024-06-17 | End: 2024-06-17 | Stop reason: HOSPADM

## 2024-06-17 RX ORDER — GABAPENTIN 300 MG/1
300 CAPSULE ORAL 2 TIMES DAILY
Qty: 60 CAPSULE | Refills: 1 | Status: SHIPPED | OUTPATIENT
Start: 2024-06-17

## 2024-06-17 RX ORDER — FAMOTIDINE 10 MG/ML
20 INJECTION, SOLUTION INTRAVENOUS AS NEEDED
Status: CANCELLED | OUTPATIENT
Start: 2024-06-17

## 2024-06-17 RX ORDER — HEPARIN SODIUM (PORCINE) LOCK FLUSH IV SOLN 100 UNIT/ML 100 UNIT/ML
500 SOLUTION INTRAVENOUS AS NEEDED
Status: DISCONTINUED | OUTPATIENT
Start: 2024-06-17 | End: 2024-06-17 | Stop reason: HOSPADM

## 2024-06-17 RX ORDER — HEPARIN SODIUM (PORCINE) LOCK FLUSH IV SOLN 100 UNIT/ML 100 UNIT/ML
500 SOLUTION INTRAVENOUS AS NEEDED
OUTPATIENT
Start: 2024-06-17

## 2024-06-17 RX ORDER — FAMOTIDINE 10 MG/ML
20 INJECTION, SOLUTION INTRAVENOUS ONCE
Status: CANCELLED | OUTPATIENT
Start: 2024-06-17

## 2024-06-17 RX ORDER — PACLITAXEL 100 MG/20ML
80 INJECTION, POWDER, LYOPHILIZED, FOR SUSPENSION INTRAVENOUS ONCE
Status: COMPLETED | OUTPATIENT
Start: 2024-06-17 | End: 2024-06-17

## 2024-06-17 RX ORDER — MONTELUKAST SODIUM 10 MG/1
10 TABLET ORAL ONCE
Status: COMPLETED | OUTPATIENT
Start: 2024-06-17 | End: 2024-06-17

## 2024-06-17 RX ORDER — PACLITAXEL 100 MG/20ML
80 INJECTION, POWDER, LYOPHILIZED, FOR SUSPENSION INTRAVENOUS ONCE
Status: CANCELLED | OUTPATIENT
Start: 2024-06-17

## 2024-06-17 RX ORDER — SODIUM CHLORIDE 9 MG/ML
20 INJECTION, SOLUTION INTRAVENOUS ONCE
Status: COMPLETED | OUTPATIENT
Start: 2024-06-17 | End: 2024-06-17

## 2024-06-17 RX ORDER — SODIUM CHLORIDE 9 MG/ML
20 INJECTION, SOLUTION INTRAVENOUS ONCE
Status: CANCELLED | OUTPATIENT
Start: 2024-06-17

## 2024-06-17 RX ORDER — FAMOTIDINE 10 MG/ML
20 INJECTION, SOLUTION INTRAVENOUS ONCE
Status: COMPLETED | OUTPATIENT
Start: 2024-06-17 | End: 2024-06-17

## 2024-06-17 RX ORDER — SODIUM CHLORIDE 0.9 % (FLUSH) 0.9 %
10 SYRINGE (ML) INJECTION AS NEEDED
OUTPATIENT
Start: 2024-06-17

## 2024-06-17 RX ADMIN — PACLITAXEL 120 MG: 100 INJECTION, POWDER, LYOPHILIZED, FOR SUSPENSION INTRAVENOUS at 14:08

## 2024-06-17 RX ADMIN — Medication 500 UNITS: at 14:44

## 2024-06-17 RX ADMIN — MONTELUKAST 10 MG: 10 TABLET, FILM COATED ORAL at 12:51

## 2024-06-17 RX ADMIN — PERFLUTREN 2 ML: 6.52 INJECTION, SUSPENSION INTRAVENOUS at 08:51

## 2024-06-17 RX ADMIN — SODIUM CHLORIDE 20 ML/HR: 9 INJECTION, SOLUTION INTRAVENOUS at 12:53

## 2024-06-17 RX ADMIN — DIPHENHYDRAMINE HYDROCHLORIDE 25 MG: 50 INJECTION, SOLUTION INTRAMUSCULAR; INTRAVENOUS at 12:54

## 2024-06-17 RX ADMIN — Medication 10 ML: at 14:44

## 2024-06-17 RX ADMIN — FAMOTIDINE 20 MG: 10 INJECTION INTRAVENOUS at 13:29

## 2024-06-17 RX ADMIN — DEXAMETHASONE SODIUM PHOSPHATE 12 MG: 10 INJECTION, SOLUTION INTRAMUSCULAR; INTRAVENOUS at 13:11

## 2024-06-17 NOTE — TELEPHONE ENCOUNTER
RIGHT ultrasound [facility]-  Prevoiusly noted mass at 8:00, 8 CFN is no longer visible and a clip is not confidently identified.

## 2024-06-17 NOTE — PROGRESS NOTES
I left a VM that this looks great and is normal/unchanged. She has a repeat echo in 3 mo and an appt with us.

## 2024-06-17 NOTE — PROGRESS NOTES
Subjective   Felicia Boyle is a 68 y.o. female.  Referred by Dr. Manrique for right breast invasive ductal carcinoma, HER2 positive, left breast ductal carcinoma in situ    History of Present Illness     Ms. Boyle is a 68-year-old postmenopausal [place] lady who is fairly healthy without any significant comorbidities palpated of right breast mass in the latter part of  which was further worked up with diagnostic mammogram and ultrasound and subsequently a biopsy which confirmed invasive ductal carcinoma which was ER/TX negative and HER2 positive.    Family history significant for her 2 sisters with breast cancer at the age of 38 and 48 respectively.  Her older sister  of metastatic breast cancer but her younger sister is doing well.  Patient underwent genetic testing in 2017 which was negative due to her family history.    She had regular screening mammograms up until 2019 but subsequently stopped having annual gynecological visits and therefore did not have any further screening mammograms up until the most recent diagnostic mammogram for the new palpable abnormality.    2024-bilateral diagnostic mammogram and right breast ultrasound  The breast heterogeneously dense.  Left breast with a scar marker in the lower left breast dating back to .  No new findings in the left breast.    Right breast at 8:00, 8 cm from the nipple there is a 1.7 x 1.4 x 1.6 cm oval high density mass with partially circumscribed and partially indistinct margins.    On the ultrasound the mass measures 1.7 x 1 x 1.3 cm.    No abnormal right axilla lymphadenopathy.    2024-right breast ultrasound-guided biopsy  Pathology consistent with invasive ductal carcinoma  Grade 3  9.3 mm of invasive disease on the core biopsy  ER negative  TX negative  HER2 3+ on immunohistochemistry, 95%  Ki-67 70%    2024-bilateral breast MRI  In the right breast at 8:00, 8 cm from the nipple there is a irregular enhancing mass which  measures 1.8 x 2 x 1.7 cm.  This corresponds to the biopsy-proven malignancy.  Extending anteriorly away from the MT margin of the mass there is an irregular linear enhancement which measures 3.5 x 1 x 0.7 cm.  The whole area including the mass measures 4.7 cm in AP dimension, 1.7 in mediolateral and 2.4 cm in the superior-inferior dimension.    At 12:00, 3 cm from the nipple there is an irregular non-mass enhancement which measures 1 x 1.1 x 1.2 cm.  No mammographic correlate is appreciated.    No other suspicious areas of enhancement in the right breast or right axilla or internal mammary chain.    In the left breast, middle third at 11:30 position, 3.7 cm from the nipple there is an irregular area of non-mass enhancement which measures 1.3 x 1.1 x 1 cm.  There is suspected architectural distortion.  No other areas of abnormal enhancement seen in the left breast of the left axilla or the internal mammary chain.    2/26/2024-additional MRI guided biopsies    1.left breast 12:00-intermediate grade ductal carcinoma in situ with apocrine features  ER +91 to 100% strong  ID +81 to 90% strong  Ki-67 15%    2. Right breast 12 o'clock position MRI guided biopsy of the non-mass enhancement-sclerosing adenosis with associated calcifications  No atypical hyperplasia in situ or invasive carcinoma    3.right breast 8:00 MRI guided biopsies of the linear enhancement is consistent with high-grade ductal carcinoma in situ  DCIS involves multiple tissue cores measuring up to 3 mm.  Sclerosing adenosis and usual ductal hyperplasia with associated microcalcifications.    The case was discussed in tumor board and Dr. Patel thought that they could be more invasive disease in the 4.7 cm of linear enhancement noted in the right breast at the site of the mass.  Therefore it was decided to proceed with neoadjuvant chemotherapy.    Patient was a former smoker and smoked for about 40 years, half a pack per week, quit about 1 month ago.   Denies any alcohol use.    Week 1 Taxol Herceptin and Perjeta was planned for 3/18/2024.  Patient received Herceptin and Perjeta and had some chills for which she received Solu-Medrol and responded well to that.  However after infusing only 1 cc of Taxol patient experienced a severe anaphylactic reaction due to which the infusion was stopped permanently and she received an additional dose of Solu-Medrol and Benadryl.  Symptoms resolved subsequently and patient was discharged home safely.    Due to this recent treatment has been changed to Abraxane along with Herceptin and Perjeta.  3/25/2024-patient is due for her first treatment with Abraxane.    She is extremely anxious given the severe hypersensitivity reaction that she experienced with Taxol.  She is also complaining of increased belching and heartburn since her last chemotherapy.  She is complaining of severe diarrhea with started last night.  Had multiple loose stools.    5/11/2024-MRI of the breast-images independently reviewed and interpreted by me.  Interval near complete resolution of all suspicious enhancement in the posterior one third lower outer quadrant of the right breast at 8:00 corresponding to the biopsy-proven site of malignancy.  Who has been on masslike area of enhancement measuring 1.5 x 0.9 x 0.9 cm compared to 2 x 1.8 x 1.7 cm.  Mildly abnormal enhancement extending anteriorly away from the mass has resolved.  No other suspicious areas of enhancement noted in the right breast.  Left breast at 12:00 interval resolution of all suspicious enhancement.    INTERVAL HISTORY     She presents today for the 12th week of Abraxane today. She continues to report neuropathy that is intermittent. It has gotten worse since the start of treatment, however, remained stable over the past week. She is apprehensive about moving forward with surgery and will be discussing surgical plan with Dr. Manrique on Thursday.       The following portions of the patient's  "history were reviewed and updated as appropriate: allergies, current medications, past family history, past medical history, past social history, past surgical history, and problem list.    Past Medical History:   Diagnosis Date    Anxiety about health     Breast cancer of lower-outer quadrant of right female breast 02/14/2024    DCIS (ductal carcinoma in situ) of breast 03/15/2024    Diverticulosis     Mixed hyperlipidemia 05/19/2019    DIET CONTROLLED    Osteoporosis     Type 2 diabetes mellitus         Past Surgical History:   Procedure Laterality Date    BREAST BIOPSY Left     Excisional biopsy in the [facility]    BREAST BIOPSY  03/2024    CATARACT EXTRACTION W/ INTRAOCULAR LENS IMPLANT Bilateral     CHOLECYSTECTOMY  2012    COLON RESECTION Right 2002    COLONOSCOPY N/A 04/28/2021    Procedure: COLONOSCOPY TO ANASTAMOSIS/TI;  Surgeon: Angelo Gonsalez MD;  Location: [facility] ENDOSCOPY;  Service: Gastroenterology;  Laterality: N/A;  PRE: SCREENING  POST: NORMAL POST-OP ANATOMY    ENDOSCOPY      2013    ENDOSCOPY N/A 03/10/2017    Procedure: ESOPHAGOGASTRODUODENOSCOPY WITH BIOPSY AND PETER DILATATION 54\";  Surgeon: Angelo Gonsalez MD;  Location: [facility] ENDOSCOPY;  Service:     ENDOSCOPY N/A 04/28/2021    Procedure: ESOPHAGOGASTRODUODENOSCOPY WITH BIOPSIES, 54FR PETER DILATATION;  Surgeon: Angelo Gonsalez MD;  Location: [facility] ENDOSCOPY;  Service: Gastroenterology;  Laterality: N/A;  PRE: DYSPHAGIA  POST: GASTRITIS, ESOPHAGEAL RING    VENOUS ACCESS DEVICE (PORT) INSERTION Left 3/14/2024    Procedure: INSERTION OF PORTACATH;  Surgeon: Abeba Manrique MD;  Location: [facility] OR;  Service: General;  Laterality: Left;        Family History   Problem Relation Age of Onset    Hypertension Mother     Diabetes Mother     Hypertension Father     Breast cancer Sister 48    Breast cancer Sister 38    No Known Problems Brother     No Known Problems Daughter     No Known Problems Son     No Known Problems Maternal " "Grandmother     No Known Problems Paternal Grandmother     No Known Problems Maternal Grandfather     No Known Problems Paternal Grandfather     Autism Grandson     Colon cancer Neg Hx     Rectal cancer Neg Hx     Endometrial cancer Neg Hx     Vaginal cancer Neg Hx     Cervical cancer Neg Hx     Ovarian cancer Neg Hx     Prostate cancer Neg Hx     Uterine cancer Neg Hx     Malig Hyperthermia Neg Hx         Social History     Socioeconomic History    Marital status:      Spouse name: Ryan   Tobacco Use    Smoking status: Former     Current packs/day: 0.00     Average packs/day: 0.3 packs/day for 15.0 years (3.8 ttl pk-yrs)     Types: Cigarettes     Quit date: 1/15/2024     Years since quittin.4     Passive exposure: Past    Smokeless tobacco: Never    Tobacco comments:     N/A   Vaping Use    Vaping status: Never Used   Substance and Sexual Activity    Alcohol use: Yes     Comment: RARE    Drug use: No    Sexual activity: Not Currently     Partners: Male     Birth control/protection: Tubal ligation        OB History          4    Para   3    Term   3       0    AB   1    Living   3         SAB   1    IAB   0    Ectopic   0    Molar   0    Multiple   0    Live Births   3               Age at menarche-16  Age at first live childbirth-18   4 para 3  1  Age at menopause-49  No use of hormone replacement therapy  No use of oral conceptive pills  Breast-feeding for 3 months    Allergies   Allergen Reactions    Metronidazole Nausea And Vomiting      Review of systems as mentioned HPI otherwise negative    Objective   Blood pressure 137/75, pulse 85, temperature 98.4 °F (36.9 °C), temperature source Oral, resp. rate 16, height 147.3 cm (58\"), weight 53.5 kg (117 lb 14.4 oz), SpO2 96%, not currently breastfeeding.     Physical Exam  Vitals reviewed.   Constitutional:       Appearance: Normal appearance. She is normal weight.   HENT:      Nose: Nose normal.      Mouth/Throat:      " Mouth: Mucous membranes are moist.      Pharynx: Oropharynx is clear.   Eyes:      Conjunctiva/sclera: Conjunctivae normal.      Pupils: Pupils are equal, round, and reactive to light.   Cardiovascular:      Rate and Rhythm: Normal rate.   Pulmonary:      Effort: Pulmonary effort is normal.      Breath sounds: Normal breath sounds.   Abdominal:      General: Abdomen is flat. Bowel sounds are normal.   Musculoskeletal:         General: Normal range of motion.      Cervical back: Normal range of motion.   Skin:     General: Skin is warm and dry.      Findings: No rash.   Neurological:      General: No focal deficit present.      Mental Status: She is alert and oriented to person, place, and time.   Psychiatric:         Mood and Affect: Mood normal.         Behavior: Behavior normal.         Thought Content: Thought content normal.         Judgment: Judgment normal.       Breast Exam: Right breast on inspection 9 o'clock position with biopsy site changes.  The right breast mass is no longer palpable.  No right axillary lymphadenopathy.    Left breast :There is a palpable underlying hematoma which is tender to palpation from MRI guided biopsies, improved. No left axillary lymphadenopathy.     I have reexamined the patient and the results are consistent with the previously documented exam. Madelyn Licona, DAY      Results from last 7 days   Lab Units 06/17/24  1125   WBC 10*3/mm3 7.02   NEUTROS ABS 10*3/mm3 3.61   HEMOGLOBIN g/dL 11.0*   HEMATOCRIT % 33.6*   PLATELETS 10*3/mm3 334     Results from last 7 days   Lab Units 06/17/24  1125   SODIUM mmol/L 138   POTASSIUM mmol/L 3.8   CHLORIDE mmol/L 101   CO2 mmol/L 25.2   BUN mg/dL 14   CREATININE mg/dL 0.65   CALCIUM mg/dL 9.3   ALBUMIN g/dL 4.3   BILIRUBIN mg/dL 0.3   ALK PHOS U/L 87   ALT (SGPT) U/L 15   AST (SGOT) U/L 25   GLUCOSE mg/dL 147*              No radiology results for the last 30 days.       Assessment & Plan     *Right breast invasive ductal  carcinoma  Clinical T2 N0 M0, stage IIa, clinical and prognostic ER/NV negative, HER2 3+ immunohistochemistry, Ki-67 70%  On the MRI the mass measures 2 cm however there is linear enhancement which in the AP dimension measures 4.7 cm.  Additional biopsies of this linear enhancement was performed and consistent with DCIS  It is hard to delineate the amount of invasive disease as opposed to DCIS.  More likely than not invasive component is likely limited to the mass which is about 2-1/2 cm on exam.  Since the tumor is greater than 2 cm I think it is reasonable to proceed with neoadjuvant chemotherapy.  We discussed proceeding with weekly Taxol along with Herceptin and Perjeta every 3 weeks.  After 12 weeks she will require an MRI to assess the response.  If there is inadequate response then we may have to proceed with Adriamycin and Cytoxan for 4 cycles however if there is a good response then she can proceed with surgery with no additional chemotherapy.  The likely plan is that she will undergo bilateral mastectomy.  If that is the case as long as she does not have any positive margins or lymph node positive disease she may not need radiation.  We discussed continuing adjuvant HER2 directed therapy for whole year  3/18/2024-received first dose of Herceptin and Perjeta, had a severe hypersensitivity reaction to Taxol and hence Taxol was discontinued  3/25/2024-Labs reviewed and stable to proceed with chemotherapy  Patient is extremely anxious today after experiencing the severe hypersensitivity reaction last week.  Reassured patient that this is unlikely to happen with Abraxane and we will premedicate with extra Solu-Medrol..  4/1/2024 patient returns for cycle 1 day 15 Abraxane.,  Left chest, left shoulder at times.  This has come and gone over the past week and is not associated with any shortness of breath, numbness or tingling.  She states when her chest is hurting if she presses on the area it gets better.  She  "notices it at different points during the day.  It feels more like an ache and is not a sharp pain.  She states she has been sleeping in a different position due to the port which could be causing some muscular discomfort.  She has noticed the discomfort at times when she takes a deep breath however is able to get deep breath here in the office did not have any pain.  She is not having any pain today at all.  She has taken her hydrocodone maybe 3-4 times over the past week and has not needed it multiple times a day.  CMP resulted today with AST elevated at 47 and .  Bilirubin remains normal at 0.4 with alkaline phosphatase elevated to 160.  Reviewed with Dr. Loredo and will hold treatment today.  She will try to limit any acetaminophen containing products.  She did have a few days of diarrhea that she is unsure if it was related to the Abraxane versus a GI bug as her  had the same symptoms.  Neither had chills or fevers.  She is also having some dysuria today we have a urinalysis with culture pending.  4/8/2024: Due for cycle 2-day 1 Abraxane, Herceptin, Perjeta.  Did not receive cycle 1 day 15 Abraxane due to elevated LFTs as detailed above.  LFTs have significantly improved, AST 28, ALT 58.  Reviewed with Dr. Renteria will proceed with Abraxane only today, will reduce Abraxane by 20%.  4/15/2024-patient is scheduled for week 4 of chemotherapy today.  Labs reviewed and overall stable except for an ALT of 45.  She will proceed with planned chemotherapy.  4/22/2024: after last chemotherapy patient experienced 3 episodes of \"zapping\" in her fingertips and soles of feet that last a few seconds then resolved.  Has not experienced this sensation in at least 3 days.  No interference with daily activities.  Will continue Abraxane at previous 20% dose reduction.  She is doing cold therapy for her hands/feet.  Will need to monitor closely for neuropathy.  4/29/2024-chemotherapy held as patient reported worsening " neuropathy and dropping things.  5/6/2024-presents today for evaluation of the neuropathy and to determine if she can resume chemotherapy.  She reports that the neuropathy has improved some.  She is mainly perceiving pain in her fingers and tingling and numbness.   She however tells me that she has not been dropping things anymore.  She is able to do other fine motor activities just fine.  5/6/2024-patient did receive Abraxane with a 20% dose reduction.  5/13/2024-presents today for the neck cycle of Abraxane and since her last treatment she has not had any worsening of neuropathy.  We also started her on gabapentin 300 mg nightly.  Neuropathy overall stable.  Labs reviewed and she does have elevated LFTs however no further dose reduction needed.  Will proceed with planned chemotherapy today.  5/11/2024-MRI of the breast shows near complete resolution of the non-mass enhancement and areas of malignancy in both right and left breast.  This is reassuring and if patient has any further worsening of neuropathy then we could discontinue Abraxane and she will proceed with surgery and continue HER2 directed therapy.  5/28/2024: Cycle 4-day 8 Abraxane only, continue previous 20% dose reduction.  Continues on gabapentin, neuropathy stable.  6/10/2024-scheduled for Abraxane Herceptin and Perjeta.  Neuropathy overall stable.  She will continue with 20% dose reduction. She has 1 more week of Abraxane left following which she will have to proceed with surgery followed by adjuvant HER2 directed therapy.  Patient is very reluctant to undergo surgery.  Communicated with Dr. Manrique and she will be scheduled to see her after completion of the last treatment of Abraxane.  Since she had a recent MRI she may not require another MRI   Proceed with last dose of Abraxane today 6/17/2024. Maintain previous dosing with 20% dose reduction.     *Left breast ductal carcinoma in situ  Intermed  She has been having some discomfort in her left  shoulder bladeiate grade with apocrine features  ER/AZ strongly positive  Plan is for her to undergo bilateral mastectomy.  If she does undergo bilateral mastectomy then that would be curative and she would not require any endocrine therapy subsequently unless there is any upstaging of the malignancy  5/11/2024-MRI of the breast with near complete resolution of the non-mass enhancement.    *Severe diarrhea  Started last night  Patient has not used any Imodium  We will administer 1 L of normal saline today  Imodium will be given here today  She will start entegrade  Patient has not been using any Imodium.  Encouraged her to use Imodium up to 8 tablets in a day.  No diarrhea at this time.    *GERD  Protonix with improvement  Continue the same    *Cardiac health  Referral to cardio oncology placed  3/15/2024 echocardiogram with an ejection fraction of 75.5%, LV strain is normal at -24.2%  She has met with Dr. Peguero , started on bisoprolol   Continue follow-up with cardio oncology    *Elevated LFTs  4/1/2024 AST at 47, from 20.  ALT at 141, from 22.  Alkaline phosphatase at 160, from 100.  Total bilirubin remains normal.  Acute hepatitis panel negative.  Patient asked to avoid Tylenol and limit her hydrocodone usage as this does have Tylenol in it unless needed.  Also advised to avoid alcohol which she states she does not drink.  4/8/2024: AST 28, ALT 58, alk phos 102.  Total bilirubin 0.3.  Likely secondary to Abraxane.   4/15/2024-ALT 45, AST 25, alkaline phosphatase 98  AST 24, ALT 37, alkaline phosphatase 93  5/13/2024 LFTs higher with an ALT of 98 and AST 67, alkaline phosphatase 90  5/28/2024: AST 30, ALT 43, total bilirubin 0.2.  6/10/2024-ALT mildly elevated at 43, AST 26, alkaline phosphatase 77, total bilirubin 0.2.  Lab stable, proceed with Abraxane  6/17/2024 : AST 25, ALT 15    *Hypokalemia secondary to diarrhea likely  Potassium normal today at 3.8    *Alopecia-secondary to chemotherapy.  Continue follow-up  with supportive oncology    *Dysuria-UA with 2+ bacteria.  Start bactrim twice daily x 3 days.    Repeat UA negative    *Neuropathy  Held treatment  due to neuropathy  5/6/2024-resumed chemotherapy  She was started on gabapentin 300 mg nightly and also vitamin B12.  Reports stable neuropathy  Proceed with planned chemotherapy today   MRI shows near complete resolution of the abnormalities and if patient experiences any worsening neuropathy then low threshold to stop chemotherapy and proceed with surgery  The remains intermittent though occasionally painful in her feet.  Will increase gabapentin to 300 mg twice daily    PLAN:   Proceed with Abraxane, final dose today, with previous 20% dose reduction.   Increase gabapentin 300mg to twice daily, this was refilled today  Continue cold therapy to the hands/feet.  Will need to monitor closely for neuropathy.  Continue gabapentin and B12  Again discussed recommendation to proceed with surgery. She continues to remain apprehensive. She is scheduled to meet with Dr. Manrique on Thursday  Continue Herceptin Perjeta every 2 weeks. Next scheduled on 7/1/2024.  Nurse practitioner follow-up in 5 weeks with CBC, CMP, continued Herceptin Perjeta    The patient is on high risk medication that requires close monitoring for toxicity.  Today's plan of care was discussed and reviewed with Dr. Renteria who is in agreement    Madelyn Licona, DAY  06/17/2024

## 2024-06-18 ENCOUNTER — DOCUMENTATION (OUTPATIENT)
Dept: OTHER | Facility: HOSPITAL | Age: 69
End: 2024-06-18
Payer: COMMERCIAL

## 2024-06-18 ENCOUNTER — TELEPHONE (OUTPATIENT)
Dept: ONCOLOGY | Facility: CLINIC | Age: 69
End: 2024-06-18
Payer: COMMERCIAL

## 2024-06-18 NOTE — PROGRESS NOTES
Oncology Social Work  [facility]       Chief Complaint: fear, adjustment to dx and treatment, anxiety      Subjective  Patient ID: Felicia Boyle is a 68 y.o. female who presents to the [facility] ([facility] clinic for initial consultation at the request of Dr Renteria for supportive therapy.  Chart reviewed.  Patient diagnosed with Right breast invasive ductal carcinoma.  Clinical T2 N0 M0, stage IIa, clinical and prognostic ER/MA negative, HER2 3+ immunohistochemistry, Ki-67 70%. Patient completed Abraxane chemo on 6/17/2024.  Patient very tearful today stating that since she has had a good response to chemotherapy she does not want to undergo surgery. She has an appt with Dr Manrique on 6/20/24 to discuss. Encouraged her to write everything down and have her questions ready for her appt with Dr Manrique.       Objective   Mental Status Exam  Appearance:  clean and casually dressed, appropriate  Attitude toward clinician:  cooperative and agreeable ; a little evasive   Speech:               Rate:  slow               Volume:  soft   Motor:  no abnormal movements present  Mood:  sad and tearful   Affect:  dysphoric  Thought Processes:  linear, logical, and goal directed  Thought Content:  normal  Suicidal Thoughts:  absent  Homicidal Thoughts:  absent  Perceptual Disturbance: no perceptual disturbance  Attention and Concentration:  fair  Insight and Judgement:  fair  Memory:  memory appears to be intact     Physical Exam  Station and gait observed to be WNL- patient is active and independent with ADL's. .     Assessment: Adjustment disorder with mixed anxiety and depressed mood     Plan of Care     OSW will see patient to work through feelings and emotions, assist with adjusting and adapting, decrease anxiety, improve mood and coping.  Next Appt:  7/2/2024 at 10AM     MARISOL Vickers, SERGIO

## 2024-06-18 NOTE — TELEPHONE ENCOUNTER
Caller: Felicia Boyle    Relationship: Self    Best call back number:     [phone]     What is the best time to reach you: ASAP    Who are you requesting to speak with (clinical staff, provider,  specific staff member): CLINICAL    What was the call regarding: PT IS AT THE PHARMACY. PT WAS ADVISED TO GET VITAMIN B12 COMPLEX. THE PT SPOKE WITH THE PHARMACIST, BUT THERE IS NO SUCH THING AS VITAMIN B12 COMPLEX. THERE IS VITAMIN B COMPLEX AND THEN THERE IS VITAMIN B12. PT IS UNSURE OF WHAT TO GET.     PLEASE CALL TO ADVISE.

## 2024-06-20 ENCOUNTER — OFFICE VISIT (OUTPATIENT)
Dept: SURGERY | Facility: CLINIC | Age: 69
End: 2024-06-20
Payer: COMMERCIAL

## 2024-06-20 ENCOUNTER — PREP FOR SURGERY (OUTPATIENT)
Dept: OTHER | Facility: HOSPITAL | Age: 69
End: 2024-06-20

## 2024-06-20 VITALS
BODY MASS INDEX: 24.35 KG/M2 | HEIGHT: 58 IN | SYSTOLIC BLOOD PRESSURE: 128 MMHG | DIASTOLIC BLOOD PRESSURE: 78 MMHG | OXYGEN SATURATION: 97 % | HEART RATE: 71 BPM | WEIGHT: 116 LBS

## 2024-06-20 DIAGNOSIS — Z87.891 HISTORY OF PRIOR CIGARETTE SMOKING: ICD-10-CM

## 2024-06-20 DIAGNOSIS — R92.8 ABNORMAL MRI, BREAST: ICD-10-CM

## 2024-06-20 DIAGNOSIS — C50.911 BILATERAL MALIGNANT NEOPLASM OF BREAST IN FEMALE, UNSPECIFIED ESTROGEN RECEPTOR STATUS, UNSPECIFIED SITE OF BREAST: Primary | ICD-10-CM

## 2024-06-20 DIAGNOSIS — C50.912 BILATERAL MALIGNANT NEOPLASM OF BREAST IN FEMALE, UNSPECIFIED ESTROGEN RECEPTOR STATUS, UNSPECIFIED SITE OF BREAST: Primary | ICD-10-CM

## 2024-06-20 DIAGNOSIS — Z80.3 FH: BREAST CANCER: ICD-10-CM

## 2024-06-20 DIAGNOSIS — C50.511 MALIGNANT NEOPLASM OF LOWER-OUTER QUADRANT OF RIGHT BREAST OF FEMALE, ESTROGEN RECEPTOR NEGATIVE: Primary | ICD-10-CM

## 2024-06-20 DIAGNOSIS — Z17.1 MALIGNANT NEOPLASM OF LOWER-OUTER QUADRANT OF RIGHT BREAST OF FEMALE, ESTROGEN RECEPTOR NEGATIVE: Primary | ICD-10-CM

## 2024-06-20 DIAGNOSIS — D05.11 DUCTAL CARCINOMA IN SITU (DCIS) OF RIGHT BREAST: ICD-10-CM

## 2024-06-20 RX ORDER — DIAZEPAM 5 MG/1
10 TABLET ORAL ONCE
Status: CANCELLED | OUTPATIENT
Start: 2024-08-15 | End: 2024-06-20

## 2024-06-20 RX ORDER — SODIUM CHLORIDE, SODIUM LACTATE, POTASSIUM CHLORIDE, CALCIUM CHLORIDE 600; 310; 30; 20 MG/100ML; MG/100ML; MG/100ML; MG/100ML
100 INJECTION, SOLUTION INTRAVENOUS CONTINUOUS
Status: CANCELLED | OUTPATIENT
Start: 2024-08-15

## 2024-06-20 RX ORDER — BISOPROLOL FUMARATE 5 MG/1
TABLET, FILM COATED ORAL
COMMUNITY
Start: 2024-06-19

## 2024-06-20 NOTE — PROGRESS NOTES
Chief Complaint: Felicia Boyle is a 68 y.o. female who was seen in consultation at the request of Tristan Malone DO  for newly diagnosed breast cancer and a followup visit    History of Present Illness:  Patient presents with newly diagnosed breast cancer.  She noted a new RIGHT breast mass lower outer in 2023. She noted no other new masses, skin changes, nipple discharge, nipple changes prior to her most recent imaging.    Her most recent imaging includes the followin2019, [facility] Screening [place] Bilateral ([facility])  Scattered fibroglandular density.  BI-RADS 0    2019, [facility] Diagnostic Right ([facility])  Resolution of the area of focal asymmetry.  BI-RADS 1: Negative    2024, [facility] Diagnostic Digital [place] Bilateral, US Breast Right Limited ([facility])  INDICATION: Right breast palpable lump the patient first identified at  the end of December 3 to 4 weeks ago. The patient reports 2 sisters  with a history of breast cancer, diagnosed age 38 and 48.     The breasts are heterogeneously dense   Right breast: There is a triangular skin marker area of palpable concern, 8 cm from the nipple. The marker overlies an oval high density mass margins measuring 1.7  x 1.4 x 1.6 cm. Lymph nodes included in the right axilla have a normal mammographic appearance.    Ultrasound  8 o'clock 8 cm from the nipple solid mass 1.7 x 1.0 x 1.3 cm.   Ultrasound of the right axilla was performed with no abnormally enlarged lymph nodes.    BI-RADS 4c: Suspicious        She had a biopsy on the following day that showed:   2024, US Guided Breast Biopsy ([facility])  Right breast 8 o'clock 8 cm from the nipple. 10-gauge mammotome multiple tissue specimens bowtie shaped metallic clip was placed.    Post-biopsy mammography demonstrates bowtie shaped metallic clip centrally within a mass in the posterior one third lower outer quadrant of the right breast. No evidence for clip migration.    The pathology is concordant.      2024,  Surgical pathology report ([facility])    1.  Breast, right, 8:00, 8 cm from nipple, biopsy: Invasive ductal adenocarcinoma                -A.  The tumor is Lucernemines grade III (tubular grade 3, nuclear grade 3, mitotic grade 3).               -B.  The tumor measures up to 9.3 mm on the slide               -C.  No definitive lymphovascular invasion or perineural invasion is identified               -D.  Ductal carcinoma in situ is not identified    ER Negative (less than 1%)  VA Negative (less than 1%)  HER2 positive (Score 3+) 95%  KI-67 70%     I then arranged for a MRi:  2/9/24  [facility] BEATRIZ BREAST MRI  SHERRILL BUTLER   Posterior one third lower outer quadrant of the right  breast 8 o'clock, 8 cm from the nipple there is an irregular enhancing mass with an internal focal signal void. The mass measures 2.0 cm in anterior to posterior dimension, 1.8 cm in the superior-inferior dimension and 1.7 cm in the mediolateral dimension. This corresponds to the biopsy-proven malignancy with the centrally located bowtie shaped metallic clip. Extending anteriorly away from the anterior margin of the mass there is irregular linear  enhancement that measures on the order of 3.5 cm in anterior posterior.    There are linear areas of increased density seen mammographically in this region. This is suspicious for additional DCIS. The biopsy-proven malignancy and contiguous anterior linear enhancement  measure on the order of 4.7 cm in anterior to posterior dimension, 1.7 cm in the mediolateral dimension and 2.4 cm in the superior to inferior dimension. anterior one third of the right breast at the 12 o'clock position centered on the order of 3 cm from the nipple there is an irregular area  of non-mass enhancement that measures 1.0 cm in superior-inferior dimension, 1.1 cm in anterior to posterior dimension and 1.2 cm in the medial to lateral dimension. No mammographic correlate is appreciated.  No other areas of suspicious enhancement or  morphology are seen in the  right breast. I see no evidence for abnormal skin, nipple or chest wall  enhancement of the right breast and there is no evidence for right  axillary or internal mammary chain adenopathy.  In the middle third of the left breast centered at the 11:30 position on the order of 3.7 cm from the nipple there is an irregular area of non-mass enhancement that measures on the order of 1.3 cm in anterior posterior dimension, 1.1 cm in the superior to inferior dimension and 1.0 cm in the mediolateral dimension. There is suspected architectural distortion. No mammographic correlate is appreciated.  No other areas of abnormal enhancement or morphology are seen within the left breast. No evidence for left axillary or internal mammary chain adenopathy.   IMPRESSION: Biopsy proven malignancy right. The entire mass and linear enhancement measure on the order of 4.7 cm. If breast conservation therapy is desired and biopsy of this region is desired, it is my opinion that this would be best performed under MRI guidance. 2. 1.2 cm irregular area o non mass enhancement in the right breast 12 o'clock position. No mammographic correlate is appreciated. Further evaluation with an MRI guided right breast biopsy should be considered. 3. 1.1 cm irregular area of non mass enhancement seen n middle third of the left breast at th 11:30 position. MRI guided left breast biopsy is recommended. BI-RADS 4 Suspicious abnormality. Biopsy should be considered.       She has not had a breast biopsy in the past.  She has her uterus and ovaries, is postmenopausal, and takes no hormones.    Her family history includes the following:   She has 2 of 7 sisters with breast cancer in their 30s-40s.  Dr Perry sent testing years ago, as follows    09/01/2017, Genetics ([facility])  Genetic result: Negative, no clinically significant mutation identified.    Based on her imaging, we arranged for a bilateral (2X RIGHT and 1 X LEFT) MRI guided  biopsies.      MRI guided biopsy x 3 with 2 being on the right and 1 being on the left dated 2024 [facility]  Right breast 12:00, buckle shaped metallic clip placed.  Sclerosing adenosis, calcifications.  Right breast 8:00, infinity shaped clip placed.  High-grade duct carcinoma in situ, solid, cribriform, clinging, lobular extension, central comedonecrosis.  No invasive carcinoma.       Left breast biopsy was also done:   Left breast MRI guided biopsy 12:00, stoplight shaped metallic clip. 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  No clip migration.  Pathology is all concordant    She reports significant bruising LEFT breast.    Interval History:  In the interim, Normal took abraxane (paclitaxel), herceptin perjeta for 12 cycles, last being 2024.  Her port has functioned well.    Her interval imaging has shown the followin24 MRI-   Showed interval resolution of all overtly suspicious enhancement on both sides    24  RIGHT ultrasound [facility]-  Prevoiusly noted mass at 8:00, 8 CFN is no longer visible and a clip is not confidently identified.      She could no longer feel the mass after the 3rd cycle.    She denies headache, bone pain, belly pain, cough , changes in vision or gait.    She is here for review.      Review of Systems:  Review of Systems   Constitutional:  Negative for unexpected weight change (2 LB WT LOSS).   Eyes:  Eye problems: cataract surgery 3 weeks ago..   All other systems reviewed and are negative.       Past Medical and Surgical History:  Breast Biopsy History:  Patient had not had a breast biopsy prior to her cancer diagnosis.  Breast Cancer HIstory:  Patient does not have a past medical history of breast cancer.  Breast Operations, and year:  None   Obstetric/Gynecologic History:  Age menstrual periods began: 16  Patient is postmenopausal, entered menopause naturally at age: 49  "  Number of pregnancies:4  Number of live births: 3  Number of abortions or miscarriages: 1  Age of delivery of first child: 18  Patient breast fed, for the following lenth of time: 3 mons total  Length of time taking birth control pills: none   Patient has never taken hormone replacement    Patient has both ovaries and uterus.   Past Surgical History:   Procedure Laterality Date    BREAST BIOPSY Left     Excisional biopsy in the [facility]    BREAST BIOPSY  03/2024    CATARACT EXTRACTION W/ INTRAOCULAR LENS IMPLANT Bilateral     CHOLECYSTECTOMY  2012    COLON RESECTION Right 2002    COLONOSCOPY N/A 04/28/2021    Procedure: COLONOSCOPY TO ANASTAMOSIS/TI;  Surgeon: Angelo Gonsalez MD;  Location: [facility] ENDOSCOPY;  Service: Gastroenterology;  Laterality: N/A;  PRE: SCREENING  POST: NORMAL POST-OP ANATOMY    ENDOSCOPY      2013    ENDOSCOPY N/A 03/10/2017    Procedure: ESOPHAGOGASTRODUODENOSCOPY WITH BIOPSY AND PETER DILATATION 54\";  Surgeon: Angelo Gonsalez MD;  Location: [facility] ENDOSCOPY;  Service:     ENDOSCOPY N/A 04/28/2021    Procedure: ESOPHAGOGASTRODUODENOSCOPY WITH BIOPSIES, 54FR PETER DILATATION;  Surgeon: Angelo Gonsalez MD;  Location: [facility] ENDOSCOPY;  Service: Gastroenterology;  Laterality: N/A;  PRE: DYSPHAGIA  POST: GASTRITIS, ESOPHAGEAL RING    VENOUS ACCESS DEVICE (PORT) INSERTION Left 3/14/2024    Procedure: INSERTION OF PORTACATH;  Surgeon: Abeba Manrique MD;  Location: [facility] OR;  Service: General;  Laterality: Left;     Past Medical History:   Diagnosis Date    Anxiety about health     Breast cancer of lower-outer quadrant of right female breast 02/14/2024    DCIS (ductal carcinoma in situ) of breast 03/15/2024    Diverticulosis     Mixed hyperlipidemia 05/19/2019    DIET CONTROLLED    Osteoporosis     Type 2 diabetes mellitus        Prior Hospitalizations, other than for surgery or childbirth, and year:  None     Social History     Socioeconomic History    Marital status: " "     Spouse name: Ryan   Tobacco Use    Smoking status: Former     Current packs/day: 0.00     Average packs/day: 0.3 packs/day for 15.0 years (3.8 ttl pk-yrs)     Types: Cigarettes     Quit date: 1/15/2024     Years since quittin.4     Passive exposure: Past    Smokeless tobacco: Never    Tobacco comments:     N/A   Vaping Use    Vaping status: Never Used   Substance and Sexual Activity    Alcohol use: Yes     Comment: RARE    Drug use: No    Sexual activity: Not Currently     Partners: Male     Birth control/protection: Tubal ligation     Patient is .  Patient is retired.  Patient drinks 0-1 servings of caffeine per day.    Family History:  Family History   Problem Relation Age of Onset    Hypertension Mother     Diabetes Mother     Hypertension Father     Breast cancer Sister 48    Breast cancer Sister 38    No Known Problems Brother     No Known Problems Daughter     No Known Problems Son     No Known Problems Maternal Grandmother     No Known Problems Paternal Grandmother     No Known Problems Maternal Grandfather     No Known Problems Paternal Grandfather     Autism Grandson     Colon cancer Neg Hx     Rectal cancer Neg Hx     Endometrial cancer Neg Hx     Vaginal cancer Neg Hx     Cervical cancer Neg Hx     Ovarian cancer Neg Hx     Prostate cancer Neg Hx     Uterine cancer Neg Hx     Malig Hyperthermia Neg Hx        Vital Signs:  /78 (BP Location: Right arm, Patient Position: Sitting)   Pulse 71   Ht 147.3 cm (57.99\")   Wt 52.6 kg (116 lb)   LMP  (LMP Unknown)   SpO2 97%   BMI 24.25 kg/m²      Medications:    Current Outpatient Medications:     Blood Glucose Monitoring Suppl (FreeStyle Lite) w/Device kit, 1 Units Daily., Disp: 1 kit, Rfl: 0    gabapentin (NEURONTIN) 300 MG capsule, Take 1 capsule by mouth 2 (Two) Times a Day., Disp: 60 capsule, Rfl: 1    glucose blood (FREESTYLE LITE) test strip, TEST ONCE DAILY, Disp: 100 each, Rfl: 2    Lancets (freestyle) lancets, Use TO " "CHECK BLOOD SUGAR ONCE DAILY, Disp: 100 each, Rfl: 2    lidocaine-prilocaine (EMLA) 2.5-2.5 % cream, Apply 1 Application topically to the appropriate area as directed Take As Directed. Apply nickel size amount to port site 30 min before appt time do not rub in cover with plastic wrap, Disp: 30 g, Rfl: 5    ondansetron (ZOFRAN) 8 MG tablet, Take 1 tablet by mouth 3 (Three) Times a Day As Needed for Nausea or Vomiting., Disp: 30 tablet, Rfl: 5    pantoprazole (PROTONIX) 40 MG EC tablet, Take 1 tablet by mouth Daily., Disp: 30 tablet, Rfl: 5    bisoprolol (ZEBeta) 5 MG tablet, , Disp: , Rfl:     HYDROcodone-acetaminophen (NORCO) 5-325 MG per tablet, 1-2 tabs po q 4-6hr prn pain, wean to tylenol (Patient not taking: Reported on 6/20/2024), Disp: 15 tablet, Rfl: 0    potassium chloride (KLOR-CON M10) 10 MEQ CR tablet, Take 2 tablets by mouth Daily. (Patient not taking: Reported on 6/20/2024), Disp: 5 tablet, Rfl: 0    prednisoLONE acetate (PRED FORTE) 1 % ophthalmic suspension, Administer 1 drop to the right eye 4 (Four) Times a Day. (Patient not taking: Reported on 6/20/2024), Disp: , Rfl:     triamcinolone (KENALOG) 0.1 % ointment, Apply 1 Application topically to the appropriate area as directed 2 (Two) Times a Day. (Patient not taking: Reported on 6/20/2024), Disp: 30 g, Rfl: 0     Allergies:  Allergies   Allergen Reactions    Metronidazole Nausea And Vomiting       Physical Examination:  /78 (BP Location: Right arm, Patient Position: Sitting)   Pulse 71   Ht 147.3 cm (57.99\")   Wt 52.6 kg (116 lb)   LMP  (LMP Unknown)   SpO2 97%   BMI 24.25 kg/m²   General Appearance:  Patient is in no distress.  She is well kept and has an average build.   Psychiatric:  Patient with appropriate mood and affect. Alert and oriented to self, time, and place.    Breast, RIGHT:  medium sized, symmetric with the contralateral side.  Breast skin is without erythema, edema, rashes.  There are no visible abnormalities upon " inspection during the arm-raising maneuver or with hands on hips in the sitting position. There is no nipple retraction, discharge or nipple/areolar skin changes. In the RIGHT breast LOQ there is no longer a 2.5x1.75 cm palpable mass at the LOQ site of original cancer- there is no residual mass palpable. At initial presentation, the cancer was fairly superficial, fairly close to the IMF, freely mobile, and no overlying skin changes.There are no masses palpable in the sitting or supine positions.    Breast, LEFT:  medium sized, symmetric with the contralateral side.  Breast skin is without erythema, edema, rashes. There are no visible abnormalities upon inspection during the arm-raising maneuver or with hands on hips in the sitting position. There is no nipple retraction, discharge or nipple/areolar skin changes.There are no masses palpable in the sitting or supine positions.    Lymphatic:  There is no axillary, cervical, infraclavicular, or supraclavicular adenopathy bilaterally.  Eyes:  Pupils are round and reactive to light.  Cardiovascular:  Heart rate and rhythm are regular.  Respiratory:  Lungs are clear bilaterally with no crackles or wheezes in any lung field.  Gastrointestinal:  Abdomen is soft, nondistended, and nontender.     Musculoskeletal:  Good strength in all 4 extremities.   There is good range of motion in both shoulders.    Skin:  No new skin lesions or rashes on the skin excluding the breast (see breast exam above).        Imagin2019, MMG Screening [place] Bilateral ([facility])  Scattered fibroglandular density.  BI-RADS 0    2019, MMG Diagnostic Right ([facility])  Resolution of the area of focal asymmetry.  BI-RADS 1: Negative    2024, MMG Diagnostic Digital [place] Bilateral, US Breast Right Limited ([facility])  INDICATION: Right breast palpable lump the patient first identified at  the end of December 3 to 4 weeks ago. The patient reports 2 sisters  with a history of breast cancer, diagnosed age  38 and 48.     The breasts are heterogeneously dense   Right breast: There is a triangular skin marker area of palpable concern, 8 cm from the nipple. The marker overlies an oval high density mass margins measuring 1.7  x 1.4 x 1.6 cm. Lymph nodes included in the right axilla have a normal mammographic appearance.    Ultrasound  8 o'clock 8 cm from the nipple solid mass 1.7 x 1.0 x 1.3 cm.   Ultrasound of the right axilla was performed with no abnormally enlarged lymph nodes.    BI-RADS 4c: Suspicious    2/9/24  [facility]ATRIZ BREAST MRI  SHERRILL BUTLER   Posterior one third lower outer quadrant of the right  breast 8 o'clock, 8 cm from the nipple there is an irregular enhancing mass with an internal focal signal void. The mass measures 2.0 cm in anterior to posterior dimension, 1.8 cm in the superior-inferior dimension and 1.7 cm in the mediolateral dimension. This corresponds to the biopsy-proven malignancy with the centrally located bowtie shaped metallic clip. Extending anteriorly away from the anterior margin of the mass there is irregular linear  enhancement that measures on the order of 3.5 cm in anterior posterior.    There are linear areas of increased density seen mammographically in this region. This is suspicious for additional DCIS. The biopsy-proven malignancy and contiguous anterior linear enhancement  measure on the order of 4.7 cm in anterior to posterior dimension, 1.7 cm in the mediolateral dimension and 2.4 cm in the superior to inferior dimension. anterior one third of the right breast at the 12 o'clock position centered on the order of 3 cm from the nipple there is an irregular area  of non-mass enhancement that measures 1.0 cm in superior-inferior dimension, 1.1 cm in anterior to posterior dimension and 1.2 cm in the medial to lateral dimension. No mammographic correlate is appreciated.  No other areas of suspicious enhancement or morphology are seen in the  right breast. I see no evidence for abnormal  skin, nipple or chest wall  enhancement of the right breast and there is no evidence for right  axillary or internal mammary chain adenopathy.  In the middle third of the left breast centered at the 11:30 position on the order of 3.7 cm from the nipple there is an irregular area of non-mass enhancement that measures on the order of 1.3 cm in anterior posterior dimension, 1.1 cm in the superior to inferior dimension and 1.0 cm in the mediolateral dimension. There is suspected architectural distortion. No mammographic correlate is appreciated.  No other areas of abnormal enhancement or morphology are seen within the left breast. No evidence for left axillary or internal mammary chain adenopathy.   IMPRESSION: Biopsy proven malignancy right. The entire mass and linear enhancement measure on the order of 4.7 cm. If breast conservation therapy is desired and biopsy of this region is desired, it is my opinion that this would be best performed under MRI guidance. 2. 1.2 cm irregular area o non mass enhancement in the right breast 12 o'clock position. No mammographic correlate is appreciated. Further evaluation with an MRI guided right breast biopsy should be considered. 3. 1.1 cm irregular area of non mass enhancement seen n middle third of the left breast at th 11:30 position. MRI guided left breast biopsy is recommended. BI-RADS 4 Suspicious abnormality. Biopsy should be considered.     RIGHT ultrasound [facility]-  Prevoiusly noted mass at 8:00, 8 CFN is no longer visible and a clip is not confidently identified.        Pathology:  01/31/2024, US Guided Breast Biopsy ([facility])  Right breast 8 o'clock 8 cm from the nipple. 10-gauge mammotome multiple tissue specimens bowtie shaped metallic clip was placed.    Post-biopsy mammography demonstrates bowtie shaped metallic clip centrally within a mass in the posterior one third lower outer quadrant of the right breast. No evidence for clip migration.    The pathology is  concordant.      01/31/2024, Surgical pathology report ([facility])    1.  Breast, right, 8:00, 8 cm from nipple, biopsy: Invasive ductal adenocarcinoma                -A.  The tumor is Jeovany grade III (tubular grade 3, nuclear grade 3, mitotic grade 3).               -B.  The tumor measures up to 9.3 mm on the slide               -C.  No definitive lymphovascular invasion or perineural invasion is identified               -D.  Ductal carcinoma in situ is not identified    ER Negative (less than 1%)  PA Negative (less than 1%)  HER2 positive (Score 3+) 95%  KI-67 70%      MRI guided biopsy x 3 with 2 being on the right and 1 being on the left dated February 26 2024 [facility]  Right breast 12:00, buckle shaped metallic clip placed.  Sclerosing adenosis, calcifications.  Right breast 8:00, infinity shaped clip placed.  High-grade duct carcinoma in situ, solid, cribriform, clinging, lobular extension, central comedonecrosis.  No invasive carcinoma.       Left breast biopsy was also done:   Left breast MRI guided biopsy 12:00, stoplight shaped metallic clip. 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  No clip migration.  Pathology is all concordant    Final Diagnosis   1.  Left breast, 12:00, MRI-guided biopsies for non-mass enhancement: INTERMEDIATE GRADE DUCTAL CARCINOMA IN SITU (DCIS) WITH APOCRINE FEATURES.               -Architectural patterns: Solid and cribriform with central necrosis and associated microcalcifications.               -DCIS involves sclerosing adenosis in multiple cores and measures up to 7 mm maximally.               -No evidence of invasion identified.     2.  Right breast, 12:00, MRI-guided biopsies for non-mass enhancement:               -Sclerosing adenosis with associated microcalcifications.               -No atypical hyperplasia, in situ nor invasive carcinoma identified.     3.  Right breast, 8:00,  MRI-guided biopsies for linear enhancement: HIGH GRADE DUCTAL CARCINOMA IN SITU (DCIS).  -Architectural patterns: Solid, comedo and clinging with lobular extension, central comedonecrosis and associated microcalcifications.               -DCIS involves multiple tissue cores measuring up to 3 mm.               -Sclerosing adenosis and usual ductal hyperplasia with associated microcalcifications.               -No evidence of invasive carcinoma identified.     SWM   Electronically signed by Shyann Hilario MD on 2/29/2024 at 0940   Synoptic Checklist   Breast Biomarker Reporting Template   Protocol posted: 12/13/2023BREAST BIOMARKER REPORTING TEMPLATE - 1  Test(s) Performed     Estrogen Receptor (ER) Status  Positive (greater than 10% of cells demonstrate nuclear positivity)   Percentage of Cells with Nuclear Positivity  %   Average Intensity of Staining  Strong   Test Type  Food and Drug Administration (FDA) cleared (test / vendor): ventana   Primary Antibody  SP1   Scoring System  Domi   Proportion Score  5   Intensity Score  3   Total Domi Score  8   Test(s) Performed     Progesterone Receptor (PgR) Status  Positive   Percentage of Cells with Nuclear Positivity  81-90%   Average Intensity of Staining  Strong   Test Type  Food and Drug Administration (FDA) cleared (test / vendor): ventana   Primary Antibody  1E2   Scoring System  Domi   Proportion Score  5   Intensity Score  3   Total Domi Score  8   Test(s) Performed  Ki-67   Ki-67 Percentage of Positive Nuclei  15 %   Primary Antibody  30-9   Cold Ischemia and Fixation Times  Meet requirements specified in latest version of the ASCO / CAP Guidelines   Cold Ischemia Time (minutes)  41 min   Fixation Time (hours)  9 hours   Testing Performed on Block Number(s)  1A   METHODS   Fixative  Formalin   Image Analysis  Not performed   .      Comment    Multiple representative slides from parts 1-3 of this case are reviewed internally with Dr. Avery,  who concurs.  The patient has a previously diagnosed invasive ductal carcinoma of the right breast ([id number]).  Please refer to that biopsy for receptor studies in the right breast.       Patient has seen Dr Renteria and plan is for paclitaxel, herceptin, pertuzumab, weekly for 12 weeks, then complete herceptin for a year      Has seen Cardiooncology Dr Peguero      Labs:  09/01/2017, Genetics ([facility])  Genetic result: Negative, no clinically significant mutation identified.    Procedures:    Assessment:   Diagnosis Plan   1. Malignant neoplasm of lower-outer quadrant of right breast of female, estrogen receptor negative        2. Ductal carcinoma in situ (DCIS) of right breast        3. Abnormal MRI, breast        4. FH: breast cancer        5. History of prior cigarette smoking            1-4  RIGHT LOQ 8:00, 7 CFN- 2.5 cm on exam, 2 cm on MRI index lesion (see also below re: enhancement anteriorly over 3.5 cm concerning for DCIS, plus second lesion in the RIGHT breast and lesion seen in the LEFT breast). MRI total enhancement at known biopsy site 4.7cm. Dr Patel cannot rule out additional invasive disease (tumor board)., Bowtie marker  IMC, NST, high grade, 3,3,3, 9.3mm in core,   ER neg NM neg her 2 hemalatha 3+    Clinical stage possibly mT2N0- IIA      MRI abnormalities at time of cancer diagnosis:  1- RIGHT  8:00- anterior to index lesion 3.5cm enhancement- high grade DCIS, solid, comedo, clinging with lobular extension, central comedonecrosis, no invasive- infinity marker good position anterior to the bowtie invasive cancer  2- RIGHT 12:00 3 CFN- 1.2 cm enhancement- sclerosing adenosis with calcifications- buckle marker (the most anterior marker)  3- LEFT middle third, 11:30 4 CFN- 1.3 cm enhancement- intermediate grade DCIS, solid and cribiform, central necrosis, 7mm- stoplight marker, only marker in the breast    Dr Renteria- abraxane (paclitaxel), perjeta, herceptin started 3-18-24 through 12th cycle 6-17-24    MRi  5-11-24- resolution all enhancement bilateral  Ultrasound 6-14-24- right breast negative for mass      3-4  LEFT breast DCIS, 11:30, 4 CFN, see above    5-  2 of 7 sisters with breast cancer, ages 35,45 approximate  My Risk genetic testing 9-1-17 negative for mutation    6-  Smoked 1/3 ppd or less from age teens until cancer diagnosis      Plan:  The patient goes by Felicia  She was here today with her  Ryan. I met her Daughter in Law Jocelyn at her initial consultation.      We reviewed her interval history, exam, imaging, treatment with chemotherapy and surgical treatment options together today.     With  bilateral cancers and her FH she is considering bilateral mastectomy versus no surgery at all.    Right now she is tired and exhausted. She would like to take a break from any treatment. She has a trip scheduled with her  to [facility] for the last week of July, back the first week of August of so.    We discussed that imaging is not 100% concordant with pathology and that it is currently not standard of care to not perform surgery after neoadjuvant even in the face of an imaging complete response.    We discussed the procedure of a Bilateral total mastectomy, bilateral sentinel node biopsy, possible axillary node dissection, no reconstruction.    Discussed risks of bleeding, infection, skin loss, lymphedema.    We would leave her port at the time of surgery for her year of herceptin..    Would do frozen section on the RIGHT, not on the LEFT since LEFT was initially only DCIS.  She has been offered plastic surgery consultation but declined this.      I have asked her to call us by the end of July (just before her trip) to let ms know if she wishes to proceed with surgery.  If she does not, I will plan for a MRI in October and to see her back after.    If she does choose surgery, I will need to call in her Rx and her EMLA and arrange for bioimpedence.  If she does choose surgery, I requested 8-12  week. She understands that this is beyond the timeframe that we usually schedule this, but her trip is important to her as is the time to consider surgery vs none.  .  Abeba Manrique MD      Today I spent 60 minutes doing the following: Reviewing records, labs, outside imaging and reports in preparation for the patient visit; obtaining medical history; performing the physical exam; counseling and educating the patient and any available family or caregivers; ordering medications, tests or procedures; coordinating care with any other physicians on her care team as needed, and documenting all of the above in the medical record as well as sending communications with her other healthcare professionals.      Next Appointment:  Return for to be determined- surgery versus MRI and clinic visit.      EMR Dragon/transcription disclaimer:    Please note that portions of this note were completed with a voice recognition program.

## 2024-06-20 NOTE — H&P
There are no changes in the history or physical exam, aside from any that are listed as an addendum at the bottom of this document.   Today, patient will go for the surgery listed in the plan below.     Chief Complaint: Felicia Boyle is a 68 y.o. female who was seen in consultation at the request of Tristan Malone DO  for newly diagnosed breast cancer and a followup visit    History of Present Illness:  Patient presents with newly diagnosed breast cancer.  She noted a new RIGHT breast mass lower outer in 2023. She noted no other new masses, skin changes, nipple discharge, nipple changes prior to her most recent imaging.    Her most recent imaging includes the followin2019, MMG Screening [place] Bilateral ([facility])  Scattered fibroglandular density.  BI-RADS 0    2019, MMG Diagnostic Right ([facility])  Resolution of the area of focal asymmetry.  BI-RADS 1: Negative    2024, MMG Diagnostic Digital [place] Bilateral, US Breast Right Limited ([facility])  INDICATION: Right breast palpable lump the patient first identified at  the end of December 3 to 4 weeks ago. The patient reports 2 sisters  with a history of breast cancer, diagnosed age 38 and 48.     The breasts are heterogeneously dense   Right breast: There is a triangular skin marker area of palpable concern, 8 cm from the nipple. The marker overlies an oval high density mass margins measuring 1.7  x 1.4 x 1.6 cm. Lymph nodes included in the right axilla have a normal mammographic appearance.    Ultrasound  8 o'clock 8 cm from the nipple solid mass 1.7 x 1.0 x 1.3 cm.   Ultrasound of the right axilla was performed with no abnormally enlarged lymph nodes.    BI-RADS 4c: Suspicious        She had a biopsy on the following day that showed:   2024, US Guided Breast Biopsy ([facility])  Right breast 8 o'clock 8 cm from the nipple. 10-gauge mammotome multiple tissue specimens bowtie shaped metallic clip was placed.    Post-biopsy mammography demonstrates  bowtie shaped metallic clip centrally within a mass in the posterior one third lower outer quadrant of the right breast. No evidence for clip migration.    The pathology is concordant.      01/31/2024, Surgical pathology report ([facility])    1.  Breast, right, 8:00, 8 cm from nipple, biopsy: Invasive ductal adenocarcinoma                -A.  The tumor is Orlando grade III (tubular grade 3, nuclear grade 3, mitotic grade 3).               -B.  The tumor measures up to 9.3 mm on the slide               -C.  No definitive lymphovascular invasion or perineural invasion is identified               -D.  Ductal carcinoma in situ is not identified    ER Negative (less than 1%)  CT Negative (less than 1%)  HER2 positive (Score 3+) 95%  KI-67 70%     I then arranged for a MRi:  2/9/24  [facility] BEATRIZ BREAST MRI  SHERRILL BUTLER   Posterior one third lower outer quadrant of the right  breast 8 o'clock, 8 cm from the nipple there is an irregular enhancing mass with an internal focal signal void. The mass measures 2.0 cm in anterior to posterior dimension, 1.8 cm in the superior-inferior dimension and 1.7 cm in the mediolateral dimension. This corresponds to the biopsy-proven malignancy with the centrally located bowtie shaped metallic clip. Extending anteriorly away from the anterior margin of the mass there is irregular linear  enhancement that measures on the order of 3.5 cm in anterior posterior.    There are linear areas of increased density seen mammographically in this region. This is suspicious for additional DCIS. The biopsy-proven malignancy and contiguous anterior linear enhancement  measure on the order of 4.7 cm in anterior to posterior dimension, 1.7 cm in the mediolateral dimension and 2.4 cm in the superior to inferior dimension. anterior one third of the right breast at the 12 o'clock position centered on the order of 3 cm from the nipple there is an irregular area  of non-mass enhancement that measures 1.0 cm in  superior-inferior dimension, 1.1 cm in anterior to posterior dimension and 1.2 cm in the medial to lateral dimension. No mammographic correlate is appreciated.  No other areas of suspicious enhancement or morphology are seen in the  right breast. I see no evidence for abnormal skin, nipple or chest wall  enhancement of the right breast and there is no evidence for right  axillary or internal mammary chain adenopathy.  In the middle third of the left breast centered at the 11:30 position on the order of 3.7 cm from the nipple there is an irregular area of non-mass enhancement that measures on the order of 1.3 cm in anterior posterior dimension, 1.1 cm in the superior to inferior dimension and 1.0 cm in the mediolateral dimension. There is suspected architectural distortion. No mammographic correlate is appreciated.  No other areas of abnormal enhancement or morphology are seen within the left breast. No evidence for left axillary or internal mammary chain adenopathy.   IMPRESSION: Biopsy proven malignancy right. The entire mass and linear enhancement measure on the order of 4.7 cm. If breast conservation therapy is desired and biopsy of this region is desired, it is my opinion that this would be best performed under MRI guidance. 2. 1.2 cm irregular area o non mass enhancement in the right breast 12 o'clock position. No mammographic correlate is appreciated. Further evaluation with an MRI guided right breast biopsy should be considered. 3. 1.1 cm irregular area of non mass enhancement seen n middle third of the left breast at th 11:30 position. MRI guided left breast biopsy is recommended. BI-RADS 4 Suspicious abnormality. Biopsy should be considered.       She has not had a breast biopsy in the past.  She has her uterus and ovaries, is postmenopausal, and takes no hormones.    Her family history includes the following:   She has 2 of 7 sisters with breast cancer in their 30s-40s.  Dr Perry sent testing years ago, as  follows    2017, Genetics ([facility])  Genetic result: Negative, no clinically significant mutation identified.    Based on her imaging, we arranged for a bilateral (2X RIGHT and 1 X LEFT) MRI guided biopsies.      MRI guided biopsy x 3 with 2 being on the right and 1 being on the left dated 2024 [facility]  Right breast 12:00, buckle shaped metallic clip placed.  Sclerosing adenosis, calcifications.  Right breast 8:00, infinity shaped clip placed.  High-grade duct carcinoma in situ, solid, cribriform, clinging, lobular extension, central comedonecrosis.  No invasive carcinoma.       Left breast biopsy was also done:   Left breast MRI guided biopsy 12:00, stoplight shaped metallic clip. 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  No clip migration.  Pathology is all concordant    She reports significant bruising LEFT breast.    Interval History:  In the interim, Normal took abraxane (paclitaxel), herceptin perjeta for 12 cycles, last being 2024.  Her port has functioned well.    Her interval imaging has shown the followin24 MRI-   Showed interval resolution of all overtly suspicious enhancement on both sides    24  RIGHT ultrasound [facility]-  Prevoiusly noted mass at 8:00, 8 CFN is no longer visible and a clip is not confidently identified.      She could no longer feel the mass after the 3rd cycle.    She denies headache, bone pain, belly pain, cough , changes in vision or gait.    She is here for review.      Review of Systems:  Review of Systems   Constitutional:  Negative for unexpected weight change (2 LB WT LOSS).   Eyes:  Eye problems: cataract surgery 3 weeks ago..   All other systems reviewed and are negative.       Past Medical and Surgical History:  Breast Biopsy History:  Patient had not had a breast biopsy prior to her cancer diagnosis.  Breast Cancer HIstory:  Patient does not have a past  "medical history of breast cancer.  Breast Operations, and year:  None   Obstetric/Gynecologic History:  Age menstrual periods began: 16  Patient is postmenopausal, entered menopause naturally at age: 49   Number of pregnancies:4  Number of live births: 3  Number of abortions or miscarriages: 1  Age of delivery of first child: 18  Patient breast fed, for the following lenth of time: 3 mons total  Length of time taking birth control pills: none   Patient has never taken hormone replacement    Patient has both ovaries and uterus.   Past Surgical History:   Procedure Laterality Date    BREAST BIOPSY Left     Excisional biopsy in the [facility]    BREAST BIOPSY  03/2024    CATARACT EXTRACTION W/ INTRAOCULAR LENS IMPLANT Bilateral     CHOLECYSTECTOMY  2012    COLON RESECTION Right 2002    COLONOSCOPY N/A 04/28/2021    Procedure: COLONOSCOPY TO ANASTAMOSIS/TI;  Surgeon: Angelo Gonsalez MD;  Location: [facility] ENDOSCOPY;  Service: Gastroenterology;  Laterality: N/A;  PRE: SCREENING  POST: NORMAL POST-OP ANATOMY    ENDOSCOPY      2013    ENDOSCOPY N/A 03/10/2017    Procedure: ESOPHAGOGASTRODUODENOSCOPY WITH BIOPSY AND PETER DILATATION 54\";  Surgeon: Angelo Gonsalez MD;  Location: [facility] ENDOSCOPY;  Service:     ENDOSCOPY N/A 04/28/2021    Procedure: ESOPHAGOGASTRODUODENOSCOPY WITH BIOPSIES, 54FR PETER DILATATION;  Surgeon: Angelo Gonsalez MD;  Location: [facility] ENDOSCOPY;  Service: Gastroenterology;  Laterality: N/A;  PRE: DYSPHAGIA  POST: GASTRITIS, ESOPHAGEAL RING    VENOUS ACCESS DEVICE (PORT) INSERTION Left 3/14/2024    Procedure: INSERTION OF PORTACATH;  Surgeon: Abeba Manrique MD;  Location: [facility];  Service: General;  Laterality: Left;     Past Medical History:   Diagnosis Date    Anxiety about health     Breast cancer of lower-outer quadrant of right female breast 02/14/2024    DCIS (ductal carcinoma in situ) of breast 03/15/2024    Diverticulosis     Mixed hyperlipidemia 05/19/2019    DIET " "CONTROLLED    Osteoporosis     Type 2 diabetes mellitus        Prior Hospitalizations, other than for surgery or childbirth, and year:  None     Social History     Socioeconomic History    Marital status:      Spouse name: Ryan   Tobacco Use    Smoking status: Former     Current packs/day: 0.00     Average packs/day: 0.3 packs/day for 15.0 years (3.8 ttl pk-yrs)     Types: Cigarettes     Quit date: 1/15/2024     Years since quittin.4     Passive exposure: Past    Smokeless tobacco: Never    Tobacco comments:     N/A   Vaping Use    Vaping status: Never Used   Substance and Sexual Activity    Alcohol use: Yes     Comment: RARE    Drug use: No    Sexual activity: Not Currently     Partners: Male     Birth control/protection: Tubal ligation     Patient is .  Patient is retired.  Patient drinks 0-1 servings of caffeine per day.    Family History:  Family History   Problem Relation Age of Onset    Hypertension Mother     Diabetes Mother     Hypertension Father     Breast cancer Sister 48    Breast cancer Sister 38    No Known Problems Brother     No Known Problems Daughter     No Known Problems Son     No Known Problems Maternal Grandmother     No Known Problems Paternal Grandmother     No Known Problems Maternal Grandfather     No Known Problems Paternal Grandfather     Autism Grandson     Colon cancer Neg Hx     Rectal cancer Neg Hx     Endometrial cancer Neg Hx     Vaginal cancer Neg Hx     Cervical cancer Neg Hx     Ovarian cancer Neg Hx     Prostate cancer Neg Hx     Uterine cancer Neg Hx     Malig Hyperthermia Neg Hx        Vital Signs:  /78 (BP Location: Right arm, Patient Position: Sitting)   Pulse 71   Ht 147.3 cm (57.99\")   Wt 52.6 kg (116 lb)   LMP  (LMP Unknown)   SpO2 97%   BMI 24.25 kg/m²      Medications:    Current Outpatient Medications:     Blood Glucose Monitoring Suppl (FreeStyle Lite) w/Device kit, 1 Units Daily., Disp: 1 kit, Rfl: 0    gabapentin (NEURONTIN) 300 MG " "capsule, Take 1 capsule by mouth 2 (Two) Times a Day., Disp: 60 capsule, Rfl: 1    glucose blood (FREESTYLE LITE) test strip, TEST ONCE DAILY, Disp: 100 each, Rfl: 2    Lancets (freestyle) lancets, Use TO CHECK BLOOD SUGAR ONCE DAILY, Disp: 100 each, Rfl: 2    lidocaine-prilocaine (EMLA) 2.5-2.5 % cream, Apply 1 Application topically to the appropriate area as directed Take As Directed. Apply nickel size amount to port site 30 min before appt time do not rub in cover with plastic wrap, Disp: 30 g, Rfl: 5    ondansetron (ZOFRAN) 8 MG tablet, Take 1 tablet by mouth 3 (Three) Times a Day As Needed for Nausea or Vomiting., Disp: 30 tablet, Rfl: 5    pantoprazole (PROTONIX) 40 MG EC tablet, Take 1 tablet by mouth Daily., Disp: 30 tablet, Rfl: 5    bisoprolol (ZEBeta) 5 MG tablet, , Disp: , Rfl:     HYDROcodone-acetaminophen (NORCO) 5-325 MG per tablet, 1-2 tabs po q 4-6hr prn pain, wean to tylenol (Patient not taking: Reported on 6/20/2024), Disp: 15 tablet, Rfl: 0    potassium chloride (KLOR-CON M10) 10 MEQ CR tablet, Take 2 tablets by mouth Daily. (Patient not taking: Reported on 6/20/2024), Disp: 5 tablet, Rfl: 0    prednisoLONE acetate (PRED FORTE) 1 % ophthalmic suspension, Administer 1 drop to the right eye 4 (Four) Times a Day. (Patient not taking: Reported on 6/20/2024), Disp: , Rfl:     triamcinolone (KENALOG) 0.1 % ointment, Apply 1 Application topically to the appropriate area as directed 2 (Two) Times a Day. (Patient not taking: Reported on 6/20/2024), Disp: 30 g, Rfl: 0     Allergies:  Allergies   Allergen Reactions    Metronidazole Nausea And Vomiting       Physical Examination:  /78 (BP Location: Right arm, Patient Position: Sitting)   Pulse 71   Ht 147.3 cm (57.99\")   Wt 52.6 kg (116 lb)   LMP  (LMP Unknown)   SpO2 97%   BMI 24.25 kg/m²   General Appearance:  Patient is in no distress.  She is well kept and has an average build.   Psychiatric:  Patient with appropriate mood and affect. Alert " and oriented to self, time, and place.    Breast, RIGHT:  medium sized, symmetric with the contralateral side.  Breast skin is without erythema, edema, rashes.  There are no visible abnormalities upon inspection during the arm-raising maneuver or with hands on hips in the sitting position. There is no nipple retraction, discharge or nipple/areolar skin changes. In the RIGHT breast LOQ there is no longer a 2.5x1.75 cm palpable mass at the LOQ site of original cancer- there is no residual mass palpable. At initial presentation, the cancer was fairly superficial, fairly close to the IMF, freely mobile, and no overlying skin changes.There are no masses palpable in the sitting or supine positions.    Breast, LEFT:  medium sized, symmetric with the contralateral side.  Breast skin is without erythema, edema, rashes. There are no visible abnormalities upon inspection during the arm-raising maneuver or with hands on hips in the sitting position. There is no nipple retraction, discharge or nipple/areolar skin changes.There are no masses palpable in the sitting or supine positions.    Lymphatic:  There is no axillary, cervical, infraclavicular, or supraclavicular adenopathy bilaterally.  Eyes:  Pupils are round and reactive to light.  Cardiovascular:  Heart rate and rhythm are regular.  Respiratory:  Lungs are clear bilaterally with no crackles or wheezes in any lung field.  Gastrointestinal:  Abdomen is soft, nondistended, and nontender.     Musculoskeletal:  Good strength in all 4 extremities.   There is good range of motion in both shoulders.    Skin:  No new skin lesions or rashes on the skin excluding the breast (see breast exam above).        Imagin2019, MMG Screening [place] Bilateral ([facility])  Scattered fibroglandular density.  BI-RADS 0    2019, MMG Diagnostic Right ([facility])  Resolution of the area of focal asymmetry.  BI-RADS 1: Negative    2024, MMG Diagnostic Digital [place] Bilateral, US Breast Right  Limited ([facility])  INDICATION: Right breast palpable lump the patient first identified at  the end of December 3 to 4 weeks ago. The patient reports 2 sisters  with a history of breast cancer, diagnosed age 38 and 48.     The breasts are heterogeneously dense   Right breast: There is a triangular skin marker area of palpable concern, 8 cm from the nipple. The marker overlies an oval high density mass margins measuring 1.7  x 1.4 x 1.6 cm. Lymph nodes included in the right axilla have a normal mammographic appearance.    Ultrasound  8 o'clock 8 cm from the nipple solid mass 1.7 x 1.0 x 1.3 cm.   Ultrasound of the right axilla was performed with no abnormally enlarged lymph nodes.    BI-RADS 4c: Suspicious    2/9/24  [facility] BEATRIZ BREAST MRI  SHERRILL BUTLER   Posterior one third lower outer quadrant of the right  breast 8 o'clock, 8 cm from the nipple there is an irregular enhancing mass with an internal focal signal void. The mass measures 2.0 cm in anterior to posterior dimension, 1.8 cm in the superior-inferior dimension and 1.7 cm in the mediolateral dimension. This corresponds to the biopsy-proven malignancy with the centrally located bowtie shaped metallic clip. Extending anteriorly away from the anterior margin of the mass there is irregular linear  enhancement that measures on the order of 3.5 cm in anterior posterior.    There are linear areas of increased density seen mammographically in this region. This is suspicious for additional DCIS. The biopsy-proven malignancy and contiguous anterior linear enhancement  measure on the order of 4.7 cm in anterior to posterior dimension, 1.7 cm in the mediolateral dimension and 2.4 cm in the superior to inferior dimension. anterior one third of the right breast at the 12 o'clock position centered on the order of 3 cm from the nipple there is an irregular area  of non-mass enhancement that measures 1.0 cm in superior-inferior dimension, 1.1 cm in anterior to posterior dimension and  1.2 cm in the medial to lateral dimension. No mammographic correlate is appreciated.  No other areas of suspicious enhancement or morphology are seen in the  right breast. I see no evidence for abnormal skin, nipple or chest wall  enhancement of the right breast and there is no evidence for right  axillary or internal mammary chain adenopathy.  In the middle third of the left breast centered at the 11:30 position on the order of 3.7 cm from the nipple there is an irregular area of non-mass enhancement that measures on the order of 1.3 cm in anterior posterior dimension, 1.1 cm in the superior to inferior dimension and 1.0 cm in the mediolateral dimension. There is suspected architectural distortion. No mammographic correlate is appreciated.  No other areas of abnormal enhancement or morphology are seen within the left breast. No evidence for left axillary or internal mammary chain adenopathy.   IMPRESSION: Biopsy proven malignancy right. The entire mass and linear enhancement measure on the order of 4.7 cm. If breast conservation therapy is desired and biopsy of this region is desired, it is my opinion that this would be best performed under MRI guidance. 2. 1.2 cm irregular area o non mass enhancement in the right breast 12 o'clock position. No mammographic correlate is appreciated. Further evaluation with an MRI guided right breast biopsy should be considered. 3. 1.1 cm irregular area of non mass enhancement seen n middle third of the left breast at th 11:30 position. MRI guided left breast biopsy is recommended. BI-RADS 4 Suspicious abnormality. Biopsy should be considered.     RIGHT ultrasound [facility]-  Prevoiusly noted mass at 8:00, 8 CFN is no longer visible and a clip is not confidently identified.        Pathology:  01/31/2024, US Guided Breast Biopsy ([facility])  Right breast 8 o'clock 8 cm from the nipple. 10-gauge mammotome multiple tissue specimens bowtie shaped metallic clip was placed.    Post-biopsy mammography  demonstrates bowtie shaped metallic clip centrally within a mass in the posterior one third lower outer quadrant of the right breast. No evidence for clip migration.    The pathology is concordant.      01/31/2024, Surgical pathology report ([facility])    1.  Breast, right, 8:00, 8 cm from nipple, biopsy: Invasive ductal adenocarcinoma                -A.  The tumor is Jeovany grade III (tubular grade 3, nuclear grade 3, mitotic grade 3).               -B.  The tumor measures up to 9.3 mm on the slide               -C.  No definitive lymphovascular invasion or perineural invasion is identified               -D.  Ductal carcinoma in situ is not identified    ER Negative (less than 1%)  VT Negative (less than 1%)  HER2 positive (Score 3+) 95%  KI-67 70%      MRI guided biopsy x 3 with 2 being on the right and 1 being on the left dated February 26 2024 [facility]  Right breast 12:00, buckle shaped metallic clip placed.  Sclerosing adenosis, calcifications.  Right breast 8:00, infinity shaped clip placed.  High-grade duct carcinoma in situ, solid, cribriform, clinging, lobular extension, central comedonecrosis.  No invasive carcinoma.       Left breast biopsy was also done:   Left breast MRI guided biopsy 12:00, stoplight shaped metallic clip. 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  No clip migration.  Pathology is all concordant    Final Diagnosis   1.  Left breast, 12:00, MRI-guided biopsies for non-mass enhancement: INTERMEDIATE GRADE DUCTAL CARCINOMA IN SITU (DCIS) WITH APOCRINE FEATURES.               -Architectural patterns: Solid and cribriform with central necrosis and associated microcalcifications.               -DCIS involves sclerosing adenosis in multiple cores and measures up to 7 mm maximally.               -No evidence of invasion identified.     2.  Right breast, 12:00, MRI-guided biopsies for non-mass enhancement:                -Sclerosing adenosis with associated microcalcifications.               -No atypical hyperplasia, in situ nor invasive carcinoma identified.     3.  Right breast, 8:00, MRI-guided biopsies for linear enhancement: HIGH GRADE DUCTAL CARCINOMA IN SITU (DCIS).  -Architectural patterns: Solid, comedo and clinging with lobular extension, central comedonecrosis and associated microcalcifications.               -DCIS involves multiple tissue cores measuring up to 3 mm.               -Sclerosing adenosis and usual ductal hyperplasia with associated microcalcifications.               -No evidence of invasive carcinoma identified.     SWM   Electronically signed by Shyann Hilario MD on 2/29/2024 at 0940   Synoptic Checklist   Breast Biomarker Reporting Template   Protocol posted: 12/13/2023BREAST BIOMARKER REPORTING TEMPLATE - 1  Test(s) Performed     Estrogen Receptor (ER) Status  Positive (greater than 10% of cells demonstrate nuclear positivity)   Percentage of Cells with Nuclear Positivity  %   Average Intensity of Staining  Strong   Test Type  Food and Drug Administration (FDA) cleared (test / vendor): ventana   Primary Antibody  SP1   Scoring System  Domi   Proportion Score  5   Intensity Score  3   Total Domi Score  8   Test(s) Performed     Progesterone Receptor (PgR) Status  Positive   Percentage of Cells with Nuclear Positivity  81-90%   Average Intensity of Staining  Strong   Test Type  Food and Drug Administration (FDA) cleared (test / vendor): ventana   Primary Antibody  1E2   Scoring System  Domi   Proportion Score  5   Intensity Score  3   Total Domi Score  8   Test(s) Performed  Ki-67   Ki-67 Percentage of Positive Nuclei  15 %   Primary Antibody  30-9   Cold Ischemia and Fixation Times  Meet requirements specified in latest version of the ASCO / CAP Guidelines   Cold Ischemia Time (minutes)  41 min   Fixation Time (hours)  9 hours   Testing Performed on Block Number(s)  1A    METHODS   Fixative  Formalin   Image Analysis  Not performed   .      Comment    Multiple representative slides from parts 1-3 of this case are reviewed internally with Dr. Avery, who concurs.  The patient has a previously diagnosed invasive ductal carcinoma of the right breast ([id number]).  Please refer to that biopsy for receptor studies in the right breast.       Patient has seen Dr Renteria and plan is for paclitaxel, herceptin, pertuzumab, weekly for 12 weeks, then complete herceptin for a year      Has seen Cardiooncology Dr Peguero      Labs:  09/01/2017, Genetics ([facility])  Genetic result: Negative, no clinically significant mutation identified.    Procedures:    Assessment:   Diagnosis Plan   1. Malignant neoplasm of lower-outer quadrant of right breast of female, estrogen receptor negative        2. Ductal carcinoma in situ (DCIS) of right breast        3. Abnormal MRI, breast        4. FH: breast cancer        5. History of prior cigarette smoking            1-4  RIGHT LOQ 8:00, 7 CFN- 2.5 cm on exam, 2 cm on MRI index lesion (see also below re: enhancement anteriorly over 3.5 cm concerning for DCIS, plus second lesion in the RIGHT breast and lesion seen in the LEFT breast). MRI total enhancement at known biopsy site 4.7cm. Dr Paetl cannot rule out additional invasive disease (tumor board)., Bowtie marker  IMC, NST, high grade, 3,3,3, 9.3mm in core,   ER neg DE neg her 2 hemalatha 3+    Clinical stage possibly mT2N0- IIA      MRI abnormalities at time of cancer diagnosis:  1- RIGHT  8:00- anterior to index lesion 3.5cm enhancement- high grade DCIS, solid, comedo, clinging with lobular extension, central comedonecrosis, no invasive- infinity marker good position anterior to the bowtie invasive cancer  2- RIGHT 12:00 3 CFN- 1.2 cm enhancement- sclerosing adenosis with calcifications- buckle marker (the most anterior marker)  3- LEFT middle third, 11:30 4 CFN- 1.3 cm enhancement- intermediate grade DCIS, solid and  cribiform, central necrosis, 7mm- stoplight marker, only marker in the breast    Dr Renteria- jim (paclitaxel), perjeta, herceptin started 3-18-24 through 12th cycle 6-17-24    MRi 5-11-24- resolution all enhancement bilateral  Ultrasound 6-14-24- right breast negative for mass      3-4  LEFT breast DCIS, 11:30, 4 CFN, see above    5-  2 of 7 sisters with breast cancer, ages 35,45 approximate  My Risk genetic testing 9-1-17 negative for mutation    6-  Smoked 1/3 ppd or less from age teens until cancer diagnosis      Plan:  The patient goes by Felicia  She was here today with her  Ryan. I met her Daughter in Law Jocelyn at her initial consultation.      We reviewed her interval history, exam, imaging, treatment with chemotherapy and surgical treatment options together today.     With  bilateral cancers and her FH she is considering bilateral mastectomy versus no surgery at all.    Right now she is tired and exhausted. She would like to take a break from any treatment. She has a trip scheduled with her  to the [facility] for the last week of July, back the first week of August of so.    We discussed that imaging is not 100% concordant with pathology and that it is currently not standard of care to not perform surgery after neoadjuvant even in the face of an imaging complete response.    We discussed the procedure of a Bilateral total mastectomy, bilateral sentinel node biopsy, possible axillary node dissection, no reconstruction.    Discussed risks of bleeding, infection, skin loss, lymphedema.    We would leave her port at the time of surgery for her year of herceptin..    Would do frozen section on the RIGHT, not on the LEFT since LEFT was initially only DCIS.  She has been offered plastic surgery consultation but declined this.      I have asked her to call us by the end of July (just before her trip) to let ms know if she wishes to proceed with surgery.  If she does not, I will plan for a MRI in  October and to see her back after.    If she does choose surgery, I will need to call in her Rx and her EMLA and arrange for bioimpedence.  If she does choose surgery, I requested 8-12 week. She understands that this is beyond the timeframe that we usually schedule this, but her trip is important to her as is the time to consider surgery vs none.  .  Abeba Manrique MD      Today I spent 60 minutes doing the following: Reviewing records, labs, outside imaging and reports in preparation for the patient visit; obtaining medical history; performing the physical exam; counseling and educating the patient and any available family or caregivers; ordering medications, tests or procedures; coordinating care with any other physicians on her care team as needed, and documenting all of the above in the medical record as well as sending communications with her other healthcare professionals.      Next Appointment:  Return for to be determined- surgery versus MRI and clinic visit.      EMR Dragon/transcription disclaimer:    Please note that portions of this note were completed with a voice recognition program.

## 2024-06-21 ENCOUNTER — TELEPHONE (OUTPATIENT)
Dept: SURGERY | Facility: CLINIC | Age: 69
End: 2024-06-21
Payer: COMMERCIAL

## 2024-06-21 NOTE — TELEPHONE ENCOUNTER
Left patient a message to call me back.  has let me know that she has clinical questions.     Plan on speaking with Whitney her daughter about their questions. Pt will have daughter call me Tuesday.

## 2024-06-25 RX ORDER — BISOPROLOL FUMARATE 5 MG/1
5 TABLET, FILM COATED ORAL DAILY
Qty: 90 TABLET | Refills: 2 | Status: SHIPPED | OUTPATIENT
Start: 2024-06-25

## 2024-06-26 DIAGNOSIS — Z17.1 MALIGNANT NEOPLASM OF LOWER-OUTER QUADRANT OF RIGHT BREAST OF FEMALE, ESTROGEN RECEPTOR NEGATIVE: Primary | ICD-10-CM

## 2024-06-26 DIAGNOSIS — C50.511 MALIGNANT NEOPLASM OF LOWER-OUTER QUADRANT OF RIGHT BREAST OF FEMALE, ESTROGEN RECEPTOR NEGATIVE: Primary | ICD-10-CM

## 2024-06-27 PROBLEM — C50.912 BILATERAL MALIGNANT NEOPLASM OF BREAST IN FEMALE: Status: ACTIVE | Noted: 2024-06-20

## 2024-06-27 PROBLEM — C50.911 BILATERAL MALIGNANT NEOPLASM OF BREAST IN FEMALE: Status: ACTIVE | Noted: 2024-06-20

## 2024-07-01 ENCOUNTER — INFUSION (OUTPATIENT)
Dept: ONCOLOGY | Facility: HOSPITAL | Age: 69
End: 2024-07-01
Payer: COMMERCIAL

## 2024-07-01 ENCOUNTER — TELEPHONE (OUTPATIENT)
Dept: SURGERY | Facility: CLINIC | Age: 69
End: 2024-07-01
Payer: COMMERCIAL

## 2024-07-01 VITALS
HEART RATE: 82 BPM | TEMPERATURE: 97.7 F | BODY MASS INDEX: 24.46 KG/M2 | RESPIRATION RATE: 16 BRPM | OXYGEN SATURATION: 95 % | DIASTOLIC BLOOD PRESSURE: 74 MMHG | WEIGHT: 117 LBS | SYSTOLIC BLOOD PRESSURE: 127 MMHG

## 2024-07-01 DIAGNOSIS — Z17.0 MALIGNANT NEOPLASM OF LOWER-OUTER QUADRANT OF RIGHT BREAST OF FEMALE, ESTROGEN RECEPTOR POSITIVE: ICD-10-CM

## 2024-07-01 DIAGNOSIS — C50.919 MALIGNANT NEOPLASM OF FEMALE BREAST, UNSPECIFIED ESTROGEN RECEPTOR STATUS, UNSPECIFIED LATERALITY, UNSPECIFIED SITE OF BREAST: Primary | ICD-10-CM

## 2024-07-01 DIAGNOSIS — C50.912 BILATERAL MALIGNANT NEOPLASM OF BREAST IN FEMALE, UNSPECIFIED ESTROGEN RECEPTOR STATUS, UNSPECIFIED SITE OF BREAST: ICD-10-CM

## 2024-07-01 DIAGNOSIS — C50.911 BILATERAL MALIGNANT NEOPLASM OF BREAST IN FEMALE, UNSPECIFIED ESTROGEN RECEPTOR STATUS, UNSPECIFIED SITE OF BREAST: ICD-10-CM

## 2024-07-01 DIAGNOSIS — Z45.2 ENCOUNTER FOR FITTING AND ADJUSTMENT OF VASCULAR CATHETER: Primary | ICD-10-CM

## 2024-07-01 DIAGNOSIS — C50.511 MALIGNANT NEOPLASM OF LOWER-OUTER QUADRANT OF RIGHT BREAST OF FEMALE, ESTROGEN RECEPTOR POSITIVE: ICD-10-CM

## 2024-07-01 LAB
ALBUMIN SERPL-MCNC: 4.4 G/DL (ref 3.5–5.2)
ALBUMIN/GLOB SERPL: 1.9 G/DL
ALP SERPL-CCNC: 82 U/L (ref 39–117)
ALT SERPL W P-5'-P-CCNC: 27 U/L (ref 1–33)
ANION GAP SERPL CALCULATED.3IONS-SCNC: 10.2 MMOL/L (ref 5–15)
AST SERPL-CCNC: 25 U/L (ref 1–32)
BASOPHILS # BLD AUTO: 0.06 10*3/MM3 (ref 0–0.2)
BASOPHILS NFR BLD AUTO: 0.7 % (ref 0–1.5)
BILIRUB SERPL-MCNC: 0.3 MG/DL (ref 0–1.2)
BUN SERPL-MCNC: 17 MG/DL (ref 8–23)
BUN/CREAT SERPL: 26.2 (ref 7–25)
CALCIUM SPEC-SCNC: 9 MG/DL (ref 8.6–10.5)
CHLORIDE SERPL-SCNC: 107 MMOL/L (ref 98–107)
CO2 SERPL-SCNC: 22.8 MMOL/L (ref 22–29)
CREAT SERPL-MCNC: 0.65 MG/DL (ref 0.57–1)
DEPRECATED RDW RBC AUTO: 51.8 FL (ref 37–54)
EGFRCR SERPLBLD CKD-EPI 2021: 95.4 ML/MIN/1.73
EOSINOPHIL # BLD AUTO: 0.08 10*3/MM3 (ref 0–0.4)
EOSINOPHIL NFR BLD AUTO: 1 % (ref 0.3–6.2)
ERYTHROCYTE [DISTWIDTH] IN BLOOD BY AUTOMATED COUNT: 14.9 % (ref 12.3–15.4)
GLOBULIN UR ELPH-MCNC: 2.3 GM/DL
GLUCOSE SERPL-MCNC: 138 MG/DL (ref 65–99)
HCT VFR BLD AUTO: 34.9 % (ref 34–46.6)
HGB BLD-MCNC: 11.3 G/DL (ref 12–15.9)
IMM GRANULOCYTES # BLD AUTO: 0.12 10*3/MM3 (ref 0–0.05)
IMM GRANULOCYTES NFR BLD AUTO: 1.5 % (ref 0–0.5)
LYMPHOCYTES # BLD AUTO: 2.44 10*3/MM3 (ref 0.7–3.1)
LYMPHOCYTES NFR BLD AUTO: 30.4 % (ref 19.6–45.3)
MCH RBC QN AUTO: 31 PG (ref 26.6–33)
MCHC RBC AUTO-ENTMCNC: 32.4 G/DL (ref 31.5–35.7)
MCV RBC AUTO: 95.6 FL (ref 79–97)
MONOCYTES # BLD AUTO: 0.82 10*3/MM3 (ref 0.1–0.9)
MONOCYTES NFR BLD AUTO: 10.2 % (ref 5–12)
NEUTROPHILS NFR BLD AUTO: 4.51 10*3/MM3 (ref 1.7–7)
NEUTROPHILS NFR BLD AUTO: 56.2 % (ref 42.7–76)
NRBC BLD AUTO-RTO: 0 /100 WBC (ref 0–0.2)
PLATELET # BLD AUTO: 314 10*3/MM3 (ref 140–450)
PMV BLD AUTO: 10.2 FL (ref 6–12)
POTASSIUM SERPL-SCNC: 3.9 MMOL/L (ref 3.5–5.2)
PROT SERPL-MCNC: 6.7 G/DL (ref 6–8.5)
RBC # BLD AUTO: 3.65 10*6/MM3 (ref 3.77–5.28)
SODIUM SERPL-SCNC: 140 MMOL/L (ref 136–145)
WBC NRBC COR # BLD AUTO: 8.03 10*3/MM3 (ref 3.4–10.8)

## 2024-07-01 PROCEDURE — 25010000002 TRASTUZUMAB-DTTB 420 MG RECONSTITUTED SOLUTION 1 EACH VIAL: Performed by: NURSE PRACTITIONER

## 2024-07-01 PROCEDURE — 25810000003 SODIUM CHLORIDE 0.9 % SOLUTION: Performed by: NURSE PRACTITIONER

## 2024-07-01 PROCEDURE — 25810000003 SODIUM CHLORIDE 0.9 % SOLUTION 250 ML FLEX CONT: Performed by: NURSE PRACTITIONER

## 2024-07-01 PROCEDURE — 96413 CHEMO IV INFUSION 1 HR: CPT

## 2024-07-01 PROCEDURE — 85025 COMPLETE CBC W/AUTO DIFF WBC: CPT

## 2024-07-01 PROCEDURE — 25010000002 PERTUZUMAB 420 MG/14ML SOLUTION 420 MG VIAL: Performed by: NURSE PRACTITIONER

## 2024-07-01 PROCEDURE — 96417 CHEMO IV INFUS EACH ADDL SEQ: CPT

## 2024-07-01 PROCEDURE — 80053 COMPREHEN METABOLIC PANEL: CPT

## 2024-07-01 PROCEDURE — 25010000002 HEPARIN LOCK FLUSH PER 10 UNITS: Performed by: INTERNAL MEDICINE

## 2024-07-01 RX ORDER — LIDOCAINE AND PRILOCAINE 25; 25 MG/G; MG/G
CREAM TOPICAL ONCE
Qty: 1 EACH | Refills: 0 | Status: SHIPPED | OUTPATIENT
Start: 2024-07-01 | End: 2024-07-01

## 2024-07-01 RX ORDER — HEPARIN SODIUM (PORCINE) LOCK FLUSH IV SOLN 100 UNIT/ML 100 UNIT/ML
500 SOLUTION INTRAVENOUS AS NEEDED
OUTPATIENT
Start: 2024-07-01

## 2024-07-01 RX ORDER — HEPARIN SODIUM (PORCINE) LOCK FLUSH IV SOLN 100 UNIT/ML 100 UNIT/ML
500 SOLUTION INTRAVENOUS AS NEEDED
Status: DISCONTINUED | OUTPATIENT
Start: 2024-07-01 | End: 2024-07-01 | Stop reason: HOSPADM

## 2024-07-01 RX ORDER — SODIUM CHLORIDE 9 MG/ML
20 INJECTION, SOLUTION INTRAVENOUS ONCE
Status: COMPLETED | OUTPATIENT
Start: 2024-07-01 | End: 2024-07-01

## 2024-07-01 RX ORDER — SODIUM CHLORIDE 0.9 % (FLUSH) 0.9 %
10 SYRINGE (ML) INJECTION AS NEEDED
OUTPATIENT
Start: 2024-07-01

## 2024-07-01 RX ORDER — SODIUM CHLORIDE 0.9 % (FLUSH) 0.9 %
10 SYRINGE (ML) INJECTION AS NEEDED
Status: DISCONTINUED | OUTPATIENT
Start: 2024-07-01 | End: 2024-07-01 | Stop reason: HOSPADM

## 2024-07-01 RX ADMIN — PERTUZUMAB 420 MG: 30 INJECTION, SOLUTION, CONCENTRATE INTRAVENOUS at 10:58

## 2024-07-01 RX ADMIN — Medication 500 UNITS: at 12:37

## 2024-07-01 RX ADMIN — Medication 10 ML: at 12:37

## 2024-07-01 RX ADMIN — ONTRUZANT 330 MG: KIT INTRAVENOUS at 12:04

## 2024-07-01 RX ADMIN — SODIUM CHLORIDE 20 ML/HR: 9 INJECTION, SOLUTION INTRAVENOUS at 10:40

## 2024-07-01 NOTE — TELEPHONE ENCOUNTER
LVM for the patient about the surgery and associated appointments with the surgery.    PAT: 08/08/24 at 12:00 am  Surgery: 08/15/24, arriving at 5:15 am, Bilateral SNI 7:00 am & 7:15 and surgery to start at 8:00 am  POST OP: 08/26/24 at 9:30 am     Surgery packet mailed ( 06/27/24)    [facility]

## 2024-07-11 ENCOUNTER — PATIENT OUTREACH (OUTPATIENT)
Dept: OTHER | Facility: HOSPITAL | Age: 69
End: 2024-07-11
Payer: COMMERCIAL

## 2024-07-11 NOTE — PROGRESS NOTES
Called MsSamuel [place] to see how she was doing. Left a message with my contact information and asked her to call back at her convenience.

## 2024-07-17 ENCOUNTER — PATIENT OUTREACH (OUTPATIENT)
Dept: OTHER | Facility: HOSPITAL | Age: 69
End: 2024-07-17
Payer: COMMERCIAL

## 2024-07-17 NOTE — PROGRESS NOTES
Ms. Boyle called with questions about wigs and we discussed our resources here at the cancer center and [facility]. She also stated her  was recently hospitalized and recovering from surgery. She is set for her bilateral mastectomy Aug 15th and stated she may need extra support from her children during that time. She will keep us updated on her needs moving forward.

## 2024-07-22 ENCOUNTER — PATIENT OUTREACH (OUTPATIENT)
Dept: OTHER | Facility: HOSPITAL | Age: 69
End: 2024-07-22
Payer: COMMERCIAL

## 2024-07-22 ENCOUNTER — INFUSION (OUTPATIENT)
Dept: ONCOLOGY | Facility: HOSPITAL | Age: 69
End: 2024-07-22
Payer: COMMERCIAL

## 2024-07-22 ENCOUNTER — OFFICE VISIT (OUTPATIENT)
Dept: ONCOLOGY | Facility: CLINIC | Age: 69
End: 2024-07-22
Payer: COMMERCIAL

## 2024-07-22 ENCOUNTER — TELEPHONE (OUTPATIENT)
Dept: SURGERY | Facility: CLINIC | Age: 69
End: 2024-07-22
Payer: COMMERCIAL

## 2024-07-22 VITALS
HEIGHT: 58 IN | SYSTOLIC BLOOD PRESSURE: 166 MMHG | HEART RATE: 80 BPM | OXYGEN SATURATION: 96 % | WEIGHT: 117.7 LBS | BODY MASS INDEX: 24.71 KG/M2 | TEMPERATURE: 98 F | DIASTOLIC BLOOD PRESSURE: 84 MMHG | RESPIRATION RATE: 16 BRPM

## 2024-07-22 DIAGNOSIS — C50.511 MALIGNANT NEOPLASM OF LOWER-OUTER QUADRANT OF RIGHT BREAST OF FEMALE, ESTROGEN RECEPTOR POSITIVE: Primary | ICD-10-CM

## 2024-07-22 DIAGNOSIS — Z17.0 MALIGNANT NEOPLASM OF LOWER-OUTER QUADRANT OF RIGHT BREAST OF FEMALE, ESTROGEN RECEPTOR POSITIVE: Primary | ICD-10-CM

## 2024-07-22 DIAGNOSIS — C50.912 BILATERAL MALIGNANT NEOPLASM OF BREAST IN FEMALE, UNSPECIFIED ESTROGEN RECEPTOR STATUS, UNSPECIFIED SITE OF BREAST: Primary | ICD-10-CM

## 2024-07-22 DIAGNOSIS — C50.511 MALIGNANT NEOPLASM OF LOWER-OUTER QUADRANT OF RIGHT BREAST OF FEMALE, ESTROGEN RECEPTOR NEGATIVE: ICD-10-CM

## 2024-07-22 DIAGNOSIS — C50.911 HER2-POSITIVE CARCINOMA OF RIGHT BREAST: ICD-10-CM

## 2024-07-22 DIAGNOSIS — Z79.899 HIGH RISK MEDICATION USE: ICD-10-CM

## 2024-07-22 DIAGNOSIS — C50.911 BILATERAL MALIGNANT NEOPLASM OF BREAST IN FEMALE, UNSPECIFIED ESTROGEN RECEPTOR STATUS, UNSPECIFIED SITE OF BREAST: Primary | ICD-10-CM

## 2024-07-22 DIAGNOSIS — Z17.0 MALIGNANT NEOPLASM OF LOWER-OUTER QUADRANT OF RIGHT BREAST OF FEMALE, ESTROGEN RECEPTOR POSITIVE: ICD-10-CM

## 2024-07-22 DIAGNOSIS — Z17.1 MALIGNANT NEOPLASM OF LOWER-OUTER QUADRANT OF RIGHT BREAST OF FEMALE, ESTROGEN RECEPTOR NEGATIVE: ICD-10-CM

## 2024-07-22 DIAGNOSIS — Z45.2 ENCOUNTER FOR FITTING AND ADJUSTMENT OF VASCULAR CATHETER: ICD-10-CM

## 2024-07-22 DIAGNOSIS — D05.10 DUCTAL CARCINOMA IN SITU (DCIS) OF BREAST, UNSPECIFIED LATERALITY: Primary | ICD-10-CM

## 2024-07-22 DIAGNOSIS — C50.511 MALIGNANT NEOPLASM OF LOWER-OUTER QUADRANT OF RIGHT BREAST OF FEMALE, ESTROGEN RECEPTOR POSITIVE: ICD-10-CM

## 2024-07-22 LAB
ALBUMIN SERPL-MCNC: 4.5 G/DL (ref 3.5–5.2)
ALBUMIN/GLOB SERPL: 1.6 G/DL
ALP SERPL-CCNC: 92 U/L (ref 39–117)
ALT SERPL W P-5'-P-CCNC: 35 U/L (ref 1–33)
ANION GAP SERPL CALCULATED.3IONS-SCNC: 13.6 MMOL/L (ref 5–15)
AST SERPL-CCNC: 29 U/L (ref 1–32)
BASOPHILS # BLD AUTO: 0.07 10*3/MM3 (ref 0–0.2)
BASOPHILS NFR BLD AUTO: 0.9 % (ref 0–1.5)
BILIRUB SERPL-MCNC: 0.4 MG/DL (ref 0–1.2)
BUN SERPL-MCNC: 10 MG/DL (ref 8–23)
BUN/CREAT SERPL: 16.1 (ref 7–25)
CALCIUM SPEC-SCNC: 9.4 MG/DL (ref 8.6–10.5)
CHLORIDE SERPL-SCNC: 102 MMOL/L (ref 98–107)
CO2 SERPL-SCNC: 24.4 MMOL/L (ref 22–29)
CREAT SERPL-MCNC: 0.62 MG/DL (ref 0.57–1)
DEPRECATED RDW RBC AUTO: 46.5 FL (ref 37–54)
EGFRCR SERPLBLD CKD-EPI 2021: 96.5 ML/MIN/1.73
EOSINOPHIL # BLD AUTO: 0.15 10*3/MM3 (ref 0–0.4)
EOSINOPHIL NFR BLD AUTO: 2 % (ref 0.3–6.2)
ERYTHROCYTE [DISTWIDTH] IN BLOOD BY AUTOMATED COUNT: 13.5 % (ref 12.3–15.4)
GLOBULIN UR ELPH-MCNC: 2.9 GM/DL
GLUCOSE SERPL-MCNC: 150 MG/DL (ref 65–99)
HCT VFR BLD AUTO: 39.8 % (ref 34–46.6)
HGB BLD-MCNC: 12.8 G/DL (ref 12–15.9)
IMM GRANULOCYTES # BLD AUTO: 0.03 10*3/MM3 (ref 0–0.05)
IMM GRANULOCYTES NFR BLD AUTO: 0.4 % (ref 0–0.5)
LYMPHOCYTES # BLD AUTO: 2.75 10*3/MM3 (ref 0.7–3.1)
LYMPHOCYTES NFR BLD AUTO: 35.8 % (ref 19.6–45.3)
MCH RBC QN AUTO: 30.3 PG (ref 26.6–33)
MCHC RBC AUTO-ENTMCNC: 32.2 G/DL (ref 31.5–35.7)
MCV RBC AUTO: 94.1 FL (ref 79–97)
MONOCYTES # BLD AUTO: 0.52 10*3/MM3 (ref 0.1–0.9)
MONOCYTES NFR BLD AUTO: 6.8 % (ref 5–12)
NEUTROPHILS NFR BLD AUTO: 4.16 10*3/MM3 (ref 1.7–7)
NEUTROPHILS NFR BLD AUTO: 54.1 % (ref 42.7–76)
NRBC BLD AUTO-RTO: 0 /100 WBC (ref 0–0.2)
PLATELET # BLD AUTO: 324 10*3/MM3 (ref 140–450)
PMV BLD AUTO: 10.3 FL (ref 6–12)
POTASSIUM SERPL-SCNC: 3.8 MMOL/L (ref 3.5–5.2)
PROT SERPL-MCNC: 7.4 G/DL (ref 6–8.5)
RBC # BLD AUTO: 4.23 10*6/MM3 (ref 3.77–5.28)
SODIUM SERPL-SCNC: 140 MMOL/L (ref 136–145)
WBC NRBC COR # BLD AUTO: 7.68 10*3/MM3 (ref 3.4–10.8)

## 2024-07-22 PROCEDURE — 25010000002 HEPARIN LOCK FLUSH PER 10 UNITS: Performed by: INTERNAL MEDICINE

## 2024-07-22 PROCEDURE — 25010000002 TRASTUZUMAB-DTTB 420 MG RECONSTITUTED SOLUTION 1 EACH VIAL: Performed by: NURSE PRACTITIONER

## 2024-07-22 PROCEDURE — 25810000003 SODIUM CHLORIDE 0.9 % SOLUTION: Performed by: NURSE PRACTITIONER

## 2024-07-22 PROCEDURE — 99214 OFFICE O/P EST MOD 30 MIN: CPT | Performed by: NURSE PRACTITIONER

## 2024-07-22 PROCEDURE — 85025 COMPLETE CBC W/AUTO DIFF WBC: CPT

## 2024-07-22 PROCEDURE — 25010000002 PERTUZUMAB 420 MG/14ML SOLUTION 420 MG VIAL: Performed by: NURSE PRACTITIONER

## 2024-07-22 PROCEDURE — 96413 CHEMO IV INFUSION 1 HR: CPT

## 2024-07-22 PROCEDURE — 25810000003 SODIUM CHLORIDE 0.9 % SOLUTION 250 ML FLEX CONT: Performed by: NURSE PRACTITIONER

## 2024-07-22 PROCEDURE — 96417 CHEMO IV INFUS EACH ADDL SEQ: CPT

## 2024-07-22 PROCEDURE — 80053 COMPREHEN METABOLIC PANEL: CPT

## 2024-07-22 RX ORDER — SODIUM CHLORIDE 0.9 % (FLUSH) 0.9 %
10 SYRINGE (ML) INJECTION AS NEEDED
OUTPATIENT
Start: 2024-07-22

## 2024-07-22 RX ORDER — SODIUM CHLORIDE 9 MG/ML
20 INJECTION, SOLUTION INTRAVENOUS ONCE
Status: CANCELLED | OUTPATIENT
Start: 2024-07-22

## 2024-07-22 RX ORDER — HEPARIN SODIUM (PORCINE) LOCK FLUSH IV SOLN 100 UNIT/ML 100 UNIT/ML
500 SOLUTION INTRAVENOUS AS NEEDED
Status: DISCONTINUED | OUTPATIENT
Start: 2024-07-22 | End: 2024-07-22 | Stop reason: HOSPADM

## 2024-07-22 RX ORDER — HEPARIN SODIUM (PORCINE) LOCK FLUSH IV SOLN 100 UNIT/ML 100 UNIT/ML
500 SOLUTION INTRAVENOUS AS NEEDED
OUTPATIENT
Start: 2024-07-22

## 2024-07-22 RX ORDER — SODIUM CHLORIDE 0.9 % (FLUSH) 0.9 %
10 SYRINGE (ML) INJECTION AS NEEDED
Status: DISCONTINUED | OUTPATIENT
Start: 2024-07-22 | End: 2024-07-22 | Stop reason: HOSPADM

## 2024-07-22 RX ORDER — SODIUM CHLORIDE 9 MG/ML
20 INJECTION, SOLUTION INTRAVENOUS ONCE
Status: COMPLETED | OUTPATIENT
Start: 2024-07-22 | End: 2024-07-22

## 2024-07-22 RX ADMIN — ONTRUZANT 330 MG: KIT INTRAVENOUS at 12:53

## 2024-07-22 RX ADMIN — Medication 10 ML: at 13:28

## 2024-07-22 RX ADMIN — Medication 500 UNITS: at 13:28

## 2024-07-22 RX ADMIN — SODIUM CHLORIDE 20 ML/HR: 9 INJECTION, SOLUTION INTRAVENOUS at 11:45

## 2024-07-22 RX ADMIN — PERTUZUMAB 420 MG: 30 INJECTION, SOLUTION, CONCENTRATE INTRAVENOUS at 11:47

## 2024-07-22 NOTE — PROGRESS NOTES
Subjective   Felicia Boyle is a 68 y.o. female.  Referred by Dr. Manrique for right breast invasive ductal carcinoma, HER2 positive, left breast ductal carcinoma in situ    History of Present Illness     Ms. Boyle is a 68-year-old postmenopausal [place] lady who is fairly healthy without any significant comorbidities palpated of right breast mass in the latter part of  which was further worked up with diagnostic mammogram and ultrasound and subsequently a biopsy which confirmed invasive ductal carcinoma which was ER/NC negative and HER2 positive.    Family history significant for her 2 sisters with breast cancer at the age of 38 and 48 respectively.  Her older sister  of metastatic breast cancer but her younger sister is doing well.  Patient underwent genetic testing in 2017 which was negative due to her family history.    She had regular screening mammograms up until 2019 but subsequently stopped having annual gynecological visits and therefore did not have any further screening mammograms up until the most recent diagnostic mammogram for the new palpable abnormality.    2024-bilateral diagnostic mammogram and right breast ultrasound  The breast heterogeneously dense.  Left breast with a scar marker in the lower left breast dating back to .  No new findings in the left breast.    Right breast at 8:00, 8 cm from the nipple there is a 1.7 x 1.4 x 1.6 cm oval high density mass with partially circumscribed and partially indistinct margins.    On the ultrasound the mass measures 1.7 x 1 x 1.3 cm.    No abnormal right axilla lymphadenopathy.    2024-right breast ultrasound-guided biopsy  Pathology consistent with invasive ductal carcinoma  Grade 3  9.3 mm of invasive disease on the core biopsy  ER negative  NC negative  HER2 3+ on immunohistochemistry, 95%  Ki-67 70%    2024-bilateral breast MRI  In the right breast at 8:00, 8 cm from the nipple there is a irregular enhancing mass which  measures 1.8 x 2 x 1.7 cm.  This corresponds to the biopsy-proven malignancy.  Extending anteriorly away from the MT margin of the mass there is an irregular linear enhancement which measures 3.5 x 1 x 0.7 cm.  The whole area including the mass measures 4.7 cm in AP dimension, 1.7 in mediolateral and 2.4 cm in the superior-inferior dimension.    At 12:00, 3 cm from the nipple there is an irregular non-mass enhancement which measures 1 x 1.1 x 1.2 cm.  No mammographic correlate is appreciated.    No other suspicious areas of enhancement in the right breast or right axilla or internal mammary chain.    In the left breast, middle third at 11:30 position, 3.7 cm from the nipple there is an irregular area of non-mass enhancement which measures 1.3 x 1.1 x 1 cm.  There is suspected architectural distortion.  No other areas of abnormal enhancement seen in the left breast of the left axilla or the internal mammary chain.    2/26/2024-additional MRI guided biopsies    1.left breast 12:00-intermediate grade ductal carcinoma in situ with apocrine features  ER +91 to 100% strong  IN +81 to 90% strong  Ki-67 15%    2. Right breast 12 o'clock position MRI guided biopsy of the non-mass enhancement-sclerosing adenosis with associated calcifications  No atypical hyperplasia in situ or invasive carcinoma    3.right breast 8:00 MRI guided biopsies of the linear enhancement is consistent with high-grade ductal carcinoma in situ  DCIS involves multiple tissue cores measuring up to 3 mm.  Sclerosing adenosis and usual ductal hyperplasia with associated microcalcifications.    The case was discussed in tumor board and Dr. Patel thought that they could be more invasive disease in the 4.7 cm of linear enhancement noted in the right breast at the site of the mass.  Therefore it was decided to proceed with neoadjuvant chemotherapy.    Patient was a former smoker and smoked for about 40 years, half a pack per week, quit about 1 month ago.   Denies any alcohol use.    Week 1 Taxol Herceptin and Perjeta was planned for 3/18/2024.  Patient received Herceptin and Perjeta and had some chills for which she received Solu-Medrol and responded well to that.  However after infusing only 1 cc of Taxol patient experienced a severe anaphylactic reaction due to which the infusion was stopped permanently and she received an additional dose of Solu-Medrol and Benadryl.  Symptoms resolved subsequently and patient was discharged home safely.    Due to this recent treatment has been changed to Abraxane along with Herceptin and Perjeta.  3/25/2024-patient is due for her first treatment with Abraxane.    She is extremely anxious given the severe hypersensitivity reaction that she experienced with Taxol.  She is also complaining of increased belching and heartburn since her last chemotherapy.  She is complaining of severe diarrhea with started last night.  Had multiple loose stools.    2024-MRI of the breast-images independently reviewed and interpreted by me.  Interval near complete resolution of all suspicious enhancement in the posterior one third lower outer quadrant of the right breast at 8:00 corresponding to the biopsy-proven site of malignancy.  Who has been on masslike area of enhancement measuring 1.5 x 0.9 x 0.9 cm compared to 2 x 1.8 x 1.7 cm.  Mildly abnormal enhancement extending anteriorly away from the mass has resolved.  No other suspicious areas of enhancement noted in the right breast.  Left breast at 12:00 interval resolution of all suspicious enhancement.    INTERVAL HISTORY  Patient returns today for continued Herceptin and Perjeta.  She has met with Dr. Manrique and has agreed to proceed with surgery which is scheduled for August 15.  She continues to be apprehensive however and even worsening situation her granddaughter recently is  after having a  section.  Of course the patient is greatly distraught by this.  She has previously  "met with social work and I have encouraged her to do so again.  She continues with intermittent neuropathy of her feet.  Her  recently was ill as well which canceled their trip.  Today he is actually having a surgical procedure.      The following portions of the patient's history were reviewed and updated as appropriate: allergies, current medications, past family history, past medical history, past social history, past surgical history, and problem list.    Past Medical History:   Diagnosis Date    Anxiety about health     Breast cancer of lower-outer quadrant of right female breast 02/14/2024    DCIS (ductal carcinoma in situ) of breast 03/15/2024    Diverticulosis     Mixed hyperlipidemia 05/19/2019    DIET CONTROLLED    Osteoporosis     Type 2 diabetes mellitus         Past Surgical History:   Procedure Laterality Date    BREAST BIOPSY Left     Excisional biopsy in the [facility]    BREAST BIOPSY  03/2024    CATARACT EXTRACTION W/ INTRAOCULAR LENS IMPLANT Bilateral     CHOLECYSTECTOMY  2012    COLON RESECTION Right 2002    COLONOSCOPY N/A 04/28/2021    Procedure: COLONOSCOPY TO ANASTAMOSIS/TI;  Surgeon: Angelo Gonsalez MD;  Location: [facility] ENDOSCOPY;  Service: Gastroenterology;  Laterality: N/A;  PRE: SCREENING  POST: NORMAL POST-OP ANATOMY    ENDOSCOPY      2013    ENDOSCOPY N/A 03/10/2017    Procedure: ESOPHAGOGASTRODUODENOSCOPY WITH BIOPSY AND PETER DILATATION 54\";  Surgeon: Angelo Gonsalez MD;  Location: [facility] ENDOSCOPY;  Service:     ENDOSCOPY N/A 04/28/2021    Procedure: ESOPHAGOGASTRODUODENOSCOPY WITH BIOPSIES, 54FR PETER DILATATION;  Surgeon: Angelo Gonsalez MD;  Location: [facility] ENDOSCOPY;  Service: Gastroenterology;  Laterality: N/A;  PRE: DYSPHAGIA  POST: GASTRITIS, ESOPHAGEAL RING    VENOUS ACCESS DEVICE (PORT) INSERTION Left 3/14/2024    Procedure: INSERTION OF PORTACATH;  Surgeon: Abeba Manrique MD;  Location: [facility] OR;  Service: General;  Laterality: Left;    "     Family History   Problem Relation Age of Onset    Hypertension Mother     Diabetes Mother     Hypertension Father     Breast cancer Sister 48    Breast cancer Sister 38    No Known Problems Brother     No Known Problems Daughter     No Known Problems Son     No Known Problems Maternal Grandmother     No Known Problems Paternal Grandmother     No Known Problems Maternal Grandfather     No Known Problems Paternal Grandfather     Autism Grandson     Colon cancer Neg Hx     Rectal cancer Neg Hx     Endometrial cancer Neg Hx     Vaginal cancer Neg Hx     Cervical cancer Neg Hx     Ovarian cancer Neg Hx     Prostate cancer Neg Hx     Uterine cancer Neg Hx     Malig Hyperthermia Neg Hx         Social History     Socioeconomic History    Marital status:      Spouse name: Ryan   Tobacco Use    Smoking status: Former     Current packs/day: 0.00     Average packs/day: 0.3 packs/day for 15.0 years (3.8 ttl pk-yrs)     Types: Cigarettes     Quit date: 1/15/2024     Years since quittin.5     Passive exposure: Past    Smokeless tobacco: Never    Tobacco comments:     N/A   Vaping Use    Vaping status: Never Used   Substance and Sexual Activity    Alcohol use: Yes     Comment: RARE    Drug use: No    Sexual activity: Not Currently     Partners: Male     Birth control/protection: Tubal ligation        OB History          4    Para   3    Term   3       0    AB   1    Living   3         SAB   1    IAB   0    Ectopic   0    Molar   0    Multiple   0    Live Births   3               Age at menarche-16  Age at first live childbirth-18   4 para 3  1  Age at menopause-49  No use of hormone replacement therapy  No use of oral conceptive pills  Breast-feeding for 3 months    Allergies   Allergen Reactions    Metronidazole Nausea And Vomiting      Review of systems as mentioned HPI otherwise negative    Objective   Blood pressure 166/84, pulse 80, temperature 98 °F (36.7 °C), temperature source  "Oral, resp. rate 16, height 147.3 cm (57.99\"), weight 53.4 kg (117 lb 11.2 oz), SpO2 96%, not currently breastfeeding.     Physical Exam  Vitals reviewed.   Constitutional:       Appearance: Normal appearance. She is normal weight.   HENT:      Nose: Nose normal.      Mouth/Throat:      Mouth: Mucous membranes are moist.      Pharynx: Oropharynx is clear.   Eyes:      Conjunctiva/sclera: Conjunctivae normal.      Pupils: Pupils are equal, round, and reactive to light.   Cardiovascular:      Rate and Rhythm: Normal rate.   Pulmonary:      Effort: Pulmonary effort is normal.      Breath sounds: Normal breath sounds.   Abdominal:      General: Abdomen is flat. Bowel sounds are normal.   Musculoskeletal:         General: Normal range of motion.      Cervical back: Normal range of motion.   Skin:     General: Skin is warm and dry.      Findings: No rash.   Neurological:      General: No focal deficit present.      Mental Status: She is alert and oriented to person, place, and time.   Psychiatric:         Mood and Affect: Mood normal.         Behavior: Behavior normal.         Thought Content: Thought content normal.         Judgment: Judgment normal.       Breast Exam: Right breast on inspection 9 o'clock position with biopsy site changes.  The right breast mass is no longer palpable.  No right axillary lymphadenopathy.    Left breast : No left axillary lymphadenopathy.     I have reexamined the patient and the results are consistent with the previously documented exam. Erin Mir, DAY      Results from last 7 days   Lab Units 07/22/24  0955   WBC 10*3/mm3 7.68   NEUTROS ABS 10*3/mm3 4.16   HEMOGLOBIN g/dL 12.8   HEMATOCRIT % 39.8   PLATELETS 10*3/mm3 324                    No radiology results for the last 30 days.       Assessment & Plan     *Right breast invasive ductal carcinoma  Clinical T2 N0 M0, stage IIa, clinical and prognostic ER/TN negative, HER2 3+ immunohistochemistry, Ki-67 70%  On the MRI the mass " measures 2 cm however there is linear enhancement which in the AP dimension measures 4.7 cm.  Additional biopsies of this linear enhancement was performed and consistent with DCIS  It is hard to delineate the amount of invasive disease as opposed to DCIS.  More likely than not invasive component is likely limited to the mass which is about 2-1/2 cm on exam.  Since the tumor is greater than 2 cm I think it is reasonable to proceed with neoadjuvant chemotherapy.  We discussed proceeding with weekly Taxol along with Herceptin and Perjeta every 3 weeks.  After 12 weeks she will require an MRI to assess the response.  If there is inadequate response then we may have to proceed with Adriamycin and Cytoxan for 4 cycles however if there is a good response then she can proceed with surgery with no additional chemotherapy.  The likely plan is that she will undergo bilateral mastectomy.  If that is the case as long as she does not have any positive margins or lymph node positive disease she may not need radiation.  We discussed continuing adjuvant HER2 directed therapy for whole year  3/18/2024-received first dose of Herceptin and Perjeta, had a severe hypersensitivity reaction to Taxol and hence Taxol was discontinued  3/25/2024-Labs reviewed and stable to proceed with chemotherapy  Patient is extremely anxious today after experiencing the severe hypersensitivity reaction last week.  Reassured patient that this is unlikely to happen with Abraxane and we will premedicate with extra Solu-Medrol..  4/1/2024 patient returns for cycle 1 day 15 Abraxane.,  Left chest, left shoulder at times.  This has come and gone over the past week and is not associated with any shortness of breath, numbness or tingling.  She states when her chest is hurting if she presses on the area it gets better.  She notices it at different points during the day.  It feels more like an ache and is not a sharp pain.  She states she has been sleeping in a  "different position due to the port which could be causing some muscular discomfort.  She has noticed the discomfort at times when she takes a deep breath however is able to get deep breath here in the office did not have any pain.  She is not having any pain today at all.  She has taken her hydrocodone maybe 3-4 times over the past week and has not needed it multiple times a day.  CMP resulted today with AST elevated at 47 and .  Bilirubin remains normal at 0.4 with alkaline phosphatase elevated to 160.  Reviewed with Dr. Loredo and will hold treatment today.  She will try to limit any acetaminophen containing products.  She did have a few days of diarrhea that she is unsure if it was related to the Abraxane versus a GI bug as her  had the same symptoms.  Neither had chills or fevers.  She is also having some dysuria today we have a urinalysis with culture pending.  4/8/2024: Due for cycle 2-day 1 Abraxane, Herceptin, Perjeta.  Did not receive cycle 1 day 15 Abraxane due to elevated LFTs as detailed above.  LFTs have significantly improved, AST 28, ALT 58.  Reviewed with Dr. Renteria will proceed with Abraxane only today, will reduce Abraxane by 20%.  4/15/2024-patient is scheduled for week 4 of chemotherapy today.  Labs reviewed and overall stable except for an ALT of 45.  She will proceed with planned chemotherapy.  4/22/2024: after last chemotherapy patient experienced 3 episodes of \"zapping\" in her fingertips and soles of feet that last a few seconds then resolved.  Has not experienced this sensation in at least 3 days.  No interference with daily activities.  Will continue Abraxane at previous 20% dose reduction.  She is doing cold therapy for her hands/feet.  Will need to monitor closely for neuropathy.  4/29/2024-chemotherapy held as patient reported worsening neuropathy and dropping things.  5/6/2024-presents today for evaluation of the neuropathy and to determine if she can resume chemotherapy.  " She reports that the neuropathy has improved some.  She is mainly perceiving pain in her fingers and tingling and numbness.   She however tells me that she has not been dropping things anymore.  She is able to do other fine motor activities just fine.  5/6/2024-patient did receive Abraxane with a 20% dose reduction.  5/13/2024-presents today for the neck cycle of Abraxane and since her last treatment she has not had any worsening of neuropathy.  We also started her on gabapentin 300 mg nightly.  Neuropathy overall stable.  Labs reviewed and she does have elevated LFTs however no further dose reduction needed.  Will proceed with planned chemotherapy today.  5/11/2024-MRI of the breast shows near complete resolution of the non-mass enhancement and areas of malignancy in both right and left breast.  This is reassuring and if patient has any further worsening of neuropathy then we could discontinue Abraxane and she will proceed with surgery and continue HER2 directed therapy.  5/28/2024: Cycle 4-day 8 Abraxane only, continue previous 20% dose reduction.  Continues on gabapentin, neuropathy stable.  6/10/2024-scheduled for Abraxane Herceptin and Perjeta.  Neuropathy overall stable.  She will continue with 20% dose reduction. She has 1 more week of Abraxane left following which she will have to proceed with surgery followed by adjuvant HER2 directed therapy.  Patient is very reluctant to undergo surgery.  Communicated with Dr. Manrique and she will be scheduled to see her after completion of the last treatment of Abraxane.  Since she had a recent MRI she may not require another MRI   Proceed with last dose of Abraxane today 6/17/2024. Maintain previous dosing with 20% dose reduction.   7/22/2024 patient returns today for Herceptin and Perjeta which she continues once every 3 weeks.  She is scheduled for bilateral mastectomy on 8/15/2024 with Dr. Manrique.    *Left breast ductal carcinoma in situ  Intermed  She has been  having some discomfort in her left shoulder bladeiate grade with apocrine features  ER/PA strongly positive  Plan is for her to undergo bilateral mastectomy.  If she does undergo bilateral mastectomy then that would be curative and she would not require any endocrine therapy subsequently unless there is any upstaging of the malignancy  5/11/2024-MRI of the breast with near complete resolution of the non-mass enhancement.    *Severe diarrhea  Started last night  Patient has not used any Imodium  We will administer 1 L of normal saline today  Imodium will be given here today  She will start entegrade  Patient has not been using any Imodium.  Encouraged her to use Imodium up to 8 tablets in a day.  No diarrhea at this time.    *GERD  Protonix with improvement  Continue the same    *Cardiac health  Referral to cardio oncology placed  3/15/2024 echocardiogram with an ejection fraction of 75.5%, LV strain is normal at -24.2%  She has met with Dr. Peguero , started on bisoprolol   6/17/2024 ejection fraction 70%, LV strain -22.4%    *Elevated LFTs  4/1/2024 AST at 47, from 20.  ALT at 141, from 22.  Alkaline phosphatase at 160, from 100.  Total bilirubin remains normal.  Acute hepatitis panel negative.  Patient asked to avoid Tylenol and limit her hydrocodone usage as this does have Tylenol in it unless needed.  Also advised to avoid alcohol which she states she does not drink.  4/8/2024: AST 28, ALT 58, alk phos 102.  Total bilirubin 0.3.  Likely secondary to Abraxane.   4/15/2024-ALT 45, AST 25, alkaline phosphatase 98  AST 24, ALT 37, alkaline phosphatase 93  5/13/2024 LFTs higher with an ALT of 98 and AST 67, alkaline phosphatase 90  5/28/2024: AST 30, ALT 43, total bilirubin 0.2.  6/10/2024-ALT mildly elevated at 43, AST 26, alkaline phosphatase 77, total bilirubin 0.2.  Lab stable, proceed with Abraxane  6/17/2024 : AST 25, ALT 15  7/22/2024 ALT slightly increased at 35, AST normal, T. bili normal    *Hypokalemia  secondary to diarrhea likely  Potassium normal today at 3.8    *Alopecia-secondary to chemotherapy.  Continue follow-up with supportive oncology    *Dysuria-UA with 2+ bacteria.  Start bactrim twice daily x 3 days.    Repeat UA negative    *Neuropathy  Held treatment  due to neuropathy  5/6/2024-resumed chemotherapy  She was started on gabapentin 300 mg nightly and also vitamin B12.  Reports stable neuropathy  Proceed with planned chemotherapy today   MRI shows near complete resolution of the abnormalities and if patient experiences any worsening neuropathy then low threshold to stop chemotherapy and proceed with surgery  The remains intermittent though occasionally painful in her feet.  Will increase gabapentin to 300 mg twice daily  7/22/2024 patient reports neuropathy is stable continues gabapentin 300 mg twice daily    PLAN:   Proceed with Herceptin and Perjeta today  Continue gabapentin and B12  Follow-up in 3 weeks with Dr. Renteria along with Herceptin and Perjeta and labs  Patient is scheduled the day following for surgery with Dr. Manrique.  Patient will reach out to social work for counseling support due to the recent death of her granddaughter    The patient is on high risk medication that requires close monitoring for toxicity.      Erin Mir, APRN  07/22/2024

## 2024-07-22 NOTE — NURSING NOTE
Pt requesting prescription for a wig.  Discussed with Erin BERNSTEIN.  Order printed and given to patient.

## 2024-07-22 NOTE — TELEPHONE ENCOUNTER
----- Message from Mayito BRODY sent at 7/22/2024  2:09 PM EDT -----  Verna Azar called in this afternoon about the above patient who is currently scheduled to have a bilateral Mastectomy on 08/15. If we could place a order for bioimpednece and have the patient scheduled before their surgery.     Can you please place this order for me?

## 2024-07-22 NOTE — PROGRESS NOTES
Ms. Boyle came to the cancer resource center to discuss her questions and concerns. She stated her  was hospitalized with Sepsis and is just now getting home from the hospital. Her son is helping with care at this time. She had questions about going to the lymphedema clinic and we discussed those. Spoke with Dr. Manrique's office who will help put the referral in. She also had questions about her aftercare and concerns that her  will not be well enough to care for her after her surgery. We discussed having someone else there to help like her son or her daughter from [place]. She stated her daughter is planning on coming to stay a week after her surgery. She also knows if things get worse with her  and she has to push the surgery back, she can talk with Dr. Manrique's office about that and they can reschedule. She also stated she will reschedule an appointment with Bri DOE for counseling. She was thankful for the help.

## 2024-08-08 ENCOUNTER — HOSPITAL ENCOUNTER (OUTPATIENT)
Dept: OCCUPATIONAL THERAPY | Facility: HOSPITAL | Age: 69
Discharge: HOME | End: 2024-08-08
Payer: COMMERCIAL

## 2024-08-08 ENCOUNTER — PRE-ADMISSION TESTING (OUTPATIENT)
Dept: PREADMISSION TESTING | Facility: HOSPITAL | Age: 69
End: 2024-08-08
Payer: COMMERCIAL

## 2024-08-08 VITALS
BODY MASS INDEX: 25.46 KG/M2 | DIASTOLIC BLOOD PRESSURE: 90 MMHG | HEART RATE: 76 BPM | RESPIRATION RATE: 18 BRPM | SYSTOLIC BLOOD PRESSURE: 160 MMHG | TEMPERATURE: 98 F | OXYGEN SATURATION: 95 % | WEIGHT: 118 LBS | HEIGHT: 57 IN

## 2024-08-08 DIAGNOSIS — Z91.89 AT RISK FOR LYMPHEDEMA: ICD-10-CM

## 2024-08-08 DIAGNOSIS — C50.911 BILATERAL MALIGNANT NEOPLASM OF BREAST IN FEMALE, UNSPECIFIED ESTROGEN RECEPTOR STATUS, UNSPECIFIED SITE OF BREAST: ICD-10-CM

## 2024-08-08 DIAGNOSIS — Z01.818 OTHER SPECIFIED PRE-OPERATIVE EXAMINATION: Primary | ICD-10-CM

## 2024-08-08 DIAGNOSIS — C50.912 BILATERAL MALIGNANT NEOPLASM OF BREAST IN FEMALE, UNSPECIFIED ESTROGEN RECEPTOR STATUS, UNSPECIFIED SITE OF BREAST: ICD-10-CM

## 2024-08-08 LAB
ALBUMIN SERPL-MCNC: 4.6 G/DL (ref 3.5–5.2)
ALBUMIN/GLOB SERPL: 1.7 G/DL
ALP SERPL-CCNC: 98 U/L (ref 39–117)
ALT SERPL W P-5'-P-CCNC: 30 U/L (ref 1–33)
ANION GAP SERPL CALCULATED.3IONS-SCNC: 10 MMOL/L (ref 5–15)
AST SERPL-CCNC: 22 U/L (ref 1–32)
BASOPHILS # BLD AUTO: 0.05 10*3/MM3 (ref 0–0.2)
BASOPHILS NFR BLD AUTO: 0.6 % (ref 0–1.5)
BILIRUB SERPL-MCNC: <0.2 MG/DL (ref 0–1.2)
BUN SERPL-MCNC: 18 MG/DL (ref 8–23)
BUN/CREAT SERPL: 22.2 (ref 7–25)
CALCIUM SPEC-SCNC: 9.8 MG/DL (ref 8.6–10.5)
CHLORIDE SERPL-SCNC: 104 MMOL/L (ref 98–107)
CO2 SERPL-SCNC: 27 MMOL/L (ref 22–29)
CREAT SERPL-MCNC: 0.81 MG/DL (ref 0.57–1)
DEPRECATED RDW RBC AUTO: 43.5 FL (ref 37–54)
EGFRCR SERPLBLD CKD-EPI 2021: 78.7 ML/MIN/1.73
EOSINOPHIL # BLD AUTO: 0.11 10*3/MM3 (ref 0–0.4)
EOSINOPHIL NFR BLD AUTO: 1.4 % (ref 0.3–6.2)
ERYTHROCYTE [DISTWIDTH] IN BLOOD BY AUTOMATED COUNT: 12.6 % (ref 12.3–15.4)
GLOBULIN UR ELPH-MCNC: 2.7 GM/DL
GLUCOSE SERPL-MCNC: 143 MG/DL (ref 65–99)
HCT VFR BLD AUTO: 41.2 % (ref 34–46.6)
HGB BLD-MCNC: 13 G/DL (ref 12–15.9)
IMM GRANULOCYTES # BLD AUTO: 0.03 10*3/MM3 (ref 0–0.05)
IMM GRANULOCYTES NFR BLD AUTO: 0.4 % (ref 0–0.5)
LYMPHOCYTES # BLD AUTO: 2.99 10*3/MM3 (ref 0.7–3.1)
LYMPHOCYTES NFR BLD AUTO: 37.2 % (ref 19.6–45.3)
MCH RBC QN AUTO: 29.5 PG (ref 26.6–33)
MCHC RBC AUTO-ENTMCNC: 31.6 G/DL (ref 31.5–35.7)
MCV RBC AUTO: 93.6 FL (ref 79–97)
MONOCYTES # BLD AUTO: 0.66 10*3/MM3 (ref 0.1–0.9)
MONOCYTES NFR BLD AUTO: 8.2 % (ref 5–12)
NEUTROPHILS NFR BLD AUTO: 4.19 10*3/MM3 (ref 1.7–7)
NEUTROPHILS NFR BLD AUTO: 52.2 % (ref 42.7–76)
NRBC BLD AUTO-RTO: 0 /100 WBC (ref 0–0.2)
PLATELET # BLD AUTO: 303 10*3/MM3 (ref 140–450)
PMV BLD AUTO: 10.5 FL (ref 6–12)
POTASSIUM SERPL-SCNC: 4.3 MMOL/L (ref 3.5–5.2)
PROT SERPL-MCNC: 7.3 G/DL (ref 6–8.5)
RBC # BLD AUTO: 4.4 10*6/MM3 (ref 3.77–5.28)
SODIUM SERPL-SCNC: 141 MMOL/L (ref 136–145)
WBC NRBC COR # BLD AUTO: 8.03 10*3/MM3 (ref 3.4–10.8)

## 2024-08-08 PROCEDURE — 80053 COMPREHEN METABOLIC PANEL: CPT

## 2024-08-08 PROCEDURE — 85025 COMPLETE CBC W/AUTO DIFF WBC: CPT

## 2024-08-08 PROCEDURE — 97535 SELF CARE MNGMENT TRAINING: CPT

## 2024-08-08 PROCEDURE — 36415 COLL VENOUS BLD VENIPUNCTURE: CPT

## 2024-08-08 PROCEDURE — 97165 OT EVAL LOW COMPLEX 30 MIN: CPT

## 2024-08-08 PROCEDURE — 93005 ELECTROCARDIOGRAM TRACING: CPT

## 2024-08-08 PROCEDURE — 93702 BIS XTRACELL FLUID ANALYSIS: CPT

## 2024-08-08 NOTE — DISCHARGE INSTRUCTIONS
Take the following medications the morning of surgery:  NONE      If you are on prescription narcotic pain medication to control your pain you may also take that medication the morning of surgery.      General Instructions:     Do not eat solid food after midnight the night before surgery.  Clear liquids day of surgery are allowed but must be stopped at least two hours before your hospital arrival time.       Allowed clear liquids      Water, sodas, and tea or coffee with no cream or milk added.       12 to 20 ounces of a clear liquid that contains carbohydrates is recommended.  If non-diabetic, have Gatorade or Powerade.  If diabetic, have G2 or Powerade Zero.     Do not have liquids red in color.  Do not consume chicken, beef, pork or vegetable broth or bouillon cubes of any variety as they are not considered clear liquids and are not allowed.      Infants may have breast milk up to four hours before surgery.  Infants drinking formula may drink formula up to six hours before surgery.   Patients who avoid smoking, chewing tobacco and alcohol for 4 weeks prior to surgery have a reduced risk of post-operative complications.  Quit smoking as many days before surgery as you can.  Do not smoke, use chewing tobacco or drink alcohol the day of surgery.   If applicable bring your C-PAP/ BI-PAP machine in with you to preop day of surgery.  Bring any papers given to you in the doctor’s office.  Wear clean comfortable clothes.  Do not wear contact lenses, false eyelashes or make-up.  Bring a case for your glasses.   Bring crutches or walker if applicable.  Remove all piercings.  Leave jewelry and any other valuables at home.  Hair extensions with metal clips must be removed prior to surgery.  The Pre-Admission Testing nurse will instruct you to bring medications if unable to obtain an accurate list in Pre-Admission Testing.              Preventing a Surgical Site Infection:  For 2 to 3 days before surgery, avoid shaving with  a razor because the razor can irritate skin and make it easier to develop an infection.    Any areas of open skin can increase the risk of a post-operative wound infection by allowing bacteria to enter and travel throughout the body.  Notify your surgeon if you have any skin wounds / rashes even if it is not near the expected surgical site.  The area will need assessed to determine if surgery should be delayed until it is healed.  The night prior to surgery shower using a fresh bar of anti-bacterial soap (such as Dial) and clean washcloth.  Sleep in a clean bed with clean clothing.  Do not allow pets to sleep with you.  Shower on the morning of surgery using a fresh bar of anti-bacterial soap (such as Dial) and clean washcloth.  Dry with a clean towel and dress in clean clothing.  Ask your surgeon if you will be receiving antibiotics prior to surgery.  Make sure you, your family, and all healthcare providers clean their hands with soap and water or an alcohol based hand  before caring for you or your wound.    Day of surgery:  Your arrival time is approximately two hours before your scheduled surgery time.  Upon arrival, a Pre-op nurse and Anesthesiologist will review your health history, obtain vital signs, and answer questions you may have.  The only belongings needed at this time will be a list of your home medications and if applicable your C-PAP/BI-PAP machine.  A Pre-op nurse will start an IV and you may receive medication in preparation for surgery, including something to help you relax.     Please be aware that surgery does come with discomfort.  We want to make every effort to control your discomfort so please discuss any uncontrolled symptoms with your nurse.   Your doctor will most likely have prescribed pain medications.      If you are going home after surgery you will receive individualized written care instructions before being discharged.  A responsible adult must drive you to and from the  hospital on the day of your surgery and ideally stay with you through the night.   .  Discharge prescriptions can be filled by the hospital pharmacy during regular pharmacy hours.  If you are having surgery late in the day/evening your prescription may be e-prescribed to your pharmacy.  Please verify your pharmacy hours or chose a 24 hour pharmacy to avoid not having access to your prescription because your pharmacy has closed for the day.    If you are staying overnight following surgery, you will be transported to your hospital room following the recovery period.  [facility] has all private rooms.    If you have any questions please call Pre-Admission Testing at [phone].  Deductibles and co-payments are collected on the day of service. Please be prepared to pay the required co-pay, deductible or deposit on the day of service as defined by your plan.    Call your surgeon immediately if you experience any of the following symptoms:  Sore Throat  Shortness of Breath or difficulty breathing  Cough  Chills  Body soreness or muscle pain  Headache  Fever  New loss of taste or smell  Do not arrive for your surgery ill.  Your procedure will need to be rescheduled to another time.  You will need to call your physician before the day of surgery to avoid any unnecessary exposure to hospital staff as well as other patients.    CHLORHEXIDINE CLOTH INSTRUCTIONS  The morning of surgery follow these instructions using the Chlorhexidine cloths you've been given.  These steps reduce bacteria on the body.  Do not use the cloths near your eyes, ears mouth, genitalia or on open wounds.  Throw the cloths away after use but do not try to flush them down a toilet.      Open and remove one cloth at a time from the package.    Leave the cloth unfolded and begin the bathing.  Massage the skin with the cloths using gentle pressure to remove bacteria.  Do not scrub harshly.   Follow the steps below with one 2% CHG cloth  per area (6 total cloths).  One cloth for neck, shoulders and chest.  One cloth for both arms, hands, fingers and underarms (do underarms last).  One cloth for the abdomen followed by groin.  One cloth for right leg and foot including between the toes.  One cloth for left leg and foot including between the toes.  The last cloth is to be used for the back of the neck, back and buttocks.    Allow the CHG to air dry 3 minutes on the skin which will give it time to work and decrease the chance of irritation.  The skin may feel sticky until it is dry.  Do not rinse with water or any other liquid or you will lose the beneficial effects of the CHG.  If mild skin irritation occurs, do rinse the skin to remove the CHG.  Report this to the nurse at time of admission.  Do not apply lotions, creams, ointments, deodorants or perfumes after using the clothes. Dress in clean clothes before coming to the hospital.

## 2024-08-08 NOTE — THERAPY EVALUATION
"Outpatient Occupational Therapy Lymphedema Initial Evaluation  [facility]     Patient Name: Felicia Boyle  : 1955  MRN: [id number]  Today's Date: 2024      Visit Date: 2024    Patient Active Problem List   Diagnosis    Age-related osteoporosis without current pathological fracture    FH: breast cancer in first degree relative    Post-menopausal    Type 2 diabetes mellitus    Vitamin D deficiency    Mixed hyperlipidemia    Breast cancer of lower-outer quadrant of right female breast    Encounter for fitting and adjustment of vascular catheter    DCIS (ductal carcinoma in situ) of breast    Bilateral malignant neoplasm of breast in female        Past Medical History:   Diagnosis Date    Anxiety about health     Breast cancer of lower-outer quadrant of right female breast 2024    DCIS (ductal carcinoma in situ) of breast 03/15/2024    Diverticulosis     History of chemotherapy 2024    Mixed hyperlipidemia 2019    DIET CONTROLLED    Osteoporosis     Port-A-Cath in place     Type 2 diabetes mellitus         Past Surgical History:   Procedure Laterality Date    BREAST BIOPSY Left     Excisional biopsy in the [facility]    BREAST BIOPSY  2024    CATARACT EXTRACTION W/ INTRAOCULAR LENS IMPLANT Bilateral     CHOLECYSTECTOMY  2012    COLON RESECTION Right 2002    COLONOSCOPY N/A 2021    Procedure: COLONOSCOPY TO ANASTAMOSIS/TI;  Surgeon: Angelo Gonsalez MD;  Location: [facility] ENDOSCOPY;  Service: Gastroenterology;  Laterality: N/A;  PRE: SCREENING  POST: NORMAL POST-OP ANATOMY    ENDOSCOPY          ENDOSCOPY N/A 03/10/2017    Procedure: ESOPHAGOGASTRODUODENOSCOPY WITH BIOPSY AND PETER DILATATION 54\";  Surgeon: Angelo Gonsalez MD;  Location: [facility] ENDOSCOPY;  Service:     ENDOSCOPY N/A 2021    Procedure: ESOPHAGOGASTRODUODENOSCOPY WITH BIOPSIES, 54FR PETER DILATATION;  Surgeon: Angelo Gonsalez MD;  Location: [facility] ENDOSCOPY;  Service: Gastroenterology;  " Laterality: N/A;  PRE: DYSPHAGIA  POST: GASTRITIS, ESOPHAGEAL RING    VENOUS ACCESS DEVICE (PORT) INSERTION Left 3/14/2024    Procedure: INSERTION OF PORTACATH;  Surgeon: Abeba Manrique MD;  Location: [facility] OR;  Service: General;  Laterality: Left;         Visit Dx:     ICD-10-CM ICD-9-CM   1. Other specified pre-operative examination  Z01.818 V72.83   2. At risk for lymphedema  Z91.89 V49.89   3. Bilateral malignant neoplasm of breast in female, unspecified estrogen receptor status, unspecified site of breast  C50.911 174.9    C50.912         Patient History       Row Name 08/08/24 1200             History    Chief Complaint Other 1 (comment)  pre op/lymphedema  -RE      Date Current Problem(s) Began 07/22/24  -RE      Brief Description of Current Complaint Patient presents for a preoperative evaluation prior to bilateral mastectomy with bilateral sentinel lymph node biopsies and possible axillary dissection.  Surgery is scheduled for 8/15/2024.  -RE      Patient/Caregiver Goals Know what to do to help the symptoms;Return to prior level of function  -RE      Hand Dominance right-handed  -RE      History of Previous Related Injuries none  -RE      Are you or can you be pregnant No  -RE         Pain     Pain at Present 0  -RE      Pain at Best 0  -RE      Pain at Worst 0  -RE      Pain Comments no pain  -RE      Is your sleep disturbed? No  -RE      Difficulties at work? No issues.  -RE      Difficulties with ADL's? No issues.  -RE      Difficulties with recreational activities? No issues.  -RE         Fall Risk Assessment    Any falls in the past year: No  -RE         Services    Prior Rehab/Home Health Experiences No  -RE      Are you currently receiving Home Health services No  -RE      Do you plan to receive Home Health services in the near future No  -RE         Daily Activities    Primary Language English  -RE      How does patient learn best? Listening;Reading;Demonstration  -RE      Barriers to  learning None  -RE      Pt Participated in POC and Goals Yes  -RE         Safety    Are you being hurt, hit, or frightened by anyone at home or in your life? No  -RE      Are you being neglected by a caregiver No  -RE      Have you had any of the following issues with N/A  -RE                User Key  (r) = Recorded By, (t) = Taken By, (c) = Cosigned By      Initials Name Provider Type    RE Emy Gonzales OTR Occupational Therapist                     Lymphedema       Row Name 08/08/24 1000             Subjective Pain    Able to rate subjective pain? yes  -RE      Pre-Treatment Pain Level 0  -RE      Subjective Pain Comment pre-operative; denies pain  -RE         Lymphedema Assessment    Lymphedema Classification RUE:;LUE:;at risk/stage 0  -RE      Lymphedema Surgery Comments pre-operative  -RE      Chemo Received yes  -RE         Posture/Observations    Posture/Observations Comments WNL  -RE         General ROM    GENERAL ROM COMMENTS BUE WFL  -RE         MMT (Manual Muscle Testing)    General MMT Comments BUE WFL  -RE         Lymphedema Edema Assessment    Edema Assessment Comment no obvious edema; pre-operative  -RE         Skin Changes/Observations    Skin Observations Comment normal  -RE         Lymphedema Sensation    Lymphedema Sensation Comments Neuropathy in hand and feet  -RE         Lymphedema Pulses/Capillary Refill    Lymph Pulses Capillary Refill Comments --  -RE         Lymphedema Measurements    Measurement Type(s) Quick Girth  -RE      Quick Girth Areas Upper extremities  -RE         LUE Quick Girth (cm)    Axilla 30.5 cm  -RE      Mid upper arm 26.5 cm  -RE      Elbow 23.3 cm  -RE      Mid forearm 23.3 cm  -RE      Wrist crease 14.8 cm  -RE      Met-heads 17.8 cm  -RE      LUE Quick Girth Total 136.2  -RE         RUE Quick Girth (cm)    Axilla 29 cm  -RE      Mid upper arm 26.5 cm  -RE      Elbow 22.7 cm  -RE      Mid forearm 22.5 cm  -RE      Wrist crease 14.8 cm  -RE      Met-heads 17.5 cm  -RE       RUE Quick Girth Total 133  -RE         Compression/Skin Care    Compression/Skin Care Comments discussed compression garment for prevention  -RE         L-Dex Bioimpedence Screening    L-Dex Measurement Extremity --  Bilateral  -RE      L-Dex Patient Position Standing  -RE      L-Dex UE Dominate Side Right  -RE      L-Dex UE At Risk Side --  Bilateral  -RE      L-Dex UE Pre Surgical Value Yes  -RE      L-Dex UE Score --  R: -4.7 and L: 4.6  -RE      L-Dex UE Comment WNL  -RE      $ L-Dex Charge --  -RE                User Key  (r) = Recorded By, (t) = Taken By, (c) = Cosigned By      Initials Name Provider Type    Emy Mccarthy OTR Occupational Therapist                    The patient had a preop baseline SOZO measurement which I reviewed today. The score is WNL  see scanned to EMR. Bioimpedance spectroscopy helps identify the   onset of lymphedema in an arm or leg before patients experience noticeable swelling. Research has   shown that 92% of patients with early detection of lymphedema using L-Dex combined with   intervention do not progress to chronic lymphedema through three years. Additionally, as of March 2023, the NCCN Guidelines® for Survivorship recommend proactive screening for lymphedema using   bioimpedance spectroscopy. Whenever possible, patients are tested for baseline L-Dex score before   cancer treatment begins and then are reassessed during regular follow-up visits using the SOZO device.   Otherwise, this can be started postoperatively and continued during regular follow-up visits. If the   patient’s L-Dex score increases above normal levels, that is a sign that lymphedema is developing and a   referral is made to occupational therapy for further evaluation and early compression treatment.   Lymphedema assessment with the SOZO L-Dex score is recommended to be done every 3 months for   the first 3 years and then every 6 months for years 4 and 5 followed by annually  "afterwards        Therapy Education  Education Details: Discussed personal risk factors for lymphedema postoperatively for the affected upper extremity and trunk quadrant.  I provided patient with \"What Is Lymphedema\" and \"Healthy Habits for Patients at Risk for Lymphedema\" and reviewed with the patient.  We also discussed indications for bioimpedance and patient's current L-Dex value.  I also explained the recommended prevention and surveillance schedule using bioimpedance.  Given: Symptoms/condition management, Other (comment)  Program: New  How Provided: Verbal, Written  Provided to: Patient  Level of Understanding: Teach back education performed, Verbalized  50343 - OT Self Care/Mgmt Minutes: 15         OT Goals       Row Name 08/08/24 1200          OT Short Term Goals    STG Date to Achieve 09/08/24  -RE     STG 1 Patient will demonstrate proper awareness of “What is Lymphedema?” and “Healthy Habits” for improved prevention, management, care of symptoms and ease of transition to self-care of condition.  -RE     STG 1 Progress New  -RE        Long Term Goals    LTG Date to Achieve 11/08/24  -RE     LTG 1 Patient will participate in bioimpedance scans every 3 months as a method of early detection of lymphedema to allow for early intervention.  -RE     LTG 1 Progress New  -RE     LTG 2 Patient's bioimpedance score to remain below 6.5 for decreased risk of stage II lymphedema.  -RE     LTG 2 Progress New  -RE        Time Calculation    OT Goal Re-Cert Due Date 11/08/24  -RE               User Key  (r) = Recorded By, (t) = Taken By, (c) = Cosigned By      Initials Name Provider Type    Emy Mccarthy OTR Occupational Therapist                     OT Assessment/Plan       Row Name 08/08/24 5469          OT Assessment    Assessment Comments Patient presents to Occupational Therapy preoperatively for planned breast cancer surgery to include bilateral mastectomies with planned sentinel lymph node biopsies.  Patient " is scheduled for surgery on 8/15/2024.  Baseline range of motion postural and bioimpedance measurements were taken today to be compared to measurements retaken postoperatively.  At that time, any reduced movement, decline in function or postural issues will be addressed with skilled therapy and new goals will be established.  Patient's strength and active range of motion are within functional limits.  Patient's L-Dex value today was R: -4.7 and L: 4.6 which are well within normal limits.  In patients at risk for bilateral upper extremity lymphedema bioimpedance is used as an additional screening tool alongside circumferential measurements.  Personal risk factors for lymphedema postoperatively for the both upper extremities and trunk quadrants were also assessed today and basic lymphedema precautions were discussed.  A more detailed discussion regarding personal risk factors will take place postoperatively once the number of nodes removed and the plan for further medical care is known.  -RE     Please refer to paper survey for additional self-reported information No  -RE     OT Diagnosis At risk for lymphedema; preoperative evaluation  -RE     OT Rehab Potential Good  -RE     Patient/caregiver participated in establishment of treatment plan and goals Yes  -RE     Patient would benefit from skilled therapy intervention Yes  -RE        OT Plan    OT Frequency Other (comment)  -RE     Predicted Duration of Therapy Intervention (OT) Follow-up 4 to 6 weeks postoperatively and then every 3 months for bioimpedance  -RE     Planned CPT's? OT EVAL LOW COMPLEXITY: 74730;OT SELF CARE/MGMT/TRAIN 15 MIN: 00955;OT BIS XTRACELL FLUID ANALYSIS: 55309  -RE     Planned Therapy Interventions (Optional Details) home exercise program;patient/family education;other (see comments)  -RE     OT Plan Comments Follow-up 4 to 6 weeks postop  -RE               User Key  (r) = Recorded By, (t) = Taken By, (c) = Cosigned By      Initials Name  Provider Type    Emy Mccarthy OTR Occupational Therapist                    Outcome Measure Options: Quick DASH  Quick DASH  Number of Questions Answered: 11  Quick DASH Score: 38.64         Time Calculation:   OT Start Time: 1030  OT Stop Time: 1120  OT Time Calculation (min): 50 min  Timed Charges  84950 - OT Self Care/Mgmt Minutes: 15  Untimed Charges  OT Eval/Re-eval Minutes: 25  51833 - OT Bioimpedence Rx Minutes: 10  Total Minutes  Timed Charges Total Minutes: 15  Untimed Charges Total Minutes: 35   Total Minutes: 50     Therapy Charges for Today       Code Description Service Date Service Provider Modifiers Qty    30140494726 HC OT SELF CARE/MGMT/TRAIN EA 15 MIN 8/8/2024 Emy Gonzales OTR GO 1    10125542010 HC OT EVAL LOW COMPLEXITY 2 8/8/2024 Emy Gonzales OTR GO 1    78509260603 HC OT BIS XTRACELL FLUID ANALYSIS 8/8/2024 Emy Gonzales OTR GO 1              Dictated utilizing Dragon dictation:  Much of this encounter note is an electronic transcription/translation of spoken language to printed text. The electronic translation of spoken language may permit erroneous, or at times, nonsensical words or phrases to be inadvertently transcribed; Although I have reviewed the note for such errors, some may still exist.        YARED Kelley  8/8/2024

## 2024-08-09 ENCOUNTER — TELEPHONE (OUTPATIENT)
Dept: SURGERY | Facility: CLINIC | Age: 69
End: 2024-08-09
Payer: COMMERCIAL

## 2024-08-09 LAB
QT INTERVAL: 388 MS
QTC INTERVAL: 440 MS

## 2024-08-09 NOTE — TELEPHONE ENCOUNTER
EMLA Cream 30 G tube   Dis # 1 tube  No refills  Apply topically to affected nipple, outer edge of affected nipple and cover day of surgery before leaving home.     [id number] - [place], [place] - [address] AT [facility] [place] [address] - [phone]  - [phone] FX Phone: [phone]

## 2024-08-12 ENCOUNTER — INFUSION (OUTPATIENT)
Dept: ONCOLOGY | Facility: HOSPITAL | Age: 69
End: 2024-08-12
Payer: COMMERCIAL

## 2024-08-12 ENCOUNTER — OFFICE VISIT (OUTPATIENT)
Dept: ONCOLOGY | Facility: CLINIC | Age: 69
End: 2024-08-12
Payer: COMMERCIAL

## 2024-08-12 VITALS — DIASTOLIC BLOOD PRESSURE: 79 MMHG | HEART RATE: 80 BPM | SYSTOLIC BLOOD PRESSURE: 148 MMHG

## 2024-08-12 VITALS
DIASTOLIC BLOOD PRESSURE: 74 MMHG | SYSTOLIC BLOOD PRESSURE: 187 MMHG | HEIGHT: 58 IN | BODY MASS INDEX: 24.6 KG/M2 | TEMPERATURE: 97.7 F | WEIGHT: 117.2 LBS | HEART RATE: 94 BPM | RESPIRATION RATE: 16 BRPM

## 2024-08-12 DIAGNOSIS — Z45.2 ENCOUNTER FOR FITTING AND ADJUSTMENT OF VASCULAR CATHETER: ICD-10-CM

## 2024-08-12 DIAGNOSIS — G62.0 CHEMOTHERAPY-INDUCED PERIPHERAL NEUROPATHY: ICD-10-CM

## 2024-08-12 DIAGNOSIS — Z17.0 MALIGNANT NEOPLASM OF LOWER-OUTER QUADRANT OF RIGHT BREAST OF FEMALE, ESTROGEN RECEPTOR POSITIVE: Primary | ICD-10-CM

## 2024-08-12 DIAGNOSIS — C50.511 MALIGNANT NEOPLASM OF LOWER-OUTER QUADRANT OF RIGHT BREAST OF FEMALE, ESTROGEN RECEPTOR POSITIVE: Primary | ICD-10-CM

## 2024-08-12 DIAGNOSIS — T45.1X5A CHEMOTHERAPY-INDUCED PERIPHERAL NEUROPATHY: ICD-10-CM

## 2024-08-12 DIAGNOSIS — Z79.899 HIGH RISK MEDICATION USE: ICD-10-CM

## 2024-08-12 LAB
ALBUMIN SERPL-MCNC: 4.4 G/DL (ref 3.5–5.2)
ALBUMIN/GLOB SERPL: 1.6 G/DL
ALP SERPL-CCNC: 94 U/L (ref 39–117)
ALT SERPL W P-5'-P-CCNC: 32 U/L (ref 1–33)
ANION GAP SERPL CALCULATED.3IONS-SCNC: 11.6 MMOL/L (ref 5–15)
AST SERPL-CCNC: 23 U/L (ref 1–32)
BASOPHILS # BLD AUTO: 0.05 10*3/MM3 (ref 0–0.2)
BASOPHILS NFR BLD AUTO: 0.6 % (ref 0–1.5)
BILIRUB SERPL-MCNC: 0.2 MG/DL (ref 0–1.2)
BUN SERPL-MCNC: 17 MG/DL (ref 8–23)
BUN/CREAT SERPL: 24.3 (ref 7–25)
CALCIUM SPEC-SCNC: 9.7 MG/DL (ref 8.6–10.5)
CHLORIDE SERPL-SCNC: 103 MMOL/L (ref 98–107)
CO2 SERPL-SCNC: 23.4 MMOL/L (ref 22–29)
CREAT SERPL-MCNC: 0.7 MG/DL (ref 0.57–1)
DEPRECATED RDW RBC AUTO: 44.9 FL (ref 37–54)
EGFRCR SERPLBLD CKD-EPI 2021: 93.8 ML/MIN/1.73
EOSINOPHIL # BLD AUTO: 0.13 10*3/MM3 (ref 0–0.4)
EOSINOPHIL NFR BLD AUTO: 1.6 % (ref 0.3–6.2)
ERYTHROCYTE [DISTWIDTH] IN BLOOD BY AUTOMATED COUNT: 13.2 % (ref 12.3–15.4)
GLOBULIN UR ELPH-MCNC: 2.7 GM/DL
GLUCOSE SERPL-MCNC: 202 MG/DL (ref 65–99)
HCT VFR BLD AUTO: 39.9 % (ref 34–46.6)
HGB BLD-MCNC: 12.9 G/DL (ref 12–15.9)
IMM GRANULOCYTES # BLD AUTO: 0.03 10*3/MM3 (ref 0–0.05)
IMM GRANULOCYTES NFR BLD AUTO: 0.4 % (ref 0–0.5)
LYMPHOCYTES # BLD AUTO: 2.4 10*3/MM3 (ref 0.7–3.1)
LYMPHOCYTES NFR BLD AUTO: 28.8 % (ref 19.6–45.3)
MCH RBC QN AUTO: 30.1 PG (ref 26.6–33)
MCHC RBC AUTO-ENTMCNC: 32.3 G/DL (ref 31.5–35.7)
MCV RBC AUTO: 93 FL (ref 79–97)
MONOCYTES # BLD AUTO: 0.64 10*3/MM3 (ref 0.1–0.9)
MONOCYTES NFR BLD AUTO: 7.7 % (ref 5–12)
NEUTROPHILS NFR BLD AUTO: 5.08 10*3/MM3 (ref 1.7–7)
NEUTROPHILS NFR BLD AUTO: 60.9 % (ref 42.7–76)
NRBC BLD AUTO-RTO: 0 /100 WBC (ref 0–0.2)
PLATELET # BLD AUTO: 307 10*3/MM3 (ref 140–450)
PMV BLD AUTO: 10.5 FL (ref 6–12)
POTASSIUM SERPL-SCNC: 3.8 MMOL/L (ref 3.5–5.2)
PROT SERPL-MCNC: 7.1 G/DL (ref 6–8.5)
RBC # BLD AUTO: 4.29 10*6/MM3 (ref 3.77–5.28)
SODIUM SERPL-SCNC: 138 MMOL/L (ref 136–145)
WBC NRBC COR # BLD AUTO: 8.33 10*3/MM3 (ref 3.4–10.8)

## 2024-08-12 PROCEDURE — 25010000002 HEPARIN LOCK FLUSH PER 10 UNITS: Performed by: INTERNAL MEDICINE

## 2024-08-12 PROCEDURE — 25810000003 SODIUM CHLORIDE 0.9 % SOLUTION: Performed by: NURSE PRACTITIONER

## 2024-08-12 PROCEDURE — 25010000002 TRASTUZUMAB-DTTB 420 MG RECONSTITUTED SOLUTION 1 EACH VIAL: Performed by: NURSE PRACTITIONER

## 2024-08-12 PROCEDURE — 25810000003 SODIUM CHLORIDE 0.9 % SOLUTION 250 ML FLEX CONT: Performed by: NURSE PRACTITIONER

## 2024-08-12 PROCEDURE — 96417 CHEMO IV INFUS EACH ADDL SEQ: CPT

## 2024-08-12 PROCEDURE — 96413 CHEMO IV INFUSION 1 HR: CPT

## 2024-08-12 PROCEDURE — 85025 COMPLETE CBC W/AUTO DIFF WBC: CPT

## 2024-08-12 PROCEDURE — 99214 OFFICE O/P EST MOD 30 MIN: CPT | Performed by: NURSE PRACTITIONER

## 2024-08-12 PROCEDURE — 25010000002 PERTUZUMAB 420 MG/14ML SOLUTION 420 MG VIAL: Performed by: NURSE PRACTITIONER

## 2024-08-12 PROCEDURE — 80053 COMPREHEN METABOLIC PANEL: CPT | Performed by: NURSE PRACTITIONER

## 2024-08-12 RX ORDER — NYSTATIN 100000 [USP'U]/G
POWDER TOPICAL 3 TIMES DAILY
Qty: 60 G | Refills: 0 | Status: SHIPPED | OUTPATIENT
Start: 2024-08-12

## 2024-08-12 RX ORDER — HEPARIN SODIUM (PORCINE) LOCK FLUSH IV SOLN 100 UNIT/ML 100 UNIT/ML
500 SOLUTION INTRAVENOUS AS NEEDED
Status: DISCONTINUED | OUTPATIENT
Start: 2024-08-12 | End: 2024-08-12 | Stop reason: HOSPADM

## 2024-08-12 RX ORDER — SODIUM CHLORIDE 0.9 % (FLUSH) 0.9 %
10 SYRINGE (ML) INJECTION AS NEEDED
OUTPATIENT
Start: 2024-08-12

## 2024-08-12 RX ORDER — SODIUM CHLORIDE 9 MG/ML
20 INJECTION, SOLUTION INTRAVENOUS ONCE
Status: COMPLETED | OUTPATIENT
Start: 2024-08-12 | End: 2024-08-12

## 2024-08-12 RX ORDER — HEPARIN SODIUM (PORCINE) LOCK FLUSH IV SOLN 100 UNIT/ML 100 UNIT/ML
500 SOLUTION INTRAVENOUS AS NEEDED
OUTPATIENT
Start: 2024-08-12

## 2024-08-12 RX ORDER — SODIUM CHLORIDE 9 MG/ML
20 INJECTION, SOLUTION INTRAVENOUS ONCE
Status: CANCELLED | OUTPATIENT
Start: 2024-08-12

## 2024-08-12 RX ORDER — SODIUM CHLORIDE 0.9 % (FLUSH) 0.9 %
10 SYRINGE (ML) INJECTION AS NEEDED
Status: DISCONTINUED | OUTPATIENT
Start: 2024-08-12 | End: 2024-08-12 | Stop reason: HOSPADM

## 2024-08-12 RX ADMIN — Medication 500 UNITS: at 12:06

## 2024-08-12 RX ADMIN — ONTRUZANT 330 MG: KIT INTRAVENOUS at 11:32

## 2024-08-12 RX ADMIN — Medication 10 ML: at 12:06

## 2024-08-12 RX ADMIN — SODIUM CHLORIDE 20 ML/HR: 9 INJECTION, SOLUTION INTRAVENOUS at 10:28

## 2024-08-12 RX ADMIN — PERTUZUMAB 420 MG: 30 INJECTION, SOLUTION, CONCENTRATE INTRAVENOUS at 10:29

## 2024-08-12 NOTE — PROGRESS NOTES
Subjective   Felicia Boyle is a 68 y.o. female.  Referred by Dr. Manrique for right breast invasive ductal carcinoma, HER2 positive, left breast ductal carcinoma in situ    History of Present Illness     Ms. Boyle is a 68-year-old postmenopausal [place] lady who is fairly healthy without any significant comorbidities palpated of right breast mass in the latter part of  which was further worked up with diagnostic mammogram and ultrasound and subsequently a biopsy which confirmed invasive ductal carcinoma which was ER/AL negative and HER2 positive.    Family history significant for her 2 sisters with breast cancer at the age of 38 and 48 respectively.  Her older sister  of metastatic breast cancer but her younger sister is doing well.  Patient underwent genetic testing in 2017 which was negative due to her family history.    She had regular screening mammograms up until 2019 but subsequently stopped having annual gynecological visits and therefore did not have any further screening mammograms up until the most recent diagnostic mammogram for the new palpable abnormality.    2024-bilateral diagnostic mammogram and right breast ultrasound  The breast heterogeneously dense.  Left breast with a scar marker in the lower left breast dating back to .  No new findings in the left breast.    Right breast at 8:00, 8 cm from the nipple there is a 1.7 x 1.4 x 1.6 cm oval high density mass with partially circumscribed and partially indistinct margins.    On the ultrasound the mass measures 1.7 x 1 x 1.3 cm.    No abnormal right axilla lymphadenopathy.    2024-right breast ultrasound-guided biopsy  Pathology consistent with invasive ductal carcinoma  Grade 3  9.3 mm of invasive disease on the core biopsy  ER negative  AL negative  HER2 3+ on immunohistochemistry, 95%  Ki-67 70%    2024-bilateral breast MRI  In the right breast at 8:00, 8 cm from the nipple there is a irregular enhancing mass which  measures 1.8 x 2 x 1.7 cm.  This corresponds to the biopsy-proven malignancy.  Extending anteriorly away from the MT margin of the mass there is an irregular linear enhancement which measures 3.5 x 1 x 0.7 cm.  The whole area including the mass measures 4.7 cm in AP dimension, 1.7 in mediolateral and 2.4 cm in the superior-inferior dimension.    At 12:00, 3 cm from the nipple there is an irregular non-mass enhancement which measures 1 x 1.1 x 1.2 cm.  No mammographic correlate is appreciated.    No other suspicious areas of enhancement in the right breast or right axilla or internal mammary chain.    In the left breast, middle third at 11:30 position, 3.7 cm from the nipple there is an irregular area of non-mass enhancement which measures 1.3 x 1.1 x 1 cm.  There is suspected architectural distortion.  No other areas of abnormal enhancement seen in the left breast of the left axilla or the internal mammary chain.    2/26/2024-additional MRI guided biopsies    1.left breast 12:00-intermediate grade ductal carcinoma in situ with apocrine features  ER +91 to 100% strong  NC +81 to 90% strong  Ki-67 15%    2. Right breast 12 o'clock position MRI guided biopsy of the non-mass enhancement-sclerosing adenosis with associated calcifications  No atypical hyperplasia in situ or invasive carcinoma    3.right breast 8:00 MRI guided biopsies of the linear enhancement is consistent with high-grade ductal carcinoma in situ  DCIS involves multiple tissue cores measuring up to 3 mm.  Sclerosing adenosis and usual ductal hyperplasia with associated microcalcifications.    The case was discussed in tumor board and Dr. Patel thought that they could be more invasive disease in the 4.7 cm of linear enhancement noted in the right breast at the site of the mass.  Therefore it was decided to proceed with neoadjuvant chemotherapy.    Patient was a former smoker and smoked for about 40 years, half a pack per week, quit about 1 month ago.   Denies any alcohol use.    Week 1 Taxol Herceptin and Perjeta was planned for 3/18/2024.  Patient received Herceptin and Perjeta and had some chills for which she received Solu-Medrol and responded well to that.  However after infusing only 1 cc of Taxol patient experienced a severe anaphylactic reaction due to which the infusion was stopped permanently and she received an additional dose of Solu-Medrol and Benadryl.  Symptoms resolved subsequently and patient was discharged home safely.    Due to this recent treatment has been changed to Abraxane along with Herceptin and Perjeta.  3/25/2024-patient is due for her first treatment with Abraxane.    She is extremely anxious given the severe hypersensitivity reaction that she experienced with Taxol.  She is also complaining of increased belching and heartburn since her last chemotherapy.  She is complaining of severe diarrhea with started last night.  Had multiple loose stools.    2024-MRI of the breast-images independently reviewed and interpreted by me.  Interval near complete resolution of all suspicious enhancement in the posterior one third lower outer quadrant of the right breast at 8:00 corresponding to the biopsy-proven site of malignancy.  Who has been on masslike area of enhancement measuring 1.5 x 0.9 x 0.9 cm compared to 2 x 1.8 x 1.7 cm.  Mildly abnormal enhancement extending anteriorly away from the mass has resolved.  No other suspicious areas of enhancement noted in the right breast.  Left breast at 12:00 interval resolution of all suspicious enhancement.    INTERVAL HISTORY  Patient returns today for continued Herceptin and Perjeta.  She is scheduled with Dr. Manrique to proceed with surgery which is scheduled for August 15.  She continues to be apprehensive however and even worsening situation her granddaughter recently is  after having a  section.  Of course the patient is greatly distraught by this.  She has previously met with  "social work and I have encouraged her to do so again.  She states her neuropathy is improving.  She denies any pain.  She does have a rash in her right groin area that is new and she has been using an antifungal cream over-the-counter but states it initially helped though has returned.  She denies any vaginal discharge or itching.  She does report her cheilitis stayed the same despite utilizing triamcinolone cream.  She does report dry mouth for which she is using vinegar rinses.      The following portions of the patient's history were reviewed and updated as appropriate: allergies, current medications, past family history, past medical history, past social history, past surgical history, and problem list.    Past Medical History:   Diagnosis Date    Anxiety about health     Breast cancer of lower-outer quadrant of right female breast 02/14/2024    DCIS (ductal carcinoma in situ) of breast 03/15/2024    Diverticulosis     History of chemotherapy 06/22/2024    Mixed hyperlipidemia 05/19/2019    DIET CONTROLLED    Osteoporosis     Port-A-Cath in place     Type 2 diabetes mellitus         Past Surgical History:   Procedure Laterality Date    BREAST BIOPSY Left     Excisional biopsy in the [facility]    BREAST BIOPSY  03/2024    CATARACT EXTRACTION W/ INTRAOCULAR LENS IMPLANT Bilateral     CHOLECYSTECTOMY  2012    COLON RESECTION Right 2002    COLONOSCOPY N/A 04/28/2021    Procedure: COLONOSCOPY TO ANASTAMOSIS/TI;  Surgeon: Angelo Gonsalez MD;  Location: [facility] ENDOSCOPY;  Service: Gastroenterology;  Laterality: N/A;  PRE: SCREENING  POST: NORMAL POST-OP ANATOMY    ENDOSCOPY      2013    ENDOSCOPY N/A 03/10/2017    Procedure: ESOPHAGOGASTRODUODENOSCOPY WITH BIOPSY AND PETER DILATATION 54\";  Surgeon: Angelo Gonsalez MD;  Location: [facility] ENDOSCOPY;  Service:     ENDOSCOPY N/A 04/28/2021    Procedure: ESOPHAGOGASTRODUODENOSCOPY WITH BIOPSIES, 54FR PETER DILATATION;  Surgeon: Angelo Gonsalez MD;  Location: [facility]" ENDOSCOPY;  Service: Gastroenterology;  Laterality: N/A;  PRE: DYSPHAGIA  POST: GASTRITIS, ESOPHAGEAL RING    VENOUS ACCESS DEVICE (PORT) INSERTION Left 3/14/2024    Procedure: INSERTION OF PORTACATH;  Surgeon: Abeba Manrique MD;  Location: [facility] OR;  Service: General;  Laterality: Left;        Family History   Problem Relation Age of Onset    Hypertension Mother     Diabetes Mother     Hypertension Father     Breast cancer Sister 48    Breast cancer Sister 38    No Known Problems Brother     No Known Problems Daughter     No Known Problems Son     No Known Problems Maternal Grandmother     No Known Problems Paternal Grandmother     No Known Problems Maternal Grandfather     No Known Problems Paternal Grandfather     Autism Grandson     Colon cancer Neg Hx     Rectal cancer Neg Hx     Endometrial cancer Neg Hx     Vaginal cancer Neg Hx     Cervical cancer Neg Hx     Ovarian cancer Neg Hx     Prostate cancer Neg Hx     Uterine cancer Neg Hx     Malig Hyperthermia Neg Hx         Social History     Socioeconomic History    Marital status:      Spouse name: Ryan   Tobacco Use    Smoking status: Former     Current packs/day: 0.00     Average packs/day: 0.3 packs/day for 15.0 years (3.8 ttl pk-yrs)     Types: Cigarettes     Quit date: 1/15/2024     Years since quittin.5     Passive exposure: Past    Smokeless tobacco: Never    Tobacco comments:     N/A   Vaping Use    Vaping status: Never Used   Substance and Sexual Activity    Alcohol use: Yes     Comment: RARE    Drug use: No    Sexual activity: Not Currently     Partners: Male     Birth control/protection: Tubal ligation        OB History          4    Para   3    Term   3       0    AB   1    Living   3         SAB   1    IAB   0    Ectopic   0    Molar   0    Multiple   0    Live Births   3               Age at menarche-16  Age at first live childbirth-18   4 para 3  1  Age at menopause-49  No use of hormone  "replacement therapy  No use of oral conceptive pills  Breast-feeding for 3 months    Allergies   Allergen Reactions    Metronidazole Nausea And Vomiting      Review of systems as mentioned HPI otherwise negative    Objective   Blood pressure (!) 187/74, pulse 94, temperature 97.7 °F (36.5 °C), resp. rate 16, height 147.3 cm (58\"), weight 53.2 kg (117 lb 3.2 oz), not currently breastfeeding.     Physical Exam  Vitals reviewed.   Constitutional:       Appearance: Normal appearance.   HENT:      Mouth/Throat:      Mouth: Mucous membranes are dry.      Comments: Corners of mouth dry and slightly pink  Eyes:      Conjunctiva/sclera: Conjunctivae normal.      Pupils: Pupils are equal, round, and reactive to light.   Cardiovascular:      Rate and Rhythm: Normal rate.   Pulmonary:      Effort: Pulmonary effort is normal.      Breath sounds: Normal breath sounds.   Abdominal:      General: Abdomen is flat. Bowel sounds are normal.   Musculoskeletal:         General: Normal range of motion.      Cervical back: Normal range of motion.      Right lower leg: No edema.      Left lower leg: No edema.   Skin:     General: Skin is warm and dry.      Findings: Rash (right groin redness with white film) present.   Neurological:      General: No focal deficit present.      Mental Status: She is alert and oriented to person, place, and time.   Psychiatric:         Mood and Affect: Mood is anxious.         Behavior: Behavior normal.         I have reexamined the patient and the results are consistent with the previously documented exam. DAY Sarmiento      Results from last 7 days   Lab Units 08/12/24  0754 08/08/24  1238   WBC 10*3/mm3 8.33 8.03   NEUTROS ABS 10*3/mm3 5.08 4.19   HEMOGLOBIN g/dL 12.9 13.0   HEMATOCRIT % 39.9 41.2   PLATELETS 10*3/mm3 307 303     Results from last 7 days   Lab Units 08/08/24  1238   SODIUM mmol/L 141   POTASSIUM mmol/L 4.3   CHLORIDE mmol/L 104   CO2 mmol/L 27.0   BUN mg/dL 18   CREATININE mg/dL " 0.81   CALCIUM mg/dL 9.8   ALBUMIN g/dL 4.6   BILIRUBIN mg/dL <0.2   ALK PHOS U/L 98   ALT (SGPT) U/L 30   AST (SGOT) U/L 22   GLUCOSE mg/dL 143*                No radiology results for the last 30 days.       Assessment & Plan     *Right breast invasive ductal carcinoma  Clinical T2 N0 M0, stage IIa, clinical and prognostic ER/W[place] negative, HER2 3+ immunohistochemistry, Ki-67 70%  On the MRI the mass measures 2 cm however there is linear enhancement which in the AP dimension measures 4.7 cm.  Additional biopsies of this linear enhancement was performed and consistent with DCIS  It is hard to delineate the amount of invasive disease as opposed to DCIS.  More likely than not invasive component is likely limited to the mass which is about 2-1/2 cm on exam.  Since the tumor is greater than 2 cm I think it is reasonable to proceed with neoadjuvant chemotherapy.  We discussed proceeding with weekly Taxol along with Herceptin and Perjeta every 3 weeks.  After 12 weeks she will require an MRI to assess the response.  If there is inadequate response then we may have to proceed with Adriamycin and Cytoxan for 4 cycles however if there is a good response then she can proceed with surgery with no additional chemotherapy.  The likely plan is that she will undergo bilateral mastectomy.  If that is the case as long as she does not have any positive margins or lymph node positive disease she may not need radiation.  We discussed continuing adjuvant HER2 directed therapy for whole year  3/18/2024-received first dose of Herceptin and Perjeta, had a severe hypersensitivity reaction to Taxol and hence Taxol was discontinued  3/25/2024-Labs reviewed and stable to proceed with chemotherapy  Patient is extremely anxious today after experiencing the severe hypersensitivity reaction last week.  Reassured patient that this is unlikely to happen with Abraxane and we will premedicate with extra Solu-Medrol..  4/1/2024 patient returns for cycle  "1 day 15 Abraxane.,  Left chest, left shoulder at times.  This has come and gone over the past week and is not associated with any shortness of breath, numbness or tingling.  She states when her chest is hurting if she presses on the area it gets better.  She notices it at different points during the day.  It feels more like an ache and is not a sharp pain.  She states she has been sleeping in a different position due to the port which could be causing some muscular discomfort.  She has noticed the discomfort at times when she takes a deep breath however is able to get deep breath here in the office did not have any pain.  She is not having any pain today at all.  She has taken her hydrocodone maybe 3-4 times over the past week and has not needed it multiple times a day.  CMP resulted today with AST elevated at 47 and .  Bilirubin remains normal at 0.4 with alkaline phosphatase elevated to 160.  Reviewed with Dr. Loredo and will hold treatment today.  She will try to limit any acetaminophen containing products.  She did have a few days of diarrhea that she is unsure if it was related to the Abraxane versus a GI bug as her  had the same symptoms.  Neither had chills or fevers.  She is also having some dysuria today we have a urinalysis with culture pending.  4/8/2024: Due for cycle 2-day 1 Abraxane, Herceptin, Perjeta.  Did not receive cycle 1 day 15 Abraxane due to elevated LFTs as detailed above.  LFTs have significantly improved, AST 28, ALT 58.  Reviewed with Dr. Renteria will proceed with Abraxane only today, will reduce Abraxane by 20%.  4/15/2024-patient is scheduled for week 4 of chemotherapy today.  Labs reviewed and overall stable except for an ALT of 45.  She will proceed with planned chemotherapy.  4/22/2024: after last chemotherapy patient experienced 3 episodes of \"zapping\" in her fingertips and soles of feet that last a few seconds then resolved.  Has not experienced this sensation in at " least 3 days.  No interference with daily activities.  Will continue Abraxane at previous 20% dose reduction.  She is doing cold therapy for her hands/feet.  Will need to monitor closely for neuropathy.  4/29/2024-chemotherapy held as patient reported worsening neuropathy and dropping things.  5/6/2024-presents today for evaluation of the neuropathy and to determine if she can resume chemotherapy.  She reports that the neuropathy has improved some.  She is mainly perceiving pain in her fingers and tingling and numbness.   She however tells me that she has not been dropping things anymore.  She is able to do other fine motor activities just fine.  5/6/2024-patient did receive Abraxane with a 20% dose reduction.  5/13/2024-presents today for the neck cycle of Abraxane and since her last treatment she has not had any worsening of neuropathy.  We also started her on gabapentin 300 mg nightly.  Neuropathy overall stable.  Labs reviewed and she does have elevated LFTs however no further dose reduction needed.  Will proceed with planned chemotherapy today.  5/11/2024-MRI of the breast shows near complete resolution of the non-mass enhancement and areas of malignancy in both right and left breast.  This is reassuring and if patient has any further worsening of neuropathy then we could discontinue Abraxane and she will proceed with surgery and continue HER2 directed therapy.  5/28/2024: Cycle 4-day 8 Abraxane only, continue previous 20% dose reduction.  Continues on gabapentin, neuropathy stable.  6/10/2024-scheduled for Abraxane Herceptin and Perjeta.  Neuropathy overall stable.  She will continue with 20% dose reduction. She has 1 more week of Abraxane left following which she will have to proceed with surgery followed by adjuvant HER2 directed therapy.  Patient is very reluctant to undergo surgery.  Communicated with Dr. Manrique and she will be scheduled to see her after completion of the last treatment of  Abraxane.  Since she had a recent MRI she may not require another MRI   Proceed with last dose of Abraxane today 6/17/2024. Maintain previous dosing with 20% dose reduction.   7/22/2024 patient returns today for Herceptin and Perjeta which she continues once every 3 weeks.  She is scheduled for bilateral mastectomy on 8/15/2024 with Dr. Manrique.  8/12/2024 patient returns today to proceed with Herceptin and Perjeta.  She is scheduled for bilateral mastectomy with Dr. Manrique on 8/15/2024.  Review of Dr. Renteria today we will proceed with treatment.    *Left breast ductal carcinoma in situ  Intermed  She has been having some discomfort in her left shoulder bladeiate grade with apocrine features  ER/WV strongly positive  Plan is for her to undergo bilateral mastectomy.  If she does undergo bilateral mastectomy then that would be curative and she would not require any endocrine therapy subsequently unless there is any upstaging of the malignancy  5/11/2024-MRI of the breast with near complete resolution of the non-mass enhancement.    *Severe diarrhea  Started last night  Patient has not used any Imodium  We will administer 1 L of normal saline today  Imodium will be given here today  She will start entegrade  Patient has not been using any Imodium.  Encouraged her to use Imodium up to 8 tablets in a day.  No diarrhea at this time.    *GERD  Protonix with improvement  Continue the same    *Cardiac health  Referral to cardio oncology placed  3/15/2024 echocardiogram with an ejection fraction of 75.5%, LV strain is normal at -24.2%  She has met with Dr. Peguero , started on bisoprolol   6/17/2024 ejection fraction 70%, LV strain -22.4%    *Elevated LFTs  4/1/2024 AST at 47, from 20.  ALT at 141, from 22.  Alkaline phosphatase at 160, from 100.  Total bilirubin remains normal.  Acute hepatitis panel negative.  Patient asked to avoid Tylenol and limit her hydrocodone usage as this does have Tylenol in it unless needed.   Also advised to avoid alcohol which she states she does not drink.  4/8/2024: AST 28, ALT 58, alk phos 102.  Total bilirubin 0.3.  Likely secondary to Abraxane.   4/15/2024-ALT 45, AST 25, alkaline phosphatase 98  AST 24, ALT 37, alkaline phosphatase 93  5/13/2024 LFTs higher with an ALT of 98 and AST 67, alkaline phosphatase 90  5/28/2024: AST 30, ALT 43, total bilirubin 0.2.  6/10/2024-ALT mildly elevated at 43, AST 26, alkaline phosphatase 77, total bilirubin 0.2.  Lab stable, proceed with Abraxane  6/17/2024 : AST 25, ALT 15  7/22/2024 ALT slightly increased at 35, AST normal, T. bili normal  8/12/2024 pending as the chemistry machines are down today, labs used from 8/8/2024 that she had performed at [facility] showing AST 22 and ALT 30    *Hypokalemia secondary to diarrhea likely  Potassium normal from 8/8/2024 at 4.3, did have slight analysis    *Alopecia-secondary to chemotherapy.  Continue follow-up with supportive oncology    *Dysuria-UA with 2+ bacteria.  Start bactrim twice daily x 3 days.    Repeat UA negative    *Neuropathy  Held treatment  due to neuropathy  5/6/2024-resumed chemotherapy  She was started on gabapentin 300 mg nightly and also vitamin B12.  Reports stable neuropathy  Proceed with planned chemotherapy today   MRI shows near complete resolution of the abnormalities and if patient experiences any worsening neuropathy then low threshold to stop chemotherapy and proceed with surgery  The remains intermittent though occasionally painful in her feet.  Will increase gabapentin to 300 mg twice daily  7/22/2024 patient reports neuropathy is stable continues gabapentin 300 mg twice daily  8/12/2024 neuropathy improving slightly.  Continues gabapentin 3 mg twice daily    *Cheilitis  Patient utilizing treatment insulin cream for cheilitis when seen on 8/12/2024.  As for her to discontinue this and begin Aquaphor.  She also reports dry mouth and is using vinegar mouth rinses.  Discussed this can throw off  the pH and also irritate the corners of her mouth.  Encouraged her to start on Biotene mouth rinse at least twice daily.    *Right groin candida  8/12/2024 had tried OTC antifungal with initial improvement and then reoccurrence.  Discussed drying well, will add Nystatin powder TID.    PLAN:   Proceed with Herceptin and Perjeta today and every 3 weeks  Nystatin powder to right groin 3 times daily  Aquaphor to corners of mouth.  Stop vinegar mouth rinses and begin Biotene mouth rinse twice daily.  Continue gabapentin and B12  Follow-up in 3 weeks with Dr. Renteria along with Herceptin and Perjeta and labs  Patient is scheduled 8/15/2024 for surgery with Dr. Manrique.    The patient is on high risk medication that requires close monitoring for toxicity.      Erin Mir, DAY  08/12/2024

## 2024-08-15 ENCOUNTER — HOSPITAL ENCOUNTER (OUTPATIENT)
Facility: HOSPITAL | Age: 69
Setting detail: HOSPITAL OUTPATIENT SURGERY
Discharge: HOME | End: 2024-08-16
Attending: SURGERY | Admitting: SURGERY
Payer: COMMERCIAL

## 2024-08-15 ENCOUNTER — HOSPITAL ENCOUNTER (OUTPATIENT)
Dept: NUCLEAR MEDICINE | Facility: HOSPITAL | Age: 69
Discharge: HOME | End: 2024-08-15
Payer: COMMERCIAL

## 2024-08-15 ENCOUNTER — ANESTHESIA EVENT (OUTPATIENT)
Dept: PERIOP | Facility: HOSPITAL | Age: 69
End: 2024-08-15
Payer: COMMERCIAL

## 2024-08-15 ENCOUNTER — ANCILLARY PROCEDURE (OUTPATIENT)
Dept: LAB | Facility: HOSPITAL | Age: 69
End: 2024-08-15
Payer: COMMERCIAL

## 2024-08-15 ENCOUNTER — ANESTHESIA (OUTPATIENT)
Dept: PERIOP | Facility: HOSPITAL | Age: 69
End: 2024-08-15
Payer: COMMERCIAL

## 2024-08-15 ENCOUNTER — TRANSCRIBE ORDERS (OUTPATIENT)
Dept: LAB | Facility: HOSPITAL | Age: 69
End: 2024-08-15
Payer: COMMERCIAL

## 2024-08-15 DIAGNOSIS — C50.511 MALIGNANT NEOPLASM OF LOWER-OUTER QUADRANT OF RIGHT BREAST OF FEMALE, ESTROGEN RECEPTOR NEGATIVE: ICD-10-CM

## 2024-08-15 DIAGNOSIS — C50.911 BILATERAL MALIGNANT NEOPLASM OF BREAST IN FEMALE, UNSPECIFIED ESTROGEN RECEPTOR STATUS, UNSPECIFIED SITE OF BREAST: Primary | ICD-10-CM

## 2024-08-15 DIAGNOSIS — C50.912 BILATERAL MALIGNANT NEOPLASM OF BREAST IN FEMALE, UNSPECIFIED ESTROGEN RECEPTOR STATUS, UNSPECIFIED SITE OF BREAST: ICD-10-CM

## 2024-08-15 DIAGNOSIS — C50.911 BILATERAL MALIGNANT NEOPLASM OF BREAST IN FEMALE, UNSPECIFIED ESTROGEN RECEPTOR STATUS, UNSPECIFIED SITE OF BREAST: ICD-10-CM

## 2024-08-15 DIAGNOSIS — Z17.1 MALIGNANT NEOPLASM OF LOWER-OUTER QUADRANT OF RIGHT BREAST OF FEMALE, ESTROGEN RECEPTOR NEGATIVE: ICD-10-CM

## 2024-08-15 DIAGNOSIS — C50.912 BILATERAL MALIGNANT NEOPLASM OF BREAST IN FEMALE, UNSPECIFIED ESTROGEN RECEPTOR STATUS, UNSPECIFIED SITE OF BREAST: Primary | ICD-10-CM

## 2024-08-15 PROBLEM — Z90.13 S/P BILATERAL MASTECTOMY: Status: ACTIVE | Noted: 2024-08-15

## 2024-08-15 LAB
GLUCOSE BLDC GLUCOMTR-MCNC: 139 MG/DL (ref 70–130)
GLUCOSE BLDC GLUCOMTR-MCNC: 188 MG/DL (ref 70–130)

## 2024-08-15 PROCEDURE — 25010000002 GENTAMICIN PER 80 MG: Performed by: SURGERY

## 2024-08-15 PROCEDURE — 25010000002 GLYCOPYRROLATE 0.2 MG/ML SOLUTION: Performed by: NURSE ANESTHETIST, CERTIFIED REGISTERED

## 2024-08-15 PROCEDURE — 38792 RA TRACER ID OF SENTINL NODE: CPT

## 2024-08-15 PROCEDURE — 25010000002 HYDROMORPHONE PER 4 MG: Performed by: SURGERY

## 2024-08-15 PROCEDURE — 25010000002 DEXAMETHASONE SODIUM PHOSPHATE 20 MG/5ML SOLUTION: Performed by: NURSE ANESTHETIST, CERTIFIED REGISTERED

## 2024-08-15 PROCEDURE — 88331 PATH CONSLTJ SURG 1 BLK 1SPC: CPT | Performed by: SURGERY

## 2024-08-15 PROCEDURE — A9520 TC99 TILMANOCEPT DIAG 0.5MCI: HCPCS | Performed by: SURGERY

## 2024-08-15 PROCEDURE — 25010000002 EPINEPHRINE PER 0.1 MG: Performed by: SURGERY

## 2024-08-15 PROCEDURE — 19303 MAST SIMPLE COMPLETE: CPT | Performed by: SPECIALIST/TECHNOLOGIST, OTHER

## 2024-08-15 PROCEDURE — 76098 X-RAY EXAM SURGICAL SPECIMEN: CPT

## 2024-08-15 PROCEDURE — 25010000002 PHENYLEPHRINE 10 MG/ML SOLUTION: Performed by: NURSE ANESTHETIST, CERTIFIED REGISTERED

## 2024-08-15 PROCEDURE — 19303 MAST SIMPLE COMPLETE: CPT | Performed by: SURGERY

## 2024-08-15 PROCEDURE — 88307 TISSUE EXAM BY PATHOLOGIST: CPT | Performed by: SURGERY

## 2024-08-15 PROCEDURE — 0 TECHETIUM TC99M TILMANOCEPT: Performed by: SURGERY

## 2024-08-15 PROCEDURE — 25010000002 PROPOFOL 200 MG/20ML EMULSION: Performed by: NURSE ANESTHETIST, CERTIFIED REGISTERED

## 2024-08-15 PROCEDURE — 25010000002 FENTANYL CITRATE (PF) 50 MCG/ML SOLUTION: Performed by: NURSE ANESTHETIST, CERTIFIED REGISTERED

## 2024-08-15 PROCEDURE — 25010000002 MAGNESIUM SULFATE PER 500 MG OF MAGNESIUM: Performed by: NURSE ANESTHETIST, CERTIFIED REGISTERED

## 2024-08-15 PROCEDURE — 25810000003 LACTATED RINGERS PER 1000 ML: Performed by: ANESTHESIOLOGY

## 2024-08-15 PROCEDURE — 25010000002 HYDROMORPHONE 1 MG/ML SOLUTION: Performed by: NURSE ANESTHETIST, CERTIFIED REGISTERED

## 2024-08-15 PROCEDURE — 25010000002 ONDANSETRON PER 1 MG: Performed by: NURSE ANESTHETIST, CERTIFIED REGISTERED

## 2024-08-15 PROCEDURE — 25010000002 CEFAZOLIN PER 500 MG: Performed by: SURGERY

## 2024-08-15 PROCEDURE — 25010000002 PROPOFOL 10 MG/ML EMULSION: Performed by: NURSE ANESTHETIST, CERTIFIED REGISTERED

## 2024-08-15 PROCEDURE — S0260 H&P FOR SURGERY: HCPCS | Performed by: SURGERY

## 2024-08-15 PROCEDURE — 25010000002 SUGAMMADEX 200 MG/2ML SOLUTION: Performed by: NURSE ANESTHETIST, CERTIFIED REGISTERED

## 2024-08-15 PROCEDURE — 25010000002 LABETALOL 5 MG/ML SOLUTION: Performed by: NURSE ANESTHETIST, CERTIFIED REGISTERED

## 2024-08-15 PROCEDURE — 78195 LYMPH SYSTEM IMAGING: CPT | Performed by: SURGERY

## 2024-08-15 PROCEDURE — 82948 REAGENT STRIP/BLOOD GLUCOSE: CPT

## 2024-08-15 PROCEDURE — 38525 BIOPSY/REMOVAL LYMPH NODES: CPT | Performed by: SURGERY

## 2024-08-15 PROCEDURE — 88332 PATH CONSLTJ SURG EA ADD BLK: CPT | Performed by: SURGERY

## 2024-08-15 DEVICE — CLIP LIGAT VASC HORIZON TI SM/WD RED 24CT: Type: IMPLANTABLE DEVICE | Site: BREAST | Status: FUNCTIONAL

## 2024-08-15 DEVICE — CLIP LIGAT VASC HORIZON TI MD BLU 24CT: Type: IMPLANTABLE DEVICE | Site: BREAST | Status: FUNCTIONAL

## 2024-08-15 RX ORDER — SODIUM CHLORIDE 0.9 % (FLUSH) 0.9 %
3-10 SYRINGE (ML) INJECTION AS NEEDED
Status: DISCONTINUED | OUTPATIENT
Start: 2024-08-15 | End: 2024-08-15 | Stop reason: HOSPADM

## 2024-08-15 RX ORDER — HYDROMORPHONE HYDROCHLORIDE 1 MG/ML
0.5 INJECTION, SOLUTION INTRAMUSCULAR; INTRAVENOUS; SUBCUTANEOUS
Status: DISCONTINUED | OUTPATIENT
Start: 2024-08-15 | End: 2024-08-16 | Stop reason: HOSPADM

## 2024-08-15 RX ORDER — FENTANYL CITRATE 50 UG/ML
50 INJECTION, SOLUTION INTRAMUSCULAR; INTRAVENOUS ONCE AS NEEDED
Status: DISCONTINUED | OUTPATIENT
Start: 2024-08-15 | End: 2024-08-15 | Stop reason: HOSPADM

## 2024-08-15 RX ORDER — DEXTROSE MONOHYDRATE, SODIUM CHLORIDE, AND POTASSIUM CHLORIDE 50; 1.49; 4.5 G/1000ML; G/1000ML; G/1000ML
75 INJECTION, SOLUTION INTRAVENOUS CONTINUOUS
Status: DISCONTINUED | OUTPATIENT
Start: 2024-08-15 | End: 2024-08-16 | Stop reason: HOSPADM

## 2024-08-15 RX ORDER — IPRATROPIUM BROMIDE AND ALBUTEROL SULFATE 2.5; .5 MG/3ML; MG/3ML
3 SOLUTION RESPIRATORY (INHALATION) ONCE AS NEEDED
Status: DISCONTINUED | OUTPATIENT
Start: 2024-08-15 | End: 2024-08-15 | Stop reason: HOSPADM

## 2024-08-15 RX ORDER — NALOXONE HCL 0.4 MG/ML
0.1 VIAL (ML) INJECTION
Status: DISCONTINUED | OUTPATIENT
Start: 2024-08-15 | End: 2024-08-16 | Stop reason: HOSPADM

## 2024-08-15 RX ORDER — GABAPENTIN 300 MG/1
300 CAPSULE ORAL 2 TIMES DAILY
Status: DISCONTINUED | OUTPATIENT
Start: 2024-08-15 | End: 2024-08-16 | Stop reason: HOSPADM

## 2024-08-15 RX ORDER — SCOLOPAMINE TRANSDERMAL SYSTEM 1 MG/1
1 PATCH, EXTENDED RELEASE TRANSDERMAL ONCE
Status: COMPLETED | OUTPATIENT
Start: 2024-08-15 | End: 2024-08-16

## 2024-08-15 RX ORDER — FENTANYL CITRATE 50 UG/ML
25 INJECTION, SOLUTION INTRAMUSCULAR; INTRAVENOUS
Status: DISCONTINUED | OUTPATIENT
Start: 2024-08-15 | End: 2024-08-15 | Stop reason: HOSPADM

## 2024-08-15 RX ORDER — PROMETHAZINE HYDROCHLORIDE 25 MG/1
25 SUPPOSITORY RECTAL ONCE AS NEEDED
Status: DISCONTINUED | OUTPATIENT
Start: 2024-08-15 | End: 2024-08-15 | Stop reason: HOSPADM

## 2024-08-15 RX ORDER — DEXAMETHASONE SODIUM PHOSPHATE 4 MG/ML
INJECTION, SOLUTION INTRA-ARTICULAR; INTRALESIONAL; INTRAMUSCULAR; INTRAVENOUS; SOFT TISSUE AS NEEDED
Status: DISCONTINUED | OUTPATIENT
Start: 2024-08-15 | End: 2024-08-15 | Stop reason: SURG

## 2024-08-15 RX ORDER — HYDROMORPHONE HYDROCHLORIDE 1 MG/ML
0.25 INJECTION, SOLUTION INTRAMUSCULAR; INTRAVENOUS; SUBCUTANEOUS
Status: DISCONTINUED | OUTPATIENT
Start: 2024-08-15 | End: 2024-08-15 | Stop reason: HOSPADM

## 2024-08-15 RX ORDER — EPHEDRINE SULFATE 50 MG/ML
5 INJECTION, SOLUTION INTRAVENOUS ONCE AS NEEDED
Status: DISCONTINUED | OUTPATIENT
Start: 2024-08-15 | End: 2024-08-15 | Stop reason: HOSPADM

## 2024-08-15 RX ORDER — FLUMAZENIL 0.1 MG/ML
0.2 INJECTION INTRAVENOUS AS NEEDED
Status: DISCONTINUED | OUTPATIENT
Start: 2024-08-15 | End: 2024-08-15 | Stop reason: HOSPADM

## 2024-08-15 RX ORDER — LIDOCAINE HYDROCHLORIDE 10 MG/ML
0.5 INJECTION, SOLUTION INFILTRATION; PERINEURAL ONCE AS NEEDED
Status: DISCONTINUED | OUTPATIENT
Start: 2024-08-15 | End: 2024-08-15 | Stop reason: HOSPADM

## 2024-08-15 RX ORDER — HYDROCODONE BITARTRATE AND ACETAMINOPHEN 7.5; 325 MG/1; MG/1
1 TABLET ORAL EVERY 4 HOURS PRN
Status: DISCONTINUED | OUTPATIENT
Start: 2024-08-15 | End: 2024-08-15 | Stop reason: HOSPADM

## 2024-08-15 RX ORDER — BISOPROLOL FUMARATE 5 MG/1
5 TABLET, FILM COATED ORAL DAILY
Status: DISCONTINUED | OUTPATIENT
Start: 2024-08-15 | End: 2024-08-16 | Stop reason: HOSPADM

## 2024-08-15 RX ORDER — ONDANSETRON 4 MG/1
4 TABLET, ORALLY DISINTEGRATING ORAL EVERY 6 HOURS PRN
Status: DISCONTINUED | OUTPATIENT
Start: 2024-08-15 | End: 2024-08-16 | Stop reason: HOSPADM

## 2024-08-15 RX ORDER — LIDOCAINE HYDROCHLORIDE 20 MG/ML
INJECTION, SOLUTION INFILTRATION; PERINEURAL AS NEEDED
Status: DISCONTINUED | OUTPATIENT
Start: 2024-08-15 | End: 2024-08-15 | Stop reason: SURG

## 2024-08-15 RX ORDER — ACETAMINOPHEN 650 MG/1
650 SUPPOSITORY RECTAL EVERY 4 HOURS PRN
Status: DISCONTINUED | OUTPATIENT
Start: 2024-08-15 | End: 2024-08-16 | Stop reason: HOSPADM

## 2024-08-15 RX ORDER — GLYCOPYRROLATE 0.2 MG/ML
INJECTION INTRAMUSCULAR; INTRAVENOUS AS NEEDED
Status: DISCONTINUED | OUTPATIENT
Start: 2024-08-15 | End: 2024-08-15 | Stop reason: SURG

## 2024-08-15 RX ORDER — PROPOFOL 10 MG/ML
INJECTION, EMULSION INTRAVENOUS AS NEEDED
Status: DISCONTINUED | OUTPATIENT
Start: 2024-08-15 | End: 2024-08-15 | Stop reason: SURG

## 2024-08-15 RX ORDER — METOPROLOL TARTRATE 1 MG/ML
INJECTION, SOLUTION INTRAVENOUS AS NEEDED
Status: DISCONTINUED | OUTPATIENT
Start: 2024-08-15 | End: 2024-08-15 | Stop reason: SURG

## 2024-08-15 RX ORDER — ROCURONIUM BROMIDE 10 MG/ML
INJECTION, SOLUTION INTRAVENOUS AS NEEDED
Status: DISCONTINUED | OUTPATIENT
Start: 2024-08-15 | End: 2024-08-15 | Stop reason: SURG

## 2024-08-15 RX ORDER — SODIUM CHLORIDE 0.9 % (FLUSH) 0.9 %
3 SYRINGE (ML) INJECTION EVERY 12 HOURS SCHEDULED
Status: DISCONTINUED | OUTPATIENT
Start: 2024-08-15 | End: 2024-08-15 | Stop reason: HOSPADM

## 2024-08-15 RX ORDER — PHENYLEPHRINE HYDROCHLORIDE 10 MG/ML
INJECTION INTRAVENOUS AS NEEDED
Status: DISCONTINUED | OUTPATIENT
Start: 2024-08-15 | End: 2024-08-15 | Stop reason: SURG

## 2024-08-15 RX ORDER — MIDAZOLAM HYDROCHLORIDE 1 MG/ML
0.5 INJECTION INTRAMUSCULAR; INTRAVENOUS
Status: DISCONTINUED | OUTPATIENT
Start: 2024-08-15 | End: 2024-08-15 | Stop reason: HOSPADM

## 2024-08-15 RX ORDER — DIAZEPAM 5 MG/1
10 TABLET ORAL ONCE
Status: COMPLETED | OUTPATIENT
Start: 2024-08-15 | End: 2024-08-15

## 2024-08-15 RX ORDER — HYDROCODONE BITARTRATE AND ACETAMINOPHEN 5; 325 MG/1; MG/1
2 TABLET ORAL EVERY 4 HOURS PRN
Status: DISCONTINUED | OUTPATIENT
Start: 2024-08-15 | End: 2024-08-16 | Stop reason: HOSPADM

## 2024-08-15 RX ORDER — ONDANSETRON 2 MG/ML
4 INJECTION INTRAMUSCULAR; INTRAVENOUS ONCE AS NEEDED
Status: DISCONTINUED | OUTPATIENT
Start: 2024-08-15 | End: 2024-08-15 | Stop reason: HOSPADM

## 2024-08-15 RX ORDER — HYDROCODONE BITARTRATE AND ACETAMINOPHEN 5; 325 MG/1; MG/1
1 TABLET ORAL EVERY 4 HOURS PRN
Status: DISCONTINUED | OUTPATIENT
Start: 2024-08-15 | End: 2024-08-16 | Stop reason: HOSPADM

## 2024-08-15 RX ORDER — FAMOTIDINE 10 MG/ML
20 INJECTION, SOLUTION INTRAVENOUS ONCE
Status: COMPLETED | OUTPATIENT
Start: 2024-08-15 | End: 2024-08-15

## 2024-08-15 RX ORDER — LABETALOL HYDROCHLORIDE 5 MG/ML
5 INJECTION, SOLUTION INTRAVENOUS
Status: DISCONTINUED | OUTPATIENT
Start: 2024-08-15 | End: 2024-08-15 | Stop reason: HOSPADM

## 2024-08-15 RX ORDER — SODIUM CHLORIDE, SODIUM LACTATE, POTASSIUM CHLORIDE, CALCIUM CHLORIDE 600; 310; 30; 20 MG/100ML; MG/100ML; MG/100ML; MG/100ML
100 INJECTION, SOLUTION INTRAVENOUS CONTINUOUS
Status: DISCONTINUED | OUTPATIENT
Start: 2024-08-15 | End: 2024-08-16 | Stop reason: HOSPADM

## 2024-08-15 RX ORDER — SODIUM CHLORIDE, SODIUM LACTATE, POTASSIUM CHLORIDE, CALCIUM CHLORIDE 600; 310; 30; 20 MG/100ML; MG/100ML; MG/100ML; MG/100ML
9 INJECTION, SOLUTION INTRAVENOUS CONTINUOUS
Status: DISCONTINUED | OUTPATIENT
Start: 2024-08-15 | End: 2024-08-16 | Stop reason: HOSPADM

## 2024-08-15 RX ORDER — HYDRALAZINE HYDROCHLORIDE 20 MG/ML
5 INJECTION INTRAMUSCULAR; INTRAVENOUS
Status: DISCONTINUED | OUTPATIENT
Start: 2024-08-15 | End: 2024-08-15 | Stop reason: HOSPADM

## 2024-08-15 RX ORDER — DIPHENHYDRAMINE HYDROCHLORIDE 50 MG/ML
12.5 INJECTION INTRAMUSCULAR; INTRAVENOUS
Status: DISCONTINUED | OUTPATIENT
Start: 2024-08-15 | End: 2024-08-15 | Stop reason: HOSPADM

## 2024-08-15 RX ORDER — ONDANSETRON 2 MG/ML
INJECTION INTRAMUSCULAR; INTRAVENOUS AS NEEDED
Status: DISCONTINUED | OUTPATIENT
Start: 2024-08-15 | End: 2024-08-15 | Stop reason: SURG

## 2024-08-15 RX ORDER — DROPERIDOL 2.5 MG/ML
0.62 INJECTION, SOLUTION INTRAMUSCULAR; INTRAVENOUS
Status: DISCONTINUED | OUTPATIENT
Start: 2024-08-15 | End: 2024-08-15 | Stop reason: HOSPADM

## 2024-08-15 RX ORDER — MAGNESIUM SULFATE HEPTAHYDRATE 500 MG/ML
INJECTION, SOLUTION INTRAMUSCULAR; INTRAVENOUS AS NEEDED
Status: DISCONTINUED | OUTPATIENT
Start: 2024-08-15 | End: 2024-08-15 | Stop reason: SURG

## 2024-08-15 RX ORDER — ONDANSETRON 2 MG/ML
4 INJECTION INTRAMUSCULAR; INTRAVENOUS EVERY 6 HOURS PRN
Status: DISCONTINUED | OUTPATIENT
Start: 2024-08-15 | End: 2024-08-16 | Stop reason: HOSPADM

## 2024-08-15 RX ORDER — PANTOPRAZOLE SODIUM 40 MG/1
40 TABLET, DELAYED RELEASE ORAL
Status: DISCONTINUED | OUTPATIENT
Start: 2024-08-16 | End: 2024-08-16 | Stop reason: HOSPADM

## 2024-08-15 RX ORDER — ACETAMINOPHEN 325 MG/1
650 TABLET ORAL EVERY 4 HOURS PRN
Status: DISCONTINUED | OUTPATIENT
Start: 2024-08-15 | End: 2024-08-16 | Stop reason: HOSPADM

## 2024-08-15 RX ORDER — NALOXONE HCL 0.4 MG/ML
0.2 VIAL (ML) INJECTION AS NEEDED
Status: DISCONTINUED | OUTPATIENT
Start: 2024-08-15 | End: 2024-08-15 | Stop reason: HOSPADM

## 2024-08-15 RX ORDER — HYDROCODONE BITARTRATE AND ACETAMINOPHEN 5; 325 MG/1; MG/1
1 TABLET ORAL ONCE AS NEEDED
Status: DISCONTINUED | OUTPATIENT
Start: 2024-08-15 | End: 2024-08-15 | Stop reason: HOSPADM

## 2024-08-15 RX ORDER — METOCLOPRAMIDE HYDROCHLORIDE 5 MG/ML
10 INJECTION INTRAMUSCULAR; INTRAVENOUS EVERY 6 HOURS PRN
Status: DISCONTINUED | OUTPATIENT
Start: 2024-08-15 | End: 2024-08-16 | Stop reason: HOSPADM

## 2024-08-15 RX ORDER — PROMETHAZINE HYDROCHLORIDE 25 MG/1
25 TABLET ORAL ONCE AS NEEDED
Status: DISCONTINUED | OUTPATIENT
Start: 2024-08-15 | End: 2024-08-15 | Stop reason: HOSPADM

## 2024-08-15 RX ORDER — FENTANYL CITRATE 50 UG/ML
INJECTION, SOLUTION INTRAMUSCULAR; INTRAVENOUS AS NEEDED
Status: DISCONTINUED | OUTPATIENT
Start: 2024-08-15 | End: 2024-08-15 | Stop reason: SURG

## 2024-08-15 RX ORDER — DIPHENHYDRAMINE HCL 25 MG
25 CAPSULE ORAL EVERY 6 HOURS PRN
Status: DISCONTINUED | OUTPATIENT
Start: 2024-08-15 | End: 2024-08-16 | Stop reason: HOSPADM

## 2024-08-15 RX ADMIN — SODIUM CHLORIDE, POTASSIUM CHLORIDE, SODIUM LACTATE AND CALCIUM CHLORIDE: 600; 310; 30; 20 INJECTION, SOLUTION INTRAVENOUS at 10:04

## 2024-08-15 RX ADMIN — FAMOTIDINE 20 MG: 10 INJECTION INTRAVENOUS at 07:40

## 2024-08-15 RX ADMIN — GABAPENTIN 300 MG: 300 CAPSULE ORAL at 15:06

## 2024-08-15 RX ADMIN — LABETALOL HYDROCHLORIDE 5 MG: 5 INJECTION, SOLUTION INTRAVENOUS at 11:32

## 2024-08-15 RX ADMIN — SUGAMMADEX 50 MG: 100 INJECTION, SOLUTION INTRAVENOUS at 10:35

## 2024-08-15 RX ADMIN — FENTANYL CITRATE 25 MCG: 50 INJECTION, SOLUTION INTRAMUSCULAR; INTRAVENOUS at 08:13

## 2024-08-15 RX ADMIN — FENTANYL CITRATE 25 MCG: 50 INJECTION, SOLUTION INTRAMUSCULAR; INTRAVENOUS at 12:44

## 2024-08-15 RX ADMIN — HYDROMORPHONE HYDROCHLORIDE 0.5 MG: 1 INJECTION, SOLUTION INTRAMUSCULAR; INTRAVENOUS; SUBCUTANEOUS at 08:29

## 2024-08-15 RX ADMIN — SODIUM CHLORIDE, POTASSIUM CHLORIDE, SODIUM LACTATE AND CALCIUM CHLORIDE 9 ML/HR: 600; 310; 30; 20 INJECTION, SOLUTION INTRAVENOUS at 06:33

## 2024-08-15 RX ADMIN — MAGNESIUM SULFATE HEPTAHYDRATE 1 G: 500 INJECTION, SOLUTION INTRAMUSCULAR; INTRAVENOUS at 09:18

## 2024-08-15 RX ADMIN — PHENYLEPHRINE HYDROCHLORIDE 100 MCG: 10 INJECTION INTRAVENOUS at 08:24

## 2024-08-15 RX ADMIN — LIDOCAINE HYDROCHLORIDE 60 MG: 20 INJECTION, SOLUTION INFILTRATION; PERINEURAL at 08:04

## 2024-08-15 RX ADMIN — SODIUM CHLORIDE, POTASSIUM CHLORIDE, SODIUM LACTATE AND CALCIUM CHLORIDE 9 ML/HR: 600; 310; 30; 20 INJECTION, SOLUTION INTRAVENOUS at 07:41

## 2024-08-15 RX ADMIN — PROPOFOL 120 MG: 10 INJECTION, EMULSION INTRAVENOUS at 08:04

## 2024-08-15 RX ADMIN — TILMANOCEPT 1 DOSE: KIT at 07:03

## 2024-08-15 RX ADMIN — GABAPENTIN 300 MG: 300 CAPSULE ORAL at 21:43

## 2024-08-15 RX ADMIN — FENTANYL CITRATE 25 MCG: 50 INJECTION, SOLUTION INTRAMUSCULAR; INTRAVENOUS at 11:39

## 2024-08-15 RX ADMIN — DIAZEPAM 10 MG: 5 TABLET ORAL at 06:07

## 2024-08-15 RX ADMIN — SODIUM CHLORIDE 2000 MG: 900 INJECTION INTRAVENOUS at 07:48

## 2024-08-15 RX ADMIN — ROCURONIUM BROMIDE 40 MG: 10 INJECTION, SOLUTION INTRAVENOUS at 08:05

## 2024-08-15 RX ADMIN — HYDROMORPHONE HYDROCHLORIDE 0.5 MG: 1 INJECTION, SOLUTION INTRAMUSCULAR; INTRAVENOUS; SUBCUTANEOUS at 15:06

## 2024-08-15 RX ADMIN — PROPOFOL 25 MCG/KG/MIN: 10 INJECTION, EMULSION INTRAVENOUS at 08:24

## 2024-08-15 RX ADMIN — SUGAMMADEX 50 MG: 100 INJECTION, SOLUTION INTRAVENOUS at 10:28

## 2024-08-15 RX ADMIN — FENTANYL CITRATE 25 MCG: 50 INJECTION, SOLUTION INTRAMUSCULAR; INTRAVENOUS at 12:54

## 2024-08-15 RX ADMIN — DEXAMETHASONE SODIUM PHOSPHATE 8 MG: 4 INJECTION, SOLUTION INTRAMUSCULAR; INTRAVENOUS at 08:15

## 2024-08-15 RX ADMIN — GLYCOPYRROLATE 0.2 MG: 0.2 INJECTION INTRAMUSCULAR; INTRAVENOUS at 08:26

## 2024-08-15 RX ADMIN — FENTANYL CITRATE 25 MCG: 50 INJECTION, SOLUTION INTRAMUSCULAR; INTRAVENOUS at 08:03

## 2024-08-15 RX ADMIN — FENTANYL CITRATE 25 MCG: 50 INJECTION, SOLUTION INTRAMUSCULAR; INTRAVENOUS at 11:46

## 2024-08-15 RX ADMIN — ONDANSETRON 4 MG: 2 INJECTION INTRAMUSCULAR; INTRAVENOUS at 10:04

## 2024-08-15 RX ADMIN — LABETALOL HYDROCHLORIDE 5 MG: 5 INJECTION, SOLUTION INTRAVENOUS at 11:11

## 2024-08-15 RX ADMIN — SUGAMMADEX 20 MG: 100 INJECTION, SOLUTION INTRAVENOUS at 10:39

## 2024-08-15 RX ADMIN — POTASSIUM CHLORIDE, DEXTROSE MONOHYDRATE AND SODIUM CHLORIDE 75 ML/HR: 150; 5; 450 INJECTION, SOLUTION INTRAVENOUS at 15:06

## 2024-08-15 RX ADMIN — TILMANOCEPT 1 DOSE: KIT at 07:11

## 2024-08-15 RX ADMIN — LIDOCAINE HYDROCHLORIDE 60 MG: 20 INJECTION, SOLUTION INFILTRATION; PERINEURAL at 10:43

## 2024-08-15 RX ADMIN — SCOPALAMINE 1 PATCH: 1 PATCH, EXTENDED RELEASE TRANSDERMAL at 06:08

## 2024-08-15 RX ADMIN — METOPROLOL TARTRATE 2.5 MG: 1 INJECTION, SOLUTION INTRAVENOUS at 08:34

## 2024-08-15 RX ADMIN — PHENYLEPHRINE HYDROCHLORIDE 100 MCG: 10 INJECTION INTRAVENOUS at 08:17

## 2024-08-15 NOTE — ANESTHESIA PROCEDURE NOTES
Airway  Urgency: elective    Date/Time: 8/15/2024 8:08 AM  Airway not difficult    General Information and Staff    Patient location during procedure: OR  Anesthesiologist: Rashel Rocha MD  CRNA/CAA: Liliana Alston CRNA    Indications and Patient Condition  Indications for airway management: airway protection    Preoxygenated: yes  Mask difficulty assessment: 2 - vent by mask + OA or adjuvant +/- NMBA    Final Airway Details  Final airway type: endotracheal airway      Successful airway: ETT  Cuffed: yes   Successful intubation technique: direct laryngoscopy  Facilitating devices/methods: intubating stylet  Endotracheal tube insertion site: oral  Blade: Danyell  Blade size: 3  ETT size (mm): 7.0  Cormack-Lehane Classification: grade I - full view of glottis  Placement verified by: chest auscultation and capnometry   Inital cuff pressure (cm H2O): 19  Cuff volume (mL): 8  Measured from: lips  ETT/EBT  to lips (cm): 20  Number of attempts at approach: 1  Assessment: lips, teeth, and gum same as pre-op and atraumatic intubation

## 2024-08-15 NOTE — ANESTHESIA POSTPROCEDURE EVALUATION
"Patient: Felicia Boyle    Procedure Summary       Date: 08/15/24 Room / Location: [facility] OR [address] [place] [address] MAIN OR    Anesthesia Start: 0756 Anesthesia Stop: 1100    Procedure: Bilateral total mastectomy, bilateral sentinel node biopsy, possible axillary node dissection, no reconstruction. (Bilateral: Breast) Diagnosis:       Bilateral malignant neoplasm of breast in female, unspecified estrogen receptor status, unspecified site of breast      (Bilateral malignant neoplasm of breast in female, unspecified estrogen receptor status, unspecified site of breast [C50.911, C50.912])    Surgeons: Abeba Manrique MD Provider: Rashel Rocha MD    Anesthesia Type: general ASA Status: 2            Anesthesia Type: general    Vitals  Vitals Value Taken Time   /76 08/15/24 1215   Temp 36.7 °C (98 °F) 08/15/24 1057   Pulse 83 08/15/24 1216   Resp 16 08/15/24 1145   SpO2 93 % 08/15/24 1216   Vitals shown include unfiled device data.        Post Anesthesia Care and Evaluation    Patient location during evaluation: bedside  Pain management: adequate    Airway patency: patent  Anesthetic complications: No anesthetic complications    Cardiovascular status: acceptable  Respiratory status: acceptable  Hydration status: acceptable    Comments: *BP (!) 178/104   Pulse 74   Temp 36.7 °C (98 °F) (Oral)   Resp 16   Ht 147.3 cm (58\")   Wt 54.2 kg (119 lb 6.4 oz)   LMP  (LMP Unknown)   SpO2 97%   BMI 24.95 kg/m²       "

## 2024-08-15 NOTE — H&P
There are no changes in the history or physical exam, aside from any that are listed as an addendum at the bottom of this document.   Today, patient will go for the surgery listed in the plan below.     No changes    Chief Complaint: Felicia Boyle is a 68 y.o. female who was seen in consultation at the request of Tristan Malone DO  for newly diagnosed breast cancer and a followup visit    History of Present Illness:  Patient presents with newly diagnosed breast cancer.  She noted a new RIGHT breast mass lower outer in 2023. She noted no other new masses, skin changes, nipple discharge, nipple changes prior to her most recent imaging.    Her most recent imaging includes the followin2019, MMG Screening [place] Bilateral ([facility])  Scattered fibroglandular density.  BI-RADS 0    2019, [facility] Diagnostic Right ([facility])  Resolution of the area of focal asymmetry.  BI-RADS 1: Negative    2024, MMG Diagnostic Digital [place] Bilateral, US Breast Right Limited ([facility])  INDICATION: Right breast palpable lump the patient first identified at  the end of December 3 to 4 weeks ago. The patient reports 2 sisters  with a history of breast cancer, diagnosed age 38 and 48.     The breasts are heterogeneously dense   Right breast: There is a triangular skin marker area of palpable concern, 8 cm from the nipple. The marker overlies an oval high density mass margins measuring 1.7  x 1.4 x 1.6 cm. Lymph nodes included in the right axilla have a normal mammographic appearance.    Ultrasound  8 o'clock 8 cm from the nipple solid mass 1.7 x 1.0 x 1.3 cm.   Ultrasound of the right axilla was performed with no abnormally enlarged lymph nodes.    BI-RADS 4c: Suspicious        She had a biopsy on the following day that showed:   2024, US Guided Breast Biopsy ([facility])  Right breast 8 o'clock 8 cm from the nipple. 10-gauge mammotome multiple tissue specimens bowtie shaped metallic clip was placed.    Post-biopsy mammography  demonstrates bowtie shaped metallic clip centrally within a mass in the posterior one third lower outer quadrant of the right breast. No evidence for clip migration.    The pathology is concordant.      01/31/2024, Surgical pathology report ([facility])    1.  Breast, right, 8:00, 8 cm from nipple, biopsy: Invasive ductal adenocarcinoma                -A.  The tumor is Jeovany grade III (tubular grade 3, nuclear grade 3, mitotic grade 3).               -B.  The tumor measures up to 9.3 mm on the slide               -C.  No definitive lymphovascular invasion or perineural invasion is identified               -D.  Ductal carcinoma in situ is not identified    ER Negative (less than 1%)  CA Negative (less than 1%)  HER2 positive (Score 3+) 95%  KI-67 70%     I then arranged for a MRi:  2/9/24  [facility] BEATRIZ BREAST MRI  SHERRILL BUTLER   Posterior one third lower outer quadrant of the right  breast 8 o'clock, 8 cm from the nipple there is an irregular enhancing mass with an internal focal signal void. The mass measures 2.0 cm in anterior to posterior dimension, 1.8 cm in the superior-inferior dimension and 1.7 cm in the mediolateral dimension. This corresponds to the biopsy-proven malignancy with the centrally located bowtie shaped metallic clip. Extending anteriorly away from the anterior margin of the mass there is irregular linear  enhancement that measures on the order of 3.5 cm in anterior posterior.    There are linear areas of increased density seen mammographically in this region. This is suspicious for additional DCIS. The biopsy-proven malignancy and contiguous anterior linear enhancement  measure on the order of 4.7 cm in anterior to posterior dimension, 1.7 cm in the mediolateral dimension and 2.4 cm in the superior to inferior dimension. anterior one third of the right breast at the 12 o'clock position centered on the order of 3 cm from the nipple there is an irregular area  of non-mass enhancement that measures 1.0 cm  in superior-inferior dimension, 1.1 cm in anterior to posterior dimension and 1.2 cm in the medial to lateral dimension. No mammographic correlate is appreciated.  No other areas of suspicious enhancement or morphology are seen in the  right breast. I see no evidence for abnormal skin, nipple or chest wall  enhancement of the right breast and there is no evidence for right  axillary or internal mammary chain adenopathy.  In the middle third of the left breast centered at the 11:30 position on the order of 3.7 cm from the nipple there is an irregular area of non-mass enhancement that measures on the order of 1.3 cm in anterior posterior dimension, 1.1 cm in the superior to inferior dimension and 1.0 cm in the mediolateral dimension. There is suspected architectural distortion. No mammographic correlate is appreciated.  No other areas of abnormal enhancement or morphology are seen within the left breast. No evidence for left axillary or internal mammary chain adenopathy.   IMPRESSION: Biopsy proven malignancy right. The entire mass and linear enhancement measure on the order of 4.7 cm. If breast conservation therapy is desired and biopsy of this region is desired, it is my opinion that this would be best performed under MRI guidance. 2. 1.2 cm irregular area o non mass enhancement in the right breast 12 o'clock position. No mammographic correlate is appreciated. Further evaluation with an MRI guided right breast biopsy should be considered. 3. 1.1 cm irregular area of non mass enhancement seen n middle third of the left breast at th 11:30 position. MRI guided left breast biopsy is recommended. BI-RADS 4 Suspicious abnormality. Biopsy should be considered.       She has not had a breast biopsy in the past.  She has her uterus and ovaries, is postmenopausal, and takes no hormones.    Her family history includes the following:   She has 2 of 7 sisters with breast cancer in their 30s-40s.  Dr Perry sent testing years ago,  as follows    2017, Genetics ([facility])  Genetic result: Negative, no clinically significant mutation identified.    Based on her imaging, we arranged for a bilateral (2X RIGHT and 1 X LEFT) MRI guided biopsies.      MRI guided biopsy x 3 with 2 being on the right and 1 being on the left dated 2024 [facility]  Right breast 12:00, buckle shaped metallic clip placed.  Sclerosing adenosis, calcifications.  Right breast 8:00, infinity shaped clip placed.  High-grade duct carcinoma in situ, solid, cribriform, clinging, lobular extension, central comedonecrosis.  No invasive carcinoma.       Left breast biopsy was also done:   Left breast MRI guided biopsy 12:00, stoplight shaped metallic clip. 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  No clip migration.  Pathology is all concordant    She reports significant bruising LEFT breast.    Interval History:  In the interim, Normal took abraxane (paclitaxel), herceptin perjeta for 12 cycles, last being 2024.  Her port has functioned well.    Her interval imaging has shown the followin24 MRI-   Showed interval resolution of all overtly suspicious enhancement on both sides    24  RIGHT ultrasound [facility]-  Prevoiusly noted mass at 8:00, 8 CFN is no longer visible and a clip is not confidently identified.      She could no longer feel the mass after the 3rd cycle.    She denies headache, bone pain, belly pain, cough , changes in vision or gait.    She is here for review.      Review of Systems:  Review of Systems   Constitutional:  Negative for unexpected weight change (2 LB WT LOSS).   Eyes:  Eye problems: cataract surgery 3 weeks ago..   All other systems reviewed and are negative.       Past Medical and Surgical History:  Breast Biopsy History:  Patient had not had a breast biopsy prior to her cancer diagnosis.  Breast Cancer HIstory:  Patient does not have a  "past medical history of breast cancer.  Breast Operations, and year:  None   Obstetric/Gynecologic History:  Age menstrual periods began: 16  Patient is postmenopausal, entered menopause naturally at age: 49   Number of pregnancies:4  Number of live births: 3  Number of abortions or miscarriages: 1  Age of delivery of first child: 18  Patient breast fed, for the following lenth of time: 3 mons total  Length of time taking birth control pills: none   Patient has never taken hormone replacement    Patient has both ovaries and uterus.   Past Surgical History:   Procedure Laterality Date    BREAST BIOPSY Left     Excisional biopsy in the [facility]    BREAST BIOPSY  03/2024    CATARACT EXTRACTION W/ INTRAOCULAR LENS IMPLANT Bilateral     CHOLECYSTECTOMY  2012    COLON RESECTION Right 2002    COLONOSCOPY N/A 04/28/2021    Procedure: COLONOSCOPY TO ANASTAMOSIS/TI;  Surgeon: Angelo Gonsalez MD;  Location: [facility] ENDOSCOPY;  Service: Gastroenterology;  Laterality: N/A;  PRE: SCREENING  POST: NORMAL POST-OP ANATOMY    ENDOSCOPY      2013    ENDOSCOPY N/A 03/10/2017    Procedure: ESOPHAGOGASTRODUODENOSCOPY WITH BIOPSY AND PETER DILATATION 54\";  Surgeon: Angelo Gonsalez MD;  Location: [facility] ENDOSCOPY;  Service:     ENDOSCOPY N/A 04/28/2021    Procedure: ESOPHAGOGASTRODUODENOSCOPY WITH BIOPSIES, 54FR PETER DILATATION;  Surgeon: Angelo Gonsalez MD;  Location: [facility] ENDOSCOPY;  Service: Gastroenterology;  Laterality: N/A;  PRE: DYSPHAGIA  POST: GASTRITIS, ESOPHAGEAL RING    VENOUS ACCESS DEVICE (PORT) INSERTION Left 3/14/2024    Procedure: INSERTION OF PORTACATH;  Surgeon: Abeba Manrique MD;  Location[facility] OR;  Service: General;  Laterality: Left;     Past Medical History:   Diagnosis Date    Anxiety about health     Breast cancer of lower-outer quadrant of right female breast 02/14/2024    DCIS (ductal carcinoma in situ) of breast 03/15/2024    Diverticulosis     Mixed hyperlipidemia 05/19/2019    DIET " "CONTROLLED    Osteoporosis     Type 2 diabetes mellitus        Prior Hospitalizations, other than for surgery or childbirth, and year:  None     Social History     Socioeconomic History    Marital status:      Spouse name: Ryan   Tobacco Use    Smoking status: Former     Current packs/day: 0.00     Average packs/day: 0.3 packs/day for 15.0 years (3.8 ttl pk-yrs)     Types: Cigarettes     Quit date: 1/15/2024     Years since quittin.4     Passive exposure: Past    Smokeless tobacco: Never    Tobacco comments:     N/A   Vaping Use    Vaping status: Never Used   Substance and Sexual Activity    Alcohol use: Yes     Comment: RARE    Drug use: No    Sexual activity: Not Currently     Partners: Male     Birth control/protection: Tubal ligation     Patient is .  Patient is retired.  Patient drinks 0-1 servings of caffeine per day.    Family History:  Family History   Problem Relation Age of Onset    Hypertension Mother     Diabetes Mother     Hypertension Father     Breast cancer Sister 48    Breast cancer Sister 38    No Known Problems Brother     No Known Problems Daughter     No Known Problems Son     No Known Problems Maternal Grandmother     No Known Problems Paternal Grandmother     No Known Problems Maternal Grandfather     No Known Problems Paternal Grandfather     Autism Grandson     Colon cancer Neg Hx     Rectal cancer Neg Hx     Endometrial cancer Neg Hx     Vaginal cancer Neg Hx     Cervical cancer Neg Hx     Ovarian cancer Neg Hx     Prostate cancer Neg Hx     Uterine cancer Neg Hx     Malig Hyperthermia Neg Hx        Vital Signs:  /78 (BP Location: Right arm, Patient Position: Sitting)   Pulse 71   Ht 147.3 cm (57.99\")   Wt 52.6 kg (116 lb)   LMP  (LMP Unknown)   SpO2 97%   BMI 24.25 kg/m²      Medications:    Current Outpatient Medications:     Blood Glucose Monitoring Suppl (FreeStyle Lite) w/Device kit, 1 Units Daily., Disp: 1 kit, Rfl: 0    gabapentin (NEURONTIN) 300 MG " "capsule, Take 1 capsule by mouth 2 (Two) Times a Day., Disp: 60 capsule, Rfl: 1    glucose blood (FREESTYLE LITE) test strip, TEST ONCE DAILY, Disp: 100 each, Rfl: 2    Lancets (freestyle) lancets, Use TO CHECK BLOOD SUGAR ONCE DAILY, Disp: 100 each, Rfl: 2    lidocaine-prilocaine (EMLA) 2.5-2.5 % cream, Apply 1 Application topically to the appropriate area as directed Take As Directed. Apply nickel size amount to port site 30 min before appt time do not rub in cover with plastic wrap, Disp: 30 g, Rfl: 5    ondansetron (ZOFRAN) 8 MG tablet, Take 1 tablet by mouth 3 (Three) Times a Day As Needed for Nausea or Vomiting., Disp: 30 tablet, Rfl: 5    pantoprazole (PROTONIX) 40 MG EC tablet, Take 1 tablet by mouth Daily., Disp: 30 tablet, Rfl: 5    bisoprolol (ZEBeta) 5 MG tablet, , Disp: , Rfl:     HYDROcodone-acetaminophen (NORCO) 5-325 MG per tablet, 1-2 tabs po q 4-6hr prn pain, wean to tylenol (Patient not taking: Reported on 6/20/2024), Disp: 15 tablet, Rfl: 0    potassium chloride (KLOR-CON M10) 10 MEQ CR tablet, Take 2 tablets by mouth Daily. (Patient not taking: Reported on 6/20/2024), Disp: 5 tablet, Rfl: 0    prednisoLONE acetate (PRED FORTE) 1 % ophthalmic suspension, Administer 1 drop to the right eye 4 (Four) Times a Day. (Patient not taking: Reported on 6/20/2024), Disp: , Rfl:     triamcinolone (KENALOG) 0.1 % ointment, Apply 1 Application topically to the appropriate area as directed 2 (Two) Times a Day. (Patient not taking: Reported on 6/20/2024), Disp: 30 g, Rfl: 0     Allergies:  Allergies   Allergen Reactions    Metronidazole Nausea And Vomiting       Physical Examination:  /78 (BP Location: Right arm, Patient Position: Sitting)   Pulse 71   Ht 147.3 cm (57.99\")   Wt 52.6 kg (116 lb)   LMP  (LMP Unknown)   SpO2 97%   BMI 24.25 kg/m²   General Appearance:  Patient is in no distress.  She is well kept and has an average build.   Psychiatric:  Patient with appropriate mood and affect. Alert " and oriented to self, time, and place.    Breast, RIGHT:  medium sized, symmetric with the contralateral side.  Breast skin is without erythema, edema, rashes.  There are no visible abnormalities upon inspection during the arm-raising maneuver or with hands on hips in the sitting position. There is no nipple retraction, discharge or nipple/areolar skin changes. In the RIGHT breast LOQ there is no longer a 2.5x1.75 cm palpable mass at the LOQ site of original cancer- there is no residual mass palpable. At initial presentation, the cancer was fairly superficial, fairly close to the IMF, freely mobile, and no overlying skin changes.There are no masses palpable in the sitting or supine positions.    Breast, LEFT:  medium sized, symmetric with the contralateral side.  Breast skin is without erythema, edema, rashes. There are no visible abnormalities upon inspection during the arm-raising maneuver or with hands on hips in the sitting position. There is no nipple retraction, discharge or nipple/areolar skin changes.There are no masses palpable in the sitting or supine positions.    Lymphatic:  There is no axillary, cervical, infraclavicular, or supraclavicular adenopathy bilaterally.  Eyes:  Pupils are round and reactive to light.  Cardiovascular:  Heart rate and rhythm are regular.  Respiratory:  Lungs are clear bilaterally with no crackles or wheezes in any lung field.  Gastrointestinal:  Abdomen is soft, nondistended, and nontender.     Musculoskeletal:  Good strength in all 4 extremities.   There is good range of motion in both shoulders.    Skin:  No new skin lesions or rashes on the skin excluding the breast (see breast exam above).        Imagin2019, MMG Screening [place] Bilateral ([facility])  Scattered fibroglandular density.  BI-RADS 0    2019, MMG Diagnostic Right ([facility])  Resolution of the area of focal asymmetry.  BI-RADS 1: Negative    2024, MMG Diagnostic Digital [place] Bilateral, US Breast Right  Limited ([facility])  INDICATION: Right breast palpable lump the patient first identified at  the end of December 3 to 4 weeks ago. The patient reports 2 sisters  with a history of breast cancer, diagnosed age 38 and 48.     The breasts are heterogeneously dense   Right breast: There is a triangular skin marker area of palpable concern, 8 cm from the nipple. The marker overlies an oval high density mass margins measuring 1.7  x 1.4 x 1.6 cm. Lymph nodes included in the right axilla have a normal mammographic appearance.    Ultrasound  8 o'clock 8 cm from the nipple solid mass 1.7 x 1.0 x 1.3 cm.   Ultrasound of the right axilla was performed with no abnormally enlarged lymph nodes.    BI-RADS 4c: Suspicious    2/9/24  [facility] BEATRIZ BREAST MRI  SHERRILL BUTLER   Posterior one third lower outer quadrant of the right  breast 8 o'clock, 8 cm from the nipple there is an irregular enhancing mass with an internal focal signal void. The mass measures 2.0 cm in anterior to posterior dimension, 1.8 cm in the superior-inferior dimension and 1.7 cm in the mediolateral dimension. This corresponds to the biopsy-proven malignancy with the centrally located bowtie shaped metallic clip. Extending anteriorly away from the anterior margin of the mass there is irregular linear  enhancement that measures on the order of 3.5 cm in anterior posterior.    There are linear areas of increased density seen mammographically in this region. This is suspicious for additional DCIS. The biopsy-proven malignancy and contiguous anterior linear enhancement  measure on the order of 4.7 cm in anterior to posterior dimension, 1.7 cm in the mediolateral dimension and 2.4 cm in the superior to inferior dimension. anterior one third of the right breast at the 12 o'clock position centered on the order of 3 cm from the nipple there is an irregular area  of non-mass enhancement that measures 1.0 cm in superior-inferior dimension, 1.1 cm in anterior to posterior dimension and  1.2 cm in the medial to lateral dimension. No mammographic correlate is appreciated.  No other areas of suspicious enhancement or morphology are seen in the  right breast. I see no evidence for abnormal skin, nipple or chest wall  enhancement of the right breast and there is no evidence for right  axillary or internal mammary chain adenopathy.  In the middle third of the left breast centered at the 11:30 position on the order of 3.7 cm from the nipple there is an irregular area of non-mass enhancement that measures on the order of 1.3 cm in anterior posterior dimension, 1.1 cm in the superior to inferior dimension and 1.0 cm in the mediolateral dimension. There is suspected architectural distortion. No mammographic correlate is appreciated.  No other areas of abnormal enhancement or morphology are seen within the left breast. No evidence for left axillary or internal mammary chain adenopathy.   IMPRESSION: Biopsy proven malignancy right. The entire mass and linear enhancement measure on the order of 4.7 cm. If breast conservation therapy is desired and biopsy of this region is desired, it is my opinion that this would be best performed under MRI guidance. 2. 1.2 cm irregular area o non mass enhancement in the right breast 12 o'clock position. No mammographic correlate is appreciated. Further evaluation with an MRI guided right breast biopsy should be considered. 3. 1.1 cm irregular area of non mass enhancement seen n middle third of the left breast at th 11:30 position. MRI guided left breast biopsy is recommended. BI-RADS 4 Suspicious abnormality. Biopsy should be considered.     RIGHT ultrasound [facility]-  Prevoiusly noted mass at 8:00, 8 CFN is no longer visible and a clip is not confidently identified.        Pathology:  01/31/2024, US Guided Breast Biopsy ([facility])  Right breast 8 o'clock 8 cm from the nipple. 10-gauge mammotome multiple tissue specimens bowtie shaped metallic clip was placed.    Post-biopsy mammography  demonstrates bowtie shaped metallic clip centrally within a mass in the posterior one third lower outer quadrant of the right breast. No evidence for clip migration.    The pathology is concordant.      01/31/2024, Surgical pathology report ([facility])    1.  Breast, right, 8:00, 8 cm from nipple, biopsy: Invasive ductal adenocarcinoma                -A.  The tumor is Jeovany grade III (tubular grade 3, nuclear grade 3, mitotic grade 3).               -B.  The tumor measures up to 9.3 mm on the slide               -C.  No definitive lymphovascular invasion or perineural invasion is identified               -D.  Ductal carcinoma in situ is not identified    ER Negative (less than 1%)  FL Negative (less than 1%)  HER2 positive (Score 3+) 95%  KI-67 70%      MRI guided biopsy x 3 with 2 being on the right and 1 being on the left dated February 26 2024 [facility]  Right breast 12:00, buckle shaped metallic clip placed.  Sclerosing adenosis, calcifications.  Right breast 8:00, infinity shaped clip placed.  High-grade duct carcinoma in situ, solid, cribriform, clinging, lobular extension, central comedonecrosis.  No invasive carcinoma.       Left breast biopsy was also done:   Left breast MRI guided biopsy 12:00, stoplight shaped metallic clip. 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  No clip migration.  Pathology is all concordant    Final Diagnosis   1.  Left breast, 12:00, MRI-guided biopsies for non-mass enhancement: INTERMEDIATE GRADE DUCTAL CARCINOMA IN SITU (DCIS) WITH APOCRINE FEATURES.               -Architectural patterns: Solid and cribriform with central necrosis and associated microcalcifications.               -DCIS involves sclerosing adenosis in multiple cores and measures up to 7 mm maximally.               -No evidence of invasion identified.     2.  Right breast, 12:00, MRI-guided biopsies for non-mass enhancement:                -Sclerosing adenosis with associated microcalcifications.               -No atypical hyperplasia, in situ nor invasive carcinoma identified.     3.  Right breast, 8:00, MRI-guided biopsies for linear enhancement: HIGH GRADE DUCTAL CARCINOMA IN SITU (DCIS).  -Architectural patterns: Solid, comedo and clinging with lobular extension, central comedonecrosis and associated microcalcifications.               -DCIS involves multiple tissue cores measuring up to 3 mm.               -Sclerosing adenosis and usual ductal hyperplasia with associated microcalcifications.               -No evidence of invasive carcinoma identified.     [facility]   Electronically signed by Shyann Hilario MD on 2/29/2024 at 0940   Synoptic Checklist   Breast Biomarker Reporting Template   Protocol posted: 12/13/2023BREAST BIOMARKER REPORTING TEMPLATE - 1  Test(s) Performed     Estrogen Receptor (ER) Status  Positive (greater than 10% of cells demonstrate nuclear positivity)   Percentage of Cells with Nuclear Positivity  %   Average Intensity of Staining  Strong   Test Type  Food and Drug Administration (FDA) cleared (test / vendor): ventana   Primary Antibody  SP1   Scoring System  Domi   Proportion Score  5   Intensity Score  3   Total Domi Score  8   Test(s) Performed     Progesterone Receptor (PgR) Status  Positive   Percentage of Cells with Nuclear Positivity  81-90%   Average Intensity of Staining  Strong   Test Type  Food and Drug Administration (FDA) cleared (test / vendor): ventana   Primary Antibody  1E2   Scoring System  Domi   Proportion Score  5   Intensity Score  3   Total Domi Score  8   Test(s) Performed  Ki-67   Ki-67 Percentage of Positive Nuclei  15 %   Primary Antibody  30-9   Cold Ischemia and Fixation Times  Meet requirements specified in latest version of the ASCO / CAP Guidelines   Cold Ischemia Time (minutes)  41 min   Fixation Time (hours)  9 hours   Testing Performed on Block Number(s)  1A    METHODS   Fixative  Formalin   Image Analysis  Not performed   .      Comment    Multiple representative slides from parts 1-3 of this case are reviewed internally with Dr. Avery, who concurs.  The patient has a previously diagnosed invasive ductal carcinoma of the right breast ([id number]).  Please refer to that biopsy for receptor studies in the right breast.       Patient has seen Dr Renteria and plan is for paclitaxel, herceptin, pertuzumab, weekly for 12 weeks, then complete herceptin for a year      Has seen Cardiooncology Dr Peguero      Labs:  09/01/2017, Genetics ([facility])  Genetic result: Negative, no clinically significant mutation identified.    Procedures:    Assessment:   Diagnosis Plan   1. Malignant neoplasm of lower-outer quadrant of right breast of female, estrogen receptor negative        2. Ductal carcinoma in situ (DCIS) of right breast        3. Abnormal MRI, breast        4. FH: breast cancer        5. History of prior cigarette smoking            1-4  RIGHT LOQ 8:00, 7 CFN- 2.5 cm on exam, 2 cm on MRI index lesion (see also below re: enhancement anteriorly over 3.5 cm concerning for DCIS, plus second lesion in the RIGHT breast and lesion seen in the LEFT breast). MRI total enhancement at known biopsy site 4.7cm. Dr Patel cannot rule out additional invasive disease (tumor board)., Bowtie marker  IMC, NST, high grade, 3,3,3, 9.3mm in core,   ER neg MS neg her 2 hemalatha 3+    Clinical stage possibly mT2N0- IIA      MRI abnormalities at time of cancer diagnosis:  1- RIGHT  8:00- anterior to index lesion 3.5cm enhancement- high grade DCIS, solid, comedo, clinging with lobular extension, central comedonecrosis, no invasive- infinity marker good position anterior to the bowtie invasive cancer  2- RIGHT 12:00 3 CFN- 1.2 cm enhancement- sclerosing adenosis with calcifications- buckle marker (the most anterior marker)  3- LEFT middle third, 11:30 4 CFN- 1.3 cm enhancement- intermediate grade DCIS, solid and  cribiform, central necrosis, 7mm- stoplight marker, only marker in the breast    Dr Renteria- jim (paclitaxel), perjeta, herceptin started 3-18-24 through 12th cycle 6-17-24    MRi 5-11-24- resolution all enhancement bilateral  Ultrasound 6-14-24- right breast negative for mass      3-4  LEFT breast DCIS, 11:30, 4 CFN, see above    5-  2 of 7 sisters with breast cancer, ages 35,45 approximate  My Risk genetic testing 9-1-17 negative for mutation    6-  Smoked 1/3 ppd or less from age teens until cancer diagnosis      Plan:  The patient goes by Felicia  She was here today with her  Ryan. I met her Daughter in Law Jocelyn at her initial consultation.      We reviewed her interval history, exam, imaging, treatment with chemotherapy and surgical treatment options together today.     With  bilateral cancers and her FH she is considering bilateral mastectomy versus no surgery at all.    Right now she is tired and exhausted. She would like to take a break from any treatment. She has a trip scheduled with her  to the [facility] for the last week of July, back the first week of August of so.    We discussed that imaging is not 100% concordant with pathology and that it is currently not standard of care to not perform surgery after neoadjuvant even in the face of an imaging complete response.    We discussed the procedure of a Bilateral total mastectomy, bilateral sentinel node biopsy, possible axillary node dissection, no reconstruction.    Discussed risks of bleeding, infection, skin loss, lymphedema.    We would leave her port at the time of surgery for her year of herceptin..    Would do frozen section on the RIGHT, not on the LEFT since LEFT was initially only DCIS.  She has been offered plastic surgery consultation but declined this.      I have asked her to call us by the end of July (just before her trip) to let ms know if she wishes to proceed with surgery.  If she does not, I will plan for a MRI in  October and to see her back after.    If she does choose surgery, I will need to call in her Rx and her EMLA and arrange for bioimpedence.  If she does choose surgery, I requested 8-12 week. She understands that this is beyond the timeframe that we usually schedule this, but her trip is important to her as is the time to consider surgery vs none.  .  Abeba Manrique MD      Today I spent 60 minutes doing the following: Reviewing records, labs, outside imaging and reports in preparation for the patient visit; obtaining medical history; performing the physical exam; counseling and educating the patient and any available family or caregivers; ordering medications, tests or procedures; coordinating care with any other physicians on her care team as needed, and documenting all of the above in the medical record as well as sending communications with her other healthcare professionals.      Next Appointment:  Return for to be determined- surgery versus MRI and clinic visit.      EMR Dragon/transcription disclaimer:    Please note that portions of this note were completed with a voice recognition program.

## 2024-08-15 NOTE — ANESTHESIA PREPROCEDURE EVALUATION
Anesthesia Evaluation     Patient summary reviewed and Nursing notes reviewed   NPO Solid Status: > 8 hours  NPO Liquid Status: > 4 hours           Airway   Mallampati: II  Neck ROM: full  No difficulty expected  Dental    (+) lower dentures and upper dentures    Pulmonary     breath sounds clear to auscultation  (+) a smoker Former,  Cardiovascular     Rhythm: regular    (+) hyperlipidemia      Neuro/Psych  (+) psychiatric history Anxiety  GI/Hepatic/Renal/Endo    (+) diabetes mellitus    Musculoskeletal     Abdominal    Substance History      OB/GYN          Other      history of cancer                Anesthesia Plan    ASA 2     general     intravenous induction     Anesthetic plan, risks, benefits, and alternatives have been provided, discussed and informed consent has been obtained with: patient.    CODE STATUS:

## 2024-08-15 NOTE — PLAN OF CARE
Goal Outcome Evaluation:  B/P elevated 153/93, down to 133/73 after pain med, medicated for pain x 1 with good relief, sleeping between care, guaze & ace wrap dressing to chest, dry & intact, TASNEEM x 2 draining bloody drainage, demonstrated drain care to  & daughter, supplies at bedside, F/C to BSD draining clear yellow urine in good amount, no c/o nausea, instructed in IS, up to 2000, family visited, &  at bedside  Plan of Care Reviewed With: patient, spouse, family

## 2024-08-15 NOTE — OP NOTE
Operative note    Preoperative Diagnosis: RIGHT Breast cancer and  LEFT DCIS, post neoadjuvant      Postoperative Diagnosis: Breast cancer and DCIS    Procedure Performed: bilateral  mastectomy and bilateral DEEP axillary sentinel node biopsy with lymphoscintographic guidance. No reconstruction.    Dictating physician and surgeon: Abeba Manrique MD    First asst: Abiola Johnson      was responsible for performing the following activities: Retraction, Suction, Irrigation, Suturing, Closing, and Placing Dressing and their skilled assistance was necessary for the success of this case.       EBL:60cc    [place] Node Biopsy for Breast Cancer - Right  Operation performed with curative intent. Yes   Tracer(s) used to identify sentinel nodes in the upfront surgery (non-neoadjuvant) setting (select all that apply). N/A   Tracer(s) used to identify sentinel nodes in the neoadjuvant setting (select all that apply). Radioactive tracer   All nodes (colored or non-colored) present at the end of a dye-filled lymphatic channel were removed. N/A   All significantly radioactive nodes were removed. Yes   All palpably suspicious nodes were removed. N/A   Biopsy-proven positive nodes marked with clips prior to chemotherapy were identified and removed. N/A     [place] Node Biopsy for Breast Cancer - Left  Operation performed with curative intent. Yes   Tracer(s) used to identify sentinel nodes in the upfront surgery (non-neoadjuvant) setting (select all that apply). N/A   Tracer(s) used to identify sentinel nodes in the neoadjuvant setting (select all that apply). Radioactive tracer   All nodes (colored or non-colored) present at the end of a dye-filled lymphatic channel were removed. N/A   All significantly radioactive nodes were removed. Yes   All palpably suspicious nodes were removed. N/A   Biopsy-proven positive nodes marked with clips prior to chemotherapy were identified and removed. N/A               FINDINGS AND DESCRIPTION OF  PROCEDURE:   The order of the case was RIGHT total mastectomy, RIGHT sentinel node biopsy, sent for frozen and benign, LEFT total mastectomy, LEFT sentinel node biopsy for permanent section, then closure.      The patient was brought to the operating room and placed on the table in the supine position. After adequate general ETT anesthesia was obtained, we sterilely prepped and draped the breast and axilla. We did a time out to identify correct patient and correct operative site.  She did receive IV antibiotic within an hour of and prior to incision.       For each mastectomy, I performed the following procedure:  I tumesced the mastectomy flaps using 180 cc of 1:500,000 epinephrine in normal saline.  I tumesced superiorly to the clavicle, inferiorly to the inframammary fold, medially to the sternum and laterally to the anterior axillary line.  I then incised a generous ellipse of skin surrounding the nipple areolar complex using a 10 blade. I then sharply dissected using a 10 blade and a long curved Hoyt scissors superiorly to the clavicle, inferiorly to the inframammary fold, medially to the sternum and laterally to the anterior axillary line. I then scored the perimeter of the specimen, the pectoral fascia, circumferentially. I then lifted the pectoral fascia off of the pectoralis major, as the posterior margin of the specimen.     I then labelled the specimen stitch marks 12 o'clock and passed it from the field.    For each sentinel node biopsy, I did the following:. I used the Neoprobe at the start of the case to ensure that the radiotracer had migrated to the axilla, which it had.  I used Bovie electrocautery for dissection and the gamma probe for guidance, to remove the lymph nodes demonstrating radiopharmaceutical uptake.     There were 2 RIGHT (43 and 500) and one LEFT (456)  sentinel nodes removed. All were grossly normal. These were sent for frozen section pathology on the RIGHT only and were found to be  benign. The LEFT were sent for permanent.    I then placed a 19 Mamadou drain and secured this using a 2-0 nylon suture on each side.    I then irrigated, assured hemostasis, sprayed 5cc thrombin on the flaps on each side,  and closed the skin in 2 layers: the first layer an interrupted 3-0 Vicryl suture layer and the second layer a running 4-0 Monocryl suture layer.       I then applied lengthwise 1/2 inch Steri-Strips, an island dressing.    Then I wrapped her in 4x4s and a 6-inch ACE wrap.    She tolerated the procedure well, there were no immediate complications, and all counts were correct at the end of the case.

## 2024-08-16 ENCOUNTER — TELEPHONE (OUTPATIENT)
Dept: SURGERY | Facility: CLINIC | Age: 69
End: 2024-08-16
Payer: COMMERCIAL

## 2024-08-16 VITALS
HEIGHT: 58 IN | WEIGHT: 119.4 LBS | OXYGEN SATURATION: 97 % | HEART RATE: 76 BPM | SYSTOLIC BLOOD PRESSURE: 116 MMHG | RESPIRATION RATE: 16 BRPM | TEMPERATURE: 97.4 F | BODY MASS INDEX: 25.06 KG/M2 | DIASTOLIC BLOOD PRESSURE: 66 MMHG

## 2024-08-16 DIAGNOSIS — C50.511 MALIGNANT NEOPLASM OF LOWER-OUTER QUADRANT OF RIGHT FEMALE BREAST, UNSPECIFIED ESTROGEN RECEPTOR STATUS: Primary | ICD-10-CM

## 2024-08-16 LAB
CYTO UR: NORMAL
LAB AP CASE REPORT: NORMAL
LAB AP CLINICAL INFORMATION: NORMAL
LAB AP DIAGNOSIS COMMENT: NORMAL
LAB AP SYNOPTIC CHECKLIST: NORMAL
Lab: NORMAL
PATH REPORT.FINAL DX SPEC: NORMAL
PATH REPORT.GROSS SPEC: NORMAL

## 2024-08-16 PROCEDURE — 99024 POSTOP FOLLOW-UP VISIT: CPT | Performed by: SURGERY

## 2024-08-16 RX ORDER — HYDROCODONE BITARTRATE AND ACETAMINOPHEN 5; 325 MG/1; MG/1
1-2 TABLET ORAL
Qty: 20 TABLET | Refills: 0 | Status: SHIPPED | OUTPATIENT
Start: 2024-08-16

## 2024-08-16 RX ORDER — ONDANSETRON 4 MG/1
4 TABLET, FILM COATED ORAL EVERY 8 HOURS PRN
Qty: 10 TABLET | Refills: 1 | Status: SHIPPED | OUTPATIENT
Start: 2024-08-16

## 2024-08-16 RX ORDER — POLYETHYLENE GLYCOL 3350 17 G/17G
17 POWDER, FOR SOLUTION ORAL DAILY
Qty: 238 G | Refills: 0 | Status: SHIPPED | OUTPATIENT
Start: 2024-08-16

## 2024-08-16 RX ADMIN — BISOPROLOL FUMARATE 5 MG: 5 TABLET, FILM COATED ORAL at 09:01

## 2024-08-16 RX ADMIN — GABAPENTIN 300 MG: 300 CAPSULE ORAL at 08:58

## 2024-08-16 RX ADMIN — HYDROCODONE BITARTRATE AND ACETAMINOPHEN 1 TABLET: 5; 325 TABLET ORAL at 00:21

## 2024-08-16 RX ADMIN — HYDROCODONE BITARTRATE AND ACETAMINOPHEN 2 TABLET: 5; 325 TABLET ORAL at 09:00

## 2024-08-16 RX ADMIN — POTASSIUM CHLORIDE, DEXTROSE MONOHYDRATE AND SODIUM CHLORIDE 75 ML/HR: 150; 5; 450 INJECTION, SOLUTION INTRAVENOUS at 04:33

## 2024-08-16 RX ADMIN — PANTOPRAZOLE SODIUM 40 MG: 40 TABLET, DELAYED RELEASE ORAL at 05:27

## 2024-08-16 NOTE — TELEPHONE ENCOUNTER
Pathology from bilateral total mastectomy, bilateral sentinel node biopsy, no reconstruction, post neoadjuvant, returned as :  Right mastectomy: Single focus of high-grade ductal carcinoma in situ, solid with focal necrosis, 2 mm.  Found in a 20 mm fibrous tumor bed.  Margins are widely clear.  Pathologic stage ypTis N0 pathologic complete response.  On the right side 0 of 4 lymph nodes.  Left total mastectomy showed fat necrosis, biopsy site changes and fibrocystic changes.  Left sentinel node 0 of 1 involved with tumor.      She will not need to see radiation oncology. Initial tumor T2N0  I called and let her know    Final Diagnosis   1.  [place] lymph node #1, right axilla, excision:               -1 lymph node, negative for metastatic carcinoma (0/1).     2.  [place] lymph node #2, right axilla, excision:               -3 lymph nodes, negative for metastatic carcinoma (0/3).     3.  Right breast, oriented simple skin sparing mastectomy status post neoadjuvant therapy (907 g): SINGLE FOCUS OF HIGH GRADE DUCTAL CARCINOMA IN SITU (DCIS).               -Architectural patterns: Solid with focal necrosis and associated microcalcifications.               -Extent of DCIS: 2.0 mm focus in a single tissue block.               -20 mm fibrous tumor bed with sclerosing adenosis, microcysts and microcalcifications.               -Unremarkable skin and nipple.               -All margins widely free of DCIS (see synoptic template).               -No evidence of residual invasive carcinoma identified.               -Pathologic stage: ypTis, N(sn)0, pCR (see Comment).     4.  Left breast, oriented simple skin sparing mastectomy status post neoadjuvant therapy (850 g):               -Fat necrosis, biopsy site changes, sclerosing adenosis and changes consistent with treatment effect.               -Microcalcifications present associated with benign ducts and sclerosing adenosis.               -Unremarkable skin and nipple.                -No evidence of residual in situ nor invasive carcinoma identified.                    5.  [place] lymph node #1, left axilla, excision:               -1 lymph node, negative for metastatic carcinoma (0/1).     SWM   Electronically signed by Shyann Hilario MD on 8/16/2024 at 1049   Synoptic Checklist   INVASIVE CARCINOMA OF THE BREAST: Resection   8th Edition - Protocol posted: 6/19/2024INVASIVE CARCINOMA OF THE BREAST: RESECTION - 1, 2, 3  SPECIMEN   Procedure  Total mastectomy   Specimen Laterality  Right   TUMOR   Tumor Site  Lower outer quadrant   Histologic Type  No residual invasive carcinoma   Tumor Size  No residual invasive carcinoma   Ductal Carcinoma In Situ (DCIS)  Present     Only DCIS is present after presurgical (neoadjuvant) therapy   Size (Extent) of DCIS  Estimated size (extent) of DCIS is at least (Millimeters): 2 mm   Architectural Patterns  Solid   Nuclear Grade  Grade III (high)   Necrosis  Present, focal (small foci or single cell necrosis)   Lobular Carcinoma In Situ (LCIS)  Not identified   Lymphatic and / or Vascular Invasion  Not identified   Microcalcifications  Present in DCIS     Present in non-neoplastic tissue   Treatment Effect in the Breast  No residual invasive carcinoma is present in the breast after presurgical therapy   Treatment Effect in the Lymph Nodes  No lymph node metastases and no fibrous scarring or histiocytic aggregates in the nodes   Residual Cancer [place] (RCB) Parameters     Residual Cancer [place] Class  RCB-0 (pCR)   MARGINS   Margin Status for DCIS  All margins negative for DCIS   Distance from DCIS to Closest Margin  20 mm   Closest Margin(s) to DCIS  Anterior   Distance from DCIS to Anterior Margin  20 mm   Distance from DCIS to Posterior Margin  40 mm   Distance from DCIS to Superior Margin  180 mm   Distance from DCIS to Inferior Margin  20 mm   Distance from DCIS to Medial Margin  80 mm   Distance from DCIS to Lateral Margin  110 mm   REGIONAL  LYMPH NODES   Regional Lymph Node Status  All regional lymph nodes negative for tumor   Total Number of Lymph Nodes Examined (sentinel and non-sentinel)  4   Number of [place] Nodes Examined  4   pTNM CLASSIFICATION (AJCC 8th Edition)   Reporting of pT, pN, and (when applicable) pM categories is based on information available to the pathologist at the time the report is issued. As per the AJCC (Chapter 1, 8th Ed.) it is the managing physician's responsibility to establish the final pathologic stage based upon all pertinent information, including but potentially not limited to this pathology report.   Modified Classification  y   pT Category  pTis (DCIS)   pN Category  pN0   N Suffix  (sn)   SPECIAL STUDIES        Estrogen Receptor (ER) Status  Negative        Progesterone Receptor (PgR) Status  Negative        HER2 (by immunohistochemistry)  Positive (Score 3+)   Percentage of Cells with Uniform Intense Complete Membrane Staining  95 %        Ki-67 Percentage of Positive Nuclei  70 %   Testing Performed on Case Number  [id number]   .

## 2024-08-16 NOTE — PLAN OF CARE
Goal Outcome Evaluation:  Plan of Care Reviewed With: patient        Progress: improving  Outcome Evaluation: VSS. Lortab given for pain.  chest dressing CDI. TASNEEM x 2 with small output.  voiding freely.  Ambulated int he room.

## 2024-08-16 NOTE — PROGRESS NOTES
INPATIENT ROUNDING NOTE    Postoperative day: 1    Overnight events:     Patient has done well overnight.    no nausea  She reports that her pain is responding favorably to the prescribed meds.  She has voided since her catheter has been removed.  She has ambulated.    Exam:  Temp:  [96.9 °F (36.1 °C)-98.2 °F (36.8 °C)] 97.4 °F (36.3 °C)  Heart Rate:  [74-91] 76  Resp:  [14-18] 16  BP: (100-178)/() 116/66     UOP > 2 L. Has voided since catheter removed    On examination, her incisions are dressed and dressings clean dry and intact.  Her mastectomy flaps are well perfused with minimal ecchymoses.   There are no undrained fluid collections.      Assessment and plan:    Breast cancer, postoperative day 1.  Doing well.  HLIV.  Likely home today after drain teaching and breakfast and/or lunch.    She has an appointment with me in the office already scheduled for her postop visit.

## 2024-08-16 NOTE — PROGRESS NOTES
Case Management Discharge Note      Final Note: Discharged home. Shyann Flores RN         Selected Continued Care - Discharged on 8/16/2024 Admission date: 8/15/2024 - Discharge disposition: Home or Self Care         Transportation Services  Private: Car    Final Discharge Disposition Code: 01 - home or self-care

## 2024-08-21 ENCOUNTER — TELEPHONE (OUTPATIENT)
Dept: SURGERY | Facility: CLINIC | Age: 69
End: 2024-08-21
Payer: COMMERCIAL

## 2024-08-21 NOTE — TELEPHONE ENCOUNTER
J.P. DRAIN TOTALS   OR 8/15/24  POST OP SCHEDULED MON 8/26/24      RIGHT #1 LEFT#2  Sun 8/18 70 cc  68 cc  Mon 8/19 28 cc  33 cc- reports only emptied twice this day.   Tues 8/20 51 cc  20 cc    Wed 8/21  23 cc  68 cc  Thur 8/22 23 cc  43 cc

## 2024-08-23 NOTE — PROGRESS NOTES
Chief Complaint: Felicia Boyle is a 68 y.o. female who was seen in consultation at the request of Tristan Malone DO  for newly diagnosed breast cancer and a postoperative visit    History of Present Illness:  Patient presents with newly diagnosed breast cancer.  She noted a new RIGHT breast mass lower outer in 2023. She noted no other new masses, skin changes, nipple discharge, nipple changes prior to her most recent imaging.    Her most recent imaging includes the followin2019, MMG Screening [place] Bilateral ([facility])  Scattered fibroglandular density.  BI-RADS 0    2019, [facility] Diagnostic Right ([facility])  Resolution of the area of focal asymmetry.  BI-RADS 1: Negative    2024, MMG Diagnostic Digital [place] Bilateral, US Breast Right Limited ([facility])  INDICATION: Right breast palpable lump the patient first identified at  the end of December 3 to 4 weeks ago. The patient reports 2 sisters  with a history of breast cancer, diagnosed age 38 and 48.     The breasts are heterogeneously dense   Right breast: There is a triangular skin marker area of palpable concern, 8 cm from the nipple. The marker overlies an oval high density mass margins measuring 1.7  x 1.4 x 1.6 cm. Lymph nodes included in the right axilla have a normal mammographic appearance.    Ultrasound  8 o'clock 8 cm from the nipple solid mass 1.7 x 1.0 x 1.3 cm.   Ultrasound of the right axilla was performed with no abnormally enlarged lymph nodes.    BI-RADS 4c: Suspicious        She had a biopsy on the following day that showed:   2024, US Guided Breast Biopsy ([facility])  Right breast 8 o'clock 8 cm from the nipple. 10-gauge mammotome multiple tissue specimens bowtie shaped metallic clip was placed.    Post-biopsy mammography demonstrates bowtie shaped metallic clip centrally within a mass in the posterior one third lower outer quadrant of the right breast. No evidence for clip migration.    The pathology is concordant.      2024,  Surgical pathology report ([facility])    1.  Breast, right, 8:00, 8 cm from nipple, biopsy: Invasive ductal adenocarcinoma                -A.  The tumor is Dow grade III (tubular grade 3, nuclear grade 3, mitotic grade 3).               -B.  The tumor measures up to 9.3 mm on the slide               -C.  No definitive lymphovascular invasion or perineural invasion is identified               -D.  Ductal carcinoma in situ is not identified    ER Negative (less than 1%)  VT Negative (less than 1%)  HER2 positive (Score 3+) 95%  KI-67 70%     I then arranged for a MRi:  2/9/24  [facility] BEATRIZ BREAST MRI  SHERRILL BUTLER   Posterior one third lower outer quadrant of the right  breast 8 o'clock, 8 cm from the nipple there is an irregular enhancing mass with an internal focal signal void. The mass measures 2.0 cm in anterior to posterior dimension, 1.8 cm in the superior-inferior dimension and 1.7 cm in the mediolateral dimension. This corresponds to the biopsy-proven malignancy with the centrally located bowtie shaped metallic clip. Extending anteriorly away from the anterior margin of the mass there is irregular linear  enhancement that measures on the order of 3.5 cm in anterior posterior.    There are linear areas of increased density seen mammographically in this region. This is suspicious for additional DCIS. The biopsy-proven malignancy and contiguous anterior linear enhancement  measure on the order of 4.7 cm in anterior to posterior dimension, 1.7 cm in the mediolateral dimension and 2.4 cm in the superior to inferior dimension. anterior one third of the right breast at the 12 o'clock position centered on the order of 3 cm from the nipple there is an irregular area  of non-mass enhancement that measures 1.0 cm in superior-inferior dimension, 1.1 cm in anterior to posterior dimension and 1.2 cm in the medial to lateral dimension. No mammographic correlate is appreciated.  No other areas of suspicious enhancement or  morphology are seen in the  right breast. I see no evidence for abnormal skin, nipple or chest wall  enhancement of the right breast and there is no evidence for right  axillary or internal mammary chain adenopathy.  In the middle third of the left breast centered at the 11:30 position on the order of 3.7 cm from the nipple there is an irregular area of non-mass enhancement that measures on the order of 1.3 cm in anterior posterior dimension, 1.1 cm in the superior to inferior dimension and 1.0 cm in the mediolateral dimension. There is suspected architectural distortion. No mammographic correlate is appreciated.  No other areas of abnormal enhancement or morphology are seen within the left breast. No evidence for left axillary or internal mammary chain adenopathy.   IMPRESSION: Biopsy proven malignancy right. The entire mass and linear enhancement measure on the order of 4.7 cm. If breast conservation therapy is desired and biopsy of this region is desired, it is my opinion that this would be best performed under MRI guidance. 2. 1.2 cm irregular area o non mass enhancement in the right breast 12 o'clock position. No mammographic correlate is appreciated. Further evaluation with an MRI guided right breast biopsy should be considered. 3. 1.1 cm irregular area of non mass enhancement seen n middle third of the left breast at th 11:30 position. MRI guided left breast biopsy is recommended. BI-RADS 4 Suspicious abnormality. Biopsy should be considered.       She has not had a breast biopsy in the past.  She has her uterus and ovaries, is postmenopausal, and takes no hormones.    Her family history includes the following:   She has 2 of 7 sisters with breast cancer in their 30s-40s.  Dr Perry sent testing years ago, as follows    09/01/2017, Genetics ([facility])  Genetic result: Negative, no clinically significant mutation identified.    Based on her imaging, we arranged for a bilateral (2X RIGHT and 1 X LEFT) MRI guided  biopsies.      MRI guided biopsy x 3 with 2 being on the right and 1 being on the left dated 2024 [facility]  Right breast 12:00, buckle shaped metallic clip placed.  Sclerosing adenosis, calcifications.  Right breast 8:00, infinity shaped clip placed.  High-grade duct carcinoma in situ, solid, cribriform, clinging, lobular extension, central comedonecrosis.  No invasive carcinoma.       Left breast biopsy was also done:   Left breast MRI guided biopsy 12:00, stoplight shaped metallic clip. 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  No clip migration.  Pathology is all concordant    She reports significant bruising LEFT breast.    In the interim, Normal took abraxane (paclitaxel), herceptin perjeta for 12 cycles, last being 2024.  Her port has functioned well.    Her interval imaging has shown the followin24 MRI-   Showed interval resolution of all overtly suspicious enhancement on both sides    24  RIGHT ultrasound [facility]-  Prevoiusly noted mass at 8:00, 8 CFN is no longer visible and a clip is not confidently identified.      She could no longer feel the mass after the 3rd cycle.    She denies headache, bone pain, belly pain, cough , changes in vision or gait.  Interval History:  Pathology from bilateral total mastectomy, bilateral sentinel node biopsy, no reconstruction, post neoadjuvant, returned as :  Right mastectomy: Single focus of high-grade ductal carcinoma in situ, solid with focal necrosis, 2 mm.  Found in a 20 mm fibrous tumor bed.  Margins are widely clear.  Pathologic stage ypTis N0 pathologic complete response.  On the right side 0 of 4 lymph nodes.  Left total mastectomy showed fat necrosis, biopsy site changes and fibrocystic changes.  Left sentinel node 0 of 1 involved with tumor.        She will not need to see radiation oncology. Initial tumor T2N0  Drains have had < 30 cc each for  "3 days  Denies redness, warmth, drainage from incisions..      Review of Systems:  Review of Systems   Constitutional:  Negative for unexpected weight change (1 lb wt loss).   Eyes:  Eye problems: cataract surgery 3 weeks ago..   All other systems reviewed and are negative.       Past Medical and Surgical History:  Breast Biopsy History:  Patient had not had a breast biopsy prior to her cancer diagnosis.  Breast Cancer HIstory:  Patient does not have a past medical history of breast cancer.  Breast Operations, and year:  None   Obstetric/Gynecologic History:  Age menstrual periods began: 16  Patient is postmenopausal, entered menopause naturally at age: 49   Number of pregnancies:4  Number of live births: 3  Number of abortions or miscarriages: 1  Age of delivery of first child: 18  Patient breast fed, for the following lenth of time: 3 mons total  Length of time taking birth control pills: none   Patient has never taken hormone replacement    Patient has both ovaries and uterus.   Past Surgical History:   Procedure Laterality Date    BREAST BIOPSY Left     Excisional biopsy in the [facility]    BREAST BIOPSY  03/2024    CATARACT EXTRACTION W/ INTRAOCULAR LENS IMPLANT Bilateral     CHOLECYSTECTOMY  2012    COLON RESECTION Right 2002    COLONOSCOPY N/A 04/28/2021    Procedure: COLONOSCOPY TO ANASTAMOSIS/TI;  Surgeon: Angelo Gonsalez MD;  Location: [facility] ENDOSCOPY;  Service: Gastroenterology;  Laterality: N/A;  PRE: SCREENING  POST: NORMAL POST-OP ANATOMY    ENDOSCOPY      2013    ENDOSCOPY N/A 03/10/2017    Procedure: ESOPHAGOGASTRODUODENOSCOPY WITH BIOPSY AND PETER DILATATION 54\";  Surgeon: Angelo Gonsalez MD;  Location: [facility] ENDOSCOPY;  Service:     ENDOSCOPY N/A 04/28/2021    Procedure: ESOPHAGOGASTRODUODENOSCOPY WITH BIOPSIES, 54FR PETER DILATATION;  Surgeon: Angelo Gonsalez MD;  Location: [facility] ENDOSCOPY;  Service: Gastroenterology;  Laterality: N/A;  PRE: DYSPHAGIA  POST: GASTRITIS, ESOPHAGEAL RING "    MASTECTOMY W/ SENTINEL NODE BIOPSY Bilateral 8/15/2024    Procedure: Bilateral total mastectomy, bilateral sentinel node biopsy, possible axillary node dissection, no reconstruction.;  Surgeon: Abeba Manrique MD;  Location: [facility];  Service: General;  Laterality: Bilateral;    VENOUS ACCESS DEVICE (PORT) INSERTION Left 3/14/2024    Procedure: INSERTION OF PORTACATH;  Surgeon: Abeba Manrique MD;  Location: [facility];  Service: General;  Laterality: Left;     Past Medical History:   Diagnosis Date    Anxiety about health     Breast cancer of lower-outer quadrant of right female breast 2024    DCIS (ductal carcinoma in situ) of breast 03/15/2024    Diverticulosis     History of chemotherapy 2024    Mixed hyperlipidemia 2019    DIET CONTROLLED    Osteoporosis     Port-A-Cath in place     Type 2 diabetes mellitus        Prior Hospitalizations, other than for surgery or childbirth, and year:  None     Social History     Socioeconomic History    Marital status:      Spouse name: Ryan   Tobacco Use    Smoking status: Former     Current packs/day: 0.00     Average packs/day: 0.3 packs/day for 15.0 years (3.8 ttl pk-yrs)     Types: Cigarettes     Quit date: 1/15/2024     Years since quittin.6     Passive exposure: Past    Smokeless tobacco: Never    Tobacco comments:     N/A   Vaping Use    Vaping status: Never Used   Substance and Sexual Activity    Alcohol use: Yes     Comment: RARE    Drug use: No    Sexual activity: Not Currently     Partners: Male     Birth control/protection: Tubal ligation     Patient is .  Patient is retired.  Patient drinks 0-1 servings of caffeine per day.    Family History:  Family History   Problem Relation Age of Onset    Hypertension Mother     Diabetes Mother     Hypertension Father     Breast cancer Sister 48    Breast cancer Sister 38    No Known Problems Brother     No Known Problems Daughter     No Known Problems Son     No  "Known Problems Maternal Grandmother     No Known Problems Paternal Grandmother     No Known Problems Maternal Grandfather     No Known Problems Paternal Grandfather     Autism Grandson     Colon cancer Neg Hx     Rectal cancer Neg Hx     Endometrial cancer Neg Hx     Vaginal cancer Neg Hx     Cervical cancer Neg Hx     Ovarian cancer Neg Hx     Prostate cancer Neg Hx     Uterine cancer Neg Hx     Malig Hyperthermia Neg Hx        Vital Signs:  /82 (BP Location: Left arm, Patient Position: Sitting, Cuff Size: Adult)   Pulse 82   Ht 147.3 cm (57.99\")   Wt 52.2 kg (115 lb)   LMP  (LMP Unknown)   SpO2 98%   BMI 24.04 kg/m²      Medications:    Current Outpatient Medications:     HYDROcodone-acetaminophen (NORCO) 5-325 MG per tablet, Take 1-2 tablets by mouth Every 4 (Four) to 6 (Six) Hours As Needed for pain.  Wean to tylenol., Disp: 20 tablet, Rfl: 0    nystatin (MYCOSTATIN) 403511 UNIT/GM powder, Apply  topically to the appropriate area as directed 3 (Three) Times a Day., Disp: 60 g, Rfl: 0    ondansetron (ZOFRAN) 4 MG tablet, Take 1 tablet by mouth Every 8 (Eight) Hours As Needed for Nausea or Vomiting. May take 2 tablets if 4mg dose is not effective., Disp: 10 tablet, Rfl: 1    pantoprazole (PROTONIX) 40 MG EC tablet, Take 1 tablet by mouth Daily., Disp: 30 tablet, Rfl: 5    polyethylene glycol (MiraLax) 17 GM/SCOOP powder, Dissolve 1 capful (17 g) in 8oz of water and take by mouth daily surrounding surgery and while on narcotic., Disp: 238 g, Rfl: 0    triamcinolone (KENALOG) 0.1 % ointment, Apply 1 Application topically to the appropriate area as directed 2 (Two) Times a Day., Disp: 30 g, Rfl: 0    bisoprolol (ZEBeta) 5 MG tablet, TAKE 1 TABLET BY MOUTH DAILY (Patient not taking: Reported on 8/8/2024), Disp: 90 tablet, Rfl: 2    Blood Glucose Monitoring Suppl (FreeStyle Lite) w/Device kit, 1 Units Daily. (Patient not taking: Reported on 8/12/2024), Disp: 1 kit, Rfl: 0    gabapentin (NEURONTIN) 300 MG " "capsule, Take 1 capsule by mouth 2 (Two) Times a Day. (Patient not taking: Reported on 8/8/2024), Disp: 60 capsule, Rfl: 1    glucose blood (FREESTYLE LITE) test strip, TEST ONCE DAILY (Patient not taking: Reported on 8/12/2024), Disp: 100 each, Rfl: 2    HYDROcodone-acetaminophen (NORCO) 5-325 MG per tablet, 1-2 tabs po q 4-6hr prn pain, wean to tylenol (Patient not taking: Reported on 6/20/2024), Disp: 15 tablet, Rfl: 0    Lancets (freestyle) lancets, Use TO CHECK BLOOD SUGAR ONCE DAILY (Patient not taking: Reported on 8/26/2024), Disp: 100 each, Rfl: 2    lidocaine-prilocaine (EMLA) 2.5-2.5 % cream, Apply 1 Application topically to the appropriate area as directed Take As Directed. Apply nickel size amount to port site 30 min before appt time do not rub in cover with plastic wrap (Patient not taking: Reported on 8/26/2024), Disp: 30 g, Rfl: 5    prednisoLONE acetate (PRED FORTE) 1 % ophthalmic suspension, Administer 1 drop to the right eye 4 (Four) Times a Day. (Patient not taking: Reported on 8/26/2024), Disp: , Rfl:      Allergies:  Allergies   Allergen Reactions    Paclitaxel Shortness Of Breath    Metronidazole Nausea And Vomiting       Physical Examination:  /82 (BP Location: Left arm, Patient Position: Sitting, Cuff Size: Adult)   Pulse 82   Ht 147.3 cm (57.99\")   Wt 52.2 kg (115 lb)   LMP  (LMP Unknown)   SpO2 98%   BMI 24.04 kg/m²   General Appearance:  Patient is in no distress.  She is well kept and has an average build.   Psychiatric:  Patient with appropriate mood and affect. Alert and oriented to self, time, and place.    Breast, RIGHT: surgically absent with well healing transverse incision with no erythema, warmth, drainage. Drain with scant serous drainage. No undrained collections.    Breast, LEFT:  surgically absent with well healing transverse incision with no erythema, warmth, drainage. Drain with scant serous drainage. No undrained collections.    Lymphatic:  There is no axillary, " cervical, infraclavicular, or supraclavicular adenopathy bilaterally.  Eyes:  Pupils are round and reactive to light.  Cardiovascular:  Heart rate and rhythm are regular.  Respiratory:  Lungs are clear bilaterally with no crackles or wheezes in any lung field.  Gastrointestinal:  Abdomen is soft, nondistended, and nontender.     Musculoskeletal:  Good strength in all 4 extremities.   There is good range of motion in both shoulders.    Skin:  No new skin lesions or rashes on the skin excluding the breast (see breast exam above).        Imagin2019, MMG Screening [place] Bilateral ([facility])  Scattered fibroglandular density.  BI-RADS 0    2019, MMG Diagnostic Right ([facility])  Resolution of the area of focal asymmetry.  BI-RADS 1: Negative    2024, MMG Diagnostic Digital [place] Bilateral, US Breast Right Limited ([facility])  INDICATION: Right breast palpable lump the patient first identified at  the end of December 3 to 4 weeks ago. The patient reports 2 sisters  with a history of breast cancer, diagnosed age 38 and 48.     The breasts are heterogeneously dense   Right breast: There is a triangular skin marker area of palpable concern, 8 cm from the nipple. The marker overlies an oval high density mass margins measuring 1.7  x 1.4 x 1.6 cm. Lymph nodes included in the right axilla have a normal mammographic appearance.    Ultrasound  8 o'clock 8 cm from the nipple solid mass 1.7 x 1.0 x 1.3 cm.   Ultrasound of the right axilla was performed with no abnormally enlarged lymph nodes.    BI-RADS 4c: Suspicious    24  [facility] BEATRIZ BREAST MRI  SHERRILL BUTLER   Posterior one third lower outer quadrant of the right  breast 8 o'clock, 8 cm from the nipple there is an irregular enhancing mass with an internal focal signal void. The mass measures 2.0 cm in anterior to posterior dimension, 1.8 cm in the superior-inferior dimension and 1.7 cm in the mediolateral dimension. This corresponds to the biopsy-proven malignancy with the  centrally located bowtie shaped metallic clip. Extending anteriorly away from the anterior margin of the mass there is irregular linear  enhancement that measures on the order of 3.5 cm in anterior posterior.    There are linear areas of increased density seen mammographically in this region. This is suspicious for additional DCIS. The biopsy-proven malignancy and contiguous anterior linear enhancement  measure on the order of 4.7 cm in anterior to posterior dimension, 1.7 cm in the mediolateral dimension and 2.4 cm in the superior to inferior dimension. anterior one third of the right breast at the 12 o'clock position centered on the order of 3 cm from the nipple there is an irregular area  of non-mass enhancement that measures 1.0 cm in superior-inferior dimension, 1.1 cm in anterior to posterior dimension and 1.2 cm in the medial to lateral dimension. No mammographic correlate is appreciated.  No other areas of suspicious enhancement or morphology are seen in the  right breast. I see no evidence for abnormal skin, nipple or chest wall  enhancement of the right breast and there is no evidence for right  axillary or internal mammary chain adenopathy.  In the middle third of the left breast centered at the 11:30 position on the order of 3.7 cm from the nipple there is an irregular area of non-mass enhancement that measures on the order of 1.3 cm in anterior posterior dimension, 1.1 cm in the superior to inferior dimension and 1.0 cm in the mediolateral dimension. There is suspected architectural distortion. No mammographic correlate is appreciated.  No other areas of abnormal enhancement or morphology are seen within the left breast. No evidence for left axillary or internal mammary chain adenopathy.   IMPRESSION: Biopsy proven malignancy right. The entire mass and linear enhancement measure on the order of 4.7 cm. If breast conservation therapy is desired and biopsy of this region is desired, it is my opinion that  this would be best performed under MRI guidance. 2. 1.2 cm irregular area o non mass enhancement in the right breast 12 o'clock position. No mammographic correlate is appreciated. Further evaluation with an MRI guided right breast biopsy should be considered. 3. 1.1 cm irregular area of non mass enhancement seen n middle third of the left breast at th 11:30 position. MRI guided left breast biopsy is recommended. BI-RADS 4 Suspicious abnormality. Biopsy should be considered.     RIGHT ultrasound [facility]-  Prevoiusly noted mass at 8:00, 8 CFN is no longer visible and a clip is not confidently identified.        Pathology:  01/31/2024, US Guided Breast Biopsy ([facility])  Right breast 8 o'clock 8 cm from the nipple. 10-gauge mammotome multiple tissue specimens bowtie shaped metallic clip was placed.    Post-biopsy mammography demonstrates bowtie shaped metallic clip centrally within a mass in the posterior one third lower outer quadrant of the right breast. No evidence for clip migration.    The pathology is concordant.      01/31/2024, Surgical pathology report ([facility])    1.  Breast, right, 8:00, 8 cm from nipple, biopsy: Invasive ductal adenocarcinoma                -A.  The tumor is Jeovany grade III (tubular grade 3, nuclear grade 3, mitotic grade 3).               -B.  The tumor measures up to 9.3 mm on the slide               -C.  No definitive lymphovascular invasion or perineural invasion is identified               -D.  Ductal carcinoma in situ is not identified    ER Negative (less than 1%)  [place] Negative (less than 1%)  HER2 positive (Score 3+) 95%  KI-67 70%      MRI guided biopsy x 3 with 2 being on the right and 1 being on the left dated February 26 2024 [facility]  Right breast 12:00, buckle shaped metallic clip placed.  Sclerosing adenosis, calcifications.  Right breast 8:00, infinity shaped clip placed.  High-grade duct carcinoma in situ, solid, cribriform, clinging, lobular extension, central  comedonecrosis.  No invasive carcinoma.       Left breast biopsy was also done:   Left breast MRI guided biopsy 12:00, stoplight shaped metallic clip. 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  No clip migration.  Pathology is all concordant    Final Diagnosis   1.  Left breast, 12:00, MRI-guided biopsies for non-mass enhancement: INTERMEDIATE GRADE DUCTAL CARCINOMA IN SITU (DCIS) WITH APOCRINE FEATURES.               -Architectural patterns: Solid and cribriform with central necrosis and associated microcalcifications.               -DCIS involves sclerosing adenosis in multiple cores and measures up to 7 mm maximally.               -No evidence of invasion identified.     2.  Right breast, 12:00, MRI-guided biopsies for non-mass enhancement:               -Sclerosing adenosis with associated microcalcifications.               -No atypical hyperplasia, in situ nor invasive carcinoma identified.     3.  Right breast, 8:00, MRI-guided biopsies for linear enhancement: HIGH GRADE DUCTAL CARCINOMA IN SITU (DCIS).  -Architectural patterns: Solid, comedo and clinging with lobular extension, central comedonecrosis and associated microcalcifications.               -DCIS involves multiple tissue cores measuring up to 3 mm.               -Sclerosing adenosis and usual ductal hyperplasia with associated microcalcifications.               -No evidence of invasive carcinoma identified.     [facility]   Electronically signed by Shyann Hilario MD on 2/29/2024 at 0940   Synoptic Checklist   Breast Biomarker Reporting Template   Protocol posted: 12/13/2023BREAST BIOMARKER REPORTING TEMPLATE - 1  Test(s) Performed     Estrogen Receptor (ER) Status  Positive (greater than 10% of cells demonstrate nuclear positivity)   Percentage of Cells with Nuclear Positivity  %   Average Intensity of Staining  Strong   Test Type  Food and Drug Administration (FDA)  cleared (test / vendor): ventana   Primary Antibody  SP1   Scoring System  Domi   Proportion Score  5   Intensity Score  3   Total Domi Score  8   Test(s) Performed     Progesterone Receptor (PgR) Status  Positive   Percentage of Cells with Nuclear Positivity  81-90%   Average Intensity of Staining  Strong   Test Type  Food and Drug Administration (FDA) cleared (test / vendor): [facility]   Primary Antibody  1E2   Scoring System  Domi   Proportion Score  5   Intensity Score  3   Total Domi Score  8   Test(s) Performed  Ki-67   Ki-67 Percentage of Positive Nuclei  15 %   Primary Antibody  30-9   Cold Ischemia and Fixation Times  Meet requirements specified in latest version of the ASCO / CAP Guidelines   Cold Ischemia Time (minutes)  41 min   Fixation Time (hours)  9 hours   Testing Performed on Block Number(s)  1A   METHODS   Fixative  Formalin   Image Analysis  Not performed   .      Comment    Multiple representative slides from parts 1-3 of this case are reviewed internally with Dr. Avery, who concurs.  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  Please refer to that biopsy for receptor studies in the right breast.           Pathology from bilateral total mastectomy, bilateral sentinel node biopsy, no reconstruction, post neoadjuvant, returned as :  Right mastectomy: Single focus of high-grade ductal carcinoma in situ, solid with focal necrosis, 2 mm.  Found in a 20 mm fibrous tumor bed.  Margins are widely clear.  Pathologic stage ypTis N0 pathologic complete response.  On the right side 0 of 4 lymph nodes.  Left total mastectomy showed fat necrosis, biopsy site changes and fibrocystic changes.  Left sentinel node 0 of 1 involved with tumor.        She will not need to see radiation oncology. Initial tumor T2N0  I called and let her know     Final Diagnosis   1.  [place] lymph node #1, right axilla, excision:               -1 lymph node, negative for  metastatic carcinoma (0/1).     2.  [place] lymph node #2, right axilla, excision:               -3 lymph nodes, negative for metastatic carcinoma (0/3).     3.  Right breast, oriented simple skin sparing mastectomy status post neoadjuvant therapy (907 g): SINGLE FOCUS OF HIGH GRADE DUCTAL CARCINOMA IN SITU (DCIS).               -Architectural patterns: Solid with focal necrosis and associated microcalcifications.               -Extent of DCIS: 2.0 mm focus in a single tissue block.               -20 mm fibrous tumor bed with sclerosing adenosis, microcysts and microcalcifications.               -Unremarkable skin and nipple.               -All margins widely free of DCIS (see synoptic template).               -No evidence of residual invasive carcinoma identified.               -Pathologic stage: ypTis, N(sn)0, pCR (see Comment).     4.  Left breast, oriented simple skin sparing mastectomy status post neoadjuvant therapy (850 g):               -Fat necrosis, biopsy site changes, sclerosing adenosis and changes consistent with treatment effect.               -Microcalcifications present associated with benign ducts and sclerosing adenosis.               -Unremarkable skin and nipple.               -No evidence of residual in situ nor invasive carcinoma identified.                    5.  [place] lymph node #1, left axilla, excision:               -1 lymph node, negative for metastatic carcinoma (0/1).     SW[facility]   Electronically signed by Shyann Hilario MD on 8/16/2024 at 1049   Synoptic Checklist   INVASIVE CARCINOMA OF THE BREAST: Resection   8th Edition - Protocol posted: 6/19/2024INVASIVE CARCINOMA OF THE BREAST: RESECTION - 1, 2, 3        SPECIMEN   Procedure   Total mastectomy   Specimen Laterality   Right   TUMOR   Tumor Site   Lower outer quadrant   Histologic Type   No residual invasive carcinoma   Tumor Size   No residual invasive carcinoma   Ductal Carcinoma In Situ (DCIS)   Present       Only DCIS  is present after presurgical (neoadjuvant) therapy   Size (Extent) of DCIS   Estimated size (extent) of DCIS is at least (Millimeters): 2 mm   Architectural Patterns   Solid   Nuclear Grade   Grade III (high)   Necrosis   Present, focal (small foci or single cell necrosis)   Lobular Carcinoma In Situ (LCIS)   Not identified   Lymphatic and / or Vascular Invasion   Not identified   Microcalcifications   Present in DCIS       Present in non-neoplastic tissue   Treatment Effect in the Breast   No residual invasive carcinoma is present in the breast after presurgical therapy   Treatment Effect in the Lymph Nodes   No lymph node metastases and no fibrous scarring or histiocytic aggregates in the nodes   Residual Cancer [place] (RCB) Parameters       Residual Cancer [place] Class   RCB-0 (pCR)   MARGINS   Margin Status for DCIS   All margins negative for DCIS   Distance from DCIS to Closest Margin   20 mm   Closest Margin(s) to DCIS   Anterior   Distance from DCIS to Anterior Margin   20 mm   Distance from DCIS to Posterior Margin   40 mm   Distance from DCIS to Superior Margin   180 mm   Distance from DCIS to Inferior Margin   20 mm   Distance from DCIS to Medial Margin   80 mm   Distance from DCIS to Lateral Margin   110 mm   REGIONAL LYMPH NODES   Regional Lymph Node Status   All regional lymph nodes negative for tumor   Total Number of Lymph Nodes Examined (sentinel and non-sentinel)   4   Number of [place] Nodes Examined   4   pTNM CLASSIFICATION (AJCC 8th Edition)   Reporting of pT, pN, and (when applicable) pM categories is based on information available to the pathologist at the time the report is issued. As per the AJCC (Chapter 1, 8th Ed.) it is the managing physician's responsibility to establish the final pathologic stage based upon all pertinent information, including but potentially not limited to this pathology report.   Modified Classification   y   pT Category   pTis (DCIS)   pN Category   pN0   N Suffix    (sn)   SPECIAL STUDIES           Estrogen Receptor (ER) Status   Negative           Progesterone Receptor (PgR) Status   Negative           HER2 (by immunohistochemistry)   Positive (Score 3+)   Percentage of Cells with Uniform Intense Complete Membrane Staining   95 %           Ki-67 Percentage of Positive Nuclei   70 %   Testing Performed on Case Number   [id number]                          Patient has seen Dr Renteria and plan is for paclitaxel, herceptin, pertuzumab, weekly for 12 weeks, then complete herceptin for a year      Has seen Cardiooncology Dr Peguero              Labs:  09/01/2017, Genetics ([facility])  Genetic result: Negative, no clinically significant mutation identified.    Procedures:    Assessment:   Diagnosis Plan   1. Malignant neoplasm of lower-outer quadrant of right female breast, unspecified estrogen receptor status        2. Ductal carcinoma in situ (DCIS) of breast, unspecified laterality        3. Abnormal MRI, breast        4. FH: breast cancer        5. History of prior cigarette smoking              1-4  RIGHT LOQ 8:00, 7 CFN- 2.5 cm on exam, 2 cm on MRI index lesion (see also below re: enhancement anteriorly over 3.5 cm concerning for DCIS, plus second lesion in the RIGHT breast and lesion seen in the LEFT breast). MRI total enhancement at known biopsy site 4.7cm. Dr Patel cannot rule out additional invasive disease (tumor board)., Bowtie marker  IMC, NST, high grade, 3,3,3, 9.3mm in core,   ER neg HI neg her 2 hemalatha 3+    Clinical stage possibly mT2N0- IIA      MRI abnormalities at time of cancer diagnosis:  1- RIGHT  8:00- anterior to index lesion 3.5cm enhancement- high grade DCIS, solid, comedo, clinging with lobular extension, central comedonecrosis, no invasive- infinity marker good position anterior to the bowtie invasive cancer  2- RIGHT 12:00 3 CFN- 1.2 cm enhancement- sclerosing adenosis with calcifications- buckle marker (the most anterior marker)  3- LEFT middle third, 11:30 4  CFN- 1.3 cm enhancement- intermediate grade DCIS, solid and cribiform, central necrosis, 7mm- stoplight marker, only marker in the breast    Dr Renteria- abraxane (paclitaxel), perjeta, herceptin started 3-18-24 through 12th cycle 6-17-24    MRi 5-11-24- resolution all enhancement bilateral  Ultrasound 6-14-24- right breast negative for mass      Pathology from bilateral total mastectomy, bilateral sentinel node biopsy, no reconstruction, post neoadjuvant, returned as :  Right mastectomy: Single focus of high-grade ductal carcinoma in situ, solid with focal necrosis, 2 mm.  Found in a 20 mm fibrous tumor bed.  Margins are widely clear.  Pathologic stage ypTis N0 pathologic complete response.  On the right side 0 of 4 lymph nodes.  Left total mastectomy showed fat necrosis, biopsy site changes and fibrocystic changes.  Left sentinel node 0 of 1 involved with tumor.        She will not need to see radiation oncology. Initial tumor T2N0    3-4  LEFT breast DCIS, 11:30, 4 CFN, see above    5-  2 of 7 sisters with breast cancer, ages 35,45 approximate  My Risk genetic testing 9-1-17 negative for mutation    6-  Smoked 1/3 ppd or less from age teens until cancer diagnosis      Plan:  The patient goes by Felicia  She was here today with her  Ferdinand, her daughter and her grandson today.      She is healing nicely.    I will see her back in 9 months to discuss port removal.  I have given her a Rx for a postmastectomy bra and prosthesis.    I removed both drains.  She has seen PT for bioimpedence.      We have left her port at the time of surgery for her year of herceptin..    .  Abeba Manrique MD        Next Appointment:  Return in about 9 months (around 5/26/2025) for no imaging.      EMR Dragon/transcription disclaimer:    Please note that portions of this note were completed with a voice recognition program.

## 2024-08-26 ENCOUNTER — OFFICE VISIT (OUTPATIENT)
Dept: SURGERY | Facility: CLINIC | Age: 69
End: 2024-08-26
Payer: COMMERCIAL

## 2024-08-26 VITALS
DIASTOLIC BLOOD PRESSURE: 82 MMHG | OXYGEN SATURATION: 98 % | WEIGHT: 115 LBS | HEIGHT: 58 IN | BODY MASS INDEX: 24.14 KG/M2 | SYSTOLIC BLOOD PRESSURE: 142 MMHG | HEART RATE: 82 BPM

## 2024-08-26 DIAGNOSIS — C50.511 MALIGNANT NEOPLASM OF LOWER-OUTER QUADRANT OF RIGHT FEMALE BREAST, UNSPECIFIED ESTROGEN RECEPTOR STATUS: Primary | ICD-10-CM

## 2024-08-26 DIAGNOSIS — Z80.3 FH: BREAST CANCER: ICD-10-CM

## 2024-08-26 DIAGNOSIS — R92.8 ABNORMAL MRI, BREAST: ICD-10-CM

## 2024-08-26 DIAGNOSIS — D05.10 DUCTAL CARCINOMA IN SITU (DCIS) OF BREAST, UNSPECIFIED LATERALITY: ICD-10-CM

## 2024-08-26 DIAGNOSIS — Z87.891 HISTORY OF PRIOR CIGARETTE SMOKING: ICD-10-CM

## 2024-08-26 PROCEDURE — 99024 POSTOP FOLLOW-UP VISIT: CPT | Performed by: SURGERY

## 2024-09-03 ENCOUNTER — PATIENT OUTREACH (OUTPATIENT)
Dept: OTHER | Facility: HOSPITAL | Age: 69
End: 2024-09-03
Payer: COMMERCIAL

## 2024-09-03 ENCOUNTER — OFFICE VISIT (OUTPATIENT)
Dept: ONCOLOGY | Facility: CLINIC | Age: 69
End: 2024-09-03
Payer: COMMERCIAL

## 2024-09-03 ENCOUNTER — INFUSION (OUTPATIENT)
Dept: ONCOLOGY | Facility: HOSPITAL | Age: 69
End: 2024-09-03
Payer: COMMERCIAL

## 2024-09-03 VITALS
BODY MASS INDEX: 24.6 KG/M2 | TEMPERATURE: 98.2 F | OXYGEN SATURATION: 95 % | RESPIRATION RATE: 16 BRPM | WEIGHT: 117.2 LBS | HEIGHT: 58 IN | SYSTOLIC BLOOD PRESSURE: 153 MMHG | HEART RATE: 72 BPM | DIASTOLIC BLOOD PRESSURE: 83 MMHG

## 2024-09-03 DIAGNOSIS — C50.511 MALIGNANT NEOPLASM OF LOWER-OUTER QUADRANT OF RIGHT BREAST OF FEMALE, ESTROGEN RECEPTOR POSITIVE: Primary | ICD-10-CM

## 2024-09-03 DIAGNOSIS — Z45.2 ENCOUNTER FOR FITTING AND ADJUSTMENT OF VASCULAR CATHETER: ICD-10-CM

## 2024-09-03 DIAGNOSIS — C50.511 MALIGNANT NEOPLASM OF LOWER-OUTER QUADRANT OF RIGHT BREAST OF FEMALE, ESTROGEN RECEPTOR POSITIVE: ICD-10-CM

## 2024-09-03 DIAGNOSIS — Z17.0 MALIGNANT NEOPLASM OF LOWER-OUTER QUADRANT OF RIGHT BREAST OF FEMALE, ESTROGEN RECEPTOR POSITIVE: Primary | ICD-10-CM

## 2024-09-03 DIAGNOSIS — Z79.899 HIGH RISK MEDICATION USE: ICD-10-CM

## 2024-09-03 DIAGNOSIS — Z17.0 MALIGNANT NEOPLASM OF LOWER-OUTER QUADRANT OF RIGHT BREAST OF FEMALE, ESTROGEN RECEPTOR POSITIVE: ICD-10-CM

## 2024-09-03 LAB
ALBUMIN SERPL-MCNC: 4 G/DL (ref 3.5–5.2)
ALBUMIN/GLOB SERPL: 1.5 G/DL
ALP SERPL-CCNC: 79 U/L (ref 39–117)
ALT SERPL W P-5'-P-CCNC: 27 U/L (ref 1–33)
ANION GAP SERPL CALCULATED.3IONS-SCNC: 12.6 MMOL/L (ref 5–15)
AST SERPL-CCNC: 22 U/L (ref 1–32)
BASOPHILS # BLD AUTO: 0.05 10*3/MM3 (ref 0–0.2)
BASOPHILS NFR BLD AUTO: 0.7 % (ref 0–1.5)
BILIRUB SERPL-MCNC: 0.2 MG/DL (ref 0–1.2)
BUN SERPL-MCNC: 16 MG/DL (ref 8–23)
BUN/CREAT SERPL: 27.1 (ref 7–25)
CALCIUM SPEC-SCNC: 9.2 MG/DL (ref 8.6–10.5)
CHLORIDE SERPL-SCNC: 103 MMOL/L (ref 98–107)
CO2 SERPL-SCNC: 23.4 MMOL/L (ref 22–29)
CREAT SERPL-MCNC: 0.59 MG/DL (ref 0.57–1)
DEPRECATED RDW RBC AUTO: 44.5 FL (ref 37–54)
EGFRCR SERPLBLD CKD-EPI 2021: 97.7 ML/MIN/1.73
EOSINOPHIL # BLD AUTO: 0.07 10*3/MM3 (ref 0–0.4)
EOSINOPHIL NFR BLD AUTO: 1 % (ref 0.3–6.2)
ERYTHROCYTE [DISTWIDTH] IN BLOOD BY AUTOMATED COUNT: 13.2 % (ref 12.3–15.4)
GLOBULIN UR ELPH-MCNC: 2.6 GM/DL
GLUCOSE SERPL-MCNC: 134 MG/DL (ref 65–99)
HCT VFR BLD AUTO: 36.3 % (ref 34–46.6)
HGB BLD-MCNC: 11.5 G/DL (ref 12–15.9)
IMM GRANULOCYTES # BLD AUTO: 0.03 10*3/MM3 (ref 0–0.05)
IMM GRANULOCYTES NFR BLD AUTO: 0.4 % (ref 0–0.5)
LYMPHOCYTES # BLD AUTO: 2.38 10*3/MM3 (ref 0.7–3.1)
LYMPHOCYTES NFR BLD AUTO: 33.9 % (ref 19.6–45.3)
MCH RBC QN AUTO: 29.3 PG (ref 26.6–33)
MCHC RBC AUTO-ENTMCNC: 31.7 G/DL (ref 31.5–35.7)
MCV RBC AUTO: 92.6 FL (ref 79–97)
MONOCYTES # BLD AUTO: 0.52 10*3/MM3 (ref 0.1–0.9)
MONOCYTES NFR BLD AUTO: 7.4 % (ref 5–12)
NEUTROPHILS NFR BLD AUTO: 3.98 10*3/MM3 (ref 1.7–7)
NEUTROPHILS NFR BLD AUTO: 56.6 % (ref 42.7–76)
NRBC BLD AUTO-RTO: 0 /100 WBC (ref 0–0.2)
PLATELET # BLD AUTO: 324 10*3/MM3 (ref 140–450)
PMV BLD AUTO: 10.1 FL (ref 6–12)
POTASSIUM SERPL-SCNC: 3.8 MMOL/L (ref 3.5–5.2)
PROT SERPL-MCNC: 6.6 G/DL (ref 6–8.5)
RBC # BLD AUTO: 3.92 10*6/MM3 (ref 3.77–5.28)
SODIUM SERPL-SCNC: 139 MMOL/L (ref 136–145)
WBC NRBC COR # BLD AUTO: 7.03 10*3/MM3 (ref 3.4–10.8)

## 2024-09-03 PROCEDURE — 25010000002 HEPARIN LOCK FLUSH PER 10 UNITS: Performed by: INTERNAL MEDICINE

## 2024-09-03 PROCEDURE — 80053 COMPREHEN METABOLIC PANEL: CPT

## 2024-09-03 PROCEDURE — 25810000003 SODIUM CHLORIDE 0.9 % SOLUTION: Performed by: INTERNAL MEDICINE

## 2024-09-03 PROCEDURE — 96417 CHEMO IV INFUS EACH ADDL SEQ: CPT

## 2024-09-03 PROCEDURE — 25810000003 SODIUM CHLORIDE 0.9 % SOLUTION 250 ML FLEX CONT: Performed by: INTERNAL MEDICINE

## 2024-09-03 PROCEDURE — 96413 CHEMO IV INFUSION 1 HR: CPT

## 2024-09-03 PROCEDURE — 25010000002 TRASTUZUMAB-DTTB 420 MG RECONSTITUTED SOLUTION 1 EACH VIAL: Performed by: INTERNAL MEDICINE

## 2024-09-03 PROCEDURE — 25010000002 PERTUZUMAB 420 MG/14ML SOLUTION 420 MG VIAL: Performed by: INTERNAL MEDICINE

## 2024-09-03 PROCEDURE — 85025 COMPLETE CBC W/AUTO DIFF WBC: CPT

## 2024-09-03 PROCEDURE — 99215 OFFICE O/P EST HI 40 MIN: CPT | Performed by: INTERNAL MEDICINE

## 2024-09-03 RX ORDER — SODIUM CHLORIDE 0.9 % (FLUSH) 0.9 %
10 SYRINGE (ML) INJECTION AS NEEDED
OUTPATIENT
Start: 2024-09-03

## 2024-09-03 RX ORDER — SODIUM CHLORIDE 9 MG/ML
20 INJECTION, SOLUTION INTRAVENOUS ONCE
Status: CANCELLED | OUTPATIENT
Start: 2024-09-03

## 2024-09-03 RX ORDER — HEPARIN SODIUM (PORCINE) LOCK FLUSH IV SOLN 100 UNIT/ML 100 UNIT/ML
500 SOLUTION INTRAVENOUS AS NEEDED
OUTPATIENT
Start: 2024-09-03

## 2024-09-03 RX ORDER — SODIUM CHLORIDE 9 MG/ML
20 INJECTION, SOLUTION INTRAVENOUS ONCE
Status: COMPLETED | OUTPATIENT
Start: 2024-09-03 | End: 2024-09-03

## 2024-09-03 RX ORDER — SODIUM CHLORIDE 0.9 % (FLUSH) 0.9 %
10 SYRINGE (ML) INJECTION AS NEEDED
Status: DISCONTINUED | OUTPATIENT
Start: 2024-09-03 | End: 2024-09-03 | Stop reason: HOSPADM

## 2024-09-03 RX ORDER — HEPARIN SODIUM (PORCINE) LOCK FLUSH IV SOLN 100 UNIT/ML 100 UNIT/ML
500 SOLUTION INTRAVENOUS AS NEEDED
Status: DISCONTINUED | OUTPATIENT
Start: 2024-09-03 | End: 2024-09-03 | Stop reason: HOSPADM

## 2024-09-03 RX ADMIN — Medication 10 ML: at 11:52

## 2024-09-03 RX ADMIN — PERTUZUMAB 420 MG: 30 INJECTION, SOLUTION, CONCENTRATE INTRAVENOUS at 10:14

## 2024-09-03 RX ADMIN — Medication 500 UNITS: at 11:52

## 2024-09-03 RX ADMIN — ONTRUZANT 330 MG: KIT INTRAVENOUS at 11:19

## 2024-09-03 RX ADMIN — SODIUM CHLORIDE 20 ML/HR: 9 INJECTION, SOLUTION INTRAVENOUS at 10:14

## 2024-09-03 NOTE — PROGRESS NOTES
Subjective   Felicia Boyle is a 68 y.o. female.  Referred by Dr. Manrique for right breast invasive ductal carcinoma, HER2 positive, left breast ductal carcinoma in situ    History of Present Illness     Ms. Boyle is a 68-year-old postmenopausal [place] lady who is fairly healthy without any significant comorbidities palpated of right breast mass in the latter part of  which was further worked up with diagnostic mammogram and ultrasound and subsequently a biopsy which confirmed invasive ductal carcinoma which was ER/OH negative and HER2 positive.    Family history significant for her 2 sisters with breast cancer at the age of 38 and 48 respectively.  Her older sister  of metastatic breast cancer but her younger sister is doing well.  Patient underwent genetic testing in 2017 which was negative due to her family history.    She had regular screening mammograms up until 2019 but subsequently stopped having annual gynecological visits and therefore did not have any further screening mammograms up until the most recent diagnostic mammogram for the new palpable abnormality.    2024-bilateral diagnostic mammogram and right breast ultrasound  The breast heterogeneously dense.  Left breast with a scar marker in the lower left breast dating back to .  No new findings in the left breast.    Right breast at 8:00, 8 cm from the nipple there is a 1.7 x 1.4 x 1.6 cm oval high density mass with partially circumscribed and partially indistinct margins.    On the ultrasound the mass measures 1.7 x 1 x 1.3 cm.    No abnormal right axilla lymphadenopathy.    2024-right breast ultrasound-guided biopsy  Pathology consistent with invasive ductal carcinoma  Grade 3  9.3 mm of invasive disease on the core biopsy  ER negative  OH negative  HER2 3+ on immunohistochemistry, 95%  Ki-67 70%    2024-bilateral breast MRI  In the right breast at 8:00, 8 cm from the nipple there is a irregular enhancing mass which  measures 1.8 x 2 x 1.7 cm.  This corresponds to the biopsy-proven malignancy.  Extending anteriorly away from the MT margin of the mass there is an irregular linear enhancement which measures 3.5 x 1 x 0.7 cm.  The whole area including the mass measures 4.7 cm in AP dimension, 1.7 in mediolateral and 2.4 cm in the superior-inferior dimension.    At 12:00, 3 cm from the nipple there is an irregular non-mass enhancement which measures 1 x 1.1 x 1.2 cm.  No mammographic correlate is appreciated.    No other suspicious areas of enhancement in the right breast or right axilla or internal mammary chain.    In the left breast, middle third at 11:30 position, 3.7 cm from the nipple there is an irregular area of non-mass enhancement which measures 1.3 x 1.1 x 1 cm.  There is suspected architectural distortion.  No other areas of abnormal enhancement seen in the left breast of the left axilla or the internal mammary chain.    2/26/2024-additional MRI guided biopsies    1.left breast 12:00-intermediate grade ductal carcinoma in situ with apocrine features  ER +91 to 100% strong  MD +81 to 90% strong  Ki-67 15%    2. Right breast 12 o'clock position MRI guided biopsy of the non-mass enhancement-sclerosing adenosis with associated calcifications  No atypical hyperplasia in situ or invasive carcinoma    3.right breast 8:00 MRI guided biopsies of the linear enhancement is consistent with high-grade ductal carcinoma in situ  DCIS involves multiple tissue cores measuring up to 3 mm.  Sclerosing adenosis and usual ductal hyperplasia with associated microcalcifications.    The case was discussed in tumor board and Dr. Patel thought that they could be more invasive disease in the 4.7 cm of linear enhancement noted in the right breast at the site of the mass.  Therefore it was decided to proceed with neoadjuvant chemotherapy.    Patient was a former smoker and smoked for about 40 years, half a pack per week, quit about 1 month ago.   Denies any alcohol use.    Week 1 Taxol Herceptin and Perjeta was planned for 3/18/2024.  Patient received Herceptin and Perjeta and had some chills for which she received Solu-Medrol and responded well to that.  However after infusing only 1 cc of Taxol patient experienced a severe anaphylactic reaction due to which the infusion was stopped permanently and she received an additional dose of Solu-Medrol and Benadryl.  Symptoms resolved subsequently and patient was discharged home safely.    Due to this recent treatment has been changed to Abraxane along with Herceptin and Perjeta.  3/25/2024-patient is due for her first treatment with Abraxane.    She is extremely anxious given the severe hypersensitivity reaction that she experienced with Taxol.  She is also complaining of increased belching and heartburn since her last chemotherapy.  She is complaining of severe diarrhea with started last night.  Had multiple loose stools.    5/11/2024-MRI of the breast-images independently reviewed and interpreted by me.  Interval near complete resolution of all suspicious enhancement in the posterior one third lower outer quadrant of the right breast at 8:00 corresponding to the biopsy-proven site of malignancy.  Who has been on masslike area of enhancement measuring 1.5 x 0.9 x 0.9 cm compared to 2 x 1.8 x 1.7 cm.  Mildly abnormal enhancement extending anteriorly away from the mass has resolved.  No other suspicious areas of enhancement noted in the right breast.  Left breast at 12:00 interval resolution of all suspicious enhancement.    8/15/2024-bilateral mastectomy and bilateral sentinel lymph node biopsy  Right total mastectomy  No residual invasive carcinoma  2 mm of DCIS high-grade  RCB 0, pathological complete response  All the margins are negative  Y pTis N0 M0  4 sentinel lymph nodes negative    Left breast mastectomy  Fat necrosis, biopsy site changes, sclerosing adenosis and changes consistent with treatment  "effect.  No evidence of residual in situ or invasive carcinoma  1 x-ray lymph node negative    INTERVAL HISTORY  Felicia returns today for follow-up accompanied by her .  She is scheduled for Herceptin and Perjeta.  She is recovering well from surgery.  She is complaining of some back pain due to having to lay on her back and also complaining of some tingling in her lower extremities since the surgery which is likely related to her sleeping on the back.  She is reporting numbness of the chest wall and some tightness.  She has met with lymphedema clinic.  Trying to do some exercises with her hands.  No other complaints at this time      The following portions of the patient's history were reviewed and updated as appropriate: allergies, current medications, past family history, past medical history, past social history, past surgical history, and problem list.    Past Medical History:   Diagnosis Date    Anxiety about health     Breast cancer of lower-outer quadrant of right female breast 02/14/2024    DCIS (ductal carcinoma in situ) of breast 03/15/2024    Diverticulosis     History of chemotherapy 06/22/2024    Mixed hyperlipidemia 05/19/2019    DIET CONTROLLED    Osteoporosis     Port-A-Cath in place     Type 2 diabetes mellitus         Past Surgical History:   Procedure Laterality Date    BREAST BIOPSY Left     Excisional biopsy in the [facility]    BREAST BIOPSY  03/2024    CATARACT EXTRACTION W/ INTRAOCULAR LENS IMPLANT Bilateral     CHOLECYSTECTOMY  2012    COLON RESECTION Right 2002    COLONOSCOPY N/A 04/28/2021    Procedure: COLONOSCOPY TO ANASTAMOSIS/TI;  Surgeon: Angelo Gonsalez MD;  Location: [facility] ENDOSCOPY;  Service: Gastroenterology;  Laterality: N/A;  PRE: SCREENING  POST: NORMAL POST-OP ANATOMY    ENDOSCOPY      2013    ENDOSCOPY N/A 03/10/2017    Procedure: ESOPHAGOGASTRODUODENOSCOPY WITH BIOPSY AND PETER DILATATION 54\";  Surgeon: Angelo Gonsalez MD;  Location: [facility] ENDOSCOPY;  Service:  "    ENDOSCOPY N/A 2021    Procedure: ESOPHAGOGASTRODUODENOSCOPY WITH BIOPSIES, 54FR PETER DILATATION;  Surgeon: Angelo Gonsalez MD;  Location: [facility] ENDOSCOPY;  Service: Gastroenterology;  Laterality: N/A;  PRE: DYSPHAGIA  POST: GASTRITIS, ESOPHAGEAL RING    MASTECTOMY W/ SENTINEL NODE BIOPSY Bilateral 8/15/2024    Procedure: Bilateral total mastectomy, bilateral sentinel node biopsy, possible axillary node dissection, no reconstruction.;  Surgeon: Abeba Manrique MD;  Location: [facility] MAIN OR;  Service: General;  Laterality: Bilateral;    VENOUS ACCESS DEVICE (PORT) INSERTION Left 3/14/2024    Procedure: INSERTION OF PORTACATH;  Surgeon: Abeba Manrique MD;  Location: [facility] OR;  Service: General;  Laterality: Left;        Family History   Problem Relation Age of Onset    Hypertension Mother     Diabetes Mother     Hypertension Father     Breast cancer Sister 48    Breast cancer Sister 38    No Known Problems Brother     No Known Problems Daughter     No Known Problems Son     No Known Problems Maternal Grandmother     No Known Problems Paternal Grandmother     No Known Problems Maternal Grandfather     No Known Problems Paternal Grandfather     Autism Grandson     Colon cancer Neg Hx     Rectal cancer Neg Hx     Endometrial cancer Neg Hx     Vaginal cancer Neg Hx     Cervical cancer Neg Hx     Ovarian cancer Neg Hx     Prostate cancer Neg Hx     Uterine cancer Neg Hx     Malig Hyperthermia Neg Hx         Social History     Socioeconomic History    Marital status:      Spouse name: Ryan   Tobacco Use    Smoking status: Former     Current packs/day: 0.00     Average packs/day: 0.3 packs/day for 15.0 years (3.8 ttl pk-yrs)     Types: Cigarettes     Quit date: 1/15/2024     Years since quittin.6     Passive exposure: Past    Smokeless tobacco: Never    Tobacco comments:     N/A   Vaping Use    Vaping status: Never Used   Substance and Sexual Activity    Alcohol use: Yes     Comment:  "RARE    Drug use: No    Sexual activity: Not Currently     Partners: Male     Birth control/protection: Tubal ligation        OB History          4    Para   3    Term   3       0    AB   1    Living   3         SAB   1    IAB   0    Ectopic   0    Molar   0    Multiple   0    Live Births   3               Age at menarche-16  Age at first live childbirth-18   4 para 3  1  Age at menopause-49  No use of hormone replacement therapy  No use of oral conceptive pills  Breast-feeding for 3 months    Allergies   Allergen Reactions    Paclitaxel Shortness Of Breath    Metronidazole Nausea And Vomiting      Review of systems as mentioned HPI otherwise negative    Objective   Blood pressure 153/83, pulse 72, temperature 98.2 °F (36.8 °C), temperature source Oral, resp. rate 16, height 147.3 cm (57.99\"), weight 53.2 kg (117 lb 3.2 oz), SpO2 95%, not currently breastfeeding.     Physical Exam  Vitals reviewed.   Constitutional:       Appearance: Normal appearance.   Eyes:      Conjunctiva/sclera: Conjunctivae normal.      Pupils: Pupils are equal, round, and reactive to light.   Cardiovascular:      Rate and Rhythm: Normal rate.   Pulmonary:      Effort: Pulmonary effort is normal.      Breath sounds: Normal breath sounds.   Abdominal:      General: Abdomen is flat. Bowel sounds are normal.   Musculoskeletal:         General: Normal range of motion.      Cervical back: Normal range of motion.      Right lower leg: No edema.      Left lower leg: No edema.   Skin:     General: Skin is warm and dry.      Findings: Rash: right groin redness with white film.   Neurological:      General: No focal deficit present.      Mental Status: She is alert and oriented to person, place, and time.   Psychiatric:         Mood and Affect: Mood is anxious.         Behavior: Behavior normal.       Status post bilateral mastectomy without reconstruction    I have reexamined the patient and the results are consistent with " the previously documented exam. Agata Renteria MD      Results from last 7 days   Lab Units 09/03/24  0851   WBC 10*3/mm3 7.03   NEUTROS ABS 10*3/mm3 3.98   HEMOGLOBIN g/dL 11.5*   HEMATOCRIT % 36.3   PLATELETS 10*3/mm3 324                      No radiology results for the last 30 days.       Assessment & Plan     *Right breast invasive ductal carcinoma  Clinical T2 N0 M0, stage IIa, clinical and prognostic ER/OR negative, HER2 3+ immunohistochemistry, Ki-67 70%  On the MRI the mass measures 2 cm however there is linear enhancement which in the AP dimension measures 4.7 cm.  Additional biopsies of this linear enhancement was performed and consistent with DCIS  It is hard to delineate the amount of invasive disease as opposed to DCIS.  More likely than not invasive component is likely limited to the mass which is about 2-1/2 cm on exam.  Since the tumor is greater than 2 cm I think it is reasonable to proceed with neoadjuvant chemotherapy.  We discussed proceeding with weekly Taxol along with Herceptin and Perjeta every 3 weeks.  3/18/2024-received first dose of Herceptin and Perjeta, had a severe hypersensitivity reaction to Taxol and hence Taxol was discontinued  3/25/2024-Labs reviewed and stable to proceed with chemotherapy  Patient is extremely anxious today after experiencing the severe hypersensitivity reaction last week.  Reassured patient that this is unlikely to happen with Abraxane and we will premedicate with extra Solu-Medrol..  4/1/2024 patient returns for cycle 1 day 15 Abraxane.,  Left chest, left shoulder at times.  This has come and gone over the past week and is not associated with any shortness of breath, numbness or tingling.  She states when her chest is hurting if she presses on the area it gets better.  She notices it at different points during the day.  It feels more like an ache and is not a sharp pain.  She states she has been sleeping in a different position due to the port which could  "be causing some muscular discomfort.  She has noticed the discomfort at times when she takes a deep breath however is able to get deep breath here in the office did not have any pain.  She is not having any pain today at all.  She has taken her hydrocodone maybe 3-4 times over the past week and has not needed it multiple times a day.  CMP resulted today with AST elevated at 47 and .  Bilirubin remains normal at 0.4 with alkaline phosphatase elevated to 160.  Reviewed with Dr. Loredo and will hold treatment today.  She will try to limit any acetaminophen containing products.  She did have a few days of diarrhea that she is unsure if it was related to the Abraxane versus a GI bug as her  had the same symptoms.  Neither had chills or fevers.  She is also having some dysuria today we have a urinalysis with culture pending.  4/8/2024: Due for cycle 2-day 1 Abraxane, Herceptin, Perjeta.  Did not receive cycle 1 day 15 Abraxane due to elevated LFTs as detailed above.  LFTs have significantly improved, AST 28, ALT 58.  Reviewed with Dr. Renteria will proceed with Abraxane only today, will reduce Abraxane by 20%.  4/15/2024-patient is scheduled for week 4 of chemotherapy today.  Labs reviewed and overall stable except for an ALT of 45.  She will proceed with planned chemotherapy.  4/22/2024: after last chemotherapy patient experienced 3 episodes of \"zapping\" in her fingertips and soles of feet that last a few seconds then resolved.  Has not experienced this sensation in at least 3 days.  No interference with daily activities.  Will continue Abraxane at previous 20% dose reduction.  She is doing cold therapy for her hands/feet.  Will need to monitor closely for neuropathy.  4/29/2024-chemotherapy held as patient reported worsening neuropathy and dropping things.  5/6/2024-presents today for evaluation of the neuropathy and to determine if she can resume chemotherapy.  She reports that the neuropathy has improved " some.  She is mainly perceiving pain in her fingers and tingling and numbness.   She however tells me that she has not been dropping things anymore.  She is able to do other fine motor activities just fine.  5/6/2024-patient did receive Abraxane with a 20% dose reduction.  5/13/2024-presents today for the neck cycle of Abraxane and since her last treatment she has not had any worsening of neuropathy.  We also started her on gabapentin 300 mg nightly.  Neuropathy overall stable.  Labs reviewed and she does have elevated LFTs however no further dose reduction needed.  Will proceed with planned chemotherapy today.  5/11/2024-MRI of the breast shows near complete resolution of the non-mass enhancement and areas of malignancy in both right and left breast.  This is reassuring and if patient has any further worsening of neuropathy then we could discontinue Abraxane and she will proceed with surgery and continue HER2 directed therapy.  5/28/2024: Cycle 4-day 8 Abraxane only, continue previous 20% dose reduction.  Continues on gabapentin, neuropathy stable.  6/10/2024-scheduled for Abraxane Herceptin and Perjeta.  Neuropathy overall stable.  She will continue with 20% dose reduction. She has 1 more week of Abraxane left following which she will have to proceed with surgery followed by adjuvant HER2 directed therapy.  Patient is very reluctant to undergo surgery.  Communicated with Dr. Manrique and she will be scheduled to see her after completion of the last treatment of Abraxane.  Since she had a recent MRI she may not require another MRI   Proceed with last dose of Abraxane today 6/17/2024. Maintain previous dosing with 20% dose reduction.   7/22/2024 patient returns today for Herceptin and Perjeta which she continues once every 3 weeks.  She is scheduled for bilateral mastectomy on 8/15/2024 with Dr. Manrique.  8/12/2024 patient returns today to proceed with Herceptin and Perjeta.  She is scheduled for bilateral  mastectomy with Dr. Manrique on 8/15/2024.  Review of Dr. Renteria today we will proceed with treatment.  8/15/2024-status post bilateral mastectomy with residual DCIS on the right side measuring 2 mm, high-grade, Y pTis N0 M0  Patient will continue with Herceptin and Perjeta    *Left breast ductal carcinoma in situ  Intermed  She has been having some discomfort in her left shoulder bladeiate grade with apocrine features  ER/NC strongly positive  Plan is for her to undergo bilateral mastectomy.  If she does undergo bilateral mastectomy then that would be curative and she would not require any endocrine therapy subsequently unless there is any upstaging of the malignancy  5/11/2024-MRI of the breast with near complete resolution of the non-mass enhancement.  Status post left mastectomy and sentinel lymph node biopsy with no residual disease    *Severe diarrhea  Started last night  Patient has not used any Imodium  We will administer 1 L of normal saline today  Imodium will be given here today  She will start entegrade  Patient has not been using any Imodium.  Encouraged her to use Imodium up to 8 tablets in a day.  Diarrhea resolved completely    *GERD  Protonix with improvement  Continue the same    *Cardiac health  Referral to cardio oncology placed  3/15/2024 echocardiogram with an ejection fraction of 75.5%, LV strain is normal at -24.2%  She has met with Dr. Peguero , started on bisoprolol   6/17/2024 ejection fraction 70%, LV strain -22.4%  Has a follow-up echocardiogram scheduled for September 2024    *Elevated LFTs  LFTs now normal.    *Hypokalemia secondary to diarrhea likely  Potassium normal today      *Neuropathy  Held treatment  due to neuropathy  5/6/2024-resumed chemotherapy  She was started on gabapentin 300 mg nightly and also vitamin B12.  Reports stable neuropathy  Proceed with planned chemotherapy today   MRI shows near complete resolution of the abnormalities and if patient experiences any worsening  neuropathy then low threshold to stop chemotherapy and proceed with surgery  The remains intermittent though occasionally painful in her feet.  Will increase gabapentin to 300 mg twice daily  7/22/2024 patient reports neuropathy is stable continues gabapentin 300 mg twice daily  9/3/2024-neuropathy improved    *Cheilitis  Improved significantly    *Right groin candida  8/12/2024 had tried OTC antifungal with initial improvement and then reoccurrence.  Discussed drying well, will add Nystatin powder TID.  Resolved    PLAN:   Given pathological complete response patient will continue with Herceptin and Perjeta only to finish out 1 year of treatment  Continue follow-up with lymphedema clinic  Patient has a prescription for mastectomy bra  DAY in 9 weeks and MD in 18 weeks    The patient is on high risk medication that requires close monitoring for toxicity.  41 minutes spent on the encounter on the same day.    Agata Renteria MD  09/03/2024

## 2024-09-03 NOTE — PROGRESS NOTES
MsSamuel Montana called with questions about who she spoke with the last time she was there regarding finances related to treatment and grants. Message sent to Madelyn BHAKTA RN to help navigate for her. She was thankful for the help

## 2024-09-05 ENCOUNTER — TELEPHONE (OUTPATIENT)
Dept: SURGERY | Facility: CLINIC | Age: 69
End: 2024-09-05
Payer: COMMERCIAL

## 2024-09-05 NOTE — TELEPHONE ENCOUNTER
Note from Dr Renteria- 9-3-24Given pathologic complete response we will continue Herceptin and Perjeta only to finish out 1 year of treatment.

## 2024-09-24 ENCOUNTER — OFFICE VISIT (OUTPATIENT)
Dept: CARDIOLOGY | Facility: CLINIC | Age: 69
End: 2024-09-24
Payer: COMMERCIAL

## 2024-09-24 ENCOUNTER — INFUSION (OUTPATIENT)
Dept: ONCOLOGY | Facility: HOSPITAL | Age: 69
End: 2024-09-24
Payer: COMMERCIAL

## 2024-09-24 ENCOUNTER — HOSPITAL ENCOUNTER (OUTPATIENT)
Dept: CARDIOLOGY | Facility: HOSPITAL | Age: 69
Discharge: HOME | End: 2024-09-24
Admitting: INTERNAL MEDICINE
Payer: COMMERCIAL

## 2024-09-24 VITALS
HEART RATE: 69 BPM | DIASTOLIC BLOOD PRESSURE: 70 MMHG | HEIGHT: 60 IN | WEIGHT: 118 LBS | SYSTOLIC BLOOD PRESSURE: 120 MMHG | OXYGEN SATURATION: 98 % | BODY MASS INDEX: 23.16 KG/M2

## 2024-09-24 VITALS
TEMPERATURE: 96.9 F | DIASTOLIC BLOOD PRESSURE: 88 MMHG | BODY MASS INDEX: 22.97 KG/M2 | RESPIRATION RATE: 16 BRPM | HEART RATE: 76 BPM | OXYGEN SATURATION: 95 % | WEIGHT: 117.6 LBS | SYSTOLIC BLOOD PRESSURE: 153 MMHG

## 2024-09-24 VITALS
HEART RATE: 69 BPM | DIASTOLIC BLOOD PRESSURE: 70 MMHG | OXYGEN SATURATION: 98 % | HEIGHT: 60 IN | BODY MASS INDEX: 22.97 KG/M2 | WEIGHT: 117 LBS | SYSTOLIC BLOOD PRESSURE: 120 MMHG

## 2024-09-24 DIAGNOSIS — C50.511 MALIGNANT NEOPLASM OF LOWER-OUTER QUADRANT OF RIGHT BREAST OF FEMALE, ESTROGEN RECEPTOR POSITIVE: Primary | ICD-10-CM

## 2024-09-24 DIAGNOSIS — D05.10 DUCTAL CARCINOMA IN SITU (DCIS) OF BREAST, UNSPECIFIED LATERALITY: ICD-10-CM

## 2024-09-24 DIAGNOSIS — Z17.1 MALIGNANT NEOPLASM OF LOWER-OUTER QUADRANT OF RIGHT BREAST OF FEMALE, ESTROGEN RECEPTOR NEGATIVE: ICD-10-CM

## 2024-09-24 DIAGNOSIS — Z92.21 PERSONAL HISTORY OF ANTINEOPLASTIC CHEMOTHERAPY: ICD-10-CM

## 2024-09-24 DIAGNOSIS — C50.511 MALIGNANT NEOPLASM OF LOWER-OUTER QUADRANT OF RIGHT BREAST OF FEMALE, ESTROGEN RECEPTOR NEGATIVE: ICD-10-CM

## 2024-09-24 DIAGNOSIS — Z17.0 MALIGNANT NEOPLASM OF LOWER-OUTER QUADRANT OF RIGHT BREAST OF FEMALE, ESTROGEN RECEPTOR POSITIVE: Primary | ICD-10-CM

## 2024-09-24 DIAGNOSIS — Z45.2 ENCOUNTER FOR FITTING AND ADJUSTMENT OF VASCULAR CATHETER: ICD-10-CM

## 2024-09-24 DIAGNOSIS — C50.912 BILATERAL MALIGNANT NEOPLASM OF BREAST IN FEMALE, UNSPECIFIED ESTROGEN RECEPTOR STATUS, UNSPECIFIED SITE OF BREAST: Primary | ICD-10-CM

## 2024-09-24 DIAGNOSIS — C50.911 BILATERAL MALIGNANT NEOPLASM OF BREAST IN FEMALE, UNSPECIFIED ESTROGEN RECEPTOR STATUS, UNSPECIFIED SITE OF BREAST: Primary | ICD-10-CM

## 2024-09-24 LAB
ALBUMIN SERPL-MCNC: 4.5 G/DL (ref 3.5–5.2)
ALBUMIN/GLOB SERPL: 1.4 G/DL
ALP SERPL-CCNC: 85 U/L (ref 39–117)
ALT SERPL W P-5'-P-CCNC: 28 U/L (ref 1–33)
ANION GAP SERPL CALCULATED.3IONS-SCNC: 13.6 MMOL/L (ref 5–15)
AST SERPL-CCNC: 24 U/L (ref 1–32)
BASOPHILS # BLD AUTO: 0.07 10*3/MM3 (ref 0–0.2)
BASOPHILS NFR BLD AUTO: 0.9 % (ref 0–1.5)
BH CV ECHO LEFT VENTRICLE GLOBAL LONGITUDINAL STRAIN: -23.9 %
BH CV ECHO MEAS - EDV(CUBED): 52.9 ML
BH CV ECHO MEAS - EDV(MOD-SP2): 61 ML
BH CV ECHO MEAS - EDV(MOD-SP4): 72 ML
BH CV ECHO MEAS - EF(MOD-BP): 75.3 %
BH CV ECHO MEAS - EF(MOD-SP2): 75.4 %
BH CV ECHO MEAS - EF(MOD-SP4): 73.6 %
BH CV ECHO MEAS - ESV(CUBED): 9.8 ML
BH CV ECHO MEAS - ESV(MOD-SP2): 15 ML
BH CV ECHO MEAS - ESV(MOD-SP4): 19 ML
BH CV ECHO MEAS - FS: 43 %
BH CV ECHO MEAS - IVS/LVPW: 0.96 CM
BH CV ECHO MEAS - IVSD: 0.88 CM
BH CV ECHO MEAS - LAT PEAK E' VEL: 5.2 CM/SEC
BH CV ECHO MEAS - LV DIASTOLIC VOL/BSA (35-75): 50.3 CM2
BH CV ECHO MEAS - LV MASS(C)D: 98.2 GRAMS
BH CV ECHO MEAS - LV SYSTOLIC VOL/BSA (12-30): 13.3 CM2
BH CV ECHO MEAS - LVIDD: 3.8 CM
BH CV ECHO MEAS - LVIDS: 2.14 CM
BH CV ECHO MEAS - LVPWD: 0.91 CM
BH CV ECHO MEAS - MED PEAK E' VEL: 7.1 CM/SEC
BH CV ECHO MEAS - MR MAX PG: 21.6 MMHG
BH CV ECHO MEAS - MR MAX VEL: 232.1 CM/SEC
BH CV ECHO MEAS - MV A DUR: 0.12 SEC
BH CV ECHO MEAS - MV A MAX VEL: 100 CM/SEC
BH CV ECHO MEAS - MV DEC SLOPE: 496.1 CM/SEC2
BH CV ECHO MEAS - MV DEC TIME: 0.21 SEC
BH CV ECHO MEAS - MV E MAX VEL: 93.8 CM/SEC
BH CV ECHO MEAS - MV E/A: 0.94
BH CV ECHO MEAS - MV MAX PG: 4.5 MMHG
BH CV ECHO MEAS - MV MEAN PG: 2.17 MMHG
BH CV ECHO MEAS - MV P1/2T: 64.1 MSEC
BH CV ECHO MEAS - MV V2 VTI: 31.7 CM
BH CV ECHO MEAS - MVA(P1/2T): 3.4 CM2
BH CV ECHO MEAS - PULM A REVS DUR: 0.13 SEC
BH CV ECHO MEAS - PULM A REVS VEL: 22.3 CM/SEC
BH CV ECHO MEAS - PULM DIAS VEL: 40.7 CM/SEC
BH CV ECHO MEAS - PULM S/D: 1.2
BH CV ECHO MEAS - PULM SYS VEL: 48.8 CM/SEC
BH CV ECHO MEAS - SV(MOD-SP2): 46 ML
BH CV ECHO MEAS - SV(MOD-SP4): 53 ML
BH CV ECHO MEAS - SVI(MOD-SP2): 32.1 ML/M2
BH CV ECHO MEAS - SVI(MOD-SP4): 37 ML/M2
BH CV ECHO MEASUREMENTS AVERAGE E/E' RATIO: 15.25
BILIRUB SERPL-MCNC: 0.3 MG/DL (ref 0–1.2)
BUN SERPL-MCNC: 16 MG/DL (ref 8–23)
BUN/CREAT SERPL: 23.5 (ref 7–25)
CALCIUM SPEC-SCNC: 9.5 MG/DL (ref 8.6–10.5)
CHLORIDE SERPL-SCNC: 101 MMOL/L (ref 98–107)
CO2 SERPL-SCNC: 24.4 MMOL/L (ref 22–29)
CREAT SERPL-MCNC: 0.68 MG/DL (ref 0.57–1)
DEPRECATED RDW RBC AUTO: 42.2 FL (ref 37–54)
EGFRCR SERPLBLD CKD-EPI 2021: 94.4 ML/MIN/1.73
EOSINOPHIL # BLD AUTO: 0.1 10*3/MM3 (ref 0–0.4)
EOSINOPHIL NFR BLD AUTO: 1.3 % (ref 0.3–6.2)
ERYTHROCYTE [DISTWIDTH] IN BLOOD BY AUTOMATED COUNT: 12.9 % (ref 12.3–15.4)
GLOBULIN UR ELPH-MCNC: 3.3 GM/DL
GLUCOSE SERPL-MCNC: 134 MG/DL (ref 65–99)
HCT VFR BLD AUTO: 40.1 % (ref 34–46.6)
HGB BLD-MCNC: 12.9 G/DL (ref 12–15.9)
IMM GRANULOCYTES # BLD AUTO: 0.03 10*3/MM3 (ref 0–0.05)
IMM GRANULOCYTES NFR BLD AUTO: 0.4 % (ref 0–0.5)
LEFT ATRIUM VOLUME INDEX: 15.7 ML/M2
LYMPHOCYTES # BLD AUTO: 3.13 10*3/MM3 (ref 0.7–3.1)
LYMPHOCYTES NFR BLD AUTO: 40.7 % (ref 19.6–45.3)
MCH RBC QN AUTO: 28.8 PG (ref 26.6–33)
MCHC RBC AUTO-ENTMCNC: 32.2 G/DL (ref 31.5–35.7)
MCV RBC AUTO: 89.5 FL (ref 79–97)
MONOCYTES # BLD AUTO: 0.78 10*3/MM3 (ref 0.1–0.9)
MONOCYTES NFR BLD AUTO: 10.1 % (ref 5–12)
NEUTROPHILS NFR BLD AUTO: 3.58 10*3/MM3 (ref 1.7–7)
NEUTROPHILS NFR BLD AUTO: 46.6 % (ref 42.7–76)
NRBC BLD AUTO-RTO: 0 /100 WBC (ref 0–0.2)
PLATELET # BLD AUTO: 295 10*3/MM3 (ref 140–450)
PMV BLD AUTO: 9.9 FL (ref 6–12)
POTASSIUM SERPL-SCNC: 4.5 MMOL/L (ref 3.5–5.2)
PROT SERPL-MCNC: 7.8 G/DL (ref 6–8.5)
RBC # BLD AUTO: 4.48 10*6/MM3 (ref 3.77–5.28)
SODIUM SERPL-SCNC: 139 MMOL/L (ref 136–145)
WBC NRBC COR # BLD AUTO: 7.69 10*3/MM3 (ref 3.4–10.8)

## 2024-09-24 PROCEDURE — 93325 DOPPLER ECHO COLOR FLOW MAPG: CPT

## 2024-09-24 PROCEDURE — 25510000001 PERFLUTREN 6.52 MG/ML SUSPENSION 2 ML VIAL: Performed by: INTERNAL MEDICINE

## 2024-09-24 PROCEDURE — 96417 CHEMO IV INFUS EACH ADDL SEQ: CPT

## 2024-09-24 PROCEDURE — 93321 DOPPLER ECHO F-UP/LMTD STD: CPT | Performed by: INTERNAL MEDICINE

## 2024-09-24 PROCEDURE — 93356 MYOCRD STRAIN IMG SPCKL TRCK: CPT

## 2024-09-24 PROCEDURE — 25810000003 SODIUM CHLORIDE 0.9 % SOLUTION 250 ML FLEX CONT: Performed by: NURSE PRACTITIONER

## 2024-09-24 PROCEDURE — 99214 OFFICE O/P EST MOD 30 MIN: CPT | Performed by: NURSE PRACTITIONER

## 2024-09-24 PROCEDURE — 93308 TTE F-UP OR LMTD: CPT | Performed by: INTERNAL MEDICINE

## 2024-09-24 PROCEDURE — 96413 CHEMO IV INFUSION 1 HR: CPT

## 2024-09-24 PROCEDURE — 93321 DOPPLER ECHO F-UP/LMTD STD: CPT

## 2024-09-24 PROCEDURE — 25810000003 SODIUM CHLORIDE 0.9 % SOLUTION: Performed by: NURSE PRACTITIONER

## 2024-09-24 PROCEDURE — 93000 ELECTROCARDIOGRAM COMPLETE: CPT | Performed by: NURSE PRACTITIONER

## 2024-09-24 PROCEDURE — 93308 TTE F-UP OR LMTD: CPT

## 2024-09-24 PROCEDURE — 80053 COMPREHEN METABOLIC PANEL: CPT

## 2024-09-24 PROCEDURE — 93325 DOPPLER ECHO COLOR FLOW MAPG: CPT | Performed by: INTERNAL MEDICINE

## 2024-09-24 PROCEDURE — 93356 MYOCRD STRAIN IMG SPCKL TRCK: CPT | Performed by: INTERNAL MEDICINE

## 2024-09-24 PROCEDURE — 85025 COMPLETE CBC W/AUTO DIFF WBC: CPT

## 2024-09-24 PROCEDURE — 25010000002 TRASTUZUMAB-DTTB 420 MG RECONSTITUTED SOLUTION 1 EACH VIAL: Performed by: NURSE PRACTITIONER

## 2024-09-24 PROCEDURE — 25010000002 HEPARIN LOCK FLUSH PER 10 UNITS: Performed by: INTERNAL MEDICINE

## 2024-09-24 PROCEDURE — 25010000002 PERTUZUMAB 420 MG/14ML SOLUTION 420 MG VIAL: Performed by: NURSE PRACTITIONER

## 2024-09-24 RX ORDER — SODIUM CHLORIDE 0.9 % (FLUSH) 0.9 %
10 SYRINGE (ML) INJECTION AS NEEDED
Status: DISCONTINUED | OUTPATIENT
Start: 2024-09-24 | End: 2024-09-24 | Stop reason: HOSPADM

## 2024-09-24 RX ORDER — SODIUM CHLORIDE 9 MG/ML
20 INJECTION, SOLUTION INTRAVENOUS ONCE
Status: CANCELLED | OUTPATIENT
Start: 2024-09-24

## 2024-09-24 RX ORDER — HEPARIN SODIUM (PORCINE) LOCK FLUSH IV SOLN 100 UNIT/ML 100 UNIT/ML
500 SOLUTION INTRAVENOUS AS NEEDED
OUTPATIENT
Start: 2024-09-24

## 2024-09-24 RX ORDER — SODIUM CHLORIDE 9 MG/ML
20 INJECTION, SOLUTION INTRAVENOUS ONCE
Status: COMPLETED | OUTPATIENT
Start: 2024-09-24 | End: 2024-09-24

## 2024-09-24 RX ORDER — SODIUM CHLORIDE 0.9 % (FLUSH) 0.9 %
10 SYRINGE (ML) INJECTION AS NEEDED
OUTPATIENT
Start: 2024-09-24

## 2024-09-24 RX ORDER — HEPARIN SODIUM (PORCINE) LOCK FLUSH IV SOLN 100 UNIT/ML 100 UNIT/ML
500 SOLUTION INTRAVENOUS AS NEEDED
Status: DISCONTINUED | OUTPATIENT
Start: 2024-09-24 | End: 2024-09-24 | Stop reason: HOSPADM

## 2024-09-24 RX ADMIN — Medication 10 ML: at 13:06

## 2024-09-24 RX ADMIN — Medication 500 UNITS: at 13:06

## 2024-09-24 RX ADMIN — PERTUZUMAB 420 MG: 30 INJECTION, SOLUTION, CONCENTRATE INTRAVENOUS at 11:29

## 2024-09-24 RX ADMIN — SODIUM CHLORIDE 20 ML/HR: 9 INJECTION, SOLUTION INTRAVENOUS at 10:52

## 2024-09-24 RX ADMIN — PERFLUTREN 2 ML: 6.52 INJECTION, SUSPENSION INTRAVENOUS at 08:26

## 2024-09-24 RX ADMIN — ONTRUZANT 330 MG: KIT INTRAVENOUS at 12:32

## 2024-09-30 ENCOUNTER — PATIENT OUTREACH (OUTPATIENT)
Dept: OTHER | Facility: HOSPITAL | Age: 69
End: 2024-09-30
Payer: COMMERCIAL

## 2024-09-30 ENCOUNTER — HOSPITAL ENCOUNTER (OUTPATIENT)
Dept: OCCUPATIONAL THERAPY | Facility: HOSPITAL | Age: 69
Discharge: HOME | End: 2024-09-30
Admitting: SURGERY
Payer: COMMERCIAL

## 2024-09-30 DIAGNOSIS — C50.912 BILATERAL MALIGNANT NEOPLASM OF BREAST IN FEMALE, UNSPECIFIED ESTROGEN RECEPTOR STATUS, UNSPECIFIED SITE OF BREAST: ICD-10-CM

## 2024-09-30 DIAGNOSIS — M79.602 PAIN OF LEFT UPPER EXTREMITY: ICD-10-CM

## 2024-09-30 DIAGNOSIS — Z91.89 AT RISK FOR LYMPHEDEMA: Primary | ICD-10-CM

## 2024-09-30 DIAGNOSIS — M25.612 DECREASED RANGE OF MOTION OF LEFT SHOULDER: ICD-10-CM

## 2024-09-30 DIAGNOSIS — M25.611 DECREASED RANGE OF MOTION OF RIGHT SHOULDER: ICD-10-CM

## 2024-09-30 DIAGNOSIS — C50.911 BILATERAL MALIGNANT NEOPLASM OF BREAST IN FEMALE, UNSPECIFIED ESTROGEN RECEPTOR STATUS, UNSPECIFIED SITE OF BREAST: ICD-10-CM

## 2024-09-30 DIAGNOSIS — M79.601 PAIN OF RIGHT UPPER EXTREMITY: ICD-10-CM

## 2024-09-30 PROCEDURE — 97168 OT RE-EVAL EST PLAN CARE: CPT

## 2024-09-30 PROCEDURE — 97535 SELF CARE MNGMENT TRAINING: CPT

## 2024-09-30 NOTE — THERAPY RE-EVALUATION
"Outpatient Occupational Therapy Lymphedema Re-Evaluation  [facility]     Patient Name: Felicia Boyle  : 1955  MRN: [id number]  Today's Date: 2024      Visit Date: 2024    Patient Active Problem List   Diagnosis    Age-related osteoporosis without current pathological fracture    FH: breast cancer in first degree relative    Post-menopausal    Type 2 diabetes mellitus    Vitamin D deficiency    Mixed hyperlipidemia    Breast cancer of lower-outer quadrant of right female breast    Encounter for fitting and adjustment of vascular catheter    DCIS (ductal carcinoma in situ) of breast    Bilateral malignant neoplasm of breast in female    S/P bilateral mastectomy        Past Medical History:   Diagnosis Date    Anxiety about health     Breast cancer of lower-outer quadrant of right female breast 2024    DCIS (ductal carcinoma in situ) of breast 03/15/2024    Diverticulosis     History of chemotherapy 2024    Mixed hyperlipidemia 2019    DIET CONTROLLED    Osteoporosis     Port-A-Cath in place     Type 2 diabetes mellitus         Past Surgical History:   Procedure Laterality Date    BREAST BIOPSY Left     Excisional biopsy in the [facility]    BREAST BIOPSY  2024    CATARACT EXTRACTION W/ INTRAOCULAR LENS IMPLANT Bilateral     CHOLECYSTECTOMY  2012    COLON RESECTION Right     COLONOSCOPY N/A 2021    Procedure: COLONOSCOPY TO ANASTAMOSIS/TI;  Surgeon: Angelo Gonsalez MD;  Location: [facility] ENDOSCOPY;  Service: Gastroenterology;  Laterality: N/A;  PRE: SCREENING  POST: NORMAL POST-OP ANATOMY    ENDOSCOPY          ENDOSCOPY N/A 03/10/2017    Procedure: ESOPHAGOGASTRODUODENOSCOPY WITH BIOPSY AND PETER DILATATION 54\";  Surgeon: Angelo Gonsalez MD;  Location: [facility] ENDOSCOPY;  Service:     ENDOSCOPY N/A 2021    Procedure: ESOPHAGOGASTRODUODENOSCOPY WITH BIOPSIES, 54FR PETER DILATATION;  Surgeon: Angelo Gonsalez MD;  Location: [facility] ENDOSCOPY;  Service: " Gastroenterology;  Laterality: N/A;  PRE: DYSPHAGIA  POST: GASTRITIS, ESOPHAGEAL RING    MASTECTOMY W/ SENTINEL NODE BIOPSY Bilateral 8/15/2024    Procedure: Bilateral total mastectomy, bilateral sentinel node biopsy, possible axillary node dissection, no reconstruction.;  Surgeon: Abeba Manrique MD;  Location: [facility];  Service: General;  Laterality: Bilateral;    VENOUS ACCESS DEVICE (PORT) INSERTION Left 3/14/2024    Procedure: INSERTION OF PORTACATH;  Surgeon: Abeba Manrique MD;  Location: [facility] OR;  Service: General;  Laterality: Left;         Visit Dx:     ICD-10-CM ICD-9-CM   1. At risk for lymphedema  Z91.89 V49.89   2. Bilateral malignant neoplasm of breast in female, unspecified estrogen receptor status, unspecified site of breast  C50.911 174.9    C50.912    3. Decreased range of motion of left shoulder  M25.612 719.51   4. Decreased range of motion of right shoulder  M25.611 719.51   5. Pain of left upper extremity  M79.602 729.5   6. Pain of right upper extremity  M79.601 729.5            Lymphedema       Row Name 09/30/24 1000             Subjective Pain    Able to rate subjective pain? yes  -RE      Pre-Treatment Pain Level 7  -RE      Post-Treatment Pain Level 7  -RE      Subjective Pain Comment With upper extremity movement  -RE         Lymphedema Assessment    Lymphedema Classification RUE:;LUE:;at risk/stage 0  -RE      Lymphedema Cancer Related Sx bilateral;simple mastectomy;sentinel node biopsy  -RE      Lymphedema Surgery Comments No reconstruction  -RE      Lymph Nodes Removed # --  Right: 0/4 and left: 0/1  -RE      Positive Lymph Nodes # 0  -RE      Chemo Received yes  -RE      Chemo Treatments #/Timeframe Neoadjuvant  -RE      Radiation Therapy Received yes  -RE      Infections or Cellulitis? 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  -RE         Lymphedema Measurements    Measurement Type(s) Quick Girth  -RE      Quick Girth Areas Upper extremities  -RE         LUE Quick Girth (cm)    Axilla 30.3 cm  -RE      Mid upper arm 27.3 cm  -RE      Elbow 23.3 cm  -RE      Mid forearm 23.5 cm  -RE      Wrist crease 14.5 cm  -RE      Met-heads 17.3 cm  -RE      LUE Quick Girth Total 136.2  -RE         RUE Quick Girth (cm)    Axilla 29.5 cm  -RE      Mid upper arm 26.5 cm  -RE      Elbow 22.5 cm  -RE      Mid forearm 22 cm  -RE      Wrist crease 14.7 cm  -RE      Met-heads 17.5 cm  -RE      RUE Quick Girth Total 132.7  -RE                User Key  (r) = Recorded By, (t) = Taken By, (c) = Cosigned By      Initials Name Provider Type    Emy Mccarthy, YARED Occupational Therapist                            Therapy Education  Education Details: Patient is very anxious and concerned about her pain.  I reassured her that her progress is normal and that her pain is reasonable.  I provided her with a simple home exercise program to begin to improve her active range of motion.  Patient had several questions which I believe were answered to her satisfaction.  Her  was present and is very supportive.  Access Code: [email or web address]  URL: [email or web address]  Date: 09/30/2024  Prepared by: Emy Gonzales    Exercises  - Shoulder External Rotation and Scapular Retraction  - 1 x daily - 7 x weekly - 3 sets - 10 reps  - Supine Chest Stretch with Elbows Bent  - 1 x daily - 7 x weekly - 3 sets - 10 reps  - Supine Shoulder  Flexion Extension Full Range AROM  - 1 x daily - 7 x weekly - 3 sets - 10 reps  - Supine Shoulder Abduction AROM  - 1 x daily - 7 x weekly - 3 sets - 10 reps  Given: Other (comment), Symptoms/condition management  Program: New  How Provided: Verbal, Written  Provided to: Patient  Level of Understanding: Teach back education performed, Verbalized  70133 - OT Self Care/Mgmt Minutes: 15         OT Goals       Row Name 09/30/24 1800          OT Short Term Goals    STG Date to Achieve 10/30/24  -RE     STG 1 Patient will demonstrate proper awareness of “What is Lymphedema?” and “Healthy Habits” for improved prevention, management, care of symptoms and ease of transition to self-care of condition.  -RE     STG 1 Progress New  -RE     STG 2 Pt will demonstrate understanding of use of compression sleeve for edema prevention, exercise and air travel.  -RE     STG 2 Progress New  -RE     STG 3 Patient independent and compliant with initial home exercise program focused on gentle AROM and stretching to improve AROM to pre-surgical level.  -RE     STG 3 Progress New  -RE        Long Term Goals    LTG Date to Achieve 12/30/24  -RE     LTG 1 Patient to improve DASH/ QuickDASH score by 10 points in 4 weeks.  -RE     LTG 1 Progress New  -RE     LTG 2 Patient will participate in bioimpedance scans every 3 months as a method of early detection of lymphedema to allow for early intervention.  -RE     LTG 2 Progress New  -RE     LTG 3 Patient's bioimpedance score to remain below 6.5 for decreased risk of stage II lymphedema.  -RE     LTG 3 Progress New  -RE     LTG 4 Patient to demonstrate increased bilateral shoulder flexion to 160 to improve functional UE use and to restore pre operative AROM per patient perception.  -RE     LTG 4 Progress New  -RE     LTG 5 Patient to demonstrate increased bilateral shoulder abduction to 160 to improve functional UE use and to restore preoperative AROM per patient perception.  -RE     LTG 5 Progress  New  -RE        Time Calculation    OT Goal Re-Cert Due Date 12/30/24  -RE               User Key  (r) = Recorded By, (t) = Taken By, (c) = Cosigned By      Initials Name Provider Type    RE Emy Gonzales OTR Occupational Therapist                     OT Assessment/Plan       Row Name 09/30/24 7835          OT Assessment    Impairments Impaired lymphatic circulation;Pain;Range of motion  -RE     Assessment Comments Patient is a 68-year-old female who was recently diagnosed with bilateral breast cancers.  She presents to therapy in evolving condition and is status post bilateral mastectomies with bilateral sentinel lymph node biopsies.  She did not undergo reconstruction.  She is at increased risk of postmastectomy lymphedema syndrome in both upper extremities due to breast surgery and lymph node removal.  The right upper extremity has increased risk versus the left due to the removal of 4 lymph nodes versus 1.  She presents with postoperative decreased range of motion, flexibility, strength, function, and increased pain.  Currently she has negative signs and symptoms of lymphedema or axillary cording.  We will perform a postop baseline bioimpedance assessment at her next visit.  She will benefit from skilled formal breast care Occupational Therapy at this time to address listed dysfunctions and to follow for lymphedema prevention and surveillance.  -RE     Please refer to paper survey for additional self-reported information No  -RE     OT Diagnosis At risk for lymphedema, decreased-- shoulder AROM  -RE     OT Rehab Potential Good  -RE     Patient/caregiver participated in establishment of treatment plan and goals Yes  -RE     Patient would benefit from skilled therapy intervention Yes  -RE        OT Plan    Predicted Duration of Therapy Intervention (OT) Follow-up in 2 to 3 weeks for reassessment of range of motion and bioimpedance.  Patient will follow-up for bioimpedance every 3 months.  If patient's active  range of motion is still significantly limited recommend 1 visit weekly for 4 to 6 weeks.  -RE     Planned CPT's? OT SELF CARE/MGMT/TRAIN 15 MIN: 09001;OT RE-EVAL: 80094  -RE     Planned Therapy Interventions (Optional Details) home exercise program;manual therapy techniques;patient/family education;ROM (Range of Motion);stretching;other (see comments)  Bioimpedance  -RE     OT Plan Comments Follow-up in 2 to 3 weeks  -RE               User Key  (r) = Recorded By, (t) = Taken By, (c) = Cosigned By      Initials Name Provider Type    RE Emy Gonzales OTR Occupational Therapist                              Time Calculation:   OT Start Time: 1030  OT Stop Time: 1115  OT Time Calculation (min): 45 min  Total Timed Code Minutes- OT: 15 minute(s)  Timed Charges  65366 - OT Self Care/Mgmt Minutes: 15  Untimed Charges  OT Eval/Re-eval Minutes: 30  Total Minutes  Timed Charges Total Minutes: 15  Untimed Charges Total Minutes: 30   Total Minutes: 45     Therapy Charges for Today       Code Description Service Date Service Provider Modifiers Qty    [id number]  OT SELF CARE/MGMT/TRAIN EA 15 MIN 9/30/2024 Emy Gonzales OTR GO 1    [id number]  OT RE-EVAL 2 9/30/2024 Emy Gonzales OTR GO 1              Dictated utilizing Dragon dictation:  Much of this encounter note is an electronic transcription/translation of spoken language to printed text. The electronic translation of spoken language may permit erroneous, or at times, nonsensical words or phrases to be inadvertently transcribed; Although I have reviewed the note for such errors, some may still exist.        YARED Kelley  9/30/2024

## 2024-09-30 NOTE — PROGRESS NOTES
Ms. Boyle and her  [facility] for knitted [facility]ers and information on post op things. She stated her incision site was itchy and she wanted to know what type of things she could use to help. She is 6 weeks post bilateral mastectomy and incision site is closed and healing. We discussed Vit E as an option, but called Dr. Manrique's office to verify and they stated a lotion without fragrance like CeraVe may be best. Let them know CeraVe may be a better option to start with. They were thankful for the help.

## 2024-10-10 DIAGNOSIS — Z17.0 MALIGNANT NEOPLASM OF LOWER-OUTER QUADRANT OF RIGHT BREAST OF FEMALE, ESTROGEN RECEPTOR POSITIVE: Primary | ICD-10-CM

## 2024-10-10 DIAGNOSIS — Z90.13 ABSENCE OF BREAST, ACQUIRED, BILATERAL: ICD-10-CM

## 2024-10-10 DIAGNOSIS — C50.511 MALIGNANT NEOPLASM OF LOWER-OUTER QUADRANT OF RIGHT BREAST OF FEMALE, ESTROGEN RECEPTOR POSITIVE: Primary | ICD-10-CM

## 2024-10-15 ENCOUNTER — INFUSION (OUTPATIENT)
Dept: ONCOLOGY | Facility: HOSPITAL | Age: 69
End: 2024-10-15
Payer: COMMERCIAL

## 2024-10-15 VITALS
BODY MASS INDEX: 23.32 KG/M2 | WEIGHT: 119.4 LBS | OXYGEN SATURATION: 94 % | HEART RATE: 85 BPM | SYSTOLIC BLOOD PRESSURE: 123 MMHG | RESPIRATION RATE: 16 BRPM | DIASTOLIC BLOOD PRESSURE: 82 MMHG | TEMPERATURE: 97.5 F

## 2024-10-15 DIAGNOSIS — C50.511 MALIGNANT NEOPLASM OF LOWER-OUTER QUADRANT OF RIGHT BREAST OF FEMALE, ESTROGEN RECEPTOR POSITIVE: ICD-10-CM

## 2024-10-15 DIAGNOSIS — Z17.0 MALIGNANT NEOPLASM OF LOWER-OUTER QUADRANT OF RIGHT BREAST OF FEMALE, ESTROGEN RECEPTOR POSITIVE: ICD-10-CM

## 2024-10-15 LAB
ALBUMIN SERPL-MCNC: 4.1 G/DL (ref 3.5–5.2)
ALBUMIN/GLOB SERPL: 1.5 G/DL
ALP SERPL-CCNC: 79 U/L (ref 39–117)
ALT SERPL W P-5'-P-CCNC: 32 U/L (ref 1–33)
ANION GAP SERPL CALCULATED.3IONS-SCNC: 11.4 MMOL/L (ref 5–15)
AST SERPL-CCNC: 24 U/L (ref 1–32)
BASOPHILS # BLD AUTO: 0.04 10*3/MM3 (ref 0–0.2)
BASOPHILS NFR BLD AUTO: 0.5 % (ref 0–1.5)
BILIRUB SERPL-MCNC: 0.2 MG/DL (ref 0–1.2)
BUN SERPL-MCNC: 20 MG/DL (ref 8–23)
BUN/CREAT SERPL: 23.8 (ref 7–25)
CALCIUM SPEC-SCNC: 9 MG/DL (ref 8.6–10.5)
CHLORIDE SERPL-SCNC: 103 MMOL/L (ref 98–107)
CO2 SERPL-SCNC: 24.6 MMOL/L (ref 22–29)
CREAT SERPL-MCNC: 0.84 MG/DL (ref 0.57–1)
DEPRECATED RDW RBC AUTO: 42.5 FL (ref 37–54)
EGFRCR SERPLBLD CKD-EPI 2021: 74.9 ML/MIN/1.73
EOSINOPHIL # BLD AUTO: 0.06 10*3/MM3 (ref 0–0.4)
EOSINOPHIL NFR BLD AUTO: 0.8 % (ref 0.3–6.2)
ERYTHROCYTE [DISTWIDTH] IN BLOOD BY AUTOMATED COUNT: 13 % (ref 12.3–15.4)
GLOBULIN UR ELPH-MCNC: 2.8 GM/DL
GLUCOSE SERPL-MCNC: 181 MG/DL (ref 65–99)
HCT VFR BLD AUTO: 38.3 % (ref 34–46.6)
HGB BLD-MCNC: 12.2 G/DL (ref 12–15.9)
IMM GRANULOCYTES # BLD AUTO: 0.03 10*3/MM3 (ref 0–0.05)
IMM GRANULOCYTES NFR BLD AUTO: 0.4 % (ref 0–0.5)
LYMPHOCYTES # BLD AUTO: 2.53 10*3/MM3 (ref 0.7–3.1)
LYMPHOCYTES NFR BLD AUTO: 34.6 % (ref 19.6–45.3)
MCH RBC QN AUTO: 28.3 PG (ref 26.6–33)
MCHC RBC AUTO-ENTMCNC: 31.9 G/DL (ref 31.5–35.7)
MCV RBC AUTO: 88.9 FL (ref 79–97)
MONOCYTES # BLD AUTO: 0.57 10*3/MM3 (ref 0.1–0.9)
MONOCYTES NFR BLD AUTO: 7.8 % (ref 5–12)
NEUTROPHILS NFR BLD AUTO: 4.08 10*3/MM3 (ref 1.7–7)
NEUTROPHILS NFR BLD AUTO: 55.9 % (ref 42.7–76)
NRBC BLD AUTO-RTO: 0 /100 WBC (ref 0–0.2)
PLATELET # BLD AUTO: 294 10*3/MM3 (ref 140–450)
PMV BLD AUTO: 10 FL (ref 6–12)
POTASSIUM SERPL-SCNC: 3.7 MMOL/L (ref 3.5–5.2)
PROT SERPL-MCNC: 6.9 G/DL (ref 6–8.5)
RBC # BLD AUTO: 4.31 10*6/MM3 (ref 3.77–5.28)
SODIUM SERPL-SCNC: 139 MMOL/L (ref 136–145)
WBC NRBC COR # BLD AUTO: 7.31 10*3/MM3 (ref 3.4–10.8)

## 2024-10-15 PROCEDURE — G0463 HOSPITAL OUTPT CLINIC VISIT: HCPCS

## 2024-10-15 PROCEDURE — 85025 COMPLETE CBC W/AUTO DIFF WBC: CPT

## 2024-10-15 PROCEDURE — 80053 COMPREHEN METABOLIC PANEL: CPT

## 2024-10-15 RX ORDER — LIDOCAINE/PRILOCAINE 2.5 %-2.5%
1 CREAM (GRAM) TOPICAL TAKE AS DIRECTED
Qty: 30 G | Refills: 5 | Status: SHIPPED | OUTPATIENT
Start: 2024-10-15

## 2024-10-15 RX ORDER — VITAMIN B COMPLEX
1 CAPSULE ORAL DAILY
Qty: 30 CAPSULE | Refills: 1 | Status: SHIPPED | OUTPATIENT
Start: 2024-10-15 | End: 2025-10-15

## 2024-10-15 NOTE — PROGRESS NOTES
Pt reports constant numbness/tingling in both hands and feet. Both palms of hands and bottom of feet appear red. Denies taking anything for this. D/w Dr. Renteria. V/o to hold treatment today and have pt return in 1 week for possible treatment. MD wants pt to begin b complex vitamin and to apply urea cream. Pt also reports itching to incisions. MD wants the pt to try vitamin E cream. The pt v/u. Sent scheduling and clinic RN about changing appts.

## 2024-10-17 NOTE — PROGRESS NOTES
Subjective   Felicia Boyle is a 69 y.o. female.  Referred by Dr. Manrique for right breast invasive ductal carcinoma, HER2 positive, left breast ductal carcinoma in situ    History of Present Illness     Ms. Boyle is a 68-year-old postmenopausal [place] lady who is fairly healthy without any significant comorbidities palpated of right breast mass in the latter part of  which was further worked up with diagnostic mammogram and ultrasound and subsequently a biopsy which confirmed invasive ductal carcinoma which was ER/IL negative and HER2 positive.    Family history significant for her 2 sisters with breast cancer at the age of 38 and 48 respectively.  Her older sister  of metastatic breast cancer but her younger sister is doing well.  Patient underwent genetic testing in 2017 which was negative due to her family history.    She had regular screening mammograms up until 2019 but subsequently stopped having annual gynecological visits and therefore did not have any further screening mammograms up until the most recent diagnostic mammogram for the new palpable abnormality.    2024-bilateral diagnostic mammogram and right breast ultrasound  The breast heterogeneously dense.  Left breast with a scar marker in the lower left breast dating back to .  No new findings in the left breast.    Right breast at 8:00, 8 cm from the nipple there is a 1.7 x 1.4 x 1.6 cm oval high density mass with partially circumscribed and partially indistinct margins.    On the ultrasound the mass measures 1.7 x 1 x 1.3 cm.    No abnormal right axilla lymphadenopathy.    2024-right breast ultrasound-guided biopsy  Pathology consistent with invasive ductal carcinoma  Grade 3  9.3 mm of invasive disease on the core biopsy  ER negative  IL negative  HER2 3+ on immunohistochemistry, 95%  Ki-67 70%    2024-bilateral breast MRI  In the right breast at 8:00, 8 cm from the nipple there is a irregular enhancing mass which  measures 1.8 x 2 x 1.7 cm.  This corresponds to the biopsy-proven malignancy.  Extending anteriorly away from the MT margin of the mass there is an irregular linear enhancement which measures 3.5 x 1 x 0.7 cm.  The whole area including the mass measures 4.7 cm in AP dimension, 1.7 in mediolateral and 2.4 cm in the superior-inferior dimension.    At 12:00, 3 cm from the nipple there is an irregular non-mass enhancement which measures 1 x 1.1 x 1.2 cm.  No mammographic correlate is appreciated.    No other suspicious areas of enhancement in the right breast or right axilla or internal mammary chain.    In the left breast, middle third at 11:30 position, 3.7 cm from the nipple there is an irregular area of non-mass enhancement which measures 1.3 x 1.1 x 1 cm.  There is suspected architectural distortion.  No other areas of abnormal enhancement seen in the left breast of the left axilla or the internal mammary chain.    2/26/2024-additional MRI guided biopsies    1.left breast 12:00-intermediate grade ductal carcinoma in situ with apocrine features  ER +91 to 100% strong  ND +81 to 90% strong  Ki-67 15%    2. Right breast 12 o'clock position MRI guided biopsy of the non-mass enhancement-sclerosing adenosis with associated calcifications  No atypical hyperplasia in situ or invasive carcinoma    3.right breast 8:00 MRI guided biopsies of the linear enhancement is consistent with high-grade ductal carcinoma in situ  DCIS involves multiple tissue cores measuring up to 3 mm.  Sclerosing adenosis and usual ductal hyperplasia with associated microcalcifications.    The case was discussed in tumor board and Dr. Patel thought that they could be more invasive disease in the 4.7 cm of linear enhancement noted in the right breast at the site of the mass.  Therefore it was decided to proceed with neoadjuvant chemotherapy.    Patient was a former smoker and smoked for about 40 years, half a pack per week, quit about 1 month ago.   Denies any alcohol use.    Week 1 Taxol Herceptin and Perjeta was planned for 3/18/2024.  Patient received Herceptin and Perjeta and had some chills for which she received Solu-Medrol and responded well to that.  However after infusing only 1 cc of Taxol patient experienced a severe anaphylactic reaction due to which the infusion was stopped permanently and she received an additional dose of Solu-Medrol and Benadryl.  Symptoms resolved subsequently and patient was discharged home safely.    Due to this recent treatment has been changed to Abraxane along with Herceptin and Perjeta.  3/25/2024-patient is due for her first treatment with Abraxane.    She is extremely anxious given the severe hypersensitivity reaction that she experienced with Taxol.  She is also complaining of increased belching and heartburn since her last chemotherapy.  She is complaining of severe diarrhea with started last night.  Had multiple loose stools.    5/11/2024-MRI of the breast-images independently reviewed and interpreted by me.  Interval near complete resolution of all suspicious enhancement in the posterior one third lower outer quadrant of the right breast at 8:00 corresponding to the biopsy-proven site of malignancy.  Who has been on masslike area of enhancement measuring 1.5 x 0.9 x 0.9 cm compared to 2 x 1.8 x 1.7 cm.  Mildly abnormal enhancement extending anteriorly away from the mass has resolved.  No other suspicious areas of enhancement noted in the right breast.  Left breast at 12:00 interval resolution of all suspicious enhancement.    8/15/2024-bilateral mastectomy and bilateral sentinel lymph node biopsy  Right total mastectomy  No residual invasive carcinoma  2 mm of DCIS high-grade  RCB 0, pathological complete response  All the margins are negative  Y pTis N0 M0  4 sentinel lymph nodes negative    Left breast mastectomy  Fat necrosis, biopsy site changes, sclerosing adenosis and changes consistent with treatment  effect.  No evidence of residual in situ or invasive carcinoma  1 x-ray lymph node negative    INTERVAL HISTORY  Felicia returns today for follow-up.  She was due for Herceptin and Perjeta last week, however this was held because patient was experiencing numbness/tingling in her hands and feet.  She was recommended to start on B complex vitamin.  She was also complaining of itching at her incision site, so was recommended to try vitamin E cream.  Since starting on the B complex vitamin, she has had improvement in her neuropathy.  It is no longer constant numbness/tingling, it is now intermittent and typically positional.  She also notes that the intensity of the numbness/tingling has decreased.  Her appetite remains good.  Bowels moving regularly.  She also continues to experience slight swelling to the lower extremities, recommended elevation and compression.  She denies cough, shortness of breath, palpitations.  Her weight is stable.  Denies fever, chills, nausea, vomiting.  Denies new or worsening pain.    The following portions of the patient's history were reviewed and updated as appropriate: allergies, current medications, past family history, past medical history, past social history, past surgical history, and problem list.    Past Medical History:   Diagnosis Date    Anxiety about health     Breast cancer of lower-outer quadrant of right female breast 02/14/2024    DCIS (ductal carcinoma in situ) of breast 03/15/2024    Diverticulosis     History of chemotherapy 06/22/2024    Mixed hyperlipidemia 05/19/2019    DIET CONTROLLED    Osteoporosis     Port-A-Cath in place     Type 2 diabetes mellitus         Past Surgical History:   Procedure Laterality Date    BREAST BIOPSY Left     Excisional biopsy in the [facility]    BREAST BIOPSY  03/2024    CATARACT EXTRACTION W/ INTRAOCULAR LENS IMPLANT Bilateral     CHOLECYSTECTOMY  2012    COLON RESECTION Right 2002    COLONOSCOPY N/A 04/28/2021    Procedure: COLONOSCOPY  "TO ANASTAMOSIS/TI;  Surgeon: Angelo Gonsalez MD;  Location: [facility] ENDOSCOPY;  Service: Gastroenterology;  Laterality: N/A;  PRE: SCREENING  POST: NORMAL POST-OP ANATOMY    ENDOSCOPY      2013    ENDOSCOPY N/A 03/10/2017    Procedure: ESOPHAGOGASTRODUODENOSCOPY WITH BIOPSY AND PETER DILATATION 54\";  Surgeon: Angelo Gonsalez MD;  Location: [facility] ENDOSCOPY;  Service:     ENDOSCOPY N/A 04/28/2021    Procedure: ESOPHAGOGASTRODUODENOSCOPY WITH BIOPSIES, 54FR PETER DILATATION;  Surgeon: Angelo Gonsalez MD;  Location: [facility] ENDOSCOPY;  Service: Gastroenterology;  Laterality: N/A;  PRE: DYSPHAGIA  POST: GASTRITIS, ESOPHAGEAL RING    MASTECTOMY W/ SENTINEL NODE BIOPSY Bilateral 8/15/2024    Procedure: Bilateral total mastectomy, bilateral sentinel node biopsy, possible axillary node dissection, no reconstruction.;  Surgeon: Abeba Manrique MD;  Location: [facility] MAIN OR;  Service: General;  Laterality: Bilateral;    VENOUS ACCESS DEVICE (PORT) INSERTION Left 3/14/2024    Procedure: INSERTION OF PORTACATH;  Surgeon: Abeba Manrique MD;  Location: [facility] MAIN OR;  Service: General;  Laterality: Left;        Family History   Problem Relation Age of Onset    Hypertension Mother     Diabetes Mother     Hypertension Father     Breast cancer Sister 48    Breast cancer Sister 38    No Known Problems Brother     No Known Problems Daughter     No Known Problems Son     No Known Problems Maternal Grandmother     No Known Problems Paternal Grandmother     No Known Problems Maternal Grandfather     No Known Problems Paternal Grandfather     Autism Grandson     Colon cancer Neg Hx     Rectal cancer Neg Hx     Endometrial cancer Neg Hx     Vaginal cancer Neg Hx     Cervical cancer Neg Hx     Ovarian cancer Neg Hx     Prostate cancer Neg Hx     Uterine cancer Neg Hx     Malig Hyperthermia Neg Hx         Social History     Socioeconomic History    Marital status:      Spouse name: Ryan   Tobacco Use    Smoking " "status: Former     Current packs/day: 0.00     Average packs/day: 0.3 packs/day for 15.0 years (3.8 ttl pk-yrs)     Types: Cigarettes     Quit date: 1/15/2024     Years since quittin.7     Passive exposure: Past    Smokeless tobacco: Never    Tobacco comments:     N/A   Vaping Use    Vaping status: Never Used   Substance and Sexual Activity    Alcohol use: Yes     Comment: RARE    Drug use: No    Sexual activity: Not Currently     Partners: Male     Birth control/protection: Tubal ligation        OB History          4    Para   3    Term   3       0    AB   1    Living   3         SAB   1    IAB   0    Ectopic   0    Molar   0    Multiple   0    Live Births   3               Age at menarche-16  Age at first live childbirth-18   4 para 3  1  Age at menopause-49  No use of hormone replacement therapy  No use of oral conceptive pills  Breast-feeding for 3 months    Allergies   Allergen Reactions    Paclitaxel Shortness Of Breath    Metronidazole Nausea And Vomiting      Review of systems as mentioned HPI otherwise negative    Objective   Blood pressure 151/68, pulse 82, temperature 97.6 °F (36.4 °C), resp. rate 16, height 152.4 cm (60\"), weight 54 kg (119 lb), SpO2 93%, not currently breastfeeding.   Limited due to video visit  Physical Exam  Vitals reviewed.   Constitutional:       Appearance: Normal appearance.   HENT:      Mouth/Throat:      Mouth: Mucous membranes are moist.      Pharynx: Oropharynx is clear.   Eyes:      Conjunctiva/sclera: Conjunctivae normal.      Pupils: Pupils are equal, round, and reactive to light.   Pulmonary:      Effort: Pulmonary effort is normal.   Musculoskeletal:         General: Normal range of motion.      Cervical back: Normal range of motion.   Skin:     General: Skin is warm and dry.      Findings: No rash (right groin redness with white film).   Neurological:      General: No focal deficit present.      Mental Status: She is alert and oriented to " person, place, and time.   Psychiatric:         Mood and Affect: Mood is anxious.         Behavior: Behavior normal.       Status post bilateral mastectomy without reconstruction    I have reexamined the patient and the results are consistent with the previously documented exam. DAY Flores      Results from last 7 days   Lab Units 10/22/24  0744 10/15/24  1239   WBC 10*3/mm3 7.99 7.31   NEUTROS ABS 10*3/mm3 4.90 4.08   HEMOGLOBIN g/dL 11.7* 12.2   HEMATOCRIT % 36.6 38.3   PLATELETS 10*3/mm3 262 294     Results from last 7 days   Lab Units 10/22/24  0744 10/15/24  1239   SODIUM mmol/L 140 139   POTASSIUM mmol/L 3.9 3.7   CHLORIDE mmol/L 106 103   CO2 mmol/L 22.3 24.6   BUN mg/dL 17 20   CREATININE mg/dL 0.68 0.84   CALCIUM mg/dL 9.3 9.0   ALBUMIN g/dL 4.1 4.1   BILIRUBIN mg/dL 0.2 0.2   ALK PHOS U/L 78 79   ALT (SGPT) U/L 31 32   AST (SGOT) U/L 22 24   GLUCOSE mg/dL 254* 181*                  No radiology results for the last 30 days.       Assessment & Plan     *Right breast invasive ductal carcinoma  Clinical T2 N0 M0, stage IIa, clinical and prognostic ER/RI negative, HER2 3+ immunohistochemistry, Ki-67 70%  On the MRI the mass measures 2 cm however there is linear enhancement which in the AP dimension measures 4.7 cm.  Additional biopsies of this linear enhancement was performed and consistent with DCIS  It is hard to delineate the amount of invasive disease as opposed to DCIS.  More likely than not invasive component is likely limited to the mass which is about 2-1/2 cm on exam.  Since the tumor is greater than 2 cm I think it is reasonable to proceed with neoadjuvant chemotherapy.  We discussed proceeding with weekly Taxol along with Herceptin and Perjeta every 3 weeks.  3/18/2024-received first dose of Herceptin and Perjeta, had a severe hypersensitivity reaction to Taxol and hence Taxol was discontinued  3/25/2024-Labs reviewed and stable to proceed with chemotherapy  Patient is extremely anxious  today after experiencing the severe hypersensitivity reaction last week.  Reassured patient that this is unlikely to happen with Abraxane and we will premedicate with extra Solu-Medrol..  4/1/2024 patient returns for cycle 1 day 15 Abraxane.,  Left chest, left shoulder at times.  This has come and gone over the past week and is not associated with any shortness of breath, numbness or tingling.  She states when her chest is hurting if she presses on the area it gets better.  She notices it at different points during the day.  It feels more like an ache and is not a sharp pain.  She states she has been sleeping in a different position due to the port which could be causing some muscular discomfort.  She has noticed the discomfort at times when she takes a deep breath however is able to get deep breath here in the office did not have any pain.  She is not having any pain today at all.  She has taken her hydrocodone maybe 3-4 times over the past week and has not needed it multiple times a day.  CMP resulted today with AST elevated at 47 and .  Bilirubin remains normal at 0.4 with alkaline phosphatase elevated to 160.  Reviewed with Dr. Loredo and will hold treatment today.  She will try to limit any acetaminophen containing products.  She did have a few days of diarrhea that she is unsure if it was related to the Abraxane versus a GI bug as her  had the same symptoms.  Neither had chills or fevers.  She is also having some dysuria today we have a urinalysis with culture pending.  4/8/2024: Due for cycle 2-day 1 Abraxane, Herceptin, Perjeta.  Did not receive cycle 1 day 15 Abraxane due to elevated LFTs as detailed above.  LFTs have significantly improved, AST 28, ALT 58.  Reviewed with Dr. Renteria will proceed with Abraxane only today, will reduce Abraxane by 20%.  4/15/2024-patient is scheduled for week 4 of chemotherapy today.  Labs reviewed and overall stable except for an ALT of 45.  She will proceed with  "planned chemotherapy.  4/22/2024: after last chemotherapy patient experienced 3 episodes of \"zapping\" in her fingertips and soles of feet that last a few seconds then resolved.  Has not experienced this sensation in at least 3 days.  No interference with daily activities.  Will continue Abraxane at previous 20% dose reduction.  She is doing cold therapy for her hands/feet.  Will need to monitor closely for neuropathy.  4/29/2024-chemotherapy held as patient reported worsening neuropathy and dropping things.  5/6/2024-presents today for evaluation of the neuropathy and to determine if she can resume chemotherapy.  She reports that the neuropathy has improved some.  She is mainly perceiving pain in her fingers and tingling and numbness.   She however tells me that she has not been dropping things anymore.  She is able to do other fine motor activities just fine.  5/6/2024-patient did receive Abraxane with a 20% dose reduction.  5/13/2024-presents today for the neck cycle of Abraxane and since her last treatment she has not had any worsening of neuropathy.  We also started her on gabapentin 300 mg nightly.  Neuropathy overall stable.  Labs reviewed and she does have elevated LFTs however no further dose reduction needed.  Will proceed with planned chemotherapy today.  5/11/2024-MRI of the breast shows near complete resolution of the non-mass enhancement and areas of malignancy in both right and left breast.  This is reassuring and if patient has any further worsening of neuropathy then we could discontinue Abraxane and she will proceed with surgery and continue HER2 directed therapy.  5/28/2024: Cycle 4-day 8 Abraxane only, continue previous 20% dose reduction.  Continues on gabapentin, neuropathy stable.  6/10/2024-scheduled for Abraxane Herceptin and Perjeta.  Neuropathy overall stable.  She will continue with 20% dose reduction. She has 1 more week of Abraxane left following which she will have to proceed with " surgery followed by adjuvant HER2 directed therapy.  Patient is very reluctant to undergo surgery.  Communicated with Dr. Manrique and she will be scheduled to see her after completion of the last treatment of Abraxane.  Since she had a recent MRI she may not require another MRI   Proceed with last dose of Abraxane today 6/17/2024. Maintain previous dosing with 20% dose reduction.   7/22/2024 patient returns today for Herceptin and Perjeta which she continues once every 3 weeks.  She is scheduled for bilateral mastectomy on 8/15/2024 with Dr. Manrique.  8/12/2024 patient returns today to proceed with Herceptin and Perjeta.  She is scheduled for bilateral mastectomy with Dr. Manrique on 8/15/2024.  Review of Dr. Renteria today we will proceed with treatment.  8/15/2024-status post bilateral mastectomy with residual DCIS on the right side measuring 2 mm, high-grade, Y pTis N0 M0  10/15/2024: Herceptin and Perjeta held as patient was complaining of numbness/tingling in both hands and feet.  Recommended starting B complex vitamin.  10/21/2024: Neuropathy improved after starting B complex vitamin.  Was previously constant, now intermittent and typically occurs with certain positions.  The intensity of numbness/tingling has also reduced.  Will resume Herceptin and Perjeta today and monitor neuropathy closely.    *Left breast ductal carcinoma in situ  Intermed  She has been having some discomfort in her left shoulder bladeiate grade with apocrine features  ER/[place] strongly positive  Plan is for her to undergo bilateral mastectomy.  If she does undergo bilateral mastectomy then that would be curative and she would not require any endocrine therapy subsequently unless there is any upstaging of the malignancy  5/11/2024-MRI of the breast with near complete resolution of the non-mass enhancement.  Status post left mastectomy and sentinel lymph node biopsy with no residual disease    *Severe diarrhea  Started last night  Patient  has not used any Imodium  We will administer 1 L of normal saline today  Imodium will be given here today  She will start entegrade  Patient has not been using any Imodium.  Encouraged her to use Imodium up to 8 tablets in a day.  Diarrhea resolved completely     *GERD  Protonix with improvement  Continue the same    *Cardiac health  Referral to cardio oncology placed  3/15/2024 echocardiogram with an ejection fraction of 75.5%, LV strain is normal at -24.2%  She has met with Dr. Peguero , started on bisoprolol   6/17/2024 ejection fraction 70%, LV strain -22.4%  9/24/2024 echocardiogram showing calculated LVEF at 75.3% and GLS -23.9%.    *Elevated LFTs  LFTs now normal.    *Hypokalemia secondary to diarrhea likely  Potassium normal today 3.9.      *Neuropathy  Held treatment  due to neuropathy  5/6/2024-resumed chemotherapy  She was started on gabapentin 300 mg nightly and also vitamin B12.  Reports stable neuropathy  Proceed with planned chemotherapy today   MRI shows near complete resolution of the abnormalities and if patient experiences any worsening neuropathy then low threshold to stop chemotherapy and proceed with surgery  The remains intermittent though occasionally painful in her feet.  Will increase gabapentin to 300 mg twice daily  7/22/2024 patient reports neuropathy is stable continues gabapentin 300 mg twice daily  9/3/2024-neuropathy improved  10/15/2024-complaining of worsening neuropathy in the hands/feet.  Recommended starting B complex vitamin.  Herceptin and Perjeta delayed 1 week.  10/22/2024 after starting B complex vitamin numbness/tingling has improved.  It was previously constant, is now intermittent typically only occurring with certain positions.  The intensity of the numbness/tingling has also improved.    *Cheilitis  Improved significantly    *Right groin candida  8/12/2024 had tried OTC antifungal with initial improvement and then reoccurrence.  Discussed drying well, will add Nystatin  powder TID.  Resolved    PLAN:   Given pathological complete response patient will continue with Herceptin and Perjeta only to finish out 1 year of treatment.  Proceed with Herceptin and Perjeta today.  Continue follow-up with lymphedema clinic  Continue B complex vitamin.  Monitor neuropathy closely.  Continue vitamin E ointment to scars.  Return in 3 weeks for NP Herceptin and Perjeta.  Return in 6 weeks for Herceptin Perjeta.  Return in 9 weeks for MD with Herceptin and Perjeta.    The patient is on high risk medication that requires close monitoring for toxicity.      Today's visit completed through [facility] video visit.  Patient is located at [facility] office ([place]) I am located at [facility] office ([place]).    You have chosen to receive care through a telehealth visit.  Do you consent to use a video/audio connection for your medical care today? Yes    I spent 47 minutes caring for Felicia on this date of service. This time includes time spent by me in the following activities: preparing for the visit, reviewing tests, obtaining and/or reviewing a separately obtained history, performing a medically appropriate examination and/or evaluation, counseling and educating the patient/family/caregiver, ordering medications, tests, or procedures, documenting information in the medical record, and care coordination.     Latha Galloway, DAY  10/22/2024

## 2024-10-21 ENCOUNTER — HOSPITAL ENCOUNTER (OUTPATIENT)
Dept: OCCUPATIONAL THERAPY | Facility: HOSPITAL | Age: 69
Setting detail: THERAPIES SERIES
Discharge: HOME | End: 2024-10-21
Payer: COMMERCIAL

## 2024-10-21 DIAGNOSIS — M79.602 PAIN OF LEFT UPPER EXTREMITY: ICD-10-CM

## 2024-10-21 DIAGNOSIS — Z91.89 AT RISK FOR LYMPHEDEMA: Primary | ICD-10-CM

## 2024-10-21 DIAGNOSIS — M25.611 DECREASED RANGE OF MOTION OF RIGHT SHOULDER: ICD-10-CM

## 2024-10-21 DIAGNOSIS — M25.612 DECREASED RANGE OF MOTION OF LEFT SHOULDER: ICD-10-CM

## 2024-10-21 DIAGNOSIS — M79.601 PAIN OF RIGHT UPPER EXTREMITY: ICD-10-CM

## 2024-10-21 PROCEDURE — 97140 MANUAL THERAPY 1/> REGIONS: CPT

## 2024-10-21 PROCEDURE — 97110 THERAPEUTIC EXERCISES: CPT

## 2024-10-21 NOTE — THERAPY TREATMENT NOTE
"Outpatient Occupational Therapy Lymphedema Treatment Note  [facility]     Patient Name: Felicia Boyle  : 1955  MRN: [id number]  Today's Date: 10/21/2024      Visit Date: 10/21/2024    Patient Active Problem List   Diagnosis    Age-related osteoporosis without current pathological fracture    FH: breast cancer in first degree relative    Post-menopausal    Type 2 diabetes mellitus    Vitamin D deficiency    Mixed hyperlipidemia    Breast cancer of lower-outer quadrant of right female breast    Encounter for fitting and adjustment of vascular catheter    DCIS (ductal carcinoma in situ) of breast    Bilateral malignant neoplasm of breast in female    S/P bilateral mastectomy        Past Medical History:   Diagnosis Date    Anxiety about health     Breast cancer of lower-outer quadrant of right female breast 2024    DCIS (ductal carcinoma in situ) of breast 03/15/2024    Diverticulosis     History of chemotherapy 2024    Mixed hyperlipidemia 2019    DIET CONTROLLED    Osteoporosis     Port-A-Cath in place     Type 2 diabetes mellitus         Past Surgical History:   Procedure Laterality Date    BREAST BIOPSY Left     Excisional biopsy in the [facility]    BREAST BIOPSY  2024    CATARACT EXTRACTION W/ INTRAOCULAR LENS IMPLANT Bilateral     CHOLECYSTECTOMY  2012    COLON RESECTION Right     COLONOSCOPY N/A 2021    Procedure: COLONOSCOPY TO ANASTAMOSIS/TI;  Surgeon: Angelo Gonsalez MD;  Location: [facility] ENDOSCOPY;  Service: Gastroenterology;  Laterality: N/A;  PRE: SCREENING  POST: NORMAL POST-OP ANATOMY    ENDOSCOPY          ENDOSCOPY N/A 03/10/2017    Procedure: ESOPHAGOGASTRODUODENOSCOPY WITH BIOPSY AND PETER DILATATION 54\";  Surgeon: Angelo Gonslaez MD;  Location: [facility] ENDOSCOPY;  Service:     ENDOSCOPY N/A 2021    Procedure: ESOPHAGOGASTRODUODENOSCOPY WITH BIOPSIES, 54FR PETER DILATATION;  Surgeon: Angelo Gonsalez MD;  Location: [facility] ENDOSCOPY;  Service: " Gastroenterology;  Laterality: N/A;  PRE: DYSPHAGIA  POST: GASTRITIS, ESOPHAGEAL RING    MASTECTOMY W/ SENTINEL NODE BIOPSY Bilateral 8/15/2024    Procedure: Bilateral total mastectomy, bilateral sentinel node biopsy, possible axillary node dissection, no reconstruction.;  Surgeon: Abeba Manrique MD;  Location: [facility];  Service: General;  Laterality: Bilateral;    VENOUS ACCESS DEVICE (PORT) INSERTION Left 3/14/2024    Procedure: INSERTION OF PORTACATH;  Surgeon: Abeba Manrique MD;  Location: [facility];  Service: General;  Laterality: Left;         Visit Dx:      ICD-10-CM ICD-9-CM   1. At risk for lymphedema  Z91.89 V49.89   2. Decreased range of motion of left shoulder  M25.612 719.51   3. Decreased range of motion of right shoulder  M25.611 719.51   4. Pain of left upper extremity  M79.602 729.5   5. Pain of right upper extremity  M79.601 729.5        Lymphedema       Row Name 10/21/24 1800             Subjective Pain    Able to rate subjective pain? yes  -RE      Pre-Treatment Pain Level 5  -RE      Post-Treatment Pain Level 3  -RE      Subjective Pain Comment Patient reported significant pain decrease with manual techniques.  She also stated that it was much easier for her to move her arms following manual techniques.  -RE         Right Upper Ext    Rt Shoulder Flexion AROM 120  -RE         Left Upper Ext    Lt Shoulder Flexion AROM 120  -RE                User Key  (r) = Recorded By, (t) = Taken By, (c) = Cosigned By      Initials Name Provider Type    RE Emy Gonzales OTR Occupational Therapist                             OT Assessment/Plan       Row Name 10/21/24 7107          OT Assessment    Assessment Comments Patient returns today with continued complaints of axillary arm and chest pain related to her recent bilateral mastectomy.  Patient demonstrates soft tissue tightness throughout the bilateral chest and bilateral axillary areas.  Patient's active range of motion continues to  be limited but improved significantly following manual techniques.  -RE        OT Plan    OT Plan Comments Continue treatment for soft tissue tightness and pain  -RE               User Key  (r) = Recorded By, (t) = Taken By, (c) = Cosigned By      Initials Name Provider Type    Emy Mccarthy OTR Occupational Therapist                        Manual Rx (Last 36 Hours)       Manual Treatments       Row Name 10/21/24 1848 10/21/24 1700          Total Minutes    72016 - OT Manual Therapy Minutes 35  -RE --        Manual Rx 1    Manual Rx 1 Location -- pec sweep B'LY  -RE        Manual Rx 2    Manual Rx 2 Location -- skin rolling B chest  -RE        Manual Rx 3    Manual Rx 3 Location -- soft tissue stretching B axillae, B trunk  -RE               User Key  (r) = Recorded By, (t) = Taken By, (c) = Cosigned By      Initials Name Provider Type    Emy Mccarthy OTR Occupational Therapist                      Therapy Education  Education Details: Hands behind head in supine with trunk rotation, and soft tissue stretch  Given: HEP  Program: New  How Provided: Verbal, Demonstration, Written  Provided to: Patient, Caregiver  Level of Understanding: Teach back education performed, Verbalized, Demonstrated                Time Calculation:   OT Start Time: 0945  OT Stop Time: 1030  OT Time Calculation (min): 45 min  Total Timed Code Minutes- OT: 45 minute(s)  Timed Charges  09191 - OT Therapeutic Exercise Minutes: 10  59195 - OT Manual Therapy Minutes: 35  Total Minutes  Timed Charges Total Minutes: 45   Total Minutes: 45     Therapy Charges for Today       Code Description Service Date Service Provider Modifiers Qty    05652644143 HC OT THER PROC EA 15 MIN 10/21/2024 Emy Gonzales OTR GO 1    76949834619 HC OT MANUAL THERAPY EA 15 MIN 10/21/2024 Emy Gonzales OTR GO 2          Dictated utilizing Dragon dictation:  Much of this encounter note is an electronic transcription/translation of spoken language to printed text.  The electronic translation of spoken language may permit erroneous, or at times, nonsensical words or phrases to be inadvertently transcribed; Although I have reviewed the note for such errors, some may still exist.              Emy Gonzales, OTR  10/21/2024

## 2024-10-22 ENCOUNTER — TELEMEDICINE (OUTPATIENT)
Dept: ONCOLOGY | Facility: CLINIC | Age: 69
End: 2024-10-22
Payer: COMMERCIAL

## 2024-10-22 ENCOUNTER — INFUSION (OUTPATIENT)
Dept: ONCOLOGY | Facility: HOSPITAL | Age: 69
End: 2024-10-22
Payer: COMMERCIAL

## 2024-10-22 VITALS
HEART RATE: 82 BPM | RESPIRATION RATE: 16 BRPM | WEIGHT: 119 LBS | SYSTOLIC BLOOD PRESSURE: 151 MMHG | HEIGHT: 60 IN | OXYGEN SATURATION: 93 % | BODY MASS INDEX: 23.36 KG/M2 | TEMPERATURE: 97.6 F | DIASTOLIC BLOOD PRESSURE: 68 MMHG

## 2024-10-22 DIAGNOSIS — C50.511 MALIGNANT NEOPLASM OF LOWER-OUTER QUADRANT OF RIGHT BREAST OF FEMALE, ESTROGEN RECEPTOR POSITIVE: Primary | ICD-10-CM

## 2024-10-22 DIAGNOSIS — Z17.0 MALIGNANT NEOPLASM OF LOWER-OUTER QUADRANT OF RIGHT BREAST OF FEMALE, ESTROGEN RECEPTOR POSITIVE: ICD-10-CM

## 2024-10-22 DIAGNOSIS — Z17.0 MALIGNANT NEOPLASM OF LOWER-OUTER QUADRANT OF RIGHT BREAST OF FEMALE, ESTROGEN RECEPTOR POSITIVE: Primary | ICD-10-CM

## 2024-10-22 DIAGNOSIS — C50.511 MALIGNANT NEOPLASM OF LOWER-OUTER QUADRANT OF RIGHT BREAST OF FEMALE, ESTROGEN RECEPTOR POSITIVE: ICD-10-CM

## 2024-10-22 DIAGNOSIS — Z45.2 ENCOUNTER FOR FITTING AND ADJUSTMENT OF VASCULAR CATHETER: ICD-10-CM

## 2024-10-22 DIAGNOSIS — G62.0 CHEMOTHERAPY-INDUCED PERIPHERAL NEUROPATHY: ICD-10-CM

## 2024-10-22 DIAGNOSIS — Z79.899 HIGH RISK MEDICATION USE: ICD-10-CM

## 2024-10-22 DIAGNOSIS — T45.1X5A CHEMOTHERAPY-INDUCED PERIPHERAL NEUROPATHY: ICD-10-CM

## 2024-10-22 LAB
ALBUMIN SERPL-MCNC: 4.1 G/DL (ref 3.5–5.2)
ALBUMIN/GLOB SERPL: 1.5 G/DL
ALP SERPL-CCNC: 78 U/L (ref 39–117)
ALT SERPL W P-5'-P-CCNC: 31 U/L (ref 1–33)
ANION GAP SERPL CALCULATED.3IONS-SCNC: 11.7 MMOL/L (ref 5–15)
AST SERPL-CCNC: 22 U/L (ref 1–32)
BASOPHILS # BLD AUTO: 0.04 10*3/MM3 (ref 0–0.2)
BASOPHILS NFR BLD AUTO: 0.5 % (ref 0–1.5)
BILIRUB SERPL-MCNC: 0.2 MG/DL (ref 0–1.2)
BUN SERPL-MCNC: 17 MG/DL (ref 8–23)
BUN/CREAT SERPL: 25 (ref 7–25)
CALCIUM SPEC-SCNC: 9.3 MG/DL (ref 8.6–10.5)
CHLORIDE SERPL-SCNC: 106 MMOL/L (ref 98–107)
CO2 SERPL-SCNC: 22.3 MMOL/L (ref 22–29)
CREAT SERPL-MCNC: 0.68 MG/DL (ref 0.57–1)
DEPRECATED RDW RBC AUTO: 43.6 FL (ref 37–54)
EGFRCR SERPLBLD CKD-EPI 2021: 93.8 ML/MIN/1.73
EOSINOPHIL # BLD AUTO: 0.08 10*3/MM3 (ref 0–0.4)
EOSINOPHIL NFR BLD AUTO: 1 % (ref 0.3–6.2)
ERYTHROCYTE [DISTWIDTH] IN BLOOD BY AUTOMATED COUNT: 13.3 % (ref 12.3–15.4)
GLOBULIN UR ELPH-MCNC: 2.7 GM/DL
GLUCOSE SERPL-MCNC: 254 MG/DL (ref 65–99)
HCT VFR BLD AUTO: 36.6 % (ref 34–46.6)
HGB BLD-MCNC: 11.7 G/DL (ref 12–15.9)
IMM GRANULOCYTES # BLD AUTO: 0.03 10*3/MM3 (ref 0–0.05)
IMM GRANULOCYTES NFR BLD AUTO: 0.4 % (ref 0–0.5)
LYMPHOCYTES # BLD AUTO: 2.27 10*3/MM3 (ref 0.7–3.1)
LYMPHOCYTES NFR BLD AUTO: 28.4 % (ref 19.6–45.3)
MCH RBC QN AUTO: 28.4 PG (ref 26.6–33)
MCHC RBC AUTO-ENTMCNC: 32 G/DL (ref 31.5–35.7)
MCV RBC AUTO: 88.8 FL (ref 79–97)
MONOCYTES # BLD AUTO: 0.67 10*3/MM3 (ref 0.1–0.9)
MONOCYTES NFR BLD AUTO: 8.4 % (ref 5–12)
NEUTROPHILS NFR BLD AUTO: 4.9 10*3/MM3 (ref 1.7–7)
NEUTROPHILS NFR BLD AUTO: 61.3 % (ref 42.7–76)
NRBC BLD AUTO-RTO: 0 /100 WBC (ref 0–0.2)
PLATELET # BLD AUTO: 262 10*3/MM3 (ref 140–450)
PMV BLD AUTO: 10.1 FL (ref 6–12)
POTASSIUM SERPL-SCNC: 3.9 MMOL/L (ref 3.5–5.2)
PROT SERPL-MCNC: 6.8 G/DL (ref 6–8.5)
RBC # BLD AUTO: 4.12 10*6/MM3 (ref 3.77–5.28)
SODIUM SERPL-SCNC: 140 MMOL/L (ref 136–145)
WBC NRBC COR # BLD AUTO: 7.99 10*3/MM3 (ref 3.4–10.8)

## 2024-10-22 PROCEDURE — 25010000002 TRASTUZUMAB-DTTB 420 MG RECONSTITUTED SOLUTION 1 EACH VIAL: Performed by: NURSE PRACTITIONER

## 2024-10-22 PROCEDURE — 25010000002 PERTUZUMAB 420 MG/14ML SOLUTION 420 MG VIAL: Performed by: NURSE PRACTITIONER

## 2024-10-22 PROCEDURE — 85025 COMPLETE CBC W/AUTO DIFF WBC: CPT

## 2024-10-22 PROCEDURE — 96413 CHEMO IV INFUSION 1 HR: CPT

## 2024-10-22 PROCEDURE — 25010000002 HEPARIN LOCK FLUSH PER 10 UNITS: Performed by: INTERNAL MEDICINE

## 2024-10-22 PROCEDURE — 96417 CHEMO IV INFUS EACH ADDL SEQ: CPT

## 2024-10-22 PROCEDURE — 80053 COMPREHEN METABOLIC PANEL: CPT

## 2024-10-22 PROCEDURE — 25810000003 SODIUM CHLORIDE 0.9 % SOLUTION 250 ML FLEX CONT: Performed by: NURSE PRACTITIONER

## 2024-10-22 RX ORDER — SODIUM CHLORIDE 9 MG/ML
20 INJECTION, SOLUTION INTRAVENOUS ONCE
Status: CANCELLED | OUTPATIENT
Start: 2024-10-22

## 2024-10-22 RX ORDER — SODIUM CHLORIDE 0.9 % (FLUSH) 0.9 %
10 SYRINGE (ML) INJECTION AS NEEDED
OUTPATIENT
Start: 2024-10-22

## 2024-10-22 RX ORDER — HEPARIN SODIUM (PORCINE) LOCK FLUSH IV SOLN 100 UNIT/ML 100 UNIT/ML
500 SOLUTION INTRAVENOUS AS NEEDED
Status: DISCONTINUED | OUTPATIENT
Start: 2024-10-22 | End: 2024-10-22 | Stop reason: HOSPADM

## 2024-10-22 RX ORDER — HEPARIN SODIUM (PORCINE) LOCK FLUSH IV SOLN 100 UNIT/ML 100 UNIT/ML
500 SOLUTION INTRAVENOUS AS NEEDED
OUTPATIENT
Start: 2024-10-22

## 2024-10-22 RX ORDER — SODIUM CHLORIDE 0.9 % (FLUSH) 0.9 %
10 SYRINGE (ML) INJECTION AS NEEDED
Status: DISCONTINUED | OUTPATIENT
Start: 2024-10-22 | End: 2024-10-22 | Stop reason: HOSPADM

## 2024-10-22 RX ADMIN — Medication 500 UNITS: at 11:10

## 2024-10-22 RX ADMIN — PERTUZUMAB 420 MG: 30 INJECTION, SOLUTION, CONCENTRATE INTRAVENOUS at 09:38

## 2024-10-22 RX ADMIN — Medication 10 ML: at 11:10

## 2024-10-22 RX ADMIN — ONTRUZANT 330 MG: KIT INTRAVENOUS at 10:39

## 2024-10-30 ENCOUNTER — TELEPHONE (OUTPATIENT)
Dept: FAMILY MEDICINE CLINIC | Facility: CLINIC | Age: 69
End: 2024-10-30
Payer: COMMERCIAL

## 2024-10-30 ENCOUNTER — PATIENT OUTREACH (OUTPATIENT)
Dept: OTHER | Facility: HOSPITAL | Age: 69
End: 2024-10-30
Payer: COMMERCIAL

## 2024-10-30 ENCOUNTER — FLU SHOT (OUTPATIENT)
Dept: FAMILY MEDICINE CLINIC | Facility: CLINIC | Age: 69
End: 2024-10-30
Payer: COMMERCIAL

## 2024-10-30 DIAGNOSIS — E78.2 MIXED HYPERLIPIDEMIA: ICD-10-CM

## 2024-10-30 DIAGNOSIS — E55.9 VITAMIN D DEFICIENCY: ICD-10-CM

## 2024-10-30 DIAGNOSIS — E11.9 TYPE 2 DIABETES MELLITUS WITHOUT COMPLICATION, WITHOUT LONG-TERM CURRENT USE OF INSULIN: Primary | ICD-10-CM

## 2024-10-30 DIAGNOSIS — Z23 NEED FOR INFLUENZA VACCINATION: Primary | ICD-10-CM

## 2024-10-30 DIAGNOSIS — Z13.29 SCREENING FOR THYROID DISORDER: ICD-10-CM

## 2024-10-30 PROCEDURE — 90662 IIV NO PRSV INCREASED AG IM: CPT | Performed by: FAMILY MEDICINE

## 2024-10-30 PROCEDURE — 90471 IMMUNIZATION ADMIN: CPT | Performed by: FAMILY MEDICINE

## 2024-10-30 NOTE — TELEPHONE ENCOUNTER
While patient was here today for her flu shot, she stated that she was told to remind Dr. Malone about her RX for Triamcinolone Ointment.   Patient gave no other details.   Please advise.

## 2024-10-31 RX ORDER — TRIAMCINOLONE ACETONIDE 1 MG/G
1 OINTMENT TOPICAL 2 TIMES DAILY
Qty: 30 G | Refills: 1 | Status: SHIPPED | OUTPATIENT
Start: 2024-10-31

## 2024-11-01 ENCOUNTER — TELEPHONE (OUTPATIENT)
Dept: FAMILY MEDICINE CLINIC | Facility: CLINIC | Age: 69
End: 2024-11-01

## 2024-11-01 LAB
25(OH)D3+25(OH)D2 SERPL-MCNC: 29.1 NG/ML (ref 30–100)
ALBUMIN SERPL-MCNC: 4.7 G/DL (ref 3.5–5.2)
ALBUMIN/CREAT UR: 32 MG/G CREAT (ref 0–29)
ALBUMIN/GLOB SERPL: 1.6 G/DL
ALP SERPL-CCNC: 97 U/L (ref 39–117)
ALT SERPL-CCNC: 31 U/L (ref 1–33)
AST SERPL-CCNC: 22 U/L (ref 1–32)
BASOPHILS # BLD AUTO: 0.05 10*3/MM3 (ref 0–0.2)
BASOPHILS NFR BLD AUTO: 0.6 % (ref 0–1.5)
BILIRUB SERPL-MCNC: 0.3 MG/DL (ref 0–1.2)
BUN SERPL-MCNC: 15 MG/DL (ref 8–23)
BUN/CREAT SERPL: 20 (ref 7–25)
CALCIUM SERPL-MCNC: 9.6 MG/DL (ref 8.6–10.5)
CHLORIDE SERPL-SCNC: 101 MMOL/L (ref 98–107)
CHOLEST SERPL-MCNC: 164 MG/DL (ref 0–200)
CO2 SERPL-SCNC: 23.3 MMOL/L (ref 22–29)
CREAT SERPL-MCNC: 0.75 MG/DL (ref 0.57–1)
CREAT UR-MCNC: 145.5 MG/DL
EGFRCR SERPLBLD CKD-EPI 2021: 85.8 ML/MIN/1.73
EOSINOPHIL # BLD AUTO: 0.06 10*3/MM3 (ref 0–0.4)
EOSINOPHIL NFR BLD AUTO: 0.7 % (ref 0.3–6.2)
ERYTHROCYTE [DISTWIDTH] IN BLOOD BY AUTOMATED COUNT: 13 % (ref 12.3–15.4)
GLOBULIN SER CALC-MCNC: 3 GM/DL
GLUCOSE SERPL-MCNC: 143 MG/DL (ref 65–99)
HBA1C MFR BLD: 7.5 % (ref 4.8–5.6)
HCT VFR BLD AUTO: 41.3 % (ref 34–46.6)
HDLC SERPL-MCNC: 68 MG/DL (ref 40–60)
HGB BLD-MCNC: 13 G/DL (ref 12–15.9)
IMM GRANULOCYTES # BLD AUTO: 0.04 10*3/MM3 (ref 0–0.05)
IMM GRANULOCYTES NFR BLD AUTO: 0.5 % (ref 0–0.5)
LDLC SERPL CALC-MCNC: 69 MG/DL (ref 0–100)
LYMPHOCYTES # BLD AUTO: 2.03 10*3/MM3 (ref 0.7–3.1)
LYMPHOCYTES NFR BLD AUTO: 23 % (ref 19.6–45.3)
MCH RBC QN AUTO: 27.7 PG (ref 26.6–33)
MCHC RBC AUTO-ENTMCNC: 31.5 G/DL (ref 31.5–35.7)
MCV RBC AUTO: 88.1 FL (ref 79–97)
MICROALBUMIN UR-MCNC: 46.8 UG/ML
MONOCYTES # BLD AUTO: 0.73 10*3/MM3 (ref 0.1–0.9)
MONOCYTES NFR BLD AUTO: 8.3 % (ref 5–12)
NEUTROPHILS # BLD AUTO: 5.93 10*3/MM3 (ref 1.7–7)
NEUTROPHILS NFR BLD AUTO: 66.9 % (ref 42.7–76)
NRBC BLD AUTO-RTO: 0 /100 WBC (ref 0–0.2)
PLATELET # BLD AUTO: 319 10*3/MM3 (ref 140–450)
POTASSIUM SERPL-SCNC: 4.2 MMOL/L (ref 3.5–5.2)
PROT SERPL-MCNC: 7.7 G/DL (ref 6–8.5)
RBC # BLD AUTO: 4.69 10*6/MM3 (ref 3.77–5.28)
SODIUM SERPL-SCNC: 139 MMOL/L (ref 136–145)
TRIGL SERPL-MCNC: 162 MG/DL (ref 0–150)
TSH SERPL DL<=0.005 MIU/L-ACNC: 1.94 UIU/ML (ref 0.27–4.2)
VLDLC SERPL CALC-MCNC: 27 MG/DL (ref 5–40)
WBC # BLD AUTO: 8.84 10*3/MM3 (ref 3.4–10.8)

## 2024-11-01 NOTE — TELEPHONE ENCOUNTER
Caller: Felicia Boyle    Relationship: Self    Best call back number: [phone]    Which medication are you concerned about: TRIAMCINOLONE    Who prescribed you this medication: DR LOPEZ    When did you start taking this medication: 10/31/24    What are your concerns: PATIENT STATES THAT SHE WAS SUPPOSED TO RECEIVE AN ALTERNATEIVE TO THE TRIAMINOLONE BUT NONE WAS ORDERED.    How long have you had these concerns: 1 DAY    [id number] - [place], [place] - [address] AT [facility] OF [place] [address] - [phone]  - [phone] FX

## 2024-11-01 NOTE — TELEPHONE ENCOUNTER
Today patient called stating you were supposed to send in an alternative to the triamcinolone ointment. Please advise.

## 2024-11-02 RX ORDER — MUPIROCIN 20 MG/G
1 OINTMENT TOPICAL 3 TIMES DAILY
Qty: 15 G | Refills: 1 | Status: SHIPPED | OUTPATIENT
Start: 2024-11-02

## 2024-11-04 ENCOUNTER — TELEPHONE (OUTPATIENT)
Dept: FAMILY MEDICINE CLINIC | Facility: CLINIC | Age: 69
End: 2024-11-04
Payer: COMMERCIAL

## 2024-11-04 NOTE — TELEPHONE ENCOUNTER
HUB TO RELAY:   Tried to call patient to schedule an office visit with Dr. Malone, to go over lab results.   Patient did not answer, LVM.

## 2024-11-04 NOTE — TELEPHONE ENCOUNTER
----- Message from Anahy VENTURA sent at 11/4/2024  2:33 PM EST -----  Can you please call patient and schedule her for an office visit with Dr. Malone to discuss lab results. Thanks!  ----- Message -----  From: Tristan Malone DO  Sent: 11/2/2024   4:33 PM EST  To: Anahy Rios MA    Patient needs follow-up appointment with me

## 2024-11-04 NOTE — TELEPHONE ENCOUNTER
Name: Felicia Boyle      Relationship: Self      Best Callback Number: [phone]       HUB PROVIDED THE RELAY MESSAGE FROM THE OFFICE      PATIENT: SCHEDULED PER NOTE

## 2024-11-07 ENCOUNTER — HOSPITAL ENCOUNTER (OUTPATIENT)
Dept: OCCUPATIONAL THERAPY | Facility: HOSPITAL | Age: 69
Discharge: HOME | End: 2024-11-07
Payer: COMMERCIAL

## 2024-11-07 DIAGNOSIS — M79.602 PAIN OF LEFT UPPER EXTREMITY: ICD-10-CM

## 2024-11-07 DIAGNOSIS — M79.601 PAIN OF RIGHT UPPER EXTREMITY: ICD-10-CM

## 2024-11-07 DIAGNOSIS — M25.612 DECREASED RANGE OF MOTION OF LEFT SHOULDER: ICD-10-CM

## 2024-11-07 DIAGNOSIS — M25.611 DECREASED RANGE OF MOTION OF RIGHT SHOULDER: ICD-10-CM

## 2024-11-07 DIAGNOSIS — C50.912 BILATERAL MALIGNANT NEOPLASM OF BREAST IN FEMALE, UNSPECIFIED ESTROGEN RECEPTOR STATUS, UNSPECIFIED SITE OF BREAST: ICD-10-CM

## 2024-11-07 DIAGNOSIS — Z91.89 AT RISK FOR LYMPHEDEMA: Primary | ICD-10-CM

## 2024-11-07 DIAGNOSIS — C50.911 BILATERAL MALIGNANT NEOPLASM OF BREAST IN FEMALE, UNSPECIFIED ESTROGEN RECEPTOR STATUS, UNSPECIFIED SITE OF BREAST: ICD-10-CM

## 2024-11-07 PROCEDURE — 97140 MANUAL THERAPY 1/> REGIONS: CPT

## 2024-11-07 PROCEDURE — 93702 BIS XTRACELL FLUID ANALYSIS: CPT

## 2024-11-07 NOTE — THERAPY PROGRESS REPORT/RE-CERT
"Outpatient Occupational Therapy Lymphedema Progress Note  [facility]     Patient Name: Felicia Boyle  : 1955  MRN: [id number]  Today's Date: 2024      Visit Date: 2024    Patient Active Problem List   Diagnosis    Age-related osteoporosis without current pathological fracture    FH: breast cancer in first degree relative    Post-menopausal    Type 2 diabetes mellitus    Vitamin D deficiency    Mixed hyperlipidemia    Breast cancer of lower-outer quadrant of right female breast    Encounter for fitting and adjustment of vascular catheter    DCIS (ductal carcinoma in situ) of breast    Bilateral malignant neoplasm of breast in female    S/P bilateral mastectomy        Past Medical History:   Diagnosis Date    Anxiety about health     Breast cancer of lower-outer quadrant of right female breast 2024    DCIS (ductal carcinoma in situ) of breast 03/15/2024    Diverticulosis     History of chemotherapy 2024    Mixed hyperlipidemia 2019    DIET CONTROLLED    Osteoporosis     Port-A-Cath in place     Type 2 diabetes mellitus         Past Surgical History:   Procedure Laterality Date    BREAST BIOPSY Left     Excisional biopsy in the [facility]    BREAST BIOPSY  2024    CATARACT EXTRACTION W/ INTRAOCULAR LENS IMPLANT Bilateral     CHOLECYSTECTOMY  2012    COLON RESECTION Right     COLONOSCOPY N/A 2021    Procedure: COLONOSCOPY TO ANASTAMOSIS/TI;  Surgeon: Angelo Gonsalez MD;  Location: [facility] ENDOSCOPY;  Service: Gastroenterology;  Laterality: N/A;  PRE: SCREENING  POST: NORMAL POST-OP ANATOMY    ENDOSCOPY          ENDOSCOPY N/A 03/10/2017    Procedure: ESOPHAGOGASTRODUODENOSCOPY WITH BIOPSY AND PETER DILATATION 54\";  Surgeon: Angelo Gonsalez MD;  Location: [facility] ENDOSCOPY;  Service:     ENDOSCOPY N/A 2021    Procedure: ESOPHAGOGASTRODUODENOSCOPY WITH BIOPSIES, 54FR PETER DILATATION;  Surgeon: Angelo Gonsalez MD;  Location: [facility] ENDOSCOPY;  Service: " Gastroenterology;  Laterality: N/A;  PRE: DYSPHAGIA  POST: GASTRITIS, ESOPHAGEAL RING    MASTECTOMY W/ SENTINEL NODE BIOPSY Bilateral 8/15/2024    Procedure: Bilateral total mastectomy, bilateral sentinel node biopsy, possible axillary node dissection, no reconstruction.;  Surgeon: Abeba Manrique MD;  Location: [facility];  Service: General;  Laterality: Bilateral;    VENOUS ACCESS DEVICE (PORT) INSERTION Left 3/14/2024    Procedure: INSERTION OF PORTACATH;  Surgeon: Abeba Manrique MD;  Location: [facility];  Service: General;  Laterality: Left;         Visit Dx:      ICD-10-CM ICD-9-CM   1. At risk for lymphedema  Z91.89 V49.89   2. Decreased range of motion of left shoulder  M25.612 719.51   3. Decreased range of motion of right shoulder  M25.611 719.51   4. Pain of left upper extremity  M79.602 729.5   5. Pain of right upper extremity  M79.601 729.5   6. Bilateral malignant neoplasm of breast in female, unspecified estrogen receptor status, unspecified site of breast  C50.911 174.9    C50.912         Lymphedema       Row Name 11/07/24 1400             Subjective Pain    Able to rate subjective pain? yes  -RE      Pre-Treatment Pain Level 0  -RE      Post-Treatment Pain Level 0  -RE      Subjective Pain Comment Patient reports that her pain has improved significantly.  She does complain of occasional sharp rib pain on both sides.  When she bends at the waist she still has discomfort and pain through the chest and ribs.  -RE         General ROM    GENERAL ROM COMMENTS Bilateral upper extremity shoulder active range of motion is within functional limits and symmetrical though patient still complains of tightness through the axilla and the chest wall on both sides.  -RE         L-Dex Bioimpedence Screening    L-Dex Measurement Extremity RUE  -RE      L-Dex Patient Position Standing  -RE      L-Dex UE Dominate Side Right  -RE      L-Dex UE At Risk Side Right  -RE      L-Dex UE Pre Surgical Value No   -RE      L-Dex UE Score -3.7  R UE which is most at risk  -RE      L-Dex UE Baseline Score -4.7  -RE      L-Dex UE Value Change 1  -RE      L-Dex UE Comment WNL  -RE                User Key  (r) = Recorded By, (t) = Taken By, (c) = Cosigned By      Initials Name Provider Type    Emy Mccarthy, OTR Occupational Therapist                    The patient had a  SOZO measurement which I reviewed today. The score is WNL  see scanned to EMR. Bioimpedance spectroscopy helps identify the   onset of lymphedema in an arm or leg before patients experience noticeable swelling. Research has   shown that 92% of patients with early detection of lymphedema using L-Dex combined with   intervention do not progress to chronic lymphedema through three years. Additionally, as of March 2023, the NCCN Guidelines® for Survivorship recommend proactive screening for lymphedema using   bioimpedance spectroscopy. Whenever possible, patients are tested for baseline L-Dex score before   cancer treatment begins and then are reassessed during regular follow-up visits using the SOZO device.   Otherwise, this can be started postoperatively and continued during regular follow-up visits. If the   patient’s L-Dex score increases above normal levels, that is a sign that lymphedema is developing and a   referral is made to occupational therapy for further evaluation and early compression treatment.   Lymphedema assessment with the SOZO L-Dex score is recommended to be done every 3 months for   the first 3 years and then every 6 months for years 4 and 5 followed by annually afterwards         OT Assessment/Plan       Row Name 11/07/24 7271          OT Assessment    Impairments Pain;Range of motion;Impaired flexibility;Impaired lymphatic circulation  -RE     Assessment Comments Patient returns today and is very pleased with her progress.  She states that her ongoing pain has improved significantly.  She has occasional sharp pains through her ribs but  the constant pain that she had has resolved.  She reports that her range of motion has returned to normal though she still complains of tightness through the chest and axillae.  She has some trouble bending at the waist due to discomfort and reaching above her head feels uncomfortable.  Patient has a significant amount of scar tissue in the chest and then the axillary area on both sides.  Following manual therapy the scar tissue and soft tissue tightness had decreased significantly and patient reported additional symptom relief.  Patient's bioimpedance for the right upper extremity was -3.7 which is consistent with her baseline.  Bioimpedance surveillance will focus on the right upper extremity as it is most at risk.  -RE     OT Diagnosis At risk for lymphedema; decreased bilateral shoulder flexibility  -RE     OT Rehab Potential Good  -RE     Patient/caregiver participated in establishment of treatment plan and goals Yes  -RE     Patient would benefit from skilled therapy intervention Yes  -RE        OT Plan    OT Plan Comments Follow-up for bioimpedance in 3 months; follow-up for soft tissue tightness and tight scar tissue in 1 to 2 weeks  -RE               User Key  (r) = Recorded By, (t) = Taken By, (c) = Cosigned By      Initials Name Provider Type    Emy Mccarthy OTR Occupational Therapist                        Manual Rx (Last 36 Hours)       Manual Treatments       Row Name 11/07/24 1559 11/07/24 1500          Total Minutes    14188 - OT Manual Therapy Minutes 35  -RE --        Manual Rx 2    Manual Rx 2 Location -- skin rolling B chest  -RE        Manual Rx 3    Manual Rx 3 Location -- soft tissue stretching B axillae, B trunk  -RE               User Key  (r) = Recorded By, (t) = Taken By, (c) = Cosigned By      Initials Name Provider Type    Emy Mccarthy OTR Occupational Therapist                   OT Goals       Row Name 11/07/24 1500          OT Short Term Goals    STG Date to Achieve 12/07/24   -RE     STG 1 Patient will demonstrate proper awareness of “What is Lymphedema?” and “Healthy Habits” for improved prevention, management, care of symptoms and ease of transition to self-care of condition.  -RE     STG 1 Progress Met  -RE     STG 2 Pt will demonstrate understanding of use of compression sleeve for edema prevention, exercise and air travel.  -RE     STG 2 Progress Ongoing  -RE     STG 3 Patient independent and compliant with initial home exercise program focused on gentle AROM and stretching to improve AROM to pre-surgical level.  -RE     STG 3 Progress Met;Ongoing  -RE        Long Term Goals    LTG Date to Achieve 12/30/24  -RE     LTG 1 Patient to improve DASH/ QuickDASH score by 10 points in 4 weeks.  -RE     LTG 1 Progress Ongoing  -RE     LTG 2 Patient will participate in bioimpedance scans every 3 months as a method of early detection of lymphedema to allow for early intervention.  -RE     LTG 2 Progress Met;Ongoing  -RE     LTG 3 Patient's bioimpedance score to remain below 6.5 for decreased risk of stage II lymphedema.  -RE     LTG 3 Progress Met;Ongoing  -RE     LTG 4 Patient to demonstrate increased bilateral shoulder flexion to 160 to improve functional UE use and to restore pre operative AROM per patient perception.  -RE     LTG 4 Progress Met;Ongoing  -RE     LTG 5 Patient to demonstrate increased bilateral shoulder abduction to 160 to improve functional UE use and to restore preoperative AROM per patient perception.  -RE     LTG 5 Progress Met;Ongoing  -RE        Time Calculation    OT Goal Re-Cert Due Date 12/30/24  -RE               User Key  (r) = Recorded By, (t) = Taken By, (c) = Cosigned By      Initials Name Provider Type    Emy Mccarthy OTR Occupational Therapist                    Therapy Education  Education Details: Continue with current home exercise program with an emphasis on stretching and trunk rotation exercises.  Patient may massage the axillary and chest  area.  Given: HEP, Symptoms/condition management  Program: Reinforced  How Provided: Verbal, Demonstration  Provided to: Patient  Level of Understanding: Teach back education performed, Verbalized                Time Calculation:   OT Start Time: 1445  OT Stop Time: 1530  OT Time Calculation (min): 45 min  Total Timed Code Minutes- OT: 35 minute(s)  Timed Charges  20063 - OT Manual Therapy Minutes: 35  Untimed Charges  76811 - OT Bioimpedence Rx Minutes: 10  Total Minutes  Timed Charges Total Minutes: 35  Untimed Charges Total Minutes: 10   Total Minutes: 45     Therapy Charges for Today       Code Description Service Date Service Provider Modifiers Qty    91858324375 HC OT MANUAL THERAPY EA 15 MIN 11/7/2024 Emy Gonzales OTR GO 2    55092546641 HC OT BIS XTRACELL FLUID ANALYSIS 11/7/2024 Emy Gonzales OTR GO 1            Dictated utilizing Dragon dictation:  Much of this encounter note is an electronic transcription/translation of spoken language to printed text. The electronic translation of spoken language may permit erroneous, or at times, nonsensical words or phrases to be inadvertently transcribed; Although I have reviewed the note for such errors, some may still exist.            YARED Kelley  11/7/2024

## 2024-11-12 ENCOUNTER — INFUSION (OUTPATIENT)
Dept: ONCOLOGY | Facility: HOSPITAL | Age: 69
End: 2024-11-12
Payer: COMMERCIAL

## 2024-11-12 ENCOUNTER — OFFICE VISIT (OUTPATIENT)
Dept: ONCOLOGY | Facility: CLINIC | Age: 69
End: 2024-11-12
Payer: COMMERCIAL

## 2024-11-12 VITALS
SYSTOLIC BLOOD PRESSURE: 154 MMHG | BODY MASS INDEX: 25.44 KG/M2 | HEART RATE: 82 BPM | OXYGEN SATURATION: 93 % | DIASTOLIC BLOOD PRESSURE: 88 MMHG | WEIGHT: 121.2 LBS | TEMPERATURE: 97.7 F | HEIGHT: 58 IN | RESPIRATION RATE: 16 BRPM

## 2024-11-12 DIAGNOSIS — C50.511 MALIGNANT NEOPLASM OF LOWER-OUTER QUADRANT OF RIGHT BREAST OF FEMALE, ESTROGEN RECEPTOR POSITIVE: Primary | ICD-10-CM

## 2024-11-12 DIAGNOSIS — Z17.0 MALIGNANT NEOPLASM OF LOWER-OUTER QUADRANT OF RIGHT BREAST OF FEMALE, ESTROGEN RECEPTOR POSITIVE: Primary | ICD-10-CM

## 2024-11-12 DIAGNOSIS — Z45.2 ENCOUNTER FOR FITTING AND ADJUSTMENT OF VASCULAR CATHETER: ICD-10-CM

## 2024-11-12 DIAGNOSIS — C50.511 MALIGNANT NEOPLASM OF LOWER-OUTER QUADRANT OF RIGHT BREAST OF FEMALE, ESTROGEN RECEPTOR POSITIVE: ICD-10-CM

## 2024-11-12 DIAGNOSIS — Z79.899 HIGH RISK MEDICATION USE: ICD-10-CM

## 2024-11-12 DIAGNOSIS — Z17.0 MALIGNANT NEOPLASM OF LOWER-OUTER QUADRANT OF RIGHT BREAST OF FEMALE, ESTROGEN RECEPTOR POSITIVE: ICD-10-CM

## 2024-11-12 LAB
ALBUMIN SERPL-MCNC: 4.3 G/DL (ref 3.5–5.2)
ALBUMIN/GLOB SERPL: 1.7 G/DL
ALP SERPL-CCNC: 85 U/L (ref 39–117)
ALT SERPL W P-5'-P-CCNC: 31 U/L (ref 1–33)
ANION GAP SERPL CALCULATED.3IONS-SCNC: 14.6 MMOL/L (ref 5–15)
AST SERPL-CCNC: 21 U/L (ref 1–32)
BASOPHILS # BLD AUTO: 0.04 10*3/MM3 (ref 0–0.2)
BASOPHILS NFR BLD AUTO: 0.5 % (ref 0–1.5)
BILIRUB SERPL-MCNC: 0.3 MG/DL (ref 0–1.2)
BUN SERPL-MCNC: 16 MG/DL (ref 8–23)
BUN/CREAT SERPL: 25.4 (ref 7–25)
CALCIUM SPEC-SCNC: 9.2 MG/DL (ref 8.6–10.5)
CHLORIDE SERPL-SCNC: 103 MMOL/L (ref 98–107)
CO2 SERPL-SCNC: 21.4 MMOL/L (ref 22–29)
CREAT SERPL-MCNC: 0.63 MG/DL (ref 0.57–1)
DEPRECATED RDW RBC AUTO: 42.6 FL (ref 37–54)
EGFRCR SERPLBLD CKD-EPI 2021: 95.6 ML/MIN/1.73
EOSINOPHIL # BLD AUTO: 0.06 10*3/MM3 (ref 0–0.4)
EOSINOPHIL NFR BLD AUTO: 0.7 % (ref 0.3–6.2)
ERYTHROCYTE [DISTWIDTH] IN BLOOD BY AUTOMATED COUNT: 13.5 % (ref 12.3–15.4)
GLOBULIN UR ELPH-MCNC: 2.6 GM/DL
GLUCOSE SERPL-MCNC: 187 MG/DL (ref 65–99)
HCT VFR BLD AUTO: 37.5 % (ref 34–46.6)
HGB BLD-MCNC: 12.2 G/DL (ref 12–15.9)
IMM GRANULOCYTES # BLD AUTO: 0.03 10*3/MM3 (ref 0–0.05)
IMM GRANULOCYTES NFR BLD AUTO: 0.4 % (ref 0–0.5)
LYMPHOCYTES # BLD AUTO: 2.63 10*3/MM3 (ref 0.7–3.1)
LYMPHOCYTES NFR BLD AUTO: 31.1 % (ref 19.6–45.3)
MCH RBC QN AUTO: 28.4 PG (ref 26.6–33)
MCHC RBC AUTO-ENTMCNC: 32.5 G/DL (ref 31.5–35.7)
MCV RBC AUTO: 87.2 FL (ref 79–97)
MONOCYTES # BLD AUTO: 0.56 10*3/MM3 (ref 0.1–0.9)
MONOCYTES NFR BLD AUTO: 6.6 % (ref 5–12)
NEUTROPHILS NFR BLD AUTO: 5.14 10*3/MM3 (ref 1.7–7)
NEUTROPHILS NFR BLD AUTO: 60.7 % (ref 42.7–76)
NRBC BLD AUTO-RTO: 0 /100 WBC (ref 0–0.2)
PLATELET # BLD AUTO: 306 10*3/MM3 (ref 140–450)
PMV BLD AUTO: 9.8 FL (ref 6–12)
POTASSIUM SERPL-SCNC: 3.5 MMOL/L (ref 3.5–5.2)
PROT SERPL-MCNC: 6.9 G/DL (ref 6–8.5)
RBC # BLD AUTO: 4.3 10*6/MM3 (ref 3.77–5.28)
SODIUM SERPL-SCNC: 139 MMOL/L (ref 136–145)
WBC NRBC COR # BLD AUTO: 8.46 10*3/MM3 (ref 3.4–10.8)

## 2024-11-12 PROCEDURE — 25010000002 PERTUZUMAB 420 MG/14ML SOLUTION 420 MG VIAL

## 2024-11-12 PROCEDURE — 25810000003 SODIUM CHLORIDE 0.9 % SOLUTION 250 ML FLEX CONT

## 2024-11-12 PROCEDURE — 96417 CHEMO IV INFUS EACH ADDL SEQ: CPT

## 2024-11-12 PROCEDURE — 96413 CHEMO IV INFUSION 1 HR: CPT

## 2024-11-12 PROCEDURE — 80053 COMPREHEN METABOLIC PANEL: CPT

## 2024-11-12 PROCEDURE — 25010000002 HEPARIN LOCK FLUSH PER 10 UNITS: Performed by: INTERNAL MEDICINE

## 2024-11-12 PROCEDURE — 25010000002 TRASTUZUMAB-DTTB 420 MG RECONSTITUTED SOLUTION 1 EACH VIAL

## 2024-11-12 PROCEDURE — 85025 COMPLETE CBC W/AUTO DIFF WBC: CPT

## 2024-11-12 RX ORDER — UREA 8.5 G/85G
1 CREAM TOPICAL AS NEEDED
Qty: 71 G | Refills: 2 | Status: SHIPPED | OUTPATIENT
Start: 2024-11-12

## 2024-11-12 RX ORDER — CICLOPIROX OLAMINE 7.7 MG/G
CREAM TOPICAL
COMMUNITY
Start: 2024-10-28

## 2024-11-12 RX ORDER — SODIUM CHLORIDE 0.9 % (FLUSH) 0.9 %
10 SYRINGE (ML) INJECTION AS NEEDED
Status: DISCONTINUED | OUTPATIENT
Start: 2024-11-12 | End: 2024-11-12 | Stop reason: HOSPADM

## 2024-11-12 RX ORDER — HEPARIN SODIUM (PORCINE) LOCK FLUSH IV SOLN 100 UNIT/ML 100 UNIT/ML
500 SOLUTION INTRAVENOUS AS NEEDED
Status: DISCONTINUED | OUTPATIENT
Start: 2024-11-12 | End: 2024-11-12 | Stop reason: HOSPADM

## 2024-11-12 RX ORDER — SODIUM CHLORIDE 0.9 % (FLUSH) 0.9 %
10 SYRINGE (ML) INJECTION AS NEEDED
OUTPATIENT
Start: 2024-11-12

## 2024-11-12 RX ORDER — NEOMYCIN SULFATE, POLYMYXIN B SULFATE, AND DEXAMETHASONE 3.5; 10000; 1 MG/G; [USP'U]/G; MG/G
OINTMENT OPHTHALMIC
COMMUNITY
Start: 2024-10-10

## 2024-11-12 RX ORDER — DULOXETIN HYDROCHLORIDE 30 MG/1
30 CAPSULE, DELAYED RELEASE ORAL DAILY
Qty: 30 CAPSULE | Refills: 0 | Status: SHIPPED | OUTPATIENT
Start: 2024-11-12

## 2024-11-12 RX ORDER — HEPARIN SODIUM (PORCINE) LOCK FLUSH IV SOLN 100 UNIT/ML 100 UNIT/ML
500 SOLUTION INTRAVENOUS AS NEEDED
OUTPATIENT
Start: 2024-11-12

## 2024-11-12 RX ADMIN — ONTRUZANT 330 MG: KIT INTRAVENOUS at 12:30

## 2024-11-12 RX ADMIN — Medication 10 ML: at 13:08

## 2024-11-12 RX ADMIN — PERTUZUMAB 420 MG: 30 INJECTION, SOLUTION, CONCENTRATE INTRAVENOUS at 11:21

## 2024-11-12 RX ADMIN — Medication 500 UNITS: at 13:08

## 2024-11-12 NOTE — PROGRESS NOTES
Subjective   Felicia Boyle is a 69 y.o. female.  Referred by Dr. Manrique for right breast invasive ductal carcinoma, HER2 positive, left breast ductal carcinoma in situ    History of Present Illness     Ms. Boyle is a 68-year-old postmenopausal [place] lady who is fairly healthy without any significant comorbidities palpated of right breast mass in the latter part of  which was further worked up with diagnostic mammogram and ultrasound and subsequently a biopsy which confirmed invasive ductal carcinoma which was ER/DE negative and HER2 positive.    Family history significant for her 2 sisters with breast cancer at the age of 38 and 48 respectively.  Her older sister  of metastatic breast cancer but her younger sister is doing well.  Patient underwent genetic testing in 2017 which was negative due to her family history.    She had regular screening mammograms up until 2019 but subsequently stopped having annual gynecological visits and therefore did not have any further screening mammograms up until the most recent diagnostic mammogram for the new palpable abnormality.    2024-bilateral diagnostic mammogram and right breast ultrasound  The breast heterogeneously dense.  Left breast with a scar marker in the lower left breast dating back to .  No new findings in the left breast.    Right breast at 8:00, 8 cm from the nipple there is a 1.7 x 1.4 x 1.6 cm oval high density mass with partially circumscribed and partially indistinct margins.    On the ultrasound the mass measures 1.7 x 1 x 1.3 cm.    No abnormal right axilla lymphadenopathy.    2024-right breast ultrasound-guided biopsy  Pathology consistent with invasive ductal carcinoma  Grade 3  9.3 mm of invasive disease on the core biopsy  ER negative  DE negative  HER2 3+ on immunohistochemistry, 95%  Ki-67 70%    2024-bilateral breast MRI  In the right breast at 8:00, 8 cm from the nipple there is a irregular enhancing mass which  measures 1.8 x 2 x 1.7 cm.  This corresponds to the biopsy-proven malignancy.  Extending anteriorly away from the MT margin of the mass there is an irregular linear enhancement which measures 3.5 x 1 x 0.7 cm.  The whole area including the mass measures 4.7 cm in AP dimension, 1.7 in mediolateral and 2.4 cm in the superior-inferior dimension.    At 12:00, 3 cm from the nipple there is an irregular non-mass enhancement which measures 1 x 1.1 x 1.2 cm.  No mammographic correlate is appreciated.    No other suspicious areas of enhancement in the right breast or right axilla or internal mammary chain.    In the left breast, middle third at 11:30 position, 3.7 cm from the nipple there is an irregular area of non-mass enhancement which measures 1.3 x 1.1 x 1 cm.  There is suspected architectural distortion.  No other areas of abnormal enhancement seen in the left breast of the left axilla or the internal mammary chain.    2/26/2024-additional MRI guided biopsies    1.left breast 12:00-intermediate grade ductal carcinoma in situ with apocrine features  ER +91 to 100% strong  IA +81 to 90% strong  Ki-67 15%    2. Right breast 12 o'clock position MRI guided biopsy of the non-mass enhancement-sclerosing adenosis with associated calcifications  No atypical hyperplasia in situ or invasive carcinoma    3.right breast 8:00 MRI guided biopsies of the linear enhancement is consistent with high-grade ductal carcinoma in situ  DCIS involves multiple tissue cores measuring up to 3 mm.  Sclerosing adenosis and usual ductal hyperplasia with associated microcalcifications.    The case was discussed in tumor board and Dr. Patel thought that they could be more invasive disease in the 4.7 cm of linear enhancement noted in the right breast at the site of the mass.  Therefore it was decided to proceed with neoadjuvant chemotherapy.    Patient was a former smoker and smoked for about 40 years, half a pack per week, quit about 1 month ago.   Denies any alcohol use.    Week 1 Taxol Herceptin and Perjeta was planned for 3/18/2024.  Patient received Herceptin and Perjeta and had some chills for which she received Solu-Medrol and responded well to that.  However after infusing only 1 cc of Taxol patient experienced a severe anaphylactic reaction due to which the infusion was stopped permanently and she received an additional dose of Solu-Medrol and Benadryl.  Symptoms resolved subsequently and patient was discharged home safely.    Due to this recent treatment has been changed to Abraxane along with Herceptin and Perjeta.  3/25/2024-patient is due for her first treatment with Abraxane.    She is extremely anxious given the severe hypersensitivity reaction that she experienced with Taxol.  She is also complaining of increased belching and heartburn since her last chemotherapy.  She is complaining of severe diarrhea with started last night.  Had multiple loose stools.    5/11/2024-MRI of the breast-images independently reviewed and interpreted by me.  Interval near complete resolution of all suspicious enhancement in the posterior one third lower outer quadrant of the right breast at 8:00 corresponding to the biopsy-proven site of malignancy.  Who has been on masslike area of enhancement measuring 1.5 x 0.9 x 0.9 cm compared to 2 x 1.8 x 1.7 cm.  Mildly abnormal enhancement extending anteriorly away from the mass has resolved.  No other suspicious areas of enhancement noted in the right breast.  Left breast at 12:00 interval resolution of all suspicious enhancement.    8/15/2024-bilateral mastectomy and bilateral sentinel lymph node biopsy  Right total mastectomy  No residual invasive carcinoma  2 mm of DCIS high-grade  RCB 0, pathological complete response  All the margins are negative  Y pTis N0 M0  4 sentinel lymph nodes negative    Left breast mastectomy  Fat necrosis, biopsy site changes, sclerosing adenosis and changes consistent with treatment  "effect.  No evidence of residual in situ or invasive carcinoma  1 x-ray lymph node negative    INTERVAL HISTORY  She returns today for 3 week follow up for the above diagnosis. She is due today for Cycle 12 Herceptin/Perjeta. Her last cycle was delayed a week due to worsening neuropathy, which originally improved after taking B complex, but she states today the neuropathy is now constant and she experience numbness/tingling on her hands and feet. She also has redness/dry skin to palms of her hands and bottom of her feet. She otherwise denies shortness of breath, chest pain, headaches, appetite changes, n/v/d.       The following portions of the patient's history were reviewed and updated as appropriate: allergies, current medications, past family history, past medical history, past social history, past surgical history, and problem list.    Past Medical History:   Diagnosis Date    Anxiety about health     Breast cancer of lower-outer quadrant of right female breast 02/14/2024    DCIS (ductal carcinoma in situ) of breast 03/15/2024    Diverticulosis     History of chemotherapy 06/22/2024    Mixed hyperlipidemia 05/19/2019    DIET CONTROLLED    Osteoporosis     Port-A-Cath in place     Type 2 diabetes mellitus         Past Surgical History:   Procedure Laterality Date    BREAST BIOPSY Left     Excisional biopsy in the [facility]    BREAST BIOPSY  03/2024    CATARACT EXTRACTION W/ INTRAOCULAR LENS IMPLANT Bilateral     CHOLECYSTECTOMY  2012    COLON RESECTION Right 2002    COLONOSCOPY N/A 04/28/2021    Procedure: COLONOSCOPY TO ANASTAMOSIS/TI;  Surgeon: Angelo Gonsalez MD;  Location: [facility] ENDOSCOPY;  Service: Gastroenterology;  Laterality: N/A;  PRE: SCREENING  POST: NORMAL POST-OP ANATOMY    ENDOSCOPY      2013    ENDOSCOPY N/A 03/10/2017    Procedure: ESOPHAGOGASTRODUODENOSCOPY WITH BIOPSY AND PETER DILATATION 54\";  Surgeon: Angelo Gonsalez MD;  Location: [facility] ENDOSCOPY;  Service:     ENDOSCOPY N/A " 2021    Procedure: ESOPHAGOGASTRODUODENOSCOPY WITH BIOPSIES, 54FR PETER DILATATION;  Surgeon: Angelo Gonsalez MD;  Location: [facility] ENDOSCOPY;  Service: Gastroenterology;  Laterality: N/A;  PRE: DYSPHAGIA  POST: GASTRITIS, ESOPHAGEAL RING    MASTECTOMY W/ SENTINEL NODE BIOPSY Bilateral 8/15/2024    Procedure: Bilateral total mastectomy, bilateral sentinel node biopsy, possible axillary node dissection, no reconstruction.;  Surgeon: Abeba Manrique MD;  Location: [facility] MAIN OR;  Service: General;  Laterality: Bilateral;    VENOUS ACCESS DEVICE (PORT) INSERTION Left 3/14/2024    Procedure: INSERTION OF PORTACATH;  Surgeon: Abeba Manrique MD;  Location: [facility] MAIN OR;  Service: General;  Laterality: Left;        Family History   Problem Relation Age of Onset    Hypertension Mother     Diabetes Mother     Hypertension Father     Breast cancer Sister 48    Breast cancer Sister 38    No Known Problems Brother     No Known Problems Daughter     No Known Problems Son     No Known Problems Maternal Grandmother     No Known Problems Paternal Grandmother     No Known Problems Maternal Grandfather     No Known Problems Paternal Grandfather     Autism Grandson     Colon cancer Neg Hx     Rectal cancer Neg Hx     Endometrial cancer Neg Hx     Vaginal cancer Neg Hx     Cervical cancer Neg Hx     Ovarian cancer Neg Hx     Prostate cancer Neg Hx     Uterine cancer Neg Hx     Malig Hyperthermia Neg Hx         Social History     Socioeconomic History    Marital status:      Spouse name: Ryan   Tobacco Use    Smoking status: Former     Current packs/day: 0.00     Average packs/day: 0.3 packs/day for 15.0 years (3.8 ttl pk-yrs)     Types: Cigarettes     Quit date: 1/15/2024     Years since quittin.8     Passive exposure: Past    Smokeless tobacco: Never    Tobacco comments:     N/A   Vaping Use    Vaping status: Never Used   Substance and Sexual Activity    Alcohol use: Yes     Comment: RARE    Drug  use: No    Sexual activity: Not Currently     Partners: Male     Birth control/protection: Tubal ligation        OB History          4    Para   3    Term   3       0    AB   1    Living   3         SAB   1    IAB   0    Ectopic   0    Molar   0    Multiple   0    Live Births   3               Age at menarche-16  Age at first live childbirth-18   4 para 3  1  Age at menopause-49  No use of hormone replacement therapy  No use of oral conceptive pills  Breast-feeding for 3 months    Allergies   Allergen Reactions    Paclitaxel Shortness Of Breath    Metronidazole Nausea And Vomiting          Objective   not currently breastfeeding.   Physical Exam  Vitals reviewed.   Constitutional:       Appearance: Normal appearance.   HENT:      Mouth/Throat:      Mouth: Mucous membranes are moist.      Pharynx: Oropharynx is clear.   Eyes:      Conjunctiva/sclera: Conjunctivae normal.      Pupils: Pupils are equal, round, and reactive to light.   Pulmonary:      Effort: Pulmonary effort is normal.   Musculoskeletal:         General: Normal range of motion.      Cervical back: Normal range of motion.      Comments: +bilateral trace lower extremity edema    Skin:     General: Skin is warm and dry.      Findings: Rash present.      Comments: +erythema to palms and bottom of feet.   Neurological:      General: No focal deficit present.      Mental Status: She is alert and oriented to person, place, and time.   Psychiatric:         Mood and Affect: Mood is anxious.         Behavior: Behavior normal.       Status post bilateral mastectomy without reconstruction    I have reexamined the patient and the results are consistent with the previously documented exam. DAY Driver      Assessment & Plan     *Right breast invasive ductal carcinoma  Clinical T2 N0 M0, stage IIa, clinical and prognostic ER/AZ negative, HER2 3+ immunohistochemistry, Ki-67 70%  On the MRI the mass measures 2 cm however there is  linear enhancement which in the AP dimension measures 4.7 cm.  Additional biopsies of this linear enhancement was performed and consistent with DCIS  It is hard to delineate the amount of invasive disease as opposed to DCIS.  More likely than not invasive component is likely limited to the mass which is about 2-1/2 cm on exam.  Since the tumor is greater than 2 cm I think it is reasonable to proceed with neoadjuvant chemotherapy.  We discussed proceeding with weekly Taxol along with Herceptin and Perjeta every 3 weeks.  3/18/2024-received first dose of Herceptin and Perjeta, had a severe hypersensitivity reaction to Taxol and hence Taxol was discontinued  3/25/2024-Labs reviewed and stable to proceed with chemotherapy  Patient is extremely anxious today after experiencing the severe hypersensitivity reaction last week.  Reassured patient that this is unlikely to happen with Abraxane and we will premedicate with extra Solu-Medrol..  4/1/2024 patient returns for cycle 1 day 15 Abraxane.,  Left chest, left shoulder at times.  This has come and gone over the past week and is not associated with any shortness of breath, numbness or tingling.  She states when her chest is hurting if she presses on the area it gets better.  She notices it at different points during the day.  It feels more like an ache and is not a sharp pain.  She states she has been sleeping in a different position due to the port which could be causing some muscular discomfort.  She has noticed the discomfort at times when she takes a deep breath however is able to get deep breath here in the office did not have any pain.  She is not having any pain today at all.  She has taken her hydrocodone maybe 3-4 times over the past week and has not needed it multiple times a day.  CMP resulted today with AST elevated at 47 and .  Bilirubin remains normal at 0.4 with alkaline phosphatase elevated to 160.  Reviewed with Dr. Loredo and will hold treatment  "today.  She will try to limit any acetaminophen containing products.  She did have a few days of diarrhea that she is unsure if it was related to the Abraxane versus a GI bug as her  had the same symptoms.  Neither had chills or fevers.  She is also having some dysuria today we have a urinalysis with culture pending.  4/8/2024: Due for cycle 2-day 1 Abraxane, Herceptin, Perjeta.  Did not receive cycle 1 day 15 Abraxane due to elevated LFTs as detailed above.  LFTs have significantly improved, AST 28, ALT 58.  Reviewed with Dr. Renteria will proceed with Abraxane only today, will reduce Abraxane by 20%.  4/15/2024-patient is scheduled for week 4 of chemotherapy today.  Labs reviewed and overall stable except for an ALT of 45.  She will proceed with planned chemotherapy.  4/22/2024: after last chemotherapy patient experienced 3 episodes of \"zapping\" in her fingertips and soles of feet that last a few seconds then resolved.  Has not experienced this sensation in at least 3 days.  No interference with daily activities.  Will continue Abraxane at previous 20% dose reduction.  She is doing cold therapy for her hands/feet.  Will need to monitor closely for neuropathy.  4/29/2024-chemotherapy held as patient reported worsening neuropathy and dropping things.  5/6/2024-presents today for evaluation of the neuropathy and to determine if she can resume chemotherapy.  She reports that the neuropathy has improved some.  She is mainly perceiving pain in her fingers and tingling and numbness.   She however tells me that she has not been dropping things anymore.  She is able to do other fine motor activities just fine.  5/6/2024-patient did receive Abraxane with a 20% dose reduction.  5/13/2024-presents today for the neck cycle of Abraxane and since her last treatment she has not had any worsening of neuropathy.  We also started her on gabapentin 300 mg nightly.  Neuropathy overall stable.  Labs reviewed and she does have " elevated LFTs however no further dose reduction needed.  Will proceed with planned chemotherapy today.  5/11/2024-MRI of the breast shows near complete resolution of the non-mass enhancement and areas of malignancy in both right and left breast.  This is reassuring and if patient has any further worsening of neuropathy then we could discontinue Abraxane and she will proceed with surgery and continue HER2 directed therapy.  5/28/2024: Cycle 4-day 8 Abraxane only, continue previous 20% dose reduction.  Continues on gabapentin, neuropathy stable.  6/10/2024-scheduled for Abraxane Herceptin and Perjeta.  Neuropathy overall stable.  She will continue with 20% dose reduction. She has 1 more week of Abraxane left following which she will have to proceed with surgery followed by adjuvant HER2 directed therapy.  Patient is very reluctant to undergo surgery.  Communicated with Dr. Manrique and she will be scheduled to see her after completion of the last treatment of Abraxane.  Since she had a recent MRI she may not require another MRI   Proceed with last dose of Abraxane today 6/17/2024. Maintain previous dosing with 20% dose reduction.   7/22/2024 patient returns today for Herceptin and Perjeta which she continues once every 3 weeks.  She is scheduled for bilateral mastectomy on 8/15/2024 with Dr. Manrique.  8/12/2024 patient returns today to proceed with Herceptin and Perjeta.  She is scheduled for bilateral mastectomy with Dr. Manrique on 8/15/2024.  Review of Dr. Renteria today we will proceed with treatment.  8/15/2024-status post bilateral mastectomy with residual DCIS on the right side measuring 2 mm, high-grade, Y pTis N0 M0  10/15/2024: Herceptin and Perjeta held as patient was complaining of numbness/tingling in both hands and feet.  Recommended starting B complex vitamin.  10/21/2024: Neuropathy improved after starting B complex vitamin.  Was previously constant, now intermittent and typically occurs with certain  positions.  The intensity of numbness/tingling has also reduced.  Will resume Herceptin and Perjeta today and monitor neuropathy closely.  11/12/2024: Proceed with Cycle 12 Herceptin/perjeta today. Continue to closely monitor neuropathy and ?possible palmar-plantar erythrodysesthesia. In regards to numbness/tingling, advised starting cymbalta 30mg daily as patient has tried gabapentin in the past without benefit. In regards to redness on hands/feet, advised starting urea cream and encouraged her to reach out if symptoms do not improve after starting this.     *Left breast ductal carcinoma in situ  Intermed  She has been having some discomfort in her left shoulder bladeiate grade with apocrine features  ER/MA strongly positive  Plan is for her to undergo bilateral mastectomy.  If she does undergo bilateral mastectomy then that would be curative and she would not require any endocrine therapy subsequently unless there is any upstaging of the malignancy  5/11/2024-MRI of the breast with near complete resolution of the non-mass enhancement.  Status post left mastectomy and sentinel lymph node biopsy with no residual disease    *Severe diarrhea  Started last night  Patient has not used any Imodium  We will administer 1 L of normal saline today  Imodium will be given here today  She will start entegrade  Patient has not been using any Imodium.  Encouraged her to use Imodium up to 8 tablets in a day.  Diarrhea resolved completely     *GERD  Protonix with improvement  Continue the same    *Cardiac health  Referral to cardio oncology placed  3/15/2024 echocardiogram with an ejection fraction of 75.5%, LV strain is normal at -24.2%  She has met with Dr. Peguero , started on bisoprolol   6/17/2024 ejection fraction 70%, LV strain -22.4%  9/24/2024 echocardiogram showing calculated LVEF at 75.3% and GLS -23.9%.    *Elevated LFTs  LFTs now normal.    *Hypokalemia secondary to diarrhea likely  Potassium normal today  3.9.    *Neuropathy  Held treatment  due to neuropathy  5/6/2024-resumed chemotherapy  She was started on gabapentin 300 mg nightly and also vitamin B12.  Reports stable neuropathy  Proceed with planned chemotherapy today   MRI shows near complete resolution of the abnormalities and if patient experiences any worsening neuropathy then low threshold to stop chemotherapy and proceed with surgery  The remains intermittent though occasionally painful in her feet.  Will increase gabapentin to 300 mg twice daily  7/22/2024 patient reports neuropathy is stable continues gabapentin 300 mg twice daily  9/3/2024-neuropathy improved  10/15/2024-complaining of worsening neuropathy in the hands/feet.  Recommended starting B complex vitamin.  Herceptin and Perjeta delayed 1 week.  10/22/2024 after starting B complex vitamin numbness/tingling has improved.  It was previously constant, is now intermittent typically only occurring with certain positions.  The intensity of the numbness/tingling has also improved.  11/12/2024: She continues to experience numbness/tingling. Advised to continue b complex vitamin and also prescribed cymbalta 30mg daily as patient has previously tried gabapentin without improvement. There is question whether some of what she is describing is related to possible palmar-plantar erthrodysesthesia as her palms and bottoms of her feet are red with flaky skin, therefore, will also prescribe urea cream for her to apply.     *Cheilitis  Improved significantly    *Right groin candida  8/12/2024 had tried OTC antifungal with initial improvement and then reoccurrence.  Discussed drying well, will add Nystatin powder TID.  Resolved    *?Palmar-plantar erythrodysesthesia  11/12/2024: Erythematous/flaky palms of her hands and bottom of her feet. Advised utilizing urea cream generously and instructed her to reach out if she does not see improvement in symptoms.     PLAN:   Proceed with Herceptin/Perjeta today. Continue  this for 1 year.   Start cymbalta 30mg daily and continue vitamin b complex.   Prescription sent for urea cream today.   Continue to follow with lymphedema clinic.   Next echo scheduled for 1/3/2025.   Return to clinic in 3 weeks for Cycle 13 Herceptin/perjeta. She does not need to see a provider at this visit   Return to clinic in 6 weeks for MD visit, Cycle 14 Herceptin/perjeta, labs per protocol.   Instructed to reach out sooner with any new concerns.     50 minutes total spent on the encounter including face-to-face time, reviewing the old medical records, reviewing laboratory testing, documentation and care coordination on the same day.       Megan Hall, APRN  11/12/2024

## 2024-11-13 ENCOUNTER — OFFICE VISIT (OUTPATIENT)
Dept: FAMILY MEDICINE CLINIC | Facility: CLINIC | Age: 69
End: 2024-11-13
Payer: COMMERCIAL

## 2024-11-13 VITALS
WEIGHT: 121 LBS | DIASTOLIC BLOOD PRESSURE: 96 MMHG | HEIGHT: 58 IN | BODY MASS INDEX: 25.4 KG/M2 | TEMPERATURE: 97.1 F | HEART RATE: 79 BPM | OXYGEN SATURATION: 95 % | SYSTOLIC BLOOD PRESSURE: 146 MMHG

## 2024-11-13 DIAGNOSIS — Z90.13 S/P BILATERAL MASTECTOMY: ICD-10-CM

## 2024-11-13 DIAGNOSIS — E11.29 PROTEINURIA DUE TO TYPE 2 DIABETES MELLITUS: ICD-10-CM

## 2024-11-13 DIAGNOSIS — R80.9 PROTEINURIA DUE TO TYPE 2 DIABETES MELLITUS: ICD-10-CM

## 2024-11-13 DIAGNOSIS — E55.9 VITAMIN D DEFICIENCY: ICD-10-CM

## 2024-11-13 DIAGNOSIS — E11.9 TYPE 2 DIABETES MELLITUS WITHOUT COMPLICATION, WITHOUT LONG-TERM CURRENT USE OF INSULIN: Primary | ICD-10-CM

## 2024-11-13 DIAGNOSIS — I10 ESSENTIAL HYPERTENSION: ICD-10-CM

## 2024-11-13 DIAGNOSIS — M81.0 AGE-RELATED OSTEOPOROSIS WITHOUT CURRENT PATHOLOGICAL FRACTURE: ICD-10-CM

## 2024-11-13 PROCEDURE — 99214 OFFICE O/P EST MOD 30 MIN: CPT | Performed by: FAMILY MEDICINE

## 2024-11-13 RX ORDER — LISINOPRIL 10 MG/1
10 TABLET ORAL DAILY
Qty: 90 TABLET | Refills: 1 | Status: SHIPPED | OUTPATIENT
Start: 2024-11-13

## 2024-11-13 NOTE — PROGRESS NOTES
Subjective   Felicia Boyle is a 69 y.o. female with   Chief Complaint   Patient presents with    Discuss lab work   .    History of Present Illness   69-year-old [place] female with multiple medical issues including type II non-insulin-dependent diabetes mellitus.  She has walk-through breast cancer having had bilateral modified radical mastectomy.  She has received chemotherapy and is currently doing well.  As a result of the above she has not had fasting labs until recently for her sugar.  She has not been following her sugar by glucometer and uses no diabetic medication.  Current medications include Cymbalta, hydrocodone, and Kenalog ointment.  She was last on Januvia which was beneficial for her sugar but this was discontinued she believes secondary to a side effect although cannot remember the reaction.  She has also used metformin in the past but did not like this medication secondary to the size of the pills.  She is otherwise doing well.  The following portions of the patient's history were reviewed and updated as appropriate: allergies, current medications, past family history, past medical history, past social history, past surgical history and problem list.    Review of Systems   Respiratory:          Breast cancer   Endocrine:        Type II non-insulin-dependent diabetes mellitus       Objective     Vitals:    11/13/24 0834   BP: 146/96   Pulse: 79   Temp: 97.1 °F (36.2 °C)   SpO2: 95%       Recent Results (from the past 4 weeks)   Comprehensive metabolic panel    Collection Time: 10/22/24  7:44 AM    Specimen: Blood   Result Value Ref Range    Glucose 254 (H) 65 - 99 mg/dL    BUN 17 8 - 23 mg/dL    Creatinine 0.68 0.57 - 1.00 mg/dL    Sodium 140 136 - 145 mmol/L    Potassium 3.9 3.5 - 5.2 mmol/L    Chloride 106 98 - 107 mmol/L    CO2 22.3 22.0 - 29.0 mmol/L    Calcium 9.3 8.6 - 10.5 mg/dL    Total Protein 6.8 6.0 - 8.5 g/dL    Albumin 4.1 3.5 - 5.2 g/dL    ALT (SGPT) 31 1 - 33 U/L    AST (SGOT) 22 1  - 32 U/L    Alkaline Phosphatase 78 39 - 117 U/L    Total Bilirubin 0.2 0.0 - 1.2 mg/dL    Globulin 2.7 gm/dL    A/G Ratio 1.5 g/dL    BUN/Creatinine Ratio 25.0 7.0 - 25.0    Anion Gap 11.7 5.0 - 15.0 mmol/L    eGFR 93.8 >60.0 mL/min/1.73   CBC Auto Differential    Collection Time: 10/22/24  7:44 AM    Specimen: Blood   Result Value Ref Range    WBC 7.99 3.40 - 10.80 10*3/mm3    RBC 4.12 3.77 - 5.28 10*6/mm3    Hemoglobin 11.7 (L) 12.0 - 15.9 g/dL    Hematocrit 36.6 34.0 - 46.6 %    MCV 88.8 79.0 - 97.0 fL    MCH 28.4 26.6 - 33.0 pg    MCHC 32.0 31.5 - 35.7 g/dL    RDW 13.3 12.3 - 15.4 %    RDW-SD 43.6 37.0 - 54.0 fl    MPV 10.1 6.0 - 12.0 fL    Platelets 262 140 - 450 10*3/mm3    Neutrophil % 61.3 42.7 - 76.0 %    Lymphocyte % 28.4 19.6 - 45.3 %    Monocyte % 8.4 5.0 - 12.0 %    Eosinophil % 1.0 0.3 - 6.2 %    Basophil % 0.5 0.0 - 1.5 %    Immature Grans % 0.4 0.0 - 0.5 %    Neutrophils, Absolute 4.90 1.70 - 7.00 10*3/mm3    Lymphocytes, Absolute 2.27 0.70 - 3.10 10*3/mm3    Monocytes, Absolute 0.67 0.10 - 0.90 10*3/mm3    Eosinophils, Absolute 0.08 0.00 - 0.40 10*3/mm3    Basophils, Absolute 0.04 0.00 - 0.20 10*3/mm3    Immature Grans, Absolute 0.03 0.00 - 0.05 10*3/mm3    nRBC 0.0 0.0 - 0.2 /100 WBC   Comprehensive Metabolic Panel    Collection Time: 10/31/24  8:51 AM    Specimen: Blood   Result Value Ref Range    Glucose 143 (H) 65 - 99 mg/dL    BUN 15 8 - 23 mg/dL    Creatinine 0.75 0.57 - 1.00 mg/dL    EGFR Result 85.8 >60.0 mL/min/1.73    BUN/Creatinine Ratio 20.0 7.0 - 25.0    Sodium 139 136 - 145 mmol/L    Potassium 4.2 3.5 - 5.2 mmol/L    Chloride 101 98 - 107 mmol/L    Total CO2 23.3 22.0 - 29.0 mmol/L    Calcium 9.6 8.6 - 10.5 mg/dL    Total Protein 7.7 6.0 - 8.5 g/dL    Albumin 4.7 3.5 - 5.2 g/dL    Globulin 3.0 gm/dL    A/G Ratio 1.6 g/dL    Total Bilirubin 0.3 0.0 - 1.2 mg/dL    Alkaline Phosphatase 97 39 - 117 U/L    AST (SGOT) 22 1 - 32 U/L    ALT (SGPT) 31 1 - 33 U/L   TSH    Collection Time:  10/31/24  8:51 AM    Specimen: Blood   Result Value Ref Range    TSH 1.940 0.270 - 4.200 uIU/mL   Vitamin D,25-Hydroxy    Collection Time: 10/31/24  8:51 AM    Specimen: Blood   Result Value Ref Range    25 Hydroxy, Vitamin D 29.1 (L) 30.0 - 100.0 ng/ml   Lipid Panel    Collection Time: 10/31/24  8:51 AM    Specimen: Blood   Result Value Ref Range    Total Cholesterol 164 0 - 200 mg/dL    Triglycerides 162 (H) 0 - 150 mg/dL    HDL Cholesterol 68 (H) 40 - 60 mg/dL    VLDL Cholesterol Rashi 27 5 - 40 mg/dL    LDL Chol Calc (NIH) 69 0 - 100 mg/dL   Hemoglobin A1c    Collection Time: 10/31/24  8:51 AM    Specimen: Blood   Result Value Ref Range    Hemoglobin A1C 7.50 (H) 4.80 - 5.60 %   CBC & Differential    Collection Time: 10/31/24  8:51 AM    Specimen: Blood   Result Value Ref Range    WBC 8.84 3.40 - 10.80 10*3/mm3    RBC 4.69 3.77 - 5.28 10*6/mm3    Hemoglobin 13.0 12.0 - 15.9 g/dL    Hematocrit 41.3 34.0 - 46.6 %    MCV 88.1 79.0 - 97.0 fL    MCH 27.7 26.6 - 33.0 pg    MCHC 31.5 31.5 - 35.7 g/dL    RDW 13.0 12.3 - 15.4 %    Platelets 319 140 - 450 10*3/mm3    Neutrophil Rel % 66.9 42.7 - 76.0 %    Lymphocyte Rel % 23.0 19.6 - 45.3 %    Monocyte Rel % 8.3 5.0 - 12.0 %    Eosinophil Rel % 0.7 0.3 - 6.2 %    Basophil Rel % 0.6 0.0 - 1.5 %    Neutrophils Absolute 5.93 1.70 - 7.00 10*3/mm3    Lymphocytes Absolute 2.03 0.70 - 3.10 10*3/mm3    Monocytes Absolute 0.73 0.10 - 0.90 10*3/mm3    Eosinophils Absolute 0.06 0.00 - 0.40 10*3/mm3    Basophils Absolute 0.05 0.00 - 0.20 10*3/mm3    Immature Granulocyte Rel % 0.5 0.0 - 0.5 %    Immature Grans Absolute 0.04 0.00 - 0.05 10*3/mm3    nRBC 0.0 0.0 - 0.2 /100 WBC   Microalbumin / Creatinine Urine Ratio - Urine, Clean Catch    Collection Time: 10/31/24  8:51 AM    Specimen: Urine, Clean Catch   Result Value Ref Range    Creatinine, Urine 145.5 Not Estab. mg/dL    Microalbumin, Urine 46.8 Not Estab. ug/mL    Microalbumin/Creatinine Ratio 32 (H) 0 - 29 mg/g creat    Comprehensive metabolic panel    Collection Time: 11/12/24  8:53 AM    Specimen: Blood   Result Value Ref Range    Glucose 187 (H) 65 - 99 mg/dL    BUN 16 8 - 23 mg/dL    Creatinine 0.63 0.57 - 1.00 mg/dL    Sodium 139 136 - 145 mmol/L    Potassium 3.5 3.5 - 5.2 mmol/L    Chloride 103 98 - 107 mmol/L    CO2 21.4 (L) 22.0 - 29.0 mmol/L    Calcium 9.2 8.6 - 10.5 mg/dL    Total Protein 6.9 6.0 - 8.5 g/dL    Albumin 4.3 3.5 - 5.2 g/dL    ALT (SGPT) 31 1 - 33 U/L    AST (SGOT) 21 1 - 32 U/L    Alkaline Phosphatase 85 39 - 117 U/L    Total Bilirubin 0.3 0.0 - 1.2 mg/dL    Globulin 2.6 gm/dL    A/G Ratio 1.7 g/dL    BUN/Creatinine Ratio 25.4 (H) 7.0 - 25.0    Anion Gap 14.6 5.0 - 15.0 mmol/L    eGFR 95.6 >60.0 mL/min/1.73   CBC Auto Differential    Collection Time: 11/12/24  8:53 AM    Specimen: Blood   Result Value Ref Range    WBC 8.46 3.40 - 10.80 10*3/mm3    RBC 4.30 3.77 - 5.28 10*6/mm3    Hemoglobin 12.2 12.0 - 15.9 g/dL    Hematocrit 37.5 34.0 - 46.6 %    MCV 87.2 79.0 - 97.0 fL    MCH 28.4 26.6 - 33.0 pg    MCHC 32.5 31.5 - 35.7 g/dL    RDW 13.5 12.3 - 15.4 %    RDW-SD 42.6 37.0 - 54.0 fl    MPV 9.8 6.0 - 12.0 fL    Platelets 306 140 - 450 10*3/mm3    Neutrophil % 60.7 42.7 - 76.0 %    Lymphocyte % 31.1 19.6 - 45.3 %    Monocyte % 6.6 5.0 - 12.0 %    Eosinophil % 0.7 0.3 - 6.2 %    Basophil % 0.5 0.0 - 1.5 %    Immature Grans % 0.4 0.0 - 0.5 %    Neutrophils, Absolute 5.14 1.70 - 7.00 10*3/mm3    Lymphocytes, Absolute 2.63 0.70 - 3.10 10*3/mm3    Monocytes, Absolute 0.56 0.10 - 0.90 10*3/mm3    Eosinophils, Absolute 0.06 0.00 - 0.40 10*3/mm3    Basophils, Absolute 0.04 0.00 - 0.20 10*3/mm3    Immature Grans, Absolute 0.03 0.00 - 0.05 10*3/mm3    nRBC 0.0 0.0 - 0.2 /100 WBC       Physical Exam  Vitals and nursing note reviewed.   Constitutional:       Appearance: Normal appearance. She is well-developed and well-groomed.   HENT:      Head: Normocephalic and atraumatic.   Neck:      Thyroid: No thyroid mass or  thyromegaly.      Vascular: Normal carotid pulses. No carotid bruit.      Trachea: Trachea and phonation normal.   Cardiovascular:      Rate and Rhythm: Normal rate and regular rhythm.      Heart sounds: Normal heart sounds. No murmur heard.     No friction rub. No gallop.   Pulmonary:      Effort: Pulmonary effort is normal. No respiratory distress.      Breath sounds: Normal breath sounds. No decreased breath sounds, wheezing, rhonchi or rales.   Musculoskeletal:      Cervical back: Neck supple.   Lymphadenopathy:      Cervical: No cervical adenopathy.   Skin:     General: Skin is warm and dry.      Findings: No rash.   Neurological:      Mental Status: She is alert and oriented to person, place, and time.   Psychiatric:         Attention and Perception: Attention and perception normal.         Mood and Affect: Mood and affect normal.         Speech: Speech normal.         Behavior: Behavior normal. Behavior is cooperative.         Thought Content: Thought content normal.         Cognition and Memory: Cognition and memory normal.         Judgment: Judgment normal.         Assessment & Plan   Diagnoses and all orders for this visit:    1. Type 2 diabetes mellitus without complication, without long-term current use of insulin (Primary)  -     SITagliptin (Januvia) 100 MG tablet; Take 1 tablet by mouth Daily.  Dispense: 30 tablet; Refill: 5  -     Comprehensive metabolic panel; Future  -     Hemoglobin A1c; Future  -     TSH; Future  -     Vitamin D 25 hydroxy; Future  -     Lipid panel; Future  -     CBC w AUTO Differential; Future    2. Vitamin D deficiency  -     Comprehensive metabolic panel; Future  -     Hemoglobin A1c; Future  -     TSH; Future  -     Vitamin D 25 hydroxy; Future  -     Lipid panel; Future  -     CBC w AUTO Differential; Future    3. Essential hypertension  -     lisinopril (PRINIVIL,ZESTRIL) 10 MG tablet; Take 1 tablet by mouth Daily.  Dispense: 90 tablet; Refill: 1  -     Comprehensive  metabolic panel; Future  -     Hemoglobin A1c; Future  -     TSH; Future  -     Vitamin D 25 hydroxy; Future  -     Lipid panel; Future  -     CBC w AUTO Differential; Future    4. S/P bilateral mastectomy  -     Comprehensive metabolic panel; Future  -     Hemoglobin A1c; Future  -     TSH; Future  -     Vitamin D 25 hydroxy; Future  -     Lipid panel; Future  -     CBC w AUTO Differential; Future    5. Age-related osteoporosis without current pathological fracture  -     Comprehensive metabolic panel; Future  -     Hemoglobin A1c; Future  -     TSH; Future  -     Vitamin D 25 hydroxy; Future  -     Lipid panel; Future  -     CBC w AUTO Differential; Future    6. Proteinuria due to type 2 diabetes mellitus  -     lisinopril (PRINIVIL,ZESTRIL) 10 MG tablet; Take 1 tablet by mouth Daily.  Dispense: 90 tablet; Refill: 1  -     Comprehensive metabolic panel; Future  -     Hemoglobin A1c; Future  -     TSH; Future  -     Vitamin D 25 hydroxy; Future  -     Lipid panel; Future  -     CBC w AUTO Differential; Future        Return in about 6 months (around 5/13/2025) for Recheck.

## 2024-12-03 ENCOUNTER — INFUSION (OUTPATIENT)
Dept: ONCOLOGY | Facility: HOSPITAL | Age: 69
End: 2024-12-03
Payer: COMMERCIAL

## 2024-12-03 VITALS
SYSTOLIC BLOOD PRESSURE: 156 MMHG | WEIGHT: 121.2 LBS | DIASTOLIC BLOOD PRESSURE: 88 MMHG | BODY MASS INDEX: 25.34 KG/M2 | TEMPERATURE: 97.8 F | HEART RATE: 85 BPM | RESPIRATION RATE: 16 BRPM | OXYGEN SATURATION: 94 %

## 2024-12-03 DIAGNOSIS — Z17.0 MALIGNANT NEOPLASM OF LOWER-OUTER QUADRANT OF RIGHT BREAST OF FEMALE, ESTROGEN RECEPTOR POSITIVE: Primary | ICD-10-CM

## 2024-12-03 DIAGNOSIS — Z45.2 ENCOUNTER FOR FITTING AND ADJUSTMENT OF VASCULAR CATHETER: ICD-10-CM

## 2024-12-03 DIAGNOSIS — C50.511 MALIGNANT NEOPLASM OF LOWER-OUTER QUADRANT OF RIGHT BREAST OF FEMALE, ESTROGEN RECEPTOR POSITIVE: Primary | ICD-10-CM

## 2024-12-03 LAB
ALBUMIN SERPL-MCNC: 4.2 G/DL (ref 3.5–5.2)
ALBUMIN/GLOB SERPL: 1.4 G/DL
ALP SERPL-CCNC: 87 U/L (ref 39–117)
ALT SERPL W P-5'-P-CCNC: 38 U/L (ref 1–33)
ANION GAP SERPL CALCULATED.3IONS-SCNC: 12.7 MMOL/L (ref 5–15)
AST SERPL-CCNC: 29 U/L (ref 1–32)
BASOPHILS # BLD AUTO: 0.05 10*3/MM3 (ref 0–0.2)
BASOPHILS NFR BLD AUTO: 0.7 % (ref 0–1.5)
BILIRUB SERPL-MCNC: 0.4 MG/DL (ref 0–1.2)
BUN SERPL-MCNC: 11 MG/DL (ref 8–23)
BUN/CREAT SERPL: 19 (ref 7–25)
CALCIUM SPEC-SCNC: 8.8 MG/DL (ref 8.6–10.5)
CHLORIDE SERPL-SCNC: 100 MMOL/L (ref 98–107)
CO2 SERPL-SCNC: 25.3 MMOL/L (ref 22–29)
CREAT SERPL-MCNC: 0.58 MG/DL (ref 0.57–1)
DEPRECATED RDW RBC AUTO: 44.3 FL (ref 37–54)
EGFRCR SERPLBLD CKD-EPI 2021: 97.5 ML/MIN/1.73
EOSINOPHIL # BLD AUTO: 0.04 10*3/MM3 (ref 0–0.4)
EOSINOPHIL NFR BLD AUTO: 0.5 % (ref 0.3–6.2)
ERYTHROCYTE [DISTWIDTH] IN BLOOD BY AUTOMATED COUNT: 13.5 % (ref 12.3–15.4)
GLOBULIN UR ELPH-MCNC: 2.9 GM/DL
GLUCOSE SERPL-MCNC: 155 MG/DL (ref 65–99)
HCT VFR BLD AUTO: 38.4 % (ref 34–46.6)
HGB BLD-MCNC: 12.4 G/DL (ref 12–15.9)
IMM GRANULOCYTES # BLD AUTO: 0.03 10*3/MM3 (ref 0–0.05)
IMM GRANULOCYTES NFR BLD AUTO: 0.4 % (ref 0–0.5)
LYMPHOCYTES # BLD AUTO: 2.14 10*3/MM3 (ref 0.7–3.1)
LYMPHOCYTES NFR BLD AUTO: 28.5 % (ref 19.6–45.3)
MCH RBC QN AUTO: 28.6 PG (ref 26.6–33)
MCHC RBC AUTO-ENTMCNC: 32.3 G/DL (ref 31.5–35.7)
MCV RBC AUTO: 88.7 FL (ref 79–97)
MONOCYTES # BLD AUTO: 0.6 10*3/MM3 (ref 0.1–0.9)
MONOCYTES NFR BLD AUTO: 8 % (ref 5–12)
NEUTROPHILS NFR BLD AUTO: 4.65 10*3/MM3 (ref 1.7–7)
NEUTROPHILS NFR BLD AUTO: 61.9 % (ref 42.7–76)
NRBC BLD AUTO-RTO: 0 /100 WBC (ref 0–0.2)
PLATELET # BLD AUTO: 273 10*3/MM3 (ref 140–450)
PMV BLD AUTO: 9.9 FL (ref 6–12)
POTASSIUM SERPL-SCNC: 3.6 MMOL/L (ref 3.5–5.2)
PROT SERPL-MCNC: 7.1 G/DL (ref 6–8.5)
RBC # BLD AUTO: 4.33 10*6/MM3 (ref 3.77–5.28)
SODIUM SERPL-SCNC: 138 MMOL/L (ref 136–145)
WBC NRBC COR # BLD AUTO: 7.51 10*3/MM3 (ref 3.4–10.8)

## 2024-12-03 PROCEDURE — 96417 CHEMO IV INFUS EACH ADDL SEQ: CPT

## 2024-12-03 PROCEDURE — 25810000003 SODIUM CHLORIDE 0.9 % SOLUTION 250 ML FLEX CONT

## 2024-12-03 PROCEDURE — 25010000002 PERTUZUMAB 420 MG/14ML SOLUTION 420 MG VIAL

## 2024-12-03 PROCEDURE — 25010000002 HEPARIN LOCK FLUSH PER 10 UNITS: Performed by: INTERNAL MEDICINE

## 2024-12-03 PROCEDURE — 96413 CHEMO IV INFUSION 1 HR: CPT

## 2024-12-03 PROCEDURE — 25010000002 TRASTUZUMAB-DTTB 420 MG RECONSTITUTED SOLUTION 1 EACH VIAL

## 2024-12-03 PROCEDURE — 80053 COMPREHEN METABOLIC PANEL: CPT

## 2024-12-03 PROCEDURE — 85025 COMPLETE CBC W/AUTO DIFF WBC: CPT

## 2024-12-03 RX ORDER — SODIUM CHLORIDE 0.9 % (FLUSH) 0.9 %
10 SYRINGE (ML) INJECTION AS NEEDED
OUTPATIENT
Start: 2024-12-03

## 2024-12-03 RX ORDER — HEPARIN SODIUM (PORCINE) LOCK FLUSH IV SOLN 100 UNIT/ML 100 UNIT/ML
500 SOLUTION INTRAVENOUS AS NEEDED
OUTPATIENT
Start: 2024-12-03

## 2024-12-03 RX ORDER — SODIUM CHLORIDE 9 MG/ML
20 INJECTION, SOLUTION INTRAVENOUS ONCE
Status: CANCELLED | OUTPATIENT
Start: 2024-12-03

## 2024-12-03 RX ORDER — SODIUM CHLORIDE 0.9 % (FLUSH) 0.9 %
10 SYRINGE (ML) INJECTION AS NEEDED
Status: DISCONTINUED | OUTPATIENT
Start: 2024-12-03 | End: 2024-12-03 | Stop reason: HOSPADM

## 2024-12-03 RX ORDER — HEPARIN SODIUM (PORCINE) LOCK FLUSH IV SOLN 100 UNIT/ML 100 UNIT/ML
500 SOLUTION INTRAVENOUS AS NEEDED
Status: DISCONTINUED | OUTPATIENT
Start: 2024-12-03 | End: 2024-12-03 | Stop reason: HOSPADM

## 2024-12-03 RX ADMIN — PERTUZUMAB 420 MG: 30 INJECTION, SOLUTION, CONCENTRATE INTRAVENOUS at 10:42

## 2024-12-03 RX ADMIN — Medication 10 ML: at 12:15

## 2024-12-03 RX ADMIN — ONTRUZANT 330 MG: KIT INTRAVENOUS at 11:43

## 2024-12-03 RX ADMIN — Medication 500 UNITS: at 12:15

## 2024-12-03 NOTE — NURSING NOTE
Patient states she is having sharp pain at both mastectomy scar areas. 8/10. Reviewed with Megan LOPEZ. Feels it is neuropathic pain from the surgery. Recommended continuing her cymbalta, b complex, and heat/massage. Patient v/u.

## 2024-12-05 ENCOUNTER — HOSPITAL ENCOUNTER (OUTPATIENT)
Dept: OCCUPATIONAL THERAPY | Facility: HOSPITAL | Age: 69
Discharge: HOME | End: 2024-12-05
Admitting: SURGERY
Payer: COMMERCIAL

## 2024-12-05 DIAGNOSIS — Z90.13 S/P BILATERAL MASTECTOMY: ICD-10-CM

## 2024-12-05 DIAGNOSIS — R22.2 SOFT TISSUE SWELLING OF CHEST WALL: ICD-10-CM

## 2024-12-05 DIAGNOSIS — L90.5 SCAR ADHERENT: ICD-10-CM

## 2024-12-05 DIAGNOSIS — I97.2 POST-MASTECTOMY LYMPHEDEMA SYNDROME: Primary | ICD-10-CM

## 2024-12-05 PROCEDURE — 93702 BIS XTRACELL FLUID ANALYSIS: CPT

## 2024-12-05 PROCEDURE — 97140 MANUAL THERAPY 1/> REGIONS: CPT

## 2024-12-05 NOTE — THERAPY PROGRESS REPORT/RE-CERT
"Outpatient Occupational Therapy Lymphedema Progress Note  [facility]     Patient Name: Felicia Boyle  : 1955  MRN: [id number]  Today's Date: 2024      Visit Date: 2024    Patient Active Problem List   Diagnosis    Age-related osteoporosis without current pathological fracture    FH: breast cancer in first degree relative    Post-menopausal    Type 2 diabetes mellitus without complication, without long-term current use of insulin    Vitamin D deficiency    Mixed hyperlipidemia    Breast cancer of lower-outer quadrant of right female breast    Encounter for fitting and adjustment of vascular catheter    DCIS (ductal carcinoma in situ) of breast    Bilateral malignant neoplasm of breast in female    S/P bilateral mastectomy        Past Medical History:   Diagnosis Date    Anxiety about health     Breast cancer of lower-outer quadrant of right female breast 2024    DCIS (ductal carcinoma in situ) of breast 03/15/2024    Diverticulosis     History of chemotherapy 2024    Mixed hyperlipidemia 2019    DIET CONTROLLED    Osteoporosis     Port-A-Cath in place     Type 2 diabetes mellitus         Past Surgical History:   Procedure Laterality Date    BREAST BIOPSY Left     Excisional biopsy in the [facility]    BREAST BIOPSY  2024    CATARACT EXTRACTION W/ INTRAOCULAR LENS IMPLANT Bilateral     CHOLECYSTECTOMY  2012    COLON RESECTION Right     COLONOSCOPY N/A 2021    Procedure: COLONOSCOPY TO ANASTAMOSIS/TI;  Surgeon: Angelo Gonsalez MD;  Location: [facility] ENDOSCOPY;  Service: Gastroenterology;  Laterality: N/A;  PRE: SCREENING  POST: NORMAL POST-OP ANATOMY    ENDOSCOPY      2013    ENDOSCOPY N/A 03/10/2017    Procedure: ESOPHAGOGASTRODUODENOSCOPY WITH BIOPSY AND PETER DILATATION 54\";  Surgeon: Angelo Gonsalez MD;  Location: [facility] ENDOSCOPY;  Service:     ENDOSCOPY N/A 2021    Procedure: ESOPHAGOGASTRODUODENOSCOPY WITH BIOPSIES, 54FR PETER DILATATION;  Surgeon: " Angelo Gonsalez MD;  Location: [facility] ENDOSCOPY;  Service: Gastroenterology;  Laterality: N/A;  PRE: DYSPHAGIA  POST: GASTRITIS, ESOPHAGEAL RING    MASTECTOMY W/ SENTINEL NODE BIOPSY Bilateral 8/15/2024    Procedure: Bilateral total mastectomy, bilateral sentinel node biopsy, possible axillary node dissection, no reconstruction.;  Surgeon: Abeba Manrique MD;  Location: [facility] MAIN OR;  Service: General;  Laterality: Bilateral;    VENOUS ACCESS DEVICE (PORT) INSERTION Left 3/14/2024    Procedure: INSERTION OF PORTACATH;  Surgeon: Abeba Manrique MD;  Location: [facility] MAIN OR;  Service: General;  Laterality: Left;         Visit Dx:      ICD-10-CM ICD-9-CM   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 Soft tissue swelling of chest wall  R22.2 786.6   3. Scar adherent  L90.5 709.2   4. S/P bilateral mastectomy  Z90.13 V45.71        Lymphedema       Row Name 12/05/24 1800             Subjective Pain    Able to rate subjective pain? yes  -RE      Pre-Treatment Pain Level 0  -RE      Post-Treatment Pain Level 0  -RE      Subjective Pain Comment Complains of intermittent bilateral rib pain which is brief in nature.  She also complains of significant soft tissue tightness across the right chest and axilla.  -RE         Subjective    Subjective Comments Patient reports that when she was fitted for her mastectomy bra that fit her stated that her left arm looked bigger than her right.  -RE         LUE Quick Girth (cm)    Axilla 31.5 cm  -RE      Mid upper arm 28 cm  -RE      Elbow 24.5 cm  -RE      Mid forearm 23.7 cm  -RE      Wrist crease 14.5 cm  -RE      Met-heads 17 cm  -RE      LUE Quick Girth Total 139.2  -RE         RUE Quick Girth (cm)    Axilla 30 cm  -RE      Mid upper arm 26.5 cm  -RE      Elbow 22.3 cm  -RE      Mid forearm 22.5 cm  -RE      Wrist crease 14.3 cm  -RE      Met-heads 17.5 cm  -RE      RUE Quick Girth Total 133.1  -RE         Compression/Skin Care    Compression/Skin Care  Comments Measured for Medi Rutledge size 2 extrawide compression arm sleeve and a Medi Rutledge size 2 compression gauntlet both 20 to 30 mmHg  -RE         L-Dex Bioimpedence Screening    L-Dex Measurement Extremity RUE  -RE      L-Dex Patient Position Standing  -RE      L-Dex UE Dominate Side Right  -RE      L-Dex UE At Risk Side Right  -RE      L-Dex UE Pre Surgical Value No  -RE      L-Dex UE Score -12.1  -RE      L-Dex UE Baseline Score -4.7  -RE      L-Dex UE Value Change -7.4  -RE      L-Dex UE Comment Abnormal result see assessment for explanation  -RE                User Key  (r) = Recorded By, (t) = Taken By, (c) = Cosigned By      Initials Name Provider Type    Emy Mccarthy OTR Occupational Therapist                    The patient had a  SOZO measurement which I reviewed today. The score is abnormal  see scanned to EMR. Bioimpedance spectroscopy helps identify the   onset of lymphedema in an arm or leg before patients experience noticeable swelling. Research has   shown that 92% of patients with early detection of lymphedema using L-Dex combined with   intervention do not progress to chronic lymphedema through three years. Additionally, as of March 2023, the NCCN Guidelines® for Survivorship recommend proactive screening for lymphedema using   bioimpedance spectroscopy. Whenever possible, patients are tested for baseline L-Dex score before   cancer treatment begins and then are reassessed during regular follow-up visits using the SOZO device.   Otherwise, this can be started postoperatively and continued during regular follow-up visits. If the   patient’s L-Dex score increases above normal levels, that is a sign that lymphedema is developing and a   referral is made to occupational therapy for further evaluation and early compression treatment.   Lymphedema assessment with the SOZO L-Dex score is recommended to be done every 3 months for   the first 3 years and then every 6 months for years 4 and 5  followed by annually afterwards         OT Assessment/Plan       Row Name 12/05/24 8844          OT Assessment    Impairments Edema;Impaired lymphatic circulation;Impaired flexibility  -RE     Assessment Comments Patient returns today to address soft tissue tightness.  However, she also stated when she was fitted for her mastectomy bra the fit her noted increased swelling in her left arm.  Patient's L-Dex value today was -12.1 as measured for the right arm.  This value is significantly decreased from her previous values and baseline which indicates that the left arm is actually swelling.  Patient has some mild edema across the left chest in particular inferior to her mastectomy scar.  She also has increased soft tissue tightness throughout the upper chest and axillary area which is likely contributing to the increased lymphatic load and therefore the swelling.  I have recommended that we continue to address the soft tissue tightness with her home exercise program and manual therapy as well as add a compression sleeve and manual lymph drainage.  Following manual techniques today patient noticed decreased tightness in her shoulder.  -RE     OT Diagnosis Stage I postmastectomy lymphedema, soft tissue tightness left chest, axilla  -RE     OT Rehab Potential Good  -RE     Patient/caregiver participated in establishment of treatment plan and goals Yes  -RE     Patient would benefit from skilled therapy intervention Yes  -RE        OT Plan    OT Frequency 1x/week;2x/week  -RE     Predicted Duration of Therapy Intervention (OT) 4 to 6 weeks  -RE     Planned CPT's? OT SELF CARE/MGMT/TRAIN 15 MIN: 90361;OT MANUAL THERAPY EA 15 MIN: 41848;OT THER PROC EA 15 MIN: [id number];OT BIS XTRACELL FLUID ANALYSIS: 46326  -RE     OT Plan Comments Order patient's compression garment from [employer]  -RE               User Key  (r) = Recorded By, (t) = Taken By, (c) = Cosigned By      Initials Name Provider Type    Emy Mccarthy OTR  Occupational Therapist                        Manual Rx (Last 36 Hours)       Manual Treatments       Row Name 12/05/24 1815 12/05/24 1700          Total Minutes    66739 - OT Manual Therapy Minutes 30  -RE --        Manual Rx 2    Manual Rx 2 Location -- Brief MLD to left chest and axillary area  -RE        Manual Rx 3    Manual Rx 3 Location -- soft tissue stretching L axilla and left chest  -RE               User Key  (r) = Recorded By, (t) = Taken By, (c) = Cosigned By      Initials Name Provider Type    RE Emy Gonzales OTR Occupational Therapist                   OT Goals       Row Name 12/05/24 1800          OT Short Term Goals    STG Date to Achieve 01/05/25  -RE     STG 1 Patient will demonstrate proper awareness of “What is Lymphedema?” and “Healthy Habits” for improved prevention, management, care of symptoms and ease of transition to self-care of condition.  -RE     STG 1 Progress Met  -RE     STG 2 Pt will demonstrate understanding of use of compression sleeve for edema prevention, exercise and air travel.  -RE     STG 2 Progress Ongoing  -RE     STG 3 Patient independent and compliant with initial home exercise program focused on gentle AROM and stretching to improve AROM to pre-surgical level.  -RE     STG 3 Progress Met;Ongoing  -RE        Long Term Goals    LTG Date to Achieve 03/05/25  -RE     LTG 1 Patient to improve DASH/ QuickDASH score by 10 points in 4 weeks.  -RE     LTG 1 Progress Ongoing  -RE     LTG 2 Patient will participate in bioimpedance scans every 3 months as a method of early detection of lymphedema to allow for early intervention.  -RE     LTG 2 Progress Met;Ongoing  -RE     LTG 3 Patient's bioimpedance score to remain below 6.5 for decreased risk of stage II lymphedema.  -RE     LTG 3 Progress Not Met  -RE     LTG 4 Patient to demonstrate increased bilateral shoulder flexion to 160 to improve functional UE use and to restore pre operative AROM per patient perception.  -RE     LTG  4 Progress Met;Ongoing  -RE     LTG 5 Patient to demonstrate increased bilateral shoulder abduction to 160 to improve functional UE use and to restore preoperative AROM per patient perception.  -RE     LTG 5 Progress Met;Ongoing  -RE        Time Calculation    OT Goal Re-Cert Due Date 03/05/25  -RE               User Key  (r) = Recorded By, (t) = Taken By, (c) = Cosigned By      Initials Name Provider Type    RE Emy Gonzales OTR Occupational Therapist                    Therapy Education  Education Details: I explained patient's L-Dex value and that it indicates that her left arm which was less at risk for lymphedema is actually demonstrating some increased fluid load based on her current L-Dex value.  I recommended that we continue with soft tissue stretching and add a compression garment as well as manual lymph drainage.  Given: Symptoms/condition management, Edema management  Program: New  How Provided: Verbal  Provided to: Patient  Level of Understanding: Teach back education performed, Verbalized  68627 - OT Self Care/Mgmt Minutes: 5                Time Calculation:   OT Start Time: 1500  OT Stop Time: 1545  OT Time Calculation (min): 45 min  Total Timed Code Minutes- OT: 35 minute(s)  Timed Charges  38545 - OT Manual Therapy Minutes: 30  66788 - OT Self Care/Mgmt Minutes: 5  Untimed Charges  24679 - OT Bioimpedence Rx Minutes: 10  Total Minutes  Timed Charges Total Minutes: 35  Untimed Charges Total Minutes: 10   Total Minutes: 45     Therapy Charges for Today       Code Description Service Date Service Provider Modifiers Qty    07973799489 HC OT MANUAL THERAPY EA 15 MIN 12/5/2024 Emy Gonzales OTR GO 2    50478794825 HC OT BIS XTRACELL FLUID ANALYSIS 12/5/2024 Emy Gonzales OTR GO 1          Dictated utilizing Dragon dictation:  Much of this encounter note is an electronic transcription/translation of spoken language to printed text. The electronic translation of spoken language may permit erroneous, or  at times, nonsensical words or phrases to be inadvertently transcribed; Although I have reviewed the note for such errors, some may still exist.              Emy Gonzales, OTR  12/5/2024

## 2024-12-06 ENCOUNTER — PATIENT OUTREACH (OUTPATIENT)
Dept: OTHER | Facility: HOSPITAL | Age: 69
End: 2024-12-06
Payer: COMMERCIAL

## 2024-12-06 DIAGNOSIS — Z17.0 MALIGNANT NEOPLASM OF LOWER-OUTER QUADRANT OF RIGHT BREAST OF FEMALE, ESTROGEN RECEPTOR POSITIVE: Primary | ICD-10-CM

## 2024-12-06 DIAGNOSIS — C50.511 MALIGNANT NEOPLASM OF LOWER-OUTER QUADRANT OF RIGHT BREAST OF FEMALE, ESTROGEN RECEPTOR POSITIVE: Primary | ICD-10-CM

## 2024-12-06 NOTE — PROGRESS NOTES
Called Ms. Boyle to see how she was doing. She stated she is struggling with increasing neuropathy and the medication they put her on is not helping. She will see them again Dec 23rd and I encouraged her to talk with them about her symptoms before another infusion is given. Otherwise she is doing well and has no resource needs at this time. We discussed our survivorship program and she was interested in getting an appointment. That referral has been made. She was thankful for the call and will reach out if any questions or needs arise.

## 2024-12-09 ENCOUNTER — TELEPHONE (OUTPATIENT)
Dept: ONCOLOGY | Facility: CLINIC | Age: 69
End: 2024-12-09
Payer: COMMERCIAL

## 2024-12-09 ENCOUNTER — HOSPITAL ENCOUNTER (OUTPATIENT)
Dept: OCCUPATIONAL THERAPY | Facility: HOSPITAL | Age: 69
Discharge: HOME | End: 2024-12-09
Admitting: SURGERY
Payer: COMMERCIAL

## 2024-12-09 DIAGNOSIS — L90.5 SCAR ADHERENT: ICD-10-CM

## 2024-12-09 DIAGNOSIS — R22.2 SOFT TISSUE SWELLING OF CHEST WALL: Primary | ICD-10-CM

## 2024-12-09 DIAGNOSIS — I97.2 POST-MASTECTOMY LYMPHEDEMA SYNDROME: ICD-10-CM

## 2024-12-09 DIAGNOSIS — M25.612 DECREASED RANGE OF MOTION OF LEFT SHOULDER: ICD-10-CM

## 2024-12-09 PROCEDURE — 97140 MANUAL THERAPY 1/> REGIONS: CPT

## 2024-12-09 RX ORDER — VITAMIN B COMPLEX
1 CAPSULE ORAL DAILY
Qty: 30 CAPSULE | Refills: 5 | Status: SHIPPED | OUTPATIENT
Start: 2024-12-09 | End: 2025-12-09

## 2024-12-09 NOTE — THERAPY TREATMENT NOTE
"Outpatient Occupational Therapy Lymphedema Treatment Note  [facility]     Patient Name: Felicia Boyle  : 1955  MRN: [id number]  Today's Date: 2024      Visit Date: 2024    Patient Active Problem List   Diagnosis    Age-related osteoporosis without current pathological fracture    FH: breast cancer in first degree relative    Post-menopausal    Type 2 diabetes mellitus without complication, without long-term current use of insulin    Vitamin D deficiency    Mixed hyperlipidemia    Breast cancer of lower-outer quadrant of right female breast    Encounter for fitting and adjustment of vascular catheter    DCIS (ductal carcinoma in situ) of breast    Bilateral malignant neoplasm of breast in female    S/P bilateral mastectomy        Past Medical History:   Diagnosis Date    Anxiety about health     Breast cancer of lower-outer quadrant of right female breast 2024    DCIS (ductal carcinoma in situ) of breast 03/15/2024    Diverticulosis     History of chemotherapy 2024    Mixed hyperlipidemia 2019    DIET CONTROLLED    Osteoporosis     Port-A-Cath in place     Type 2 diabetes mellitus         Past Surgical History:   Procedure Laterality Date    BREAST BIOPSY Left     Excisional biopsy in the [facility]    BREAST BIOPSY  2024    CATARACT EXTRACTION W/ INTRAOCULAR LENS IMPLANT Bilateral     CHOLECYSTECTOMY  2012    COLON RESECTION Right     COLONOSCOPY N/A 2021    Procedure: COLONOSCOPY TO ANASTAMOSIS/TI;  Surgeon: Angelo Gonsalez MD;  Location: [facility] ENDOSCOPY;  Service: Gastroenterology;  Laterality: N/A;  PRE: SCREENING  POST: NORMAL POST-OP ANATOMY    ENDOSCOPY      2013    ENDOSCOPY N/A 03/10/2017    Procedure: ESOPHAGOGASTRODUODENOSCOPY WITH BIOPSY AND PETER DILATATION 54\";  Surgeon: Angelo Gonsalez MD;  Location: [facility] ENDOSCOPY;  Service:     ENDOSCOPY N/A 2021    Procedure: ESOPHAGOGASTRODUODENOSCOPY WITH BIOPSIES, 54FR PETER DILATATION;  " Surgeon: Angelo Gonsalez MD;  Location: [facility] ENDOSCOPY;  Service: Gastroenterology;  Laterality: N/A;  PRE: DYSPHAGIA  POST: GASTRITIS, ESOPHAGEAL RING    MASTECTOMY W/ SENTINEL NODE BIOPSY Bilateral 8/15/2024    Procedure: Bilateral total mastectomy, bilateral sentinel node biopsy, possible axillary node dissection, no reconstruction.;  Surgeon: Abeba Manrique MD;  Location: [facility] MAIN OR;  Service: General;  Laterality: Bilateral;    VENOUS ACCESS DEVICE (PORT) INSERTION Left 3/14/2024    Procedure: INSERTION OF PORTACATH;  Surgeon: Abeba Manrique MD;  Location: [facility] MAIN OR;  Service: General;  Laterality: Left;         Visit Dx:      ICD-10-CM ICD-9-CM   1. Soft tissue swelling of chest wall  R22.2 786.6   2. Post-mastectomy lymphedema syndrome  I97.2 457.0   3. Scar adherent  L90.5 709.2   4. Decreased range of motion of left shoulder  M25.612 719.51        Lymphedema       Row Name 12/09/24 1500             Subjective Pain    Able to rate subjective pain? yes  -RE      Pre-Treatment Pain Level 0  -RE      Post-Treatment Pain Level 0  -RE      Subjective Pain Comment Continues to c/o tightness inthe L chest adn axilla  -RE         Manual Lymphatic Drainage    Manual Lymphatic Drainage initial sequence;opened regional lymph nodes;opened anastamoses;extremity treatment  -RE      Initial Sequence short neck  -RE      Opened Regional Lymph Nodes inguinal  -RE      Inguinal left  -RE      Opened Anastamoses axillo-inguinal  -RE      Axillo-Inguinal left  -RE      Extremity Treatment MLD to proximal limb only  -RE      MLD to Proximal Limb Only chest and upper back  -RE      Manual Therapy Soft tissue techniques to axilla and chest  -RE                User Key  (r) = Recorded By, (t) = Taken By, (c) = Cosigned By      Initials Name Provider Type    RE Emy Gonzales OTR Occupational Therapist                             OT Assessment/Plan       Row Name 12/09/24 5122          OT Assessment     Assessment Comments Following manual techniques pt reported decreased tissue tightness in the chest area. Edema in the upper back softened with MLD indicating a decrease in edema.  -RE        OT Plan    OT Plan Comments Continue OT for soft tissue tightness and early stage lymphedema  -RE               User Key  (r) = Recorded By, (t) = Taken By, (c) = Cosigned By      Initials Name Provider Type    Emy Mccarthy OTR Occupational Therapist                        Manual Rx (Last 36 Hours)       Manual Treatments       Row Name 12/09/24 1708             Total Minutes    23444 - OT Manual Therapy Minutes 50  -RE                User Key  (r) = Recorded By, (t) = Taken By, (c) = Cosigned By      Initials Name Provider Type    Emy Mccarthy OTR Occupational Therapist                      Therapy Education  Education Details: reviewed self MLD and HEP  Given: HEP, Edema management  Program: Reinforced  How Provided: Verbal  Provided to: Patient  Level of Understanding: Teach back education performed, Verbalized  75947 - OT Self Care/Mgmt Minutes: 5                Time Calculation:   OT Start Time: 1535  OT Stop Time: 1630  OT Time Calculation (min): 55 min  Total Timed Code Minutes- OT: 55 minute(s)  Timed Charges  05530 - OT Manual Therapy Minutes: 50  52410 - OT Self Care/Mgmt Minutes: 5  Total Minutes  Timed Charges Total Minutes: 55   Total Minutes: 55     Therapy Charges for Today       Code Description Service Date Service Provider Modifiers Qty    55389716822 HC OT MANUAL THERAPY EA 15 MIN 12/9/2024 Emy Gonzales OTR GO 4                        YARED Kelley  12/9/2024

## 2024-12-09 NOTE — TELEPHONE ENCOUNTER
Provider: Myra  Caller: patient  Relationship to Patient: self  Call Back Phone Number: [phone]  Reason for Call: she needs a refill for Vitamin B complex

## 2024-12-16 ENCOUNTER — HOSPITAL ENCOUNTER (OUTPATIENT)
Dept: OCCUPATIONAL THERAPY | Facility: HOSPITAL | Age: 69
Setting detail: THERAPIES SERIES
Discharge: HOME | End: 2024-12-16
Payer: COMMERCIAL

## 2024-12-16 DIAGNOSIS — I97.2 POST-MASTECTOMY LYMPHEDEMA SYNDROME: ICD-10-CM

## 2024-12-16 DIAGNOSIS — L90.5 SCAR ADHERENT: ICD-10-CM

## 2024-12-16 DIAGNOSIS — R22.2 SOFT TISSUE SWELLING OF CHEST WALL: Primary | ICD-10-CM

## 2024-12-16 PROCEDURE — 97535 SELF CARE MNGMENT TRAINING: CPT

## 2024-12-16 PROCEDURE — 97140 MANUAL THERAPY 1/> REGIONS: CPT

## 2024-12-16 NOTE — THERAPY TREATMENT NOTE
"Outpatient Occupational Therapy Lymphedema Treatment Note  [facility]     Patient Name: Felicia Boyle  : 1955  MRN: [id number]  Today's Date: 2024      Visit Date: 2024    Patient Active Problem List   Diagnosis    Age-related osteoporosis without current pathological fracture    FH: breast cancer in first degree relative    Post-menopausal    Type 2 diabetes mellitus without complication, without long-term current use of insulin    Vitamin D deficiency    Mixed hyperlipidemia    Breast cancer of lower-outer quadrant of right female breast    Encounter for fitting and adjustment of vascular catheter    DCIS (ductal carcinoma in situ) of breast    Bilateral malignant neoplasm of breast in female    S/P bilateral mastectomy        Past Medical History:   Diagnosis Date    Anxiety about health     Breast cancer of lower-outer quadrant of right female breast 2024    DCIS (ductal carcinoma in situ) of breast 03/15/2024    Diverticulosis     History of chemotherapy 2024    Mixed hyperlipidemia 2019    DIET CONTROLLED    Osteoporosis     Port-A-Cath in place     Type 2 diabetes mellitus         Past Surgical History:   Procedure Laterality Date    BREAST BIOPSY Left     Excisional biopsy in the [facility]    BREAST BIOPSY  2024    CATARACT EXTRACTION W/ INTRAOCULAR LENS IMPLANT Bilateral     CHOLECYSTECTOMY  2012    COLON RESECTION Right     COLONOSCOPY N/A 2021    Procedure: COLONOSCOPY TO ANASTAMOSIS/TI;  Surgeon: Angelo Gonsalez MD;  Location: [facility] ENDOSCOPY;  Service: Gastroenterology;  Laterality: N/A;  PRE: SCREENING  POST: NORMAL POST-OP ANATOMY    ENDOSCOPY      2013    ENDOSCOPY N/A 03/10/2017    Procedure: ESOPHAGOGASTRODUODENOSCOPY WITH BIOPSY AND PETER DILATATION 54\";  Surgeon: Angelo Gonsalez MD;  Location: [facility] ENDOSCOPY;  Service:     ENDOSCOPY N/A 2021    Procedure: ESOPHAGOGASTRODUODENOSCOPY WITH BIOPSIES, 54FR PETER DILATATION;  " Surgeon: Angelo Gonsalez MD;  Location: [facility] ENDOSCOPY;  Service: Gastroenterology;  Laterality: N/A;  PRE: DYSPHAGIA  POST: GASTRITIS, ESOPHAGEAL RING    MASTECTOMY W/ SENTINEL NODE BIOPSY Bilateral 8/15/2024    Procedure: Bilateral total mastectomy, bilateral sentinel node biopsy, possible axillary node dissection, no reconstruction.;  Surgeon: Abeba Manrique MD;  Location: [facility] MAIN OR;  Service: General;  Laterality: Bilateral;    VENOUS ACCESS DEVICE (PORT) INSERTION Left 3/14/2024    Procedure: INSERTION OF PORTACATH;  Surgeon: Abeba Manrique MD;  Location: [facility] MAIN OR;  Service: General;  Laterality: Left;         Visit Dx:      ICD-10-CM ICD-9-CM   1. Soft tissue swelling of chest wall  R22.2 786.6   2. Post-mastectomy lymphedema syndrome  I97.2 457.0   3. Scar adherent  L90.5 709.2        Lymphedema       Row Name 12/16/24 1100             Subjective Pain    Able to rate subjective pain? yes  -RE      Pre-Treatment Pain Level 0  -RE      Post-Treatment Pain Level 0  -RE      Subjective Pain Comment Patient complained of increased tightness and discomfort across bilateral ribs today versus left-sided only last week.  -RE         Subjective    Subjective Comments Patient is also complaining of increased symptoms of neuropathy in her hands and her feet.  She also complained of some numbness around her mouth.  This has been ongoing for approximately 4 days.  I strongly encouraged patient to contact [facility] with her concerns.  -RE         Manual Lymphatic Drainage    Manual Lymphatic Drainage initial sequence;opened regional lymph nodes;opened anastamoses;extremity treatment  -RE      Initial Sequence short neck  -RE      Opened Regional Lymph Nodes inguinal  -RE      Inguinal left  -RE      Opened Anastamoses axillo-inguinal  -RE      Axillo-Inguinal left  -RE      Extremity Treatment MLD to proximal limb only  -RE      MLD to Proximal Limb Only Left chest and upper arm  -RE                User  Key  (r) = Recorded By, (t) = Taken By, (c) = Cosigned By      Initials Name Provider Type    Emy Mccarthy OTR Occupational Therapist                             OT Assessment/Plan       Row Name 12/16/24 1355          OT Assessment    Assessment Comments Patient soft tissue tightness is worse today than it was last week.  I suspect this may be partially due to the fact that her neuropathy symptoms have increased and patient is nervous about that.  I encouraged her to continue with her home exercise program.  Following manual therapy techniques patient had significantly decreased soft tissue tightness across the left chest.  In regards to her early stage lymphedema we are still waiting on her compression garments.  I am trying to combat that with manual lymph drainage until her compression arrives.  -RE        OT Plan    OT Plan Comments Continue OT for early stage lymphedema and soft tissue tightness  -RE               User Key  (r) = Recorded By, (t) = Taken By, (c) = Cosigned By      Initials Name Provider Type    Emy Mccarthy OTR Occupational Therapist                        Manual Rx (Last 36 Hours)       Manual Treatments       Row Name 12/16/24 1356 12/16/24 1300          Total Minutes    10423 - OT Manual Therapy Minutes 35  -RE --        Manual Rx 1    Manual Rx 1 Location -- pec sweep B'LY  -RE        Manual Rx 2    Manual Rx 2 Location -- Skin rolling bilateral lower ribs  -RE        Manual Rx 3    Manual Rx 3 Location -- soft tissue stretching L axilla and left chest  -RE               User Key  (r) = Recorded By, (t) = Taken By, (c) = Cosigned By      Initials Name Provider Type    Emy Mccarthy OTR Occupational Therapist                      Therapy Education  Education Details: Encouraged patient to continue with daily performance of her home exercise program.  Patient is very compliant.  Since patient continues to complain of significant soft tissue tightness I encouraged her to add  "shoulder flexion stretch for 10 repetitions in the afternoon.  I also encouraged patient to perform her home exercise program after her muscles are warm either from a shower or from engaging in daily tasks.  I encouraged her not to stretch \"cold\".  I also provided her with contact information for [facility].  I ordered her compression garments from [facility] and she had some questions.  When she gets her compression sleeve and gauntlet she should wear it daily for 10 to 12 hours/day.  Patient should not sleep in her compression products.  If she has any concerns or questions she should contact the clinic.  Given: HEP, Symptoms/condition management, Edema management  Program: New, Reinforced  How Provided: Verbal  Provided to: Patient  Level of Understanding: Teach back education performed, Verbalized  49065 - OT Self Care/Mgmt Minutes: 10                Time Calculation:   OT Start Time: 1130  OT Stop Time: 1215  OT Time Calculation (min): 45 min  Total Timed Code Minutes- OT: 45 minute(s)  Timed Charges  82548 - OT Manual Therapy Minutes: 35  65511 - OT Self Care/Mgmt Minutes: 10  Total Minutes  Timed Charges Total Minutes: 45   Total Minutes: 45     Therapy Charges for Today       Code Description Service Date Service Provider Modifiers Qty    76568345140 HC OT MANUAL THERAPY EA 15 MIN 12/16/2024 Emy Gonzales OTR GO 2    58552359799 HC OT SELF CARE/MGMT/TRAIN EA 15 MIN 12/16/2024 Emy Gonzales OTR GO 1                Dictated utilizing Dragon dictation:  Much of this encounter note is an electronic transcription/translation of spoken language to printed text. The electronic translation of spoken language may permit erroneous, or at times, nonsensical words or phrases to be inadvertently transcribed; Although I have reviewed the note for such errors, some may still exist.        YARED Kelley  12/16/2024  "

## 2024-12-17 ENCOUNTER — PATIENT OUTREACH (OUTPATIENT)
Dept: OTHER | Facility: HOSPITAL | Age: 69
End: 2024-12-17
Payer: COMMERCIAL

## 2024-12-17 NOTE — PROGRESS NOTES
Ms. Bolye called stated her neuropathy is getting worse and its all over her body. She stated it is even on her tongue. Message sent to Dr. Renteria to see what next steps should be. Her next infusion is Monday. She was thankful for the help.

## 2024-12-20 ENCOUNTER — TELEPHONE (OUTPATIENT)
Dept: OTHER | Facility: HOSPITAL | Age: 69
End: 2024-12-20
Payer: COMMERCIAL

## 2024-12-20 ENCOUNTER — PATIENT OUTREACH (OUTPATIENT)
Dept: OTHER | Facility: HOSPITAL | Age: 69
End: 2024-12-20
Payer: COMMERCIAL

## 2024-12-20 DIAGNOSIS — G62.9 NEUROPATHY: Primary | ICD-10-CM

## 2024-12-20 NOTE — PROGRESS NOTES
Ms. Boyle called back with questions about survivorship and I told her she definitely should do that. We also discussed her neuropathy again and she is waiting to hear from Dr. Renteria's office about that. Message sent to follow up on those needs. She was thankful for the help.

## 2024-12-20 NOTE — TELEPHONE ENCOUNTER
[facility] MULTIDISCIPLINARY CLINIC  SURVIVORSHIP SERVICES CARE COORDINATION NOTE  PHONE      Call placed to patient  RE: open referral for survivorship care plan and treatment summary visit.    Left voice mail     Introduced myself and reviewed purpose and goals of survivorship visit as well as what to expect..    Patient mailed name and contact information for Survivorship Clinic [phone]

## 2024-12-23 ENCOUNTER — TELEPHONE (OUTPATIENT)
Dept: FAMILY MEDICINE CLINIC | Facility: CLINIC | Age: 69
End: 2024-12-23

## 2024-12-23 ENCOUNTER — INFUSION (OUTPATIENT)
Dept: ONCOLOGY | Facility: HOSPITAL | Age: 69
End: 2024-12-23
Payer: COMMERCIAL

## 2024-12-23 ENCOUNTER — TELEPHONE (OUTPATIENT)
Dept: ONCOLOGY | Facility: CLINIC | Age: 69
End: 2024-12-23
Payer: COMMERCIAL

## 2024-12-23 VITALS
HEART RATE: 70 BPM | OXYGEN SATURATION: 95 % | RESPIRATION RATE: 16 BRPM | SYSTOLIC BLOOD PRESSURE: 161 MMHG | BODY MASS INDEX: 24.84 KG/M2 | DIASTOLIC BLOOD PRESSURE: 90 MMHG | TEMPERATURE: 97.1 F | WEIGHT: 118.8 LBS

## 2024-12-23 DIAGNOSIS — C50.511 MALIGNANT NEOPLASM OF LOWER-OUTER QUADRANT OF RIGHT BREAST OF FEMALE, ESTROGEN RECEPTOR POSITIVE: Primary | ICD-10-CM

## 2024-12-23 DIAGNOSIS — Z45.2 ENCOUNTER FOR FITTING AND ADJUSTMENT OF VASCULAR CATHETER: ICD-10-CM

## 2024-12-23 DIAGNOSIS — Z17.0 MALIGNANT NEOPLASM OF LOWER-OUTER QUADRANT OF RIGHT BREAST OF FEMALE, ESTROGEN RECEPTOR POSITIVE: Primary | ICD-10-CM

## 2024-12-23 LAB
ALBUMIN SERPL-MCNC: 4 G/DL (ref 3.5–5.2)
ALBUMIN/GLOB SERPL: 1.3 G/DL
ALP SERPL-CCNC: 105 U/L (ref 39–117)
ALT SERPL W P-5'-P-CCNC: 29 U/L (ref 1–33)
ANION GAP SERPL CALCULATED.3IONS-SCNC: 14.9 MMOL/L (ref 5–15)
AST SERPL-CCNC: 22 U/L (ref 1–32)
BASOPHILS # BLD AUTO: 0.04 10*3/MM3 (ref 0–0.2)
BASOPHILS NFR BLD AUTO: 0.4 % (ref 0–1.5)
BILIRUB SERPL-MCNC: 0.2 MG/DL (ref 0–1.2)
BUN SERPL-MCNC: 12 MG/DL (ref 8–23)
BUN/CREAT SERPL: 18.5 (ref 7–25)
CALCIUM SPEC-SCNC: 9 MG/DL (ref 8.6–10.5)
CHLORIDE SERPL-SCNC: 102 MMOL/L (ref 98–107)
CO2 SERPL-SCNC: 23.1 MMOL/L (ref 22–29)
CREAT SERPL-MCNC: 0.65 MG/DL (ref 0.57–1)
DEPRECATED RDW RBC AUTO: 41.9 FL (ref 37–54)
EGFRCR SERPLBLD CKD-EPI 2021: 94.9 ML/MIN/1.73
EOSINOPHIL # BLD AUTO: 0.08 10*3/MM3 (ref 0–0.4)
EOSINOPHIL NFR BLD AUTO: 0.9 % (ref 0.3–6.2)
ERYTHROCYTE [DISTWIDTH] IN BLOOD BY AUTOMATED COUNT: 13 % (ref 12.3–15.4)
GLOBULIN UR ELPH-MCNC: 3.2 GM/DL
GLUCOSE SERPL-MCNC: 234 MG/DL (ref 65–99)
HCT VFR BLD AUTO: 38.4 % (ref 34–46.6)
HGB BLD-MCNC: 12.2 G/DL (ref 12–15.9)
IMM GRANULOCYTES # BLD AUTO: 0.09 10*3/MM3 (ref 0–0.05)
IMM GRANULOCYTES NFR BLD AUTO: 1 % (ref 0–0.5)
LYMPHOCYTES # BLD AUTO: 2.63 10*3/MM3 (ref 0.7–3.1)
LYMPHOCYTES NFR BLD AUTO: 28.4 % (ref 19.6–45.3)
MCH RBC QN AUTO: 28.1 PG (ref 26.6–33)
MCHC RBC AUTO-ENTMCNC: 31.8 G/DL (ref 31.5–35.7)
MCV RBC AUTO: 88.5 FL (ref 79–97)
MONOCYTES # BLD AUTO: 0.71 10*3/MM3 (ref 0.1–0.9)
MONOCYTES NFR BLD AUTO: 7.7 % (ref 5–12)
NEUTROPHILS NFR BLD AUTO: 5.7 10*3/MM3 (ref 1.7–7)
NEUTROPHILS NFR BLD AUTO: 61.6 % (ref 42.7–76)
NRBC BLD AUTO-RTO: 0 /100 WBC (ref 0–0.2)
PLATELET # BLD AUTO: 350 10*3/MM3 (ref 140–450)
PMV BLD AUTO: 9.8 FL (ref 6–12)
POTASSIUM SERPL-SCNC: 3.5 MMOL/L (ref 3.5–5.2)
PROT SERPL-MCNC: 7.2 G/DL (ref 6–8.5)
RBC # BLD AUTO: 4.34 10*6/MM3 (ref 3.77–5.28)
SODIUM SERPL-SCNC: 140 MMOL/L (ref 136–145)
WBC NRBC COR # BLD AUTO: 9.25 10*3/MM3 (ref 3.4–10.8)

## 2024-12-23 PROCEDURE — 25010000002 TRASTUZUMAB-DTTB 420 MG RECONSTITUTED SOLUTION 1 EACH VIAL: Performed by: NURSE PRACTITIONER

## 2024-12-23 PROCEDURE — 25810000003 SODIUM CHLORIDE 0.9 % SOLUTION 250 ML FLEX CONT: Performed by: NURSE PRACTITIONER

## 2024-12-23 PROCEDURE — 85025 COMPLETE CBC W/AUTO DIFF WBC: CPT

## 2024-12-23 PROCEDURE — 96413 CHEMO IV INFUSION 1 HR: CPT

## 2024-12-23 PROCEDURE — 80053 COMPREHEN METABOLIC PANEL: CPT

## 2024-12-23 PROCEDURE — 96417 CHEMO IV INFUS EACH ADDL SEQ: CPT

## 2024-12-23 PROCEDURE — 25010000002 HEPARIN LOCK FLUSH PER 10 UNITS: Performed by: INTERNAL MEDICINE

## 2024-12-23 PROCEDURE — 25010000002 PERTUZUMAB 420 MG/14ML SOLUTION 420 MG VIAL: Performed by: NURSE PRACTITIONER

## 2024-12-23 RX ORDER — DEXTROMETHORPHAN HYDROBROMIDE AND PROMETHAZINE HYDROCHLORIDE 15; 6.25 MG/5ML; MG/5ML
5 SYRUP ORAL 4 TIMES DAILY PRN
Qty: 240 ML | Refills: 0 | Status: SHIPPED | OUTPATIENT
Start: 2024-12-23

## 2024-12-23 RX ORDER — SODIUM CHLORIDE 9 MG/ML
20 INJECTION, SOLUTION INTRAVENOUS ONCE
Status: CANCELLED | OUTPATIENT
Start: 2024-12-24

## 2024-12-23 RX ORDER — SODIUM CHLORIDE 0.9 % (FLUSH) 0.9 %
10 SYRINGE (ML) INJECTION AS NEEDED
Status: DISCONTINUED | OUTPATIENT
Start: 2024-12-23 | End: 2024-12-23 | Stop reason: HOSPADM

## 2024-12-23 RX ORDER — SODIUM CHLORIDE 0.9 % (FLUSH) 0.9 %
10 SYRINGE (ML) INJECTION AS NEEDED
OUTPATIENT
Start: 2024-12-23

## 2024-12-23 RX ORDER — HEPARIN SODIUM (PORCINE) LOCK FLUSH IV SOLN 100 UNIT/ML 100 UNIT/ML
500 SOLUTION INTRAVENOUS AS NEEDED
Status: DISCONTINUED | OUTPATIENT
Start: 2024-12-23 | End: 2024-12-23 | Stop reason: HOSPADM

## 2024-12-23 RX ORDER — HEPARIN SODIUM (PORCINE) LOCK FLUSH IV SOLN 100 UNIT/ML 100 UNIT/ML
500 SOLUTION INTRAVENOUS AS NEEDED
OUTPATIENT
Start: 2024-12-23

## 2024-12-23 RX ADMIN — Medication 500 UNITS: at 12:21

## 2024-12-23 RX ADMIN — ONTRUZANT 330 MG: KIT INTRAVENOUS at 11:47

## 2024-12-23 RX ADMIN — PERTUZUMAB 420 MG: 30 INJECTION, SOLUTION, CONCENTRATE INTRAVENOUS at 10:38

## 2024-12-23 RX ADMIN — Medication 10 ML: at 12:20

## 2024-12-23 NOTE — TELEPHONE ENCOUNTER
----- Message from Hayde CALLES sent at 2024 10:27 AM EST -----  Patient is here for treatment. Needs help with the followin. Neuropathy- patient is on Cymbalta states she has not been seeing much improvement- states she is under impression she was going to be referred to a specialist for neuropathy. Please advise.    2. Patient is seeing lymphedema clinic for left chest wall lymphedema- has question about checking BP at home.  She states she was told she can't have BP checked on right arm due to 4 lymph nodes dissection. On left arm she has a compression sleeve that is new.     Can she have BP checked on left arm? We checked it on forearm since lympedema is visible on chest wall. Please clarify if patient needs to be having BP checked on her leg instead.     3. Update- just FYI. Patient has been having productive cough with yellow / green sputum for 5 days. Pt. wanted Dr. Renteria to prescribe ATB for productive cough- I asked them to call PCP which they did and PCP is sending ATB as we speak.   Hayde.

## 2024-12-23 NOTE — TELEPHONE ENCOUNTER
Caller: Felicia Boyle    Relationship: Self    Best call back number: [phone]    What medication are you requesting: COUGH MEDICATION, HAS HAD ONE FOR OVER 5 DAYS    What are your current symptoms: COUGH    How long have you been experiencing symptoms: OVER 5 DAYS    Have you had these symptoms before:    [x] Yes  [] No    Have you been treated for these symptoms before:   [x] Yes  [] No    If a prescription is needed, what is your preferred pharmacy and phone number: [id number] - [place], [place] - [address] AT [facility] [address] [place]  - [phone]  - [phone]      Additional notes:

## 2024-12-23 NOTE — TELEPHONE ENCOUNTER
Patient called me back. I let her know the neuro referral was in and ready to be scheduled and the Neuro office would call her later this week with the holiday week. I also let her know we would defer the question about her sleeve and BP to the lymphedema clinic, but that we would not want her using her right arm for BP. Patient did let me know she was placed on an abx through her pcp. I asked her to call us back with anything else and she v/u.

## 2024-12-26 RX ORDER — DULOXETIN HYDROCHLORIDE 30 MG/1
30 CAPSULE, DELAYED RELEASE ORAL DAILY
Qty: 30 CAPSULE | Refills: 0 | Status: SHIPPED | OUTPATIENT
Start: 2024-12-26

## 2024-12-31 RX ORDER — DULOXETIN HYDROCHLORIDE 30 MG/1
30 CAPSULE, DELAYED RELEASE ORAL DAILY
Qty: 30 CAPSULE | Refills: 0 | OUTPATIENT
Start: 2024-12-31

## 2025-01-03 ENCOUNTER — HOSPITAL ENCOUNTER (OUTPATIENT)
Dept: CARDIOLOGY | Facility: HOSPITAL | Age: 70
Discharge: HOME | End: 2025-01-03
Admitting: INTERNAL MEDICINE
Payer: COMMERCIAL

## 2025-01-03 VITALS
BODY MASS INDEX: 25.46 KG/M2 | SYSTOLIC BLOOD PRESSURE: 162 MMHG | DIASTOLIC BLOOD PRESSURE: 92 MMHG | WEIGHT: 118 LBS | HEIGHT: 57 IN | HEART RATE: 91 BPM

## 2025-01-03 DIAGNOSIS — C50.511 MALIGNANT NEOPLASM OF LOWER-OUTER QUADRANT OF RIGHT BREAST OF FEMALE, ESTROGEN RECEPTOR POSITIVE: ICD-10-CM

## 2025-01-03 DIAGNOSIS — Z17.0 MALIGNANT NEOPLASM OF LOWER-OUTER QUADRANT OF RIGHT BREAST OF FEMALE, ESTROGEN RECEPTOR POSITIVE: ICD-10-CM

## 2025-01-03 DIAGNOSIS — Z79.899 HIGH RISK MEDICATION USE: ICD-10-CM

## 2025-01-03 LAB
AORTIC ARCH: 2.3 CM
AORTIC DIMENSIONLESS INDEX: 1.2 (DI)
ASCENDING AORTA: 3.7 CM
AV MEAN PRESS GRAD SYS DOP V1V2: 2.48 MMHG
AV VMAX SYS DOP: 108.4 CM/SEC
BH CV ECHO LEFT VENTRICLE GLOBAL LONGITUDINAL STRAIN: -22.6 %
BH CV ECHO MEAS - ACS: 1.83 CM
BH CV ECHO MEAS - AO MAX PG: 4.7 MMHG
BH CV ECHO MEAS - AO ROOT DIAM: 2.9 CM
BH CV ECHO MEAS - AO V2 VTI: 19.3 CM
BH CV ECHO MEAS - AVA(I,D): 3.5 CM2
BH CV ECHO MEAS - EDV(CUBED): 52.7 ML
BH CV ECHO MEAS - EDV(MOD-SP2): 60 ML
BH CV ECHO MEAS - EDV(MOD-SP4): 57 ML
BH CV ECHO MEAS - EF(MOD-SP2): 73.3 %
BH CV ECHO MEAS - EF(MOD-SP4): 71.9 %
BH CV ECHO MEAS - ESV(CUBED): 11.4 ML
BH CV ECHO MEAS - ESV(MOD-SP2): 16 ML
BH CV ECHO MEAS - ESV(MOD-SP4): 16 ML
BH CV ECHO MEAS - FS: 40 %
BH CV ECHO MEAS - IVS/LVPW: 0.89 CM
BH CV ECHO MEAS - IVSD: 1.11 CM
BH CV ECHO MEAS - LA DIMENSION: 3.7 CM
BH CV ECHO MEAS - LAT PEAK E' VEL: 8.1 CM/SEC
BH CV ECHO MEAS - LV DIASTOLIC VOL/BSA (35-75): 39.7 CM2
BH CV ECHO MEAS - LV MASS(C)D: 146.1 GRAMS
BH CV ECHO MEAS - LV MAX PG: 3.9 MMHG
BH CV ECHO MEAS - LV MEAN PG: 2.26 MMHG
BH CV ECHO MEAS - LV SYSTOLIC VOL/BSA (12-30): 11.1 CM2
BH CV ECHO MEAS - LV V1 MAX: 99 CM/SEC
BH CV ECHO MEAS - LV V1 VTI: 23.1 CM
BH CV ECHO MEAS - LVIDD: 3.7 CM
BH CV ECHO MEAS - LVIDS: 2.25 CM
BH CV ECHO MEAS - LVOT AREA: 2.9 CM2
BH CV ECHO MEAS - LVOT DIAM: 1.93 CM
BH CV ECHO MEAS - LVPWD: 1.25 CM
BH CV ECHO MEAS - MED PEAK E' VEL: 9.6 CM/SEC
BH CV ECHO MEAS - MV A DUR: 0.12 SEC
BH CV ECHO MEAS - MV A MAX VEL: 123.2 CM/SEC
BH CV ECHO MEAS - MV DEC SLOPE: 485.9 CM/SEC2
BH CV ECHO MEAS - MV DEC TIME: 207 SEC
BH CV ECHO MEAS - MV E MAX VEL: 100 CM/SEC
BH CV ECHO MEAS - MV E/A: 0.81
BH CV ECHO MEAS - MV MAX PG: 5.5 MMHG
BH CV ECHO MEAS - MV MEAN PG: 2.5 MMHG
BH CV ECHO MEAS - MV P1/2T: 64.5 MSEC
BH CV ECHO MEAS - MV V2 VTI: 26.9 CM
BH CV ECHO MEAS - MVA(P1/2T): 3.4 CM2
BH CV ECHO MEAS - MVA(VTI): 2.5 CM2
BH CV ECHO MEAS - PA ACC TIME: 0.14 SEC
BH CV ECHO MEAS - PA V2 MAX: 94.1 CM/SEC
BH CV ECHO MEAS - PULM A REVS DUR: 0.11 SEC
BH CV ECHO MEAS - PULM A REVS VEL: 59.2 CM/SEC
BH CV ECHO MEAS - PULM DIAS VEL: 47.2 CM/SEC
BH CV ECHO MEAS - PULM S/D: 1.22
BH CV ECHO MEAS - PULM SYS VEL: 57.6 CM/SEC
BH CV ECHO MEAS - QP/QS: 0.46
BH CV ECHO MEAS - RAP SYSTOLE: 3 MMHG
BH CV ECHO MEAS - RV MAX PG: 2.41 MMHG
BH CV ECHO MEAS - RV V1 MAX: 77.6 CM/SEC
BH CV ECHO MEAS - RV V1 VTI: 16.5 CM
BH CV ECHO MEAS - RVDD: 1.66 CM
BH CV ECHO MEAS - RVOT DIAM: 1.55 CM
BH CV ECHO MEAS - RVSP: 24.9 MMHG
BH CV ECHO MEAS - SUP REN AO DIAM: 1.8 CM
BH CV ECHO MEAS - SV(LVOT): 67.6 ML
BH CV ECHO MEAS - SV(MOD-SP2): 44 ML
BH CV ECHO MEAS - SV(MOD-SP4): 41 ML
BH CV ECHO MEAS - SV(RVOT): 31.1 ML
BH CV ECHO MEAS - SVI(LVOT): 47.1 ML/M2
BH CV ECHO MEAS - SVI(MOD-SP2): 30.6 ML/M2
BH CV ECHO MEAS - SVI(MOD-SP4): 28.5 ML/M2
BH CV ECHO MEAS - TAPSE (>1.6): 1.61 CM
BH CV ECHO MEAS - TR MAX PG: 21.9 MMHG
BH CV ECHO MEAS - TR MAX VEL: 233.8 CM/SEC
BH CV ECHO MEASUREMENTS AVERAGE E/E' RATIO: 11.3
BH CV XLRA - RV BASE: 2.17 CM
BH CV XLRA - RV LENGTH: 5 CM
BH CV XLRA - RV MID: 1.87 CM
BH CV XLRA - TDI S': 12.3 CM/SEC
LEFT ATRIUM VOLUME INDEX: 11 ML/M2
LV EF BIPLANE MOD: 73.4 %
SINUS: 2.6 CM
STJ: 2.7 CM

## 2025-01-03 PROCEDURE — 93356 MYOCRD STRAIN IMG SPCKL TRCK: CPT

## 2025-01-03 PROCEDURE — 25510000001 PERFLUTREN 6.52 MG/ML SUSPENSION 2 ML VIAL: Performed by: INTERNAL MEDICINE

## 2025-01-03 PROCEDURE — 93306 TTE W/DOPPLER COMPLETE: CPT

## 2025-01-03 RX ADMIN — PERFLUTREN 2 ML: 6.52 INJECTION, SUSPENSION INTRAVENOUS at 13:04

## 2025-01-13 ENCOUNTER — SPECIALTY PHARMACY (OUTPATIENT)
Dept: PHARMACY | Facility: HOSPITAL | Age: 70
End: 2025-01-13
Payer: COMMERCIAL

## 2025-01-13 ENCOUNTER — HOSPITAL ENCOUNTER (OUTPATIENT)
Dept: GENERAL RADIOLOGY | Facility: HOSPITAL | Age: 70
Discharge: HOME | End: 2025-01-13
Admitting: INTERNAL MEDICINE
Payer: COMMERCIAL

## 2025-01-13 ENCOUNTER — HOSPITAL ENCOUNTER (OUTPATIENT)
Dept: OCCUPATIONAL THERAPY | Facility: HOSPITAL | Age: 70
Setting detail: THERAPIES SERIES
Discharge: HOME | End: 2025-01-13
Payer: COMMERCIAL

## 2025-01-13 ENCOUNTER — INFUSION (OUTPATIENT)
Dept: ONCOLOGY | Facility: HOSPITAL | Age: 70
End: 2025-01-13
Payer: COMMERCIAL

## 2025-01-13 ENCOUNTER — OFFICE VISIT (OUTPATIENT)
Dept: ONCOLOGY | Facility: CLINIC | Age: 70
End: 2025-01-13
Payer: COMMERCIAL

## 2025-01-13 VITALS
RESPIRATION RATE: 16 BRPM | OXYGEN SATURATION: 96 % | DIASTOLIC BLOOD PRESSURE: 99 MMHG | BODY MASS INDEX: 25.5 KG/M2 | HEART RATE: 89 BPM | HEIGHT: 57 IN | SYSTOLIC BLOOD PRESSURE: 171 MMHG | WEIGHT: 118.2 LBS | TEMPERATURE: 98 F

## 2025-01-13 DIAGNOSIS — Z17.0 MALIGNANT NEOPLASM OF LOWER-OUTER QUADRANT OF RIGHT BREAST OF FEMALE, ESTROGEN RECEPTOR POSITIVE: ICD-10-CM

## 2025-01-13 DIAGNOSIS — C50.511 MALIGNANT NEOPLASM OF LOWER-OUTER QUADRANT OF RIGHT BREAST OF FEMALE, ESTROGEN RECEPTOR POSITIVE: Primary | ICD-10-CM

## 2025-01-13 DIAGNOSIS — Z17.0 MALIGNANT NEOPLASM OF LOWER-OUTER QUADRANT OF RIGHT BREAST OF FEMALE, ESTROGEN RECEPTOR POSITIVE: Primary | ICD-10-CM

## 2025-01-13 DIAGNOSIS — Z79.899 HIGH RISK MEDICATION USE: ICD-10-CM

## 2025-01-13 DIAGNOSIS — I97.2 POST-MASTECTOMY LYMPHEDEMA SYNDROME: ICD-10-CM

## 2025-01-13 DIAGNOSIS — C50.511 MALIGNANT NEOPLASM OF LOWER-OUTER QUADRANT OF RIGHT BREAST OF FEMALE, ESTROGEN RECEPTOR POSITIVE: ICD-10-CM

## 2025-01-13 DIAGNOSIS — R22.2 SOFT TISSUE SWELLING OF CHEST WALL: Primary | ICD-10-CM

## 2025-01-13 DIAGNOSIS — M25.611 DECREASED RANGE OF MOTION OF RIGHT SHOULDER: ICD-10-CM

## 2025-01-13 DIAGNOSIS — M25.612 DECREASED RANGE OF MOTION OF LEFT SHOULDER: ICD-10-CM

## 2025-01-13 DIAGNOSIS — Z45.2 ENCOUNTER FOR FITTING AND ADJUSTMENT OF VASCULAR CATHETER: ICD-10-CM

## 2025-01-13 LAB
ALBUMIN SERPL-MCNC: 4.1 G/DL (ref 3.5–5.2)
ALBUMIN/GLOB SERPL: 1.4 G/DL
ALP SERPL-CCNC: 92 U/L (ref 39–117)
ALT SERPL W P-5'-P-CCNC: 30 U/L (ref 1–33)
ANION GAP SERPL CALCULATED.3IONS-SCNC: 10.9 MMOL/L (ref 5–15)
AST SERPL-CCNC: 25 U/L (ref 1–32)
BASOPHILS # BLD AUTO: 0.05 10*3/MM3 (ref 0–0.2)
BASOPHILS NFR BLD AUTO: 0.4 % (ref 0–1.5)
BILIRUB SERPL-MCNC: 0.3 MG/DL (ref 0–1.2)
BUN SERPL-MCNC: 21 MG/DL (ref 8–23)
BUN/CREAT SERPL: 26.3 (ref 7–25)
CALCIUM SPEC-SCNC: 9.7 MG/DL (ref 8.6–10.5)
CHLORIDE SERPL-SCNC: 103 MMOL/L (ref 98–107)
CO2 SERPL-SCNC: 25.1 MMOL/L (ref 22–29)
CREAT SERPL-MCNC: 0.8 MG/DL (ref 0.57–1)
DEPRECATED RDW RBC AUTO: 44.1 FL (ref 37–54)
EGFRCR SERPLBLD CKD-EPI 2021: 79.4 ML/MIN/1.73
EOSINOPHIL # BLD AUTO: 0.06 10*3/MM3 (ref 0–0.4)
EOSINOPHIL NFR BLD AUTO: 0.5 % (ref 0.3–6.2)
ERYTHROCYTE [DISTWIDTH] IN BLOOD BY AUTOMATED COUNT: 13.5 % (ref 12.3–15.4)
GLOBULIN UR ELPH-MCNC: 3 GM/DL
GLUCOSE SERPL-MCNC: 163 MG/DL (ref 65–99)
HCT VFR BLD AUTO: 39.7 % (ref 34–46.6)
HGB BLD-MCNC: 12.8 G/DL (ref 12–15.9)
IMM GRANULOCYTES # BLD AUTO: 0.04 10*3/MM3 (ref 0–0.05)
IMM GRANULOCYTES NFR BLD AUTO: 0.3 % (ref 0–0.5)
LYMPHOCYTES # BLD AUTO: 3.56 10*3/MM3 (ref 0.7–3.1)
LYMPHOCYTES NFR BLD AUTO: 31.1 % (ref 19.6–45.3)
MCH RBC QN AUTO: 28.8 PG (ref 26.6–33)
MCHC RBC AUTO-ENTMCNC: 32.2 G/DL (ref 31.5–35.7)
MCV RBC AUTO: 89.4 FL (ref 79–97)
MONOCYTES # BLD AUTO: 0.78 10*3/MM3 (ref 0.1–0.9)
MONOCYTES NFR BLD AUTO: 6.8 % (ref 5–12)
NEUTROPHILS NFR BLD AUTO: 6.96 10*3/MM3 (ref 1.7–7)
NEUTROPHILS NFR BLD AUTO: 60.9 % (ref 42.7–76)
NRBC BLD AUTO-RTO: 0 /100 WBC (ref 0–0.2)
PLATELET # BLD AUTO: 328 10*3/MM3 (ref 140–450)
PMV BLD AUTO: 10.1 FL (ref 6–12)
POTASSIUM SERPL-SCNC: 4 MMOL/L (ref 3.5–5.2)
PROT SERPL-MCNC: 7.1 G/DL (ref 6–8.5)
RBC # BLD AUTO: 4.44 10*6/MM3 (ref 3.77–5.28)
SODIUM SERPL-SCNC: 139 MMOL/L (ref 136–145)
WBC NRBC COR # BLD AUTO: 11.45 10*3/MM3 (ref 3.4–10.8)

## 2025-01-13 PROCEDURE — 97140 MANUAL THERAPY 1/> REGIONS: CPT

## 2025-01-13 PROCEDURE — 85025 COMPLETE CBC W/AUTO DIFF WBC: CPT

## 2025-01-13 PROCEDURE — 80053 COMPREHEN METABOLIC PANEL: CPT

## 2025-01-13 PROCEDURE — 96413 CHEMO IV INFUSION 1 HR: CPT

## 2025-01-13 PROCEDURE — 25010000002 PERTUZUMAB 420 MG/14ML SOLUTION 420 MG VIAL: Performed by: INTERNAL MEDICINE

## 2025-01-13 PROCEDURE — 25010000002 HEPARIN LOCK FLUSH PER 10 UNITS: Performed by: INTERNAL MEDICINE

## 2025-01-13 PROCEDURE — 25010000002 TRASTUZUMAB-DTTB 420 MG RECONSTITUTED SOLUTION 1 EACH VIAL: Performed by: INTERNAL MEDICINE

## 2025-01-13 PROCEDURE — 96417 CHEMO IV INFUS EACH ADDL SEQ: CPT

## 2025-01-13 PROCEDURE — 71046 X-RAY EXAM CHEST 2 VIEWS: CPT

## 2025-01-13 PROCEDURE — 25810000003 SODIUM CHLORIDE 0.9 % SOLUTION 250 ML FLEX CONT: Performed by: INTERNAL MEDICINE

## 2025-01-13 RX ORDER — SODIUM CHLORIDE 0.9 % (FLUSH) 0.9 %
10 SYRINGE (ML) INJECTION AS NEEDED
OUTPATIENT
Start: 2025-01-13

## 2025-01-13 RX ORDER — HEPARIN SODIUM (PORCINE) LOCK FLUSH IV SOLN 100 UNIT/ML 100 UNIT/ML
500 SOLUTION INTRAVENOUS AS NEEDED
Status: DISCONTINUED | OUTPATIENT
Start: 2025-01-13 | End: 2025-01-13 | Stop reason: HOSPADM

## 2025-01-13 RX ORDER — SODIUM CHLORIDE 9 MG/ML
20 INJECTION, SOLUTION INTRAVENOUS ONCE
Status: CANCELLED | OUTPATIENT
Start: 2025-01-13

## 2025-01-13 RX ORDER — HEPARIN SODIUM (PORCINE) LOCK FLUSH IV SOLN 100 UNIT/ML 100 UNIT/ML
500 SOLUTION INTRAVENOUS AS NEEDED
OUTPATIENT
Start: 2025-01-13

## 2025-01-13 RX ORDER — SODIUM CHLORIDE 0.9 % (FLUSH) 0.9 %
10 SYRINGE (ML) INJECTION AS NEEDED
Status: DISCONTINUED | OUTPATIENT
Start: 2025-01-13 | End: 2025-01-13 | Stop reason: HOSPADM

## 2025-01-13 RX ORDER — DULOXETIN HYDROCHLORIDE 20 MG/1
CAPSULE, DELAYED RELEASE ORAL
Qty: 14 CAPSULE | Refills: 0 | Status: SHIPPED | OUTPATIENT
Start: 2025-01-13

## 2025-01-13 RX ADMIN — PERTUZUMAB 420 MG: 30 INJECTION, SOLUTION, CONCENTRATE INTRAVENOUS at 13:50

## 2025-01-13 RX ADMIN — Medication 500 UNITS: at 15:20

## 2025-01-13 RX ADMIN — Medication 10 ML: at 15:19

## 2025-01-13 RX ADMIN — ONTRUZANT 330 MG: KIT INTRAVENOUS at 14:49

## 2025-01-13 NOTE — THERAPY TREATMENT NOTE
"Outpatient Occupational Therapy Lymphedema Treatment Note  [facility]     Patient Name: Felicia Boyle  : 1955  MRN: [id number]  Today's Date: 2025      Visit Date: 2025    Patient Active Problem List   Diagnosis    Age-related osteoporosis without current pathological fracture    FH: breast cancer in first degree relative    Post-menopausal    Type 2 diabetes mellitus without complication, without long-term current use of insulin    Vitamin D deficiency    Mixed hyperlipidemia    Breast cancer of lower-outer quadrant of right female breast    Encounter for fitting and adjustment of vascular catheter    DCIS (ductal carcinoma in situ) of breast    Bilateral malignant neoplasm of breast in female    S/P bilateral mastectomy        Past Medical History:   Diagnosis Date    Anxiety about health     Breast cancer of lower-outer quadrant of right female breast 2024    DCIS (ductal carcinoma in situ) of breast 03/15/2024    Diverticulosis     History of chemotherapy 2024    Mixed hyperlipidemia 2019    DIET CONTROLLED    Osteoporosis     Port-A-Cath in place     Type 2 diabetes mellitus         Past Surgical History:   Procedure Laterality Date    BREAST BIOPSY Left     Excisional biopsy in the [facility]    BREAST BIOPSY  2024    CATARACT EXTRACTION W/ INTRAOCULAR LENS IMPLANT Bilateral     CHOLECYSTECTOMY  2012    COLON RESECTION Right     COLONOSCOPY N/A 2021    Procedure: COLONOSCOPY TO ANASTAMOSIS/TI;  Surgeon: Angelo Gonsalez MD;  Location: [facility] ENDOSCOPY;  Service: Gastroenterology;  Laterality: N/A;  PRE: SCREENING  POST: NORMAL POST-OP ANATOMY    ENDOSCOPY          ENDOSCOPY N/A 03/10/2017    Procedure: ESOPHAGOGASTRODUODENOSCOPY WITH BIOPSY AND PETER DILATATION 54\";  Surgeon: Angelo Gonsalez MD;  Location: [facility] ENDOSCOPY;  Service:     ENDOSCOPY N/A 2021    Procedure: ESOPHAGOGASTRODUODENOSCOPY WITH BIOPSIES, 54FR PETER DILATATION;  " Surgeon: Angelo Gonsalez MD;  Location: [facility] ENDOSCOPY;  Service: Gastroenterology;  Laterality: N/A;  PRE: DYSPHAGIA  POST: GASTRITIS, ESOPHAGEAL RING    MASTECTOMY W/ SENTINEL NODE BIOPSY Bilateral 8/15/2024    Procedure: Bilateral total mastectomy, bilateral sentinel node biopsy, possible axillary node dissection, no reconstruction.;  Surgeon: Abeba Manrique MD;  Location: [facility] MAIN OR;  Service: General;  Laterality: Bilateral;    VENOUS ACCESS DEVICE (PORT) INSERTION Left 3/14/2024    Procedure: INSERTION OF PORTACATH;  Surgeon: Abeba Manrique MD;  Location: [facility] OR;  Service: General;  Laterality: Left;         Visit Dx:      ICD-10-CM ICD-9-CM   1. Soft tissue swelling of chest wall  R22.2 786.6   2. Post-mastectomy lymphedema syndrome  I97.2 457.0   3. Decreased range of motion of right shoulder  M25.611 719.51   4. Decreased range of motion of left shoulder  M25.612 719.51        Lymphedema       Row Name 01/13/25 1800             Subjective Pain    Able to rate subjective pain? yes  -RE      Pre-Treatment Pain Level 0  -RE      Post-Treatment Pain Level 0  -RE      Subjective Pain Comment Patient continues to complain of bilateral rib pain which is intermittent and sometimes moderate to severe in intensity.  She discussed this today with her oncologist and will be getting x-rays today.  -RE         Manual Lymphatic Drainage    Manual Lymphatic Drainage initial sequence;opened regional lymph nodes;opened anastamoses;extremity treatment  -RE      Initial Sequence short neck  -RE      Opened Regional Lymph Nodes inguinal  -RE      Inguinal left  -RE      Opened Anastamoses axillo-inguinal  -RE      Axillo-Inguinal left  -RE      Extremity Treatment MLD to full limb  -RE         Compression/Skin Care    Compression/Skin Care Comments Patient is wearing her compression sleeve and gauntlet.  -RE                User Key  (r) = Recorded By, (t) = Taken By, (c) = Cosigned By      Initials  Name Provider Type    Emy Mccarthy OTR Occupational Therapist                             OT Assessment/Plan       Row Name 01/13/25 1831          OT Assessment    Assessment Comments Patient continues with decreased bilateral shoulder flexibility and complains of tightness and pain in bilateral axillae.  Deferred stretching and manual techniques until x-rays have been completed and read.  Focused on manual lymph drainage of the left upper extremity working towards decreasing patient's L-Dex value and her risk of progressing to stage II lymphedema.  Little to no palpable edema noted in the left trunk quadrant or left upper extremity today  -RE        OT Plan    OT Plan Comments Continue OT for stage I left upper extremity lymphedema and decreased flexibility in bilateral shoulders  -RE               User Key  (r) = Recorded By, (t) = Taken By, (c) = Cosigned By      Initials Name Provider Type    Emy Mccarthy OTR Occupational Therapist                        Manual Rx (Last 36 Hours)       Manual Treatments       Row Name 01/13/25 1650             Total Minutes    82754 - OT Manual Therapy Minutes 35  -RE                User Key  (r) = Recorded By, (t) = Taken By, (c) = Cosigned By      Initials Name Provider Type    Emy Mccarthy OTR Occupational Therapist                      Therapy Education  Education Details: We discussed possibility that some of her soft tissue tightness could be causing her rib pain.  I explained that the x-rays are very important in ruling out other major issues.  If they are normal we will pursue soft tissue stretching more aggressively.  97762 - OT Self Care/Mgmt Minutes: 5                Time Calculation:   OT Start Time: 1535  OT Stop Time: 1615  OT Time Calculation (min): 40 min  Total Timed Code Minutes- OT: 40 minute(s)  Timed Charges  26983 - OT Manual Therapy Minutes: 35  45856 - OT Self Care/Mgmt Minutes: 5  Total Minutes  Timed Charges Total Minutes: 40   Total  Minutes: 40     Therapy Charges for Today       Code Description Service Date Service Provider Modifiers Qty    00328608595 HC OT MANUAL THERAPY EA 15 MIN 1/13/2025 Emy Gonzales, OTR GO 3          Dictated utilizing Dragon dictation:  Much of this encounter note is an electronic transcription/translation of spoken language to printed text. The electronic translation of spoken language may permit erroneous, or at times, nonsensical words or phrases to be inadvertently transcribed; Although I have reviewed the note for such errors, some may still exist.              YARED Kelley  1/13/2025

## 2025-01-13 NOTE — PROGRESS NOTES
Subjective   Felicia Boyle is a 69 y.o. female.  Referred by Dr. Manrique for right breast invasive ductal carcinoma, HER2 positive, left breast ductal carcinoma in situ    History of Present Illness     Ms. Boyle is a 69-year-old postmenopausal [place] lady who is fairly healthy without any significant comorbidities palpated of right breast mass in the latter part of  which was further worked up with diagnostic mammogram and ultrasound and subsequently a biopsy which confirmed invasive ductal carcinoma which was ER/UT negative and HER2 positive.    Family history significant for her 2 sisters with breast cancer at the age of 38 and 48 respectively.  Her older sister  of metastatic breast cancer but her younger sister is doing well.  Patient underwent genetic testing in 2017 which was negative due to her family history.    She had regular screening mammograms up until 2019 but subsequently stopped having annual gynecological visits and therefore did not have any further screening mammograms up until the most recent diagnostic mammogram for the new palpable abnormality.    2024-bilateral diagnostic mammogram and right breast ultrasound  The breast heterogeneously dense.  Left breast with a scar marker in the lower left breast dating back to .  No new findings in the left breast.    Right breast at 8:00, 8 cm from the nipple there is a 1.7 x 1.4 x 1.6 cm oval high density mass with partially circumscribed and partially indistinct margins.    On the ultrasound the mass measures 1.7 x 1 x 1.3 cm.    No abnormal right axilla lymphadenopathy.    2024-right breast ultrasound-guided biopsy  Pathology consistent with invasive ductal carcinoma  Grade 3  9.3 mm of invasive disease on the core biopsy  ER negative  UT negative  HER2 3+ on immunohistochemistry, 95%  Ki-67 70%    2024-bilateral breast MRI  In the right breast at 8:00, 8 cm from the nipple there is a irregular enhancing mass which  measures 1.8 x 2 x 1.7 cm.  This corresponds to the biopsy-proven malignancy.  Extending anteriorly away from the MT margin of the mass there is an irregular linear enhancement which measures 3.5 x 1 x 0.7 cm.  The whole area including the mass measures 4.7 cm in AP dimension, 1.7 in mediolateral and 2.4 cm in the superior-inferior dimension.    At 12:00, 3 cm from the nipple there is an irregular non-mass enhancement which measures 1 x 1.1 x 1.2 cm.  No mammographic correlate is appreciated.    No other suspicious areas of enhancement in the right breast or right axilla or internal mammary chain.    In the left breast, middle third at 11:30 position, 3.7 cm from the nipple there is an irregular area of non-mass enhancement which measures 1.3 x 1.1 x 1 cm.  There is suspected architectural distortion.  No other areas of abnormal enhancement seen in the left breast of the left axilla or the internal mammary chain.    2/26/2024-additional MRI guided biopsies    1.left breast 12:00-intermediate grade ductal carcinoma in situ with apocrine features  ER +91 to 100% strong  WV +81 to 90% strong  Ki-67 15%    2. Right breast 12 o'clock position MRI guided biopsy of the non-mass enhancement-sclerosing adenosis with associated calcifications  No atypical hyperplasia in situ or invasive carcinoma    3.right breast 8:00 MRI guided biopsies of the linear enhancement is consistent with high-grade ductal carcinoma in situ  DCIS involves multiple tissue cores measuring up to 3 mm.  Sclerosing adenosis and usual ductal hyperplasia with associated microcalcifications.    The case was discussed in tumor board and Dr. Patel thought that they could be more invasive disease in the 4.7 cm of linear enhancement noted in the right breast at the site of the mass.  Therefore it was decided to proceed with neoadjuvant chemotherapy.    Patient was a former smoker and smoked for about 40 years, half a pack per week, quit about 1 month ago.   Denies any alcohol use.    Week 1 Taxol Herceptin and Perjeta was planned for 3/18/2024.  Patient received Herceptin and Perjeta and had some chills for which she received Solu-Medrol and responded well to that.  However after infusing only 1 cc of Taxol patient experienced a severe anaphylactic reaction due to which the infusion was stopped permanently and she received an additional dose of Solu-Medrol and Benadryl.  Symptoms resolved subsequently and patient was discharged home safely.    Due to this recent treatment has been changed to Abraxane along with Herceptin and Perjeta.  3/25/2024-patient is due for her first treatment with Abraxane.    She is extremely anxious given the severe hypersensitivity reaction that she experienced with Taxol.  She is also complaining of increased belching and heartburn since her last chemotherapy.  She is complaining of severe diarrhea with started last night.  Had multiple loose stools.    5/11/2024-MRI of the breast-images independently reviewed and interpreted by me.  Interval near complete resolution of all suspicious enhancement in the posterior one third lower outer quadrant of the right breast at 8:00 corresponding to the biopsy-proven site of malignancy.  Who has been on masslike area of enhancement measuring 1.5 x 0.9 x 0.9 cm compared to 2 x 1.8 x 1.7 cm.  Mildly abnormal enhancement extending anteriorly away from the mass has resolved.  No other suspicious areas of enhancement noted in the right breast.  Left breast at 12:00 interval resolution of all suspicious enhancement.    8/15/2024-bilateral mastectomy and bilateral sentinel lymph node biopsy  Right total mastectomy  No residual invasive carcinoma  2 mm of DCIS high-grade  RCB 0, pathological complete response  All the margins are negative  Y pTis N0 M0  4 sentinel lymph nodes negative    Left breast mastectomy  Fat necrosis, biopsy site changes, sclerosing adenosis and changes consistent with treatment  effect.  No evidence of residual in situ or invasive carcinoma  1 x-ray lymph node negative    INTERVAL HISTORY  Patient returns today for follow-up.  She is complaining of tingling and pain in her fingertips as well as her lower extremities.  She is complaining of swelling of the left upper extremity determined to be secondary to lymphedema.  She has been wearing the compression garment.  She is also complaining of bilateral rib pain that started about a week ago.  Remains concerned about recurrence of disease.  She has been referred to neurology for ongoing neuropathy but started after completion of chemotherapy.  She has an appointment coming up later in the month.  She has been using Cymbalta for the neuropathy without any improvement in symptoms.  She reports dizziness with Cymbalta.      The following portions of the patient's history were reviewed and updated as appropriate: allergies, current medications, past family history, past medical history, past social history, past surgical history, and problem list.    Past Medical History:   Diagnosis Date    Anxiety about health     Breast cancer of lower-outer quadrant of right female breast 02/14/2024    DCIS (ductal carcinoma in situ) of breast 03/15/2024    Diverticulosis     History of chemotherapy 06/22/2024    Mixed hyperlipidemia 05/19/2019    DIET CONTROLLED    Osteoporosis     Port-A-Cath in place     Type 2 diabetes mellitus         Past Surgical History:   Procedure Laterality Date    BREAST BIOPSY Left     Excisional biopsy in the [facility]    BREAST BIOPSY  03/2024    CATARACT EXTRACTION W/ INTRAOCULAR LENS IMPLANT Bilateral     CHOLECYSTECTOMY  2012    COLON RESECTION Right 2002    COLONOSCOPY N/A 04/28/2021    Procedure: COLONOSCOPY TO ANASTAMOSIS/TI;  Surgeon: Angelo Gonsalez MD;  Location: [facility] ENDOSCOPY;  Service: Gastroenterology;  Laterality: N/A;  PRE: SCREENING  POST: NORMAL POST-OP ANATOMY    ENDOSCOPY      2013    ENDOSCOPY N/A  "03/10/2017    Procedure: ESOPHAGOGASTRODUODENOSCOPY WITH BIOPSY AND PETER DILATATION 54\";  Surgeon: Angelo Gonsalez MD;  [facility] END[facility];  Service:     ENDOSCOPY N/A 2021    Procedure: ESOPHAGOGASTRODUODENOSCOPY WITH BIOPSIES, 54FR PETER DILATATION;  Surgeon: Angelo Gonsalez MD;  Location[facility] ENDOSCOPY;  Service: Gastroenterology;  Laterality: N/A;  PRE: DYSPHAGIA  POST: GASTRITIS, ESOPHAGEAL RING    MASTECTOMY W/ SENTINEL NODE BIOPSY Bilateral 8/15/2024    Procedure: Bilateral total mastectomy, bilateral sentinel node biopsy, possible axillary node dissection, no reconstruction.;  Surgeon: Abeba Manrique MD;  Location: [facility] MAIN OR;  Service: General;  Laterality: Bilateral;    VENOUS ACCESS DEVICE (PORT) INSERTION Left 3/14/2024    Procedure: INSERTION OF PORTACATH;  Surgeon: Abeba aMnrique MD;  Location: [facility] MAIN OR;  Service: General;  Laterality: Left;        Family History   Problem Relation Age of Onset    Hypertension Mother     Diabetes Mother     Hypertension Father     Breast cancer Sister 48    Breast cancer Sister 38    No Known Problems Brother     No Known Problems Daughter     No Known Problems Son     No Known Problems Maternal Grandmother     No Known Problems Paternal Grandmother     No Known Problems Maternal Grandfather     No Known Problems Paternal Grandfather     Autism Grandson     Colon cancer Neg Hx     Rectal cancer Neg Hx     Endometrial cancer Neg Hx     Vaginal cancer Neg Hx     Cervical cancer Neg Hx     Ovarian cancer Neg Hx     Prostate cancer Neg Hx     Uterine cancer Neg Hx     Malig Hyperthermia Neg Hx         Social History     Socioeconomic History    Marital status:      Spouse name: Ryan   Tobacco Use    Smoking status: Former     Current packs/day: 0.00     Average packs/day: 0.3 packs/day for 15.0 years (3.8 ttl pk-yrs)     Types: Cigarettes     Quit date: 1/15/2024     Years since quittin.9     Passive exposure: Past " "   Smokeless tobacco: Never    Tobacco comments:     N/A   Vaping Use    Vaping status: Never Used   Substance and Sexual Activity    Alcohol use: Yes     Comment: RARE    Drug use: No    Sexual activity: Not Currently     Partners: Male     Birth control/protection: Tubal ligation        OB History          4    Para   3    Term   3       0    AB   1    Living   3         SAB   1    IAB   0    Ectopic   0    Molar   0    Multiple   0    Live Births   3               Age at menarche-16  Age at first live childbirth-18   4 para 3  1  Age at menopause-49  No use of hormone replacement therapy  No use of oral conceptive pills  Breast-feeding for 3 months    Allergies   Allergen Reactions    Paclitaxel Shortness Of Breath    Metronidazole Nausea And Vomiting          Objective   Blood pressure 171/99, pulse 89, temperature 98 °F (36.7 °C), temperature source Oral, resp. rate 16, height 144.8 cm (57.01\"), weight 53.6 kg (118 lb 3.2 oz), SpO2 96%, not currently breastfeeding.   Physical Exam  Vitals reviewed.   Constitutional:       Appearance: Normal appearance.   HENT:      Mouth/Throat:      Mouth: Mucous membranes are moist.      Pharynx: Oropharynx is clear.   Eyes:      Conjunctiva/sclera: Conjunctivae normal.      Pupils: Pupils are equal, round, and reactive to light.   Pulmonary:      Effort: Pulmonary effort is normal.   Musculoskeletal:         General: Normal range of motion.      Cervical back: Normal range of motion.      Comments: +bilateral trace lower extremity edema    Skin:     General: Skin is warm and dry.      Findings: Rash present.      Comments: +erythema to palms and bottom of feet.   Neurological:      General: No focal deficit present.      Mental Status: She is alert and oriented to person, place, and time.   Psychiatric:         Mood and Affect: Mood is anxious.         Behavior: Behavior normal.       Status post bilateral mastectomy without reconstruction    I " have reexamined the patient and the results are consistent with the previously documented exam. Agata Renteria MD      Assessment & Plan     *Right breast invasive ductal carcinoma  Clinical T2 N0 M0, stage IIa, clinical and prognostic ER/UT negative, HER2 3+ immunohistochemistry, Ki-67 70%  On the MRI the mass measures 2 cm however there is linear enhancement which in the AP dimension measures 4.7 cm.  Additional biopsies of this linear enhancement was performed and consistent with DCIS  It is hard to delineate the amount of invasive disease as opposed to DCIS.  More likely than not invasive component is likely limited to the mass which is about 2-1/2 cm on exam.  Since the tumor is greater than 2 cm I think it is reasonable to proceed with neoadjuvant chemotherapy.  We discussed proceeding with weekly Taxol along with Herceptin and Perjeta every 3 weeks.  3/18/2024-received first dose of Herceptin and Perjeta, had a severe hypersensitivity reaction to Taxol and hence Taxol was discontinued  3/25/2024-Labs reviewed and stable to proceed with chemotherapy  Patient is extremely anxious today after experiencing the severe hypersensitivity reaction last week.  Reassured patient that this is unlikely to happen with Abraxane and we will premedicate with extra Solu-Medrol..  4/1/2024 patient returns for cycle 1 day 15 Abraxane.,  Left chest, left shoulder at times.  This has come and gone over the past week and is not associated with any shortness of breath, numbness or tingling.  She states when her chest is hurting if she presses on the area it gets better.  She notices it at different points during the day.  It feels more like an ache and is not a sharp pain.  She states she has been sleeping in a different position due to the port which could be causing some muscular discomfort.  She has noticed the discomfort at times when she takes a deep breath however is able to get deep breath here in the office did not have  "any pain.  She is not having any pain today at all.  She has taken her hydrocodone maybe 3-4 times over the past week and has not needed it multiple times a day.  CMP resulted today with AST elevated at 47 and .  Bilirubin remains normal at 0.4 with alkaline phosphatase elevated to 160.  Reviewed with Dr. Loredo and will hold treatment today.  She will try to limit any acetaminophen containing products.  She did have a few days of diarrhea that she is unsure if it was related to the Abraxane versus a GI bug as her  had the same symptoms.  Neither had chills or fevers.  She is also having some dysuria today we have a urinalysis with culture pending.  4/8/2024: Due for cycle 2-day 1 Abraxane, Herceptin, Perjeta.  Did not receive cycle 1 day 15 Abraxane due to elevated LFTs as detailed above.  LFTs have significantly improved, AST 28, ALT 58.  Reviewed with Dr. Renteria will proceed with Abraxane only today, will reduce Abraxane by 20%.  4/15/2024-patient is scheduled for week 4 of chemotherapy today.  Labs reviewed and overall stable except for an ALT of 45.  She will proceed with planned chemotherapy.  4/22/2024: after last chemotherapy patient experienced 3 episodes of \"zapping\" in her fingertips and soles of feet that last a few seconds then resolved.  Has not experienced this sensation in at least 3 days.  No interference with daily activities.  Will continue Abraxane at previous 20% dose reduction.  She is doing cold therapy for her hands/feet.  Will need to monitor closely for neuropathy.  4/29/2024-chemotherapy held as patient reported worsening neuropathy and dropping things.  5/6/2024-presents today for evaluation of the neuropathy and to determine if she can resume chemotherapy.  She reports that the neuropathy has improved some.  She is mainly perceiving pain in her fingers and tingling and numbness.   She however tells me that she has not been dropping things anymore.  She is able to do other " fine motor activities just fine.  5/6/2024-patient did receive Abraxane with a 20% dose reduction.  5/13/2024-presents today for the neck cycle of Abraxane and since her last treatment she has not had any worsening of neuropathy.  We also started her on gabapentin 300 mg nightly.  Neuropathy overall stable.  Labs reviewed and she does have elevated LFTs however no further dose reduction needed.  Will proceed with planned chemotherapy today.  5/11/2024-MRI of the breast shows near complete resolution of the non-mass enhancement and areas of malignancy in both right and left breast.  This is reassuring and if patient has any further worsening of neuropathy then we could discontinue Abraxane and she will proceed with surgery and continue HER2 directed therapy.  5/28/2024: Cycle 4-day 8 Abraxane only, continue previous 20% dose reduction.  Continues on gabapentin, neuropathy stable.  6/10/2024-scheduled for Abraxane Herceptin and Perjeta.  Neuropathy overall stable.  She will continue with 20% dose reduction. She has 1 more week of Abraxane left following which she will have to proceed with surgery followed by adjuvant HER2 directed therapy.  Patient is very reluctant to undergo surgery.  Communicated with Dr. Manrique and she will be scheduled to see her after completion of the last treatment of Abraxane.  Since she had a recent MRI she may not require another MRI   Proceed with last dose of Abraxane today 6/17/2024. Maintain previous dosing with 20% dose reduction.   7/22/2024 patient returns today for Herceptin and Perjeta which she continues once every 3 weeks.  She is scheduled for bilateral mastectomy on 8/15/2024 with Dr. Manrique.  8/12/2024 patient returns today to proceed with Herceptin and Perjeta.  She is scheduled for bilateral mastectomy with Dr. Manrique on 8/15/2024.  Review of Dr. Renteria today we will proceed with treatment.  8/15/2024-status post bilateral mastectomy with residual DCIS on the right  side measuring 2 mm, high-grade, Y pTis N0 M0  Continues on adjuvant Perjeta and Herceptin   She is complaining of bilateral rib pain and palpable abnormalities of the chest wall.  On exam the palpable abnormalities are consistent with the ribs.  Will proceed with a chest x-ray for further evaluation of the bilateral rib pain.    *Left breast ductal carcinoma in situ  Intermed  She has been having some discomfort in her left shoulder bladeiate grade with apocrine features  ER/MO strongly positive  Plan is for her to undergo bilateral mastectomy.  If she does undergo bilateral mastectomy then that would be curative and she would not require any endocrine therapy subsequently unless there is any upstaging of the malignancy  5/11/2024-MRI of the breast with near complete resolution of the non-mass enhancement.  Status post left mastectomy and sentinel lymph node biopsy with no residual disease  No evidence of recurrent disease.    *GERD  Continue Protonix.    *Cardiac health  Referral to cardio oncology placed  3/15/2024 echocardiogram with an ejection fraction of 75.5%, LV strain is normal at -24.2%  She has met with Dr. Peguero , started on bisoprolol   6/17/2024 ejection fraction 70%, LV strain -22.4%  9/24/2024 echocardiogram showing calculated LVEF at 75.3% and GLS -23.9%.  1/3/2025-echocardiogram shows an ejection fraction of greater than 70%, GLS -22.6%    *Elevated LFTs  LFTs now normal.    *Hypokalemia secondary to diarrhea likely  Potassium is normal today.    *Neuropathy  Held treatment  due to neuropathy  5/6/2024-resumed chemotherapy  She was started on gabapentin 300 mg nightly and also vitamin B12.  Reports stable neuropathy  Proceed with planned chemotherapy today   MRI shows near complete resolution of the abnormalities and if patient experiences any worsening neuropathy then low threshold to stop chemotherapy and proceed with surgery  The remains intermittent though occasionally painful in her feet.   Will increase gabapentin to 300 mg twice daily  7/22/2024 patient reports neuropathy is stable continues gabapentin 300 mg twice daily  9/3/2024-neuropathy improved  10/15/2024-complaining of worsening neuropathy in the hands/feet.  Recommended starting B complex vitamin.  Herceptin and Perjeta delayed 1 week.  10/22/2024 after starting B complex vitamin numbness/tingling has improved.  It was previously constant, is now intermittent typically only occurring with certain positions.  The intensity of the numbness/tingling has also improved.  11/12/2024: She continues to experience numbness/tingling. Advised to continue b complex vitamin and also prescribed cymbalta 30mg daily as patient has previously tried gabapentin without improvement. There is question whether some of what she is describing is related to possible palmar-plantar erthrodysesthesia as her palms and bottoms of her feet are red with flaky skin, therefore, will also prescribe urea cream for her to apply.   1/13/2025-patient continues to report  tingling and discomfort of the fingertips and also discomfort in her lower extremities.  Neurology referral has been placed.  She has been taking Cymbalta without any improvement in symptoms.  Therefore we will discontinue Cymbalta.  She is willing to try gabapentin again and recommend resuming gabapentin at 300 mg nightly.    *Cheilitis   still present.    *Right groin candida  8/12/2024 had tried OTC antifungal with initial improvement and then reoccurrence.  Discussed drying well, will add Nystatin powder TID.  Resolved    *?Palmar-plantar erythrodysesthesia  11/12/2024: Erythematous/flaky palms of her hands and bottom of her feet. Advised utilizing urea cream generously and instructed her to reach out if she does not see improvement in symptoms.     PLAN:   Proceed with Herceptin/Perjeta today. Continue this for 1 year.   Continue vitamin B complex  Taper Cymbalta  Resume gabapentin 300 mg nightly  Referral  to neurology has been placed  Continue to follow with lymphedema clinic.   Every 3 weekly Herceptin Tera BERNSTEIN in 6 weeks and MD in 12 weeks.     42 months total spent on the encounter on the same day.      Agata Renteria MD  01/13/2025

## 2025-01-13 NOTE — PROGRESS NOTES
Specialty Note- Michaela    Called Ms Boyle to discuss Cymbalta taper as follows- 20 mg po daily for 7 days, then 20 mg po every other day for 7 days, then 20 mg po every 72 hours x 7 days, then dc.  MATTHEW has escribed this to Waqar and left VM with our contact information [phone] for any further questions.

## 2025-01-17 ENCOUNTER — TELEPHONE (OUTPATIENT)
Dept: ONCOLOGY | Facility: CLINIC | Age: 70
End: 2025-01-17
Payer: COMMERCIAL

## 2025-01-17 NOTE — TELEPHONE ENCOUNTER
Caller: Felicia Boyle    Relationship: Self    Best call back number: [phone]    What test was performed: ECHO    When was the test performed: 1/3/2025    Where was the test performed: [facility]     Additional notes: CALL TO DISCUSS ECHO RESULTS

## 2025-01-20 ENCOUNTER — HOSPITAL ENCOUNTER (OUTPATIENT)
Dept: OCCUPATIONAL THERAPY | Facility: HOSPITAL | Age: 70
Setting detail: THERAPIES SERIES
Discharge: HOME | End: 2025-01-20
Payer: COMMERCIAL

## 2025-01-20 ENCOUNTER — TELEPHONE (OUTPATIENT)
Dept: ONCOLOGY | Facility: CLINIC | Age: 70
End: 2025-01-20
Payer: COMMERCIAL

## 2025-01-20 DIAGNOSIS — M25.611 DECREASED RANGE OF MOTION OF RIGHT SHOULDER: ICD-10-CM

## 2025-01-20 DIAGNOSIS — C50.911 BILATERAL MALIGNANT NEOPLASM OF BREAST IN FEMALE, UNSPECIFIED ESTROGEN RECEPTOR STATUS, UNSPECIFIED SITE OF BREAST: ICD-10-CM

## 2025-01-20 DIAGNOSIS — I97.2 POST-MASTECTOMY LYMPHEDEMA SYNDROME: Primary | ICD-10-CM

## 2025-01-20 DIAGNOSIS — C50.912 BILATERAL MALIGNANT NEOPLASM OF BREAST IN FEMALE, UNSPECIFIED ESTROGEN RECEPTOR STATUS, UNSPECIFIED SITE OF BREAST: ICD-10-CM

## 2025-01-20 DIAGNOSIS — M25.612 DECREASED RANGE OF MOTION OF LEFT SHOULDER: ICD-10-CM

## 2025-01-20 PROCEDURE — 97110 THERAPEUTIC EXERCISES: CPT

## 2025-01-20 PROCEDURE — 93702 BIS XTRACELL FLUID ANALYSIS: CPT

## 2025-01-20 PROCEDURE — 97140 MANUAL THERAPY 1/> REGIONS: CPT

## 2025-01-20 NOTE — THERAPY PROGRESS REPORT/RE-CERT
"Outpatient Occupational Therapy Lymphedema Progress Note  [facility]     Patient Name: Felicia Boyle  : 1955  MRN: [id number]  Today's Date: 2025      Visit Date: 2025    Patient Active Problem List   Diagnosis    Age-related osteoporosis without current pathological fracture    FH: breast cancer in first degree relative    Post-menopausal    Type 2 diabetes mellitus without complication, without long-term current use of insulin    Vitamin D deficiency    Mixed hyperlipidemia    Breast cancer of lower-outer quadrant of right female breast    Encounter for fitting and adjustment of vascular catheter    DCIS (ductal carcinoma in situ) of breast    Bilateral malignant neoplasm of breast in female    S/P bilateral mastectomy        Past Medical History:   Diagnosis Date    Anxiety about health     Breast cancer of lower-outer quadrant of right female breast 2024    DCIS (ductal carcinoma in situ) of breast 03/15/2024    Diverticulosis     History of chemotherapy 2024    Mixed hyperlipidemia 2019    DIET CONTROLLED    Osteoporosis     Port-A-Cath in place     Type 2 diabetes mellitus         Past Surgical History:   Procedure Laterality Date    BREAST BIOPSY Left     Excisional biopsy in the [facility]    BREAST BIOPSY  2024    CATARACT EXTRACTION W/ INTRAOCULAR LENS IMPLANT Bilateral     CHOLECYSTECTOMY  2012    COLON RESECTION Right     COLONOSCOPY N/A 2021    Procedure: COLONOSCOPY TO ANASTAMOSIS/TI;  Surgeon: Angelo Gonsalez MD;  Location: [facility] ENDOSCOPY;  Service: Gastroenterology;  Laterality: N/A;  PRE: SCREENING  POST: NORMAL POST-OP ANATOMY    ENDOSCOPY      2013    ENDOSCOPY N/A 03/10/2017    Procedure: ESOPHAGOGASTRODUODENOSCOPY WITH BIOPSY AND PETER DILATATION 54\";  Surgeon: Angelo Gonsalez MD;  Location: [facility] ENDOSCOPY;  Service:     ENDOSCOPY N/A 2021    Procedure: ESOPHAGOGASTRODUODENOSCOPY WITH BIOPSIES, 54FR PETER DILATATION;  Surgeon: " Angelo Gonsalez MD;  Location: [facility] ENDOSCOPY;  Service: Gastroenterology;  Laterality: N/A;  PRE: DYSPHAGIA  POST: GASTRITIS, ESOPHAGEAL RING    MASTECTOMY W/ SENTINEL NODE BIOPSY Bilateral 8/15/2024    Procedure: Bilateral total mastectomy, bilateral sentinel node biopsy, possible axillary node dissection, no reconstruction.;  Surgeon: Abeba Manrique MD;  Location: [facility] MAIN OR;  Service: General;  Laterality: Bilateral;    VENOUS ACCESS DEVICE (PORT) INSERTION Left 3/14/2024    Procedure: INSERTION OF PORTACATH;  Surgeon: Abeba Manrique MD;  Location: [facility] MAIN OR;  Service: General;  Laterality: Left;         Visit Dx:      ICD-10-CM ICD-9-CM   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 Decreased range of motion of right shoulder  M25.611 719.51   3. Decreased range of motion of left shoulder  M25.612 719.51   4. Bilateral malignant neoplasm of breast in female, unspecified estrogen receptor status, unspecified site of breast  C50.911 174.9    C50.912         Lymphedema       Row Name 01/20/25 1400             Subjective Pain    Able to rate subjective pain? yes  -RE      Pre-Treatment Pain Level 0  -RE      Post-Treatment Pain Level 0  -RE      Subjective Pain Comment Zach of rib pain today.  Patient does report that she does continue to have intermittent rib pain.  -RE         L-Dex Bioimpedence Screening    L-Dex Measurement Extremity RUE  -RE      L-Dex Patient Position Standing  -RE      L-Dex UE Dominate Side Right  -RE      L-Dex UE At Risk Side Right  -RE      L-Dex UE Pre Surgical Value No  -RE      L-Dex UE Score -5.1  -RE      L-Dex UE Baseline Score -4.7  -RE      L-Dex UE Value Change -0.4  -RE      L-Dex UE Comment WNL  -RE                User Key  (r) = Recorded By, (t) = Taken By, (c) = Cosigned By      Initials Name Provider Type    Emy Mccarthy, OTR Occupational Therapist                             OT Assessment/Plan       Row Name 01/20/25 6530           OT Assessment    Impairments Edema;Impaired flexibility;Impaired lymphatic circulation  -RE     Assessment Comments Patient continues with significant tissue tightness in the axillary area and chest on the left side.  Following soft tissue techniques and exercises patient noted significant improvement in the sensation of tightness and pulling.  Patient was very pleased with how she felt at the end of the session.  Patient's L-Dex value today was -5.1 indicating a significant improvement in the left upper extremity.  Previously swelling in the left upper extremity have caused patient's L-Dex value to decrease abnormally since the measurement is actually of her right arm.  Overall patient is progressing very well.  Her lymphedema symptoms in her left upper extremity are under control.  -RE     OT Diagnosis Left upper extremity postmastectomy lymphedema: Decreased bilateral shoulder active range of motion and flexibility  -RE        OT Plan    OT Plan Comments Continue occupational therapy for lymphedema prevention and surveillance and for range of motion  -RE               User Key  (r) = Recorded By, (t) = Taken By, (c) = Cosigned By      Initials Name Provider Type    Emy Mccarthy OTR Occupational Therapist                       OT Exercises       Row Name 01/20/25 1700             Exercise 1    Exercise Name 1 pulleys  -RE      Cueing 1 Verbal;Demo  -RE      Equipment 1 Pulley  -RE      Time (Minutes) 1 7  -RE                User Key  (r) = Recorded By, (t) = Taken By, (c) = Cosigned By      Initials Name Provider Type    Emy Mccarthy OTR Occupational Therapist                  Manual Rx (Last 36 Hours)       Manual Treatments       Row Name 01/20/25 1719 01/20/25 1700          Total Minutes    14641 - OT Manual Therapy Minutes 30  -RE --        Manual Rx 3    Manual Rx 3 Location -- soft tissue stretching L axilla and left chest  -RE        Manual Rx 4    Manual Rx 4 Location -- soft tissue stretch L rib  area and chest.  -RE               User Key  (r) = Recorded By, (t) = Taken By, (c) = Cosigned By      Initials Name Provider Type    Emy Mccarthy, OTR Occupational Therapist                   OT Goals       Row Name 01/20/25 1700          OT Short Term Goals    STG Date to Achieve 01/05/25  -RE     STG 1 Patient will demonstrate proper awareness of “What is Lymphedema?” and “Healthy Habits” for improved prevention, management, care of symptoms and ease of transition to self-care of condition.  -RE     STG 1 Progress Met  -RE     STG 2 Pt will demonstrate understanding of use of compression sleeve for edema prevention, exercise and air travel.  -RE     STG 2 Progress Met  -RE     STG 3 Patient independent and compliant with initial home exercise program focused on gentle AROM and stretching to improve AROM to pre-surgical level.  -RE     STG 3 Progress Met;Ongoing  -RE        Long Term Goals    LTG Date to Achieve 03/05/25  -RE     LTG 1 Patient to improve DASH/ QuickDASH score by 10 points in 4 weeks.  -RE     LTG 1 Progress Ongoing  -RE     LTG 2 Patient will participate in bioimpedance scans every 3 months as a method of early detection of lymphedema to allow for early intervention.  -RE     LTG 2 Progress Met;Ongoing  -RE     LTG 3 Patient's bioimpedance score to remain below 6.5 for decreased risk of stage II lymphedema.  -RE     LTG 3 Progress Met  -RE     LTG 4 Patient to demonstrate increased bilateral shoulder flexion to 160 to improve functional UE use and to restore pre operative AROM per patient perception.  -RE     LTG 4 Progress Met;Ongoing  -RE     LTG 5 Patient to demonstrate increased bilateral shoulder abduction to 160 to improve functional UE use and to restore preoperative AROM per patient perception.  -RE     LTG 5 Progress Met;Ongoing  -RE        Time Calculation    OT Goal Re-Cert Due Date 03/05/25  -RE               User Key  (r) = Recorded By, (t) = Taken By, (c) = Cosigned By       Initials Name Provider Type    RE Emy Gonzales OTR Occupational Therapist                    Therapy Education  Education Details: Provided patient with information on ordering her own over-the-door pulley system from [employer].  Given: HEP  Program: New  How Provided: Verbal, Demonstration, Written  Provided to: Patient  Level of Understanding: Teach back education performed, Verbalized                Time Calculation:   OT Start Time: 1430  OT Stop Time: 1520  OT Time Calculation (min): 50 min  Total Timed Code Minutes- OT: 40 minute(s)  Timed Charges  89451 - OT Therapeutic Exercise Minutes: 10  90679 - OT Manual Therapy Minutes: 30  Untimed Charges  36858 - OT Bioimpedence Rx Minutes: 10  Total Minutes  Timed Charges Total Minutes: 40  Untimed Charges Total Minutes: 10   Total Minutes: 50     Therapy Charges for Today       Code Description Service Date Service Provider Modifiers Qty    48528288852 HC OT MANUAL THERAPY EA 15 MIN 1/20/2025 Emy Gonzales OTR GO 2    36710902513 HC OT THER PROC EA 15 MIN 1/20/2025 Emy Gonzales OTR GO 1    09057848305 HC OT BIS XTRACELL FLUID ANALYSIS 1/20/2025 Emy Gonzales OTR GO 1          Dictated utilizing Dragon dictation:  Much of this encounter note is an electronic transcription/translation of spoken language to printed text. The electronic translation of spoken language may permit erroneous, or at times, nonsensical words or phrases to be inadvertently transcribed; Although I have reviewed the note for such errors, some may still exist.              YARED Kelley  1/20/2025

## 2025-01-20 NOTE — TELEPHONE ENCOUNTER
----- Message from Agata Renteria sent at 1/20/2025  6:39 AM EST -----  Pls let pt know that cxr negative  ----- Message -----  From: Interface, Rad Results [place] In  Sent: 1/16/2025   7:50 AM EST  To: Agata Renteria MD

## 2025-01-21 ENCOUNTER — OFFICE VISIT (OUTPATIENT)
Dept: OTHER | Facility: HOSPITAL | Age: 70
End: 2025-01-21
Payer: COMMERCIAL

## 2025-01-21 VITALS
BODY MASS INDEX: 25.64 KG/M2 | WEIGHT: 118.5 LBS | OXYGEN SATURATION: 95 % | TEMPERATURE: 97.1 F | HEART RATE: 90 BPM | RESPIRATION RATE: 16 BRPM

## 2025-01-21 DIAGNOSIS — C50.911 BILATERAL MALIGNANT NEOPLASM OF BREAST IN FEMALE, UNSPECIFIED ESTROGEN RECEPTOR STATUS, UNSPECIFIED SITE OF BREAST: Primary | ICD-10-CM

## 2025-01-21 DIAGNOSIS — C50.912 BILATERAL MALIGNANT NEOPLASM OF BREAST IN FEMALE, UNSPECIFIED ESTROGEN RECEPTOR STATUS, UNSPECIFIED SITE OF BREAST: Primary | ICD-10-CM

## 2025-01-21 PROCEDURE — G0463 HOSPITAL OUTPT CLINIC VISIT: HCPCS | Performed by: NURSE PRACTITIONER

## 2025-01-21 NOTE — LETTER
January 22, 2025     Tristan Malone DO  [facility] [facility] [address]    Patient: Felicia Boyle   YOB: 1955   Date of Visit: 1/21/2025       Dear Tristan Malone DO,    Thank you for referring Felicia Boyle to me for evaluation. Below is a copy of my consult note.    If you have questions, please do not hesitate to call me. I look forward to following Felicia along with you.         Sincerely,        DAY Ku        CC: MD Agata Ceja MD    [facility] MULTIDISCIPLINARY CLINIC   IN CLINIC Initial Visit  SUPPORTIVE ONCOLOGY - SURVIVORSHIP    Felicia Boyle is a pleasant 69 y.o. female being followed by Agata Renteria MD for bilateral breast cancer. Reviewed today in Multidisciplinary Clinic, for initial visit    HPI  69-year-old patient being seen today for treatment summary and survivorship care plan.  She underwent biopsy 1/31/2024 of the right breast which showed invasive ductal carcinoma, ER/NV negative, HER2 positive.  She underwent bilateral biopsies on 2/26/2024.  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  Right breast showed high-grade DCIS.  ER/NV were positive.  She received neoadjuvant chemotherapy.  Cycle 1 Herceptin Perjeta Taxol given 3/18/2024.  She had nearly immediate severe anaphylactic reaction.  Taxol was stopped permanently.  Abraxane Herceptin Perjeta initiated 3/25/2024.  She developed some neuropathy symptoms as well as elevations in LFTs, proceeded with Abraxane and had a 20% dose reduction and received her last cycle on 6/17/2024.  She underwent bilateral mastectomies on 8/15/2024.  Pathology right breast showed a single focus of high-grade DCIS and no evidence of residual invasive carcinoma identified.  4 sentinel lymph nodes on the right side were negative.  The left breast showed no evidence of residual in situ or invasive carcinoma.  1 sentinel left lymph node was negative. She  continues Herceptin Perjeta every 3 weeks.    She is currently being followed by the lymphedema clinic for left upper extremity lymphedema.  She does have nearly full range of motion of bilateral upper extremities.  She continues to work on stretches and exercises as directed by her OT.  She is fitted with a compression sleeve for the left arm.  She is denying any pain.    She reports her energy is good.  Her appetite is excellent.  She denies constipation or diarrhea.      She reports ongoing issues with peripheral neuropathy.  The neuropathy is present in her bilateral fingertips and bilateral of lower extremities with numbness pins-and-needles and occasional burning from toes to above the ankles.  She has tried Cymbalta and gabapentin with Dr. Renteria.  She has had minimal improvements.  The skin on the hands and feet is red.  She is using urea cream for this.  She does have have a appointment scheduled for consultation with neurology in April.    Distress: denies distress, no areas of concern indicated  PHQ-9 Total Score: 0   SUSANNA-7: 0     TREATMENT HISTORY:     Oncology/Hematology History   Breast cancer of lower-outer quadrant of right female breast   2/14/2024 Initial Diagnosis    Breast cancer of lower-outer quadrant of right female breast     3/18/2024 -  Chemotherapy    OP BREAST Pertuzumab / Trastuzumab + (Weekly PACLitaxel x 4 Cycles) Q21D     Bilateral malignant neoplasm of breast in female   9/1/2017 Genetic Testing     Genetics ([facility])  Genetic result: Negative, no clinically significant mutation identified.     12/30/2019 Imaging    BONE MINERAL DENSITOMETRY     HISTORY: Postmenopausal     COMPARISON: BMD 01/22/2018, 01/19/2016     TECHNIQUE: The T score compares the patient's bone mineral density with  the peak bone mass of young normal patients. Patients with T-scores  between 1.0 and 2.5 standard deviations below the mean are osteopenic.  Patients with T-scores greater than 2.5 standard deviation  below the  mean are osteoporotic.     The Z score compares the patient's bone mineral density with sex and age  matched patients. Z score of -2.0 and lower is an indication of low  mineral density for the patient's age.     FINDINGS:   LUMBAR SPINE:  The BMD measured in the L1-L4 is 0.699 g/cm2 for a  T-score of -3.2 and a Z-score of -1.4     LEFT HIP: The BMD for the femoral neck is 0.543g/cm2 for a T score of   -2.8 and a Z score of -1.2     RIGHT HIP:  The BMD for the femoral neck is 0.512g/cm2 for a T score of  -3.0 and a Z score of -1.5     IMPRESSION:  1. Osteoporosis.  2. When compared to the prior exam of 01/22/2018 and baseline exam of  01/19/2016, there has been no statistically significant change.     1/31/2024 Initial Diagnosis    Bilateral malignant neoplasm of breast in female     1/31/2024 Biopsy    Final Diagnosis   1.  Breast, right, 8:00, 8 cm from nipple, biopsy: Invasive ductal adenocarcinoma                -A.  The tumor is Hastings grade III (tubular grade 3, nuclear grade 3, mitotic grade 3).               -B.  The tumor measures up to 9.3 mm on the slide               -C.  No definitive lymphovascular invasion or perineural invasion is identified               -D.  Ductal carcinoma in situ is not identified               -E.  Microcalcifications are not identified     Estrogen receptor: <1%  Progesterone receptor: <1%  HER2 status: Positive, 3+, IHC  Ki 67: 70%     2/26/2024 Biopsy    Final Diagnosis   1.  Left breast, 12:00, MRI-guided biopsies for non-mass enhancement: INTERMEDIATE GRADE DUCTAL CARCINOMA IN SITU (DCIS) WITH APOCRINE FEATURES.               -Architectural patterns: Solid and cribriform with central necrosis and associated microcalcifications.               -DCIS involves sclerosing adenosis in multiple cores and measures up to 7 mm maximally.               -No evidence of invasion identified.     2.  Right breast, 12:00, MRI-guided biopsies for non-mass enhancement:                -Sclerosing adenosis with associated microcalcifications.               -No atypical hyperplasia, in situ nor invasive carcinoma identified.     3.  Right breast, 8:00, MRI-guided biopsies for linear enhancement: HIGH GRADE DUCTAL CARCINOMA IN SITU (DCIS).  -Architectural patterns: Solid, comedo and clinging with lobular extension, central comedonecrosis and associated microcalcifications.               -DCIS involves multiple tissue cores measuring up to 3 mm.               -Sclerosing adenosis and usual ductal hyperplasia with associated microcalcifications.               -No evidence of invasive carcinoma identified.        Estrogen receptor: %  Progesterone receptor: 81-90%  Ki 67: 15%     3/18/2024 -  Chemotherapy    3/18/2024-received first dose of Herceptin and Perjeta, had a severe hypersensitivity reaction to Taxol and hence Taxol was discontinued     3/25/2024-cycle 1 abraxane    4/8/24-abraxane dose reduced 20% due to elevated LFTs    6/17/24- last cycle abraxane    7/1/24-continue herceptin perjeta every three weeks     8/15/2024 Surgery    Final Diagnosis   1.  [place] lymph node #1, right axilla, excision:               -1 lymph node, negative for metastatic carcinoma (0/1).     2.  [place] lymph node #2, right axilla, excision:               -3 lymph nodes, negative for metastatic carcinoma (0/3).     3.  Right breast, oriented simple skin sparing mastectomy status post neoadjuvant therapy (907 g): SINGLE FOCUS OF HIGH GRADE DUCTAL CARCINOMA IN SITU (DCIS).               -Architectural patterns: Solid with focal necrosis and associated microcalcifications.               -Extent of DCIS: 2.0 mm focus in a single tissue block.               -20 mm fibrous tumor bed with sclerosing adenosis, microcysts and microcalcifications.               -Unremarkable skin and nipple.               -All margins widely free of DCIS (see synoptic template).               -No evidence of residual invasive  "carcinoma identified.               -Pathologic stage: ypTis, N(sn)0, pCR (see Comment).     4.  Left breast, oriented simple skin sparing mastectomy status post neoadjuvant therapy (850 g):               -Fat necrosis, biopsy site changes, sclerosing adenosis and changes consistent with treatment effect.               -Microcalcifications present associated with benign ducts and sclerosing adenosis.               -Unremarkable skin and nipple.               -No evidence of residual in situ nor invasive carcinoma identified.                    5.  [place] lymph node #1, left axilla, excision:               -1 lymph node, negative for metastatic carcinoma (0/1).              Past Medical History:   Diagnosis Date   • Anxiety about health    • Breast cancer of lower-outer quadrant of right female breast 02/14/2024   • DCIS (ductal carcinoma in situ) of breast 03/15/2024   • Diverticulosis    • History of chemotherapy 06/22/2024   • Mixed hyperlipidemia 05/19/2019    DIET CONTROLLED   • Osteoporosis    • Port-A-Cath in place    • Type 2 diabetes mellitus        Past Surgical History:   Procedure Laterality Date   • BREAST BIOPSY Left     Excisional biopsy in [facility] [place]   • BREAST BIOPSY  03/2024   • CATARACT EXTRACTION W/ INTRAOCULAR LENS IMPLANT Bilateral    • CHOLECYSTECTOMY  2012   • COLON RESECTION Right 2002   • COLONOSCOPY N/A 04/28/2021    Procedure: COLONOSCOPY TO ANASTAMOSIS/TI;  Surgeon: Angelo Gonsalez MD;  Location: [facility] ENDOSCOPY;  Service: Gastroenterology;  Laterality: N/A;  PRE: SCREENING  POST: NORMAL POST-OP ANATOMY   • ENDOSCOPY      2013   • ENDOSCOPY N/A 03/10/2017    Procedure: ESOPHAGOGASTRODUODENOSCOPY WITH BIOPSY AND PETER DILATATION 54\";  Surgeon: Angelo Gonsalez MD;  Location: [facility] ENDOSCOPY;  Service:    • ENDOSCOPY N/A 04/28/2021    Procedure: ESOPHAGOGASTRODUODENOSCOPY WITH BIOPSIES, 54FR PETER DILATATION;  Surgeon: Angelo Gonsalez MD;  Location: [facility] ENDOSCOPY;  Service: " "Gastroenterology;  Laterality: N/A;  PRE: DYSPHAGIA  POST: GASTRITIS, ESOPHAGEAL RING   • MASTECTOMY W/ SENTINEL NODE BIOPSY Bilateral 8/15/2024    Procedure: Bilateral total mastectomy, bilateral sentinel node biopsy, possible axillary node dissection, no reconstruction.;  Surgeon: Abeba Manrique MD;  Location: [facility];  Service: General;  Laterality: Bilateral;   • VENOUS ACCESS DEVICE (PORT) INSERTION Left 3/14/2024    Procedure: INSERTION OF PORTACATH;  Surgeon: Abeba Manrique MD;  Location: [facility] OR;  Service: General;  Laterality: Left;       Social History     Socioeconomic History   • Marital status:      Spouse name: Ryan   Tobacco Use   • Smoking status: Former     Current packs/day: 0.00     Average packs/day: 0.3 packs/day for 15.0 years (3.8 ttl pk-yrs)     Types: Cigarettes     Quit date: 1/15/2024     Years since quittin.0     Passive exposure: Past   • Smokeless tobacco: Never   • Tobacco comments:     N/A   Vaping Use   • Vaping status: Never Used   Substance and Sexual Activity   • Alcohol use: Yes     Comment: RARE   • Drug use: No   • Sexual activity: Not Currently     Partners: Male     Birth control/protection: Tubal ligation         No results found for: \"LDH\", \"URICACID\"      Lab Results   Component Value Date    GLUCOSE 163 (H) 2025    BUN 21 2025    CREATININE 0.80 2025    EGFRIFNONA 113 2020    EGFRIFAFRI 137 2020    BCR 26.3 (H) 2025    K 4.0 2025    CO2 25.1 2025    CALCIUM 9.7 2025    PROTENTOTREF 7.7 10/31/2024    ALBUMIN 4.1 2025    LABIL2 1.6 10/31/2024    AST 25 2025    ALT 30 2025       CBC w/diff          12/3/2024    09:47 2024    08:43 2025    12:03   CBC w/Diff   WBC 7.51  9.25  11.45    RBC 4.33  4.34  4.44    Hemoglobin 12.4  12.2  12.8    Hematocrit 38.4  38.4  39.7    MCV 88.7  88.5  89.4    MCH 28.6  28.1  28.8    MCHC 32.3  31.8  32.2    RDW 13.5  13.0  " 13.5    Platelets 273  350  328    Neutrophil Rel % 61.9  61.6  60.9    Immature Granulocyte Rel % 0.4  1.0  0.3    Lymphocyte Rel % 28.5  28.4  31.1    Monocyte Rel % 8.0  7.7  6.8    Eosinophil Rel % 0.5  0.9  0.5    Basophil Rel % 0.7  0.4  0.4        Allergies as of 01/21/2025 - Reviewed 01/21/2025   Allergen Reaction Noted   • Paclitaxel Shortness Of Breath 08/15/2024   • Metronidazole Nausea And Vomiting 03/03/2016        MEDICATIONS:  Patient medication list reviewed today    Review of Systems   Constitutional:  Negative for activity change, appetite change, fatigue and unexpected weight change.   Respiratory:  Negative for chest tightness and shortness of breath.    Cardiovascular:  Negative for chest pain and leg swelling.   Gastrointestinal:  Negative for blood in stool, constipation and diarrhea.   Genitourinary:  Negative for dysuria and hematuria.   Musculoskeletal:  Negative for arthralgias and myalgias.   Skin:  Positive for color change (palms and soles of feet red, peeling).   Neurological:  Positive for numbness (bilateral fingertips; bilateral toes to above ankle). Negative for weakness.   Psychiatric/Behavioral:  Negative for dysphoric mood and sleep disturbance. The patient is not nervous/anxious.        Pulse 90   Temp 97.1 °F (36.2 °C) (Temporal)   Resp 16   Wt 53.8 kg (118 lb 8 oz)   LMP  (LMP Unknown)   SpO2 95%   BMI 25.64 kg/m²     Wt Readings from Last 3 Encounters:   01/21/25 53.8 kg (118 lb 8 oz)   01/13/25 53.6 kg (118 lb 3.2 oz)   01/03/25 53.5 kg (118 lb)        Pain Score    01/21/25 1345   PainSc: 0-No pain           Physical Exam  Constitutional:       Appearance: Normal appearance. She is well-groomed.   HENT:      Head: Normocephalic and atraumatic.   Cardiovascular:      Rate and Rhythm: Normal rate and regular rhythm.   Pulmonary:      Effort: No tachypnea or respiratory distress.   Abdominal:      General: Abdomen is flat. There is no distension.   Skin:     General:  Skin is warm and dry.      Findings: Erythema (palms and soles of feet) present.   Neurological:      Mental Status: She is alert and oriented to person, place, and time.      Gait: Gait is intact.   Psychiatric:         Attention and Perception: Attention and perception normal.         Mood and Affect: Mood and affect normal.         Speech: Speech normal.         Behavior: Behavior normal. Behavior is cooperative.         Thought Content: Thought content normal.           Advance Care Planning    Patient does not have advance care planning complete, we do not have a copy on file.    Patient has not designated a healthcare surrogate.    Arrangements for further assistance with advance care planning can be made by scheduling an appointment with myself or another advance care planning facilitator at their convenience. Patients may also call the toll free [facility] ACP Help Line at [phone].           DISCUSSION HELD TODAY:   Discussed NCCN recommendations for all cancer survivors of 150 minutes/week moderate intensity exercise, achieve and maintain a healthy weight, plants-based whole-foods diet, avoid tobacco and second hand smoke, avoid alcohol or minimize alcohol intake - no more than 1 drink in a day for adults.     A copy of the Survivorship Treatment Summary & Care Plan for Ms. Boyle was provided to and forwarded to the providers identified on the care team.    Cancer Screening:   Mammogram: Planned 5/2025, FU breast surgery 5/30/2025  Colonoscopy: 4/28/21 egd colon; Patent end-to-side ileo-colonic anastomosis, characterized by healthy appearing mucosa, no specimens collected, repeat 10 years  Cervical cancer screening: 10/10/2019 Pap, HPV negative, discontinued  Low-dose chest CT: History of smoking 2-4 cigarettes a day since age 16, quit 2024, approximately 13 pack year history, not currently eligible for annual LDCT for screening    Neuropathy bilateral fingertips, bilateral lower extremities toes  2 above the ankles.  Reports this is not occasional pins-and-needles occasional burning.  The fingertips are bothersome occasionally drops things.  Has tried gabapentin and duloxetine with minimal effectiveness.  Continues to do exercises to maintain manual dexterity.  She notes she has not had any difficulty with ambulation stumbles or falls.  Scheduled for neurology consultation April 1.  Left arm lymphedema, currently managed in the lymphedema clinic, she is fitted with a left arm compression sleeve.  Demonstrates ongoing mild deficits in range of motion with overhead extension, continues to do exercises as instructed, understands the importance of lifelong maintenance.  Stays active walking the mall with her .  She has 3 adult children, a daughter and a son in [facility] and also a daughter in [place].  Excellent support system, strong elise all have been sources of strength for her      Plan and recommendations:  Surveillance as above  Neurology consult April 1 with Dr. Akhtar  Continues follow-up in lymphedema clinic  Following up with cardio oncology next echo and Dr. Peguero on 4/7/2025  Continue physical activity as tolerated  No formal follow up arranged for now. Return to clinic as needed.  Call my office as needed at any point for additional information, resources or support at [phone]    (X87.911,  C50.912) Bilateral malignant neoplasm of breast in female, unspecified estrogen receptor status, unspecified site of breast     I spent 40 minutes caring for this patient on this date of service by face-to-face counseling. This time includes time spent by me in the following activities: preparing for the visit, reviewing tests, performing a medically appropriate examination and/or evaluation, counseling and educating the patient/family/caregiver, referring and communicating with other health care professionals, documenting information in the medical record, obtaining a separately obtained history, and  reviewing a separately obtained history     Fariba Self DNP, DAY, CHASITY-BC, AOS  01/21/2025

## 2025-01-21 NOTE — PROGRESS NOTES
[facility] MULTIDISCIPLINARY CLINIC   IN CLINIC Initial Visit  SUPPORTIVE ONCOLOGY - SURVIVORSHIP    Felicia Boyle is a pleasant 69 y.o. female being followed by Agata Renteria MD for bilateral breast cancer. Reviewed today in Multidisciplinary Clinic, for initial visit    HPI  69-year-old patient being seen today for treatment summary and survivorship care plan.  She underwent biopsy 1/31/2024 of the right breast which showed invasive ductal carcinoma, ER/CA negative, HER2 positive.  She underwent bilateral biopsies on 2/26/2024.  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  Right breast showed high-grade DCIS.  ER/CA were positive.  She received neoadjuvant chemotherapy.  Cycle 1 Herceptin Perjeta Taxol given 3/18/2024.  She had nearly immediate severe anaphylactic reaction.  Taxol was stopped permanently.  Abraxane Herceptin Perjeta initiated 3/25/2024.  She developed some neuropathy symptoms as well as elevations in LFTs, proceeded with Abraxane and had a 20% dose reduction and received her last cycle on 6/17/2024.  She underwent bilateral mastectomies on 8/15/2024.  Pathology right breast showed a single focus of high-grade DCIS and no evidence of residual invasive carcinoma identified.  4 sentinel lymph nodes on the right side were negative.  The left breast showed no evidence of residual in situ or invasive carcinoma.  1 sentinel left lymph node was negative. She continues Herceptin Perjeta every 3 weeks.    She is currently being followed by the lymphedema clinic for left upper extremity lymphedema.  She does have nearly full range of motion of bilateral upper extremities.  She continues to work on stretches and exercises as directed by her OT.  She is fitted with a compression sleeve for the left arm.  She is denying any pain.    She reports her energy is good.  Her appetite is excellent.  She denies constipation or diarrhea.      She reports ongoing issues with  peripheral neuropathy.  The neuropathy is present in her bilateral fingertips and bilateral of lower extremities with numbness pins-and-needles and occasional burning from toes to above the ankles.  She has tried Cymbalta and gabapentin with Dr. Renteria.  She has had minimal improvements.  The skin on the hands and feet is red.  She is using urea cream for this.  She does have have a appointment scheduled for consultation with neurology in April.    Distress: denies distress, no areas of concern indicated  PHQ-9 Total Score: 0   SUSANNA-7: 0     TREATMENT HISTORY:     Oncology/Hematology History   Breast cancer of lower-outer quadrant of right female breast   2/14/2024 Initial Diagnosis    Breast cancer of lower-outer quadrant of right female breast     3/18/2024 -  Chemotherapy    OP BREAST Pertuzumab / Trastuzumab + (Weekly PACLitaxel x 4 Cycles) Q21D     Bilateral malignant neoplasm of breast in female   9/1/2017 Genetic Testing     Genetics ([facility])  Genetic result: Negative, no clinically significant mutation identified.     12/30/2019 Imaging    BONE MINERAL DENSITOMETRY     HISTORY: Postmenopausal     COMPARISON: BMD 01/22/2018, 01/19/2016     TECHNIQUE: The T score compares the patient's bone mineral density with  the peak bone mass of young normal patients. Patients with T-scores  between 1.0 and 2.5 standard deviations below the mean are osteopenic.  Patients with T-scores greater than 2.5 standard deviation below the  mean are osteoporotic.     The Z score compares the patient's bone mineral density with sex and age  matched patients. Z score of -2.0 and lower is an indication of low  mineral density for the patient's age.     FINDINGS:   LUMBAR SPINE:  The BMD measured in the L1-L4 is 0.699 g/cm2 for a  T-score of -3.2 and a Z-score of -1.4     LEFT HIP: The BMD for the femoral neck is 0.543g/cm2 for a T score of   -2.8 and a Z score of -1.2     RIGHT HIP:  The BMD for the femoral neck is 0.512g/cm2 for a T  score of  -3.0 and a Z score of -1.5     IMPRESSION:  1. Osteoporosis.  2. When compared to the prior exam of 01/22/2018 and baseline exam of  01/19/2016, there has been no statistically significant change.     1/31/2024 Initial Diagnosis    Bilateral malignant neoplasm of breast in female     1/31/2024 Biopsy    Final Diagnosis   1.  Breast, right, 8:00, 8 cm from nipple, biopsy: Invasive ductal adenocarcinoma                -A.  The tumor is Jeovany grade III (tubular grade 3, nuclear grade 3, mitotic grade 3).               -B.  The tumor measures up to 9.3 mm on the slide               -C.  No definitive lymphovascular invasion or perineural invasion is identified               -D.  Ductal carcinoma in situ is not identified               -E.  Microcalcifications are not identified     Estrogen receptor: <1%  Progesterone receptor: <1%  HER2 status: Positive, 3+, IHC  Ki 67: 70%     2/26/2024 Biopsy    Final Diagnosis   1.  Left breast, 12:00, MRI-guided biopsies for non-mass enhancement: INTERMEDIATE GRADE DUCTAL CARCINOMA IN SITU (DCIS) WITH APOCRINE FEATURES.               -Architectural patterns: Solid and cribriform with central necrosis and associated microcalcifications.               -DCIS involves sclerosing adenosis in multiple cores and measures up to 7 mm maximally.               -No evidence of invasion identified.     2.  Right breast, 12:00, MRI-guided biopsies for non-mass enhancement:               -Sclerosing adenosis with associated microcalcifications.               -No atypical hyperplasia, in situ nor invasive carcinoma identified.     3.  Right breast, 8:00, MRI-guided biopsies for linear enhancement: HIGH GRADE DUCTAL CARCINOMA IN SITU (DCIS).  -Architectural patterns: Solid, comedo and clinging with lobular extension, central comedonecrosis and associated microcalcifications.               -DCIS involves multiple tissue cores measuring up to 3 mm.               -Sclerosing adenosis  and usual ductal hyperplasia with associated microcalcifications.               -No evidence of invasive carcinoma identified.        Estrogen receptor: %  Progesterone receptor: 81-90%  Ki 67: 15%     3/18/2024 -  Chemotherapy    3/18/2024-received first dose of Herceptin and Perjeta, had a severe hypersensitivity reaction to Taxol and hence Taxol was discontinued     3/25/2024-cycle 1 abraxane    4/8/24-abraxane dose reduced 20% due to elevated LFTs    6/17/24- last cycle abraxane    7/1/24-continue herceptin perjeta every three weeks     8/15/2024 Surgery    Final Diagnosis   1.  [place] lymph node #1, right axilla, excision:               -1 lymph node, negative for metastatic carcinoma (0/1).     2.  [place] lymph node #2, right axilla, excision:               -3 lymph nodes, negative for metastatic carcinoma (0/3).     3.  Right breast, oriented simple skin sparing mastectomy status post neoadjuvant therapy (907 g): SINGLE FOCUS OF HIGH GRADE DUCTAL CARCINOMA IN SITU (DCIS).               -Architectural patterns: Solid with focal necrosis and associated microcalcifications.               -Extent of DCIS: 2.0 mm focus in a single tissue block.               -20 mm fibrous tumor bed with sclerosing adenosis, microcysts and microcalcifications.               -Unremarkable skin and nipple.               -All margins widely free of DCIS (see synoptic template).               -No evidence of residual invasive carcinoma identified.               -Pathologic stage: ypTis, N(sn)0, pCR (see Comment).     4.  Left breast, oriented simple skin sparing mastectomy status post neoadjuvant therapy (850 g):               -Fat necrosis, biopsy site changes, sclerosing adenosis and changes consistent with treatment effect.               -Microcalcifications present associated with benign ducts and sclerosing adenosis.               -Unremarkable skin and nipple.               -No evidence of residual in situ nor invasive  "carcinoma identified.                    5.  [place] lymph node #1, left axilla, excision:               -1 lymph node, negative for metastatic carcinoma (0/1).              Past Medical History:   Diagnosis Date    Anxiety about health     Breast cancer of lower-outer quadrant of right female breast 02/14/2024    DCIS (ductal carcinoma in situ) of breast 03/15/2024    Diverticulosis     History of chemotherapy 06/22/2024    Mixed hyperlipidemia 05/19/2019    DIET CONTROLLED    Osteoporosis     Port-A-Cath in place     Type 2 diabetes mellitus        Past Surgical History:   Procedure Laterality Date    BREAST BIOPSY Left     Excisional biopsy in the [facility]    BREAST BIOPSY  03/2024    CATARACT EXTRACTION W/ INTRAOCULAR LENS IMPLANT Bilateral     CHOLECYSTECTOMY  2012    COLON RESECTION Right 2002    COLONOSCOPY N/A 04/28/2021    Procedure: COLONOSCOPY TO ANASTAMOSIS/TI;  Surgeon: Angelo Gonsalez MD;  Location: [facility] ENDOSCOPY;  Service: Gastroenterology;  Laterality: N/A;  PRE: SCREENING  POST: NORMAL POST-OP ANATOMY    ENDOSCOPY      2013    ENDOSCOPY N/A 03/10/2017    Procedure: ESOPHAGOGASTRODUODENOSCOPY WITH BIOPSY AND PETER DILATATION 54\";  Surgeon: Angelo Gonsalez MD;  Location: [facility] ENDOSCOPY;  Service:     ENDOSCOPY N/A 04/28/2021    Procedure: ESOPHAGOGASTRODUODENOSCOPY WITH BIOPSIES, 54FR PETER DILATATION;  Surgeon: Angelo Gonsalez MD;  Location: [facility] ENDOSCOPY;  Service: Gastroenterology;  Laterality: N/A;  PRE: DYSPHAGIA  POST: GASTRITIS, ESOPHAGEAL RING    MASTECTOMY W/ SENTINEL NODE BIOPSY Bilateral 8/15/2024    Procedure: Bilateral total mastectomy, bilateral sentinel node biopsy, possible axillary node dissection, no reconstruction.;  Surgeon: Abeba Manrique MD;  Location: [facility] OR;  Service: General;  Laterality: Bilateral;    VENOUS ACCESS DEVICE (PORT) INSERTION Left 3/14/2024    Procedure: INSERTION OF PORTACATH;  Surgeon: Abeba Manrique MD;  [facility]: [facility]" "MAIN OR;  Service: General;  Laterality: Left;       Social History     Socioeconomic History    Marital status:      Spouse name: Ryan   Tobacco Use    Smoking status: Former     Current packs/day: 0.00     Average packs/day: 0.3 packs/day for 15.0 years (3.8 ttl pk-yrs)     Types: Cigarettes     Quit date: 1/15/2024     Years since quittin.0     Passive exposure: Past    Smokeless tobacco: Never    Tobacco comments:     N/A   Vaping Use    Vaping status: Never Used   Substance and Sexual Activity    Alcohol use: Yes     Comment: RARE    Drug use: No    Sexual activity: Not Currently     Partners: Male     Birth control/protection: Tubal ligation         No results found for: \"LDH\", \"URICACID\"      Lab Results   Component Value Date    GLUCOSE 163 (H) 2025    BUN 21 2025    CREATININE 0.80 2025    EGFRIFNONA 113 2020    EGFRIFAFRI 137 2020    BCR 26.3 (H) 2025    K 4.0 2025    CO2 25.1 2025    CALCIUM 9.7 2025    PROTENTOTREF 7.7 10/31/2024    ALBUMIN 4.1 2025    LABIL2 1.6 10/31/2024    AST 25 2025    ALT 30 2025       CBC w/diff          12/3/2024    09:47 2024    08:43 2025    12:03   CBC w/Diff   WBC 7.51  9.25  11.45    RBC 4.33  4.34  4.44    Hemoglobin 12.4  12.2  12.8    Hematocrit 38.4  38.4  39.7    MCV 88.7  88.5  89.4    MCH 28.6  28.1  28.8    MCHC 32.3  31.8  32.2    RDW 13.5  13.0  13.5    Platelets 273  350  328    Neutrophil Rel % 61.9  61.6  60.9    Immature Granulocyte Rel % 0.4  1.0  0.3    Lymphocyte Rel % 28.5  28.4  31.1    Monocyte Rel % 8.0  7.7  6.8    Eosinophil Rel % 0.5  0.9  0.5    Basophil Rel % 0.7  0.4  0.4        Allergies as of 2025 - Reviewed 2025   Allergen Reaction Noted    Paclitaxel Shortness Of Breath 08/15/2024    Metronidazole Nausea And Vomiting 2016        MEDICATIONS:  Patient medication list reviewed today    Review of Systems   Constitutional:  Negative for " activity change, appetite change, fatigue and unexpected weight change.   Respiratory:  Negative for chest tightness and shortness of breath.    Cardiovascular:  Negative for chest pain and leg swelling.   Gastrointestinal:  Negative for blood in stool, constipation and diarrhea.   Genitourinary:  Negative for dysuria and hematuria.   Musculoskeletal:  Negative for arthralgias and myalgias.   Skin:  Positive for color change (palms and soles of feet red, peeling).   Neurological:  Positive for numbness (bilateral fingertips; bilateral toes to above ankle). Negative for weakness.   Psychiatric/Behavioral:  Negative for dysphoric mood and sleep disturbance. The patient is not nervous/anxious.        Pulse 90   Temp 97.1 °F (36.2 °C) (Temporal)   Resp 16   Wt 53.8 kg (118 lb 8 oz)   LMP  (LMP Unknown)   SpO2 95%   BMI 25.64 kg/m²     Wt Readings from Last 3 Encounters:   01/21/25 53.8 kg (118 lb 8 oz)   01/13/25 53.6 kg (118 lb 3.2 oz)   01/03/25 53.5 kg (118 lb)        Pain Score    01/21/25 1345   PainSc: 0-No pain           Physical Exam  Constitutional:       Appearance: Normal appearance. She is well-groomed.   HENT:      Head: Normocephalic and atraumatic.   Cardiovascular:      Rate and Rhythm: Normal rate and regular rhythm.   Pulmonary:      Effort: No tachypnea or respiratory distress.   Abdominal:      General: Abdomen is flat. There is no distension.   Skin:     General: Skin is warm and dry.      Findings: Erythema (palms and soles of feet) present.   Neurological:      Mental Status: She is alert and oriented to person, place, and time.      Gait: Gait is intact.   Psychiatric:         Attention and Perception: Attention and perception normal.         Mood and Affect: Mood and affect normal.         Speech: Speech normal.         Behavior: Behavior normal. Behavior is cooperative.         Thought Content: Thought content normal.           Advance Care Planning     Patient does not have advance care  planning complete, we do not have a copy on file.    Patient has not designated a healthcare surrogate.    Arrangements for further assistance with advance care planning can be made by scheduling an appointment with myself or another advance care planning facilitator at their convenience. Patients may also call the toll free [facility] ACP Help Line at [phone].           DISCUSSION HELD TODAY:   Discussed NCCN recommendations for all cancer survivors of 150 minutes/week moderate intensity exercise, achieve and maintain a healthy weight, plants-based whole-foods diet, avoid tobacco and second hand smoke, avoid alcohol or minimize alcohol intake - no more than 1 drink in a day for adults.     A copy of the Survivorship Treatment Summary & Care Plan for Ms. Boyle was provided to and forwarded to the providers identified on the care team.    Cancer Screening:   Mammogram: Planned 5/2025, FU breast surgery 5/30/2025  Colonoscopy: 4/28/21 egd colon; Patent end-to-side ileo-colonic anastomosis, characterized by healthy appearing mucosa, no specimens collected, repeat 10 years  Cervical cancer screening: 10/10/2019 Pap, HPV negative, discontinued  Low-dose chest CT: History of smoking 2-4 cigarettes a day since age 16, quit 2024, approximately 13 pack year history, not currently eligible for annual LDCT for screening    Neuropathy bilateral fingertips, bilateral lower extremities toes 2 above the ankles.  Reports this is not occasional pins-and-needles occasional burning.  The fingertips are bothersome occasionally drops things.  Has tried gabapentin and duloxetine with minimal effectiveness.  Continues to do exercises to maintain manual dexterity.  She notes she has not had any difficulty with ambulation stumbles or falls.  Scheduled for neurology consultation April 1.  Left arm lymphedema, currently managed in the lymphedema clinic, she is fitted with a left arm compression sleeve.  Demonstrates ongoing mild  deficits in range of motion with overhead extension, continues to do exercises as instructed, understands the importance of lifelong maintenance.  Stays active walking the mall with her .  She has 3 adult children, a daughter and a son in [facility] and also a daughter in [place].  Excellent support system, strong elise all have been sources of strength for her      Plan and recommendations:  Surveillance as above  Neurology consult April 1 with Dr. Akhtar  Continues follow-up in lymphedema clinic  Following up with cardio oncology next echo and Dr. Peguero on 4/7/2025  Continue physical activity as tolerated  No formal follow up arranged for now. Return to clinic as needed.  Call my office as needed at any point for additional information, resources or support at [phone]    (C50.911,  C50.912) Bilateral malignant neoplasm of breast in female, unspecified estrogen receptor status, unspecified site of breast     I spent 40 minutes caring for this patient on this date of service by face-to-face counseling. This time includes time spent by me in the following activities: preparing for the visit, reviewing tests, performing a medically appropriate examination and/or evaluation, counseling and educating the patient/family/caregiver, referring and communicating with other health care professionals, documenting information in the medical record, obtaining a separately obtained history, and reviewing a separately obtained history     Fariba Self DNP, APRN, CHASITY-BC, AOCNS  01/21/2025

## 2025-01-27 ENCOUNTER — APPOINTMENT (OUTPATIENT)
Dept: OCCUPATIONAL THERAPY | Facility: HOSPITAL | Age: 70
End: 2025-01-27
Payer: COMMERCIAL

## 2025-01-28 ENCOUNTER — HOSPITAL ENCOUNTER (OUTPATIENT)
Dept: OCCUPATIONAL THERAPY | Facility: HOSPITAL | Age: 70
Setting detail: THERAPIES SERIES
Discharge: HOME | End: 2025-01-28
Payer: COMMERCIAL

## 2025-01-28 DIAGNOSIS — M25.611 DECREASED RANGE OF MOTION OF RIGHT SHOULDER: ICD-10-CM

## 2025-01-28 DIAGNOSIS — M79.602 PAIN OF LEFT UPPER EXTREMITY: ICD-10-CM

## 2025-01-28 DIAGNOSIS — M25.612 DECREASED RANGE OF MOTION OF LEFT SHOULDER: ICD-10-CM

## 2025-01-28 DIAGNOSIS — R22.2 SOFT TISSUE SWELLING OF CHEST WALL: ICD-10-CM

## 2025-01-28 DIAGNOSIS — M79.601 PAIN OF RIGHT UPPER EXTREMITY: ICD-10-CM

## 2025-01-28 DIAGNOSIS — I97.2 POST-MASTECTOMY LYMPHEDEMA SYNDROME: Primary | ICD-10-CM

## 2025-01-28 DIAGNOSIS — L90.5 SCAR ADHERENT: ICD-10-CM

## 2025-01-28 PROCEDURE — 97110 THERAPEUTIC EXERCISES: CPT

## 2025-01-28 PROCEDURE — 97140 MANUAL THERAPY 1/> REGIONS: CPT

## 2025-01-28 NOTE — THERAPY TREATMENT NOTE
"Outpatient Occupational Therapy Lymphedema Treatment Note  [facility]     Patient Name: Felicia Boyle  : 1955  MRN: [id number]  Today's Date: 2025      Visit Date: 2025    Patient Active Problem List   Diagnosis    Age-related osteoporosis without current pathological fracture    FH: breast cancer in first degree relative    Post-menopausal    Type 2 diabetes mellitus without complication, without long-term current use of insulin    Vitamin D deficiency    Mixed hyperlipidemia    Breast cancer of lower-outer quadrant of right female breast    Encounter for fitting and adjustment of vascular catheter    DCIS (ductal carcinoma in situ) of breast    Bilateral malignant neoplasm of breast in female    S/P bilateral mastectomy        Past Medical History:   Diagnosis Date    Anxiety about health     Breast cancer of lower-outer quadrant of right female breast 2024    DCIS (ductal carcinoma in situ) of breast 03/15/2024    Diverticulosis     History of chemotherapy 2024    Mixed hyperlipidemia 2019    DIET CONTROLLED    Osteoporosis     Port-A-Cath in place     Type 2 diabetes mellitus         Past Surgical History:   Procedure Laterality Date    BREAST BIOPSY Left     Excisional biopsy in the [facility]    BREAST BIOPSY  2024    CATARACT EXTRACTION W/ INTRAOCULAR LENS IMPLANT Bilateral     CHOLECYSTECTOMY  2012    COLON RESECTION Right     COLONOSCOPY N/A 2021    Procedure: COLONOSCOPY TO ANASTAMOSIS/TI;  Surgeon: Angelo Gonsalez MD;  Location: [facility] ENDOSCOPY;  Service: Gastroenterology;  Laterality: N/A;  PRE: SCREENING  POST: NORMAL POST-OP ANATOMY    ENDOSCOPY      2013    ENDOSCOPY N/A 03/10/2017    Procedure: ESOPHAGOGASTRODUODENOSCOPY WITH BIOPSY AND PETER DILATATION 54\";  Surgeon: Angelo Gonsalez MD;  Location: [facility] ENDOSCOPY;  Service:     ENDOSCOPY N/A 2021    Procedure: ESOPHAGOGASTRODUODENOSCOPY WITH BIOPSIES, 54FR PETER DILATATION;  " Surgeon: Angelo Gonsalez MD;  Location: [facility] ENDOSCOPY;  Service: Gastroenterology;  Laterality: N/A;  PRE: DYSPHAGIA  POST: GASTRITIS, ESOPHAGEAL RING    MASTECTOMY W/ SENTINEL NODE BIOPSY Bilateral 8/15/2024    Procedure: Bilateral total mastectomy, bilateral sentinel node biopsy, possible axillary node dissection, no reconstruction.;  Surgeon: Abeba Manrique MD;  Location: [facility] MAIN OR;  Service: General;  Laterality: Bilateral;    VENOUS ACCESS DEVICE (PORT) INSERTION Left 3/14/2024    Procedure: INSERTION OF PORTACATH;  Surgeon: Abeba Manrique MD;  Location: [facility] MAIN OR;  Service: General;  Laterality: Left;         Visit Dx:      ICD-10-CM ICD-9-CM   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 Decreased range of motion of right shoulder  M25.611 719.51   3. Decreased range of motion of left shoulder  M25.612 719.51   4. Soft tissue swelling of chest wall  R22.2 786.6   5. Scar adherent  L90.5 709.2   6. Pain of left upper extremity  M79.602 729.5   7. Pain of right upper extremity  M79.601 729.5        Lymphedema       Row Name 01/28/25 0900             Subjective Pain    Able to rate subjective pain? yes  -RE      Pre-Treatment Pain Level 2  -RE      Subjective Pain Comment Right-sided rib pain  -RE         Subjective    Subjective Comments Patient reports that she is feeling much better and feels like she can manage her symptoms.  -RE                User Key  (r) = Recorded By, (t) = Taken By, (c) = Cosigned By      Initials Name Provider Type    Emy Mccarthy OTR Occupational Therapist                             OT Assessment/Plan       Row Name 01/28/25 0936          OT Assessment    Assessment Comments Overall patient feels that she has good symptom control regarding her bilateral rib pain and tightness across her chest.  She reports that following her last therapy session she had significant improvement.  She continues to perform her home exercise program to maintain  the gains that she made in therapy.  I will continue to follow for bioimpedance to monitor for early signs and symptoms of lymphedema in the right and left upper extremity.  -RE        OT Plan    OT Plan Comments Follow-up for bioimpedance only next week  -RE               User Key  (r) = Recorded By, (t) = Taken By, (c) = Cosigned By      Initials Name Provider Type    Emy Mccarthy OTR Occupational Therapist                       OT Exercises       Row Name 01/28/25 0900             Exercise 1    Exercise Name 1 pulleys  -RE      Cueing 1 Verbal;Demo  -RE      Equipment 1 Pulley  -RE      Time (Minutes) 1 7  -RE                User Key  (r) = Recorded By, (t) = Taken By, (c) = Cosigned By      Initials Name Provider Type    Emy Mccarthy OTR Occupational Therapist                  Manual Rx (Last 36 Hours)       Manual Treatments       Row Name 01/28/25 0937 01/28/25 0900          Total Minutes    97680 - OT Manual Therapy Minutes 30  -RE --        Manual Rx 3    Manual Rx 3 Location -- soft tissue stretching R axilla and chest  -RE        Manual Rx 4    Manual Rx 4 Location -- soft tissue stretch R rib area and chest.  -RE        Manual Rx 5    Manual Rx 5 Location -- SCM sweep -R  -RE               User Key  (r) = Recorded By, (t) = Taken By, (c) = Cosigned By      Initials Name Provider Type    Emy Mccarthy OTR Occupational Therapist                   OT Goals       Row Name 01/28/25 0900          OT Short Term Goals    STG 1 Patient will demonstrate proper awareness of “What is Lymphedema?” and “Healthy Habits” for improved prevention, management, care of symptoms and ease of transition to self-care of condition.  -RE     STG 1 Progress Met  -RE     STG 2 Pt will demonstrate understanding of use of compression sleeve for edema prevention, exercise and air travel.  -RE     STG 2 Progress Met  -RE     STG 3 Patient independent and compliant with initial home exercise program focused on gentle AROM  and stretching to improve AROM to pre-surgical level.  -RE     STG 3 Progress Met;Ongoing  -RE        Long Term Goals    LTG Date to Achieve 03/05/25  -RE     LTG 1 Patient to improve DASH/ QuickDASH score by 10 points in 4 weeks.  -RE     LTG 1 Progress Ongoing  -RE     LTG 2 Patient will participate in bioimpedance scans every 3 months as a method of early detection of lymphedema to allow for early intervention.  -RE     LTG 2 Progress Met;Ongoing  -RE     LTG 3 Patient's bioimpedance score to remain below 6.5 for decreased risk of stage II lymphedema.  -RE     LTG 3 Progress Met  -RE     LTG 4 Patient to demonstrate increased bilateral shoulder flexion to 160 to improve functional UE use and to restore pre operative AROM per patient perception.  -RE     LTG 4 Progress Met;Ongoing  -RE     LTG 5 Patient to demonstrate increased bilateral shoulder abduction to 160 to improve functional UE use and to restore preoperative AROM per patient perception.  -RE     LTG 5 Progress Met;Ongoing  -RE        Time Calculation    OT Goal Re-Cert Due Date 03/05/25  -RE               User Key  (r) = Recorded By, (t) = Taken By, (c) = Cosigned By      Initials Name Provider Type    Emy Mccarthy OTR Occupational Therapist                    Therapy Education  Education Details: Continue with home exercise program and sleeve use for the left arm until her next visit.  We briefly discussed hurt versus harm in relation to patient's right rib pain.  We discussed that she may have some intermittent rib pain but to try to address it through stretching.  Monitor for increased pain after aggressive stretching.  Given: HEP, Symptoms/condition management, Edema management  Program: Reinforced  How Provided: Verbal  Provided to: Patient  Level of Understanding: Teach back education performed, Verbalized                Time Calculation:   OT Start Time: 0815  OT Stop Time: 0855  OT Time Calculation (min): 40 min  Total Timed Code Minutes-  OT: 40 minute(s)  Timed Charges  00408 - OT Therapeutic Exercise Minutes: 10  60562 - OT Manual Therapy Minutes: 30  Total Minutes  Timed Charges Total Minutes: 40   Total Minutes: 40     Therapy Charges for Today       Code Description Service Date Service Provider Modifiers Qty    16757828001 HC OT THER PROC EA 15 MIN 1/28/2025 Emy Gonzales OTR GO 1    43562660711 HC OT MANUAL THERAPY EA 15 MIN 1/28/2025 Emy Gonzales OTR GO 2            Dictated utilizing Dragon dictation:  Much of this encounter note is an electronic transcription/translation of spoken language to printed text. The electronic translation of spoken language may permit erroneous, or at times, nonsensical words or phrases to be inadvertently transcribed; Although I have reviewed the note for such errors, some may still exist.            YARED Kelley  1/28/2025

## 2025-02-03 ENCOUNTER — INFUSION (OUTPATIENT)
Dept: ONCOLOGY | Facility: HOSPITAL | Age: 70
End: 2025-02-03
Payer: COMMERCIAL

## 2025-02-03 ENCOUNTER — HOSPITAL ENCOUNTER (OUTPATIENT)
Dept: OCCUPATIONAL THERAPY | Facility: HOSPITAL | Age: 70
Setting detail: THERAPIES SERIES
Discharge: HOME | End: 2025-02-03
Payer: COMMERCIAL

## 2025-02-03 VITALS
WEIGHT: 118.6 LBS | BODY MASS INDEX: 25.66 KG/M2 | DIASTOLIC BLOOD PRESSURE: 90 MMHG | RESPIRATION RATE: 16 BRPM | TEMPERATURE: 97.1 F | SYSTOLIC BLOOD PRESSURE: 160 MMHG | HEART RATE: 91 BPM | OXYGEN SATURATION: 95 %

## 2025-02-03 DIAGNOSIS — C50.511 MALIGNANT NEOPLASM OF LOWER-OUTER QUADRANT OF RIGHT BREAST OF FEMALE, ESTROGEN RECEPTOR POSITIVE: Primary | ICD-10-CM

## 2025-02-03 DIAGNOSIS — Z45.2 ENCOUNTER FOR FITTING AND ADJUSTMENT OF VASCULAR CATHETER: ICD-10-CM

## 2025-02-03 DIAGNOSIS — Z17.0 MALIGNANT NEOPLASM OF LOWER-OUTER QUADRANT OF RIGHT BREAST OF FEMALE, ESTROGEN RECEPTOR POSITIVE: Primary | ICD-10-CM

## 2025-02-03 DIAGNOSIS — I97.2 POST-MASTECTOMY LYMPHEDEMA SYNDROME: Primary | ICD-10-CM

## 2025-02-03 LAB
ALBUMIN SERPL-MCNC: 4.2 G/DL (ref 3.5–5.2)
ALBUMIN/GLOB SERPL: 1.6 G/DL
ALP SERPL-CCNC: 81 U/L (ref 39–117)
ALT SERPL W P-5'-P-CCNC: 29 U/L (ref 1–33)
ANION GAP SERPL CALCULATED.3IONS-SCNC: 13.4 MMOL/L (ref 5–15)
AST SERPL-CCNC: 24 U/L (ref 1–32)
BASOPHILS # BLD AUTO: 0.06 10*3/MM3 (ref 0–0.2)
BASOPHILS NFR BLD AUTO: 0.7 % (ref 0–1.5)
BILIRUB SERPL-MCNC: 0.2 MG/DL (ref 0–1.2)
BUN SERPL-MCNC: 14 MG/DL (ref 8–23)
BUN/CREAT SERPL: 23.3 (ref 7–25)
CALCIUM SPEC-SCNC: 9.6 MG/DL (ref 8.6–10.5)
CHLORIDE SERPL-SCNC: 102 MMOL/L (ref 98–107)
CO2 SERPL-SCNC: 22.6 MMOL/L (ref 22–29)
CREAT SERPL-MCNC: 0.6 MG/DL (ref 0.57–1)
DEPRECATED RDW RBC AUTO: 43.5 FL (ref 37–54)
EGFRCR SERPLBLD CKD-EPI 2021: 96.7 ML/MIN/1.73
EOSINOPHIL # BLD AUTO: 0.09 10*3/MM3 (ref 0–0.4)
EOSINOPHIL NFR BLD AUTO: 1 % (ref 0.3–6.2)
ERYTHROCYTE [DISTWIDTH] IN BLOOD BY AUTOMATED COUNT: 13.3 % (ref 12.3–15.4)
GLOBULIN UR ELPH-MCNC: 2.7 GM/DL
GLUCOSE SERPL-MCNC: 179 MG/DL (ref 65–99)
HCT VFR BLD AUTO: 38.6 % (ref 34–46.6)
HGB BLD-MCNC: 12.4 G/DL (ref 12–15.9)
IMM GRANULOCYTES # BLD AUTO: 0.03 10*3/MM3 (ref 0–0.05)
IMM GRANULOCYTES NFR BLD AUTO: 0.3 % (ref 0–0.5)
LYMPHOCYTES # BLD AUTO: 2.75 10*3/MM3 (ref 0.7–3.1)
LYMPHOCYTES NFR BLD AUTO: 30.7 % (ref 19.6–45.3)
MCH RBC QN AUTO: 28.9 PG (ref 26.6–33)
MCHC RBC AUTO-ENTMCNC: 32.1 G/DL (ref 31.5–35.7)
MCV RBC AUTO: 90 FL (ref 79–97)
MONOCYTES # BLD AUTO: 0.55 10*3/MM3 (ref 0.1–0.9)
MONOCYTES NFR BLD AUTO: 6.1 % (ref 5–12)
NEUTROPHILS NFR BLD AUTO: 5.48 10*3/MM3 (ref 1.7–7)
NEUTROPHILS NFR BLD AUTO: 61.2 % (ref 42.7–76)
NRBC BLD AUTO-RTO: 0 /100 WBC (ref 0–0.2)
PLATELET # BLD AUTO: 289 10*3/MM3 (ref 140–450)
PMV BLD AUTO: 10.3 FL (ref 6–12)
POTASSIUM SERPL-SCNC: 3.8 MMOL/L (ref 3.5–5.2)
PROT SERPL-MCNC: 6.9 G/DL (ref 6–8.5)
RBC # BLD AUTO: 4.29 10*6/MM3 (ref 3.77–5.28)
SODIUM SERPL-SCNC: 138 MMOL/L (ref 136–145)
WBC NRBC COR # BLD AUTO: 8.96 10*3/MM3 (ref 3.4–10.8)

## 2025-02-03 PROCEDURE — 97535 SELF CARE MNGMENT TRAINING: CPT

## 2025-02-03 PROCEDURE — 25010000002 HEPARIN LOCK FLUSH PER 10 UNITS: Performed by: INTERNAL MEDICINE

## 2025-02-03 PROCEDURE — 85025 COMPLETE CBC W/AUTO DIFF WBC: CPT

## 2025-02-03 PROCEDURE — 25010000002 TRASTUZUMAB-DTTB 420 MG RECONSTITUTED SOLUTION 1 EACH VIAL: Performed by: NURSE PRACTITIONER

## 2025-02-03 PROCEDURE — 25810000003 SODIUM CHLORIDE 0.9 % SOLUTION 250 ML FLEX CONT: Performed by: NURSE PRACTITIONER

## 2025-02-03 PROCEDURE — 80053 COMPREHEN METABOLIC PANEL: CPT

## 2025-02-03 PROCEDURE — 25010000002 PERTUZUMAB 420 MG/14ML SOLUTION 420 MG VIAL: Performed by: NURSE PRACTITIONER

## 2025-02-03 PROCEDURE — 93702 BIS XTRACELL FLUID ANALYSIS: CPT

## 2025-02-03 PROCEDURE — 96413 CHEMO IV INFUSION 1 HR: CPT

## 2025-02-03 PROCEDURE — 96417 CHEMO IV INFUS EACH ADDL SEQ: CPT

## 2025-02-03 RX ORDER — SODIUM CHLORIDE 0.9 % (FLUSH) 0.9 %
10 SYRINGE (ML) INJECTION AS NEEDED
Status: DISCONTINUED | OUTPATIENT
Start: 2025-02-03 | End: 2025-02-03 | Stop reason: HOSPADM

## 2025-02-03 RX ORDER — SODIUM CHLORIDE 0.9 % (FLUSH) 0.9 %
10 SYRINGE (ML) INJECTION AS NEEDED
OUTPATIENT
Start: 2025-02-03

## 2025-02-03 RX ORDER — HEPARIN SODIUM (PORCINE) LOCK FLUSH IV SOLN 100 UNIT/ML 100 UNIT/ML
500 SOLUTION INTRAVENOUS AS NEEDED
Status: DISCONTINUED | OUTPATIENT
Start: 2025-02-03 | End: 2025-02-03 | Stop reason: HOSPADM

## 2025-02-03 RX ORDER — HEPARIN SODIUM (PORCINE) LOCK FLUSH IV SOLN 100 UNIT/ML 100 UNIT/ML
500 SOLUTION INTRAVENOUS AS NEEDED
OUTPATIENT
Start: 2025-02-03

## 2025-02-03 RX ORDER — SODIUM CHLORIDE 9 MG/ML
20 INJECTION, SOLUTION INTRAVENOUS ONCE
Status: CANCELLED | OUTPATIENT
Start: 2025-02-03

## 2025-02-03 RX ADMIN — Medication 10 ML: at 11:13

## 2025-02-03 RX ADMIN — PERTUZUMAB 420 MG: 30 INJECTION, SOLUTION, CONCENTRATE INTRAVENOUS at 09:41

## 2025-02-03 RX ADMIN — ONTRUZANT 330 MG: KIT INTRAVENOUS at 10:43

## 2025-02-03 RX ADMIN — Medication 500 UNITS: at 11:13

## 2025-02-03 NOTE — THERAPY TREATMENT NOTE
"Outpatient Occupational Therapy Lymphedema Treatment Note  [facility]     Patient Name: Felicia Boyle  : 1955  MRN: [id number]  Today's Date: 2/3/2025      Visit Date: 2025    Patient Active Problem List   Diagnosis    Age-related osteoporosis without current pathological fracture    FH: breast cancer in first degree relative    Post-menopausal    Type 2 diabetes mellitus without complication, without long-term current use of insulin    Vitamin D deficiency    Mixed hyperlipidemia    Breast cancer of lower-outer quadrant of right female breast    Encounter for fitting and adjustment of vascular catheter    DCIS (ductal carcinoma in situ) of breast    Bilateral malignant neoplasm of breast in female    S/P bilateral mastectomy        Past Medical History:   Diagnosis Date    Anxiety about health     Breast cancer of lower-outer quadrant of right female breast 2024    DCIS (ductal carcinoma in situ) of breast 03/15/2024    Diverticulosis     History of chemotherapy 2024    Mixed hyperlipidemia 2019    DIET CONTROLLED    Osteoporosis     Port-A-Cath in place     Type 2 diabetes mellitus         Past Surgical History:   Procedure Laterality Date    BREAST BIOPSY Left     Excisional biopsy in the [facility]    BREAST BIOPSY  2024    CATARACT EXTRACTION W/ INTRAOCULAR LENS IMPLANT Bilateral     CHOLECYSTECTOMY  2012    COLON RESECTION Right     COLONOSCOPY N/A 2021    Procedure: COLONOSCOPY TO ANASTAMOSIS/TI;  Surgeon: Angelo Gonsalez MD;  Location: [facility] ENDOSCOPY;  Service: Gastroenterology;  Laterality: N/A;  PRE: SCREENING  POST: NORMAL POST-OP ANATOMY    ENDOSCOPY      2013    ENDOSCOPY N/A 03/10/2017    Procedure: ESOPHAGOGASTRODUODENOSCOPY WITH BIOPSY AND PETER DILATATION 54\";  Surgeon: Angelo Gonsalez MD;  Location: [facility] ENDOSCOPY;  Service:     ENDOSCOPY N/A 2021    Procedure: ESOPHAGOGASTRODUODENOSCOPY WITH BIOPSIES, 54FR PETER DILATATION;  Surgeon: " Angelo Gonsalez MD;  Location: [facility] ENDOSCOPY;  Service: Gastroenterology;  Laterality: N/A;  PRE: DYSPHAGIA  POST: GASTRITIS, ESOPHAGEAL RING    MASTECTOMY W/ SENTINEL NODE BIOPSY Bilateral 8/15/2024    Procedure: Bilateral total mastectomy, bilateral sentinel node biopsy, possible axillary node dissection, no reconstruction.;  Surgeon: Abeba Manrique MD;  Location: [facility] MAIN OR;  Service: General;  Laterality: Bilateral;    VENOUS ACCESS DEVICE (PORT) INSERTION Left 3/14/2024    Procedure: INSERTION OF PORTACATH;  Surgeon: Abeba Manrique MD;  Location: [facility] MAIN OR;  Service: General;  Laterality: Left;         Visit Dx:      ICD-10-CM ICD-9-CM   1. Post-mastectomy lymphedema syndrome  I97.2 457.0        Lymphedema       Row Name 02/03/25 1400             Subjective Pain    Able to rate subjective pain? yes  -RE      Pre-Treatment Pain Level 0  -RE      Post-Treatment Pain Level 0  -RE         Lymphedema Measurements    Measurement Type(s) Quick Girth  -RE      Quick Girth Areas Upper extremities  -RE         LUE Quick Girth (cm)    Axilla 32.5 cm  -RE      Mid upper arm 27.5 cm  -RE      Elbow 24 cm  -RE      Mid forearm 23.6 cm  -RE      Wrist crease 14.5 cm  -RE      Met-heads 17.8 cm  -RE      LUE Quick Girth Total 139.9  -RE         L-Dex Bioimpedence Screening    L-Dex Measurement Extremity RUE  -RE      L-Dex Patient Position Standing  -RE      L-Dex UE Dominate Side Right  -RE      L-Dex UE At Risk Side Right  -RE      L-Dex UE Pre Surgical Value No  -RE      L-Dex UE Score -11.2  -RE      L-Dex UE Baseline Score -4.7  -RE      L-Dex UE Value Change -6.5  -RE      L-Dex UE Comment abnormal  -RE                User Key  (r) = Recorded By, (t) = Taken By, (c) = Cosigned By      Initials Name Provider Type    RE Emy Gonzales, OTR Occupational Therapist                  The patient had a  SOZO measurement which I reviewed today. The score is abnormal  see scanned to EMR. Bioimpedance  spectroscopy helps identify the   onset of lymphedema in an arm or leg before patients experience noticeable swelling. Research has   shown that 92% of patients with early detection of lymphedema using L-Dex combined with   intervention do not progress to chronic lymphedema through three years. Additionally, as of March 2023, the NCCN Guidelines® for Survivorship recommend proactive screening for lymphedema using   bioimpedance spectroscopy. Whenever possible, patients are tested for baseline L-Dex score before   cancer treatment begins and then are reassessed during regular follow-up visits using the SOZO device.   Otherwise, this can be started postoperatively and continued during regular follow-up visits. If the   patient’s L-Dex score increases above normal levels, that is a sign that lymphedema is developing and a   referral is made to occupational therapy for further evaluation and early compression treatment.   Lymphedema assessment with the SOZO L-Dex score is recommended to be done every 3 months for   the first 3 years and then every 6 months for years 4 and 5 followed by annually afterwards           OT Assessment/Plan       Row Name 02/03/25 1456          OT Assessment    Assessment Comments Patient returns today for bioimpedance reassessment.  Patient was last assessed approximately 3 weeks ago.  Patient's current L-Dex value is -11.2 for the right upper extremity.  This is outside of the normal range which in her case indicates that the left upper extremity has an increased lymphatic fluid load.  Patient had a normal reading approximately 3 weeks ago and a similar abnormal reading the first week in December.  I recommended that she continue with compression garment use for another month and we will reassess.  I advised her that there are several additional options for controlling her symptoms if her L-Dex value does not improve or continues to climb.  -RE     OT Diagnosis Postmastectomy lymphedema   -RE        OT Plan    OT Plan Comments Follow-up in 1 month for bioimpedance  -RE               User Key  (r) = Recorded By, (t) = Taken By, (c) = Cosigned By      Initials Name Provider Type    Emy Mccarthy OTR Occupational Therapist                              Therapy Education  Education Details: Discussed patient's current L-Dex value of -11.2 and recommended continued daily sleeve wear.  Given: Symptoms/condition management  Program: Reinforced  How Provided: Verbal  Provided to: Patient  Level of Understanding: Teach back education performed, Verbalized  01779 - OT Self Care/Mgmt Minutes: 15                Time Calculation:   OT Start Time: 1415  OT Stop Time: 1440  OT Time Calculation (min): 25 min  Total Timed Code Minutes- OT: 15 minute(s)  Timed Charges  15897 - OT Self Care/Mgmt Minutes: 15  Untimed Charges  81849 - OT Bioimpedence Rx Minutes: 10  Total Minutes  Timed Charges Total Minutes: 15  Untimed Charges Total Minutes: 10   Total Minutes: 25     Therapy Charges for Today       Code Description Service Date Service Provider Modifiers Qty    72348218845 HC OT SELF CARE/MGMT/TRAIN EA 15 MIN 2/3/2025 Emy Gonzales OTR GO 1    87032081759 HC OT BIS XTRACELL FLUID ANALYSIS 2/3/2025 Emy Gonzales OTR GO 1            Dictated utilizing Dragon dictation:  Much of this encounter note is an electronic transcription/translation of spoken language to printed text. The electronic translation of spoken language may permit erroneous, or at times, nonsensical words or phrases to be inadvertently transcribed; Although I have reviewed the note for such errors, some may still exist.            YARED Kelley  2/3/2025

## 2025-02-17 RX ORDER — DULOXETIN HYDROCHLORIDE 20 MG/1
CAPSULE, DELAYED RELEASE ORAL
Qty: 14 CAPSULE | Refills: 0 | OUTPATIENT
Start: 2025-02-17

## 2025-02-21 NOTE — PROGRESS NOTES
Subjective   Felicia Boyle is a 69 y.o. female.  Referred by Dr. Manrique for right breast invasive ductal carcinoma, HER2 positive, left breast ductal carcinoma in situ    History of Present Illness     Ms. Boyle is a 69-year-old postmenopausal [place] lady who is fairly healthy without any significant comorbidities palpated of right breast mass in the latter part of  which was further worked up with diagnostic mammogram and ultrasound and subsequently a biopsy which confirmed invasive ductal carcinoma which was ER/PA negative and HER2 positive.    Family history significant for her 2 sisters with breast cancer at the age of 38 and 48 respectively.  Her older sister  of metastatic breast cancer but her younger sister is doing well.  Patient underwent genetic testing in 2017 which was negative due to her family history.    She had regular screening mammograms up until 2019 but subsequently stopped having annual gynecological visits and therefore did not have any further screening mammograms up until the most recent diagnostic mammogram for the new palpable abnormality.    2024-bilateral diagnostic mammogram and right breast ultrasound  The breast heterogeneously dense.  Left breast with a scar marker in the lower left breast dating back to .  No new findings in the left breast.    Right breast at 8:00, 8 cm from the nipple there is a 1.7 x 1.4 x 1.6 cm oval high density mass with partially circumscribed and partially indistinct margins.    On the ultrasound the mass measures 1.7 x 1 x 1.3 cm.    No abnormal right axilla lymphadenopathy.    2024-right breast ultrasound-guided biopsy  Pathology consistent with invasive ductal carcinoma  Grade 3  9.3 mm of invasive disease on the core biopsy  ER negative  PA negative  HER2 3+ on immunohistochemistry, 95%  Ki-67 70%    2024-bilateral breast MRI  In the right breast at 8:00, 8 cm from the nipple there is a irregular enhancing mass which  measures 1.8 x 2 x 1.7 cm.  This corresponds to the biopsy-proven malignancy.  Extending anteriorly away from the MT margin of the mass there is an irregular linear enhancement which measures 3.5 x 1 x 0.7 cm.  The whole area including the mass measures 4.7 cm in AP dimension, 1.7 in mediolateral and 2.4 cm in the superior-inferior dimension.    At 12:00, 3 cm from the nipple there is an irregular non-mass enhancement which measures 1 x 1.1 x 1.2 cm.  No mammographic correlate is appreciated.    No other suspicious areas of enhancement in the right breast or right axilla or internal mammary chain.    In the left breast, middle third at 11:30 position, 3.7 cm from the nipple there is an irregular area of non-mass enhancement which measures 1.3 x 1.1 x 1 cm.  There is suspected architectural distortion.  No other areas of abnormal enhancement seen in the left breast of the left axilla or the internal mammary chain.    2/26/2024-additional MRI guided biopsies    1.left breast 12:00-intermediate grade ductal carcinoma in situ with apocrine features  ER +91 to 100% strong  AK +81 to 90% strong  Ki-67 15%    2. Right breast 12 o'clock position MRI guided biopsy of the non-mass enhancement-sclerosing adenosis with associated calcifications  No atypical hyperplasia in situ or invasive carcinoma    3.right breast 8:00 MRI guided biopsies of the linear enhancement is consistent with high-grade ductal carcinoma in situ  DCIS involves multiple tissue cores measuring up to 3 mm.  Sclerosing adenosis and usual ductal hyperplasia with associated microcalcifications.    The case was discussed in tumor board and Dr. Patel thought that they could be more invasive disease in the 4.7 cm of linear enhancement noted in the right breast at the site of the mass.  Therefore it was decided to proceed with neoadjuvant chemotherapy.    Patient was a former smoker and smoked for about 40 years, half a pack per week, quit about 1 month ago.   Denies any alcohol use.    Week 1 Taxol Herceptin and Perjeta was planned for 3/18/2024.  Patient received Herceptin and Perjeta and had some chills for which she received Solu-Medrol and responded well to that.  However after infusing only 1 cc of Taxol patient experienced a severe anaphylactic reaction due to which the infusion was stopped permanently and she received an additional dose of Solu-Medrol and Benadryl.  Symptoms resolved subsequently and patient was discharged home safely.    Due to this recent treatment has been changed to Abraxane along with Herceptin and Perjeta.  3/25/2024-patient is due for her first treatment with Abraxane.    She is extremely anxious given the severe hypersensitivity reaction that she experienced with Taxol.  She is also complaining of increased belching and heartburn since her last chemotherapy.  She is complaining of severe diarrhea with started last night.  Had multiple loose stools.    5/11/2024-MRI of the breast-images independently reviewed and interpreted by me.  Interval near complete resolution of all suspicious enhancement in the posterior one third lower outer quadrant of the right breast at 8:00 corresponding to the biopsy-proven site of malignancy.  Who has been on masslike area of enhancement measuring 1.5 x 0.9 x 0.9 cm compared to 2 x 1.8 x 1.7 cm.  Mildly abnormal enhancement extending anteriorly away from the mass has resolved.  No other suspicious areas of enhancement noted in the right breast.  Left breast at 12:00 interval resolution of all suspicious enhancement.    8/15/2024-bilateral mastectomy and bilateral sentinel lymph node biopsy  Right total mastectomy  No residual invasive carcinoma  2 mm of DCIS high-grade  RCB 0, pathological complete response  All the margins are negative  Y pTis N0 M0  4 sentinel lymph nodes negative    Left breast mastectomy  Fat necrosis, biopsy site changes, sclerosing adenosis and changes consistent with treatment  effect.  No evidence of residual in situ or invasive carcinoma  1 x-ray lymph node negative    INTERVAL HISTORY  The patient returns the office today, 2/24/2025 in anticipation of her last dose of Herceptin Perjeta which she continues to receive every 3 weeks.  When she was last here she was having bilateral rib pain and underwent chest x-ray which was negative.  She reports today she has ongoing numbness across her chest wall.  We did review this is unfortunately normal following mastectomy.  She has noted some improvement in her neuropathy transitioning from Cymbalta to gabapentin.  She has no shortness of breath or lower extremity swelling.  She does continue to have some intermittent swelling of her left upper extremity utilizing compression sleeve and following with the lymphedema clinic      The following portions of the patient's history were reviewed and updated as appropriate: allergies, current medications, past family history, past medical history, past social history, past surgical history, and problem list.    Past Medical History:   Diagnosis Date    Anxiety about health     Breast cancer of lower-outer quadrant of right female breast 02/14/2024    DCIS (ductal carcinoma in situ) of breast 03/15/2024    Diverticulosis     History of chemotherapy 06/22/2024    Mixed hyperlipidemia 05/19/2019    DIET CONTROLLED    Osteoporosis     Port-A-Cath in place     Type 2 diabetes mellitus         Past Surgical History:   Procedure Laterality Date    BREAST BIOPSY Left     Excisional biopsy in the [facility]    BREAST BIOPSY  03/2024    CATARACT EXTRACTION W/ INTRAOCULAR LENS IMPLANT Bilateral     CHOLECYSTECTOMY  2012    COLON RESECTION Right 2002    COLONOSCOPY N/A 04/28/2021    Procedure: COLONOSCOPY TO ANASTAMOSIS/TI;  Surgeon: Angelo Gonsalez MD;  Location: [facility] ENDOSCOPY;  Service: Gastroenterology;  Laterality: N/A;  PRE: SCREENING  POST: NORMAL POST-OP ANATOMY    ENDOSCOPY      2013    ENDOSCOPY N/A  "03/10/2017    Procedure: ESOPHAGOGASTRODUODENOSCOPY WITH BIOPSY AND PETER DILATATION 54\";  Surgeon: Angelo Gonsalez MD;  Location[facility] ENDOSCOPY;  Service:     ENDOSCOPY N/A 2021    Procedure: ESOPHAGOGASTRODUODENOSCOPY WITH BIOPSIES, 54FR PETER DILATATION;  Surgeon: Angelo Gonsalez MD;  Location: [facility] ENDOSCOPY;  Service: Gastroenterology;  Laterality: N/A;  PRE: DYSPHAGIA  POST: GASTRITIS, ESOPHAGEAL RING    MASTECTOMY W/ SENTINEL NODE BIOPSY Bilateral 8/15/2024    Procedure: Bilateral total mastectomy, bilateral sentinel node biopsy, possible axillary node dissection, no reconstruction.;  Surgeon: Abeba Manrique MD;  Location: [facility] MAIN OR;  Service: General;  Laterality: Bilateral;    VENOUS ACCESS DEVICE (PORT) INSERTION Left 3/14/2024    Procedure: INSERTION OF PORTACATH;  Surgeon: Abeba Manrique MD;  Location: [facility] MAIN OR;  Service: General;  Laterality: Left;        Family History   Problem Relation Age of Onset    Hypertension Mother     Diabetes Mother     Hypertension Father     Breast cancer Sister 48    Breast cancer Sister 38    No Known Problems Brother     No Known Problems Daughter     No Known Problems Son     No Known Problems Maternal Grandmother     No Known Problems Paternal Grandmother     No Known Problems Maternal Grandfather     No Known Problems Paternal Grandfather     Autism Grandson     Colon cancer Neg Hx     Rectal cancer Neg Hx     Endometrial cancer Neg Hx     Vaginal cancer Neg Hx     Cervical cancer Neg Hx     Ovarian cancer Neg Hx     Prostate cancer Neg Hx     Uterine cancer Neg Hx     Malig Hyperthermia Neg Hx         Social History     Socioeconomic History    Marital status:      Spouse name: Ryan   Tobacco Use    Smoking status: Former     Current packs/day: 0.00     Average packs/day: 0.3 packs/day for 52.0 years (13.0 ttl pk-yrs)     Types: Cigarettes     Start date:      Quit date: 1/15/2024     Years since quittin.1     " "Passive exposure: Past    Smokeless tobacco: Never    Tobacco comments:     N/A   Vaping Use    Vaping status: Never Used   Substance and Sexual Activity    Alcohol use: Yes     Comment: RARE    Drug use: No    Sexual activity: Not Currently     Partners: Male     Birth control/protection: Tubal ligation        OB History          4    Para   3    Term   3       0    AB   1    Living   3         SAB   1    IAB   0    Ectopic   0    Molar   0    Multiple   0    Live Births   3               Age at menarche-16  Age at first live childbirth-18   4 para 3  1  Age at menopause-49  No use of hormone replacement therapy  No use of oral conceptive pills  Breast-feeding for 3 months    Allergies   Allergen Reactions    Paclitaxel Shortness Of Breath    Metronidazole Nausea And Vomiting          Objective   Blood pressure 142/78, pulse 81, temperature 97.8 °F (36.6 °C), temperature source Oral, resp. rate 16, height 144.8 cm (57.01\"), weight 53.4 kg (117 lb 12.8 oz), SpO2 96%, not currently breastfeeding.     Physical Exam  Vitals reviewed.   Constitutional:       Appearance: Normal appearance.   HENT:      Mouth/Throat:      Mouth: Mucous membranes are moist.      Pharynx: Oropharynx is clear.      Comments: Fissures in the corner of the mouth  Eyes:      Conjunctiva/sclera: Conjunctivae normal.      Pupils: Pupils are equal, round, and reactive to light.   Pulmonary:      Effort: Pulmonary effort is normal.   Musculoskeletal:         General: Normal range of motion.      Cervical back: Normal range of motion.   Skin:     General: Skin is warm and dry.      Findings: Rash present.      Comments: +erythema to palms and bottom of feet.   Neurological:      General: No focal deficit present.      Mental Status: She is alert and oriented to person, place, and time.   Psychiatric:         Mood and Affect: Mood is anxious.         Behavior: Behavior normal.       Status post bilateral mastectomy " without reconstruction, no chest wall abnormalities    I have reexamined the patient and the results are consistent with the previously documented exam. DAY Perez      Assessment & Plan     *Right breast invasive ductal carcinoma  Clinical T2 N0 M0, stage IIa, clinical and prognostic ER/NY negative, HER2 3+ immunohistochemistry, Ki-67 70%  On the MRI the mass measures 2 cm however there is linear enhancement which in the AP dimension measures 4.7 cm.  Additional biopsies of this linear enhancement was performed and consistent with DCIS  It is hard to delineate the amount of invasive disease as opposed to DCIS.  More likely than not invasive component is likely limited to the mass which is about 2-1/2 cm on exam.  Since the tumor is greater than 2 cm I think it is reasonable to proceed with neoadjuvant chemotherapy.  We discussed proceeding with weekly Taxol along with Herceptin and Perjeta every 3 weeks.  3/18/2024-received first dose of Herceptin and Perjeta, had a severe hypersensitivity reaction to Taxol and hence Taxol was discontinued  3/25/2024-Labs reviewed and stable to proceed with chemotherapy  Patient is extremely anxious today after experiencing the severe hypersensitivity reaction last week.  Reassured patient that this is unlikely to happen with Abraxane and we will premedicate with extra Solu-Medrol..  4/1/2024 patient returns for cycle 1 day 15 Abraxane.,  Left chest, left shoulder at times.  This has come and gone over the past week and is not associated with any shortness of breath, numbness or tingling.  She states when her chest is hurting if she presses on the area it gets better.  She notices it at different points during the day.  It feels more like an ache and is not a sharp pain.  She states she has been sleeping in a different position due to the port which could be causing some muscular discomfort.  She has noticed the discomfort at times when she takes a deep breath  "however is able to get deep breath here in the office did not have any pain.  She is not having any pain today at all.  She has taken her hydrocodone maybe 3-4 times over the past week and has not needed it multiple times a day.  CMP resulted today with AST elevated at 47 and .  Bilirubin remains normal at 0.4 with alkaline phosphatase elevated to 160.  Reviewed with Dr. Loredo and will hold treatment today.  She will try to limit any acetaminophen containing products.  She did have a few days of diarrhea that she is unsure if it was related to the Abraxane versus a GI bug as her  had the same symptoms.  Neither had chills or fevers.  She is also having some dysuria today we have a urinalysis with culture pending.  4/8/2024: Due for cycle 2-day 1 Abraxane, Herceptin, Perjeta.  Did not receive cycle 1 day 15 Abraxane due to elevated LFTs as detailed above.  LFTs have significantly improved, AST 28, ALT 58.  Reviewed with Dr. Renteria will proceed with Abraxane only today, will reduce Abraxane by 20%.  4/15/2024-patient is scheduled for week 4 of chemotherapy today.  Labs reviewed and overall stable except for an ALT of 45.  She will proceed with planned chemotherapy.  4/22/2024: after last chemotherapy patient experienced 3 episodes of \"zapping\" in her fingertips and soles of feet that last a few seconds then resolved.  Has not experienced this sensation in at least 3 days.  No interference with daily activities.  Will continue Abraxane at previous 20% dose reduction.  She is doing cold therapy for her hands/feet.  Will need to monitor closely for neuropathy.  4/29/2024-chemotherapy held as patient reported worsening neuropathy and dropping things.  5/6/2024-presents today for evaluation of the neuropathy and to determine if she can resume chemotherapy.  She reports that the neuropathy has improved some.  She is mainly perceiving pain in her fingers and tingling and numbness.   She however tells me that " she has not been dropping things anymore.  She is able to do other fine motor activities just fine.  5/6/2024-patient did receive Abraxane with a 20% dose reduction.  5/13/2024-presents today for the neck cycle of Abraxane and since her last treatment she has not had any worsening of neuropathy.  We also started her on gabapentin 300 mg nightly.  Neuropathy overall stable.  Labs reviewed and she does have elevated LFTs however no further dose reduction needed.  Will proceed with planned chemotherapy today.  5/11/2024-MRI of the breast shows near complete resolution of the non-mass enhancement and areas of malignancy in both right and left breast.  This is reassuring and if patient has any further worsening of neuropathy then we could discontinue Abraxane and she will proceed with surgery and continue HER2 directed therapy.  5/28/2024: Cycle 4-day 8 Abraxane only, continue previous 20% dose reduction.  Continues on gabapentin, neuropathy stable.  6/10/2024-scheduled for Abraxane Herceptin and Perjeta.  Neuropathy overall stable.  She will continue with 20% dose reduction. She has 1 more week of Abraxane left following which she will have to proceed with surgery followed by adjuvant HER2 directed therapy.  Patient is very reluctant to undergo surgery.  Communicated with Dr. Manrique and she will be scheduled to see her after completion of the last treatment of Abraxane.  Since she had a recent MRI she may not require another MRI   Proceed with last dose of Abraxane today 6/17/2024. Maintain previous dosing with 20% dose reduction.   7/22/2024 patient returns today for Herceptin and Perjeta which she continues once every 3 weeks.  She is scheduled for bilateral mastectomy on 8/15/2024 with Dr. Manrique.  8/12/2024 patient returns today to proceed with Herceptin and Perjeta.  She is scheduled for bilateral mastectomy with Dr. Manrique on 8/15/2024.  Review of Dr. Renteria today we will proceed with  treatment.  8/15/2024-status post bilateral mastectomy with residual DCIS on the right side measuring 2 mm, high-grade, Y pTis N0 M0  Continues on adjuvant Perjeta and Herceptin   She is complaining of bilateral rib pain and palpable abnormalities of the chest wall.  On exam the palpable abnormalities are consistent with the ribs.  Chest x-ray was negative  2/24/2025 patient will complete adjuvant Perjeta Herceptin today    *Left breast ductal carcinoma in situ  Intermed  She has been having some discomfort in her left shoulder bladeiate grade with apocrine features  ER/RI strongly positive  Plan is for her to undergo bilateral mastectomy.  If she does undergo bilateral mastectomy then that would be curative and she would not require any endocrine therapy subsequently unless there is any upstaging of the malignancy  5/11/2024-MRI of the breast with near complete resolution of the non-mass enhancement.  Status post left mastectomy and sentinel lymph node biopsy with no residual disease  No evidence of recurrent disease.    *GERD  Continue Protonix.    *Cardiac health  Referral to cardio oncology placed  3/15/2024 echocardiogram with an ejection fraction of 75.5%, LV strain is normal at -24.2%  She has met with Dr. Peguero , started on bisoprolol   6/17/2024 ejection fraction 70%, LV strain -22.4%  9/24/2024 echocardiogram showing calculated LVEF at 75.3% and GLS -23.9%.  1/3/2025-echocardiogram shows an ejection fraction of greater than 70%, GLS -22.6%    *Elevated LFTs  LFTs now normal.    *Hypokalemia secondary to diarrhea likely  Potassium is normal today at 4.0.    *Neuropathy  Held treatment  due to neuropathy  5/6/2024-resumed chemotherapy  She was started on gabapentin 300 mg nightly and also vitamin B12.  Reports stable neuropathy  Proceed with planned chemotherapy today   MRI shows near complete resolution of the abnormalities and if patient experiences any worsening neuropathy then low threshold to stop  chemotherapy and proceed with surgery  The remains intermittent though occasionally painful in her feet.  Will increase gabapentin to 300 mg twice daily  7/22/2024 patient reports neuropathy is stable continues gabapentin 300 mg twice daily  9/3/2024-neuropathy improved  10/15/2024-complaining of worsening neuropathy in the hands/feet.  Recommended starting B complex vitamin.  Herceptin and Perjeta delayed 1 week.  10/22/2024 after starting B complex vitamin numbness/tingling has improved.  It was previously constant, is now intermittent typically only occurring with certain positions.  The intensity of the numbness/tingling has also improved.  11/12/2024: She continues to experience numbness/tingling. Advised to continue b complex vitamin and also prescribed cymbalta 30mg daily as patient has previously tried gabapentin without improvement. There is question whether some of what she is describing is related to possible palmar-plantar erthrodysesthesia as her palms and bottoms of her feet are red with flaky skin, therefore, will also prescribe urea cream for her to apply.   1/13/2025-patient continues to report  tingling and discomfort of the fingertips and also discomfort in her lower extremities.  Neurology referral has been placed.  She has been taking Cymbalta without any improvement in symptoms.  Therefore we will discontinue Cymbalta.  She is willing to try gabapentin again and recommend resuming gabapentin at 300 mg nightly.  2/24/2025 the patient notes improvement in neuropathy utilizing gabapentin with discontinuation of Cymbalta.    *Cheilitis   still present.    *Right groin candida  8/12/2024 had tried OTC antifungal with initial improvement and then reoccurrence.  Discussed drying well, will add Nystatin powder TID.  Resolved    *?Palmar-plantar erythrodysesthesia  11/12/2024: Erythematous/flaky palms of her hands and bottom of her feet. Advised utilizing urea cream generously and instructed her to  reach out if she does not see improvement in symptoms.     PLAN:   Proceed today with cycle 17 Herceptin Perjeta.  This is the patient's final dose of adjuvant therapy  Continue vitamin B complex  Continue gabapentin 300 mg nightly  Referral back to Dr. Manrique for Mediport removal  We discussed Aquaphor to the fissures in the corner of the mouth  Continue to follow with lymphedema clinic.   MD follow-up with Dr. Renteria April 2025 as she is now beginning surveillance completing adjuvant therapy today    The patient is on a high risk medication requiring close monitoring for toxicity    Madelyn Licona, APRN  02/24/2025

## 2025-02-24 ENCOUNTER — OFFICE VISIT (OUTPATIENT)
Dept: ONCOLOGY | Facility: CLINIC | Age: 70
End: 2025-02-24
Payer: COMMERCIAL

## 2025-02-24 ENCOUNTER — PREP FOR SURGERY (OUTPATIENT)
Dept: OTHER | Facility: HOSPITAL | Age: 70
End: 2025-02-24

## 2025-02-24 ENCOUNTER — INFUSION (OUTPATIENT)
Dept: ONCOLOGY | Facility: HOSPITAL | Age: 70
End: 2025-02-24
Payer: COMMERCIAL

## 2025-02-24 VITALS
HEIGHT: 57 IN | RESPIRATION RATE: 16 BRPM | WEIGHT: 117.8 LBS | OXYGEN SATURATION: 96 % | TEMPERATURE: 97.8 F | BODY MASS INDEX: 25.41 KG/M2 | HEART RATE: 81 BPM | SYSTOLIC BLOOD PRESSURE: 142 MMHG | DIASTOLIC BLOOD PRESSURE: 78 MMHG

## 2025-02-24 DIAGNOSIS — Z17.0 MALIGNANT NEOPLASM OF LOWER-OUTER QUADRANT OF RIGHT BREAST OF FEMALE, ESTROGEN RECEPTOR POSITIVE: ICD-10-CM

## 2025-02-24 DIAGNOSIS — Z95.828 PORT-A-CATH IN PLACE: ICD-10-CM

## 2025-02-24 DIAGNOSIS — C50.511 MALIGNANT NEOPLASM OF LOWER-OUTER QUADRANT OF RIGHT BREAST OF FEMALE, ESTROGEN RECEPTOR POSITIVE: Primary | ICD-10-CM

## 2025-02-24 DIAGNOSIS — Z17.1 MALIGNANT NEOPLASM OF LOWER-OUTER QUADRANT OF RIGHT BREAST OF FEMALE, ESTROGEN RECEPTOR NEGATIVE: Primary | ICD-10-CM

## 2025-02-24 DIAGNOSIS — C50.511 MALIGNANT NEOPLASM OF LOWER-OUTER QUADRANT OF RIGHT BREAST OF FEMALE, ESTROGEN RECEPTOR POSITIVE: ICD-10-CM

## 2025-02-24 DIAGNOSIS — Z17.0 MALIGNANT NEOPLASM OF LOWER-OUTER QUADRANT OF RIGHT BREAST OF FEMALE, ESTROGEN RECEPTOR POSITIVE: Primary | ICD-10-CM

## 2025-02-24 DIAGNOSIS — C50.511 MALIGNANT NEOPLASM OF LOWER-OUTER QUADRANT OF RIGHT BREAST OF FEMALE, ESTROGEN RECEPTOR NEGATIVE: Primary | ICD-10-CM

## 2025-02-24 DIAGNOSIS — Z45.2 ENCOUNTER FOR FITTING AND ADJUSTMENT OF VASCULAR CATHETER: ICD-10-CM

## 2025-02-24 DIAGNOSIS — Z79.899 HIGH RISK MEDICATION USE: ICD-10-CM

## 2025-02-24 LAB
ALBUMIN SERPL-MCNC: 4 G/DL (ref 3.5–5.2)
ALBUMIN/GLOB SERPL: 1.4 G/DL
ALP SERPL-CCNC: 81 U/L (ref 39–117)
ALT SERPL W P-5'-P-CCNC: 32 U/L (ref 1–33)
ANION GAP SERPL CALCULATED.3IONS-SCNC: 11.9 MMOL/L (ref 5–15)
AST SERPL-CCNC: 25 U/L (ref 1–32)
BASOPHILS # BLD AUTO: 0.06 10*3/MM3 (ref 0–0.2)
BASOPHILS NFR BLD AUTO: 0.7 % (ref 0–1.5)
BILIRUB SERPL-MCNC: 0.3 MG/DL (ref 0–1.2)
BUN SERPL-MCNC: 14 MG/DL (ref 8–23)
BUN/CREAT SERPL: 19.7 (ref 7–25)
CALCIUM SPEC-SCNC: 9.2 MG/DL (ref 8.6–10.5)
CHLORIDE SERPL-SCNC: 104 MMOL/L (ref 98–107)
CO2 SERPL-SCNC: 24.1 MMOL/L (ref 22–29)
CREAT SERPL-MCNC: 0.71 MG/DL (ref 0.57–1)
DEPRECATED RDW RBC AUTO: 43.6 FL (ref 37–54)
EGFRCR SERPLBLD CKD-EPI 2021: 91.6 ML/MIN/1.73
EOSINOPHIL # BLD AUTO: 0.04 10*3/MM3 (ref 0–0.4)
EOSINOPHIL NFR BLD AUTO: 0.5 % (ref 0.3–6.2)
ERYTHROCYTE [DISTWIDTH] IN BLOOD BY AUTOMATED COUNT: 13.2 % (ref 12.3–15.4)
GLOBULIN UR ELPH-MCNC: 2.9 GM/DL
GLUCOSE SERPL-MCNC: 220 MG/DL (ref 65–99)
HCT VFR BLD AUTO: 39.2 % (ref 34–46.6)
HGB BLD-MCNC: 12.4 G/DL (ref 12–15.9)
IMM GRANULOCYTES # BLD AUTO: 0.03 10*3/MM3 (ref 0–0.05)
IMM GRANULOCYTES NFR BLD AUTO: 0.3 % (ref 0–0.5)
LYMPHOCYTES # BLD AUTO: 2.19 10*3/MM3 (ref 0.7–3.1)
LYMPHOCYTES NFR BLD AUTO: 25.3 % (ref 19.6–45.3)
MCH RBC QN AUTO: 28.4 PG (ref 26.6–33)
MCHC RBC AUTO-ENTMCNC: 31.6 G/DL (ref 31.5–35.7)
MCV RBC AUTO: 89.9 FL (ref 79–97)
MONOCYTES # BLD AUTO: 0.6 10*3/MM3 (ref 0.1–0.9)
MONOCYTES NFR BLD AUTO: 6.9 % (ref 5–12)
NEUTROPHILS NFR BLD AUTO: 5.75 10*3/MM3 (ref 1.7–7)
NEUTROPHILS NFR BLD AUTO: 66.3 % (ref 42.7–76)
NRBC BLD AUTO-RTO: 0 /100 WBC (ref 0–0.2)
PLATELET # BLD AUTO: 320 10*3/MM3 (ref 140–450)
PMV BLD AUTO: 9.6 FL (ref 6–12)
POTASSIUM SERPL-SCNC: 4 MMOL/L (ref 3.5–5.2)
PROT SERPL-MCNC: 6.9 G/DL (ref 6–8.5)
RBC # BLD AUTO: 4.36 10*6/MM3 (ref 3.77–5.28)
SODIUM SERPL-SCNC: 140 MMOL/L (ref 136–145)
WBC NRBC COR # BLD AUTO: 8.67 10*3/MM3 (ref 3.4–10.8)

## 2025-02-24 PROCEDURE — 99214 OFFICE O/P EST MOD 30 MIN: CPT | Performed by: NURSE PRACTITIONER

## 2025-02-24 PROCEDURE — 96417 CHEMO IV INFUS EACH ADDL SEQ: CPT

## 2025-02-24 PROCEDURE — 25810000003 SODIUM CHLORIDE 0.9 % SOLUTION 250 ML FLEX CONT: Performed by: NURSE PRACTITIONER

## 2025-02-24 PROCEDURE — 85025 COMPLETE CBC W/AUTO DIFF WBC: CPT

## 2025-02-24 PROCEDURE — 25010000002 HEPARIN LOCK FLUSH PER 10 UNITS: Performed by: INTERNAL MEDICINE

## 2025-02-24 PROCEDURE — 25010000002 PERTUZUMAB 420 MG/14ML SOLUTION 420 MG VIAL: Performed by: NURSE PRACTITIONER

## 2025-02-24 PROCEDURE — 96413 CHEMO IV INFUSION 1 HR: CPT

## 2025-02-24 PROCEDURE — 25010000002 TRASTUZUMAB-DTTB 420 MG RECONSTITUTED SOLUTION 1 EACH VIAL: Performed by: NURSE PRACTITIONER

## 2025-02-24 PROCEDURE — 80053 COMPREHEN METABOLIC PANEL: CPT

## 2025-02-24 RX ORDER — SODIUM CHLORIDE, SODIUM LACTATE, POTASSIUM CHLORIDE, CALCIUM CHLORIDE 600; 310; 30; 20 MG/100ML; MG/100ML; MG/100ML; MG/100ML
100 INJECTION, SOLUTION INTRAVENOUS CONTINUOUS
Status: CANCELLED | OUTPATIENT
Start: 2025-02-24 | End: 2025-02-25

## 2025-02-24 RX ORDER — HEPARIN SODIUM (PORCINE) LOCK FLUSH IV SOLN 100 UNIT/ML 100 UNIT/ML
500 SOLUTION INTRAVENOUS AS NEEDED
Status: DISCONTINUED | OUTPATIENT
Start: 2025-02-24 | End: 2025-02-24 | Stop reason: HOSPADM

## 2025-02-24 RX ORDER — SODIUM CHLORIDE 9 MG/ML
20 INJECTION, SOLUTION INTRAVENOUS ONCE
Status: CANCELLED | OUTPATIENT
Start: 2025-02-24

## 2025-02-24 RX ORDER — SODIUM CHLORIDE 0.9 % (FLUSH) 0.9 %
10 SYRINGE (ML) INJECTION AS NEEDED
OUTPATIENT
Start: 2025-02-24

## 2025-02-24 RX ORDER — HEPARIN SODIUM (PORCINE) LOCK FLUSH IV SOLN 100 UNIT/ML 100 UNIT/ML
500 SOLUTION INTRAVENOUS AS NEEDED
OUTPATIENT
Start: 2025-02-24

## 2025-02-24 RX ORDER — SODIUM CHLORIDE 0.9 % (FLUSH) 0.9 %
10 SYRINGE (ML) INJECTION AS NEEDED
Status: DISCONTINUED | OUTPATIENT
Start: 2025-02-24 | End: 2025-02-24 | Stop reason: HOSPADM

## 2025-02-24 RX ADMIN — PERTUZUMAB 420 MG: 30 INJECTION, SOLUTION, CONCENTRATE INTRAVENOUS at 10:40

## 2025-02-24 RX ADMIN — Medication 10 ML: at 12:15

## 2025-02-24 RX ADMIN — Medication 500 UNITS: at 12:15

## 2025-02-24 RX ADMIN — ONTRUZANT 330 MG: KIT INTRAVENOUS at 11:42

## 2025-02-24 NOTE — H&P
There are no changes in the history or physical exam, aside from any that are listed as an addendum at the bottom of this document.   Today, patient will go for the surgery listed in the plan below.   Plan is for LEFT port removal. Discussed risks of bleeding, infection        Chief Complaint: Felicia Boyle is a 68 y.o. female who was seen in consultation at the request of Tristan Malone DO  for newly diagnosed breast cancer and a postoperative visit    History of Present Illness:  Patient presents with newly diagnosed breast cancer.  She noted a new RIGHT breast mass lower outer in 2023. She noted no other new masses, skin changes, nipple discharge, nipple changes prior to her most recent imaging.    Her most recent imaging includes the followin2019, MMG Screening [place] Bilateral ([facility])  Scattered fibroglandular density.  BI-RADS 0    2019, MMG Diagnostic Right ([facility])  Resolution of the area of focal asymmetry.  BI-RADS 1: Negative    2024, MMG Diagnostic Digital [place] Bilateral, US Breast Right Limited ([facility])  INDICATION: Right breast palpable lump the patient first identified at  the end of December 3 to 4 weeks ago. The patient reports 2 sisters  with a history of breast cancer, diagnosed age 38 and 48.     The breasts are heterogeneously dense   Right breast: There is a triangular skin marker area of palpable concern, 8 cm from the nipple. The marker overlies an oval high density mass margins measuring 1.7  x 1.4 x 1.6 cm. Lymph nodes included in the right axilla have a normal mammographic appearance.    Ultrasound  8 o'clock 8 cm from the nipple solid mass 1.7 x 1.0 x 1.3 cm.   Ultrasound of the right axilla was performed with no abnormally enlarged lymph nodes.    BI-RADS 4c: Suspicious        She had a biopsy on the following day that showed:   2024, US Guided Breast Biopsy ([facility])  Right breast 8 o'clock 8 cm from the nipple. 10-gauge mammotome multiple tissue specimens  bowtie shaped metallic clip was placed.    Post-biopsy mammography demonstrates bowtie shaped metallic clip centrally within a mass in the posterior one third lower outer quadrant of the right breast. No evidence for clip migration.    The pathology is concordant.      01/31/2024, Surgical pathology report ([facility])    1.  Breast, right, 8:00, 8 cm from nipple, biopsy: Invasive ductal adenocarcinoma                -A.  The tumor is [place] grade III (tubular grade 3, nuclear grade 3, mitotic grade 3).               -B.  The tumor measures up to 9.3 mm on the slide               -C.  No definitive lymphovascular invasion or perineural invasion is identified               -D.  Ductal carcinoma in situ is not identified    ER Negative (less than 1%)  VA Negative (less than 1%)  HER2 positive (Score 3+) 95%  KI-67 70%     I then arranged for a MRi:  2/9/24  [facility] BEATRIZ BREAST MRI  SHERRILL BUTLER   Posterior one third lower outer quadrant of the right  breast 8 o'clock, 8 cm from the nipple there is an irregular enhancing mass with an internal focal signal void. The mass measures 2.0 cm in anterior to posterior dimension, 1.8 cm in the superior-inferior dimension and 1.7 cm in the mediolateral dimension. This corresponds to the biopsy-proven malignancy with the centrally located bowtie shaped metallic clip. Extending anteriorly away from the anterior margin of the mass there is irregular linear  enhancement that measures on the order of 3.5 cm in anterior posterior.    There are linear areas of increased density seen mammographically in this region. This is suspicious for additional DCIS. The biopsy-proven malignancy and contiguous anterior linear enhancement  measure on the order of 4.7 cm in anterior to posterior dimension, 1.7 cm in the mediolateral dimension and 2.4 cm in the superior to inferior dimension. anterior one third of the right breast at the 12 o'clock position centered on the order of 3 cm from the nipple there  is an irregular area  of non-mass enhancement that measures 1.0 cm in superior-inferior dimension, 1.1 cm in anterior to posterior dimension and 1.2 cm in the medial to lateral dimension. No mammographic correlate is appreciated.  No other areas of suspicious enhancement or morphology are seen in the  right breast. I see no evidence for abnormal skin, nipple or chest wall  enhancement of the right breast and there is no evidence for right  axillary or internal mammary chain adenopathy.  In the middle third of the left breast centered at the 11:30 position on the order of 3.7 cm from the nipple there is an irregular area of non-mass enhancement that measures on the order of 1.3 cm in anterior posterior dimension, 1.1 cm in the superior to inferior dimension and 1.0 cm in the mediolateral dimension. There is suspected architectural distortion. No mammographic correlate is appreciated.  No other areas of abnormal enhancement or morphology are seen within the left breast. No evidence for left axillary or internal mammary chain adenopathy.   IMPRESSION: Biopsy proven malignancy right. The entire mass and linear enhancement measure on the order of 4.7 cm. If breast conservation therapy is desired and biopsy of this region is desired, it is my opinion that this would be best performed under MRI guidance. 2. 1.2 cm irregular area o non mass enhancement in the right breast 12 o'clock position. No mammographic correlate is appreciated. Further evaluation with an MRI guided right breast biopsy should be considered. 3. 1.1 cm irregular area of non mass enhancement seen n middle third of the left breast at th 11:30 position. MRI guided left breast biopsy is recommended. BI-RADS 4 Suspicious abnormality. Biopsy should be considered.       She has not had a breast biopsy in the past.  She has her uterus and ovaries, is postmenopausal, and takes no hormones.    Her family history includes the following:   She has 2 of 7 sisters  with breast cancer in their 30s-40s.  Dr Perry sent testing years ago, as follows    2017, Genetics ([facility])  Genetic result: Negative, no clinically significant mutation identified.    Based on her imaging, we arranged for a bilateral (2X RIGHT and 1 X LEFT) MRI guided biopsies.      MRI guided biopsy x 3 with 2 being on the right and 1 being on the left dated 2024 [facility]  Right breast 12:00, buckle shaped metallic clip placed.  Sclerosing adenosis, calcifications.  Right breast 8:00, infinity shaped clip placed.  High-grade duct carcinoma in situ, solid, cribriform, clinging, lobular extension, central comedonecrosis.  No invasive carcinoma.       Left breast biopsy was also done:   Left breast MRI guided biopsy 12:00, stoplight shaped metallic clip. 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  No clip migration.  Pathology is all concordant    She reports significant bruising LEFT breast.    In the interim, Normal took abraxane (paclitaxel), herceptin perjeta for 12 cycles, last being 2024.  Her port has functioned well.    Her interval imaging has shown the followin24 MRI-   Showed interval resolution of all overtly suspicious enhancement on both sides    24  RIGHT ultrasound [facility]-  Prevoiusly noted mass at 8:00, 8 CFN is no longer visible and a clip is not confidently identified.      She could no longer feel the mass after the 3rd cycle.    She denies headache, bone pain, belly pain, cough , changes in vision or gait.  Interval History:  Pathology from bilateral total mastectomy, bilateral sentinel node biopsy, no reconstruction, post neoadjuvant, returned as :  Right mastectomy: Single focus of high-grade ductal carcinoma in situ, solid with focal necrosis, 2 mm.  Found in a 20 mm fibrous tumor bed.  Margins are widely clear.  Pathologic stage ypTis N0 pathologic complete response.  On  "the right side 0 of 4 lymph nodes.  Left total mastectomy showed fat necrosis, biopsy site changes and fibrocystic changes.  Left sentinel node 0 of 1 involved with tumor.        She will not need to see radiation oncology. Initial tumor T2N0  Drains have had < 30 cc each for 3 days  Denies redness, warmth, drainage from incisions..      Review of Systems:  Review of Systems   Constitutional:  Negative for unexpected weight change (1 lb wt loss).   Eyes:  Eye problems: cataract surgery 3 weeks ago..   All other systems reviewed and are negative.       Past Medical and Surgical History:  Breast Biopsy History:  Patient had not had a breast biopsy prior to her cancer diagnosis.  Breast Cancer HIstory:  Patient does not have a past medical history of breast cancer.  Breast Operations, and year:  None   Obstetric/Gynecologic History:  Age menstrual periods began: 16  Patient is postmenopausal, entered menopause naturally at age: 49   Number of pregnancies:4  Number of live births: 3  Number of abortions or miscarriages: 1  Age of delivery of first child: 18  Patient breast fed, for the following lenth of time: 3 mons total  Length of time taking birth control pills: none   Patient has never taken hormone replacement    Patient has both ovaries and uterus.   Past Surgical History:   Procedure Laterality Date    BREAST BIOPSY Left     Excisional biopsy in the [facility]    BREAST BIOPSY  03/2024    CATARACT EXTRACTION W/ INTRAOCULAR LENS IMPLANT Bilateral     CHOLECYSTECTOMY  2012    COLON RESECTION Right 2002    COLONOSCOPY N/A 04/28/2021    Procedure: COLONOSCOPY TO ANASTAMOSIS/TI;  Surgeon: Angelo Gonsalez MD;  Location: [facility] ENDOSCOPY;  Service: Gastroenterology;  Laterality: N/A;  PRE: SCREENING  POST: NORMAL POST-OP ANATOMY    ENDOSCOPY      2013    ENDOSCOPY N/A 03/10/2017    Procedure: ESOPHAGOGASTRODUODENOSCOPY WITH BIOPSY AND PETER DILATATION 54\";  Surgeon: Angelo Gonsalez MD;  Location: [facility] ENDOSCOPY;  " Service:     ENDOSCOPY N/A 2021    Procedure: ESOPHAGOGASTRODUODENOSCOPY WITH BIOPSIES, 54FR PETER DILATATION;  Surgeon: Angelo Gonsalez MD;  Location: [facility] ENDOSCOPY;  Service: Gastroenterology;  Laterality: N/A;  PRE: DYSPHAGIA  POST: GASTRITIS, ESOPHAGEAL RING    MASTECTOMY W/ SENTINEL NODE BIOPSY Bilateral 8/15/2024    Procedure: Bilateral total mastectomy, bilateral sentinel node biopsy, possible axillary node dissection, no reconstruction.;  Surgeon: Abeba Manrique MD;  Location: [facility] MAIN OR;  Service: General;  Laterality: Bilateral;    VENOUS ACCESS DEVICE (PORT) INSERTION Left 3/14/2024    Procedure: INSERTION OF PORTACATH;  Surgeon: Abeba Manrique MD;  Location: [facility] OR;  Service: General;  Laterality: Left;     Past Medical History:   Diagnosis Date    Anxiety about health     Breast cancer of lower-outer quadrant of right female breast 2024    DCIS (ductal carcinoma in situ) of breast 03/15/2024    Diverticulosis     History of chemotherapy 2024    Mixed hyperlipidemia 2019    DIET CONTROLLED    Osteoporosis     Port-A-Cath in place     Type 2 diabetes mellitus        Prior Hospitalizations, other than for surgery or childbirth, and year:  None     Social History     Socioeconomic History    Marital status:      Spouse name: Ryan   Tobacco Use    Smoking status: Former     Current packs/day: 0.00     Average packs/day: 0.3 packs/day for 15.0 years (3.8 ttl pk-yrs)     Types: Cigarettes     Quit date: 1/15/2024     Years since quittin.6     Passive exposure: Past    Smokeless tobacco: Never    Tobacco comments:     N/A   Vaping Use    Vaping status: Never Used   Substance and Sexual Activity    Alcohol use: Yes     Comment: RARE    Drug use: No    Sexual activity: Not Currently     Partners: Male     Birth control/protection: Tubal ligation     Patient is .  Patient is retired.  Patient drinks 0-1 servings of caffeine per day.    Family  "History:  Family History   Problem Relation Age of Onset    Hypertension Mother     Diabetes Mother     Hypertension Father     Breast cancer Sister 48    Breast cancer Sister 38    No Known Problems Brother     No Known Problems Daughter     No Known Problems Son     No Known Problems Maternal Grandmother     No Known Problems Paternal Grandmother     No Known Problems Maternal Grandfather     No Known Problems Paternal Grandfather     Autism Grandson     Colon cancer Neg Hx     Rectal cancer Neg Hx     Endometrial cancer Neg Hx     Vaginal cancer Neg Hx     Cervical cancer Neg Hx     Ovarian cancer Neg Hx     Prostate cancer Neg Hx     Uterine cancer Neg Hx     Malig Hyperthermia Neg Hx        Vital Signs:  /82 (BP Location: Left arm, Patient Position: Sitting, Cuff Size: Adult)   Pulse 82   Ht 147.3 cm (57.99\")   Wt 52.2 kg (115 lb)   LMP  (LMP Unknown)   SpO2 98%   BMI 24.04 kg/m²      Medications:    Current Outpatient Medications:     HYDROcodone-acetaminophen (NORCO) 5-325 MG per tablet, Take 1-2 tablets by mouth Every 4 (Four) to 6 (Six) Hours As Needed for pain.  Wean to tylenol., Disp: 20 tablet, Rfl: 0    nystatin (MYCOSTATIN) 550968 UNIT/GM powder, Apply  topically to the appropriate area as directed 3 (Three) Times a Day., Disp: 60 g, Rfl: 0    ondansetron (ZOFRAN) 4 MG tablet, Take 1 tablet by mouth Every 8 (Eight) Hours As Needed for Nausea or Vomiting. May take 2 tablets if 4mg dose is not effective., Disp: 10 tablet, Rfl: 1    pantoprazole (PROTONIX) 40 MG EC tablet, Take 1 tablet by mouth Daily., Disp: 30 tablet, Rfl: 5    polyethylene glycol (MiraLax) 17 GM/SCOOP powder, Dissolve 1 capful (17 g) in 8oz of water and take by mouth daily surrounding surgery and while on narcotic., Disp: 238 g, Rfl: 0    triamcinolone (KENALOG) 0.1 % ointment, Apply 1 Application topically to the appropriate area as directed 2 (Two) Times a Day., Disp: 30 g, Rfl: 0    bisoprolol (ZEBeta) 5 MG tablet, TAKE " "1 TABLET BY MOUTH DAILY (Patient not taking: Reported on 8/8/2024), Disp: 90 tablet, Rfl: 2    Blood Glucose Monitoring Suppl (FreeStyle Lite) w/Device kit, 1 Units Daily. (Patient not taking: Reported on 8/12/2024), Disp: 1 kit, Rfl: 0    gabapentin (NEURONTIN) 300 MG capsule, Take 1 capsule by mouth 2 (Two) Times a Day. (Patient not taking: Reported on 8/8/2024), Disp: 60 capsule, Rfl: 1    glucose blood (FREESTYLE LITE) test strip, TEST ONCE DAILY (Patient not taking: Reported on 8/12/2024), Disp: 100 each, Rfl: 2    HYDROcodone-acetaminophen (NORCO) 5-325 MG per tablet, 1-2 tabs po q 4-6hr prn pain, wean to tylenol (Patient not taking: Reported on 6/20/2024), Disp: 15 tablet, Rfl: 0    Lancets (freestyle) lancets, Use TO CHECK BLOOD SUGAR ONCE DAILY (Patient not taking: Reported on 8/26/2024), Disp: 100 each, Rfl: 2    lidocaine-prilocaine (EMLA) 2.5-2.5 % cream, Apply 1 Application topically to the appropriate area as directed Take As Directed. Apply nickel size amount to port site 30 min before appt time do not rub in cover with plastic wrap (Patient not taking: Reported on 8/26/2024), Disp: 30 g, Rfl: 5    prednisoLONE acetate (PRED FORTE) 1 % ophthalmic suspension, Administer 1 drop to the right eye 4 (Four) Times a Day. (Patient not taking: Reported on 8/26/2024), Disp: , Rfl:      Allergies:  Allergies   Allergen Reactions    Paclitaxel Shortness Of Breath    Metronidazole Nausea And Vomiting       Physical Examination:  /82 (BP Location: Left arm, Patient Position: Sitting, Cuff Size: Adult)   Pulse 82   Ht 147.3 cm (57.99\")   Wt 52.2 kg (115 lb)   LMP  (LMP Unknown)   SpO2 98%   BMI 24.04 kg/m²   General Appearance:  Patient is in no distress.  She is well kept and has an average build.   Psychiatric:  Patient with appropriate mood and affect. Alert and oriented to self, time, and place.    Breast, RIGHT: surgically absent with well healing transverse incision with no erythema, warmth, " drainage. Drain with scant serous drainage. No undrained collections.    Breast, LEFT:  surgically absent with well healing transverse incision with no erythema, warmth, drainage. Drain with scant serous drainage. No undrained collections.    Lymphatic:  There is no axillary, cervical, infraclavicular, or supraclavicular adenopathy bilaterally.  Eyes:  Pupils are round and reactive to light.  Cardiovascular:  Heart rate and rhythm are regular.  Respiratory:  Lungs are clear bilaterally with no crackles or wheezes in any lung field.  Gastrointestinal:  Abdomen is soft, nondistended, and nontender.     Musculoskeletal:  Good strength in all 4 extremities.   There is good range of motion in both shoulders.    Skin:  No new skin lesions or rashes on the skin excluding the breast (see breast exam above).        Imagin2019, MMG Screening [place] Bilateral ([facility])  Scattered fibroglandular density.  BI-RADS 0    2019, MMG Diagnostic Right ([facility])  Resolution of the area of focal asymmetry.  BI-RADS 1: Negative    2024, MMG Diagnostic Digital [place] Bilateral, US Breast Right Limited ([facility])  INDICATION: Right breast palpable lump the patient first identified at  the end of December 3 to 4 weeks ago. The patient reports 2 sisters  with a history of breast cancer, diagnosed age 38 and 48.     The breasts are heterogeneously dense   Right breast: There is a triangular skin marker area of palpable concern, 8 cm from the nipple. The marker overlies an oval high density mass margins measuring 1.7  x 1.4 x 1.6 cm. Lymph nodes included in the right axilla have a normal mammographic appearance.    Ultrasound  8 o'clock 8 cm from the nipple solid mass 1.7 x 1.0 x 1.3 cm.   Ultrasound of the right axilla was performed with no abnormally enlarged lymph nodes.    BI-RADS 4c: Suspicious    24  [facility] BEATRIZ BREAST MRI  SHERRILL BUTLER   Posterior one third lower outer quadrant of the right  breast 8 o'clock, 8 cm from the nipple  there is an irregular enhancing mass with an internal focal signal void. The mass measures 2.0 cm in anterior to posterior dimension, 1.8 cm in the superior-inferior dimension and 1.7 cm in the mediolateral dimension. This corresponds to the biopsy-proven malignancy with the centrally located bowtie shaped metallic clip. Extending anteriorly away from the anterior margin of the mass there is irregular linear  enhancement that measures on the order of 3.5 cm in anterior posterior.    There are linear areas of increased density seen mammographically in this region. This is suspicious for additional DCIS. The biopsy-proven malignancy and contiguous anterior linear enhancement  measure on the order of 4.7 cm in anterior to posterior dimension, 1.7 cm in the mediolateral dimension and 2.4 cm in the superior to inferior dimension. anterior one third of the right breast at the 12 o'clock position centered on the order of 3 cm from the nipple there is an irregular area  of non-mass enhancement that measures 1.0 cm in superior-inferior dimension, 1.1 cm in anterior to posterior dimension and 1.2 cm in the medial to lateral dimension. No mammographic correlate is appreciated.  No other areas of suspicious enhancement or morphology are seen in the  right breast. I see no evidence for abnormal skin, nipple or chest wall  enhancement of the right breast and there is no evidence for right  axillary or internal mammary chain adenopathy.  In the middle third of the left breast centered at the 11:30 position on the order of 3.7 cm from the nipple there is an irregular area of non-mass enhancement that measures on the order of 1.3 cm in anterior posterior dimension, 1.1 cm in the superior to inferior dimension and 1.0 cm in the mediolateral dimension. There is suspected architectural distortion. No mammographic correlate is appreciated.  No other areas of abnormal enhancement or morphology are seen within the left breast. No evidence  for left axillary or internal mammary chain adenopathy.   IMPRESSION: Biopsy proven malignancy right. The entire mass and linear enhancement measure on the order of 4.7 cm. If breast conservation therapy is desired and biopsy of this region is desired, it is my opinion that this would be best performed under MRI guidance. 2. 1.2 cm irregular area o non mass enhancement in the right breast 12 o'clock position. No mammographic correlate is appreciated. Further evaluation with an MRI guided right breast biopsy should be considered. 3. 1.1 cm irregular area of non mass enhancement seen n middle third of the left breast at th 11:30 position. MRI guided left breast biopsy is recommended. BI-RADS 4 Suspicious abnormality. Biopsy should be considered.     RIGHT ultrasound [facility]-  Prevoiusly noted mass at 8:00, 8 CFN is no longer visible and a clip is not confidently identified.        Pathology:  01/31/2024, US Guided Breast Biopsy ([facility])  Right breast 8 o'clock 8 cm from the nipple. 10-gauge mammotome multiple tissue specimens bowtie shaped metallic clip was placed.    Post-biopsy mammography demonstrates bowtie shaped metallic clip centrally within a mass in the posterior one third lower outer quadrant of the right breast. No evidence for clip migration.    The pathology is concordant.      01/31/2024, Surgical pathology report ([facility])    1.  Breast, right, 8:00, 8 cm from nipple, biopsy: Invasive ductal adenocarcinoma                -A.  The tumor is [place] grade III (tubular grade 3, nuclear grade 3, mitotic grade 3).               -B.  The tumor measures up to 9.3 mm on the slide               -C.  No definitive lymphovascular invasion or perineural invasion is identified               -D.  Ductal carcinoma in situ is not identified    ER Negative (less than 1%)  ME Negative (less than 1%)  HER2 positive (Score 3+) 95%  KI-67 70%      MRI guided biopsy x 3 with 2 being on the right and 1 being on the left dated February  26 2024 [facility]  Right breast 12:00, buckle shaped metallic clip placed.  Sclerosing adenosis, calcifications.  Right breast 8:00, infinity shaped clip placed.  High-grade duct carcinoma in situ, solid, cribriform, clinging, lobular extension, central comedonecrosis.  No invasive carcinoma.       Left breast biopsy was also done:   Left breast MRI guided biopsy 12:00, stoplight shaped metallic clip. 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  No clip migration.  Pathology is all concordant    Final Diagnosis   1.  Left breast, 12:00, MRI-guided biopsies for non-mass enhancement: INTERMEDIATE GRADE DUCTAL CARCINOMA IN SITU (DCIS) WITH APOCRINE FEATURES.               -Architectural patterns: Solid and cribriform with central necrosis and associated microcalcifications.               -DCIS involves sclerosing adenosis in multiple cores and measures up to 7 mm maximally.               -No evidence of invasion identified.     2.  Right breast, 12:00, MRI-guided biopsies for non-mass enhancement:               -Sclerosing adenosis with associated microcalcifications.               -No atypical hyperplasia, in situ nor invasive carcinoma identified.     3.  Right breast, 8:00, MRI-guided biopsies for linear enhancement: HIGH GRADE DUCTAL CARCINOMA IN SITU (DCIS).  -Architectural patterns: Solid, comedo and clinging with lobular extension, central comedonecrosis and associated microcalcifications.               -DCIS involves multiple tissue cores measuring up to 3 mm.               -Sclerosing adenosis and usual ductal hyperplasia with associated microcalcifications.               -No evidence of invasive carcinoma identified.     [facility]   Electronically signed by Shyann Hilario MD on 2/29/2024 at 0940   Synoptic Checklist   Breast Biomarker Reporting Template   Protocol posted: 12/13/2023BREAST BIOMARKER REPORTING TEMPLATE -  1  Test(s) Performed     Estrogen Receptor (ER) Status  Positive (greater than 10% of cells demonstrate nuclear positivity)   Percentage of Cells with Nuclear Positivity  %   Average Intensity of Staining  Strong   Test Type  Food and Drug Administration (FDA) cleared (test / vendor): ventana   Primary Antibody  SP1   Scoring System  Domi   Proportion Score  5   Intensity Score  3   Total Domi Score  8   Test(s) Performed     Progesterone Receptor (PgR) Status  Positive   Percentage of Cells with Nuclear Positivity  81-90%   Average Intensity of Staining  Strong   Test Type  Food and Drug Administration (FDA) cleared (test / vendor): ventana   Primary Antibody  1E2   Scoring System  Domi   Proportion Score  5   Intensity Score  3   Total Domi Score  8   Test(s) Performed  Ki-67   Ki-67 Percentage of Positive Nuclei  15 %   Primary Antibody  30-9   Cold Ischemia and Fixation Times  Meet requirements specified in latest version of the ASCO / CAP Guidelines   Cold Ischemia Time (minutes)  41 min   Fixation Time (hours)  9 hours   Testing Performed on Block Number(s)  1A   METHODS   Fixative  Formalin   Image Analysis  Not performed   .      Comment    Multiple representative slides from parts 1-3 of this case are reviewed internally with Dr. Avery, who concurs.  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  Please refer to that biopsy for receptor studies in the right breast.           Pathology from bilateral total mastectomy, bilateral sentinel node biopsy, no reconstruction, post neoadjuvant, returned as :  Right mastectomy: Single focus of high-grade ductal carcinoma in situ, solid with focal necrosis, 2 mm.  Found in a 20 mm fibrous tumor bed.  Margins are widely clear.  Pathologic stage ypTis N0 pathologic complete response.  On the right side 0 of 4 lymph nodes.  Left total mastectomy showed fat necrosis, biopsy site changes and fibrocystic  changes.  Left sentinel node 0 of 1 involved with tumor.        She will not need to see radiation oncology. Initial tumor T2N0  I called and let her know     Final Diagnosis   1.  [place] lymph node #1, right axilla, excision:               -1 lymph node, negative for metastatic carcinoma (0/1).     2.  [place] lymph node #2, right axilla, excision:               -3 lymph nodes, negative for metastatic carcinoma (0/3).     3.  Right breast, oriented simple skin sparing mastectomy status post neoadjuvant therapy (907 g): SINGLE FOCUS OF HIGH GRADE DUCTAL CARCINOMA IN SITU (DCIS).               -Architectural patterns: Solid with focal necrosis and associated microcalcifications.               -Extent of DCIS: 2.0 mm focus in a single tissue block.               -20 mm fibrous tumor bed with sclerosing adenosis, microcysts and microcalcifications.               -Unremarkable skin and nipple.               -All margins widely free of DCIS (see synoptic template).               -No evidence of residual invasive carcinoma identified.               -Pathologic stage: ypTis, N(sn)0, pCR (see Comment).     4.  Left breast, oriented simple skin sparing mastectomy status post neoadjuvant therapy (850 g):               -Fat necrosis, biopsy site changes, sclerosing adenosis and changes consistent with treatment effect.               -Microcalcifications present associated with benign ducts and sclerosing adenosis.               -Unremarkable skin and nipple.               -No evidence of residual in situ nor invasive carcinoma identified.                    5.  [place] lymph node #1, left axilla, excision:               -1 lymph node, negative for metastatic carcinoma (0/1).     SWM   Electronically signed by Shyann Hilario MD on 8/16/2024 at 1049   Synoptic Checklist   INVASIVE CARCINOMA OF THE BREAST: Resection   8th Edition - Protocol posted: 6/19/2024INVASIVE CARCINOMA OF THE BREAST: RESECTION - 1, 2, 3         SPECIMEN   Procedure   Total mastectomy   Specimen Laterality   Right   TUMOR   Tumor Site   Lower outer quadrant   Histologic Type   No residual invasive carcinoma   Tumor Size   No residual invasive carcinoma   Ductal Carcinoma In Situ (DCIS)   Present       Only DCIS is present after presurgical (neoadjuvant) therapy   Size (Extent) of DCIS   Estimated size (extent) of DCIS is at least (Millimeters): 2 mm   Architectural Patterns   Solid   Nuclear Grade   Grade III (high)   Necrosis   Present, focal (small foci or single cell necrosis)   Lobular Carcinoma In Situ (LCIS)   Not identified   Lymphatic and / or Vascular Invasion   Not identified   Microcalcifications   Present in DCIS       Present in non-neoplastic tissue   Treatment Effect in the Breast   No residual invasive carcinoma is present in the breast after presurgical therapy   Treatment Effect in the Lymph Nodes   No lymph node metastases and no fibrous scarring or histiocytic aggregates in the nodes   Residual Cancer [place] (RCB) Parameters       Residual Cancer [place] Class   RCB-0 (pCR)   MARGINS   Margin Status for DCIS   All margins negative for DCIS   Distance from DCIS to Closest Margin   20 mm   Closest Margin(s) to DCIS   Anterior   Distance from DCIS to Anterior Margin   20 mm   Distance from DCIS to Posterior Margin   40 mm   Distance from DCIS to Superior Margin   180 mm   Distance from DCIS to Inferior Margin   20 mm   Distance from DCIS to Medial Margin   80 mm   Distance from DCIS to Lateral Margin   110 mm   REGIONAL LYMPH NODES   Regional Lymph Node Status   All regional lymph nodes negative for tumor   Total Number of Lymph Nodes Examined (sentinel and non-sentinel)   4   Number of [place] Nodes Examined   4   pTNM CLASSIFICATION (AJCC 8th Edition)   Reporting of pT, pN, and (when applicable) pM categories is based on information available to the pathologist at the time the report is issued. As per the AJCC (Chapter 1, 8th Ed.) it is  the managing physician's responsibility to establish the final pathologic stage based upon all pertinent information, including but potentially not limited to this pathology report.   Modified Classification   y   pT Category   pTis (DCIS)   pN Category   pN0   N Suffix   (sn)   SPECIAL STUDIES           Estrogen Receptor (ER) Status   Negative           Progesterone Receptor (PgR) Status   Negative           HER2 (by immunohistochemistry)   Positive (Score 3+)   Percentage of Cells with Uniform Intense Complete Membrane Staining   95 %           Ki-67 Percentage of Positive Nuclei   70 %   Testing Performed on Case Number   [id number]                          Patient has seen Dr Renteria and plan is for paclitaxel, herceptin, pertuzumab, weekly for 12 weeks, then complete herceptin for a year      Has seen Cardiooncology Dr Peguero              Labs:  09/01/2017, Genetics ([facility])  Genetic result: Negative, no clinically significant mutation identified.    Procedures:    Assessment:   Diagnosis Plan   1. Malignant neoplasm of lower-outer quadrant of right female breast, unspecified estrogen receptor status        2. Ductal carcinoma in situ (DCIS) of breast, unspecified laterality        3. Abnormal MRI, breast        4. FH: breast cancer        5. History of prior cigarette smoking              1-4  RIGHT LOQ 8:00, 7 CFN- 2.5 cm on exam, 2 cm on MRI index lesion (see also below re: enhancement anteriorly over 3.5 cm concerning for DCIS, plus second lesion in the RIGHT breast and lesion seen in the LEFT breast). MRI total enhancement at known biopsy site 4.7cm. Dr Patel cannot rule out additional invasive disease (tumor board)., Bowtie marker  IMC, NST, high grade, 3,3,3, 9.3mm in core,   ER neg AR neg her 2 hemalatha 3+    Clinical stage possibly mT2N0- IIA      MRI abnormalities at time of cancer diagnosis:  1- RIGHT  8:00- anterior to index lesion 3.5cm enhancement- high grade DCIS, solid, comedo, clinging with lobular  extension, central comedonecrosis, no invasive- infinity marker good position anterior to the bowtie invasive cancer  2- RIGHT 12:00 3 CFN- 1.2 cm enhancement- sclerosing adenosis with calcifications- buckle marker (the most anterior marker)  3- LEFT middle third, 11:30 4 CFN- 1.3 cm enhancement- intermediate grade DCIS, solid and cribiform, central necrosis, 7mm- stoplight marker, only marker in the breast    Dr Renteria- abraxane (paclitaxel), perjeta, herceptin started 3-18-24 through 12th cycle 6-17-24    MRi 5-11-24- resolution all enhancement bilateral  Ultrasound 6-14-24- right breast negative for mass      Pathology from bilateral total mastectomy, bilateral sentinel node biopsy, no reconstruction, post neoadjuvant, returned as :  Right mastectomy: Single focus of high-grade ductal carcinoma in situ, solid with focal necrosis, 2 mm.  Found in a 20 mm fibrous tumor bed.  Margins are widely clear.  Pathologic stage ypTis N0 pathologic complete response.  On the right side 0 of 4 lymph nodes.  Left total mastectomy showed fat necrosis, biopsy site changes and fibrocystic changes.  Left sentinel node 0 of 1 involved with tumor.        She will not need to see radiation oncology. Initial tumor T2N0    3-4  LEFT breast DCIS, 11:30, 4 CFN, see above    5-  2 of 7 sisters with breast cancer, ages 35,45 approximate  My Risk genetic testing 9-1-17 negative for mutation    6-  Smoked 1/3 ppd or less from age teens until cancer diagnosis      Plan:  The patient goes by Felicia  She was here today with her  Ferdinand, her daughter and her grandson today.      She is healing nicely.    I will see her back in 9 months to discuss port removal.  I have given her a Rx for a postmastectomy bra and prosthesis.    I removed both drains.  She has seen PT for bioimpedence.      We have left her port at the time of surgery for her year of herceptin..    .  Abeba Manrique MD        Next Appointment:  Return in about 9 months  (around 5/26/2025) for no imaging.      EMR Dragon/transcription disclaimer:    Please note that portions of this note were completed with a voice recognition program.        Encounter Administratively Closed by Health Information Management

## 2025-02-26 ENCOUNTER — TELEPHONE (OUTPATIENT)
Dept: SURGERY | Facility: CLINIC | Age: 70
End: 2025-02-26
Payer: COMMERCIAL

## 2025-02-26 NOTE — TELEPHONE ENCOUNTER
LVM with patient about their surgery and associated appointments with the surgery.    PAT: 02/28/2025 at 2:30 pm   SURGERY: 03/06/2025 at 11:30 am, arriving at 9:30 am   POST OP: 03/17/2025 at 11:00 am    Asked for the patient to stop by the office to sign the consent form for the surgery. And to give the office a call to confirm the appt.

## 2025-03-04 ENCOUNTER — OFFICE VISIT (OUTPATIENT)
Dept: SURGERY | Facility: CLINIC | Age: 70
End: 2025-03-04
Payer: COMMERCIAL

## 2025-03-04 VITALS — SYSTOLIC BLOOD PRESSURE: 128 MMHG | HEART RATE: 95 BPM | OXYGEN SATURATION: 98 % | DIASTOLIC BLOOD PRESSURE: 76 MMHG

## 2025-03-04 DIAGNOSIS — C50.511 MALIGNANT NEOPLASM OF LOWER-OUTER QUADRANT OF RIGHT BREAST OF FEMALE, ESTROGEN RECEPTOR NEGATIVE: Primary | ICD-10-CM

## 2025-03-04 DIAGNOSIS — Z95.828 PORT-A-CATH IN PLACE: ICD-10-CM

## 2025-03-04 DIAGNOSIS — D05.12 DUCTAL CARCINOMA IN SITU (DCIS) OF LEFT BREAST: ICD-10-CM

## 2025-03-04 DIAGNOSIS — Z17.1 MALIGNANT NEOPLASM OF LOWER-OUTER QUADRANT OF RIGHT BREAST OF FEMALE, ESTROGEN RECEPTOR NEGATIVE: Primary | ICD-10-CM

## 2025-03-04 NOTE — LETTER
March 5, 2025     Agata Renteria MD  [address]  [facility] [address]  [place] [id number]    Patient: Felicia Boyle   YOB: 1955   Date of Visit: 3/4/2025     Dear Agata Renteria MD:       Thank you for referring Felicia Boyle to me for evaluation. Below are the relevant portions of my assessment and plan of care.    If you have questions, please do not hesitate to call me. I look forward to following Felicia along with you.         Sincerely,        Bri Balbuena MD        CC: DO Verna Barbosa, Bri Lewis MD  03/05/25 1953  Sign when Signing Visit  Breast Surgery Follow Up Note    Oncologic History:  Felicia Boyle is a 69 y.o. female with the following oncologic history:  Oncology/Hematology History   Breast cancer of lower-outer quadrant of right female breast   2/14/2024 Initial Diagnosis    Breast cancer of lower-outer quadrant of right female breast     3/18/2024 -  Chemotherapy    OP BREAST Pertuzumab / Trastuzumab + (Weekly PACLitaxel x 4 Cycles) Q21D     Bilateral malignant neoplasm of breast in female   9/1/2017 Genetic Testing     Genetics ([facility])  Genetic result: Negative, no clinically significant mutation identified.     12/30/2019 Imaging    BONE MINERAL DENSITOMETRY     HISTORY: Postmenopausal     COMPARISON: BMD 01/22/2018, 01/19/2016     TECHNIQUE: The T score compares the patient's bone mineral density with  the peak bone mass of young normal patients. Patients with T-scores  between 1.0 and 2.5 standard deviations below the mean are osteopenic.  Patients with T-scores greater than 2.5 standard deviation below the  mean are osteoporotic.     The Z score compares the patient's bone mineral density with sex and age  matched patients. Z score of -2.0 and lower is an indication of low  mineral density for the patient's age.     FINDINGS:   LUMBAR SPINE:  The BMD measured in the L1-L4 is 0.699 g/cm2 for a  T-score of -3.2 and a Z-score of -1.4     LEFT HIP:  The BMD for the femoral neck is 0.543g/cm2 for a T score of   -2.8 and a Z score of -1.2     RIGHT HIP:  The BMD for the femoral neck is 0.512g/cm2 for a T score of  -3.0 and a Z score of -1.5     IMPRESSION:  1. Osteoporosis.  2. When compared to the prior exam of 01/22/2018 and baseline exam of  01/19/2016, there has been no statistically significant change.     1/31/2024 Initial Diagnosis    Bilateral malignant neoplasm of breast in female     1/31/2024 Biopsy    Final Diagnosis   1.  Breast, right, 8:00, 8 cm from nipple, biopsy: Invasive ductal adenocarcinoma                -A.  The tumor is [place] grade III (tubular grade 3, nuclear grade 3, mitotic grade 3).               -B.  The tumor measures up to 9.3 mm on the slide               -C.  No definitive lymphovascular invasion or perineural invasion is identified               -D.  Ductal carcinoma in situ is not identified               -E.  Microcalcifications are not identified     Estrogen receptor: <1%  Progesterone receptor: <1%  HER2 status: Positive, 3+, IHC  Ki 67: 70%     2/26/2024 Biopsy    Final Diagnosis   1.  Left breast, 12:00, MRI-guided biopsies for non-mass enhancement: INTERMEDIATE GRADE DUCTAL CARCINOMA IN SITU (DCIS) WITH APOCRINE FEATURES.               -Architectural patterns: Solid and cribriform with central necrosis and associated microcalcifications.               -DCIS involves sclerosing adenosis in multiple cores and measures up to 7 mm maximally.               -No evidence of invasion identified.     2.  Right breast, 12:00, MRI-guided biopsies for non-mass enhancement:               -Sclerosing adenosis with associated microcalcifications.               -No atypical hyperplasia, in situ nor invasive carcinoma identified.     3.  Right breast, 8:00, MRI-guided biopsies for linear enhancement: HIGH GRADE DUCTAL CARCINOMA IN SITU (DCIS).  -Architectural patterns: Solid, comedo and clinging with lobular extension, central  comedonecrosis and associated microcalcifications.               -DCIS involves multiple tissue cores measuring up to 3 mm.               -Sclerosing adenosis and usual ductal hyperplasia with associated microcalcifications.               -No evidence of invasive carcinoma identified.        Estrogen receptor: %  Progesterone receptor: 81-90%  Ki 67: 15%     3/18/2024 -  Chemotherapy    3/18/2024-received first dose of Herceptin and Perjeta, had a severe hypersensitivity reaction to Taxol and hence Taxol was discontinued     3/25/2024-cycle 1 abraxane    4/8/24-abraxane dose reduced 20% due to elevated LFTs    6/17/24- last cycle abraxane    7/1/24-continue herceptin perjeta every three weeks     8/15/2024 Surgery    Final Diagnosis   1.  [place] lymph node #1, right axilla, excision:               -1 lymph node, negative for metastatic carcinoma (0/1).     2.  [place] lymph node #2, right axilla, excision:               -3 lymph nodes, negative for metastatic carcinoma (0/3).     3.  Right breast, oriented simple skin sparing mastectomy status post neoadjuvant therapy (907 g): SINGLE FOCUS OF HIGH GRADE DUCTAL CARCINOMA IN SITU (DCIS).               -Architectural patterns: Solid with focal necrosis and associated microcalcifications.               -Extent of DCIS: 2.0 mm focus in a single tissue block.               -20 mm fibrous tumor bed with sclerosing adenosis, microcysts and microcalcifications.               -Unremarkable skin and nipple.               -All margins widely free of DCIS (see synoptic template).               -No evidence of residual invasive carcinoma identified.               -Pathologic stage: ypTis, N(sn)0, pCR (see Comment).     4.  Left breast, oriented simple skin sparing mastectomy status post neoadjuvant therapy (850 g):               -Fat necrosis, biopsy site changes, sclerosing adenosis and changes consistent with treatment effect.               -Microcalcifications present  associated with benign ducts and sclerosing adenosis.               -Unremarkable skin and nipple.               -No evidence of residual in situ nor invasive carcinoma identified.                    5.  [place] lymph node #1, left axilla, excision:               -1 lymph node, negative for metastatic carcinoma (0/1).               Interval History: she has now completed chemotherapy - last dose 2/24/25. Surgery is complete. All incisions well healed. No radiation required. Was diagnosed with bilateral breast cancer - left DCIS and right ER-N[place]-HER2+ breast cancer with an excellent response to neoadjuvant therapy.     Past Medical History:   Diagnosis Date   • Anxiety about health    • Breast cancer of lower-outer quadrant of right female breast 02/14/2024   • DCIS (ductal carcinoma in situ) of breast 03/15/2024   • Diverticulosis    • History of chemotherapy 06/22/2024   • Mixed hyperlipidemia 05/19/2019    DIET CONTROLLED   • Osteoporosis    • Port-A-Cath in place    • Type 2 diabetes mellitus      Patient Active Problem List   Diagnosis   • Age-related osteoporosis without current pathological fracture   • FH: breast cancer in first degree relative   • Post-menopausal   • Type 2 diabetes mellitus without complication, without long-term current use of insulin   • Vitamin D deficiency   • Mixed hyperlipidemia   • Breast cancer of lower-outer quadrant of right female breast   • Encounter for fitting and adjustment of vascular catheter   • DCIS (ductal carcinoma in situ) of breast   • Bilateral malignant neoplasm of breast in female   • S/P bilateral mastectomy   • Port-A-Cath in place     Current Outpatient Medications on File Prior to Visit   Medication Sig Dispense Refill   • b complex vitamins capsule Take 1 capsule by mouth Daily. (Patient taking differently: Take 1 capsule by mouth Daily. HOLD PER MD BALDWIN) 30 capsule 5   • Blood Glucose Monitoring Suppl (FreeStyle Lite) w/Device kit 1 Units Daily. 1 kit 0   •  ciclopirox (LOPROX) 0.77 % cream APPLY TO THE SKIN OF BOTH FEET TWICE DAILY FOR 2 TO 4 WEEKS. AND AFFECTED TOENAILS TWICE DAILY FOR 6 TO 12 MONTHS     • DULoxetine (CYMBALTA) 20 MG capsule Take 1 cap ( 20mg) daily for 7 days, then 1 cap ( 20 mg) every other day for 7 days, then 1 cap every 72 hours for 7 days, then stop. 14 capsule 0   • glucose blood (FREESTYLE LITE) test strip TEST ONCE DAILY 100 each 2   • HYDROcodone-acetaminophen (NORCO) 5-325 MG per tablet Take 1-2 tablets by mouth Every 4 (Four) to 6 (Six) Hours As Needed for pain.  Wean to tylenol. (Patient taking differently: Take 1-2 tablets by mouth As Needed.) 20 tablet 0   • Lancets (freestyle) lancets Use TO CHECK BLOOD SUGAR ONCE DAILY 100 each 2   • lidocaine-prilocaine (EMLA) 2.5-2.5 % cream Apply 1 Application topically to the appropriate area as directed Take As Directed. Apply nickel size amount to port site 30 min before appt time do not rub in cover with plastic wrap 30 g 5   • lisinopril (PRINIVIL,ZESTRIL) 10 MG tablet Take 1 tablet by mouth Daily. 90 tablet 1   • mupirocin (BACTROBAN) 2 % ointment Apply 1 Application topically to the appropriate area as directed 3 (Three) Times a Day. 15 g 1   • neomycin-polymyxin-dexamethamethasone (POLYDEX) 3.5-79877-5.1 ointment ophthalmic ointment INSTILL IN RIGHT EYE TWICE DAILY AS DIRECTED FOR 2 WEEKS THEN STOP     • promethazine-dextromethorphan (PROMETHAZINE-DM) 6.25-15 MG/5ML syrup Take 5 mL by mouth 4 (Four) Times a Day As Needed for Cough. 240 mL 0   • SITagliptin (Januvia) 100 MG tablet Take 1 tablet by mouth Daily. 30 tablet 5   • triamcinolone (KENALOG) 0.1 % ointment Apply 1 Application topically to the appropriate area as directed 2 (Two) Times a Day. 30 g 1   • urea (CARMOL) 10 % cream Apply 1 Application topically to the appropriate area as directed As Needed for Dry Skin. 71 g 2     No current facility-administered medications on file prior to visit.     Allergies   Allergen Reactions   •  "Paclitaxel Shortness Of Breath   • Metronidazole Nausea And Vomiting       Past Surgical History:   Procedure Laterality Date   • BREAST BIOPSY Left     Excisional [facility] in the [facility]   • BREAST BIOPSY  2024   • CATARACT EXTRACTION W/ INTRAOCULAR LENS IMPLANT Bilateral    • CHOLECYSTECTOMY     • COLON RESECTION Right    • COLONOSCOPY N/A 2021    Procedure: COLONOSCOPY TO ANASTAMOSIS/TI;  Surgeon: Angelo Gonsalez MD;  Location: [facility] ENDOSCOPY;  Service: Gastroenterology;  Laterality: N/A;  PRE: SCREENING  POST: NORMAL POST-OP ANATOMY   • ENDOSCOPY         • ENDOSCOPY N/A 03/10/2017    Procedure: ESOPHAGOGASTRODUODENOSCOPY WITH BIOPSY AND PETER DILATATION 54\";  Surgeon: Angelo Gonsalez MD;  Location: [facility] ENDOSCOPY;  Service:    • ENDOSCOPY N/A 2021    Procedure: ESOPHAGOGASTRODUODENOSCOPY WITH BIOPSIES, 54FR PETER DILATATION;  Surgeon: Angelo Gonsalez MD;  Location: [facility] ENDOSCOPY;  Service: Gastroenterology;  Laterality: N/A;  PRE: DYSPHAGIA  POST: GASTRITIS, ESOPHAGEAL RING   • MASTECTOMY W/ SENTINEL NODE BIOPSY Bilateral 8/15/2024    Procedure: Bilateral total mastectomy, bilateral sentinel node biopsy, possible axillary node dissection, no reconstruction.;  Surgeon: Abeba Manrique MD;  Location: [facility] OR;  Service: General;  Laterality: Bilateral;   • VENOUS ACCESS DEVICE (PORT) INSERTION Left 3/14/2024    Procedure: INSERTION OF PORTACATH;  Surgeon: Abeba Manrique MD;  Location: [facility] OR;  Service: General;  Laterality: Left;     Social History     Socioeconomic History   • Marital status:      Spouse name: Ryan   Tobacco Use   • Smoking status: Former     Current packs/day: 0.00     Average packs/day: 0.3 packs/day for 52.0 years (13.0 ttl pk-yrs)     Types: Cigarettes     Start date:      Quit date: 1/15/2024     Years since quittin.1     Passive exposure: Past   • Smokeless tobacco: Never   • Tobacco comments:     N/A "   Vaping Use   • Vaping status: Never Used   Substance and Sexual Activity   • Alcohol use: Yes     Comment: RARE   • Drug use: No   • Sexual activity: Not Currently     Partners: Male     Birth control/protection: Tubal ligation     Family History   Problem Relation Age of Onset   • Hypertension Mother    • Diabetes Mother    • Hypertension Father    • Breast cancer Sister 48   • Breast cancer Sister 38   • No Known Problems Brother    • No Known Problems Daughter    • No Known Problems Son    • No Known Problems Maternal Grandmother    • No Known Problems Paternal Grandmother    • No Known Problems Maternal Grandfather    • No Known Problems Paternal Grandfather    • Autism Grandson    • Colon cancer Neg Hx    • Rectal cancer Neg Hx    • Endometrial cancer Neg Hx    • Vaginal cancer Neg Hx    • Cervical cancer Neg Hx    • Ovarian cancer Neg Hx    • Prostate cancer Neg Hx    • Uterine cancer Neg Hx    • Malig Hyperthermia Neg Hx         Review of Systems:  CONSTITUTIONAL: No weight loss, fever, chills, weakness or fatigue.  HEENT: Eyes: No visual loss, blurred vision, double vision or yellow sclerae. Ears, Nose, Throat: No hearing loss, sneezing, congestion, runny nose or sore throat.  SKIN: No rash or itching.  CARDIOVASCULAR: No chest pain, chest pressure or chest discomfort. No palpitations or edema.  RESPIRATORY: No shortness of breath, cough or sputum.  GASTROINTESTINAL: No anorexia, nausea, vomiting or diarrhea. No abdominal pain or blood.  GENITOURINARY: No burning on urination  NEUROLOGICAL: No headache, dizziness, syncope, paralysis, ataxia, numbness or tingling in the extremities. No change in bowel or bladder control.  MUSCULOSKELETAL: No muscle, back pain, joint pain or stiffness.  HEMATOLOGIC: No anemia, bleeding or bruising.  LYMPHATICS: No enlarged nodes.  PSYCHIATRIC: No history of depression or anxiety.  ENDOCRINOLOGIC: No reports of sweating, cold or heat intolerance. No polyuria or  polydipsia.  ALLERGIES: No history of asthma, hives, eczema or rhinitis.    Physical Exam:  Vitals:    03/04/25 1434   BP: 128/76   Pulse: 95   SpO2: 98%         General: Alert, cooperative    HEENT: Atraumatic, normocephalic    Oral/Maxillofacial: Moist mucous membranes    Neck: Supple     Lungs: EWOB, clear to auscultation bilaterally     Heart: RRR    Breast: surgically absent bilaterally    Abdomen: Soft, non-tender, non-distended    Extremities: normal strength and sensation.  No obvious deformities.     Neuro: alert, normal speech, no focal findings or movement disorder noted     Skin: no lesions or abrasions      Recent Imaging:  NA    Assessment/Plan: Felicia Boyle is a 69 y.o.female with h/o bilateral cancer [id number] - pCR for right breast cancer, left with DCIS alone. breast cancer, JAYDEN. Surgery completed: 8/15/24 by Dr Manrique  - Port removal, consents updated  - RTC in August for follow up, skin check for recurrence.   - continue follow up with Lymphedema clinic PT/OT for monitoring symptoms, feels left arm is slightly larger than right      Bri Balbuena MD  Breast Surgical Oncology  I spent 10 minutes caring for Ms Boyle on this date of service. This time includes time spent by me in the following activities: preparing for the visit, reviewing tests, obtaining and/or reviewing a separately obtained history, performing a medically appropriate examination and/or evaluation , counseling and educating the patient/family/caregiver, ordering medications, tests, or procedures, referring and communicating with other health care professionals , documenting information in the medical record, independently interpreting results and communicating that information with the patient/family/caregiver, and care coordination.    Return in about 6 months (around 9/4/2025).

## 2025-03-06 ENCOUNTER — ANESTHESIA (OUTPATIENT)
Dept: PERIOP | Facility: HOSPITAL | Age: 70
End: 2025-03-06
Payer: COMMERCIAL

## 2025-03-06 ENCOUNTER — ANESTHESIA EVENT (OUTPATIENT)
Dept: PERIOP | Facility: HOSPITAL | Age: 70
End: 2025-03-06
Payer: COMMERCIAL

## 2025-03-06 ENCOUNTER — HOSPITAL ENCOUNTER (OUTPATIENT)
Facility: HOSPITAL | Age: 70
Setting detail: HOSPITAL OUTPATIENT SURGERY
Discharge: HOME | End: 2025-03-06
Attending: STUDENT IN AN ORGANIZED HEALTH CARE EDUCATION/TRAINING PROGRAM | Admitting: STUDENT IN AN ORGANIZED HEALTH CARE EDUCATION/TRAINING PROGRAM
Payer: COMMERCIAL

## 2025-03-06 ENCOUNTER — PATIENT OUTREACH (OUTPATIENT)
Dept: OTHER | Facility: HOSPITAL | Age: 70
End: 2025-03-06
Payer: COMMERCIAL

## 2025-03-06 VITALS
RESPIRATION RATE: 16 BRPM | OXYGEN SATURATION: 93 % | DIASTOLIC BLOOD PRESSURE: 62 MMHG | HEART RATE: 69 BPM | SYSTOLIC BLOOD PRESSURE: 117 MMHG | TEMPERATURE: 96.6 F

## 2025-03-06 DIAGNOSIS — C50.919 MALIGNANT NEOPLASM OF FEMALE BREAST, UNSPECIFIED ESTROGEN RECEPTOR STATUS, UNSPECIFIED LATERALITY, UNSPECIFIED SITE OF BREAST: Primary | ICD-10-CM

## 2025-03-06 DIAGNOSIS — Z17.1 MALIGNANT NEOPLASM OF LOWER-OUTER QUADRANT OF RIGHT BREAST OF FEMALE, ESTROGEN RECEPTOR NEGATIVE: ICD-10-CM

## 2025-03-06 DIAGNOSIS — C50.511 MALIGNANT NEOPLASM OF LOWER-OUTER QUADRANT OF RIGHT BREAST OF FEMALE, ESTROGEN RECEPTOR NEGATIVE: ICD-10-CM

## 2025-03-06 LAB
GLUCOSE BLDC GLUCOMTR-MCNC: 133 MG/DL (ref 70–130)
GLUCOSE BLDC GLUCOMTR-MCNC: 138 MG/DL (ref 70–130)

## 2025-03-06 PROCEDURE — 25010000002 LIDOCAINE 2% SOLUTION: Performed by: NURSE ANESTHETIST, CERTIFIED REGISTERED

## 2025-03-06 PROCEDURE — 25810000003 LACTATED RINGERS PER 1000 ML: Performed by: ANESTHESIOLOGY

## 2025-03-06 PROCEDURE — 82948 REAGENT STRIP/BLOOD GLUCOSE: CPT

## 2025-03-06 PROCEDURE — 25810000003 LACTATED RINGERS PER 1000 ML: Performed by: SURGERY

## 2025-03-06 PROCEDURE — 25010000002 PROPOFOL 500 MG/50ML EMULSION: Performed by: NURSE ANESTHETIST, CERTIFIED REGISTERED

## 2025-03-06 PROCEDURE — 25010000002 FENTANYL CITRATE (PF) 50 MCG/ML SOLUTION: Performed by: NURSE ANESTHETIST, CERTIFIED REGISTERED

## 2025-03-06 PROCEDURE — 25010000002 LIDOCAINE 1% - EPINEPHRINE 1:100000 1 %-1:100000 SOLUTION 30 ML VIAL: Performed by: STUDENT IN AN ORGANIZED HEALTH CARE EDUCATION/TRAINING PROGRAM

## 2025-03-06 PROCEDURE — 25010000002 ONDANSETRON PER 1 MG: Performed by: NURSE ANESTHETIST, CERTIFIED REGISTERED

## 2025-03-06 PROCEDURE — 25010000002 MIDAZOLAM PER 1 MG: Performed by: ANESTHESIOLOGY

## 2025-03-06 PROCEDURE — 36590 REMOVAL TUNNELED CV CATH: CPT | Performed by: STUDENT IN AN ORGANIZED HEALTH CARE EDUCATION/TRAINING PROGRAM

## 2025-03-06 PROCEDURE — 25010000002 BUPIVACAINE (PF) 0.5 % SOLUTION 10 ML VIAL: Performed by: STUDENT IN AN ORGANIZED HEALTH CARE EDUCATION/TRAINING PROGRAM

## 2025-03-06 PROCEDURE — 25010000002 CEFAZOLIN PER 500 MG: Performed by: SURGERY

## 2025-03-06 RX ORDER — PROMETHAZINE HYDROCHLORIDE 25 MG/1
25 TABLET ORAL ONCE AS NEEDED
Status: DISCONTINUED | OUTPATIENT
Start: 2025-03-06 | End: 2025-03-06 | Stop reason: HOSPADM

## 2025-03-06 RX ORDER — NALOXONE HCL 0.4 MG/ML
0.2 VIAL (ML) INJECTION AS NEEDED
Status: DISCONTINUED | OUTPATIENT
Start: 2025-03-06 | End: 2025-03-06 | Stop reason: HOSPADM

## 2025-03-06 RX ORDER — IPRATROPIUM BROMIDE AND ALBUTEROL SULFATE 2.5; .5 MG/3ML; MG/3ML
3 SOLUTION RESPIRATORY (INHALATION) ONCE AS NEEDED
Status: DISCONTINUED | OUTPATIENT
Start: 2025-03-06 | End: 2025-03-06 | Stop reason: HOSPADM

## 2025-03-06 RX ORDER — HYDROCODONE BITARTRATE AND ACETAMINOPHEN 7.5; 325 MG/1; MG/1
1 TABLET ORAL EVERY 4 HOURS PRN
Status: DISCONTINUED | OUTPATIENT
Start: 2025-03-06 | End: 2025-03-06 | Stop reason: HOSPADM

## 2025-03-06 RX ORDER — HYDRALAZINE HYDROCHLORIDE 20 MG/ML
5 INJECTION INTRAMUSCULAR; INTRAVENOUS
Status: DISCONTINUED | OUTPATIENT
Start: 2025-03-06 | End: 2025-03-06 | Stop reason: HOSPADM

## 2025-03-06 RX ORDER — LIDOCAINE HYDROCHLORIDE 10 MG/ML
0.5 INJECTION, SOLUTION INFILTRATION; PERINEURAL ONCE AS NEEDED
Status: DISCONTINUED | OUTPATIENT
Start: 2025-03-06 | End: 2025-03-06 | Stop reason: HOSPADM

## 2025-03-06 RX ORDER — SODIUM CHLORIDE 0.9 % (FLUSH) 0.9 %
3 SYRINGE (ML) INJECTION EVERY 12 HOURS SCHEDULED
Status: DISCONTINUED | OUTPATIENT
Start: 2025-03-06 | End: 2025-03-06 | Stop reason: HOSPADM

## 2025-03-06 RX ORDER — ONDANSETRON 2 MG/ML
4 INJECTION INTRAMUSCULAR; INTRAVENOUS ONCE AS NEEDED
Status: COMPLETED | OUTPATIENT
Start: 2025-03-06 | End: 2025-03-06

## 2025-03-06 RX ORDER — PROPOFOL 10 MG/ML
INJECTION, EMULSION INTRAVENOUS AS NEEDED
Status: DISCONTINUED | OUTPATIENT
Start: 2025-03-06 | End: 2025-03-06 | Stop reason: SURG

## 2025-03-06 RX ORDER — FENTANYL CITRATE 50 UG/ML
25 INJECTION, SOLUTION INTRAMUSCULAR; INTRAVENOUS
Status: DISCONTINUED | OUTPATIENT
Start: 2025-03-06 | End: 2025-03-06 | Stop reason: HOSPADM

## 2025-03-06 RX ORDER — PROPOFOL 10 MG/ML
INJECTION, EMULSION INTRAVENOUS CONTINUOUS PRN
Status: DISCONTINUED | OUTPATIENT
Start: 2025-03-06 | End: 2025-03-06 | Stop reason: SURG

## 2025-03-06 RX ORDER — SODIUM CHLORIDE 0.9 % (FLUSH) 0.9 %
3-10 SYRINGE (ML) INJECTION AS NEEDED
Status: DISCONTINUED | OUTPATIENT
Start: 2025-03-06 | End: 2025-03-06 | Stop reason: HOSPADM

## 2025-03-06 RX ORDER — ONDANSETRON 2 MG/ML
INJECTION INTRAMUSCULAR; INTRAVENOUS AS NEEDED
Status: DISCONTINUED | OUTPATIENT
Start: 2025-03-06 | End: 2025-03-06 | Stop reason: SURG

## 2025-03-06 RX ORDER — ATROPINE SULFATE 0.4 MG/ML
0.4 INJECTION, SOLUTION INTRAMUSCULAR; INTRAVENOUS; SUBCUTANEOUS ONCE AS NEEDED
Status: DISCONTINUED | OUTPATIENT
Start: 2025-03-06 | End: 2025-03-06 | Stop reason: HOSPADM

## 2025-03-06 RX ORDER — SODIUM CHLORIDE, SODIUM LACTATE, POTASSIUM CHLORIDE, CALCIUM CHLORIDE 600; 310; 30; 20 MG/100ML; MG/100ML; MG/100ML; MG/100ML
100 INJECTION, SOLUTION INTRAVENOUS CONTINUOUS
Status: DISCONTINUED | OUTPATIENT
Start: 2025-03-06 | End: 2025-03-06 | Stop reason: HOSPADM

## 2025-03-06 RX ORDER — HYDROMORPHONE HYDROCHLORIDE 1 MG/ML
0.25 INJECTION, SOLUTION INTRAMUSCULAR; INTRAVENOUS; SUBCUTANEOUS
Status: DISCONTINUED | OUTPATIENT
Start: 2025-03-06 | End: 2025-03-06 | Stop reason: HOSPADM

## 2025-03-06 RX ORDER — EPHEDRINE SULFATE 50 MG/ML
5 INJECTION, SOLUTION INTRAVENOUS ONCE AS NEEDED
Status: DISCONTINUED | OUTPATIENT
Start: 2025-03-06 | End: 2025-03-06 | Stop reason: HOSPADM

## 2025-03-06 RX ORDER — SODIUM CHLORIDE, SODIUM LACTATE, POTASSIUM CHLORIDE, CALCIUM CHLORIDE 600; 310; 30; 20 MG/100ML; MG/100ML; MG/100ML; MG/100ML
9 INJECTION, SOLUTION INTRAVENOUS CONTINUOUS
Status: DISCONTINUED | OUTPATIENT
Start: 2025-03-06 | End: 2025-03-06 | Stop reason: HOSPADM

## 2025-03-06 RX ORDER — HYDROCODONE BITARTRATE AND ACETAMINOPHEN 5; 325 MG/1; MG/1
1 TABLET ORAL ONCE AS NEEDED
Status: DISCONTINUED | OUTPATIENT
Start: 2025-03-06 | End: 2025-03-06 | Stop reason: HOSPADM

## 2025-03-06 RX ORDER — LIDOCAINE HYDROCHLORIDE 20 MG/ML
INJECTION, SOLUTION INFILTRATION; PERINEURAL AS NEEDED
Status: DISCONTINUED | OUTPATIENT
Start: 2025-03-06 | End: 2025-03-06 | Stop reason: SURG

## 2025-03-06 RX ORDER — PROMETHAZINE HYDROCHLORIDE 25 MG/1
25 SUPPOSITORY RECTAL ONCE AS NEEDED
Status: DISCONTINUED | OUTPATIENT
Start: 2025-03-06 | End: 2025-03-06 | Stop reason: HOSPADM

## 2025-03-06 RX ORDER — DIPHENHYDRAMINE HYDROCHLORIDE 50 MG/ML
12.5 INJECTION INTRAMUSCULAR; INTRAVENOUS
Status: DISCONTINUED | OUTPATIENT
Start: 2025-03-06 | End: 2025-03-06 | Stop reason: HOSPADM

## 2025-03-06 RX ORDER — BUPIVACAINE HCL/0.9 % NACL/PF 0.125 %
PLASTIC BAG, INJECTION (ML) EPIDURAL AS NEEDED
Status: DISCONTINUED | OUTPATIENT
Start: 2025-03-06 | End: 2025-03-06 | Stop reason: SURG

## 2025-03-06 RX ORDER — FENTANYL CITRATE 50 UG/ML
INJECTION, SOLUTION INTRAMUSCULAR; INTRAVENOUS AS NEEDED
Status: DISCONTINUED | OUTPATIENT
Start: 2025-03-06 | End: 2025-03-06 | Stop reason: SURG

## 2025-03-06 RX ORDER — MIDAZOLAM HYDROCHLORIDE 1 MG/ML
0.5 INJECTION, SOLUTION INTRAMUSCULAR; INTRAVENOUS
Status: DISCONTINUED | OUTPATIENT
Start: 2025-03-06 | End: 2025-03-06 | Stop reason: HOSPADM

## 2025-03-06 RX ORDER — FAMOTIDINE 10 MG/ML
20 INJECTION, SOLUTION INTRAVENOUS ONCE
Status: COMPLETED | OUTPATIENT
Start: 2025-03-06 | End: 2025-03-06

## 2025-03-06 RX ORDER — LABETALOL HYDROCHLORIDE 5 MG/ML
5 INJECTION, SOLUTION INTRAVENOUS
Status: DISCONTINUED | OUTPATIENT
Start: 2025-03-06 | End: 2025-03-06 | Stop reason: HOSPADM

## 2025-03-06 RX ORDER — FLUMAZENIL 0.1 MG/ML
0.2 INJECTION INTRAVENOUS AS NEEDED
Status: DISCONTINUED | OUTPATIENT
Start: 2025-03-06 | End: 2025-03-06 | Stop reason: HOSPADM

## 2025-03-06 RX ADMIN — FAMOTIDINE 20 MG: 10 INJECTION INTRAVENOUS at 07:49

## 2025-03-06 RX ADMIN — PROPOFOL 50 MG: 10 INJECTION, EMULSION INTRAVENOUS at 08:17

## 2025-03-06 RX ADMIN — FENTANYL CITRATE 50 MCG: 50 INJECTION, SOLUTION INTRAMUSCULAR; INTRAVENOUS at 08:17

## 2025-03-06 RX ADMIN — LIDOCAINE HYDROCHLORIDE 50 MG: 20 INJECTION, SOLUTION INFILTRATION; PERINEURAL at 08:02

## 2025-03-06 RX ADMIN — Medication 100 MCG: at 08:40

## 2025-03-06 RX ADMIN — Medication 100 MCG: at 08:32

## 2025-03-06 RX ADMIN — ONDANSETRON 4 MG: 2 INJECTION, SOLUTION INTRAMUSCULAR; INTRAVENOUS at 08:09

## 2025-03-06 RX ADMIN — PROPOFOL 50 MG: 10 INJECTION, EMULSION INTRAVENOUS at 08:03

## 2025-03-06 RX ADMIN — SODIUM CHLORIDE, POTASSIUM CHLORIDE, SODIUM LACTATE AND CALCIUM CHLORIDE: 600; 310; 30; 20 INJECTION, SOLUTION INTRAVENOUS at 07:48

## 2025-03-06 RX ADMIN — Medication 100 MCG: at 08:36

## 2025-03-06 RX ADMIN — Medication 100 MCG: at 08:29

## 2025-03-06 RX ADMIN — MIDAZOLAM HYDROCHLORIDE 0.5 MG: 1 INJECTION, SOLUTION INTRAMUSCULAR; INTRAVENOUS at 07:50

## 2025-03-06 RX ADMIN — PROPOFOL 120 MCG/KG/MIN: 10 INJECTION, EMULSION INTRAVENOUS at 08:03

## 2025-03-06 RX ADMIN — SODIUM CHLORIDE, SODIUM LACTATE, POTASSIUM CHLORIDE, AND CALCIUM CHLORIDE 100 ML/HR: 600; 310; 30; 20 INJECTION, SOLUTION INTRAVENOUS at 07:49

## 2025-03-06 RX ADMIN — ONDANSETRON 4 MG: 2 INJECTION INTRAMUSCULAR; INTRAVENOUS at 09:43

## 2025-03-06 RX ADMIN — CEFAZOLIN 2000 MG: 2 INJECTION, POWDER, FOR SOLUTION INTRAMUSCULAR; INTRAVENOUS at 07:50

## 2025-03-06 RX ADMIN — Medication 100 MCG: at 08:26

## 2025-03-06 NOTE — OP NOTE
REMOVAL VENOUS ACCESS DEVICE  Procedure Report    Patient Name:  Felicia Boyle  YOB: 1955    Date of Surgery:  3/6/2025      Pre-op Diagnosis:   Malignant neoplasm of lower-outer quadrant of right breast of female, estrogen receptor negative [C50.511, Z17.1]    Post-Op Diagnosis Codes:     * Malignant neoplasm of lower-outer quadrant of right breast of female, estrogen receptor negative [C50.511, Z17.1]     Procedure(s):  LEFT port removal         Staff:  Surgeon(s):  Bri Balbuena MD         Anesthesia: Monitored Anesthesia Care    Estimated Blood Loss: minimal    Implants:    Nothing was implanted during the procedure    Specimen:          None    Findings: port entirely removed    Complications: none    Description of Procedure:   The risks and benefits of the procedure were explained in detail to the patient.  Informed consent was obtained.  The patient was taken to the operating room and placed supine on the table and sequential compression devices were applied.  The chest and neck were prepped and draped in the standard surgical sterile fashion.  An appropriate time out procedure was performed, the skin overlying the previous incision on the left upper chest was anesthetized with local anesthetic (lidocaine/marcaine with epinephrine mix).  An incision was made directly over the previous scar.  Dissection was carried out through the subcutaneous tissue with the use of blunt and sharp dissection to identify the underlying chemoport.  Electrocautery and sharp dissection was utilized to dissect the port from its position within the subcutaneous tissue. A U stitch with 2-0 vicryl was placed along the path of the catheter.  The patient was moved into a trendelenberg position prior to removal of the catheter and port.  The port and catheter were removed intact and the u stitch was then tied and pressure held on the catheter path for 5 minutes to ensure proper hemostasis.   Once hemostasis from the  wound and the catheter tract was ensured, the wound edges were trimmed as needed then closed with a deep absorbable 3-0 vicryl suture and then a running 4-0 monocryl subcuticular stitch.  Dermabond was then applied.  The patient tolerated the procedure well and was transported to the post anesthesia care unit in stable condition.         Bri Balbuena MD     Date: 3/6/2025  Time: 15:31 EST

## 2025-03-06 NOTE — ANESTHESIA POSTPROCEDURE EVALUATION
Patient: Felicia Boyle    Procedure Summary       Date: 03/06/25 Room / Location: [facility] OR [address] [place] [address] MAIN OR    Anesthesia Start: 0756 Anesthesia Stop: 0850    Procedure: LEFT port removal (Left) Diagnosis:       Malignant neoplasm of lower-outer quadrant of right breast of female, estrogen receptor negative      (Malignant neoplasm of lower-outer quadrant of right breast of female, estrogen receptor negative [C50.511, Z17.1])    Surgeons: Bri Balbuena MD Provider: Nereyda Singh MD    Anesthesia Type: MAC ASA Status: 2            Anesthesia Type: MAC    Vitals  Vitals Value Taken Time   /75 03/06/25 1000   Temp 35.9 °C (96.6 °F) 03/06/25 0848   Pulse 77 03/06/25 1007   Resp 16 03/06/25 0930   SpO2 97 % 03/06/25 1007   Vitals shown include unfiled device data.        Post Anesthesia Care and Evaluation    Patient location during evaluation: bedside  Patient participation: complete - patient participated  Level of consciousness: awake  Pain management: adequate    Airway patency: patent  Anesthetic complications: No anesthetic complications    Cardiovascular status: acceptable  Respiratory status: acceptable  Hydration status: acceptable    Comments: /62   Pulse 69   Temp 35.9 °C (96.6 °F) (Rectal)   Resp 16   LMP  (LMP Unknown)   SpO2 93%

## 2025-03-06 NOTE — H&P (VIEW-ONLY)
Breast Surgery Follow Up Note    Oncologic History:  Felicia Boyle is a 69 y.o. female with the following oncologic history:  Oncology/Hematology History   Breast cancer of lower-outer quadrant of right female breast   2/14/2024 Initial Diagnosis    Breast cancer of lower-outer quadrant of right female breast     3/18/2024 -  Chemotherapy    OP BREAST Pertuzumab / Trastuzumab + (Weekly PACLitaxel x 4 Cycles) Q21D     Bilateral malignant neoplasm of breast in female   9/1/2017 Genetic Testing     Genetics ([facility])  Genetic result: Negative, no clinically significant mutation identified.     12/30/2019 Imaging    BONE MINERAL DENSITOMETRY     HISTORY: Postmenopausal     COMPARISON: BMD 01/22/2018, 01/19/2016     TECHNIQUE: The T score compares the patient's bone mineral density with  the peak bone mass of young normal patients. Patients with T-scores  between 1.0 and 2.5 standard deviations below the mean are osteopenic.  Patients with T-scores greater than 2.5 standard deviation below the  mean are osteoporotic.     The Z score compares the patient's bone mineral density with sex and age  matched patients. Z score of -2.0 and lower is an indication of low  mineral density for the patient's age.     FINDINGS:   LUMBAR SPINE:  The BMD measured in the L1-L4 is 0.699 g/cm2 for a  T-score of -3.2 and a Z-score of -1.4     LEFT HIP: The BMD for the femoral neck is 0.543g/cm2 for a T score of   -2.8 and a Z score of -1.2     RIGHT HIP:  The BMD for the femoral neck is 0.512g/cm2 for a T score of  -3.0 and a Z score of -1.5     IMPRESSION:  1. Osteoporosis.  2. When compared to the prior exam of 01/22/2018 and baseline exam of  01/19/2016, there has been no statistically significant change.     1/31/2024 Initial Diagnosis    Bilateral malignant neoplasm of breast in female     1/31/2024 Biopsy    Final Diagnosis   1.  Breast, right, 8:00, 8 cm from nipple, biopsy: Invasive ductal adenocarcinoma                -A.  The tumor  is Laurens grade III (tubular grade 3, nuclear grade 3, mitotic grade 3).               -B.  The tumor measures up to 9.3 mm on the slide               -C.  No definitive lymphovascular invasion or perineural invasion is identified               -D.  Ductal carcinoma in situ is not identified               -E.  Microcalcifications are not identified     Estrogen receptor: <1%  Progesterone receptor: <1%  HER2 status: Positive, 3+, IHC  Ki 67: 70%     2/26/2024 Biopsy    Final Diagnosis   1.  Left breast, 12:00, MRI-guided biopsies for non-mass enhancement: INTERMEDIATE GRADE DUCTAL CARCINOMA IN SITU (DCIS) WITH APOCRINE FEATURES.               -Architectural patterns: Solid and cribriform with central necrosis and associated microcalcifications.               -DCIS involves sclerosing adenosis in multiple cores and measures up to 7 mm maximally.               -No evidence of invasion identified.     2.  Right breast, 12:00, MRI-guided biopsies for non-mass enhancement:               -Sclerosing adenosis with associated microcalcifications.               -No atypical hyperplasia, in situ nor invasive carcinoma identified.     3.  Right breast, 8:00, MRI-guided biopsies for linear enhancement: HIGH GRADE DUCTAL CARCINOMA IN SITU (DCIS).  -Architectural patterns: Solid, comedo and clinging with lobular extension, central comedonecrosis and associated microcalcifications.               -DCIS involves multiple tissue cores measuring up to 3 mm.               -Sclerosing adenosis and usual ductal hyperplasia with associated microcalcifications.               -No evidence of invasive carcinoma identified.        Estrogen receptor: %  Progesterone receptor: 81-90%  Ki 67: 15%     3/18/2024 -  Chemotherapy    3/18/2024-received first dose of Herceptin and Perjeta, had a severe hypersensitivity reaction to Taxol and hence Taxol was discontinued     3/25/2024-cycle 1 abraxane    4/8/24-abraxane dose reduced 20% due to  elevated LFTs    6/17/24- last cycle abraxane    7/1/24-continue herceptin perjeta every three weeks     8/15/2024 Surgery    Final Diagnosis   1.  [place] lymph node #1, right axilla, excision:               -1 lymph node, negative for metastatic carcinoma (0/1).     2.  [place] lymph node #2, right axilla, excision:               -3 lymph nodes, negative for metastatic carcinoma (0/3).     3.  Right breast, oriented simple skin sparing mastectomy status post neoadjuvant therapy (907 g): SINGLE FOCUS OF HIGH GRADE DUCTAL CARCINOMA IN SITU (DCIS).               -Architectural patterns: Solid with focal necrosis and associated microcalcifications.               -Extent of DCIS: 2.0 mm focus in a single tissue block.               -20 mm fibrous tumor bed with sclerosing adenosis, microcysts and microcalcifications.               -Unremarkable skin and nipple.               -All margins widely free of DCIS (see synoptic template).               -No evidence of residual invasive carcinoma identified.               -Pathologic stage: ypTis, N(sn)0, pCR (see Comment).     4.  Left breast, oriented simple skin sparing mastectomy status post neoadjuvant therapy (850 g):               -Fat necrosis, biopsy site changes, sclerosing adenosis and changes consistent with treatment effect.               -Microcalcifications present associated with benign ducts and sclerosing adenosis.               -Unremarkable skin and nipple.               -No evidence of residual in situ nor invasive carcinoma identified.                    5.  [place] lymph node #1, left axilla, excision:               -1 lymph node, negative for metastatic carcinoma (0/1).               Interval History: she has now completed chemotherapy - last dose 2/24/25. Surgery is complete. All incisions well healed. No radiation required. Was diagnosed with bilateral breast cancer - left DCIS and right ER-OH-HER2+ breast cancer with an excellent response to  neoadjuvant therapy.     Past Medical History:   Diagnosis Date    Anxiety about health     Breast cancer of lower-outer quadrant of right female breast 02/14/2024    DCIS (ductal carcinoma in situ) of breast 03/15/2024    Diverticulosis     History of chemotherapy 06/22/2024    Mixed hyperlipidemia 05/19/2019    DIET CONTROLLED    Osteoporosis     Port-A-Cath in place     Type 2 diabetes mellitus      Patient Active Problem List   Diagnosis    Age-related osteoporosis without current pathological fracture    FH: breast cancer in first degree relative    Post-menopausal    Type 2 diabetes mellitus without complication, without long-term current use of insulin    Vitamin D deficiency    Mixed hyperlipidemia    Breast cancer of lower-outer quadrant of right female breast    Encounter for fitting and adjustment of vascular catheter    DCIS (ductal carcinoma in situ) of breast    Bilateral malignant neoplasm of breast in female    S/P bilateral mastectomy    Port-A-Cath in place     Current Outpatient Medications on File Prior to Visit   Medication Sig Dispense Refill    b complex vitamins capsule Take 1 capsule by mouth Daily. (Patient taking differently: Take 1 capsule by mouth Daily. HOLD PER MD BALDWIN) 30 capsule 5    Blood Glucose Monitoring Suppl (FreeStyle Lite) w/Device kit 1 Units Daily. 1 kit 0    ciclopirox (LOPROX) 0.77 % cream APPLY TO THE SKIN OF BOTH FEET TWICE DAILY FOR 2 TO 4 WEEKS. AND AFFECTED TOENAILS TWICE DAILY FOR 6 TO 12 MONTHS      DULoxetine (CYMBALTA) 20 MG capsule Take 1 cap ( 20mg) daily for 7 days, then 1 cap ( 20 mg) every other day for 7 days, then 1 cap every 72 hours for 7 days, then stop. 14 capsule 0    glucose blood (FREESTYLE LITE) test strip TEST ONCE DAILY 100 each 2    HYDROcodone-acetaminophen (NORCO) 5-325 MG per tablet Take 1-2 tablets by mouth Every 4 (Four) to 6 (Six) Hours As Needed for pain.  Wean to tylenol. (Patient taking differently: Take 1-2 tablets by mouth As  Needed.) 20 tablet 0    Lancets (freestyle) lancets Use TO CHECK BLOOD SUGAR ONCE DAILY 100 each 2    lidocaine-prilocaine (EMLA) 2.5-2.5 % cream Apply 1 Application topically to the appropriate area as directed Take As Directed. Apply nickel size amount to port site 30 min before appt time do not rub in cover with plastic wrap 30 g 5    lisinopril (PRINIVIL,ZESTRIL) 10 MG tablet Take 1 tablet by mouth Daily. 90 tablet 1    mupirocin (BACTROBAN) 2 % ointment Apply 1 Application topically to the appropriate area as directed 3 (Three) Times a Day. 15 g 1    neomycin-polymyxin-dexamethamethasone (POLYDEX) 3.5-94346-7.1 ointment ophthalmic ointment INSTILL IN RIGHT EYE TWICE DAILY AS DIRECTED FOR 2 WEEKS THEN STOP      promethazine-dextromethorphan (PROMETHAZINE-DM) 6.25-15 MG/5ML syrup Take 5 mL by mouth 4 (Four) Times a Day As Needed for Cough. 240 mL 0    SITagliptin (Januvia) 100 MG tablet Take 1 tablet by mouth Daily. 30 tablet 5    triamcinolone (KENALOG) 0.1 % ointment Apply 1 Application topically to the appropriate area as directed 2 (Two) Times a Day. 30 g 1    urea (CARMOL) 10 % cream Apply 1 Application topically to the appropriate area as directed As Needed for Dry Skin. 71 g 2     No current facility-administered medications on file prior to visit.     Allergies   Allergen Reactions    Paclitaxel Shortness Of Breath    Metronidazole Nausea And Vomiting       Past Surgical History:   Procedure Laterality Date    BREAST BIOPSY Left     Excisional biopsy in the [facility]    BREAST BIOPSY  03/2024    CATARACT EXTRACTION W/ INTRAOCULAR LENS IMPLANT Bilateral     CHOLECYSTECTOMY  2012    COLON RESECTION Right 2002    COLONOSCOPY N/A 04/28/2021    Procedure: COLONOSCOPY TO ANASTAMOSIS/TI;  Surgeon: Angelo Gonsalez MD;  Location: [facility] ENDOSCOPY;  Service: Gastroenterology;  Laterality: N/A;  PRE: SCREENING  POST: NORMAL POST-OP ANATOMY    ENDOSCOPY      2013    ENDOSCOPY N/A 03/10/2017    Procedure:  "ESOPHAGOGASTRODUODENOSCOPY WITH BIOPSY AND PETER DILATATION 54\";  Surgeon: Angelo Gonsalez MD;  [facility];  Service:     ENDOSCOPY N/A 2021    Procedure: ESOPHAGOGASTRODUODENOSCOPY WITH BIOPSIES, 54FR PETER DILATATION;  Surgeon: Angelo Gonsalez MD;  [facility];  Service: Gastroenterology;  Laterality: N/A;  PRE: DYSPHAGIA  POST: GASTRITIS, ESOPHAGEAL RING    MASTECTOMY W/ SENTINEL NODE BIOPSY Bilateral 8/15/2024    Procedure: Bilateral total mastectomy, bilateral sentinel node biopsy, possible axillary node dissection, no reconstruction.;  Surgeon: Abeba Manrique MD;  Location: [facility] MAIN OR;  Service: General;  Laterality: Bilateral;    VENOUS ACCESS DEVICE (PORT) INSERTION Left 3/14/2024    Procedure: INSERTION OF PORTACATH;  Surgeon: Abeba Manrique MD;  Location: [facility] MAIN OR;  Service: General;  Laterality: Left;     Social History     Socioeconomic History    Marital status:      Spouse name: Ryan   Tobacco Use    Smoking status: Former     Current packs/day: 0.00     Average packs/day: 0.3 packs/day for 52.0 years (13.0 ttl pk-yrs)     Types: Cigarettes     Start date:      Quit date: 1/15/2024     Years since quittin.1     Passive exposure: Past    Smokeless tobacco: Never    Tobacco comments:     N/A   Vaping Use    Vaping status: Never Used   Substance and Sexual Activity    Alcohol use: Yes     Comment: RARE    Drug use: No    Sexual activity: Not Currently     Partners: Male     Birth control/protection: Tubal ligation     Family History   Problem Relation Age of Onset    Hypertension Mother     Diabetes Mother     Hypertension Father     Breast cancer Sister 48    Breast cancer Sister 38    No Known Problems Brother     No Known Problems Daughter     No Known Problems Son     No Known Problems Maternal Grandmother     No Known Problems Paternal Grandmother     No Known Problems Maternal Grandfather     No Known Problems Paternal " Grandfather     Autism Grandson     Colon cancer Neg Hx     Rectal cancer Neg Hx     Endometrial cancer Neg Hx     Vaginal cancer Neg Hx     Cervical cancer Neg Hx     Ovarian cancer Neg Hx     Prostate cancer Neg Hx     Uterine cancer Neg Hx     Malig Hyperthermia Neg Hx         Review of Systems:  CONSTITUTIONAL: No weight loss, fever, chills, weakness or fatigue.  HEENT: Eyes: No visual loss, blurred vision, double vision or yellow sclerae. Ears, Nose, Throat: No hearing loss, sneezing, congestion, runny nose or sore throat.  SKIN: No rash or itching.  CARDIOVASCULAR: No chest pain, chest pressure or chest discomfort. No palpitations or edema.  RESPIRATORY: No shortness of breath, cough or sputum.  GASTROINTESTINAL: No anorexia, nausea, vomiting or diarrhea. No abdominal pain or blood.  GENITOURINARY: No burning on urination  NEUROLOGICAL: No headache, dizziness, syncope, paralysis, ataxia, numbness or tingling in the extremities. No change in bowel or bladder control.  MUSCULOSKELETAL: No muscle, back pain, joint pain or stiffness.  HEMATOLOGIC: No anemia, bleeding or bruising.  LYMPHATICS: No enlarged nodes.  PSYCHIATRIC: No history of depression or anxiety.  ENDOCRINOLOGIC: No reports of sweating, cold or heat intolerance. No polyuria or polydipsia.  ALLERGIES: No history of asthma, hives, eczema or rhinitis.    Physical Exam:  Vitals:    03/04/25 1434   BP: 128/76   Pulse: 95   SpO2: 98%         General: Alert, cooperative    HEENT: Atraumatic, normocephalic    Oral/Maxillofacial: Moist mucous membranes    Neck: Supple     Lungs: EWOB, clear to auscultation bilaterally     Heart: RRR    Breast: surgically absent bilaterally    Abdomen: Soft, non-tender, non-distended    Extremities: normal strength and sensation.  No obvious deformities.     Neuro: alert, normal speech, no focal findings or movement disorder noted     Skin: no lesions or abrasions      Recent Imaging:  NA    Assessment/Plan: Felicia Boyle is  a 69 y.o.female with h/o bilateral cancer [id number] - pCR for right breast cancer, left with DCIS alone. breast cancer, JAYDEN. Surgery completed: 8/15/24 by Dr Manrique  - Port removal, consents updated  - RTC in August for follow up, skin check for recurrence.   - continue follow up with Lymphedema clinic PT/OT for monitoring symptoms, feels left arm is slightly larger than right      Bri Balbuena MD  Breast Surgical Oncology  I spent 10 minutes caring for Ms Boyle on this date of service. This time includes time spent by me in the following activities: preparing for the visit, reviewing tests, obtaining and/or reviewing a separately obtained history, performing a medically appropriate examination and/or evaluation , counseling and educating the patient/family/caregiver, ordering medications, tests, or procedures, referring and communicating with other health care professionals , documenting information in the medical record, independently interpreting results and communicating that information with the patient/family/caregiver, and care coordination.    Return in about 6 months (around 9/4/2025).

## 2025-03-06 NOTE — PROGRESS NOTES
Breast Surgery Follow Up Note    Oncologic History:  Felicia Boyle is a 69 y.o. female with the following oncologic history:  Oncology/Hematology History   Breast cancer of lower-outer quadrant of right female breast   2/14/2024 Initial Diagnosis    Breast cancer of lower-outer quadrant of right female breast     3/18/2024 -  Chemotherapy    OP BREAST Pertuzumab / Trastuzumab + (Weekly PACLitaxel x 4 Cycles) Q21D     Bilateral malignant neoplasm of breast in female   9/1/2017 Genetic Testing     Genetics ([facility])  Genetic result: Negative, no clinically significant mutation identified.     12/30/2019 Imaging    BONE MINERAL DENSITOMETRY     HISTORY: Postmenopausal     COMPARISON: BMD 01/22/2018, 01/19/2016     TECHNIQUE: The T score compares the patient's bone mineral density with  the peak bone mass of young normal patients. Patients with T-scores  between 1.0 and 2.5 standard deviations below the mean are osteopenic.  Patients with T-scores greater than 2.5 standard deviation below the  mean are osteoporotic.     The Z score compares the patient's bone mineral density with sex and age  matched patients. Z score of -2.0 and lower is an indication of low  mineral density for the patient's age.     FINDINGS:   LUMBAR SPINE:  The BMD measured in the L1-L4 is 0.699 g/cm2 for a  T-score of -3.2 and a Z-score of -1.4     LEFT HIP: The BMD for the femoral neck is 0.543g/cm2 for a T score of   -2.8 and a Z score of -1.2     RIGHT HIP:  The BMD for the femoral neck is 0.512g/cm2 for a T score of  -3.0 and a Z score of -1.5     IMPRESSION:  1. Osteoporosis.  2. When compared to the prior exam of 01/22/2018 and baseline exam of  01/19/2016, there has been no statistically significant change.     1/31/2024 Initial Diagnosis    Bilateral malignant neoplasm of breast in female     1/31/2024 Biopsy    Final Diagnosis   1.  Breast, right, 8:00, 8 cm from nipple, biopsy: Invasive ductal adenocarcinoma                -A.  The tumor  is [place] grade III (tubular grade 3, nuclear grade 3, mitotic grade 3).               -B.  The tumor measures up to 9.3 mm on the slide               -C.  No definitive lymphovascular invasion or perineural invasion is identified               -D.  Ductal carcinoma in situ is not identified               -E.  Microcalcifications are not identified     Estrogen receptor: <1%  Progesterone receptor: <1%  HER2 status: Positive, 3+, IHC  Ki 67: 70%     2/26/2024 Biopsy    Final Diagnosis   1.  Left breast, 12:00, MRI-guided biopsies for non-mass enhancement: INTERMEDIATE GRADE DUCTAL CARCINOMA IN SITU (DCIS) WITH APOCRINE FEATURES.               -Architectural patterns: Solid and cribriform with central necrosis and associated microcalcifications.               -DCIS involves sclerosing adenosis in multiple cores and measures up to 7 mm maximally.               -No evidence of invasion identified.     2.  Right breast, 12:00, MRI-guided biopsies for non-mass enhancement:               -Sclerosing adenosis with associated microcalcifications.               -No atypical hyperplasia, in situ nor invasive carcinoma identified.     3.  Right breast, 8:00, MRI-guided biopsies for linear enhancement: HIGH GRADE DUCTAL CARCINOMA IN SITU (DCIS).  -Architectural patterns: Solid, comedo and clinging with lobular extension, central comedonecrosis and associated microcalcifications.               -DCIS involves multiple tissue cores measuring up to 3 mm.               -Sclerosing adenosis and usual ductal hyperplasia with associated microcalcifications.               -No evidence of invasive carcinoma identified.        Estrogen receptor: %  Progesterone receptor: 81-90%  Ki 67: 15%     3/18/2024 -  Chemotherapy    3/18/2024-received first dose of Herceptin and Perjeta, had a severe hypersensitivity reaction to Taxol and hence Taxol was discontinued     3/25/2024-cycle 1 abraxane    4/8/24-abraxane dose reduced 20% due to  elevated LFTs    6/17/24- last cycle abraxane    7/1/24-continue herceptin perjeta every three weeks     8/15/2024 Surgery    Final Diagnosis   1.  [place] lymph node #1, right axilla, excision:               -1 lymph node, negative for metastatic carcinoma (0/1).     2.  [place] lymph node #2, right axilla, excision:               -3 lymph nodes, negative for metastatic carcinoma (0/3).     3.  Right breast, oriented simple skin sparing mastectomy status post neoadjuvant therapy (907 g): SINGLE FOCUS OF HIGH GRADE DUCTAL CARCINOMA IN SITU (DCIS).               -Architectural patterns: Solid with focal necrosis and associated microcalcifications.               -Extent of DCIS: 2.0 mm focus in a single tissue block.               -20 mm fibrous tumor bed with sclerosing adenosis, microcysts and microcalcifications.               -Unremarkable skin and nipple.               -All margins widely free of DCIS (see synoptic template).               -No evidence of residual invasive carcinoma identified.               -Pathologic stage: ypTis, N(sn)0, pCR (see Comment).     4.  Left breast, oriented simple skin sparing mastectomy status post neoadjuvant therapy (850 g):               -Fat necrosis, biopsy site changes, sclerosing adenosis and changes consistent with treatment effect.               -Microcalcifications present associated with benign ducts and sclerosing adenosis.               -Unremarkable skin and nipple.               -No evidence of residual in situ nor invasive carcinoma identified.                    5.  [place] lymph node #1, left axilla, excision:               -1 lymph node, negative for metastatic carcinoma (0/1).               Interval History: she has now completed chemotherapy - last dose 2/24/25. Surgery is complete. All incisions well healed. No radiation required. Was diagnosed with bilateral breast cancer - left DCIS and right ER-AR-HER2+ breast cancer with an excellent response to  neoadjuvant therapy.     Past Medical History:   Diagnosis Date    Anxiety about health     Breast cancer of lower-outer quadrant of right female breast 02/14/2024    DCIS (ductal carcinoma in situ) of breast 03/15/2024    Diverticulosis     History of chemotherapy 06/22/2024    Mixed hyperlipidemia 05/19/2019    DIET CONTROLLED    Osteoporosis     Port-A-Cath in place     Type 2 diabetes mellitus      Patient Active Problem List   Diagnosis    Age-related osteoporosis without current pathological fracture    FH: breast cancer in first degree relative    Post-menopausal    Type 2 diabetes mellitus without complication, without long-term current use of insulin    Vitamin D deficiency    Mixed hyperlipidemia    Breast cancer of lower-outer quadrant of right female breast    Encounter for fitting and adjustment of vascular catheter    DCIS (ductal carcinoma in situ) of breast    Bilateral malignant neoplasm of breast in female    S/P bilateral mastectomy    Port-A-Cath in place     Current Outpatient Medications on File Prior to Visit   Medication Sig Dispense Refill    b complex vitamins capsule Take 1 capsule by mouth Daily. (Patient taking differently: Take 1 capsule by mouth Daily. HOLD PER MD BALDWIN) 30 capsule 5    Blood Glucose Monitoring Suppl (FreeStyle Lite) w/Device kit 1 Units Daily. 1 kit 0    ciclopirox (LOPROX) 0.77 % cream APPLY TO THE SKIN OF BOTH FEET TWICE DAILY FOR 2 TO 4 WEEKS. AND AFFECTED TOENAILS TWICE DAILY FOR 6 TO 12 MONTHS      DULoxetine (CYMBALTA) 20 MG capsule Take 1 cap ( 20mg) daily for 7 days, then 1 cap ( 20 mg) every other day for 7 days, then 1 cap every 72 hours for 7 days, then stop. 14 capsule 0    glucose blood (FREESTYLE LITE) test strip TEST ONCE DAILY 100 each 2    HYDROcodone-acetaminophen (NORCO) 5-325 MG per tablet Take 1-2 tablets by mouth Every 4 (Four) to 6 (Six) Hours As Needed for pain.  Wean to tylenol. (Patient taking differently: Take 1-2 tablets by mouth As  Needed.) 20 tablet 0    Lancets (freestyle) lancets Use TO CHECK BLOOD SUGAR ONCE DAILY 100 each 2    lidocaine-prilocaine (EMLA) 2.5-2.5 % cream Apply 1 Application topically to the appropriate area as directed Take As Directed. Apply nickel size amount to port site 30 min before appt time do not rub in cover with plastic wrap 30 g 5    lisinopril (PRINIVIL,ZESTRIL) 10 MG tablet Take 1 tablet by mouth Daily. 90 tablet 1    mupirocin (BACTROBAN) 2 % ointment Apply 1 Application topically to the appropriate area as directed 3 (Three) Times a Day. 15 g 1    neomycin-polymyxin-dexamethamethasone (POLYDEX) 3.5-18222-1.1 ointment ophthalmic ointment INSTILL IN RIGHT EYE TWICE DAILY AS DIRECTED FOR 2 WEEKS THEN STOP      promethazine-dextromethorphan (PROMETHAZINE-DM) 6.25-15 MG/5ML syrup Take 5 mL by mouth 4 (Four) Times a Day As Needed for Cough. 240 mL 0    SITagliptin (Januvia) 100 MG tablet Take 1 tablet by mouth Daily. 30 tablet 5    triamcinolone (KENALOG) 0.1 % ointment Apply 1 Application topically to the appropriate area as directed 2 (Two) Times a Day. 30 g 1    urea (CARMOL) 10 % cream Apply 1 Application topically to the appropriate area as directed As Needed for Dry Skin. 71 g 2     No current facility-administered medications on file prior to visit.     Allergies   Allergen Reactions    Paclitaxel Shortness Of Breath    Metronidazole Nausea And Vomiting       Past Surgical History:   Procedure Laterality Date    BREAST BIOPSY Left     Excisional biopsy in the [facility]    BREAST BIOPSY  03/2024    CATARACT EXTRACTION W/ INTRAOCULAR LENS IMPLANT Bilateral     CHOLECYSTECTOMY  2012    COLON RESECTION Right 2002    COLONOSCOPY N/A 04/28/2021    Procedure: COLONOSCOPY TO ANASTAMOSIS/TI;  Surgeon: Angelo Gonsalez MD;  Location: [facility] ENDOSCOPY;  Service: Gastroenterology;  Laterality: N/A;  PRE: SCREENING  POST: NORMAL POST-OP ANATOMY    ENDOSCOPY      2013    ENDOSCOPY N/A 03/10/2017    Procedure:  "ESOPHAGOGASTRODUODENOSCOPY WITH BIOPSY AND PETER DILATATION 54\";  Surgeon: Angelo Gonsalez MD;  Location: [facility] ENDOSCOPY;  Service:     ENDOSCOPY N/A 2021    Procedure: ESOPHAGOGASTRODUODENOSCOPY WITH BIOPSIES, 54FR PETER DILATATION;  Surgeon: Angelo Gonsalez MD;  Location: [facility] ENDOSCOPY;  Service: Gastroenterology;  Laterality: N/A;  PRE: DYSPHAGIA  POST: GASTRITIS, ESOPHAGEAL RING    MASTECTOMY W/ SENTINEL NODE BIOPSY Bilateral 8/15/2024    Procedure: Bilateral total mastectomy, bilateral sentinel node biopsy, possible axillary node dissection, no reconstruction.;  Surgeon: Abeba Manrique MD;  Location: [facility] MAIN OR;  Service: General;  Laterality: Bilateral;    VENOUS ACCESS DEVICE (PORT) INSERTION Left 3/14/2024    Procedure: INSERTION OF PORTACATH;  Surgeon: Abeba Manrique MD;  Location: [facility] MAIN OR;  Service: General;  Laterality: Left;     Social History     Socioeconomic History    Marital status:      Spouse name: Ryan   Tobacco Use    Smoking status: Former     Current packs/day: 0.00     Average packs/day: 0.3 packs/day for 52.0 years (13.0 ttl pk-yrs)     Types: Cigarettes     Start date:      Quit date: 1/15/2024     Years since quittin.1     Passive exposure: Past    Smokeless tobacco: Never    Tobacco comments:     N/A   Vaping Use    Vaping status: Never Used   Substance and Sexual Activity    Alcohol use: Yes     Comment: RARE    Drug use: No    Sexual activity: Not Currently     Partners: Male     Birth control/protection: Tubal ligation     Family History   Problem Relation Age of Onset    Hypertension Mother     Diabetes Mother     Hypertension Father     Breast cancer Sister 48    Breast cancer Sister 38    No Known Problems Brother     No Known Problems Daughter     No Known Problems Son     No Known Problems Maternal Grandmother     No Known Problems Paternal Grandmother     No Known Problems Maternal Grandfather     No Known Problems Paternal " Grandfather     Autism Grandson     Colon cancer Neg Hx     Rectal cancer Neg Hx     Endometrial cancer Neg Hx     Vaginal cancer Neg Hx     Cervical cancer Neg Hx     Ovarian cancer Neg Hx     Prostate cancer Neg Hx     Uterine cancer Neg Hx     Malig Hyperthermia Neg Hx         Review of Systems:  CONSTITUTIONAL: No weight loss, fever, chills, weakness or fatigue.  HEENT: Eyes: No visual loss, blurred vision, double vision or yellow sclerae. Ears, Nose, Throat: No hearing loss, sneezing, congestion, runny nose or sore throat.  SKIN: No rash or itching.  CARDIOVASCULAR: No chest pain, chest pressure or chest discomfort. No palpitations or edema.  RESPIRATORY: No shortness of breath, cough or sputum.  GASTROINTESTINAL: No anorexia, nausea, vomiting or diarrhea. No abdominal pain or blood.  GENITOURINARY: No burning on urination  NEUROLOGICAL: No headache, dizziness, syncope, paralysis, ataxia, numbness or tingling in the extremities. No change in bowel or bladder control.  MUSCULOSKELETAL: No muscle, back pain, joint pain or stiffness.  HEMATOLOGIC: No anemia, bleeding or bruising.  LYMPHATICS: No enlarged nodes.  PSYCHIATRIC: No history of depression or anxiety.  ENDOCRINOLOGIC: No reports of sweating, cold or heat intolerance. No polyuria or polydipsia.  ALLERGIES: No history of asthma, hives, eczema or rhinitis.    Physical Exam:  Vitals:    03/04/25 1434   BP: 128/76   Pulse: 95   SpO2: 98%         General: Alert, cooperative    HEENT: Atraumatic, normocephalic    Oral/Maxillofacial: Moist mucous membranes    Neck: Supple     Lungs: EWOB, clear to auscultation bilaterally     Heart: RRR    Breast: surgically absent bilaterally    Abdomen: Soft, non-tender, non-distended    Extremities: normal strength and sensation.  No obvious deformities.     Neuro: alert, normal speech, no focal findings or movement disorder noted     Skin: no lesions or abrasions      Recent Imaging:  NA    Assessment/Plan: Felicia Boyle is  a 69 y.o.female with h/o bilateral cancer [id number] - pCR for right breast cancer, left with DCIS alone. breast cancer, JAYDEN. Surgery completed: 8/15/24 by Dr Manrique  - Port removal, consents updated  - RTC in August for follow up, skin check for recurrence.   - continue follow up with Lymphedema clinic PT/OT for monitoring symptoms, feels left arm is slightly larger than right      Bri Balbuena MD  Breast Surgical Oncology  I spent 10 minutes caring for Ms Boyle on this date of service. This time includes time spent by me in the following activities: preparing for the visit, reviewing tests, obtaining and/or reviewing a separately obtained history, performing a medically appropriate examination and/or evaluation , counseling and educating the patient/family/caregiver, ordering medications, tests, or procedures, referring and communicating with other health care professionals , documenting information in the medical record, independently interpreting results and communicating that information with the patient/family/caregiver, and care coordination.    Return in about 6 months (around 9/4/2025).

## 2025-03-06 NOTE — ANESTHESIA PREPROCEDURE EVALUATION
Anesthesia Evaluation     Patient summary reviewed and Nursing notes reviewed   no history of anesthetic complications:   NPO Solid Status: > 8 hours  NPO Liquid Status: > 2 hours           Airway   Mallampati: II  TM distance: >3 FB  Neck ROM: full  No difficulty expected  Dental    (+) lower dentures and upper dentures    Pulmonary    (+) a smoker Former,  Cardiovascular     (+) hyperlipidemia      Neuro/Psych  (+) psychiatric history  GI/Hepatic/Renal/Endo    (+) diabetes mellitus type 2    Musculoskeletal     Abdominal    Substance History   (+) alcohol use     OB/GYN          Other      history of cancer (right breast)                Anesthesia Plan    ASA 2     MAC     (I have reviewed the patient's history and chart with the patient, including all pertinent laboratory results and imaging. I have explained the risks of anesthesia including but not limited to dental damage, corneal abrasion, nerve injury, MI, stroke, aspiration, and death. Patient has agreed to proceed.        )  intravenous induction     Anesthetic plan, risks, benefits, and alternatives have been provided, discussed and informed consent has been obtained with: patient.    CODE STATUS:

## 2025-03-06 NOTE — PROGRESS NOTES
Called Ms. Boyle to see how she was doing after her follow up with Dr. Balbuena. Left a message with my contact information and asked her to call back at her convenience.      Ms. Boyle called back with concerns about finances. Message sent to Michael to contact her and go over 2025 financial aid application and help. Will also send to [employer] to help with any grants

## 2025-03-07 ENCOUNTER — TELEPHONE (OUTPATIENT)
Dept: SURGERY | Facility: CLINIC | Age: 70
End: 2025-03-07
Payer: COMMERCIAL

## 2025-03-07 NOTE — TELEPHONE ENCOUNTER
Called pt and let her know I will cx MA visit on 03/17 sutures are dissolvable   Pt stated understanding

## 2025-03-11 ENCOUNTER — HOSPITAL ENCOUNTER (OUTPATIENT)
Dept: OCCUPATIONAL THERAPY | Facility: HOSPITAL | Age: 70
Setting detail: THERAPIES SERIES
Discharge: HOME | End: 2025-03-11
Payer: COMMERCIAL

## 2025-03-11 DIAGNOSIS — M25.612 DECREASED RANGE OF MOTION OF LEFT SHOULDER: ICD-10-CM

## 2025-03-11 DIAGNOSIS — I97.2 POST-MASTECTOMY LYMPHEDEMA SYNDROME: Primary | ICD-10-CM

## 2025-03-11 DIAGNOSIS — L90.5 SCAR ADHERENT: ICD-10-CM

## 2025-03-11 DIAGNOSIS — C50.911 BILATERAL MALIGNANT NEOPLASM OF BREAST IN FEMALE, UNSPECIFIED ESTROGEN RECEPTOR STATUS, UNSPECIFIED SITE OF BREAST: ICD-10-CM

## 2025-03-11 DIAGNOSIS — M25.611 DECREASED RANGE OF MOTION OF RIGHT SHOULDER: ICD-10-CM

## 2025-03-11 DIAGNOSIS — C50.912 BILATERAL MALIGNANT NEOPLASM OF BREAST IN FEMALE, UNSPECIFIED ESTROGEN RECEPTOR STATUS, UNSPECIFIED SITE OF BREAST: ICD-10-CM

## 2025-03-11 PROCEDURE — 97140 MANUAL THERAPY 1/> REGIONS: CPT

## 2025-03-11 PROCEDURE — 93702 BIS XTRACELL FLUID ANALYSIS: CPT

## 2025-03-11 PROCEDURE — 97535 SELF CARE MNGMENT TRAINING: CPT

## 2025-03-11 NOTE — THERAPY PROGRESS REPORT/RE-CERT
"Outpatient Occupational Therapy Lymphedema Progress Note  [facility]     Patient Name: Felicia Boyle  : 1955  MRN: [id number]  Today's Date: 3/11/2025      Visit Date: 2025    Patient Active Problem List   Diagnosis    Age-related osteoporosis without current pathological fracture    FH: breast cancer in first degree relative    Post-menopausal    Type 2 diabetes mellitus without complication, without long-term current use of insulin    Vitamin D deficiency    Mixed hyperlipidemia    Breast cancer of lower-outer quadrant of right female breast    Encounter for fitting and adjustment of vascular catheter    DCIS (ductal carcinoma in situ) of breast    Bilateral malignant neoplasm of breast in female    S/P bilateral mastectomy    Port-A-Cath in place        Past Medical History:   Diagnosis Date    Anxiety about health     Breast cancer of lower-outer quadrant of right female breast 2024    DCIS (ductal carcinoma in situ) of breast 03/15/2024    Diverticulosis     History of chemotherapy 2024    Mixed hyperlipidemia 2019    DIET CONTROLLED    Osteoporosis     Port-A-Cath in place     Type 2 diabetes mellitus         Past Surgical History:   Procedure Laterality Date    BREAST BIOPSY Left     Excisional biopsy in the [facility]    BREAST BIOPSY  2024    CATARACT EXTRACTION W/ INTRAOCULAR LENS IMPLANT Bilateral     CHOLECYSTECTOMY  2012    COLON RESECTION Right     COLONOSCOPY N/A 2021    Procedure: COLONOSCOPY TO ANASTAMOSIS/TI;  Surgeon: Angelo Gonsalez MD;  Location: [facility] ENDOSCOPY;  Service: Gastroenterology;  Laterality: N/A;  PRE: SCREENING  POST: NORMAL POST-OP ANATOMY    ENDOSCOPY          ENDOSCOPY N/A 03/10/2017    Procedure: ESOPHAGOGASTRODUODENOSCOPY WITH BIOPSY AND PETER DILATATION 54\";  Surgeon: Angelo Gonsalez MD;  Location: [facility] ENDOSCOPY;  Service:     ENDOSCOPY N/A 2021    Procedure: ESOPHAGOGASTRODUODENOSCOPY WITH BIOPSIES, 54FR " PETER DILATATION;  Surgeon: Angelo Gonsalez MD;  Location: [facility] ENDOSCOPY;  Service: Gastroenterology;  Laterality: N/A;  PRE: DYSPHAGIA  POST: GASTRITIS, ESOPHAGEAL RING    MASTECTOMY W/ SENTINEL NODE BIOPSY Bilateral 8/15/2024    Procedure: Bilateral total mastectomy, bilateral sentinel node biopsy, possible axillary node dissection, no reconstruction.;  Surgeon: Abeba Manrique MD;  Location: [facility] MAIN OR;  Service: General;  Laterality: Bilateral;    VENOUS ACCESS DEVICE (PORT) INSERTION Left 3/14/2024    Procedure: INSERTION OF PORTACATH;  Surgeon: Abeba Manrique MD;  Location: [facility] MAIN OR;  Service: General;  Laterality: Left;    VENOUS ACCESS DEVICE (PORT) REMOVAL Left 3/6/2025    Procedure: LEFT port removal;  Surgeon: Bri Balbuena MD;  Location: [facility] MAIN OR;  Service: General;  Laterality: Left;         Visit Dx:      ICD-10-CM ICD-9-CM   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 Bilateral malignant neoplasm of breast in female, unspecified estrogen receptor status, unspecified site of breast  C50.911 174.9    C50.912    3. Decreased range of motion of right shoulder  M25.611 719.51   4. Decreased range of motion of left shoulder  M25.612 719.51   5. Scar adherent  L90.5 709.2        Lymphedema       Row Name 03/11/25 1400             Subjective Pain    Able to rate subjective pain? yes  -RE      Pre-Treatment Pain Level 2  -RE      Post-Treatment Pain Level 2  -RE      Subjective Pain Comment Patient continues to complain of soft tissue tightness in the left axilla and some rib pain.  -RE         L-Dex Bioimpedence Screening    L-Dex Measurement Extremity RUE  -RE      L-Dex Patient Position Standing  -RE      L-Dex UE Dominate Side Right  -RE      L-Dex UE At Risk Side Right  -RE      L-Dex UE Pre Surgical Value No  -RE      L-Dex UE Score -6.6  -RE      L-Dex UE Baseline Score -4.7  -RE      L-Dex UE Value Change -1.9  -RE      L-Dex UE Comment WNL  -RE                 User Key  (r) = Recorded By, (t) = Taken By, (c) = Cosigned By      Initials Name Provider Type    RE Emy Gonzales OTR Occupational Therapist                  The patient had a 1 month SOZO measurement which I reviewed today. The score is WNL  see scanned to EMR. Bioimpedance spectroscopy helps identify the   onset of lymphedema in an arm or leg before patients experience noticeable swelling. Research has   shown that 92% of patients with early detection of lymphedema using L-Dex combined with   intervention do not progress to chronic lymphedema through three years. Additionally, as of March 2023, the NCCN Guidelines® for Survivorship recommend proactive screening for lymphedema using   bioimpedance spectroscopy. Whenever possible, patients are tested for baseline L-Dex score before   cancer treatment begins and then are reassessed during regular follow-up visits using the SOZO device.   Otherwise, this can be started postoperatively and continued during regular follow-up visits. If the   patient’s L-Dex score increases above normal levels, that is a sign that lymphedema is developing and a   referral is made to occupational therapy for further evaluation and early compression treatment.   Lymphedema assessment with the SOZO L-Dex score is recommended to be done every 3 months for   the first 3 years and then every 6 months for years 4 and 5 followed by annually afterwards           OT Assessment/Plan       Row Name 03/11/25 1736          OT Assessment    Impairments Impaired lymphatic circulation  -RE     Assessment Comments Patient returns for bioimpedance reassessment.  Her last measurement was 1 month ago.  L-Dex value today is -6.6 which is within normal limits and more consistent with her baseline.  She continues to complain of some soft tissue tightness particularly on the left side.  I encouraged her to continue with massage and stretching to address this issue.  I recommended continued daily  compression garment use due to a history of an abnormal L-dex value.  Patient's L-Dex measurement is actually of her right upper extremity.  The left upper extremity has some mild lymphedema which causes her L-Dex value for the right upper extremity to descend into abnormally negative values.  That value has normalized and improved since her previous measurement.  I will continue to follow for lymphedema prevention and surveillance.  I will monitor patient for worsening symptoms of soft tissue tightness but we will not continue with occupational therapy for this issue unless patient has worsening symptoms.  I recommended follow-up in 1 month.  -RE     OT Diagnosis Postmastectomy lymphedema  -RE     OT Rehab Potential Good  -RE     Patient/caregiver participated in establishment of treatment plan and goals Yes  -RE     Patient would benefit from skilled therapy intervention Yes  -RE        OT Plan    OT Frequency Other (comment)  -RE     Predicted Duration of Therapy Intervention (OT) Bioimpedance every 1-3 months  -RE     Planned CPT's? OT SELF CARE/MGMT/TRAIN 15 MIN: 05590;OT BIS XTRACELL FLUID ANALYSIS: 84339  -RE     OT Plan Comments Follow-up in 1 month  -RE               User Key  (r) = Recorded By, (t) = Taken By, (c) = Cosigned By      Initials Name Provider Type    Emy Mccarthy OTR Occupational Therapist                        Manual Rx (Last 36 Hours)       Manual Treatments       Row Name 03/11/25 1741 03/11/25 1700          Total Minutes    37531 - OT Manual Therapy Minutes 15  -RE --        Manual Rx 3    Manual Rx 3 Location -- soft tissue stretching L axilla avoiding area of recent port removal  -RE               User Key  (r) = Recorded By, (t) = Taken By, (c) = Cosigned By      Initials Name Provider Type    Emy Mccarthy OTR Occupational Therapist                   OT Goals       Row Name 03/11/25 1700          OT Short Term Goals    STG 1 Patient will demonstrate proper awareness of “What  is Lymphedema?” and “Healthy Habits” for improved prevention, management, care of symptoms and ease of transition to self-care of condition.  -RE     STG 1 Progress Met  -RE     STG 2 Pt will demonstrate understanding of use of compression sleeve for edema prevention, exercise and air travel.  -RE     STG 2 Progress Met  -RE     STG 3 Patient independent and compliant with initial home exercise program focused on gentle AROM and stretching to improve AROM to pre-surgical level.  -RE     STG 3 Progress Met;Ongoing  -RE        Long Term Goals    LTG Date to Achieve 06/11/25  -RE     LTG 1 Patient to improve DASH/ QuickDASH score by 10 points in 4 weeks.  -RE     LTG 1 Progress Ongoing  -RE     LTG 2 Patient will participate in bioimpedance scans every 3 months as a method of early detection of lymphedema to allow for early intervention.  -RE     LTG 2 Progress Met;Ongoing  -RE     LTG 3 Patient's bioimpedance score to remain below 6.5 for decreased risk of stage II lymphedema.  -RE     LTG 3 Progress Met;Ongoing  -RE     LTG 4 Patient to demonstrate increased bilateral shoulder flexion to 160 to improve functional UE use and to restore pre operative AROM per patient perception.  -RE     LTG 4 Progress Met  -RE     LTG 5 Patient to demonstrate increased bilateral shoulder abduction to 160 to improve functional UE use and to restore preoperative AROM per patient perception.  -RE     LTG 5 Progress Met  -RE        Time Calculation    OT Goal Re-Cert Due Date 06/11/25  -RE               User Key  (r) = Recorded By, (t) = Taken By, (c) = Cosigned By      Initials Name Provider Type    Emy Mccarthy OTR Occupational Therapist                    Therapy Education  Education Details: Discussed patient's current L-Dex value of -6.6 and recommended continued daily sleeve wear.  I encouraged her to continue with her regular exercise program and self tissue massage.  Given: Symptoms/condition management  Program:  Reinforced  How Provided: Verbal  Provided to: Patient  Level of Understanding: Teach back education performed, Verbalized  86191 - OT Self Care/Mgmt Minutes: 20                Time Calculation:   OT Start Time: 1015  OT Stop Time: 1100  OT Time Calculation (min): 45 min  Total Timed Code Minutes- OT: 35 minute(s)  Timed Charges  49801 - OT Manual Therapy Minutes: 15  46674 - OT Self Care/Mgmt Minutes: 20  Untimed Charges  08569 - OT Bioimpedence Rx Minutes: 10  Total Minutes  Timed Charges Total Minutes: 35  Untimed Charges Total Minutes: 10   Total Minutes: 45     Therapy Charges for Today       Code Description Service Date Service Provider Modifiers Qty    95113926021 HC OT MANUAL THERAPY EA 15 MIN 3/11/2025 Emy Gonzales OTR GO 1    74795822168 HC OT SELF CARE/MGMT/TRAIN EA 15 MIN 3/11/2025 Emy Gonzales OTR GO 1    07876702064 HC OT BIS XTRACELL FLUID ANALYSIS 3/11/2025 Emy Gonzales OTR GO 1          Dictated utilizing Dragon dictation:  Much of this encounter note is an electronic transcription/translation of spoken language to printed text. The electronic translation of spoken language may permit erroneous, or at times, nonsensical words or phrases to be inadvertently transcribed; Although I have reviewed the note for such errors, some may still exist.              YARED Kelley  3/11/2025

## 2025-03-14 ENCOUNTER — TELEPHONE (OUTPATIENT)
Dept: NEUROLOGY | Facility: CLINIC | Age: 70
End: 2025-03-14
Payer: COMMERCIAL

## 2025-03-14 NOTE — TELEPHONE ENCOUNTER
Lvm for patient asking if she can come in at noon on April 1st instead of 8am. Sending [employer] message as well.

## 2025-03-21 ENCOUNTER — TELEPHONE (OUTPATIENT)
Dept: PSYCHIATRY | Facility: HOSPITAL | Age: 70
End: 2025-03-21
Payer: COMMERCIAL

## 2025-03-21 NOTE — PROGRESS NOTES
RM:________     PCP: Tristan Malone DO    : 1955  AGE: 69 y.o.  EST PATIENT           Wt Readings from Last 3 Encounters:   25 52.8 kg (116 lb 4.8 oz)   25 53.4 kg (117 lb 12.8 oz)   25 53.8 kg (118 lb 9.6 oz)           CP______  SOA_______ DIZZINESS _____ FATIGUE ______  PALPS ______    WT: ____________ BP: __________L __________R HR______    ALLERGIES:Paclitaxel and Metronidazole      Social History     Tobacco Use    Smoking status: Former     Current packs/day: 0.00     Average packs/day: 0.3 packs/day for 52.0 years (13.0 ttl pk-yrs)     Types: Cigarettes     Start date:      Quit date: 1/15/2024     Years since quittin.1     Passive exposure: Past    Smokeless tobacco: Never    Tobacco comments:     N/A   Vaping Use    Vaping status: Never Used   Substance Use Topics    Alcohol use: Yes     Comment: RARE    Drug use: No

## 2025-03-21 NOTE — TELEPHONE ENCOUNTER
Oncology Support Services    OSW called the patient to follow up on the referral from her Nurse Navigator, Verna Azar RN and Dr. Balbuena, regarding financial distress.  OSW left a VM message for the patient and encouraged her to return the call.    [place], [facility]  Oncology Social Worker

## 2025-03-21 NOTE — TELEPHONE ENCOUNTER
Oncology Support Services    OSW received a return call from the patient.  The patient verified that she had worked with Michael, in Financial Counseling, and she had now been approved for 50% financial assistance from [facility].  OSW assessed the patient's income and financial need further, as related to non-medical grants.  After the discussion, the patient clarified that her concern was with the medical bills and being approved for  Financial Assistance was the help she needed.  No other financial concerns at this time.  OSW provided contact information and location of our office for any future identified needs.    Nano Reagan MSW, LCSW  Oncology Support Services

## 2025-03-27 NOTE — PROGRESS NOTES
----- Message from Tim Hernadez MD sent at 3/26/2025  5:54 PM CDT -----  Labs/imaging has been resulted.   Please call patient.  She was a new patient last week.  Her thyroid labs are normal.  Continue current thyroid medication dose levothyroxine 137 mcg daily.  Please refill for 6 months.  Recheck TSH and thyroid antibodies in 6 months.   Thanks  Tim Hernadez MD       Subjective   Felicia Boyle is a 62 y.o. female with   Chief Complaint   Patient presents with   • Osteoporosis     discuss results   .    History of Present Illness     Patient presents for follow up after having Axial Bone Density testing completed.  Patient completed Dexa in 2016 and the most current Dexa showed no statistical change despite using Fosamax.   Patient has been using Fosamax only since Sept 2017.  She has indicated that recently she has had an increase of reflux and vomiting with using the Fosamax and wishes to change.     The following portions of the patient's history were reviewed and updated as appropriate: allergies, current medications, past family history, past medical history, past social history, past surgical history and problem list.    Review of Systems   Gastrointestinal: Positive for vomiting.        GERD   Musculoskeletal:        Osteoporosis   All other systems reviewed and are negative.      Objective     Vitals:    01/29/18 1407   BP: 120/68   Pulse: 97   Temp: 98.5 °F (36.9 °C)   SpO2: 97%     BP Readings from Last 3 Encounters:   01/29/18 120/68   11/02/17 120/70   09/01/17 118/78      Wt Readings from Last 3 Encounters:   01/29/18 52.2 kg (115 lb)   11/02/17 54 kg (119 lb)   09/01/17 54 kg (119 lb)        Physical Exam   Constitutional: She is oriented to person, place, and time. She appears well-developed and well-nourished.   HENT:   Head: Normocephalic and atraumatic.   Neck: Trachea normal and phonation normal. Neck supple. Normal carotid pulses present. Carotid bruit is not present. No thyroid mass and no thyromegaly present.   Cardiovascular: Normal rate, regular rhythm and normal heart sounds.  Exam reveals no gallop and no friction rub.    No murmur heard.  Pulmonary/Chest: Effort normal and breath sounds normal. No respiratory distress. She has no decreased breath sounds. She has no wheezes. She has no rhonchi. She has no rales.   Lymphadenopathy:     She has no  cervical adenopathy.   Neurological: She is alert and oriented to person, place, and time.   Skin: Skin is warm and dry. No rash noted.   Psychiatric: She has a normal mood and affect. Her speech is normal and behavior is normal. Judgment and thought content normal. Cognition and memory are normal.   Nursing note and vitals reviewed.      Assessment/Plan   Felicia was seen today for osteoporosis.    Diagnoses and all orders for this visit:    Age-related osteoporosis without current pathological fracture    Vitamin D deficiency  -     Vitamin D 25 Hydroxy; Future    Controlled type 2 diabetes mellitus without complication, without long-term current use of insulin  -     CBC & Differential; Future  -     Comprehensive Metabolic Panel; Future  -     Hemoglobin A1c; Future  -     Lipid Panel; Future  -     TSH; Future    Other orders  -     vitamin D (ERGOCALCIFEROL) 26933 units capsule capsule; Take 1 capsule by mouth Every 7 (Seven) Days.  -     denosumab (PROLIA) 60 MG/ML solution syringe; Inject 1 mL under the skin 1 (One) Time for 1 dose.        Return in about 3 months (around 4/29/2018) for Recheck.        Scribed for Tristan Malone MD by Erick Cordova. 01/29/2018    ITristan MD personally performed the services described in this documentation, as scribed by Erick Cordova in my presence, and it is both accurate and complete

## 2025-04-01 ENCOUNTER — OFFICE VISIT (OUTPATIENT)
Dept: NEUROLOGY | Facility: CLINIC | Age: 70
End: 2025-04-01
Payer: COMMERCIAL

## 2025-04-01 VITALS
DIASTOLIC BLOOD PRESSURE: 74 MMHG | SYSTOLIC BLOOD PRESSURE: 132 MMHG | OXYGEN SATURATION: 97 % | BODY MASS INDEX: 25.46 KG/M2 | HEART RATE: 79 BPM | WEIGHT: 118 LBS | HEIGHT: 57 IN

## 2025-04-01 DIAGNOSIS — Z85.9 HISTORY OF CANCER: ICD-10-CM

## 2025-04-01 DIAGNOSIS — Q67.0 FACIAL ASYMMETRY: Primary | ICD-10-CM

## 2025-04-01 DIAGNOSIS — G62.9 NEUROPATHY: ICD-10-CM

## 2025-04-01 PROCEDURE — 99203 OFFICE O/P NEW LOW 30 MIN: CPT | Performed by: STUDENT IN AN ORGANIZED HEALTH CARE EDUCATION/TRAINING PROGRAM

## 2025-04-01 NOTE — PROGRESS NOTES
Chief Complaint   Patient presents with    Peripheral Neuropathy       Patient ID: Felicia Boyle is a 69 y.o. female.    HPI:    The following portions of the patient's history were reviewed and updated as appropriate: allergies, current medications, past family history, past medical history, past social history, past surgical history and problem list.      Review of Systems   Musculoskeletal:  Negative for gait problem.   Neurological:  Positive for numbness (fingertips and feet and legs). Negative for dizziness, tremors, seizures, syncope, facial asymmetry, speech difficulty, weakness, light-headedness and headaches.      History of Present Illness  The patient is a 69-year-old female with diabetes, a history of bilateral breast cancer status post bilateral mastectomy, and neuropathy. New patient evaluation for neuropathy.    She has had Taxol, Herceptin, and Perjeta with a hypersensitivity reaction to Taxol. She was then put on Abraxane, Herceptin, and Perjeta. In April 2024, she noticed symptoms of neuropathy, and her chemotherapy was held in May 2024. She received some chemotherapy and it was felt that her neuropathy was stable. She was referred by her oncologist, Dr. Renteria, due to persistent signs of neuropathy. She reports that her neuropathy symptoms initially emerged during her chemotherapy treatment but were not severe and subsequently subsided. However, two months ago, following her chemotherapy, the symptoms re-emerged. She describes the sensation as a sharp poking pain in all five fingertips bilaterally, which is constant throughout the day and exacerbated by water exposure. This necessitates the use of gloves for daily activities such as dishwashing and cooking. She is currently not on any medication. She is currently off all chemotherapy and has an upcoming appointment with Dr. Renteria on 04/16/2025. She has not experienced any falls or balance issues in the past year. She is open to trying any  medication that may alleviate her symptoms. She has been prescribed gabapentin and duloxetine, both of which were ineffective. She has completed her course of gabapentin and is not currently taking any medication. She recalls experiencing nausea when she first started gabapentin, which led to a switch to duloxetine.    She has diabetes. Her blood sugar levels have been relatively well-controlled, consistently measuring between 120 and 130. She maintains a healthy diet that includes daily fruit consumption.    She also reports a decrease in the size of her right eye opening (has ptosis) compared to her left, a change she first noticed post-surgery in April 2023. She underwent cataract surgery on both eyes in January 2024 and has been experiencing floaters since then. Despite being informed that the floaters would resolve post-surgery, they persist. She also reports a sensation of constant tearing and swelling in her eyes, for which she has been prescribed Pataday. She has been under the care of an ophthalmologist and has had her eye drops changed several times without any improvement.    SOCIAL HISTORY  She does not drink alcohol.  She used to work at [employer]  ALLERGIES  She had a hypersensitivity reaction to TAXOL.    MEDICATIONS  Current: B complex, D3 with K2  Discontinued: Gabapentin, duloxetine      Vitals:    04/01/25 1148   BP: 132/74   Pulse: 79   SpO2: 97%       Neurological Exam    Cranial Nerves  R eyelid ptosis - has been present for 2 years or more..    Sensory  Light touch abnormality:   Decr light touch to all 10 fingertips. Decreased vibration fingertips and toes, normal at ankles, knees, wrists..    Reflexes                                            Right                      Left  Brachioradialis                    2+                         2+  Biceps                                 0                         0  Patellar                                0                         0  Achilles                                 0                         0    Gait    Neg Romberg, able to do tandem with mild difficulty..      Physical Exam  Neurological:      Deep Tendon Reflexes:      Reflex Scores:       Bicep reflexes are 0 on the right side and 0 on the left side.       Brachioradialis reflexes are 2+ on the right side and 2+ on the left side.       Patellar reflexes are 0 on the right side and 0 on the left side.       Achilles reflexes are 0 on the right side and 0 on the left side.      Procedures    Assessment/Plan:    Assessment & Plan  1. Neuropathy.  The neuropathy could be attributed to her diabetes or the medications she was previously on, including Herceptin, Perjeta, and Abraxane. The latter is particularly known for causing nerve damage. No agent exists current to cure neuropathy. She has been advised to monitor her blood sugar levels closely to prevent further nerve damage. A prescription for a cream containing ibuprofen, baclofen, gabapentin, and ketamine has been provided. She is instructed to apply this cream to her fingertips 2 to 3 times daily and to observe for any side effects. If the cream proves ineffective, consideration will be given to reverting to gabapentin pills.    2. Diabetes Mellitus.  Her diabetes is well-controlled with blood sugar levels typically around 120-130 mg/dL. She is advised to continue her current management plan, including dietary measures and monitoring blood sugar levels to prevent further nerve damage.    3. Right eyelid ptosis  The patient reports that her right eye has been smaller than her left eye for about 2 years, following cataract surgery. This could be due to surgical complications or other underlying issues. A brain scan will be ordered to rule out any potential brain-related causes, such as a tumor. She showed me pictures of her face prior to her surgery and there was some questionable asymmetry on photos prior to the surgery.    Follow-up  The patient will  follow up in 2 months.    PROCEDURE  The patient underwent bilateral mastectomy and port removal.   I spent 33 minutes caring for this patient on this date of service. This time includes time spent by me in the following activities as necessary: preparing for the visit, reviewing tests, medical records and previous visits, obtaining and/or reviewing a separately obtained history, performing a medically appropriate exam and/or evaluation, counseling and educating the patient, and/or communicating with other healthcare professionals, documenting information in the medical record, independently interpreting results and communicating that information with the patient, and developing a medically appropriate treatment plan with consideration of other conditions, medications, and treatments.    Patient or patient representative verbalized consent for the use of Ambient Listening during the visit with  Paul Parson MD for chart documentation. 4/5/2025  12:11 EDT     Diagnoses and all orders for this visit:    1. Facial asymmetry (Primary)  -     MRI Brain With & Without Contrast; Future    2. History of cancer  -     MRI Brain With & Without Contrast; Future    3. Neuropathy  -     Ibuprofen 3 %, Gabapentin 10 %, Baclofen 2 %, lidocaine 4 %, Ketamine HCl 4 %; Apply 1-2 g topically to the appropriate area as directed 3 (Three) to 4 (Four) times daily.  Dispense: 90 g; Refill: 1           Paul Parson MD

## 2025-04-01 NOTE — PATIENT INSTRUCTIONS
Pharmacy: [facility] - [place], [place] - [address] - [phone]  - [phone] FX     This is where the cream order was sent.

## 2025-04-01 NOTE — LETTER
April 5, 2025     Agata Renteria MD  [address]  [facility] [address]  [place] [id number]    Patient: Felicia Boyle   YOB: 1955   Date of Visit: 4/1/2025     Dear Dr. Myra MD:    Thank you for referring Felicia Boyle to me for evaluation. Below are the relevant portions of my assessment and plan of care.    If you have questions, please do not hesitate to call me. I look forward to following Felicia along with you.         Sincerely,        Paul Parson MD        CC: No Recipients      Progress Notes:  Chief Complaint   Patient presents with   • Peripheral Neuropathy       Patient ID: Felicia Boyle is a 69 y.o. female.    HPI:    The following portions of the patient's history were reviewed and updated as appropriate: allergies, current medications, past family history, past medical history, past social history, past surgical history and problem list.      Review of Systems   Musculoskeletal:  Negative for gait problem.   Neurological:  Positive for numbness (fingertips and feet and legs). Negative for dizziness, tremors, seizures, syncope, facial asymmetry, speech difficulty, weakness, light-headedness and headaches.      History of Present Illness  The patient is a 69-year-old female with diabetes, a history of bilateral breast cancer status post bilateral mastectomy, and neuropathy. New patient evaluation for neuropathy.    She has had Taxol, Herceptin, and Perjeta with a hypersensitivity reaction to Taxol. She was then put on Abraxane, Herceptin, and Perjeta. In April 2024, she noticed symptoms of neuropathy, and her chemotherapy was held in May 2024. She received some chemotherapy and it was felt that her neuropathy was stable. She was referred by her oncologist, Dr. Renteria, due to persistent signs of neuropathy. She reports that her neuropathy symptoms initially emerged during her chemotherapy treatment but were not severe and subsequently subsided. However, two months ago, following her  chemotherapy, the symptoms re-emerged. She describes the sensation as a sharp poking pain in all five fingertips bilaterally, which is constant throughout the day and exacerbated by water exposure. This necessitates the use of gloves for daily activities such as dishwashing and cooking. She is currently not on any medication. She is currently off all chemotherapy and has an upcoming appointment with Dr. Renteria on 04/16/2025. She has not experienced any falls or balance issues in the past year. She is open to trying any medication that may alleviate her symptoms. She has been prescribed gabapentin and duloxetine, both of which were ineffective. She has completed her course of gabapentin and is not currently taking any medication. She recalls experiencing nausea when she first started gabapentin, which led to a switch to duloxetine.    She has diabetes. Her blood sugar levels have been relatively well-controlled, consistently measuring between 120 and 130. She maintains a healthy diet that includes daily fruit consumption.    She also reports a decrease in the size of her right eye opening (has ptosis) compared to her left, a change she first noticed post-surgery in April 2023. She underwent cataract surgery on both eyes in January 2024 and has been experiencing floaters since then. Despite being informed that the floaters would resolve post-surgery, they persist. She also reports a sensation of constant tearing and swelling in her eyes, for which she has been prescribed Pataday. She has been under the care of an ophthalmologist and has had her eye drops changed several times without any improvement.    SOCIAL HISTORY  She does not drink alcohol.  She used to work at [employer]  ALLERGIES  She had a hypersensitivity reaction to TAXOL.    MEDICATIONS  Current: B complex, D3 with K2  Discontinued: Gabapentin, duloxetine      Vitals:    04/01/25 1148   BP: 132/74   Pulse: 79   SpO2: 97%       Neurological Exam    Cranial  Nerves  R eyelid ptosis - has been present for 2 years or more..    Sensory  Light touch abnormality:   Decr light touch to all 10 fingertips. Decreased vibration fingertips and toes, normal at ankles, knees, wrists..    Reflexes                                            Right                      Left  Brachioradialis                    2+                         2+  Biceps                                 0                         0  Patellar                                0                         0  Achilles                                0                         0    Gait    Neg Romberg, able to do tandem with mild difficulty..      Physical Exam  Neurological:      Deep Tendon Reflexes:      Reflex Scores:       Bicep reflexes are 0 on the right side and 0 on the left side.       Brachioradialis reflexes are 2+ on the right side and 2+ on the left side.       Patellar reflexes are 0 on the right side and 0 on the left side.       Achilles reflexes are 0 on the right side and 0 on the left side.      Procedures    Assessment/Plan:    Assessment & Plan  1. Neuropathy.  The neuropathy could be attributed to her diabetes or the medications she was previously on, including Herceptin, Perjeta, and Abraxane. The latter is particularly known for causing nerve damage. No agent exists current to cure neuropathy. She has been advised to monitor her blood sugar levels closely to prevent further nerve damage. A prescription for a cream containing ibuprofen, baclofen, gabapentin, and ketamine has been provided. She is instructed to apply this cream to her fingertips 2 to 3 times daily and to observe for any side effects. If the cream proves ineffective, consideration will be given to reverting to gabapentin pills.    2. Diabetes Mellitus.  Her diabetes is well-controlled with blood sugar levels typically around 120-130 mg/dL. She is advised to continue her current management plan, including dietary measures and monitoring  blood sugar levels to prevent further nerve damage.    3. Right eyelid ptosis  The patient reports that her right eye has been smaller than her left eye for about 2 years, following cataract surgery. This could be due to surgical complications or other underlying issues. A brain scan will be ordered to rule out any potential brain-related causes, such as a tumor. She showed me pictures of her face prior to her surgery and there was some questionable asymmetry on photos prior to the surgery.    Follow-up  The patient will follow up in 2 months.    PROCEDURE  The patient underwent bilateral mastectomy and port removal.   I spent 33 minutes caring for this patient on this date of service. This time includes time spent by me in the following activities as necessary: preparing for the visit, reviewing tests, medical records and previous visits, obtaining and/or reviewing a separately obtained history, performing a medically appropriate exam and/or evaluation, counseling and educating the patient, and/or communicating with other healthcare professionals, documenting information in the medical record, independently interpreting results and communicating that information with the patient, and developing a medically appropriate treatment plan with consideration of other conditions, medications, and treatments.    Patient or patient representative verbalized consent for the use of Ambient Listening during the visit with  Paul Parson MD for chart documentation. 4/5/2025  12:11 EDT     Diagnoses and all orders for this visit:    1. Facial asymmetry (Primary)  -     MRI Brain With & Without Contrast; Future    2. History of cancer  -     MRI Brain With & Without Contrast; Future    3. Neuropathy  -     Ibuprofen 3 %, Gabapentin 10 %, Baclofen 2 %, lidocaine 4 %, Ketamine HCl 4 %; Apply 1-2 g topically to the appropriate area as directed 3 (Three) to 4 (Four) times daily.  Dispense: 90 g; Refill: 1           Paul Parson  MD

## 2025-04-02 ENCOUNTER — TELEPHONE (OUTPATIENT)
Dept: NEUROLOGY | Facility: CLINIC | Age: 70
End: 2025-04-02
Payer: COMMERCIAL

## 2025-04-02 ENCOUNTER — PATIENT ROUNDING (BHMG ONLY) (OUTPATIENT)
Dept: NEUROLOGY | Facility: CLINIC | Age: 70
End: 2025-04-02
Payer: COMMERCIAL

## 2025-04-02 NOTE — TELEPHONE ENCOUNTER
Rufino call from Jessica with [facility] requesting the ok to give her 2 bottles of the cream Dr. Parson prescribed as pt insurance prescription benefit covers 2 vs one. I gave verbal ok

## 2025-04-07 ENCOUNTER — HOSPITAL ENCOUNTER (OUTPATIENT)
Dept: CARDIOLOGY | Facility: HOSPITAL | Age: 70
Discharge: HOME | End: 2025-04-07
Admitting: NURSE PRACTITIONER
Payer: COMMERCIAL

## 2025-04-07 ENCOUNTER — OFFICE VISIT (OUTPATIENT)
Dept: CARDIOLOGY | Age: 70
End: 2025-04-07
Payer: COMMERCIAL

## 2025-04-07 ENCOUNTER — HOSPITAL ENCOUNTER (OUTPATIENT)
Dept: CARDIOLOGY | Facility: HOSPITAL | Age: 70
Discharge: HOME | End: 2025-04-07
Payer: COMMERCIAL

## 2025-04-07 VITALS
HEIGHT: 59 IN | SYSTOLIC BLOOD PRESSURE: 148 MMHG | DIASTOLIC BLOOD PRESSURE: 80 MMHG | BODY MASS INDEX: 23.59 KG/M2 | WEIGHT: 117 LBS | HEART RATE: 74 BPM

## 2025-04-07 VITALS
HEIGHT: 59 IN | DIASTOLIC BLOOD PRESSURE: 90 MMHG | OXYGEN SATURATION: 96 % | WEIGHT: 118 LBS | SYSTOLIC BLOOD PRESSURE: 152 MMHG | BODY MASS INDEX: 23.79 KG/M2 | HEART RATE: 81 BPM

## 2025-04-07 DIAGNOSIS — Z17.1 MALIGNANT NEOPLASM OF LOWER-OUTER QUADRANT OF RIGHT BREAST OF FEMALE, ESTROGEN RECEPTOR NEGATIVE: Primary | ICD-10-CM

## 2025-04-07 DIAGNOSIS — Z92.21 PERSONAL HISTORY OF ANTINEOPLASTIC CHEMOTHERAPY: ICD-10-CM

## 2025-04-07 DIAGNOSIS — C50.912 BILATERAL MALIGNANT NEOPLASM OF BREAST IN FEMALE, UNSPECIFIED ESTROGEN RECEPTOR STATUS, UNSPECIFIED SITE OF BREAST: ICD-10-CM

## 2025-04-07 DIAGNOSIS — C50.511 MALIGNANT NEOPLASM OF LOWER-OUTER QUADRANT OF RIGHT BREAST OF FEMALE, ESTROGEN RECEPTOR NEGATIVE: Primary | ICD-10-CM

## 2025-04-07 DIAGNOSIS — C50.911 BILATERAL MALIGNANT NEOPLASM OF BREAST IN FEMALE, UNSPECIFIED ESTROGEN RECEPTOR STATUS, UNSPECIFIED SITE OF BREAST: ICD-10-CM

## 2025-04-07 LAB
AORTIC ARCH: 2.4 CM
AORTIC DIMENSIONLESS INDEX: 0.75 (DI)
ASCENDING AORTA: 2.9 CM
AV MEAN PRESS GRAD SYS DOP V1V2: 3 MMHG
AV VMAX SYS DOP: 110 CM/SEC
BH CV ECHO LEFT VENTRICLE GLOBAL LONGITUDINAL STRAIN: -24.2 %
BH CV ECHO MEAS - ACS: 1.86 CM
BH CV ECHO MEAS - AI P1/2T: 604.6 MSEC
BH CV ECHO MEAS - AO MAX PG: 4.8 MMHG
BH CV ECHO MEAS - AO ROOT DIAM: 2.9 CM
BH CV ECHO MEAS - AO V2 VTI: 24.8 CM
BH CV ECHO MEAS - AVA(I,D): 1.85 CM2
BH CV ECHO MEAS - EDV(CUBED): 47.7 ML
BH CV ECHO MEAS - EDV(MOD-SP2): 54 ML
BH CV ECHO MEAS - EDV(MOD-SP4): 62 ML
BH CV ECHO MEAS - EF(MOD-SP2): 74.1 %
BH CV ECHO MEAS - EF(MOD-SP4): 75.8 %
BH CV ECHO MEAS - ESV(CUBED): 4.1 ML
BH CV ECHO MEAS - ESV(MOD-SP2): 14 ML
BH CV ECHO MEAS - ESV(MOD-SP4): 15 ML
BH CV ECHO MEAS - FS: 55.9 %
BH CV ECHO MEAS - IVS/LVPW: 1 CM
BH CV ECHO MEAS - IVSD: 0.87 CM
BH CV ECHO MEAS - LAT PEAK E' VEL: 5.3 CM/SEC
BH CV ECHO MEAS - LV DIASTOLIC VOL/BSA (35-75): 43.1 CM2
BH CV ECHO MEAS - LV MASS(C)D: 89.1 GRAMS
BH CV ECHO MEAS - LV MAX PG: 3.1 MMHG
BH CV ECHO MEAS - LV MEAN PG: 2 MMHG
BH CV ECHO MEAS - LV SYSTOLIC VOL/BSA (12-30): 10.4 CM2
BH CV ECHO MEAS - LV V1 MAX: 87.8 CM/SEC
BH CV ECHO MEAS - LV V1 VTI: 18.5 CM
BH CV ECHO MEAS - LVIDD: 3.6 CM
BH CV ECHO MEAS - LVIDS: 1.6 CM
BH CV ECHO MEAS - LVOT AREA: 2.48 CM2
BH CV ECHO MEAS - LVOT DIAM: 1.78 CM
BH CV ECHO MEAS - LVPWD: 0.87 CM
BH CV ECHO MEAS - MED PEAK E' VEL: 5.1 CM/SEC
BH CV ECHO MEAS - MV A DUR: 0.13 SEC
BH CV ECHO MEAS - MV A MAX VEL: 117 CM/SEC
BH CV ECHO MEAS - MV DEC SLOPE: 407.7 CM/SEC2
BH CV ECHO MEAS - MV DEC TIME: 0.18 SEC
BH CV ECHO MEAS - MV E MAX VEL: 66 CM/SEC
BH CV ECHO MEAS - MV E/A: 0.56
BH CV ECHO MEAS - MV MAX PG: 6.7 MMHG
BH CV ECHO MEAS - MV MEAN PG: 2.5 MMHG
BH CV ECHO MEAS - MV P1/2T: 66.3 MSEC
BH CV ECHO MEAS - MV V2 VTI: 32 CM
BH CV ECHO MEAS - MVA(P1/2T): 3.3 CM2
BH CV ECHO MEAS - MVA(VTI): 1.43 CM2
BH CV ECHO MEAS - PA ACC TIME: 0.1 SEC
BH CV ECHO MEAS - PA V2 MAX: 90.9 CM/SEC
BH CV ECHO MEAS - PULM A REVS DUR: 0.13 SEC
BH CV ECHO MEAS - PULM A REVS VEL: 33.4 CM/SEC
BH CV ECHO MEAS - PULM DIAS VEL: 32.6 CM/SEC
BH CV ECHO MEAS - PULM S/D: 1.36
BH CV ECHO MEAS - PULM SYS VEL: 44.1 CM/SEC
BH CV ECHO MEAS - QP/QS: 1.18
BH CV ECHO MEAS - RAP SYSTOLE: 3 MMHG
BH CV ECHO MEAS - RV MAX PG: 2.43 MMHG
BH CV ECHO MEAS - RV V1 MAX: 78 CM/SEC
BH CV ECHO MEAS - RV V1 VTI: 18.1 CM
BH CV ECHO MEAS - RVOT DIAM: 1.95 CM
BH CV ECHO MEAS - RVSP: 21.6 MMHG
BH CV ECHO MEAS - SUP REN AO DIAM: 2 CM
BH CV ECHO MEAS - SV(LVOT): 45.9 ML
BH CV ECHO MEAS - SV(MOD-SP2): 40 ML
BH CV ECHO MEAS - SV(MOD-SP4): 47 ML
BH CV ECHO MEAS - SV(RVOT): 54.3 ML
BH CV ECHO MEAS - SVI(LVOT): 31.9 ML/M2
BH CV ECHO MEAS - SVI(MOD-SP2): 27.8 ML/M2
BH CV ECHO MEAS - SVI(MOD-SP4): 32.7 ML/M2
BH CV ECHO MEAS - TAPSE (>1.6): 1.86 CM
BH CV ECHO MEAS - TR MAX PG: 18.6 MMHG
BH CV ECHO MEAS - TR MAX VEL: 215.8 CM/SEC
BH CV ECHO MEASUREMENTS AVERAGE E/E' RATIO: 12.69
BH CV XLRA - RV BASE: 2.29 CM
BH CV XLRA - RV LENGTH: 6.2 CM
BH CV XLRA - RV MID: 2.03 CM
BH CV XLRA - TDI S': 10.2 CM/SEC
LEFT ATRIUM VOLUME INDEX: 24.7 ML/M2
LV EF BIPLANE MOD: 75.1 %
SINUS: 2.7 CM
STJ: 2.25 CM

## 2025-04-07 PROCEDURE — 25510000001 PERFLUTREN 6.52 MG/ML SUSPENSION 2 ML VIAL: Performed by: NURSE PRACTITIONER

## 2025-04-07 PROCEDURE — 93306 TTE W/DOPPLER COMPLETE: CPT | Performed by: INTERNAL MEDICINE

## 2025-04-07 PROCEDURE — 99214 OFFICE O/P EST MOD 30 MIN: CPT | Performed by: INTERNAL MEDICINE

## 2025-04-07 PROCEDURE — 93356 MYOCRD STRAIN IMG SPCKL TRCK: CPT

## 2025-04-07 PROCEDURE — 93000 ELECTROCARDIOGRAM COMPLETE: CPT | Performed by: INTERNAL MEDICINE

## 2025-04-07 PROCEDURE — 93356 MYOCRD STRAIN IMG SPCKL TRCK: CPT | Performed by: INTERNAL MEDICINE

## 2025-04-07 PROCEDURE — 93306 TTE W/DOPPLER COMPLETE: CPT

## 2025-04-07 RX ADMIN — PERFLUTREN 2 ML: 6.52 INJECTION, SUSPENSION INTRAVENOUS at 09:39

## 2025-04-07 NOTE — LETTER
2025     Tristan Malone DO  [address] [facility] [address]    Patient: Felicia Boyle   YOB: 1955   Date of Visit: 2025     Dear Tristan Malone DO:       Thank you for referring Felicia Boyle to me for evaluation. Below are the relevant portions of my assessment and plan of care.    If you have questions, please do not hesitate to call me. I look forward to following Felicia along with you.         Sincerely,        Hieu Peguero MD        CC: MD Bri Damon MD Kemp, Jamie D, MD  25 [phone]  Sign when Signing Visit  Date of Office Visit: 25  Encounter Provider: Hieu Peguero MD  Place of Service: [facility] CARDIOLOGY  Patient Name: Felicia Boyle  :1955    Chief Complaint   Patient presents with   • CARDIO ONCOLOGY VISIT    :     HPI:     Ms. Boyle is 69 y.o. and presents today in cardio-oncology follow up. I have reviewed prior notes and there are no changes except for any new updates described below. I have also reviewed any information entered into the medical record by the patient or by ancillary staff.     She was diagnosed with right sided breast cancer (ER/MO-, Her2+) and bilateral DCIS (ER/MO+) in 2024. She was treated with paclitaxel/trastuzumab/pertuzumab but didn't tolerate paclitaxel so that was changed to abraxane. She had a bilateral mastectomy in 2024. She completed q3week therapy with HP until 2025.     All of her echocardiograms, including one performed today, have been normal/stable (hyperdynamic LVSF, normal diastolic function for age, normal/stable GLS).    She is very active and denies chest pain, shortness of breath, palpitations, lightheadedness, syncope, leg swelling, orthopnea, or PND.    Past Medical History:   Diagnosis Date   • Anxiety about health    • Breast cancer of lower-outer quadrant of right female breast 2024   • DCIS (ductal carcinoma in  "situ) of breast 03/15/2024   • Diverticulosis    • History of chemotherapy 06/22/2024   • Mixed hyperlipidemia 05/19/2019    DIET CONTROLLED   • Osteoporosis    • Port-A-Cath in place    • Type 2 diabetes mellitus        Past Surgical History:   Procedure Laterality Date   • BREAST BIOPSY Left     Excisional biopsy in the [facility]   • BREAST BIOPSY  03/2024   • CATARACT EXTRACTION W/ INTRAOCULAR LENS IMPLANT Bilateral    • CHOLECYSTECTOMY  2012   • COLON RESECTION Right 2002   • COLONOSCOPY N/A 04/28/2021    Procedure: COLONOSCOPY TO ANASTAMOSIS/TI;  Surgeon: Angelo Gonsalez MD;  Location: [facility] ENDOSCOPY;  Service: Gastroenterology;  Laterality: N/A;  PRE: SCREENING  POST: NORMAL POST-OP ANATOMY   • ENDOSCOPY      2013   • ENDOSCOPY N/A 03/10/2017    Procedure: ESOPHAGOGASTRODUODENOSCOPY WITH BIOPSY AND PETER DILATATION 54\";  Surgeon: Angelo Gonsalez MD;  Location: [facility] ENDOSCOPY;  Service:    • ENDOSCOPY N/A 04/28/2021    Procedure: ESOPHAGOGASTRODUODENOSCOPY WITH BIOPSIES, 54FR PETER DILATATION;  Surgeon: Angelo Gonsalez MD;  Location: [facility] ENDOSCOPY;  Service: Gastroenterology;  Laterality: N/A;  PRE: DYSPHAGIA  POST: GASTRITIS, ESOPHAGEAL RING   • EYE SURGERY  Jan 12&Jan 19 2024    Cataract   • MASTECTOMY W/ SENTINEL NODE BIOPSY Bilateral 08/15/2024    Procedure: Bilateral total mastectomy, bilateral sentinel node biopsy, possible axillary node dissection, no reconstruction.;  Surgeon: Abeba Manrique MD;  Location: [facility] OR;  Service: General;  Laterality: Bilateral;   • VENOUS ACCESS DEVICE (PORT) INSERTION Left 03/14/2024    Procedure: INSERTION OF PORTACATH;  Surgeon: Abeba Manrique MD;  Location: [facility] OR;  Service: General;  Laterality: Left;   • VENOUS ACCESS DEVICE (PORT) REMOVAL Left 03/06/2025    Procedure: LEFT port removal;  Surgeon: Bri Balbuena MD;  Location: [facility] OR;  Service: General;  Laterality: Left;       Social History     Socioeconomic History "   • Marital status:      Spouse name: Ryan   Tobacco Use   • Smoking status: Former     Current packs/day: 0.00     Average packs/day: 0.3 packs/day for 52.0 years (13.0 ttl pk-yrs)     Types: Cigarettes     Start date:      Quit date: 1/15/2024     Years since quittin.2     Passive exposure: Past   • Smokeless tobacco: Never   • Tobacco comments:     N/A   Vaping Use   • Vaping status: Never Used   Substance and Sexual Activity   • Alcohol use: Yes     Comment: RARE   • Drug use: No   • Sexual activity: Defer     Partners: Male     Birth control/protection: Tubal ligation       Family History   Problem Relation Age of Onset   • Hypertension Mother    • Diabetes Mother    • Hypertension Father    • Breast cancer Sister 48   • Breast cancer Sister 38   • No Known Problems Brother    • No Known Problems Daughter    • No Known Problems Son    • No Known Problems Maternal Grandmother    • No Known Problems Paternal Grandmother    • No Known Problems Maternal Grandfather    • No Known Problems Paternal Grandfather    • Autism Grandson    • Colon cancer Neg Hx    • Rectal cancer Neg Hx    • Endometrial cancer Neg Hx    • Vaginal cancer Neg Hx    • Cervical cancer Neg Hx    • Ovarian cancer Neg Hx    • Prostate cancer Neg Hx    • Uterine cancer Neg Hx    • Malig Hyperthermia Neg Hx        Review of Systems   Cardiovascular:  Negative for chest pain.   Respiratory:  Negative for shortness of breath.    Neurological:  Negative for dizziness and light-headedness.       Allergies   Allergen Reactions   • Paclitaxel Shortness Of Breath   • Metronidazole Nausea And Vomiting         Current Outpatient Medications:   •  b complex vitamins capsule, Take 1 capsule by mouth Daily. (Patient taking differently: Take 1 capsule by mouth Daily. HOLD PER MD INSTR), Disp: 30 capsule, Rfl: 5  •  Blood Glucose Monitoring Suppl (FreeStyle Lite) w/Device kit, 1 Units Daily., Disp: 1 kit, Rfl: 0  •  glucose blood (FREESTYLE LITE)  "test strip, TEST ONCE DAILY, Disp: 100 each, Rfl: 2  •  HYDROcodone-acetaminophen (NORCO) 5-325 MG per tablet, Take 1-2 tablets by mouth Every 4 (Four) to 6 (Six) Hours As Needed for pain.  Wean to tylenol., Disp: 20 tablet, Rfl: 0  •  Ibuprofen 3 %, Gabapentin 10 %, Baclofen 2 %, lidocaine 4 %, Ketamine HCl 4 %, Apply 1-2 g topically to the appropriate area as directed 3 (Three) to 4 (Four) times daily., Disp: 90 g, Rfl: 1  •  Lancets (freestyle) lancets, Use TO CHECK BLOOD SUGAR ONCE DAILY, Disp: 100 each, Rfl: 2  •  SITagliptin (Januvia) 100 MG tablet, Take 1 tablet by mouth Daily., Disp: 30 tablet, Rfl: 5  •  triamcinolone (KENALOG) 0.1 % ointment, Apply 1 Application topically to the appropriate area as directed 2 (Two) Times a Day., Disp: 30 g, Rfl: 1  •  lisinopril (PRINIVIL,ZESTRIL) 10 MG tablet, Take 1 tablet by mouth Daily. (Patient not taking: Reported on 4/7/2025), Disp: 90 tablet, Rfl: 1  No current facility-administered medications for this visit.      Objective:     Vitals:    04/07/25 0951   BP: 148/80   BP Location: Left arm   Pulse: 74   Weight: 53.1 kg (117 lb)   Height: 149.9 cm (59\")          Body mass index is 23.63 kg/m².    Vitals reviewed.   Constitutional:       Appearance: Well-developed and not in distress.   Eyes:      Conjunctiva/sclera: Conjunctivae normal.   HENT:      Head: Normocephalic.      Nose: Nose normal.    Mouth/Throat:      Pharynx: Oropharynx is clear.   Neck:      Thyroid: Thyroid normal.      Vascular: No JVD. JVD normal.      Lymphadenopathy: No cervical adenopathy.   Pulmonary:      Effort: Pulmonary effort is normal.      Breath sounds: Normal breath sounds.   Chest:      Comments: Port is bandaged, mild edema but no redness or warmth  Cardiovascular:      Normal rate. Regular rhythm.      Murmurs: There is no murmur.   Pulses:     Intact distal pulses.   Edema:     Peripheral edema absent.   Abdominal:      Palpations: Abdomen is soft.      Tenderness: There is no " abdominal tenderness.   Musculoskeletal: Normal range of motion.      Cervical back: Normal range of motion. Skin:     General: Skin is warm and dry.   Neurological:      General: No focal deficit present.      Mental Status: Alert and oriented to person, place, and time.      Cranial Nerves: No cranial nerve deficit.   Psychiatric:         Behavior: Behavior normal.         Thought Content: Thought content normal.         Judgment: Judgment normal.           ECG 12 Lead    Date/Time: 4/7/2025 12:39 PM  Performed by: Hieu Peguero MD    Authorized by: Hieu Peguero MD  Comparison: compared with previous ECG   Similar to previous ECG  Rhythm: sinus rhythm  Conduction: conduction normal  ST Segments: ST segments normal  T Waves: T waves normal  QRS axis: normal  Other: no other findings    Clinical impression: normal ECG             Assessment:       Diagnosis Plan   1. Malignant neoplasm of lower-outer quadrant of right breast of female, estrogen receptor negative            Plan:       Mrs Boyle has a history of right sided breast cancer (ER/NH-, Her2+) and bilateral DCIS (ER/NH+).  She completed therapy with trastuzumab/pertuzumab from March 2024 until February 2025. All echocardiograms have been normal/stable, including one today. She did not receive chest radiation and she did not receive anthracycline based chemotherapy. She is not on anti-estrogen therapy.    She has completed her cardiac surveillance; she can see us on an as needed basis.     Sincerely,       Hieu Peguero MD

## 2025-04-07 NOTE — PROGRESS NOTES
Date of Office Visit: 25  Encounter Provider: Hieu Peguero MD  Place of Service: [facility] CARDIOLOGY  Patient Name: Felicia Boyle  :1955    Chief Complaint   Patient presents with    CARDIO ONCOLOGY VISIT    :     HPI:     Ms. Boyle is 69 y.o. and presents today in cardio-oncology follow up. I have reviewed prior notes and there are no changes except for any new updates described below. I have also reviewed any information entered into the medical record by the patient or by ancillary staff.     She was diagnosed with right sided breast cancer (ER/IA-, Her2+) and bilateral DCIS (ER/IA+) in 2024. She was treated with paclitaxel/trastuzumab/pertuzumab but didn't tolerate paclitaxel so that was changed to abraxane. She had a bilateral mastectomy in 2024. She completed q3week therapy with HP until 2025.     All of her echocardiograms, including one performed today, have been normal/stable (hyperdynamic LVSF, normal diastolic function for age, normal/stable GLS).    She is very active and denies chest pain, shortness of breath, palpitations, lightheadedness, syncope, leg swelling, orthopnea, or PND.    Past Medical History:   Diagnosis Date    Anxiety about health     Breast cancer of lower-outer quadrant of right female breast 2024    DCIS (ductal carcinoma in situ) of breast 03/15/2024    Diverticulosis     History of chemotherapy 2024    Mixed hyperlipidemia 2019    DIET CONTROLLED    Osteoporosis     Port-A-Cath in place     Type 2 diabetes mellitus        Past Surgical History:   Procedure Laterality Date    BREAST BIOPSY Left     Excisional biopsy in the [facility]    BREAST BIOPSY  2024    CATARACT EXTRACTION W/ INTRAOCULAR LENS IMPLANT Bilateral     CHOLECYSTECTOMY  2012    COLON RESECTION Right     COLONOSCOPY N/A 2021    Procedure: COLONOSCOPY TO ANASTAMOSIS/TI;  Surgeon: Angelo Gonsalez MD;  Location:   [facility] ENDOSCOPY;  Service: Gastroenterology;  Laterality: N/A;  PRE: SCREENING  POST: NORMAL POST-OP ANATOMY    ENDOSCOPY          ENDOSCOPY N/A 03/10/2017    Procedure: ESOPHAGOGASTRODUODENOSCOPY WITH BIOPSY AND PETER DILATATION 54\";  Surgeon: Angelo Gonsalez MD;  Location: [facility] ENDOSCOPY;  Service:     ENDOSCOPY N/A 2021    Procedure: ESOPHAGOGASTRODUODENOSCOPY WITH BIOPSIES, 54FR PETER DILATATION;  Surgeon: Angelo Gonsalez MD;  Location: [facility] ENDOSCOPY;  Service: Gastroenterology;  Laterality: N/A;  PRE: DYSPHAGIA  POST: GASTRITIS, ESOPHAGEAL RING    EYE SURGERY  &2024    Cataract    MASTECTOMY W/ SENTINEL NODE BIOPSY Bilateral 08/15/2024    Procedure: Bilateral total mastectomy, bilateral sentinel node biopsy, possible axillary node dissection, no reconstruction.;  Surgeon: Abeba Manrique MD;  Location: [facility] OR;  Service: General;  Laterality: Bilateral;    VENOUS ACCESS DEVICE (PORT) INSERTION Left 2024    Procedure: INSERTION OF PORTACATH;  Surgeon: Abeba Manrique MD;  Location: [facility] OR;  Service: General;  Laterality: Left;    VENOUS ACCESS DEVICE (PORT) REMOVAL Left 2025    Procedure: LEFT port removal;  Surgeon: Bri Balbuena MD;  Location: [facility] OR;  Service: General;  Laterality: Left;       Social History     Socioeconomic History    Marital status:      Spouse name: Ryan   Tobacco Use    Smoking status: Former     Current packs/day: 0.00     Average packs/day: 0.3 packs/day for 52.0 years (13.0 ttl pk-yrs)     Types: Cigarettes     Start date:      Quit date: 1/15/2024     Years since quittin.2     Passive exposure: Past    Smokeless tobacco: Never    Tobacco comments:     N/A   Vaping Use    Vaping status: Never Used   Substance and Sexual Activity    Alcohol use: Yes     Comment: RARE    Drug use: No    Sexual activity: Defer     Partners: Male     Birth control/protection: Tubal ligation       Family History "   Problem Relation Age of Onset    Hypertension Mother     Diabetes Mother     Hypertension Father     Breast cancer Sister 48    Breast cancer Sister 38    No Known Problems Brother     No Known Problems Daughter     No Known Problems Son     No Known Problems Maternal Grandmother     No Known Problems Paternal Grandmother     No Known Problems Maternal Grandfather     No Known Problems Paternal Grandfather     Autism Grandson     Colon cancer Neg Hx     Rectal cancer Neg Hx     Endometrial cancer Neg Hx     Vaginal cancer Neg Hx     Cervical cancer Neg Hx     Ovarian cancer Neg Hx     Prostate cancer Neg Hx     Uterine cancer Neg Hx     Malig Hyperthermia Neg Hx        Review of Systems   Cardiovascular:  Negative for chest pain.   Respiratory:  Negative for shortness of breath.    Neurological:  Negative for dizziness and light-headedness.       Allergies   Allergen Reactions    Paclitaxel Shortness Of Breath    Metronidazole Nausea And Vomiting         Current Outpatient Medications:     b complex vitamins capsule, Take 1 capsule by mouth Daily. (Patient taking differently: Take 1 capsule by mouth Daily. HOLD PER MD INSTR), Disp: 30 capsule, Rfl: 5    Blood Glucose Monitoring Suppl (FreeStyle Lite) w/Device kit, 1 Units Daily., Disp: 1 kit, Rfl: 0    glucose blood (FREESTYLE LITE) test strip, TEST ONCE DAILY, Disp: 100 each, Rfl: 2    HYDROcodone-acetaminophen (NORCO) 5-325 MG per tablet, Take 1-2 tablets by mouth Every 4 (Four) to 6 (Six) Hours As Needed for pain.  Wean to tylenol., Disp: 20 tablet, Rfl: 0    Ibuprofen 3 %, Gabapentin 10 %, Baclofen 2 %, lidocaine 4 %, Ketamine HCl 4 %, Apply 1-2 g topically to the appropriate area as directed 3 (Three) to 4 (Four) times daily., Disp: 90 g, Rfl: 1    Lancets (freestyle) lancets, Use TO CHECK BLOOD SUGAR ONCE DAILY, Disp: 100 each, Rfl: 2    SITagliptin (Januvia) 100 MG tablet, Take 1 tablet by mouth Daily., Disp: 30 tablet, Rfl: 5    triamcinolone (KENALOG) 0.1  "% ointment, Apply 1 Application topically to the appropriate area as directed 2 (Two) Times a Day., Disp: 30 g, Rfl: 1    lisinopril (PRINIVIL,ZESTRIL) 10 MG tablet, Take 1 tablet by mouth Daily. (Patient not taking: Reported on 4/7/2025), Disp: 90 tablet, Rfl: 1  No current facility-administered medications for this visit.      Objective:     Vitals:    04/07/25 0951   BP: 148/80   BP Location: Left arm   Pulse: 74   Weight: 53.1 kg (117 lb)   Height: 149.9 cm (59\")          Body mass index is 23.63 kg/m².    Vitals reviewed.   Constitutional:       Appearance: Well-developed and not in distress.   Eyes:      Conjunctiva/sclera: Conjunctivae normal.   HENT:      Head: Normocephalic.      Nose: Nose normal.    Mouth/Throat:      Pharynx: Oropharynx is clear.   Neck:      Thyroid: Thyroid normal.      Vascular: No JVD. JVD normal.      Lymphadenopathy: No cervical adenopathy.   Pulmonary:      Effort: Pulmonary effort is normal.      Breath sounds: Normal breath sounds.   Chest:      Comments: Port is bandaged, mild edema but no redness or warmth  Cardiovascular:      Normal rate. Regular rhythm.      Murmurs: There is no murmur.   Pulses:     Intact distal pulses.   Edema:     Peripheral edema absent.   Abdominal:      Palpations: Abdomen is soft.      Tenderness: There is no abdominal tenderness.   Musculoskeletal: Normal range of motion.      Cervical back: Normal range of motion. Skin:     General: Skin is warm and dry.   Neurological:      General: No focal deficit present.      Mental Status: Alert and oriented to person, place, and time.      Cranial Nerves: No cranial nerve deficit.   Psychiatric:         Behavior: Behavior normal.         Thought Content: Thought content normal.         Judgment: Judgment normal.           ECG 12 Lead    Date/Time: 4/7/2025 12:39 PM  Performed by: Hieu Peguero MD    Authorized by: Hieu Peguero MD  Comparison: compared with previous ECG   Similar to previous ECG  Rhythm: " sinus rhythm  Conduction: conduction normal  ST Segments: ST segments normal  T Waves: T waves normal  QRS axis: normal  Other: no other findings    Clinical impression: normal ECG             Assessment:       Diagnosis Plan   1. Malignant neoplasm of lower-outer quadrant of right breast of female, estrogen receptor negative            Plan:       Mrs Boyle has a history of right sided breast cancer (ER/LA-, Her2+) and bilateral DCIS (ER/LA+).  She completed therapy with trastuzumab/pertuzumab from March 2024 until February 2025. All echocardiograms have been normal/stable, including one today. She did not receive chest radiation and she did not receive anthracycline based chemotherapy. She is not on anti-estrogen therapy.    She has completed her cardiac surveillance; she can see us on an as needed basis.     Sincerely,       Hieu Peguero MD

## 2025-04-08 ENCOUNTER — TELEPHONE (OUTPATIENT)
Age: 70
End: 2025-04-08
Payer: COMMERCIAL

## 2025-04-08 NOTE — TELEPHONE ENCOUNTER
----- Message from Ariana MONTIEL sent at 4/8/2025 12:00 PM EDT -----  Did you all find a pair of glasses in the pod yesterday?  ----- Message -----  From: Jackelin Armendariz RegSched Rep  Sent: 4/8/2025  11:53 AM EDT  To: tammi Ann [facility]; [facility] Seth Lo    PATIENT MAY HAVE LEFT GLASSES IN THE OFFICE. IF FOUND PLEASE GIVE HER A CALL BACK [phone]

## 2025-04-08 NOTE — TELEPHONE ENCOUNTER
I called pt, but had to leave a vm.  No glasses were found after cleaning the rooms on the day of her visit.

## 2025-04-14 ENCOUNTER — HOSPITAL ENCOUNTER (OUTPATIENT)
Dept: OCCUPATIONAL THERAPY | Facility: HOSPITAL | Age: 70
Setting detail: THERAPIES SERIES
Discharge: HOME | End: 2025-04-14
Payer: COMMERCIAL

## 2025-04-14 ENCOUNTER — OFFICE VISIT (OUTPATIENT)
Dept: ONCOLOGY | Facility: CLINIC | Age: 70
End: 2025-04-14
Payer: COMMERCIAL

## 2025-04-14 VITALS
DIASTOLIC BLOOD PRESSURE: 83 MMHG | WEIGHT: 120.2 LBS | TEMPERATURE: 98.6 F | RESPIRATION RATE: 16 BRPM | HEIGHT: 59 IN | SYSTOLIC BLOOD PRESSURE: 142 MMHG | OXYGEN SATURATION: 96 % | HEART RATE: 83 BPM | BODY MASS INDEX: 24.23 KG/M2

## 2025-04-14 DIAGNOSIS — I97.2 POST-MASTECTOMY LYMPHEDEMA SYNDROME: Primary | ICD-10-CM

## 2025-04-14 DIAGNOSIS — Z17.0 MALIGNANT NEOPLASM OF LOWER-OUTER QUADRANT OF RIGHT BREAST OF FEMALE, ESTROGEN RECEPTOR POSITIVE: Primary | ICD-10-CM

## 2025-04-14 DIAGNOSIS — C50.511 MALIGNANT NEOPLASM OF LOWER-OUTER QUADRANT OF RIGHT BREAST OF FEMALE, ESTROGEN RECEPTOR POSITIVE: Primary | ICD-10-CM

## 2025-04-14 PROCEDURE — 93702 BIS XTRACELL FLUID ANALYSIS: CPT

## 2025-04-14 PROCEDURE — 97535 SELF CARE MNGMENT TRAINING: CPT

## 2025-04-14 PROCEDURE — 99214 OFFICE O/P EST MOD 30 MIN: CPT | Performed by: INTERNAL MEDICINE

## 2025-04-14 NOTE — THERAPY PROGRESS REPORT/RE-CERT
"Outpatient Occupational Therapy Lymphedema Progress Note  [facility]     Patient Name: Felicia Boyle  : 1955  MRN: [id number]  Today's Date: 2025      Visit Date: 2025    Patient Active Problem List   Diagnosis    Age-related osteoporosis without current pathological fracture    FH: breast cancer in first degree relative    Post-menopausal    Type 2 diabetes mellitus without complication, without long-term current use of insulin    Vitamin D deficiency    Mixed hyperlipidemia    Breast cancer of lower-outer quadrant of right female breast    Encounter for fitting and adjustment of vascular catheter    DCIS (ductal carcinoma in situ) of breast    Bilateral malignant neoplasm of breast in female    S/P bilateral mastectomy    Port-A-Cath in place        Past Medical History:   Diagnosis Date    Anxiety about health     Breast cancer of lower-outer quadrant of right female breast 2024    DCIS (ductal carcinoma in situ) of breast 03/15/2024    Diverticulosis     History of chemotherapy 2024    Mixed hyperlipidemia 2019    DIET CONTROLLED    Osteoporosis     Port-A-Cath in place     Type 2 diabetes mellitus         Past Surgical History:   Procedure Laterality Date    BREAST BIOPSY Left     Excisional biopsy in the [facility]    BREAST BIOPSY  2024    CATARACT EXTRACTION W/ INTRAOCULAR LENS IMPLANT Bilateral     CHOLECYSTECTOMY  2012    COLON RESECTION Right     COLONOSCOPY N/A 2021    Procedure: COLONOSCOPY TO ANASTAMOSIS/TI;  Surgeon: Angelo Gonsalez MD;  Location: [facility] ENDOSCOPY;  Service: Gastroenterology;  Laterality: N/A;  PRE: SCREENING  POST: NORMAL POST-OP ANATOMY    ENDOSCOPY          ENDOSCOPY N/A 03/10/2017    Procedure: ESOPHAGOGASTRODUODENOSCOPY WITH BIOPSY AND PETER DILATATION 54\";  Surgeon: Angelo Gonsalez MD;  Location: [facility] ENDOSCOPY;  Service:     ENDOSCOPY N/A 2021    Procedure: ESOPHAGOGASTRODUODENOSCOPY WITH BIOPSIES, 54FR " PETER DILATATION;  Surgeon: Angelo Gonsalez MD;  Location: [facility] ENDOSCOPY;  Service: Gastroenterology;  Laterality: N/A;  PRE: DYSPHAGIA  POST: GASTRITIS, ESOPHAGEAL RING    EYE SURGERY  Jan 12&Jan 19 2024    Cataract    MASTECTOMY W/ SENTINEL NODE BIOPSY Bilateral 08/15/2024    Procedure: Bilateral total mastectomy, bilateral sentinel node biopsy, possible axillary node dissection, no reconstruction.;  Surgeon: Abeba Manrique MD;  Location: [facility] MAIN OR;  Service: General;  Laterality: Bilateral;    VENOUS ACCESS DEVICE (PORT) INSERTION Left 03/14/2024    Procedure: INSERTION OF PORTACATH;  Surgeon: Abeba Manrique MD;  Location: [facility] MAIN OR;  Service: General;  Laterality: Left;    VENOUS ACCESS DEVICE (PORT) REMOVAL Left 03/06/2025    Procedure: LEFT port removal;  Surgeon: Bri Balbuena MD;  Location: [facility] MAIN OR;  Service: General;  Laterality: Left;         Visit Dx:      ICD-10-CM ICD-9-CM   1. Post-mastectomy lymphedema syndrome  I97.2 457.0        Lymphedema       Row Name 04/14/25 1700             Subjective Pain    Able to rate subjective pain? yes  -RE      Pre-Treatment Pain Level 0  -RE      Post-Treatment Pain Level 0  -RE      Subjective Pain Comment Patient reports intermittent chest wall pain but it is manageable.  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  Patient has hypersensitivity and numbness in the fingertips of both hands.  -RE         L-Dex Bioimpedence Screening    L-Dex Measurement Extremity RUE  -RE      L-Dex Patient Position Standing  -RE      L-Dex UE Dominate Side Right  -RE      L-Dex UE At Risk Side Right  -RE      L-Dex UE Pre Surgical Value No  -RE      L-Dex UE Score -8.7  -RE      L-Dex UE Baseline Score -4.7  -RE      L-Dex UE Value Change -4  -RE      L-Dex UE Comment WNL  -RE                User Key  (r) = Recorded By, (t) = Taken By, (c) = Cosigned By      Initials Name Provider Type    RE Emy Gonzales, INDIGOR Occupational  Therapist                             OT Assessment/Plan       Row Name 04/14/25 1165          OT Assessment    Assessment Comments Patient returns today for bioimpedance reassessment.  Her last measurement was 1 month ago.  Patient's current L-Dex values -8.7 which is slightly lower than her baseline so we will monitor for lymphedema symptoms in the left upper extremity.  Patient will continue to wear a compression sleeve on her left arm on a daily basis.  Patient had no palpable edema in the left upper extremity today.  Bilateral upper extremities are at risk.  I did provide patient with some adaptive methods to address her finger neuropathy.  I also recommended a home program to try to address it.  -RE     OT Diagnosis Postmastectomy lymphedema  -RE     OT Rehab Potential Good  -RE     Patient/caregiver participated in establishment of treatment plan and goals Yes  -RE        OT Plan    Predicted Duration of Therapy Intervention (OT) Bioimpedance every 1 to 3 months  -RE     OT Plan Comments Follow-up in 1 month  -RE               User Key  (r) = Recorded By, (t) = Taken By, (c) = Cosigned By      Initials Name Provider Type    RE Emy Gonzales OTR Occupational Therapist                           OT Goals       Row Name 04/14/25 1700          OT Short Term Goals    STG 1 Patient will demonstrate proper awareness of “What is Lymphedema?” and “Healthy Habits” for improved prevention, management, care of symptoms and ease of transition to self-care of condition.  -RE     STG 1 Progress Met  -RE     STG 2 Pt will demonstrate understanding of use of compression sleeve for edema prevention, exercise and air travel.  -RE     STG 2 Progress Met  -RE     STG 3 Patient independent and compliant with initial home exercise program focused on gentle AROM and stretching to improve AROM to pre-surgical level.  -RE     STG 3 Progress Met  -RE        Long Term Goals    LTG Date to Achieve 06/11/25  -RE     LTG 1 Patient to  improve DASH/ QuickDASH score by 10 points in 4 weeks.  -RE     LTG 1 Progress Ongoing  -RE     LTG 2 Patient will participate in bioimpedance scans every 3 months as a method of early detection of lymphedema to allow for early intervention.  -RE     LTG 2 Progress Met;Ongoing  -RE     LTG 3 Patient's bioimpedance score to remain below 6.5 for decreased risk of stage II lymphedema.  -RE     LTG 3 Progress Met;Ongoing  -RE     LTG 4 Patient to demonstrate increased bilateral shoulder flexion to 160 to improve functional UE use and to restore pre operative AROM per patient perception.  -RE     LTG 4 Progress Met  -RE     LTG 5 Patient to demonstrate increased bilateral shoulder abduction to 160 to improve functional UE use and to restore preoperative AROM per patient perception.  -RE     LTG 5 Progress Met  -RE        Time Calculation    OT Goal Re-Cert Due Date 06/11/25  -RE               User Key  (r) = Recorded By, (t) = Taken By, (c) = Cosigned By      Initials Name Provider Type    Emy Mccarthy OTR Occupational Therapist                    Therapy Education  Education Details: Discussed patient's current L-Dex value of -8.7 and recommended B UE sleeve wear during air travel.  Should continue with left upper extremity sleeve wear on a daily basis.  I recommended paper tape or Coban wraps for her hypersensitive fingertips during cooking activities since that is the time she has the biggest issue.  I also instructed her in a home sensory program to address her neuropathy.  Given: Symptoms/condition management, Other (comment)  Program: Reinforced, New  How Provided: Verbal, Demonstration  Provided to: Patient, Caregiver  Level of Understanding: Teach back education performed, Verbalized, Demonstrated  54515 - OT Self Care/Mgmt Minutes: 35                Time Calculation:   OT Start Time: 0945  OT Stop Time: 1030  OT Time Calculation (min): 45 min  Total Timed Code Minutes- OT: 35 minute(s)  Timed Charges  83896  - OT Self Care/Mgmt Minutes: 35  Untimed Charges  47342 - OT Bioimpedence Rx Minutes: 10  Total Minutes  Timed Charges Total Minutes: 35  Untimed Charges Total Minutes: 10   Total Minutes: 45     Therapy Charges for Today       Code Description Service Date Service Provider Modifiers Qty    97727618638 HC OT SELF CARE/MGMT/TRAIN EA 15 MIN 4/14/2025 Emy Gonzales OTR GO 2    [id number] HC OT BIS XTRACELL FLUID ANALYSIS 4/14/2025 Emy Gonzales OTR GO 1          Dictated utilizing Dragon dictation:  Much of this encounter note is an electronic transcription/translation of spoken language to printed text. The electronic translation of spoken language may permit erroneous, or at times, nonsensical words or phrases to be inadvertently transcribed; Although I have reviewed the note for such errors, some may still exist.              YARED Kelley  4/14/2025

## 2025-04-14 NOTE — PROGRESS NOTES
Subjective   Felicia Boyle is a 69 y.o. female.  Referred by Dr. Manrique for right breast invasive ductal carcinoma, HER2 positive, left breast ductal carcinoma in situ    History of Present Illness     Ms. Boyle is a 69-year-old postmenopausal [place] lady who is fairly healthy without any significant comorbidities palpated of right breast mass in the latter part of  which was further worked up with diagnostic mammogram and ultrasound and subsequently a biopsy which confirmed invasive ductal carcinoma which was ER/NV negative and HER2 positive.    Family history significant for her 2 sisters with breast cancer at the age of 38 and 48 respectively.  Her older sister  of metastatic breast cancer but her younger sister is doing well.  Patient underwent genetic testing in 2017 which was negative due to her family history.    She had regular screening mammograms up until 2019 but subsequently stopped having annual gynecological visits and therefore did not have any further screening mammograms up until the most recent diagnostic mammogram for the new palpable abnormality.    2024-bilateral diagnostic mammogram and right breast ultrasound  The breast heterogeneously dense.  Left breast with a scar marker in the lower left breast dating back to .  No new findings in the left breast.    Right breast at 8:00, 8 cm from the nipple there is a 1.7 x 1.4 x 1.6 cm oval high density mass with partially circumscribed and partially indistinct margins.    On the ultrasound the mass measures 1.7 x 1 x 1.3 cm.    No abnormal right axilla lymphadenopathy.    2024-right breast ultrasound-guided biopsy  Pathology consistent with invasive ductal carcinoma  Grade 3  9.3 mm of invasive disease on the core biopsy  ER negative  NV negative  HER2 3+ on immunohistochemistry, 95%  Ki-67 70%    2024-bilateral breast MRI  In the right breast at 8:00, 8 cm from the nipple there is a irregular enhancing mass which  measures 1.8 x 2 x 1.7 cm.  This corresponds to the biopsy-proven malignancy.  Extending anteriorly away from the MT margin of the mass there is an irregular linear enhancement which measures 3.5 x 1 x 0.7 cm.  The whole area including the mass measures 4.7 cm in AP dimension, 1.7 in mediolateral and 2.4 cm in the superior-inferior dimension.    At 12:00, 3 cm from the nipple there is an irregular non-mass enhancement which measures 1 x 1.1 x 1.2 cm.  No mammographic correlate is appreciated.    No other suspicious areas of enhancement in the right breast or right axilla or internal mammary chain.    In the left breast, middle third at 11:30 position, 3.7 cm from the nipple there is an irregular area of non-mass enhancement which measures 1.3 x 1.1 x 1 cm.  There is suspected architectural distortion.  No other areas of abnormal enhancement seen in the left breast of the left axilla or the internal mammary chain.    2/26/2024-additional MRI guided biopsies    1.left breast 12:00-intermediate grade ductal carcinoma in situ with apocrine features  ER +91 to 100% strong  KS +81 to 90% strong  Ki-67 15%    2. Right breast 12 o'clock position MRI guided biopsy of the non-mass enhancement-sclerosing adenosis with associated calcifications  No atypical hyperplasia in situ or invasive carcinoma    3.right breast 8:00 MRI guided biopsies of the linear enhancement is consistent with high-grade ductal carcinoma in situ  DCIS involves multiple tissue cores measuring up to 3 mm.  Sclerosing adenosis and usual ductal hyperplasia with associated microcalcifications.    The case was discussed in tumor board and Dr. Patel thought that they could be more invasive disease in the 4.7 cm of linear enhancement noted in the right breast at the site of the mass.  Therefore it was decided to proceed with neoadjuvant chemotherapy.    Patient was a former smoker and smoked for about 40 years, half a pack per week, quit about 1 month ago.   Denies any alcohol use.    Week 1 Taxol Herceptin and Perjeta was planned for 3/18/2024.  Patient received Herceptin and Perjeta and had some chills for which she received Solu-Medrol and responded well to that.  However after infusing only 1 cc of Taxol patient experienced a severe anaphylactic reaction due to which the infusion was stopped permanently and she received an additional dose of Solu-Medrol and Benadryl.  Symptoms resolved subsequently and patient was discharged home safely.    Due to this recent treatment has been changed to Abraxane along with Herceptin and Perjeta.  3/25/2024-patient is due for her first treatment with Abraxane.    She is extremely anxious given the severe hypersensitivity reaction that she experienced with Taxol.  She is also complaining of increased belching and heartburn since her last chemotherapy.  She is complaining of severe diarrhea with started last night.  Had multiple loose stools.    5/11/2024-MRI of the breast-images independently reviewed and interpreted by me.  Interval near complete resolution of all suspicious enhancement in the posterior one third lower outer quadrant of the right breast at 8:00 corresponding to the biopsy-proven site of malignancy.  Who has been on masslike area of enhancement measuring 1.5 x 0.9 x 0.9 cm compared to 2 x 1.8 x 1.7 cm.  Mildly abnormal enhancement extending anteriorly away from the mass has resolved.  No other suspicious areas of enhancement noted in the right breast.  Left breast at 12:00 interval resolution of all suspicious enhancement.    8/15/2024-bilateral mastectomy and bilateral sentinel lymph node biopsy  Right total mastectomy  No residual invasive carcinoma  2 mm of DCIS high-grade  RCB 0, pathological complete response  All the margins are negative  Y pTis N0 M0  4 sentinel lymph nodes negative    Left breast mastectomy  Fat necrosis, biopsy site changes, sclerosing adenosis and changes consistent with treatment  effect.  No evidence of residual in situ or invasive carcinoma  1 x-ray lymph node negative    INTERVAL HISTORY  Ms. Boyle returns today for a follow-up.  She has completed adjuvant Herceptin.  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  She is a diabetic and has been on Januvia with somewhat elevated blood sugars.  She is very tearful in the clinic today as her son committed suicide yesterday and he was 48 years old.  He lives in [facility] and she is unable to travel to see him.  She is accompanied by her  today.  She denies any suicidal ideations.  Denies any new bone pains, no new chest wall lesions  Reporting shooting pains in the chest wall periodically.  No other complaints at this time      The following portions of the patient's history were reviewed and updated as appropriate: allergies, current medications, past family history, past medical history, past social history, past surgical history, and problem list.    Past Medical History:   Diagnosis Date    Anxiety about health     Breast cancer of lower-outer quadrant of right female breast 02/14/2024    DCIS (ductal carcinoma in situ) of breast 03/15/2024    Diverticulosis     History of chemotherapy 06/22/2024    Mixed hyperlipidemia 05/19/2019    DIET CONTROLLED    Osteoporosis     Port-A-Cath in place     Type 2 diabetes mellitus         Past Surgical History:   Procedure Laterality Date    BREAST BIOPSY Left     Excisional biopsy in the [facility]    BREAST BIOPSY  03/2024    CATARACT EXTRACTION W/ INTRAOCULAR LENS IMPLANT Bilateral     CHOLECYSTECTOMY  2012    COLON RESECTION Right 2002    COLONOSCOPY N/A 04/28/2021    Procedure: COLONOSCOPY TO ANASTAMOSIS/TI;  Surgeon: Angelo Gonsalez MD;  Location: [facility] ENDOSCOPY;  Service: Gastroenterology;  Laterality: N/A;  PRE: SCREENING  POST: NORMAL POST-OP ANATOMY    ENDOSCOPY      2013    ENDOSCOPY N/A 03/10/2017    Procedure: ESOPHAGOGASTRODUODENOSCOPY WITH BIOPSY AND PETER  "DILATATION 54\";  Surgeon: Angelo Gonsalez MD;  Location: [facility] ENDOSCOPY;  Service:     ENDOSCOPY N/A 04/28/2021    Procedure: ESOPHAGOGASTRODUODENOSCOPY WITH BIOPSIES, 54FR PETER DILATATION;  Surgeon: Angelo Gonsalez MD;  Location: [facility] ENDOSCOPY;  Service: Gastroenterology;  Laterality: N/A;  PRE: DYSPHAGIA  POST: GASTRITIS, ESOPHAGEAL RING    EYE SURGERY  Jan 12&Jan 19 2024    Cataract    MASTECTOMY W/ SENTINEL NODE BIOPSY Bilateral 08/15/2024    Procedure: Bilateral total mastectomy, bilateral sentinel node biopsy, possible axillary node dissection, no reconstruction.;  Surgeon: Abeba Manrique MD;  Location: [facility] MAIN OR;  Service: General;  Laterality: Bilateral;    VENOUS ACCESS DEVICE (PORT) INSERTION Left 03/14/2024    Procedure: INSERTION OF PORTACATH;  Surgeon: Abeba Manrique MD;  Location: [facility] MAIN OR;  Service: General;  Laterality: Left;    VENOUS ACCESS DEVICE (PORT) REMOVAL Left 03/06/2025    Procedure: LEFT port removal;  Surgeon: Bri Balbuena MD;  Location: [facility] MAIN OR;  Service: General;  Laterality: Left;        Family History   Problem Relation Age of Onset    Hypertension Mother     Diabetes Mother     Hypertension Father     Breast cancer Sister 48    Breast cancer Sister 38    No Known Problems Brother     No Known Problems Daughter     No Known Problems Son     No Known Problems Maternal Grandmother     No Known Problems Paternal Grandmother     No Known Problems Maternal Grandfather     No Known Problems Paternal Grandfather     Autism Grandson     Colon cancer Neg Hx     Rectal cancer Neg Hx     Endometrial cancer Neg Hx     Vaginal cancer Neg Hx     Cervical cancer Neg Hx     Ovarian cancer Neg Hx     Prostate cancer Neg Hx     Uterine cancer Neg Hx     Malig Hyperthermia Neg Hx         Social History     Socioeconomic History    Marital status:      Spouse name: Ryan   Tobacco Use    Smoking status: Former     Current packs/day: 0.00     Average " "packs/day: 0.3 packs/day for 52.0 years (13.0 ttl pk-yrs)     Types: Cigarettes     Start date:      Quit date: 1/15/2024     Years since quittin.2     Passive exposure: Past    Smokeless tobacco: Never    Tobacco comments:     N/A   Vaping Use    Vaping status: Never Used   Substance and Sexual Activity    Alcohol use: Yes     Comment: RARE    Drug use: No    Sexual activity: Defer     Partners: Male     Birth control/protection: Tubal ligation        OB History          4    Para   3    Term   3       0    AB   1    Living   3         SAB   1    IAB   0    Ectopic   0    Molar   0    Multiple   0    Live Births   3               Age at menarche-16  Age at first live childbirth-18   4 para 3  1  Age at menopause-49  No use of hormone replacement therapy  No use of oral conceptive pills  Breast-feeding for 3 months    Allergies   Allergen Reactions    Paclitaxel Shortness Of Breath    Metronidazole Nausea And Vomiting          Objective   Blood pressure 142/83, pulse 83, temperature 98.6 °F (37 °C), temperature source Oral, resp. rate 16, height 149.9 cm (59.02\"), weight 54.5 kg (120 lb 3.2 oz), SpO2 96%, not currently breastfeeding.     Physical Exam  Vitals reviewed.   Constitutional:       Appearance: Normal appearance.   HENT:      Mouth/Throat:      Mouth: Mucous membranes are moist.      Pharynx: Oropharynx is clear.      Comments: Fissures in the corner of the mouth  Eyes:      Conjunctiva/sclera: Conjunctivae normal.      Pupils: Pupils are equal, round, and reactive to light.   Pulmonary:      Effort: Pulmonary effort is normal.   Musculoskeletal:         General: Normal range of motion.      Cervical back: Normal range of motion.   Skin:     General: Skin is warm and dry.      Findings: Rash present.      Comments: +erythema to palms and bottom of feet.   Neurological:      General: No focal deficit present.      Mental Status: She is alert and oriented to person, " place, and time.   Psychiatric:         Mood and Affect: Mood is anxious.         Behavior: Behavior normal.       Status post bilateral mastectomy without reconstruction, no chest wall abnormalities    I have reexamined the patient and the results are consistent with the previously documented exam. Agata Renteria MD      Assessment & Plan     *Right breast invasive ductal carcinoma  Clinical T2 N0 M0, stage IIa, clinical and prognostic ER/MI negative, HER2 3+ immunohistochemistry, Ki-67 70%  On the MRI the mass measures 2 cm however there is linear enhancement which in the AP dimension measures 4.7 cm.  Additional biopsies of this linear enhancement was performed and consistent with DCIS  It is hard to delineate the amount of invasive disease as opposed to DCIS.  More likely than not invasive component is likely limited to the mass which is about 2-1/2 cm on exam.  Since the tumor is greater than 2 cm I think it is reasonable to proceed with neoadjuvant chemotherapy.  We discussed proceeding with weekly Taxol along with Herceptin and Perjeta every 3 weeks.  3/18/2024-received first dose of Herceptin and Perjeta, had a severe hypersensitivity reaction to Taxol and hence Taxol was discontinued  3/25/2024-Labs reviewed and stable to proceed with chemotherapy  Patient is extremely anxious today after experiencing the severe hypersensitivity reaction last week.  Reassured patient that this is unlikely to happen with Abraxane and we will premedicate with extra Solu-Medrol..  4/1/2024 patient returns for cycle 1 day 15 Abraxane.,  Left chest, left shoulder at times.  This has come and gone over the past week and is not associated with any shortness of breath, numbness or tingling.  She states when her chest is hurting if she presses on the area it gets better.  She notices it at different points during the day.  It feels more like an ache and is not a sharp pain.  She states she has been sleeping in a different position  "due to the port which could be causing some muscular discomfort.  She has noticed the discomfort at times when she takes a deep breath however is able to get deep breath here in the office did not have any pain.  She is not having any pain today at all.  She has taken her hydrocodone maybe 3-4 times over the past week and has not needed it multiple times a day.  CMP resulted today with AST elevated at 47 and .  Bilirubin remains normal at 0.4 with alkaline phosphatase elevated to 160.  Reviewed with Dr. Loredo and will hold treatment today.  She will try to limit any acetaminophen containing products.  She did have a few days of diarrhea that she is unsure if it was related to the Abraxane versus a GI bug as her  had the same symptoms.  Neither had chills or fevers.  She is also having some dysuria today we have a urinalysis with culture pending.  4/8/2024: Due for cycle 2-day 1 Abraxane, Herceptin, Perjeta.  Did not receive cycle 1 day 15 Abraxane due to elevated LFTs as detailed above.  LFTs have significantly improved, AST 28, ALT 58.  Reviewed with Dr. Renteria will proceed with Abraxane only today, will reduce Abraxane by 20%.  4/15/2024-patient is scheduled for week 4 of chemotherapy today.  Labs reviewed and overall stable except for an ALT of 45.  She will proceed with planned chemotherapy.  4/22/2024: after last chemotherapy patient experienced 3 episodes of \"zapping\" in her fingertips and soles of feet that last a few seconds then resolved.  Has not experienced this sensation in at least 3 days.  No interference with daily activities.  Will continue Abraxane at previous 20% dose reduction.  She is doing cold therapy for her hands/feet.  Will need to monitor closely for neuropathy.  4/29/2024-chemotherapy held as patient reported worsening neuropathy and dropping things.  5/6/2024-presents today for evaluation of the neuropathy and to determine if she can resume chemotherapy.  She reports that " the neuropathy has improved some.  She is mainly perceiving pain in her fingers and tingling and numbness.   She however tells me that she has not been dropping things anymore.  She is able to do other fine motor activities just fine.  5/6/2024-patient did receive Abraxane with a 20% dose reduction.  5/13/2024-presents today for the neck cycle of Abraxane and since her last treatment she has not had any worsening of neuropathy.  We also started her on gabapentin 300 mg nightly.  Neuropathy overall stable.  Labs reviewed and she does have elevated LFTs however no further dose reduction needed.  Will proceed with planned chemotherapy today.  5/11/2024-MRI of the breast shows near complete resolution of the non-mass enhancement and areas of malignancy in both right and left breast.  This is reassuring and if patient has any further worsening of neuropathy then we could discontinue Abraxane and she will proceed with surgery and continue HER2 directed therapy.  5/28/2024: Cycle 4-day 8 Abraxane only, continue previous 20% dose reduction.  Continues on gabapentin, neuropathy stable.  6/10/2024-scheduled for Abraxane Herceptin and Perjeta.  Neuropathy overall stable.  She will continue with 20% dose reduction. She has 1 more week of Abraxane left following which she will have to proceed with surgery followed by adjuvant HER2 directed therapy.  Patient is very reluctant to undergo surgery.  Communicated with Dr. Manrique and she will be scheduled to see her after completion of the last treatment of Abraxane.  Since she had a recent MRI she may not require another MRI   Proceed with last dose of Abraxane today 6/17/2024. Maintain previous dosing with 20% dose reduction.   7/22/2024 patient returns today for Herceptin and Perjeta which she continues once every 3 weeks.  She is scheduled for bilateral mastectomy on 8/15/2024 with Dr. Manrique.  8/12/2024 patient returns today to proceed with Herceptin and Perjeta.  She is  scheduled for bilateral mastectomy with Dr. Manrique on 8/15/2024.  Review of Dr. Renteria today we will proceed with treatment.  8/15/2024-status post bilateral mastectomy with residual DCIS on the right side measuring 2 mm, high-grade, Y pTis N0 M0  Continues on adjuvant Perjeta and Herceptin   She is complaining of bilateral rib pain and palpable abnormalities of the chest wall.  On exam the palpable abnormalities are consistent with the ribs.  Chest x-ray was negative  2/24/2025 patient will complete adjuvant Perjeta Herceptin today  4/14/2025-patient with persistent neuropathy in the fingertips other than that no new complaints suggestive of metastatic disease.    *Left breast ductal carcinoma in situ  Intermed  She has been having some discomfort in her left shoulder bladeiate grade with apocrine features  ER/[place] strongly positive  Plan is for her to undergo bilateral mastectomy.  If she does undergo bilateral mastectomy then that would be curative and she would not require any endocrine therapy subsequently unless there is any upstaging of the malignancy  5/11/2024-MRI of the breast with near complete resolution of the non-mass enhancement.  Status post left mastectomy and sentinel lymph node biopsy with no residual disease  No evidence of recurrent disease.    *GERD  Continue Protonix.    *Cardiac health  Referral to cardio oncology placed  3/15/2024 echocardiogram with an ejection fraction of 75.5%, LV strain is normal at -24.2%  She has met with Dr. Peguero , started on bisoprolol   6/17/2024 ejection fraction 70%, LV strain -22.4%  9/24/2024 echocardiogram showing calculated LVEF at 75.3% and GLS -23.9%.  1/3/2025-echocardiogram shows an ejection fraction of greater than 70%, GLS -22.6%    *Elevated LFTs  2/24/2025-LFTs reviewed and normal    *Hypokalemia secondary to diarrhea likely  Potassium is normal today at 4.0.    *Neuropathy  Held treatment  due to neuropathy  5/6/2024-resumed chemotherapy  She was  started on gabapentin 300 mg nightly and also vitamin B12.  Reports stable neuropathy  Proceed with planned chemotherapy today   MRI shows near complete resolution of the abnormalities and if patient experiences any worsening neuropathy then low threshold to stop chemotherapy and proceed with surgery  The remains intermittent though occasionally painful in her feet.  Will increase gabapentin to 300 mg twice daily  7/22/2024 patient reports neuropathy is stable continues gabapentin 300 mg twice daily  9/3/2024-neuropathy improved  10/15/2024-complaining of worsening neuropathy in the hands/feet.  Recommended starting B complex vitamin.  Herceptin and Perjeta delayed 1 week.  10/22/2024 after starting B complex vitamin numbness/tingling has improved.  It was previously constant, is now intermittent typically only occurring with certain positions.  The intensity of the numbness/tingling has also improved.  11/12/2024: She continues to experience numbness/tingling. Advised to continue b complex vitamin and also prescribed cymbalta 30mg daily as patient has previously tried gabapentin without improvement. There is question whether some of what she is describing is related to possible palmar-plantar erthrodysesthesia as her palms and bottoms of her feet are red with flaky skin, therefore, will also prescribe urea cream for her to apply.   1/13/2025-patient continues to report  tingling and discomfort of the fingertips and also discomfort in her lower extremities.  Neurology referral has been placed.  She has been taking Cymbalta without any improvement in symptoms.  Therefore we will discontinue Cymbalta.  She is willing to try gabapentin again and recommend resuming gabapentin at 300 mg nightly.  2/24/2025 the patient notes improvement in neuropathy utilizing gabapentin with discontinuation of Cymbalta.  4/14/2025-persistent neuropathy.  Likely secondary to diabetes.  Recommend that she discuss this with her primary care  physician.  Unlikely to be related to Herceptin Perjeta chemotherapy as she reports that the neuropathy is getting worse in spite of discontinuing all these treatments.    *Cheilitis   still present.  The steroid creams did not help  Recommend that she see a dermatologist at this point    *Right groin candida  8/12/2024 had tried OTC antifungal with initial improvement and then reoccurrence.  Discussed drying well, will add Nystatin powder TID.  Resolved    *?Palmar-plantar erythrodysesthesia  11/12/2024: Erythematous/flaky palms of her hands and bottom of her feet. Advised utilizing urea cream generously and instructed her to reach out if she does not see improvement in symptoms.   Resolved    PLAN:   Completed adjuvant Herceptin Perjeta  Continue vitamin B complex  Continue gabapentin 300 mg nightly  Port has been removed  Recommend appointment with dermatology  Continue to follow with lymphedema clinic.   DAY in 4 months and MD in 8 months    The patient is on a high risk medication requiring close monitoring for toxicity.  33 minutes total spent on the encounter on the same day    Agata Renteria MD  04/14/2025

## 2025-05-12 ENCOUNTER — LAB (OUTPATIENT)
Facility: HOSPITAL | Age: 70
End: 2025-05-12
Payer: COMMERCIAL

## 2025-05-12 ENCOUNTER — HOSPITAL ENCOUNTER (OUTPATIENT)
Dept: OCCUPATIONAL THERAPY | Facility: HOSPITAL | Age: 70
Setting detail: THERAPIES SERIES
Discharge: HOME | End: 2025-05-12
Payer: COMMERCIAL

## 2025-05-12 DIAGNOSIS — R80.9 PROTEINURIA DUE TO TYPE 2 DIABETES MELLITUS: ICD-10-CM

## 2025-05-12 DIAGNOSIS — Z90.13 S/P BILATERAL MASTECTOMY: ICD-10-CM

## 2025-05-12 DIAGNOSIS — C50.912 BILATERAL MALIGNANT NEOPLASM OF BREAST IN FEMALE, UNSPECIFIED ESTROGEN RECEPTOR STATUS, UNSPECIFIED SITE OF BREAST: ICD-10-CM

## 2025-05-12 DIAGNOSIS — M25.611 DECREASED RANGE OF MOTION OF RIGHT SHOULDER: ICD-10-CM

## 2025-05-12 DIAGNOSIS — C50.911 BILATERAL MALIGNANT NEOPLASM OF BREAST IN FEMALE, UNSPECIFIED ESTROGEN RECEPTOR STATUS, UNSPECIFIED SITE OF BREAST: ICD-10-CM

## 2025-05-12 DIAGNOSIS — I10 ESSENTIAL HYPERTENSION: ICD-10-CM

## 2025-05-12 DIAGNOSIS — M81.0 AGE-RELATED OSTEOPOROSIS WITHOUT CURRENT PATHOLOGICAL FRACTURE: ICD-10-CM

## 2025-05-12 DIAGNOSIS — E55.9 VITAMIN D DEFICIENCY: ICD-10-CM

## 2025-05-12 DIAGNOSIS — E11.29 PROTEINURIA DUE TO TYPE 2 DIABETES MELLITUS: ICD-10-CM

## 2025-05-12 DIAGNOSIS — I97.2 POST-MASTECTOMY LYMPHEDEMA SYNDROME: Primary | ICD-10-CM

## 2025-05-12 DIAGNOSIS — M25.612 DECREASED RANGE OF MOTION OF LEFT SHOULDER: ICD-10-CM

## 2025-05-12 DIAGNOSIS — E11.9 TYPE 2 DIABETES MELLITUS WITHOUT COMPLICATION, WITHOUT LONG-TERM CURRENT USE OF INSULIN: ICD-10-CM

## 2025-05-12 LAB
25(OH)D3 SERPL-MCNC: 39.6 NG/ML (ref 30–100)
ALBUMIN SERPL-MCNC: 4.6 G/DL (ref 3.5–5.2)
ALBUMIN/GLOB SERPL: 1.4 G/DL
ALP SERPL-CCNC: 84 U/L (ref 39–117)
ALT SERPL W P-5'-P-CCNC: 24 U/L (ref 1–33)
ANION GAP SERPL CALCULATED.3IONS-SCNC: 9.7 MMOL/L (ref 5–15)
AST SERPL-CCNC: 18 U/L (ref 1–32)
BASOPHILS # BLD AUTO: 0.07 10*3/MM3 (ref 0–0.2)
BASOPHILS NFR BLD AUTO: 0.9 % (ref 0–1.5)
BILIRUB SERPL-MCNC: 0.3 MG/DL (ref 0–1.2)
BUN SERPL-MCNC: 16 MG/DL (ref 8–23)
BUN/CREAT SERPL: 18.4 (ref 7–25)
CALCIUM SPEC-SCNC: 9.7 MG/DL (ref 8.6–10.5)
CHLORIDE SERPL-SCNC: 101 MMOL/L (ref 98–107)
CHOLEST SERPL-MCNC: 187 MG/DL (ref 0–200)
CO2 SERPL-SCNC: 24.3 MMOL/L (ref 22–29)
CREAT SERPL-MCNC: 0.87 MG/DL (ref 0.57–1)
DEPRECATED RDW RBC AUTO: 41.8 FL (ref 37–54)
EGFRCR SERPLBLD CKD-EPI 2021: 71.8 ML/MIN/1.73
EOSINOPHIL # BLD AUTO: 0.06 10*3/MM3 (ref 0–0.4)
EOSINOPHIL NFR BLD AUTO: 0.8 % (ref 0.3–6.2)
ERYTHROCYTE [DISTWIDTH] IN BLOOD BY AUTOMATED COUNT: 13 % (ref 12.3–15.4)
GLOBULIN UR ELPH-MCNC: 3.4 GM/DL
GLUCOSE SERPL-MCNC: 144 MG/DL (ref 65–99)
HBA1C MFR BLD: 7.4 % (ref 4.8–5.6)
HCT VFR BLD AUTO: 42.7 % (ref 34–46.6)
HDLC SERPL-MCNC: 75 MG/DL (ref 40–60)
HGB BLD-MCNC: 14.2 G/DL (ref 12–15.9)
IMM GRANULOCYTES # BLD AUTO: 0.03 10*3/MM3 (ref 0–0.05)
IMM GRANULOCYTES NFR BLD AUTO: 0.4 % (ref 0–0.5)
LDLC SERPL CALC-MCNC: 86 MG/DL (ref 0–100)
LDLC/HDLC SERPL: 1.07 {RATIO}
LYMPHOCYTES # BLD AUTO: 3.41 10*3/MM3 (ref 0.7–3.1)
LYMPHOCYTES NFR BLD AUTO: 43.5 % (ref 19.6–45.3)
MCH RBC QN AUTO: 29.1 PG (ref 26.6–33)
MCHC RBC AUTO-ENTMCNC: 33.3 G/DL (ref 31.5–35.7)
MCV RBC AUTO: 87.5 FL (ref 79–97)
MONOCYTES # BLD AUTO: 0.59 10*3/MM3 (ref 0.1–0.9)
MONOCYTES NFR BLD AUTO: 7.5 % (ref 5–12)
NEUTROPHILS NFR BLD AUTO: 3.68 10*3/MM3 (ref 1.7–7)
NEUTROPHILS NFR BLD AUTO: 46.9 % (ref 42.7–76)
NRBC BLD AUTO-RTO: 0 /100 WBC (ref 0–0.2)
PLATELET # BLD AUTO: 343 10*3/MM3 (ref 140–450)
PMV BLD AUTO: 11.2 FL (ref 6–12)
POTASSIUM SERPL-SCNC: 4.3 MMOL/L (ref 3.5–5.2)
PROT SERPL-MCNC: 8 G/DL (ref 6–8.5)
RBC # BLD AUTO: 4.88 10*6/MM3 (ref 3.77–5.28)
SODIUM SERPL-SCNC: 135 MMOL/L (ref 136–145)
TRIGL SERPL-MCNC: 157 MG/DL (ref 0–150)
TSH SERPL DL<=0.05 MIU/L-ACNC: 4.08 UIU/ML (ref 0.27–4.2)
VLDLC SERPL-MCNC: 26 MG/DL (ref 5–40)
WBC NRBC COR # BLD AUTO: 7.84 10*3/MM3 (ref 3.4–10.8)

## 2025-05-12 PROCEDURE — 82306 VITAMIN D 25 HYDROXY: CPT

## 2025-05-12 PROCEDURE — 80061 LIPID PANEL: CPT

## 2025-05-12 PROCEDURE — 80050 GENERAL HEALTH PANEL: CPT

## 2025-05-12 PROCEDURE — 83036 HEMOGLOBIN GLYCOSYLATED A1C: CPT

## 2025-05-12 PROCEDURE — 97535 SELF CARE MNGMENT TRAINING: CPT

## 2025-05-12 PROCEDURE — 36415 COLL VENOUS BLD VENIPUNCTURE: CPT

## 2025-05-12 PROCEDURE — 93702 BIS XTRACELL FLUID ANALYSIS: CPT

## 2025-05-12 NOTE — THERAPY PROGRESS REPORT/RE-CERT
"Outpatient Occupational Therapy Lymphedema Progress Note  [facility]     Patient Name: Felicia Boyle  : 1955  MRN: [id number]  Today's Date: 2025      Visit Date: 2025    Patient Active Problem List   Diagnosis    Age-related osteoporosis without current pathological fracture    FH: breast cancer in first degree relative    Post-menopausal    Type 2 diabetes mellitus without complication, without long-term current use of insulin    Vitamin D deficiency    Mixed hyperlipidemia    Breast cancer of lower-outer quadrant of right female breast    Encounter for fitting and adjustment of vascular catheter    DCIS (ductal carcinoma in situ) of breast    Bilateral malignant neoplasm of breast in female    S/P bilateral mastectomy    Port-A-Cath in place        Past Medical History:   Diagnosis Date    Anxiety about health     Breast cancer of lower-outer quadrant of right female breast 2024    DCIS (ductal carcinoma in situ) of breast 03/15/2024    Diverticulosis     History of chemotherapy 2024    Mixed hyperlipidemia 2019    DIET CONTROLLED    Osteoporosis     Port-A-Cath in place     Type 2 diabetes mellitus         Past Surgical History:   Procedure Laterality Date    BREAST BIOPSY Left     Excisional biopsy in the [facility]    BREAST BIOPSY  2024    CATARACT EXTRACTION W/ INTRAOCULAR LENS IMPLANT Bilateral     CHOLECYSTECTOMY  2012    COLON RESECTION Right     COLONOSCOPY N/A 2021    Procedure: COLONOSCOPY TO ANASTAMOSIS/TI;  Surgeon: Angelo Gonsalez MD;  Location: [facility] ENDOSCOPY;  Service: Gastroenterology;  Laterality: N/A;  PRE: SCREENING  POST: NORMAL POST-OP ANATOMY    ENDOSCOPY          ENDOSCOPY N/A 03/10/2017    Procedure: ESOPHAGOGASTRODUODENOSCOPY WITH BIOPSY AND PETER DILATATION 54\";  Surgeon: Angelo Gonsalez MD;  Location: [facility] ENDOSCOPY;  Service:     ENDOSCOPY N/A 2021    Procedure: ESOPHAGOGASTRODUODENOSCOPY WITH BIOPSIES, 54FR " PETER DILATATION;  Surgeon: Angelo Gonsalez MD;  Location: [facility] ENDOSCOPY;  Service: Gastroenterology;  Laterality: N/A;  PRE: DYSPHAGIA  POST: GASTRITIS, ESOPHAGEAL RING    EYE SURGERY  Jan 12&Jan 19 2024    Cataract    MASTECTOMY W/ SENTINEL NODE BIOPSY Bilateral 08/15/2024    Procedure: Bilateral total mastectomy, bilateral sentinel node biopsy, possible axillary node dissection, no reconstruction.;  Surgeon: Abeba Manrique MD;  Location: [facility] MAIN OR;  Service: General;  Laterality: Bilateral;    VENOUS ACCESS DEVICE (PORT) INSERTION Left 03/14/2024    Procedure: INSERTION OF PORTACATH;  Surgeon: Abeba Manrique MD;  Location: [facility] MAIN OR;  Service: General;  Laterality: Left;    VENOUS ACCESS DEVICE (PORT) REMOVAL Left 03/06/2025    Procedure: LEFT port removal;  Surgeon: Bri Balbuena MD;  Location: [facility] MAIN OR;  Service: General;  Laterality: Left;         Visit Dx:      ICD-10-CM ICD-9-CM   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 Bilateral malignant neoplasm of breast in female, unspecified estrogen receptor status, unspecified site of breast  C50.911 174.9    C50.912    3. Decreased range of motion of right shoulder  M25.611 719.51   4. Decreased range of motion of left shoulder  M25.612 719.51        Lymphedema       Row Name 05/12/25 0900             Subjective Pain    Able to rate subjective pain? yes  -RE      Pre-Treatment Pain Level 0  -RE      Post-Treatment Pain Level 0  -RE      Subjective Pain Comment Continues with symptoms of neuropathy in her fingers.  -RE         Subjective    Subjective Comments Patient reports that she has not been as consistent with her sleep use as she typically had been prior to the death of her son.  -RE         L-Dex Bioimpedence Screening    L-Dex Measurement Extremity RUE  -RE      L-Dex Patient Position Standing  -RE      L-Dex UE Dominate Side Right  -RE      L-Dex UE At Risk Side Right  -RE      L-Dex UE Pre Surgical Value  No  -RE      L-Dex UE Score -8.3  -RE      L-Dex UE Baseline Score -4.7  -RE      L-Dex UE Value Change -3.6  -RE      L-Dex UE Comment WNL  -RE                User Key  (r) = Recorded By, (t) = Taken By, (c) = Cosigned By      Initials Name Provider Type    RE Emy Gonzales, OTR Occupational Therapist                    The patient had a 1 month SOZO measurement which I reviewed today. The score is WNL  see scanned to EMR. Bioimpedance spectroscopy helps identify the   onset of lymphedema in an arm or leg before patients experience noticeable swelling. Research has   shown that 92% of patients with early detection of lymphedema using L-Dex combined with   intervention do not progress to chronic lymphedema through three years. Additionally, as of March 2023, the NCCN Guidelines® for Survivorship recommend proactive screening for lymphedema using   bioimpedance spectroscopy. Whenever possible, patients are tested for baseline L-Dex score before   cancer treatment begins and then are reassessed during regular follow-up visits using the SOZO device.   Otherwise, this can be started postoperatively and continued during regular follow-up visits. If the   patient’s L-Dex score increases above normal levels, that is a sign that lymphedema is developing and a   referral is made to occupational therapy for further evaluation and early compression treatment.   Lymphedema assessment with the SOZO L-Dex score is recommended to be done every 3 months for   the first 3 years and then every 6 months for years 4 and 5 followed by annually afterwards         OT Assessment/Plan       Row Name 05/12/25 1547          OT Assessment    Impairments Impaired lymphatic circulation  -RE     Assessment Comments Patient returns for bioimpedance reassessment.  Her last measurement was 1 month ago.  L-Dex value today is -8.3 which is within normal limits and consistent with her baseline.  In use with neuropathy in her fingertips in both hands.   I recommneded continued daily compression garment use due to a history of an elevated L-dex value. Today we reviewed signs and symptoms of lymphedema.  I also reviewed some options for addressing some of the functional deficits related to her peripheral neuropathy.  I recommended follow-up in 2 months.  -RE     OT Diagnosis At risk for lymphedema  -RE     OT Rehab Potential Good  -RE     Patient/caregiver participated in establishment of treatment plan and goals Yes  -RE     Patient would benefit from skilled therapy intervention Yes  -RE        OT Plan    OT Frequency Other (comment)  -RE     Predicted Duration of Therapy Intervention (OT) Bioimpedance every 1-3 months  -RE     Planned CPT's? OT SELF CARE/MGMT/TRAIN 15 MIN: 71188;OT BIS XTRACELL FLUID ANALYSIS: 86013  -RE     OT Plan Comments Follow-up in 2 months  -RE               User Key  (r) = Recorded By, (t) = Taken By, (c) = Cosigned By      Initials Name Provider Type    RE Emy Gonzales, OTR Occupational Therapist                           OT Goals       Row Name 05/12/25 0900          OT Short Term Goals    STG 1 Patient will demonstrate proper awareness of “What is Lymphedema?” and “Healthy Habits” for improved prevention, management, care of symptoms and ease of transition to self-care of condition.  -RE     STG 1 Progress Met  -RE     STG 2 Pt will demonstrate understanding of use of compression sleeve for edema prevention, exercise and air travel.  -RE     STG 2 Progress Met  -RE     STG 3 Patient independent and compliant with initial home exercise program focused on gentle AROM and stretching to improve AROM to pre-surgical level.  -RE     STG 3 Progress Met  -RE        Long Term Goals    LTG Date to Achieve 06/11/25  -RE     LTG 1 Patient to improve DASH/ QuickDASH score by 10 points in 4 weeks.  -RE     LTG 1 Progress Ongoing  -RE     LTG 2 Patient will participate in bioimpedance scans every 3 months as a method of early detection of lymphedema  to allow for early intervention.  -RE     LTG 2 Progress Met;Ongoing  -RE     LTG 3 Patient's bioimpedance score to remain below 6.5 for decreased risk of stage II lymphedema.  -RE     LTG 3 Progress Met;Ongoing  -RE     LTG 4 Patient to demonstrate increased bilateral shoulder flexion to 160 to improve functional UE use and to restore pre operative AROM per patient perception.  -RE     LTG 4 Progress Met  -RE     LTG 5 Patient to demonstrate increased bilateral shoulder abduction to 160 to improve functional UE use and to restore preoperative AROM per patient perception.  -RE     LTG 5 Progress Met  -RE        Time Calculation    OT Goal Re-Cert Due Date 06/11/25  -RE               User Key  (r) = Recorded By, (t) = Taken By, (c) = Cosigned By      Initials Name Provider Type    Emy Mccarthy OTR Occupational Therapist                    Therapy Education  Education Details: Discussed patient's current L-Dex value of -8.3 and recommended continued daily sleeve wear.  I reviewed the signs and symptoms that would indicate the need to return for medical assessment and then to return to therapy including swelling, heaviness, or unusual muscle fatigue.  I reviewed the use of tape on her fingertips to help with hypersensitivity during cooking tasks.  Given: Symptoms/condition management, Other (comment)  Program: Reinforced  How Provided: Verbal  Provided to: Patient  Level of Understanding: Teach back education performed, Verbalized  15921 - OT Self Care/Mgmt Minutes: 15                Time Calculation:   OT Start Time: 0900  OT Stop Time: 0925  OT Time Calculation (min): 25 min  Total Timed Code Minutes- OT: 15 minute(s)  Timed Charges  89550 - OT Self Care/Mgmt Minutes: 15  Untimed Charges  53272 - OT Bioimpedence Rx Minutes: 10  Total Minutes  Timed Charges Total Minutes: 15  Untimed Charges Total Minutes: 10   Total Minutes: 25     Therapy Charges for Today       Code Description Service Date Service Provider  Modifiers Qty    39233754395 HC OT SELF CARE/MGMT/TRAIN EA 15 MIN 5/12/2025 Emy Gonzales OTR GO 1    32724849409 HC OT BIS XTRACELL FLUID ANALYSIS 5/12/2025 Emy Gonzales OTR GO 1          Dictated utilizing Dragon dictation:  Much of this encounter note is an electronic transcription/translation of spoken language to printed text. The electronic translation of spoken language may permit erroneous, or at times, nonsensical words or phrases to be inadvertently transcribed; Although I have reviewed the note for such errors, some may still exist.              YARED Kelley  5/12/2025

## 2025-05-15 ENCOUNTER — APPOINTMENT (OUTPATIENT)
Dept: OCCUPATIONAL THERAPY | Facility: HOSPITAL | Age: 70
End: 2025-05-15
Payer: COMMERCIAL

## 2025-05-16 ENCOUNTER — HOSPITAL ENCOUNTER (OUTPATIENT)
Dept: MRI IMAGING | Facility: HOSPITAL | Age: 70
Discharge: HOME | End: 2025-05-16
Admitting: STUDENT IN AN ORGANIZED HEALTH CARE EDUCATION/TRAINING PROGRAM
Payer: COMMERCIAL

## 2025-05-16 DIAGNOSIS — Q67.0 FACIAL ASYMMETRY: ICD-10-CM

## 2025-05-16 DIAGNOSIS — Z85.9 HISTORY OF CANCER: ICD-10-CM

## 2025-05-16 PROCEDURE — 25510000002 GADOBENATE DIMEGLUMINE 529 MG/ML SOLUTION: Performed by: STUDENT IN AN ORGANIZED HEALTH CARE EDUCATION/TRAINING PROGRAM

## 2025-05-16 PROCEDURE — 70553 MRI BRAIN STEM W/O & W/DYE: CPT

## 2025-05-16 PROCEDURE — A9577 INJ MULTIHANCE: HCPCS | Performed by: STUDENT IN AN ORGANIZED HEALTH CARE EDUCATION/TRAINING PROGRAM

## 2025-05-16 RX ADMIN — GADOBENATE DIMEGLUMINE 11 ML: 529 INJECTION, SOLUTION INTRAVENOUS at 07:32

## 2025-05-21 ENCOUNTER — OFFICE VISIT (OUTPATIENT)
Dept: FAMILY MEDICINE CLINIC | Facility: CLINIC | Age: 70
End: 2025-05-21
Payer: COMMERCIAL

## 2025-05-21 VITALS
HEART RATE: 77 BPM | BODY MASS INDEX: 23.99 KG/M2 | OXYGEN SATURATION: 99 % | SYSTOLIC BLOOD PRESSURE: 120 MMHG | TEMPERATURE: 97.3 F | DIASTOLIC BLOOD PRESSURE: 68 MMHG | HEIGHT: 59 IN | WEIGHT: 119 LBS

## 2025-05-21 DIAGNOSIS — E55.9 VITAMIN D DEFICIENCY: ICD-10-CM

## 2025-05-21 DIAGNOSIS — C50.511 MALIGNANT NEOPLASM OF LOWER-OUTER QUADRANT OF RIGHT FEMALE BREAST, UNSPECIFIED ESTROGEN RECEPTOR STATUS: ICD-10-CM

## 2025-05-21 DIAGNOSIS — Z90.13 S/P BILATERAL MASTECTOMY: ICD-10-CM

## 2025-05-21 DIAGNOSIS — E11.9 TYPE 2 DIABETES MELLITUS WITHOUT COMPLICATION, WITHOUT LONG-TERM CURRENT USE OF INSULIN: Primary | ICD-10-CM

## 2025-05-21 DIAGNOSIS — I10 ESSENTIAL HYPERTENSION: ICD-10-CM

## 2025-05-21 DIAGNOSIS — M81.0 AGE-RELATED OSTEOPOROSIS WITHOUT CURRENT PATHOLOGICAL FRACTURE: ICD-10-CM

## 2025-05-21 DIAGNOSIS — E78.2 MIXED HYPERLIPIDEMIA: ICD-10-CM

## 2025-05-21 DIAGNOSIS — F43.21 GRIEVING: ICD-10-CM

## 2025-05-21 RX ORDER — SITAGLIPTIN AND METFORMIN HYDROCHLORIDE 1000; 50 MG/1; MG/1
TABLET, FILM COATED, EXTENDED RELEASE ORAL
Qty: 180 TABLET | Refills: 1 | Status: SHIPPED | OUTPATIENT
Start: 2025-05-21

## 2025-05-21 NOTE — PROGRESS NOTES
Subjective   Felicia Boyle is a 69 y.o. female with   Chief Complaint   Patient presents with    Diabetes    Hypertension   .    Diabetes  Hypertension     69-year-old [place] female with multiple medical issues here for further medical management.  Patient with history of hypertension and type II non-insulin-dependent diabetes mellitus.  Current medication regimens include Januvia 100 mg daily.  At one time she had used metformin and tolerated the product well but did not like the large pill.  She is also using lisinopril at 10 mg daily and a host of supplements and vitamins.  She is a breast cancer survivor and continues to follow with oncology.  She appears to have developed a ptosis of her right upper eyelid and recent MRI of the brain was performed looking for underlying etiology.  For the most part this was unremarkable.  Medications and supplements are used appropriately and are well-tolerated without side effects.  Fasting labs have been acquired prior to this visit.  Patient is also grieving having just lost her oldest son who lives in the [facility] at the age of 48.  Apparently he committed suicide-hung himself he is having a very difficult time with this.  She however has good support with her  and other family members here in the [facility].  The following portions of the patient's history were reviewed and updated as appropriate: allergies, current medications, past family history, past medical history, past social history, past surgical history and problem list.    Review of Systems   Respiratory:          Breast cancer   Cardiovascular:         Hypertension   Endocrine:        Vitamin D deficiency, type II non-insulin-dependent diabetes mellitus   All other systems reviewed and are negative.      Objective     Vitals:    05/21/25 0920   BP: 120/68   Pulse: 77   Temp: 97.3 °F (36.3 °C)   SpO2: 99%       Recent Results (from the past 4 weeks)   Comprehensive metabolic panel    Collection Time:  05/12/25  8:21 AM    Specimen: Blood   Result Value Ref Range    Glucose 144 (H) 65 - 99 mg/dL    BUN 16 8 - 23 mg/dL    Creatinine 0.87 0.57 - 1.00 mg/dL    Sodium 135 (L) 136 - 145 mmol/L    Potassium 4.3 3.5 - 5.2 mmol/L    Chloride 101 98 - 107 mmol/L    CO2 24.3 22.0 - 29.0 mmol/L    Calcium 9.7 8.6 - 10.5 mg/dL    Total Protein 8.0 6.0 - 8.5 g/dL    Albumin 4.6 3.5 - 5.2 g/dL    ALT (SGPT) 24 1 - 33 U/L    AST (SGOT) 18 1 - 32 U/L    Alkaline Phosphatase 84 39 - 117 U/L    Total Bilirubin 0.3 0.0 - 1.2 mg/dL    Globulin 3.4 gm/dL    A/G Ratio 1.4 g/dL    BUN/Creatinine Ratio 18.4 7.0 - 25.0    Anion Gap 9.7 5.0 - 15.0 mmol/L    eGFR 71.8 >60.0 mL/min/1.73   Hemoglobin A1c    Collection Time: 05/12/25  8:21 AM    Specimen: Blood   Result Value Ref Range    Hemoglobin A1C 7.40 (H) 4.80 - 5.60 %   TSH    Collection Time: 05/12/25  8:21 AM    Specimen: Blood   Result Value Ref Range    TSH 4.080 0.270 - 4.200 uIU/mL   Vitamin D 25 hydroxy    Collection Time: 05/12/25  8:21 AM    Specimen: Blood   Result Value Ref Range    25 Hydroxy, Vitamin D 39.6 30.0 - 100.0 ng/ml   Lipid panel    Collection Time: 05/12/25  8:21 AM    Specimen: Blood   Result Value Ref Range    Total Cholesterol 187 0 - 200 mg/dL    Triglycerides 157 (H) 0 - 150 mg/dL    HDL Cholesterol 75 (H) 40 - 60 mg/dL    LDL Cholesterol  86 0 - 100 mg/dL    VLDL Cholesterol 26 5 - 40 mg/dL    LDL/HDL Ratio 1.07    CBC Auto Differential    Collection Time: 05/12/25  8:21 AM    Specimen: Blood   Result Value Ref Range    WBC 7.84 3.40 - 10.80 10*3/mm3    RBC 4.88 3.77 - 5.28 10*6/mm3    Hemoglobin 14.2 12.0 - 15.9 g/dL    Hematocrit 42.7 34.0 - 46.6 %    MCV 87.5 79.0 - 97.0 fL    MCH 29.1 26.6 - 33.0 pg    MCHC 33.3 31.5 - 35.7 g/dL    RDW 13.0 12.3 - 15.4 %    RDW-SD 41.8 37.0 - 54.0 fl    MPV 11.2 6.0 - 12.0 fL    Platelets 343 140 - 450 10*3/mm3    Neutrophil % 46.9 42.7 - 76.0 %    Lymphocyte % 43.5 19.6 - 45.3 %    Monocyte % 7.5 5.0 - 12.0 %     Eosinophil % 0.8 0.3 - 6.2 %    Basophil % 0.9 0.0 - 1.5 %    Immature Grans % 0.4 0.0 - 0.5 %    Neutrophils, Absolute 3.68 1.70 - 7.00 10*3/mm3    Lymphocytes, Absolute 3.41 (H) 0.70 - 3.10 10*3/mm3    Monocytes, Absolute 0.59 0.10 - 0.90 10*3/mm3    Eosinophils, Absolute 0.06 0.00 - 0.40 10*3/mm3    Basophils, Absolute 0.07 0.00 - 0.20 10*3/mm3    Immature Grans, Absolute 0.03 0.00 - 0.05 10*3/mm3    nRBC 0.0 0.0 - 0.2 /100 WBC       Physical Exam  Vitals and nursing note reviewed.   Constitutional:       Appearance: Normal appearance. She is well-developed and well-groomed.   HENT:      Head: Normocephalic and atraumatic.   Neck:      Thyroid: No thyroid mass or thyromegaly.      Vascular: Normal carotid pulses. No carotid bruit.      Trachea: Trachea and phonation normal.   Cardiovascular:      Rate and Rhythm: Normal rate and regular rhythm.      Heart sounds: Normal heart sounds. No murmur heard.     No friction rub. No gallop.   Pulmonary:      Effort: Pulmonary effort is normal. No respiratory distress.      Breath sounds: Normal breath sounds. No decreased breath sounds, wheezing, rhonchi or rales.   Musculoskeletal:      Cervical back: Neck supple.   Lymphadenopathy:      Cervical: No cervical adenopathy.   Skin:     General: Skin is warm and dry.      Findings: No rash.   Neurological:      Mental Status: She is alert and oriented to person, place, and time.   Psychiatric:         Attention and Perception: Attention and perception normal.         Mood and Affect: Mood normal. Affect is tearful.         Speech: Speech normal.         Behavior: Behavior normal. Behavior is cooperative.         Thought Content: Thought content normal.         Cognition and Memory: Cognition and memory normal.         Judgment: Judgment normal.         Assessment & Plan   Diagnoses and all orders for this visit:    1. Type 2 diabetes mellitus without complication, without long-term current use of insulin (Primary)  -      SITaglip Phos-metFORMIN HCl ER (Janumet XR)  MG tablet; 2 po q am  Dispense: 180 tablet; Refill: 1  -     Lipid panel; Future  -     Comprehensive metabolic panel; Future  -     TSH; Future  -     Vitamin D 25 hydroxy; Future  -     Hemoglobin A1c; Future  -     CBC w AUTO Differential; Future    2. Essential hypertension  -     Lipid panel; Future  -     Comprehensive metabolic panel; Future  -     TSH; Future  -     Vitamin D 25 hydroxy; Future  -     Hemoglobin A1c; Future  -     CBC w AUTO Differential; Future    3. Mixed hyperlipidemia  -     Lipid panel; Future  -     Comprehensive metabolic panel; Future  -     TSH; Future  -     Vitamin D 25 hydroxy; Future  -     Hemoglobin A1c; Future  -     CBC w AUTO Differential; Future    4. Vitamin D deficiency  -     Lipid panel; Future  -     Comprehensive metabolic panel; Future  -     TSH; Future  -     Vitamin D 25 hydroxy; Future  -     Hemoglobin A1c; Future  -     CBC w AUTO Differential; Future    5. Malignant neoplasm of lower-outer quadrant of right female breast, unspecified estrogen receptor status  -     Lipid panel; Future  -     Comprehensive metabolic panel; Future  -     TSH; Future  -     Vitamin D 25 hydroxy; Future  -     Hemoglobin A1c; Future  -     CBC w AUTO Differential; Future    6. S/P bilateral mastectomy  -     Lipid panel; Future  -     Comprehensive metabolic panel; Future  -     TSH; Future  -     Vitamin D 25 hydroxy; Future  -     Hemoglobin A1c; Future  -     CBC w AUTO Differential; Future    7. Age-related osteoporosis without current pathological fracture  -     Lipid panel; Future  -     Comprehensive metabolic panel; Future  -     TSH; Future  -     Vitamin D 25 hydroxy; Future  -     Hemoglobin A1c; Future  -     CBC w AUTO Differential; Future    8. Grieving    Patient has been asked to lower cholesterol in her diet.  Even though her A1c is acceptable for a patient who is almost 70 years of age we will add metformin  back to her regimen in the form of Janumet XR.  Anticipate repeat fasting labs in October of this year with follow-up with me thereafter.  She will contact this office with any further needs in the interim.  Other current medications will continue without alteration.  She will continue to follow-up with oncology as scheduled.    Return in about 5 months (around 10/21/2025) for Recheck.  BMI is within normal parameters. No other follow-up for BMI required.

## 2025-05-27 ENCOUNTER — RESULTS FOLLOW-UP (OUTPATIENT)
Dept: NEUROLOGY | Facility: CLINIC | Age: 70
End: 2025-05-27
Payer: COMMERCIAL

## 2025-05-27 DIAGNOSIS — Z85.9 HISTORY OF CANCER: ICD-10-CM

## 2025-05-27 DIAGNOSIS — R90.89 ABNORMAL BRAIN MRI: Primary | ICD-10-CM

## 2025-06-04 NOTE — TELEPHONE ENCOUNTER
Answering service message:    (1) IN: 2025 04:43PM train  Pts Dr: MILADYS/DR ABRAHAM  Is this for General or Stroke?: GENERAL  From: SHERRILL MURPHY: [phone]  Pt Name:  : 53  RM #:  Msg: HAS QUESTIONS RE MRI TESTING

## 2025-06-05 NOTE — TELEPHONE ENCOUNTER
Caller: Felicia Boyle    Relationship: Self    Best call back number: [phone]    Who are you requesting to speak with (clinical staff, provider,  specific staff member): STAFF    Do you know the name of the person who called: REMY    What was the call regarding: RETURNING YOUR CALL    Is it okay if the provider responds through MyChart: YES

## 2025-06-05 NOTE — TELEPHONE ENCOUNTER
Called patient her phone never rang and went straight to voice mail.I left a message asking her to call back or send a message in My Chart with the questions she has.

## 2025-06-10 ENCOUNTER — OFFICE VISIT (OUTPATIENT)
Dept: SURGERY | Facility: CLINIC | Age: 70
End: 2025-06-10
Payer: COMMERCIAL

## 2025-06-10 VITALS
DIASTOLIC BLOOD PRESSURE: 78 MMHG | OXYGEN SATURATION: 98 % | WEIGHT: 119 LBS | BODY MASS INDEX: 23.99 KG/M2 | SYSTOLIC BLOOD PRESSURE: 126 MMHG | HEART RATE: 101 BPM | HEIGHT: 59 IN

## 2025-06-10 DIAGNOSIS — Z63.4 GRIEF AT LOSS OF CHILD: ICD-10-CM

## 2025-06-10 DIAGNOSIS — F43.21 GRIEF AT LOSS OF CHILD: ICD-10-CM

## 2025-06-10 DIAGNOSIS — Z17.1 MALIGNANT NEOPLASM OF LOWER-OUTER QUADRANT OF RIGHT BREAST OF FEMALE, ESTROGEN RECEPTOR NEGATIVE: Primary | ICD-10-CM

## 2025-06-10 DIAGNOSIS — C50.511 MALIGNANT NEOPLASM OF LOWER-OUTER QUADRANT OF RIGHT BREAST OF FEMALE, ESTROGEN RECEPTOR NEGATIVE: Primary | ICD-10-CM

## 2025-06-10 SDOH — SOCIAL STABILITY - SOCIAL INSECURITY: DISSAPEARANCE AND DEATH OF FAMILY MEMBER: Z63.4

## 2025-06-10 NOTE — PROGRESS NOTES
Breast Surgery Follow Up Note    Oncologic History:  Felicia Boyle is a 69 y.o. female with the following oncologic history:  Oncology/Hematology History   Breast cancer of lower-outer quadrant of right female breast   2/14/2024 Initial Diagnosis    Breast cancer of lower-outer quadrant of right female breast     3/18/2024 - 2/24/2025 Chemotherapy    OP BREAST Pertuzumab / Trastuzumab + (Weekly PACLitaxel x 4 Cycles) Q21D     Bilateral malignant neoplasm of breast in female   9/1/2017 Genetic Testing     Genetics ([facility])  Genetic result: Negative, no clinically significant mutation identified.     12/30/2019 Imaging    BONE MINERAL DENSITOMETRY     HISTORY: Postmenopausal     COMPARISON: BMD 01/22/2018, 01/19/2016     TECHNIQUE: The T score compares the patient's bone mineral density with  the peak bone mass of young normal patients. Patients with T-scores  between 1.0 and 2.5 standard deviations below the mean are osteopenic.  Patients with T-scores greater than 2.5 standard deviation below the  mean are osteoporotic.     The Z score compares the patient's bone mineral density with sex and age  matched patients. Z score of -2.0 and lower is an indication of low  mineral density for the patient's age.     FINDINGS:   LUMBAR SPINE:  The BMD measured in the L1-L4 is 0.699 g/cm2 for a  T-score of -3.2 and a Z-score of -1.4     LEFT HIP: The BMD for the femoral neck is 0.543g/cm2 for a T score of   -2.8 and a Z score of -1.2     RIGHT HIP:  The BMD for the femoral neck is 0.512g/cm2 for a T score of  -3.0 and a Z score of -1.5     IMPRESSION:  1. Osteoporosis.  2. When compared to the prior exam of 01/22/2018 and baseline exam of  01/19/2016, there has been no statistically significant change.     1/31/2024 Initial Diagnosis    Bilateral malignant neoplasm of breast in female     1/31/2024 Biopsy    Final Diagnosis   1.  Breast, right, 8:00, 8 cm from nipple, biopsy: Invasive ductal adenocarcinoma                -A.   The tumor is Jeovany grade III (tubular grade 3, nuclear grade 3, mitotic grade 3).               -B.  The tumor measures up to 9.3 mm on the slide               -C.  No definitive lymphovascular invasion or perineural invasion is identified               -D.  Ductal carcinoma in situ is not identified               -E.  Microcalcifications are not identified     Estrogen receptor: <1%  Progesterone receptor: <1%  HER2 status: Positive, 3+, IHC  Ki 67: 70%     2/26/2024 Biopsy    Final Diagnosis   1.  Left breast, 12:00, MRI-guided biopsies for non-mass enhancement: INTERMEDIATE GRADE DUCTAL CARCINOMA IN SITU (DCIS) WITH APOCRINE FEATURES.               -Architectural patterns: Solid and cribriform with central necrosis and associated microcalcifications.               -DCIS involves sclerosing adenosis in multiple cores and measures up to 7 mm maximally.               -No evidence of invasion identified.     2.  Right breast, 12:00, MRI-guided biopsies for non-mass enhancement:               -Sclerosing adenosis with associated microcalcifications.               -No atypical hyperplasia, in situ nor invasive carcinoma identified.     3.  Right breast, 8:00, MRI-guided biopsies for linear enhancement: HIGH GRADE DUCTAL CARCINOMA IN SITU (DCIS).  -Architectural patterns: Solid, comedo and clinging with lobular extension, central comedonecrosis and associated microcalcifications.               -DCIS involves multiple tissue cores measuring up to 3 mm.               -Sclerosing adenosis and usual ductal hyperplasia with associated microcalcifications.               -No evidence of invasive carcinoma identified.        Estrogen receptor: %  Progesterone receptor: 81-90%  Ki 67: 15%     3/18/2024 -  Chemotherapy    3/18/2024-received first dose of Herceptin and Perjeta, had a severe hypersensitivity reaction to Taxol and hence Taxol was discontinued     3/25/2024-cycle 1 abraxane    4/8/24-abraxane dose reduced  20% due to elevated LFTs    6/17/24- last cycle abraxane    7/1/24-continue herceptin perjeta every three weeks     8/15/2024 Surgery    Final Diagnosis   1.  Harrison lymph node #1, right axilla, excision:               -1 lymph node, negative for metastatic carcinoma (0/1).     2.  Harrison lymph node #2, right axilla, excision:               -3 lymph nodes, negative for metastatic carcinoma (0/3).     3.  Right breast, oriented simple skin sparing mastectomy status post neoadjuvant therapy (907 g): SINGLE FOCUS OF HIGH GRADE DUCTAL CARCINOMA IN SITU (DCIS).               -Architectural patterns: Solid with focal necrosis and associated microcalcifications.               -Extent of DCIS: 2.0 mm focus in a single tissue block.               -20 mm fibrous tumor bed with sclerosing adenosis, microcysts and microcalcifications.               -Unremarkable skin and nipple.               -All margins widely free of DCIS (see synoptic template).               -No evidence of residual invasive carcinoma identified.               -Pathologic stage: ypTis, N(sn)0, pCR (see Comment).     4.  Left breast, oriented simple skin sparing mastectomy status post neoadjuvant therapy (850 g):               -Fat necrosis, biopsy site changes, sclerosing adenosis and changes consistent with treatment effect.               -Microcalcifications present associated with benign ducts and sclerosing adenosis.               -Unremarkable skin and nipple.               -No evidence of residual in situ nor invasive carcinoma identified.                    5.  Harrison lymph node #1, left axilla, excision:               -1 lymph node, negative for metastatic carcinoma (0/1).               Interval History: right breast invasive cancer (ER-OH-HER2+) and left breast ductal carcinoma in situ (ER+OH+). She has completed chemotherapy, port removed. No role for prevention as she has completed bilateral mastectomies, sentinel lymph node biopsy. No  additional therapy required. No radiation due to mastectomy - no additional therapy required. Continues to have neuropathy - worse in her fingers than in her hands.  Complaining of pain and tingling sensation in central breast space - feels like her nipple hurt even though they are not there anymore.     Past Medical History:   Diagnosis Date    Anxiety about health     Breast cancer of lower-outer quadrant of right female breast 02/14/2024    DCIS (ductal carcinoma in situ) of breast 03/15/2024    Diverticulosis     History of chemotherapy 06/22/2024    Mixed hyperlipidemia 05/19/2019    DIET CONTROLLED    Osteoporosis     Port-A-Cath in place     Type 2 diabetes mellitus      Patient Active Problem List   Diagnosis    Age-related osteoporosis without current pathological fracture    FH: breast cancer in first degree relative    Post-menopausal    Type 2 diabetes mellitus without complication, without long-term current use of insulin    Vitamin D deficiency    Mixed hyperlipidemia    Breast cancer of lower-outer quadrant of right female breast    Encounter for fitting and adjustment of vascular catheter    DCIS (ductal carcinoma in situ) of breast    Bilateral malignant neoplasm of breast in female    S/P bilateral mastectomy    Port-A-Cath in place    Essential hypertension     Current Outpatient Medications on File Prior to Visit   Medication Sig Dispense Refill    b complex vitamins capsule Take 1 capsule by mouth Daily. 30 capsule 5    Blood Glucose Monitoring Suppl (FreeStyle Lite) w/Device kit 1 Units Daily. 1 kit 0    glucose blood (FREESTYLE LITE) test strip TEST ONCE DAILY 100 each 2    Ibuprofen 3 %, Gabapentin 10 %, Baclofen 2 %, lidocaine 4 %, Ketamine HCl 4 % Apply 1-2 g topically to the appropriate area as directed 3 (Three) to 4 (Four) times daily. 90 g 1    Lancets (freestyle) lancets Use TO CHECK BLOOD SUGAR ONCE DAILY 100 each 2    SITaglip Phos-metFORMIN HCl ER (Janumet XR)  MG tablet 2 po  "q am 180 tablet 1     No current facility-administered medications on file prior to visit.     Allergies   Allergen Reactions    Paclitaxel Shortness Of Breath    Metronidazole Nausea And Vomiting       Past Surgical History:   Procedure Laterality Date    BREAST BIOPSY Left     Excisional biopsy in the [facility]    BREAST BIOPSY  03/2024    CATARACT EXTRACTION W/ INTRAOCULAR LENS IMPLANT Bilateral     CHOLECYSTECTOMY  2012    COLON RESECTION Right 2002    COLONOSCOPY N/A 04/28/2021    Procedure: COLONOSCOPY TO ANASTAMOSIS/TI;  Surgeon: Angelo Gonsalez MD;  Location: [facility] ENDOSCOPY;  Service: Gastroenterology;  Laterality: N/A;  PRE: SCREENING  POST: NORMAL POST-OP ANATOMY    ENDOSCOPY      2013    ENDOSCOPY N/A 03/10/2017    Procedure: ESOPHAGOGASTRODUODENOSCOPY WITH BIOPSY AND PETER DILATATION 54\";  Surgeon: Angelo Gonsalez MD;  Location: [facility] ENDOSCOPY;  Service:     ENDOSCOPY N/A 04/28/2021    Procedure: ESOPHAGOGASTRODUODENOSCOPY WITH BIOPSIES, 54FR PETER DILATATION;  Surgeon: Angelo Gonsalez MD;  Location: [facility] ENDOSCOPY;  Service: Gastroenterology;  Laterality: N/A;  PRE: DYSPHAGIA  POST: GASTRITIS, ESOPHAGEAL RING    EYE SURGERY  Jan 12&Jan 19 2024    Cataract    MASTECTOMY W/ SENTINEL NODE BIOPSY Bilateral 08/15/2024    Procedure: Bilateral total mastectomy, bilateral sentinel node biopsy, possible axillary node dissection, no reconstruction.;  Surgeon: Abeba Manrique MD;  Location: [facility] OR;  Service: General;  Laterality: Bilateral;    VENOUS ACCESS DEVICE (PORT) INSERTION Left 03/14/2024    Procedure: INSERTION OF PORTACATH;  Surgeon: Abeba Manrique MD;  Location: [facility] OR;  Service: General;  Laterality: Left;    VENOUS ACCESS DEVICE (PORT) REMOVAL Left 03/06/2025    Procedure: LEFT port removal;  Surgeon: Bri Balbuena MD;  Location: [facility] MAIN OR;  Service: General;  Laterality: Left;     Social History     Socioeconomic History    Marital status:      " Spouse name: Ryan   Tobacco Use    Smoking status: Former     Current packs/day: 0.00     Average packs/day: 0.3 packs/day for 52.0 years (13.0 ttl pk-yrs)     Types: Cigarettes     Start date:      Quit date: 1/15/2024     Years since quittin.4     Passive exposure: Past    Smokeless tobacco: Never    Tobacco comments:     N/A   Vaping Use    Vaping status: Never Used   Substance and Sexual Activity    Alcohol use: Yes     Comment: RARE    Drug use: No    Sexual activity: Defer     Partners: Male     Birth control/protection: Tubal ligation     Family History   Problem Relation Age of Onset    Hypertension Mother     Diabetes Mother     Hypertension Father     Breast cancer Sister 48    Breast cancer Sister 38    No Known Problems Brother     No Known Problems Daughter     No Known Problems Son     No Known Problems Maternal Grandmother     No Known Problems Paternal Grandmother     No Known Problems Maternal Grandfather     No Known Problems Paternal Grandfather     Autism Grandson     Colon cancer Neg Hx     Rectal cancer Neg Hx     Endometrial cancer Neg Hx     Vaginal cancer Neg Hx     Cervical cancer Neg Hx     Ovarian cancer Neg Hx     Prostate cancer Neg Hx     Uterine cancer Neg Hx     Malig Hyperthermia Neg Hx         Review of Systems:  CONSTITUTIONAL: No weight loss, fever, chills, weakness or fatigue.  HEENT: Eyes: No visual loss, blurred vision, double vision or yellow sclerae. Ears, Nose, Throat: No hearing loss, sneezing, congestion, runny nose or sore throat.  SKIN: No rash or itching.  CARDIOVASCULAR: No chest pain, chest pressure or chest discomfort. No palpitations or edema.  RESPIRATORY: No shortness of breath, cough or sputum.  GASTROINTESTINAL: No anorexia, nausea, vomiting or diarrhea. No abdominal pain or blood.  GENITOURINARY: No burning on urination  NEUROLOGICAL: No headache, dizziness, syncope, paralysis, ataxia, numbness or tingling in the extremities. No change in bowel or  bladder control.  MUSCULOSKELETAL: No muscle, back pain, joint pain or stiffness.  HEMATOLOGIC: No anemia, bleeding or bruising.  LYMPHATICS: No enlarged nodes.  PSYCHIATRIC: No history of depression or anxiety.  ENDOCRINOLOGIC: No reports of sweating, cold or heat intolerance. No polyuria or polydipsia.  ALLERGIES: No history of asthma, hives, eczema or rhinitis.    Physical Exam:  Vitals:    06/10/25 1157   BP: 126/78   Pulse: 101   SpO2: 98%         General: Alert, cooperative    HEENT: Atraumatic, normocephalic    Oral/Maxillofacial: Moist mucous membranes    Neck: Supple     Lungs: EWOB, clear to auscultation bilaterally     Heart: RRR    Breast: Bilateral breasts examined sitting and supine.  bilateral breast surgically absent. Some bilateral dog ears at lateral edges of her mastectomy incision.     Lymphatics:  Bilateral supraclavicular, cervical, and axillary basins without lymphadneopathy    Abdomen: Soft, non-tender, non-distended    Extremities: normal strength and sensation.  No obvious deformities.     Neuro: alert, normal speech, no focal findings or movement disorder noted     Skin: no lesions or abrasions      Recent Imaging:  NA    Assessment/Plan: Felicia Boyle is a 69 y.o.female with h/o Stage 2A, bilateral breast cancer, JAYDEN. Surgery completed: 24  - acute grief - son  by suicide recently.    - RTC 6 moths for clinical breast exam - with the Nurse Practitioners  - Next imaging due NA  - referral to [facility] for ongoing grief in addition to managing symptoms from her cancer  - Lymphedema clinic previously referred  - referral to plastic surgery for additional contouring and to make closure as flat as possible, these areas rub and make wearing her bra/prosthesis       Bri Balbuena MD  Breast Surgical Oncology  I spent 30 minutes caring for Ms Boyle on this date of service. This time includes time spent by me in the following activities: preparing for the visit, reviewing tests,  obtaining and/or reviewing a separately obtained history, performing a medically appropriate examination and/or evaluation , counseling and educating the patient/family/caregiver, referring and communicating with other health care professionals , documenting information in the medical record, independently interpreting results and communicating that information with the patient/family/caregiver, and care coordination.    Return in about 6 months (around 12/10/2025) for Follow up with NP, needs appointment with plastic surgery for flat closure.

## 2025-06-19 ENCOUNTER — OFFICE VISIT (OUTPATIENT)
Dept: NEUROLOGY | Facility: CLINIC | Age: 70
End: 2025-06-19
Payer: COMMERCIAL

## 2025-06-19 VITALS
BODY MASS INDEX: 24.33 KG/M2 | OXYGEN SATURATION: 98 % | WEIGHT: 120.7 LBS | HEART RATE: 74 BPM | HEIGHT: 59 IN | SYSTOLIC BLOOD PRESSURE: 118 MMHG | DIASTOLIC BLOOD PRESSURE: 60 MMHG

## 2025-06-19 DIAGNOSIS — G62.9 NEUROPATHY: Primary | ICD-10-CM

## 2025-06-19 PROCEDURE — 99214 OFFICE O/P EST MOD 30 MIN: CPT | Performed by: STUDENT IN AN ORGANIZED HEALTH CARE EDUCATION/TRAINING PROGRAM

## 2025-06-19 NOTE — LETTER
June 19, 2025     Tristan Malone DO  [address] [facility] [address]    Patient: Felicia Boyle   YOB: 1955   Date of Visit: 6/19/2025     Dear Tristan Malone DO:       Thank you for referring Felicia Boyle to me for evaluation. Below are the relevant portions of my assessment and plan of care.    If you have questions, please do not hesitate to call me. I look forward to following Felicia along with you.         Sincerely,        Paul Parson MD        CC: No Recipients    Paul Parson MD  06/19/25 1034  Sign when Signing Visit  Chief Complaint   Patient presents with   • Peripheral Neuropathy     F/u       Patient ID: Felicia Boyle is a 69 y.o. female.    HPI:    The following portions of the patient's history were reviewed and updated as appropriate: allergies, current medications, past family history, past medical history, past social history, past surgical history and problem list.    Interval history:    Review of Systems   History of Present Illness  The patient presents to the neurology clinic for a follow-up of neuropathy. She has diabetes and a history of bilateral breast cancer, status post bilateral mastectomy and chemotherapy. Her A1c remains elevated at 7.4. She reports that her neuropathy in her fingertips has improved since her last outpatient visit. She used the cream without side effects, and thinks it may help.    She notes an improvement in her hand condition, although numbness persists. She has experienced no falls since her last visit. The prescribed cream for her fingertips has been beneficial, and she uses Salonpas for upper back pain. She has noticed chest tightness at certain spots of her chest, which provides some relief. She had a consultation with Dr. Balbuena last Monday due to pain at the surgical site, describing it as sharp and localized on both sides. Referral to plastic surgery was intended but order not placed.  An MRI of pituitary is scheduled for  07/16/2025 to further characterize pituitary mass as cyst or microadenoma, if latter, may need endocrinology referral. No causes of eyelid ptosis were seen in brain.    She also reports experiencing fatigue in her eyes when reading, which she manages with exercises.           Vitals:    06/19/25 0822   BP: 118/60   Pulse: 74   SpO2: 98%       Neurological Exam  Mental Status  Alert.    Cranial Nerves  CN II: Right normal visual field. Left normal visual field.  CN III, IV, VI: Extraocular movements intact bilaterally. Pupils equal round and reactive to light bilaterally.  CN V:  Right: Facial sensation is normal.  Left: Facial sensation is normal on the left.  CN VII:  Right: There is peripheral facial weakness.  Left: There is no facial weakness.  CN IX, X:  Right: Palate is normal.  Left: Palate is normal.  CN XI:  Right: Trapezius strength is normal.  Left: Trapezius strength is normal.  CN XII: Tongue midline without atrophy or fasciculations.  R eyelid ptosis - has been present for 2 years or more..    Motor                                               Right                     Left  Deltoid                                   5                          5   Biceps                                   5                          5   Triceps                                  5                          5   Iliopsoas                               5                          5   Quadriceps                           5                          5   Hamstring                             5                          5   Gastrocnemius                     5                           5   Anterior tibialis                      5                          5    Sensory  Light touch abnormality:   Decr light touch to all 10 fingertips. Decreased vibration toes, normal at fingertips, ankles, knees, wrists..    Reflexes                                            Right                      Left  Brachioradialis                    2+                          2+  Biceps                                 0                         0  Patellar                                0                         0  Achilles                                0                         0    Coordination  Right: Finger-to-nose normal. Heel-to-shin normal.Left: Finger-to-nose normal. Heel-to-shin normal.    Gait    Normal unstressed gait.      Physical Exam  Eyes:      Extraocular Movements: Extraocular movements intact.      Pupils: Pupils are equal, round, and reactive to light.   Neurological:      Mental Status: She is alert.      Deep Tendon Reflexes:      Reflex Scores:       Bicep reflexes are 0 on the right side and 0 on the left side.       Brachioradialis reflexes are 2+ on the right side and 2+ on the left side.       Patellar reflexes are 0 on the right side and 0 on the left side.       Achilles reflexes are 0 on the right side and 0 on the left side.        Procedures    Assessment/Plan:    Assessment & Plan  1. Neuropathy.  Her neuropathy fingertips has shown improvement since the last outpatient visit. She reports that the prescribed cream for her fingertips has helped reduce pain. She was advised to continue using the cream as needed for pain relief. If the pain worsens, she should notify the clinic so that imaging of the thoracic spine can be considered. She was also advised to work on lowering her A1c to keep her nerves healthier and give them a better chance to recover. If she needs a refill of the cream, she should let the clinic know.    2. Diabetes Mellitus.  Her A1c remains elevated at 7.4. She was advised to adjust her medications to lower her A1c, which will help keep her nerves healthier and aid in their recovery.    3. Bilateral Breast Cancer.  She has a history of bilateral breast cancer status post bilateral mastectomy and chemotherapy. She reported sharp pain in her right and left arms, which was evaluated by Dr. Balbuena with an x-ray showing no  abnormalities. She was advised to follow up with Dr. Renteria and the breast surgeon regarding her pain and the potential need for additional imaging or referral to plastic surgery. An MRI is scheduled for 07/16/2025 to evaluate a small mass in the pituitary gland, which is likely a benign cyst or microadenoma and not related to breast cancer.    4. Lateral chest and upper back pain.  She was advised to try changing her position during sleep and to use Salonpas patches, which she reported as helpful. She was also instructed to inform Dr. Ruby and the breast surgeon about her pain for further evaluation and potential referral to plastic surgery. If it gets worse and another cause is not found could consider additional neurological workup but does not match a single dermatome, would be upper thoracic/lower cervical combined with mid thoracic issues.    Follow-up  The patient will follow up in 6 months.   I spent 30 minutes caring for this patient on this date of service. This time includes time spent by me in the following activities as necessary: preparing for the visit, reviewing tests, medical records and previous visits, obtaining and/or reviewing a separately obtained history, performing a medically appropriate exam and/or evaluation, counseling and educating the patient, and/or communicating with other healthcare professionals, documenting information in the medical record, independently interpreting results and communicating that information with the patient, and developing a medically appropriate treatment plan with consideration of other conditions, medications, and treatments.    Patient or patient representative verbalized consent for the use of Ambient Listening during the visit with  Paul Parson MD for chart documentation. 6/19/2025  09:46 EDT     Diagnoses and all orders for this visit:    1. Neuropathy (Primary)           MD Eb Henson Mike, MD  06/19/25 0852  Sign when Signing  Visit  Chief Complaint   Patient presents with   • Peripheral Neuropathy     F/u       Patient ID: Felicia Boyle is a 69 y.o. female.    HPI:    The following portions of the patient's history were reviewed and updated as appropriate: allergies, current medications, past family history, past medical history, past social history, past surgical history and problem list.    Interval history:    Review of Systems   ***    There were no vitals filed for this visit.    Neurological Exam    Physical Exam    Procedures    Assessment/Plan:         There are no diagnoses linked to this encounter.       Kristy Wynne MA

## 2025-06-19 NOTE — PROGRESS NOTES
Chief Complaint   Patient presents with    Peripheral Neuropathy     F/u       Patient ID: Felicia Boyle is a 69 y.o. female.    HPI:    The following portions of the patient's history were reviewed and updated as appropriate: allergies, current medications, past family history, past medical history, past social history, past surgical history and problem list.    Interval history:    Review of Systems   History of Present Illness  The patient presents to the neurology clinic for a follow-up of neuropathy. She has diabetes and a history of bilateral breast cancer, status post bilateral mastectomy and chemotherapy. Her A1c remains elevated at 7.4. She reports that her neuropathy in her fingertips has improved since her last outpatient visit. She used the cream without side effects, and thinks it may help.    She notes an improvement in her hand condition, although numbness persists. She has experienced no falls since her last visit. The prescribed cream for her fingertips has been beneficial, and she uses Salonpas for upper back pain. She has noticed chest tightness at certain spots of her chest, which provides some relief. She had a consultation with Dr. Balbuena last Monday due to pain at the surgical site, describing it as sharp and localized on both sides. Referral to plastic surgery was intended but order not placed.  An MRI of pituitary is scheduled for 07/16/2025 to further characterize pituitary mass as cyst or microadenoma, if latter, may need endocrinology referral. No causes of eyelid ptosis were seen in brain.    She also reports experiencing fatigue in her eyes when reading, which she manages with exercises.           Vitals:    06/19/25 0822   BP: 118/60   Pulse: 74   SpO2: 98%       Neurological Exam  Mental Status  Alert.    Cranial Nerves  CN II: Right normal visual field. Left normal visual field.  CN III, IV, VI: Extraocular movements intact bilaterally. Pupils equal round and reactive to light  bilaterally.  CN V:  Right: Facial sensation is normal.  Left: Facial sensation is normal on the left.  CN VII:  Right: There is peripheral facial weakness.  Left: There is no facial weakness.  CN IX, X:  Right: Palate is normal.  Left: Palate is normal.  CN XI:  Right: Trapezius strength is normal.  Left: Trapezius strength is normal.  CN XII: Tongue midline without atrophy or fasciculations.  R eyelid ptosis - has been present for 2 years or more..    Motor                                               Right                     Left  Deltoid                                   5                          5   Biceps                                   5                          5   Triceps                                  5                          5   Iliopsoas                               5                          5   Quadriceps                           5                          5   Hamstring                             5                          5   Gastrocnemius                     5                           5   Anterior tibialis                      5                          5    Sensory  Light touch abnormality:   Decr light touch to all 10 fingertips. Decreased vibration toes, normal at fingertips, ankles, knees, wrists..    Reflexes                                            Right                      Left  Brachioradialis                    2+                         2+  Biceps                                 0                         0  Patellar                                0                         0  Achilles                                0                         0    Coordination  Right: Finger-to-nose normal. Heel-to-shin normal.Left: Finger-to-nose normal. Heel-to-shin normal.    Gait    Normal unstressed gait.      Physical Exam  Eyes:      Extraocular Movements: Extraocular movements intact.      Pupils: Pupils are equal, round, and reactive to light.   Neurological:      Mental Status: She is  alert.      Deep Tendon Reflexes:      Reflex Scores:       Bicep reflexes are 0 on the right side and 0 on the left side.       Brachioradialis reflexes are 2+ on the right side and 2+ on the left side.       Patellar reflexes are 0 on the right side and 0 on the left side.       Achilles reflexes are 0 on the right side and 0 on the left side.        Procedures    Assessment/Plan:    Assessment & Plan  1. Neuropathy.  Her neuropathy fingertips has shown improvement since the last outpatient visit. She reports that the prescribed cream for her fingertips has helped reduce pain. She was advised to continue using the cream as needed for pain relief. If the pain worsens, she should notify the clinic so that imaging of the thoracic spine can be considered. She was also advised to work on lowering her A1c to keep her nerves healthier and give them a better chance to recover. If she needs a refill of the cream, she should let the clinic know.    2. Diabetes Mellitus.  Her A1c remains elevated at 7.4. She was advised to adjust her medications to lower her A1c, which will help keep her nerves healthier and aid in their recovery.    3. Bilateral Breast Cancer.  She has a history of bilateral breast cancer status post bilateral mastectomy and chemotherapy. She reported sharp pain in her right and left arms, which was evaluated by Dr. Balbuena with an x-ray showing no abnormalities. She was advised to follow up with Dr. Renteria and the breast surgeon regarding her pain and the potential need for additional imaging or referral to plastic surgery. An MRI is scheduled for 07/16/2025 to evaluate a small mass in the pituitary gland, which is likely a benign cyst or microadenoma and not related to breast cancer.    4. Lateral chest and upper back pain.  She was advised to try changing her position during sleep and to use Salonpas patches, which she reported as helpful. She was also instructed to inform Dr. Ruby and the breast  surgeon about her pain for further evaluation and potential referral to plastic surgery. If it gets worse and another cause is not found could consider additional neurological workup but does not match a single dermatome, would be upper thoracic/lower cervical combined with mid thoracic issues.    Follow-up  The patient will follow up in 6 months.   I spent 30 minutes caring for this patient on this date of service. This time includes time spent by me in the following activities as necessary: preparing for the visit, reviewing tests, medical records and previous visits, obtaining and/or reviewing a separately obtained history, performing a medically appropriate exam and/or evaluation, counseling and educating the patient, and/or communicating with other healthcare professionals, documenting information in the medical record, independently interpreting results and communicating that information with the patient, and developing a medically appropriate treatment plan with consideration of other conditions, medications, and treatments.    Patient or patient representative verbalized consent for the use of Ambient Listening during the visit with  Paul Parson MD for chart documentation. 6/19/2025  09:46 EDT     Diagnoses and all orders for this visit:    1. Neuropathy (Primary)           Paul Parson MD

## 2025-06-19 NOTE — PROGRESS NOTES
Chief Complaint   Patient presents with    Peripheral Neuropathy     F/u       Patient ID: Felicia Boyle is a 69 y.o. female.    HPI:    The following portions of the patient's history were reviewed and updated as appropriate: allergies, current medications, past family history, past medical history, past social history, past surgical history and problem list.    Interval history:    Review of Systems   ***    There were no vitals filed for this visit.    Neurological Exam    Physical Exam    Procedures    Assessment/Plan:         There are no diagnoses linked to this encounter.       Kristy Wynne MA

## 2025-06-30 ENCOUNTER — OFFICE VISIT (OUTPATIENT)
Dept: FAMILY MEDICINE CLINIC | Facility: CLINIC | Age: 70
End: 2025-06-30
Payer: COMMERCIAL

## 2025-06-30 VITALS
HEIGHT: 59 IN | SYSTOLIC BLOOD PRESSURE: 128 MMHG | WEIGHT: 118.2 LBS | HEART RATE: 71 BPM | BODY MASS INDEX: 23.83 KG/M2 | DIASTOLIC BLOOD PRESSURE: 70 MMHG | TEMPERATURE: 97.1 F | OXYGEN SATURATION: 98 %

## 2025-06-30 DIAGNOSIS — R10.33 PERIUMBILICAL ABDOMINAL PAIN: Primary | ICD-10-CM

## 2025-06-30 DIAGNOSIS — C50.511 MALIGNANT NEOPLASM OF LOWER-OUTER QUADRANT OF RIGHT FEMALE BREAST, UNSPECIFIED ESTROGEN RECEPTOR STATUS: ICD-10-CM

## 2025-06-30 PROCEDURE — 99213 OFFICE O/P EST LOW 20 MIN: CPT | Performed by: FAMILY MEDICINE

## 2025-06-30 NOTE — PROGRESS NOTES
Subjective   Felicia Boyle is a 69 y.o. female with   Chief Complaint   Patient presents with    Abdominal Pain     Started 5 days ago, on and off, has a good appetite, no vomiting or diarrhea   .    Abdominal Pain     69-year-old [place] female with periumbilical pain off and on over the last several weeks.  This has become worse over the last 5 days.  Patient states that her appetite is normal as well as bowel movements-brown and formed without constipation or diarrhea.  There is been no melena or hematochezia and patient denies nausea/vomiting and there has been no hematemesis.  She denies weight loss and states that urinary system is working well-denies dysuria, frequency, urgency or hematuria.  Patient is suffering from breast cancer that has been quite extensive.  She has completed both chemotherapy and radiation and there has been concern for metastases.  The following portions of the patient's history were reviewed and updated as appropriate: allergies, current medications, past family history, past medical history, past social history, past surgical history and problem list.    Review of Systems   Gastrointestinal:  Positive for abdominal pain.       Objective     Vitals:    06/30/25 1320   BP: 128/70   Pulse: 71   Temp: 97.1 °F (36.2 °C)   SpO2: 98%       No results found for this or any previous visit (from the past 4 weeks).    Physical Exam  Vitals and nursing note reviewed.   Constitutional:       Appearance: Normal appearance. She is well-developed and well-groomed.   HENT:      Head: Normocephalic and atraumatic.   Neck:      Thyroid: No thyroid mass or thyromegaly.      Vascular: Normal carotid pulses. No carotid bruit.      Trachea: Trachea and phonation normal.   Cardiovascular:      Rate and Rhythm: Normal rate and regular rhythm.      Heart sounds: Normal heart sounds. No murmur heard.     No friction rub. No gallop.   Pulmonary:      Effort: Pulmonary effort is normal. No respiratory  distress.      Breath sounds: Normal breath sounds. No decreased breath sounds, wheezing, rhonchi or rales.   Abdominal:      General: Abdomen is flat. There is no distension.      Palpations: There is no hepatomegaly, splenomegaly or mass.      Tenderness: There is no abdominal tenderness. There is no right CVA tenderness, left CVA tenderness, guarding or rebound.      Hernia: No hernia is present.   Musculoskeletal:      Cervical back: Neck supple.   Lymphadenopathy:      Cervical: No cervical adenopathy.   Skin:     General: Skin is warm and dry.      Findings: No rash.   Neurological:      Mental Status: She is alert and oriented to person, place, and time.   Psychiatric:         Attention and Perception: Attention and perception normal.         Mood and Affect: Mood and affect normal.         Speech: Speech normal.         Behavior: Behavior normal. Behavior is cooperative.         Thought Content: Thought content normal.         Cognition and Memory: Cognition and memory normal.         Judgment: Judgment normal.         Assessment & Plan   Diagnoses and all orders for this visit:    1. Periumbilical abdominal pain (Primary)  -     CT Abdomen Pelvis With & Without Contrast; Future    2. Malignant neoplasm of lower-outer quadrant of right female breast, unspecified estrogen receptor status  -     CT Abdomen Pelvis With & Without Contrast; Future      Consider GI consultation if CT scan is normal.  Return if symptoms worsen or fail to improve.  BMI is within normal parameters. No other follow-up for BMI required.

## 2025-07-14 ENCOUNTER — APPOINTMENT (OUTPATIENT)
Dept: OCCUPATIONAL THERAPY | Facility: HOSPITAL | Age: 70
End: 2025-07-14
Payer: COMMERCIAL

## 2025-07-16 ENCOUNTER — HOSPITAL ENCOUNTER (OUTPATIENT)
Dept: MRI IMAGING | Facility: HOSPITAL | Age: 70
Discharge: HOME | End: 2025-07-16
Admitting: STUDENT IN AN ORGANIZED HEALTH CARE EDUCATION/TRAINING PROGRAM
Payer: COMMERCIAL

## 2025-07-16 DIAGNOSIS — R90.89 ABNORMAL BRAIN MRI: ICD-10-CM

## 2025-07-16 PROCEDURE — A9577 INJ MULTIHANCE: HCPCS | Performed by: STUDENT IN AN ORGANIZED HEALTH CARE EDUCATION/TRAINING PROGRAM

## 2025-07-16 PROCEDURE — 25510000002 GADOBENATE DIMEGLUMINE 529 MG/ML SOLUTION: Performed by: STUDENT IN AN ORGANIZED HEALTH CARE EDUCATION/TRAINING PROGRAM

## 2025-07-16 PROCEDURE — 76014 MR SFTY IMPLT&/FB ASMT STF 1: CPT

## 2025-07-16 PROCEDURE — 70553 MRI BRAIN STEM W/O & W/DYE: CPT

## 2025-07-16 RX ADMIN — GADOBENATE DIMEGLUMINE 15 ML: 529 INJECTION, SOLUTION INTRAVENOUS at 06:42

## 2025-07-17 ENCOUNTER — HOSPITAL ENCOUNTER (OUTPATIENT)
Dept: OCCUPATIONAL THERAPY | Facility: HOSPITAL | Age: 70
Setting detail: THERAPIES SERIES
Discharge: HOME | End: 2025-07-17
Payer: COMMERCIAL

## 2025-07-17 DIAGNOSIS — C50.912 BILATERAL MALIGNANT NEOPLASM OF BREAST IN FEMALE, UNSPECIFIED ESTROGEN RECEPTOR STATUS, UNSPECIFIED SITE OF BREAST: ICD-10-CM

## 2025-07-17 DIAGNOSIS — I97.2 POST-MASTECTOMY LYMPHEDEMA SYNDROME: Primary | ICD-10-CM

## 2025-07-17 DIAGNOSIS — C50.911 BILATERAL MALIGNANT NEOPLASM OF BREAST IN FEMALE, UNSPECIFIED ESTROGEN RECEPTOR STATUS, UNSPECIFIED SITE OF BREAST: ICD-10-CM

## 2025-07-17 PROCEDURE — 97535 SELF CARE MNGMENT TRAINING: CPT

## 2025-07-17 PROCEDURE — 93702 BIS XTRACELL FLUID ANALYSIS: CPT

## 2025-07-17 NOTE — THERAPY PROGRESS REPORT/RE-CERT
"Outpatient Occupational Therapy Lymphedema Progress Note  [facility]     Patient Name: Felicia Boyle  : 1955  MRN: [id number]  Today's Date: 2025      Visit Date: 2025    Patient Active Problem List   Diagnosis    Age-related osteoporosis without current pathological fracture    FH: breast cancer in first degree relative    Post-menopausal    Type 2 diabetes mellitus without complication, without long-term current use of insulin    Vitamin D deficiency    Mixed hyperlipidemia    Breast cancer of lower-outer quadrant of right female breast    Encounter for fitting and adjustment of vascular catheter    DCIS (ductal carcinoma in situ) of breast    Bilateral malignant neoplasm of breast in female    S/P bilateral mastectomy    Port-A-Cath in place    Essential hypertension        Past Medical History:   Diagnosis Date    Anxiety about health     Breast cancer of lower-outer quadrant of right female breast 2024    DCIS (ductal carcinoma in situ) of breast 03/15/2024    Diverticulosis     History of chemotherapy 2024    Mixed hyperlipidemia 2019    DIET CONTROLLED    Osteoporosis     Port-A-Cath in place     Type 2 diabetes mellitus         Past Surgical History:   Procedure Laterality Date    BREAST BIOPSY Left     Excisional biopsy in the [facility]    BREAST BIOPSY  2024    CATARACT EXTRACTION W/ INTRAOCULAR LENS IMPLANT Bilateral     CHOLECYSTECTOMY  2012    COLON RESECTION Right     COLONOSCOPY N/A 2021    Procedure: COLONOSCOPY TO ANASTAMOSIS/TI;  Surgeon: Angelo Gonsalez MD;  Location: [facility] ENDOSCOPY;  Service: Gastroenterology;  Laterality: N/A;  PRE: SCREENING  POST: NORMAL POST-OP ANATOMY    ENDOSCOPY      2013    ENDOSCOPY N/A 03/10/2017    Procedure: ESOPHAGOGASTRODUODENOSCOPY WITH BIOPSY AND PETER DILATATION 54\";  Surgeon: Angelo Gonsalez MD;  Location: [facility] ENDOSCOPY;  Service:     ENDOSCOPY N/A 2021    Procedure: ESOPHAGOGASTRODUODENOSCOPY " WITH BIOPSIES, 54FR PETER DILATATION;  Surgeon: Angelo Gonsalez MD;  Location: [facility] ENDOSCOPY;  Service: Gastroenterology;  Laterality: N/A;  PRE: DYSPHAGIA  POST: GASTRITIS, ESOPHAGEAL RING    EYE SURGERY  Jan 12&Jan 19 2024    Cataract    MASTECTOMY W/ SENTINEL NODE BIOPSY Bilateral 08/15/2024    Procedure: Bilateral total mastectomy, bilateral sentinel node biopsy, possible axillary node dissection, no reconstruction.;  Surgeon: Abeba Manrique MD;  Location: [facility] MAIN OR;  Service: General;  Laterality: Bilateral;    VENOUS ACCESS DEVICE (PORT) INSERTION Left 03/14/2024    Procedure: INSERTION OF PORTACATH;  Surgeon: Abeba Manrique MD;  Location: [facility] MAIN OR;  Service: General;  Laterality: Left;    VENOUS ACCESS DEVICE (PORT) REMOVAL Left 03/06/2025    Procedure: LEFT port removal;  Surgeon: Bri Balbuena MD;  Location: [facility] MAIN OR;  Service: General;  Laterality: Left;         Visit Dx:      ICD-10-CM ICD-9-CM   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 Bilateral malignant neoplasm of breast in female, unspecified estrogen receptor status, unspecified site of breast  C50.911 174.9    C50.912         Lymphedema       Row Name 07/17/25 0900             Subjective Pain    Able to rate subjective pain? yes  -RE      Pre-Treatment Pain Level 0  -RE      Post-Treatment Pain Level 0  -RE         Subjective    Subjective Comments Reports she is doinng well. Neuropathy is improved and no lymphedema or pain issues.  -RE         L-Dex Bioimpedence Screening    L-Dex Measurement Extremity RUE  -RE      L-Dex Patient Position Standing  -RE      L-Dex UE Dominate Side Right  -RE      L-Dex UE At Risk Side Right  -RE      L-Dex UE Pre Surgical Value No  -RE      L-Dex UE Score -3.2  -RE      L-Dex UE Baseline Score -4.7  -RE      L-Dex UE Value Change 1.5  -RE      L-Dex UE Comment WNL  -RE                User Key  (r) = Recorded By, (t) = Taken By, (c) = Cosigned By      Initials Name  Provider Type    RE Emy Gonzales OTR Occupational Therapist                  The patient had a 3-month SOZO measurement which I reviewed today. The score is WNL  see scanned to EMR. Bioimpedance spectroscopy helps identify the   onset of lymphedema in an arm or leg before patients experience noticeable swelling. Research has   shown that 92% of patients with early detection of lymphedema using L-Dex combined with   intervention do not progress to chronic lymphedema through three years. Additionally, as of March 2023, the NCCN Guidelines® for Survivorship recommend proactive screening for lymphedema using   bioimpedance spectroscopy. Whenever possible, patients are tested for baseline L-Dex score before   cancer treatment begins and then are reassessed during regular follow-up visits using the SOZO device.   Otherwise, this can be started postoperatively and continued during regular follow-up visits. If the   patient’s L-Dex score increases above normal levels, that is a sign that lymphedema is developing and a   referral is made to occupational therapy for further evaluation and early compression treatment.   Lymphedema assessment with the SOZO L-Dex score is recommended to be done every 3 months for   the first 3 years and then every 6 months for years 4 and 5 followed by annually afterwards           OT Assessment/Plan       Row Name 07/17/25 0906          OT Assessment    Impairments Impaired lymphatic circulation  -RE     Assessment Comments Patient returns for bioimpedance reassessment.  Her last measurement was 3 months ago.  L-Dex value today is -3.2 which is within normal limits and consistent with her baseline which is a significant improvement.  This measurement was performed immediately following sleeve removal.  She reports improved neuropathy symptoms and no complaints of lymphedema related symptoms or pain.  I recommended continued daily compression garment use due to a previously elevated L-dex  value.  I recommended follow-up in 3 months.  -RE     OT Diagnosis At risk for lymphedema  -RE     OT Rehab Potential Good  -RE     Patient/caregiver participated in establishment of treatment plan and goals Yes  -RE     Patient would benefit from skilled therapy intervention Yes  -RE        OT Plan    OT Frequency Other (comment)  -RE     Predicted Duration of Therapy Intervention (OT) Bioimpedance every 1-3 months  -RE     Planned CPT's? OT SELF CARE/MGMT/TRAIN 15 MIN: 09652;OT BIS XTRACELL FLUID ANALYSIS: 13296  -RE     OT Plan Comments Follow-up in 3 months  -RE               User Key  (r) = Recorded By, (t) = Taken By, (c) = Cosigned By      Initials Name Provider Type    RE Emy Gonzales, OTR Occupational Therapist                           OT Goals       Row Name 07/17/25 0900          OT Short Term Goals    STG 1 Patient will demonstrate proper awareness of “What is Lymphedema?” and “Healthy Habits” for improved prevention, management, care of symptoms and ease of transition to self-care of condition.  -RE     STG 1 Progress Met  -RE     STG 2 Pt will demonstrate understanding of use of compression sleeve for edema prevention, exercise and air travel.  -RE     STG 2 Progress Met  -RE     STG 3 Patient independent and compliant with initial home exercise program focused on gentle AROM and stretching to improve AROM to pre-surgical level.  -RE     STG 3 Progress Met  -RE        Long Term Goals    LTG Date to Achieve 10/17/25  -RE     LTG 1 Patient to improve DASH/ QuickDASH score by 10 points in 4 weeks.  -RE     LTG 1 Progress Not Met  -RE     LTG 2 Patient will participate in bioimpedance scans every 3 months as a method of early detection of lymphedema to allow for early intervention.  -RE     LTG 2 Progress Met;Ongoing  -RE     LTG 3 Patient's bioimpedance score to remain below 6.5 for decreased risk of stage II lymphedema.  -RE     LTG 3 Progress Met;Ongoing  -RE     LTG 4 Patient to demonstrate increased  bilateral shoulder flexion to 160 to improve functional UE use and to restore pre operative AROM per patient perception.  -RE     LTG 4 Progress Met  -RE     LTG 5 Patient to demonstrate increased bilateral shoulder abduction to 160 to improve functional UE use and to restore preoperative AROM per patient perception.  -RE     LTG 5 Progress Met  -RE        Time Calculation    OT Goal Re-Cert Due Date 10/17/25  -RE               User Key  (r) = Recorded By, (t) = Taken By, (c) = Cosigned By      Initials Name Provider Type    Emy Mccarthy OTR Occupational Therapist                    Therapy Education  Education Details: Discussed patient's current L-Dex value of -3.2 and recommended continued daily sleeve wear.  Given: Symptoms/condition management  Program: Reinforced  How Provided: Verbal  Provided to: Patient  Level of Understanding: Teach back education performed, Verbalized  98840 - OT Self Care/Mgmt Minutes: 15    Outcome Measure Options: Quick DASH  Quick DASH  Open a tight or new jar.: Moderate Difficulty  Do heavy household chores (e.g., wash walls, wash floors): Moderate Difficulty  Carry a shopping bag or briefcase: Moderate Difficulty  Wash your back: Mild Difficulty  Use a knife to cut food: Moderate Difficulty  Recreational activities in which you take some force or impact through your arm, should or hand (e.g. golf, hammering, tennis, etc.): Mild Difficulty  During the past week, to what extent has your arm, shoulder, or hand problem interfered with your normal social activites with family, friends, neighbors or groups?: Moderately  During the past week, were you limited in your work or other regular daily activities as a result of your arm, shoulder or hand problem?: Slightly Limited  Arm, Shoulder, or hand pain: Mild  Tingling (pins and needles) in your arm, shoulder, or hand: Moderate  During the past week, how much difficulty have you had sleeping because of the pain in your arm, shoulder or  hand?: Mild Difficulty  Number of Questions Answered: 11  Quick DASH Score: 38.64           Time Calculation:   OT Start Time: 0900  OT Stop Time: 0925  OT Time Calculation (min): 25 min  Total Timed Code Minutes- OT: 15 minute(s)  Timed Charges  08575 - OT Self Care/Mgmt Minutes: 15  Untimed Charges  74952 - OT Bioimpedence Rx Minutes: 10  Total Minutes  Timed Charges Total Minutes: 15  Untimed Charges Total Minutes: 10   Total Minutes: 25     Therapy Charges for Today       Code Description Service Date Service Provider Modifiers Qty    52590376038 HC OT SELF CARE/MGMT/TRAIN EA 15 MIN 7/17/2025 Emy Gonzales OTR GO 1    01126534147 HC OT BIS XTRACELL FLUID ANALYSIS 7/17/2025 Emy Gonzales OTR GO 1          Dictated utilizing Dragon dictation:  Much of this encounter note is an electronic transcription/translation of spoken language to printed text. The electronic translation of spoken language may permit erroneous, or at times, nonsensical words or phrases to be inadvertently transcribed; Although I have reviewed the note for such errors, some may still exist.              YARED Kelley  7/17/2025

## 2025-07-18 ENCOUNTER — TELEPHONE (OUTPATIENT)
Dept: NEUROLOGY | Facility: CLINIC | Age: 70
End: 2025-07-18
Payer: COMMERCIAL

## 2025-07-18 NOTE — TELEPHONE ENCOUNTER
Caller: Felicia Boyle    Relationship: Self    Best call back number: [phone]    What is the best time to reach you: ANY TIME    What was the call regarding: PT IS CALLING IN ASKING FOR MRI RESULTS    PLEASE REVIEW AND ADVISE

## 2025-08-04 ENCOUNTER — TRANSCRIBE ORDERS (OUTPATIENT)
Dept: ADMINISTRATIVE | Facility: HOSPITAL | Age: 70
End: 2025-08-04
Payer: COMMERCIAL

## 2025-08-04 DIAGNOSIS — R13.14 DYSPHAGIA, PHARYNGOESOPHAGEAL PHASE: Primary | ICD-10-CM

## 2025-08-11 ENCOUNTER — TELEPHONE (OUTPATIENT)
Dept: FAMILY MEDICINE CLINIC | Facility: CLINIC | Age: 70
End: 2025-08-11
Payer: COMMERCIAL

## 2025-08-11 DIAGNOSIS — R10.33 PERIUMBILICAL PAIN: Primary | ICD-10-CM

## 2025-08-12 ENCOUNTER — HOSPITAL ENCOUNTER (OUTPATIENT)
Dept: GENERAL RADIOLOGY | Facility: HOSPITAL | Age: 70
Discharge: HOME | End: 2025-08-12
Admitting: OTOLARYNGOLOGY
Payer: COMMERCIAL

## 2025-08-12 DIAGNOSIS — R13.14 DYSPHAGIA, PHARYNGOESOPHAGEAL PHASE: ICD-10-CM

## 2025-08-12 PROCEDURE — 92611 MOTION FLUOROSCOPY/SWALLOW: CPT

## 2025-08-12 PROCEDURE — 74230 X-RAY XM SWLNG FUNCJ C+: CPT

## 2025-08-12 RX ADMIN — BARIUM SULFATE 5 ML: 400 PASTE ORAL at 09:51

## 2025-08-12 RX ADMIN — BARIUM SULFATE 4 ML: 980 POWDER, FOR SUSPENSION ORAL at 09:51

## 2025-08-12 RX ADMIN — BARIUM SULFATE 55 ML: 0.81 POWDER, FOR SUSPENSION ORAL at 09:51

## 2025-08-13 ENCOUNTER — HOSPITAL ENCOUNTER (OUTPATIENT)
Dept: CT IMAGING | Facility: HOSPITAL | Age: 70
Discharge: HOME | End: 2025-08-13
Admitting: FAMILY MEDICINE
Payer: COMMERCIAL

## 2025-08-13 DIAGNOSIS — R10.33 PERIUMBILICAL PAIN: ICD-10-CM

## 2025-08-13 DIAGNOSIS — R10.33 PERIUMBILICAL ABDOMINAL PAIN: ICD-10-CM

## 2025-08-13 DIAGNOSIS — C50.511 MALIGNANT NEOPLASM OF LOWER-OUTER QUADRANT OF RIGHT FEMALE BREAST, UNSPECIFIED ESTROGEN RECEPTOR STATUS: ICD-10-CM

## 2025-08-13 LAB — CREAT BLDA-MCNC: 0.7 MG/DL (ref 0.6–1.3)

## 2025-08-13 PROCEDURE — 74177 CT ABD & PELVIS W/CONTRAST: CPT

## 2025-08-13 PROCEDURE — 82565 ASSAY OF CREATININE: CPT

## 2025-08-13 PROCEDURE — 25510000001 IOPAMIDOL 61 % SOLUTION: Performed by: FAMILY MEDICINE

## 2025-08-13 RX ORDER — IOPAMIDOL 612 MG/ML
85 INJECTION, SOLUTION INTRAVASCULAR
Status: COMPLETED | OUTPATIENT
Start: 2025-08-13 | End: 2025-08-13

## 2025-08-13 RX ADMIN — IOPAMIDOL 85 ML: 612 INJECTION, SOLUTION INTRAVENOUS at 08:36

## 2025-08-21 ENCOUNTER — LAB (OUTPATIENT)
Dept: LAB | Facility: HOSPITAL | Age: 70
End: 2025-08-21
Payer: COMMERCIAL

## 2025-08-21 ENCOUNTER — OFFICE VISIT (OUTPATIENT)
Dept: ONCOLOGY | Facility: CLINIC | Age: 70
End: 2025-08-21
Payer: COMMERCIAL

## 2025-08-21 VITALS
BODY MASS INDEX: 24.8 KG/M2 | DIASTOLIC BLOOD PRESSURE: 78 MMHG | SYSTOLIC BLOOD PRESSURE: 140 MMHG | WEIGHT: 123 LBS | OXYGEN SATURATION: 95 % | HEART RATE: 80 BPM | HEIGHT: 59 IN | TEMPERATURE: 97.1 F

## 2025-08-21 DIAGNOSIS — Z17.0 MALIGNANT NEOPLASM OF LOWER-OUTER QUADRANT OF RIGHT BREAST OF FEMALE, ESTROGEN RECEPTOR POSITIVE: Primary | ICD-10-CM

## 2025-08-21 DIAGNOSIS — C50.511 MALIGNANT NEOPLASM OF LOWER-OUTER QUADRANT OF RIGHT BREAST OF FEMALE, ESTROGEN RECEPTOR POSITIVE: Primary | ICD-10-CM

## 2025-08-22 ENCOUNTER — TELEPHONE (OUTPATIENT)
Dept: FAMILY MEDICINE CLINIC | Facility: CLINIC | Age: 70
End: 2025-08-22
Payer: COMMERCIAL

## 2025-08-22 ENCOUNTER — TELEPHONE (OUTPATIENT)
Dept: ONCOLOGY | Facility: CLINIC | Age: 70
End: 2025-08-22
Payer: COMMERCIAL

## 2025-08-25 DIAGNOSIS — E11.9 TYPE 2 DIABETES MELLITUS WITHOUT COMPLICATION, WITHOUT LONG-TERM CURRENT USE OF INSULIN: ICD-10-CM

## 2025-08-25 DIAGNOSIS — C50.511 MALIGNANT NEOPLASM OF LOWER-OUTER QUADRANT OF RIGHT FEMALE BREAST, UNSPECIFIED ESTROGEN RECEPTOR STATUS: ICD-10-CM

## 2025-08-25 DIAGNOSIS — E55.9 VITAMIN D DEFICIENCY: ICD-10-CM

## 2025-08-25 DIAGNOSIS — Z90.13 S/P BILATERAL MASTECTOMY: ICD-10-CM

## 2025-08-25 DIAGNOSIS — E78.2 MIXED HYPERLIPIDEMIA: ICD-10-CM

## 2025-08-25 DIAGNOSIS — I10 ESSENTIAL HYPERTENSION: ICD-10-CM

## 2025-08-25 DIAGNOSIS — M81.0 AGE-RELATED OSTEOPOROSIS WITHOUT CURRENT PATHOLOGICAL FRACTURE: ICD-10-CM

## (undated) DEVICE — DRSNG SURESITE WNDW 4X4.5

## (undated) DEVICE — BLADE SHIELD SCALPEL HOLDER: Brand: DEVON

## (undated) DEVICE — DRP SLUSH WARMR MACH CIR 44X44IN

## (undated) DEVICE — SUT MNCRYL PLS ANTIB UD 4/0 PS2 18IN

## (undated) DEVICE — Device

## (undated) DEVICE — Device: Brand: FABCO

## (undated) DEVICE — MSK PROC CURAPLEX O2 2/ADAPT 7FT

## (undated) DEVICE — TUBING, SUCTION, 1/4" X 10', STRAIGHT: Brand: MEDLINE

## (undated) DEVICE — PENCL SMOKE/EVAC MEGADYNE TELESCP 10FT

## (undated) DEVICE — COUNT NDL MAG FM/BLCK 40COUNT

## (undated) DEVICE — SUT PROLN 2/0 CT2 30IN 8411H

## (undated) DEVICE — KT ORCA ORCAPOD DISP STRL

## (undated) DEVICE — ELECTRD BLD EZ CLN MOD XLNG 2.75IN

## (undated) DEVICE — PATIENT RETURN ELECTRODE, SINGLE-USE, CONTACT QUALITY MONITORING, ADULT, WITH 9FT CORD, FOR PATIENTS WEIGING OVER 33LBS. (15KG): Brand: MEGADYNE

## (undated) DEVICE — DECANTER BAG 9": Brand: MEDLINE INDUSTRIES, INC.

## (undated) DEVICE — SKIN PREP TRAY W/CHG: Brand: MEDLINE INDUSTRIES, INC.

## (undated) DEVICE — SYR LL TP 10ML STRL

## (undated) DEVICE — BITEBLOCK OMNI BLOC

## (undated) DEVICE — SUT VIC 3/0 TIES 18IN J110T

## (undated) DEVICE — GLV SURG BIOGEL LTX PF 7

## (undated) DEVICE — CANNULA,ADULT,SOFT-TOUCH,7TUBE,SC: Brand: MEDLINE

## (undated) DEVICE — CONN TBG Y 5 IN 1 LF STRL

## (undated) DEVICE — SMOKE EVACUATION TUBING WITH 7/8 IN TO 1/4 IN REDUCER: Brand: BUFFALO FILTER

## (undated) DEVICE — TOTAL TRAY, 16FR 10ML SIL FOLEY, URN: Brand: MEDLINE

## (undated) DEVICE — LOU MINOR PROCEDURE: Brand: MEDLINE INDUSTRIES, INC.

## (undated) DEVICE — PK UNIV COMPL 40

## (undated) DEVICE — SENSR O2 OXIMAX FNGR A/ 18IN NONSTR

## (undated) DEVICE — LN SMPL CO2 SHTRM SD STREAM W/M LUER

## (undated) DEVICE — STRIP,CLOSURE,WOUND,MEDI-STRIP,1/2X4: Brand: MEDLINE

## (undated) DEVICE — SYR LUERLOK 30CC

## (undated) DEVICE — SPNG LAP 18X18IN LF STRL PK/5

## (undated) DEVICE — APPL CHLORAPREP HI/LITE 26ML ORNG

## (undated) DEVICE — ANTIBACTERIAL UNDYED BRAIDED (POLYGLACTIN 910), SYNTHETIC ABSORBABLE SUTURE: Brand: COATED VICRYL

## (undated) DEVICE — COVER,C-ARM,41X74: Brand: MEDLINE

## (undated) DEVICE — SUT SILK 3/0 TIES 18IN A184H

## (undated) DEVICE — JACKSON-PRATT 100CC BULB RESERVOIR: Brand: CARDINAL HEALTH

## (undated) DEVICE — GAUZE,SPONGE,FLUFF,6"X6.75",STRL,10/TRAY: Brand: MEDLINE

## (undated) DEVICE — ADAPT CLN BIOGUARD AIR/H2O DISP

## (undated) DEVICE — PK CHST BRST 40

## (undated) DEVICE — SYR LUERLOK 5CC

## (undated) DEVICE — THE TORRENT IRRIGATION SCOPE CONNECTOR IS USED WITH THE TORRENT IRRIGATION TUBING TO PROVIDE IRRIGATION FLUIDS SUCH AS STERILE WATER DURING GASTROINTESTINAL ENDOSCOPIC PROCEDURES WHEN USED IN CONJUNCTION WITH AN IRRIGATION PUMP (OR ELECTROSURGICAL UNIT).: Brand: TORRENT

## (undated) DEVICE — CVR TRANSD CIV FLX TPR 11.9 TO 3.8X61CM

## (undated) DEVICE — NDL HYPO PRECISIONGLIDE REG 25G 1 1/2

## (undated) DEVICE — MEDI-VAC YANKAUER SUCTION HANDLE W/BULBOUS TIP: Brand: CARDINAL HEALTH

## (undated) DEVICE — INTENDED FOR TISSUE SEPARATION, AND OTHER PROCEDURES THAT REQUIRE A SHARP SURGICAL BLADE TO PUNCTURE OR CUT.: Brand: BARD-PARKER ® CARBON RIB-BACK BLADES

## (undated) DEVICE — SOL NS 500ML

## (undated) DEVICE — FRCP BX RADJAW4 NDL 2.8 240CM LG OG BX40

## (undated) DEVICE — STPLR SKIN VISISTAT WD 35CT

## (undated) DEVICE — SUT ETHLN 2/0 PS 18IN 585H

## (undated) DEVICE — TOWEL,OR,DSP,ST,BLUE,STD,4/PK,20PK/CS: Brand: MEDLINE

## (undated) DEVICE — NEEDLE, QUINCKE, 20GX3.5": Brand: MEDLINE

## (undated) DEVICE — COVER,MAYO STAND,STERILE: Brand: MEDLINE

## (undated) DEVICE — INTENDED FOR TISSUE SEPARATION, AND OTHER PROCEDURES THAT REQUIRE A SHARP SURGICAL BLADE TO PUNCTURE OR CUT.: Brand: BARD-PARKER ® STAINLESS STEEL BLADES

## (undated) DEVICE — Device: Brand: DEFENDO AIR/WATER/SUCTION AND BIOPSY VALVE

## (undated) DEVICE — SOL NACL 0.9PCT 100ML SGL